# Patient Record
Sex: FEMALE | Race: WHITE | NOT HISPANIC OR LATINO | Employment: OTHER | URBAN - METROPOLITAN AREA
[De-identification: names, ages, dates, MRNs, and addresses within clinical notes are randomized per-mention and may not be internally consistent; named-entity substitution may affect disease eponyms.]

---

## 2017-09-20 ENCOUNTER — TELEPHONE (OUTPATIENT)
Dept: TRANSPLANT | Facility: CLINIC | Age: 57
End: 2017-09-20

## 2017-09-20 DIAGNOSIS — Z79.899 POLYPHARMACY: ICD-10-CM

## 2017-09-20 DIAGNOSIS — R06.82 TACHYPNEA: ICD-10-CM

## 2017-09-20 DIAGNOSIS — I27.9 CHRONIC PULMONARY HEART DISEASE: Primary | ICD-10-CM

## 2017-09-20 NOTE — TELEPHONE ENCOUNTER
----- Message from Cecile Snyder sent at 9/20/2017 12:13 PM CDT -----  Regarding: Pulmonary Hypertension Referral from Dr. Paddy Guerrier  Good Afternoon,     Dr. Paddy Guerrier is referring the current patient to the Pulmonary Hypertension clinic due to severe pulmonary hypertension and progressively worsening SOB. I've scanned the referral and records received, in the patient's  tab. Please let me know if you need additional information and I'd be happy to get it.    Thank you,  Cecile Snydre   [facility]  [phone]

## 2017-09-22 ENCOUNTER — TELEPHONE (OUTPATIENT)
Dept: TRANSPLANT | Facility: CLINIC | Age: 57
End: 2017-09-22

## 2017-09-28 ENCOUNTER — HISTORICAL (OUTPATIENT)
Dept: ADMINISTRATIVE | Facility: HOSPITAL | Age: 57
End: 2017-09-28

## 2017-10-04 PROBLEM — I27.20 PULMONARY HYPERTENSION: Status: ACTIVE | Noted: 2017-10-04

## 2017-10-05 ENCOUNTER — HOSPITAL ENCOUNTER (INPATIENT)
Facility: HOSPITAL | Age: 57
LOS: 6 days | Discharge: HOME HEALTH | DRG: 286 | End: 2017-10-11
Attending: INTERNAL MEDICINE | Admitting: INTERNAL MEDICINE
Payer: MEDICARE

## 2017-10-05 DIAGNOSIS — I51.7 CARDIOMEGALY: ICD-10-CM

## 2017-10-05 DIAGNOSIS — J96.11 CHRONIC RESPIRATORY FAILURE WITH HYPOXIA: ICD-10-CM

## 2017-10-05 DIAGNOSIS — I27.20 PULMONARY HTN: ICD-10-CM

## 2017-10-05 DIAGNOSIS — I50.9 CHF (CONGESTIVE HEART FAILURE): ICD-10-CM

## 2017-10-05 DIAGNOSIS — I27.20 PULMONARY HYPERTENSION: ICD-10-CM

## 2017-10-05 DIAGNOSIS — I50.33 ACUTE ON CHRONIC DIASTOLIC HEART FAILURE: ICD-10-CM

## 2017-10-05 DIAGNOSIS — I27.20 PULMONARY HYPERTENSION: Primary | ICD-10-CM

## 2017-10-05 PROBLEM — E66.2 HYPOVENTILATION ASSOCIATED WITH OBESITY SYNDROME: Status: ACTIVE | Noted: 2017-10-05

## 2017-10-05 PROBLEM — F31.9 BIPOLAR DISEASE, CHRONIC: Status: ACTIVE | Noted: 2017-10-05

## 2017-10-05 PROBLEM — J44.9 COPD (CHRONIC OBSTRUCTIVE PULMONARY DISEASE): Status: ACTIVE | Noted: 2017-10-05

## 2017-10-05 PROBLEM — E03.9 HYPOTHYROIDISM: Status: ACTIVE | Noted: 2017-10-05

## 2017-10-05 LAB
ALBUMIN SERPL BCP-MCNC: 4.1 G/DL
ALP SERPL-CCNC: 146 U/L
ALT SERPL W/O P-5'-P-CCNC: 44 U/L
ANION GAP SERPL CALC-SCNC: 13 MMOL/L
AST SERPL-CCNC: 37 U/L
BASOPHILS # BLD AUTO: 0.01 K/UL
BASOPHILS NFR BLD: 0.1 %
BILIRUB SERPL-MCNC: 1.5 MG/DL
BNP SERPL-MCNC: 669 PG/ML
BUN SERPL-MCNC: 37 MG/DL
CALCIUM SERPL-MCNC: 10.1 MG/DL
CHLORIDE SERPL-SCNC: 88 MMOL/L
CO2 SERPL-SCNC: 41 MMOL/L
CREAT SERPL-MCNC: 1.1 MG/DL
DIFFERENTIAL METHOD: ABNORMAL
EOSINOPHIL # BLD AUTO: 0.1 K/UL
EOSINOPHIL NFR BLD: 0.9 %
ERYTHROCYTE [DISTWIDTH] IN BLOOD BY AUTOMATED COUNT: 15.3 %
EST. GFR  (AFRICAN AMERICAN): >60 ML/MIN/1.73 M^2
EST. GFR  (NON AFRICAN AMERICAN): 56.7 ML/MIN/1.73 M^2
ESTIMATED AVG GLUCOSE: 111 MG/DL
ESTIMATED PA SYSTOLIC PRESSURE: 124.64
GLOBAL PERICARDIAL EFFUSION: ABNORMAL
GLUCOSE SERPL-MCNC: 81 MG/DL
HBA1C MFR BLD HPLC: 5.5 %
HCT VFR BLD AUTO: 49.6 %
HGB BLD-MCNC: 16.8 G/DL
INR PPP: 1
LYMPHOCYTES # BLD AUTO: 2 K/UL
LYMPHOCYTES NFR BLD: 18.2 %
MAGNESIUM SERPL-MCNC: 2.2 MG/DL
MCH RBC QN AUTO: 30.9 PG
MCHC RBC AUTO-ENTMCNC: 33.9 G/DL
MCV RBC AUTO: 91 FL
MONOCYTES # BLD AUTO: 0.6 K/UL
MONOCYTES NFR BLD: 5.4 %
NEUTROPHILS # BLD AUTO: 8.4 K/UL
NEUTROPHILS NFR BLD: 74.6 %
PLATELET # BLD AUTO: 136 K/UL
PMV BLD AUTO: 11.9 FL
POTASSIUM SERPL-SCNC: 3.3 MMOL/L
PROT SERPL-MCNC: 7.1 G/DL
PROTHROMBIN TIME: 10.9 SEC
RBC # BLD AUTO: 5.43 M/UL
RETIRED EF AND QEF - SEE NOTES: 55 (ref 55–65)
SODIUM SERPL-SCNC: 142 MMOL/L
TRICUSPID VALVE REGURGITATION: ABNORMAL
TSH SERPL DL<=0.005 MIU/L-ACNC: 1.9 UIU/ML
WBC # BLD AUTO: 11.18 K/UL

## 2017-10-05 PROCEDURE — 63600175 PHARM REV CODE 636 W HCPCS: Performed by: INTERNAL MEDICINE

## 2017-10-05 PROCEDURE — 83880 ASSAY OF NATRIURETIC PEPTIDE: CPT

## 2017-10-05 PROCEDURE — 94799 UNLISTED PULMONARY SVC/PX: CPT

## 2017-10-05 PROCEDURE — 99223 1ST HOSP IP/OBS HIGH 75: CPT | Mod: ,,, | Performed by: INTERNAL MEDICINE

## 2017-10-05 PROCEDURE — 93306 TTE W/DOPPLER COMPLETE: CPT | Mod: 26,,, | Performed by: INTERNAL MEDICINE

## 2017-10-05 PROCEDURE — 25000003 PHARM REV CODE 250: Performed by: PHYSICIAN ASSISTANT

## 2017-10-05 PROCEDURE — 99900035 HC TECH TIME PER 15 MIN (STAT)

## 2017-10-05 PROCEDURE — 94761 N-INVAS EAR/PLS OXIMETRY MLT: CPT

## 2017-10-05 PROCEDURE — 27000190 HC CPAP FULL FACE MASK W/VALVE

## 2017-10-05 PROCEDURE — 93306 TTE W/DOPPLER COMPLETE: CPT

## 2017-10-05 PROCEDURE — 84443 ASSAY THYROID STIM HORMONE: CPT

## 2017-10-05 PROCEDURE — 27000221 HC OXYGEN, UP TO 24 HOURS

## 2017-10-05 PROCEDURE — 85610 PROTHROMBIN TIME: CPT

## 2017-10-05 PROCEDURE — 94640 AIRWAY INHALATION TREATMENT: CPT

## 2017-10-05 PROCEDURE — 85025 COMPLETE CBC W/AUTO DIFF WBC: CPT

## 2017-10-05 PROCEDURE — 94660 CPAP INITIATION&MGMT: CPT

## 2017-10-05 PROCEDURE — 20600001 HC STEP DOWN PRIVATE ROOM

## 2017-10-05 PROCEDURE — 25000003 PHARM REV CODE 250: Performed by: INTERNAL MEDICINE

## 2017-10-05 PROCEDURE — A4216 STERILE WATER/SALINE, 10 ML: HCPCS | Performed by: INTERNAL MEDICINE

## 2017-10-05 PROCEDURE — 93010 ELECTROCARDIOGRAM REPORT: CPT | Mod: ,,, | Performed by: INTERNAL MEDICINE

## 2017-10-05 PROCEDURE — 83036 HEMOGLOBIN GLYCOSYLATED A1C: CPT

## 2017-10-05 PROCEDURE — 80053 COMPREHEN METABOLIC PANEL: CPT

## 2017-10-05 PROCEDURE — 36415 COLL VENOUS BLD VENIPUNCTURE: CPT

## 2017-10-05 PROCEDURE — 93005 ELECTROCARDIOGRAM TRACING: CPT

## 2017-10-05 PROCEDURE — 25000242 PHARM REV CODE 250 ALT 637 W/ HCPCS: Performed by: INTERNAL MEDICINE

## 2017-10-05 PROCEDURE — 83735 ASSAY OF MAGNESIUM: CPT

## 2017-10-05 RX ORDER — LEVOTHYROXINE SODIUM 75 UG/1
75 TABLET ORAL DAILY
COMMUNITY

## 2017-10-05 RX ORDER — HYDROCODONE BITARTRATE AND ACETAMINOPHEN 5; 325 MG/1; MG/1
1 TABLET ORAL EVERY 6 HOURS PRN
Status: DISCONTINUED | OUTPATIENT
Start: 2017-10-05 | End: 2017-10-11 | Stop reason: HOSPADM

## 2017-10-05 RX ORDER — PANTOPRAZOLE SODIUM 40 MG/1
40 TABLET, DELAYED RELEASE ORAL DAILY
Status: DISCONTINUED | OUTPATIENT
Start: 2017-10-05 | End: 2017-10-11 | Stop reason: HOSPADM

## 2017-10-05 RX ORDER — AMLODIPINE BESYLATE 5 MG/1
5 TABLET ORAL DAILY
COMMUNITY

## 2017-10-05 RX ORDER — AMOXICILLIN AND CLAVULANATE POTASSIUM 875; 125 MG/1; MG/1
1 TABLET, FILM COATED ORAL
Status: ON HOLD | COMMUNITY
End: 2017-10-05 | Stop reason: CLARIF

## 2017-10-05 RX ORDER — PRAVASTATIN SODIUM 40 MG/1
40 TABLET ORAL DAILY
Status: DISCONTINUED | OUTPATIENT
Start: 2017-10-05 | End: 2017-10-11 | Stop reason: HOSPADM

## 2017-10-05 RX ORDER — LEVOTHYROXINE SODIUM 75 UG/1
75 TABLET ORAL DAILY
Status: DISCONTINUED | OUTPATIENT
Start: 2017-10-05 | End: 2017-10-11 | Stop reason: HOSPADM

## 2017-10-05 RX ORDER — SODIUM CHLORIDE 0.9 % (FLUSH) 0.9 %
3 SYRINGE (ML) INJECTION EVERY 8 HOURS
Status: DISCONTINUED | OUTPATIENT
Start: 2017-10-05 | End: 2017-10-11 | Stop reason: HOSPADM

## 2017-10-05 RX ORDER — BUSPIRONE HYDROCHLORIDE 15 MG/1
15 TABLET ORAL 3 TIMES DAILY
COMMUNITY

## 2017-10-05 RX ORDER — IPRATROPIUM BROMIDE AND ALBUTEROL SULFATE 2.5; .5 MG/3ML; MG/3ML
3 SOLUTION RESPIRATORY (INHALATION) EVERY 6 HOURS
Status: DISCONTINUED | OUTPATIENT
Start: 2017-10-05 | End: 2017-10-11

## 2017-10-05 RX ORDER — DESVENLAFAXINE SUCCINATE 50 MG/1
100 TABLET, EXTENDED RELEASE ORAL DAILY
Status: DISCONTINUED | OUTPATIENT
Start: 2017-10-05 | End: 2017-10-11 | Stop reason: HOSPADM

## 2017-10-05 RX ORDER — BUSPIRONE HYDROCHLORIDE 5 MG/1
15 TABLET ORAL 3 TIMES DAILY
Status: DISCONTINUED | OUTPATIENT
Start: 2017-10-05 | End: 2017-10-11 | Stop reason: HOSPADM

## 2017-10-05 RX ORDER — BUDESONIDE AND FORMOTEROL FUMARATE DIHYDRATE 80; 4.5 UG/1; UG/1
2 AEROSOL RESPIRATORY (INHALATION) 2 TIMES DAILY
Status: ON HOLD | COMMUNITY
End: 2018-06-10 | Stop reason: HOSPADM

## 2017-10-05 RX ORDER — ALBUTEROL SULFATE 0.83 MG/ML
2.5 SOLUTION RESPIRATORY (INHALATION) EVERY 6 HOURS PRN
COMMUNITY

## 2017-10-05 RX ORDER — LAMOTRIGINE 100 MG/1
100 TABLET ORAL 2 TIMES DAILY
Status: DISCONTINUED | OUTPATIENT
Start: 2017-10-05 | End: 2017-10-11 | Stop reason: HOSPADM

## 2017-10-05 RX ORDER — POTASSIUM CHLORIDE 750 MG/1
60 CAPSULE, EXTENDED RELEASE ORAL ONCE
Status: COMPLETED | OUTPATIENT
Start: 2017-10-05 | End: 2017-10-05

## 2017-10-05 RX ORDER — LURASIDONE HYDROCHLORIDE 20 MG/1
60 TABLET, FILM COATED ORAL DAILY
Status: DISCONTINUED | OUTPATIENT
Start: 2017-10-05 | End: 2017-10-11 | Stop reason: HOSPADM

## 2017-10-05 RX ORDER — LURASIDONE HYDROCHLORIDE 60 MG/1
40 TABLET, FILM COATED ORAL DAILY
COMMUNITY
End: 2018-06-22

## 2017-10-05 RX ORDER — FUROSEMIDE 20 MG/1
20 TABLET ORAL DAILY
Status: ON HOLD | COMMUNITY
End: 2017-10-11

## 2017-10-05 RX ORDER — PRAVASTATIN SODIUM 40 MG/1
40 TABLET ORAL DAILY
COMMUNITY

## 2017-10-05 RX ORDER — DESVENLAFAXINE 100 MG/1
100 TABLET, EXTENDED RELEASE ORAL DAILY
COMMUNITY

## 2017-10-05 RX ORDER — PANTOPRAZOLE SODIUM 40 MG/1
40 TABLET, DELAYED RELEASE ORAL DAILY
COMMUNITY

## 2017-10-05 RX ORDER — AMLODIPINE BESYLATE 5 MG/1
5 TABLET ORAL DAILY
Status: DISCONTINUED | OUTPATIENT
Start: 2017-10-05 | End: 2017-10-11 | Stop reason: HOSPADM

## 2017-10-05 RX ORDER — HEPARIN SODIUM 5000 [USP'U]/ML
5000 INJECTION, SOLUTION INTRAVENOUS; SUBCUTANEOUS EVERY 8 HOURS
Status: DISCONTINUED | OUTPATIENT
Start: 2017-10-05 | End: 2017-10-11 | Stop reason: HOSPADM

## 2017-10-05 RX ORDER — FUROSEMIDE 10 MG/ML
40 INJECTION INTRAMUSCULAR; INTRAVENOUS 2 TIMES DAILY
Status: DISCONTINUED | OUTPATIENT
Start: 2017-10-05 | End: 2017-10-10

## 2017-10-05 RX ORDER — ALBUTEROL SULFATE 0.83 MG/ML
2.5 SOLUTION RESPIRATORY (INHALATION) EVERY 4 HOURS PRN
Status: DISCONTINUED | OUTPATIENT
Start: 2017-10-05 | End: 2017-10-11

## 2017-10-05 RX ORDER — HYDROCODONE BITARTRATE AND ACETAMINOPHEN 10; 325 MG/1; MG/1
1 TABLET ORAL EVERY 8 HOURS PRN
COMMUNITY

## 2017-10-05 RX ORDER — LAMOTRIGINE 100 MG/1
100 TABLET ORAL 2 TIMES DAILY
COMMUNITY

## 2017-10-05 RX ADMIN — IPRATROPIUM BROMIDE AND ALBUTEROL SULFATE 3 ML: .5; 3 SOLUTION RESPIRATORY (INHALATION) at 01:10

## 2017-10-05 RX ADMIN — HEPARIN SODIUM 5000 UNITS: 5000 INJECTION, SOLUTION INTRAVENOUS; SUBCUTANEOUS at 05:10

## 2017-10-05 RX ADMIN — PANTOPRAZOLE SODIUM 40 MG: 40 TABLET, DELAYED RELEASE ORAL at 09:10

## 2017-10-05 RX ADMIN — DESVENLAFAXINE SUCCINATE 100 MG: 50 TABLET, FILM COATED, EXTENDED RELEASE ORAL at 09:10

## 2017-10-05 RX ADMIN — FUROSEMIDE 40 MG: 10 INJECTION, SOLUTION INTRAVENOUS at 09:10

## 2017-10-05 RX ADMIN — HEPARIN SODIUM 5000 UNITS: 5000 INJECTION, SOLUTION INTRAVENOUS; SUBCUTANEOUS at 08:10

## 2017-10-05 RX ADMIN — LEVOTHYROXINE SODIUM 75 MCG: 75 TABLET ORAL at 09:10

## 2017-10-05 RX ADMIN — BUSPIRONE HYDROCHLORIDE 15 MG: 5 TABLET ORAL at 05:10

## 2017-10-05 RX ADMIN — BUSPIRONE HYDROCHLORIDE 15 MG: 5 TABLET ORAL at 08:10

## 2017-10-05 RX ADMIN — AMLODIPINE BESYLATE 5 MG: 5 TABLET ORAL at 09:10

## 2017-10-05 RX ADMIN — Medication 3 ML: at 01:10

## 2017-10-05 RX ADMIN — IPRATROPIUM BROMIDE AND ALBUTEROL SULFATE 3 ML: .5; 3 SOLUTION RESPIRATORY (INHALATION) at 07:10

## 2017-10-05 RX ADMIN — LAMOTRIGINE 100 MG: 100 TABLET ORAL at 08:10

## 2017-10-05 RX ADMIN — LAMOTRIGINE 100 MG: 100 TABLET ORAL at 11:10

## 2017-10-05 RX ADMIN — FUROSEMIDE 40 MG: 10 INJECTION, SOLUTION INTRAVENOUS at 05:10

## 2017-10-05 RX ADMIN — PRAVASTATIN SODIUM 40 MG: 40 TABLET ORAL at 09:10

## 2017-10-05 RX ADMIN — POTASSIUM CHLORIDE 60 MEQ: 750 CAPSULE, EXTENDED RELEASE ORAL at 11:10

## 2017-10-05 RX ADMIN — HYDROCODONE BITARTRATE AND ACETAMINOPHEN 1 TABLET: 5; 325 TABLET ORAL at 05:10

## 2017-10-05 RX ADMIN — HEPARIN SODIUM 5000 UNITS: 5000 INJECTION, SOLUTION INTRAVENOUS; SUBCUTANEOUS at 01:10

## 2017-10-05 RX ADMIN — BUSPIRONE HYDROCHLORIDE 15 MG: 5 TABLET ORAL at 01:10

## 2017-10-05 RX ADMIN — IPRATROPIUM BROMIDE AND ALBUTEROL SULFATE 3 ML: .5; 3 SOLUTION RESPIRATORY (INHALATION) at 08:10

## 2017-10-05 RX ADMIN — LURASIDONE HYDROCHLORIDE 60 MG: 20 TABLET, FILM COATED ORAL at 11:10

## 2017-10-05 NOTE — ASSESSMENT & PLAN NOTE
- Unclear type (suspect this is 1 or 3) with concomitant SHERIE (PA pressure out of proportion to what would be expected of SHERIE alone)   - Long history of smoking but no COPD per outside pulmonologist   - CT PE study done at OSH. May want to have our radiologist reviews as CTEPH is sometimes subtle. Disk placed in folder on floor.   - Will be evaluated this admission for advanced therapies (PA vasodilators etc)   - Will repeat TTE here with bubbles to evaluate for shunt  - Heparin sub q for VTE PPX  - Awaiting labs

## 2017-10-05 NOTE — ASSESSMENT & PLAN NOTE
"- Continue oxygen supplementaion with plan to wean back to home dose of 3Ls by NC  - BiPAP QHS (she is not sure her settings). May have to start on 10/5 or get outside records  - Consider pulmonary consult to help guide need for possible repeat PFTs and to clarify the appropriateness of a home trilogy vent(?) despite not having a tracheostomy or chronic vent dependence?   - SpO2 96% by 10L on Venti mask   - ABG for any signs of encephalopathy or respiratory distress  - Unclear role for her previous inhaled therapies for what was called "COPD". Will continue duoneb and prn albuterol for now as her lungs sound junky. Will hold further systemic steroids (initially on prednisone 40mg for a possible COPD exacerbation).   "

## 2017-10-05 NOTE — PLAN OF CARE
Problem: Patient Care Overview  Goal: Plan of Care Review  Outcome: Ongoing (interventions implemented as appropriate)  Pt AAO x4. Pt independent. Pt on 3 L NC. O2 sats 93%. Pt sinus rhythm on tele. Pt had 2D echo today. Pt on regular low sodium diet, with 2000 mL fluid restriction. Pt not experiencing any pain. Pt receiving IV lasix. Pt's urine output 350 thus far this shift with one unmeasured urine occurrence. Pt has had one bowel movement today. Pt wearing nonslip socks when out of bed and aware to call for help when needed.

## 2017-10-05 NOTE — HPI
Mrs. Sue Smith is a 57 yo female with a PMHx of complex pulmonary disease (see below), Bipolar disorder (controlled on multiple meds), hypothyroidism, GERD, previous pericardial window for an pericardial effusion, HTN, GERD and dyslipidemia. She presents as a transfer from [facility] for PulmHTN evaluation. She had previously been referred to us with plans for outpatient evaluation on 10/18/17. She was admitted there on 9/28/17 for worsening SOB that had developed over the past week. She initially required BiPAP and IV diuresis before eventually weaning to Venti mask. Over the course of her admission she had a CT PE study that was read as negative for pulmonary embolism, TTE that showed no significant aortic/mitral disease but did show a hyperdynamic LV and  Estimated PA systolic pressure of 98. She subsequently underwent RHC on 9/29/17 (unclear if she was adequately diuresed) with a PCW of 26 /50 (75) /30 RA 25. She was transferred here for advance PAH options / evaluation. Relatively asymptomatic here. Lives in assisted living with extended family close. Long discussion regarding goals of care. Patient is a full code. Does not think she has an advanced directive (she plans to discuss with her family).     Pulmonary Hx: Of note records from OSH are discordant. Hospitalist / cardiology have her labeled as COPD and Obesity hypoventilation however consult by pulmonologist (Dr. Hardik Magallanes) states explicitly that PFTs done in 2015 showed only mild restriction and were not consistent with COPD. In addition he states that though she is mildly obese her pCO2 is normal at baseline ruling out obesity hypoventilation. What makes this more difficult is the fact that the patient is unsure if she has COPD as she has been told different things by different doctors over the years.     - Severe Pulmonary HTN  - Chronic hypoxic respiratory failure for which she is on 3L all the time and BiPAP QHS  - History of  tracheostomy (4 years ago) and short term vent dependence. Tracheostomy was eventually de cannulated.   - Previous tobacco abuse (stopped 15 yrs ago)   - SHERIE (unclear severity) has been on trilogy vent for BiPAP at night.

## 2017-10-05 NOTE — PLAN OF CARE
Problem: Patient Care Overview  Goal: Plan of Care Review  Outcome: Ongoing (interventions implemented as appropriate)  Pt has call bell in reach, non slip socks on, and bedrails up x2. Pt has meds for anxiety and comfortable in room. Pt on venti mask at 12 liters. Pt encouraged to wash hands.

## 2017-10-05 NOTE — PROGRESS NOTES
Admit Note     Met with patient to assess needs. Patient is a 56 y.o.  female, admitted for pulmonary hypertension.  PMH of hypothyroidism , GERD, HTN, SOB and Bipolar disorder.     Patient transfered from [facility] on 10/5/2017 .  At this time, patient presents as alert and oriented x 4, pleasant and good eye contact.  At this time, patients caregiver is not in attendance.     Household/Family Systems     Patient resides alone, at    [facility]   [address]  [place] [address].    Pt lives in assisted living.  Pt lives 2.5 hours from [facility].  [place] is close to [place].     Support system includes Daughter, father and friend.    Patient does not have dependents that are need of being cared for.   The pt has two daughters however has the contact information for only one daughter.      Patients primary caregiver is self.  The pt's cell is: [phone]  Additional emergency contacts:  Prabha Smith (daughter, lives in [place]) [phone]  UERIN Clive (father, lives in [place]) [phone]    During admission, patient's family plans to stay at home.  Confirmed patient and patients caregivers  Have limited access to reliable transportation.    Cognitive Status/Learning     Patient reports reading ability as 12th grade and states patient does have difficulty with comprehension and learning. Pt reports she does not have difficulty with seeing, hearing, reading, witting, or with her memory.  Pt does wear glasses.  Patient reports patient learns best by one on one.     Needed: No.   Highest education level: High School (9-12) or GED    Vocation/Disability   .  Working for Income: No  If no, reason not working: Disability    Patient reports she has been on disability for the last 25-30 years due to bipolar disorder.   Pt reports prior to disability she was a .     Adherence     Patient reports a high level of adherence to patients health care regimen. Pt  reports she is on a regular diet at home.  Adherence counseling and education provided. Patient verbalizes understanding.    Substance Use    Patient reports the following substance usage.    Tobacco: none.  Pt reports she smoked from 1919-3192.  Pt reports she used to smoke 1-2 ppd.  .  Alcohol: none, patient denies any use.  Illicit Drugs/Non-prescribed Medications: none, patient denies any use.  Patient states clear understanding of the potential impact of substance use.  Substance abstinence/cessation counseling, education and resources provided and reviewed.     Services Utilizing/ADLS    Infusion Service: Prior to admission, patient utilizing? no  Home Health: Prior to admission, patient utilizing? no Pt reports no HH preference.   DME: Prior to admission, yes Home 02 set up with portables, 3 LPM via [employer].  Pt also has a nebulizar and shower chair.   Pulmonary/Cardiac Rehab: Prior to admission, no  Dialysis:  Prior to admission, no  Transplant Specialty Pharmacy:  Prior to admission, no.    Prior to admission, patient reports patient was independent with ADLS, but not with cooking or cleaning her home.   Pt  was not driving. The pt reports her daughter and father can assist with transport around the [place] area, but are not able to assist with transportation to [place].  The pt reports she has a friend-Rivka who should be able to drive the pt home and assist with transport to [place] as needed.      Patient reports patient is not able to care for self at this time due to compromised medical condition (as documented in medical record) and physical weakness..  Patient indicates a willingness to care for self once medically cleared to do so.  Worker encouraged pt to go to the Medicaid office in her town and apply for a PCA via Medicaid.    Insurance/Medications    Insured by   Payor/Plan Subscr  Sex Relation Sub. Ins. ID Effective Group Num   1. PEOPLES LINDA* RADHAHERACLIO SIMPSON 1960  Female  [id number] 1/1/17 [address]      Primary Insurance (for UNOS reporting): Public Insurance - Medicare FFS (Fee For Service)  Secondary Insurance (for UNOS reporting): Public Insurance - Medicaid    Patient reports patient is able to obtain and afford medications at this time and at time of discharge.    Living Will/Healthcare Power of     Patient states patient does not have a LW and/or HCPA.   provided education regarding LW and HCPA and the completion of forms.    Coping/Mental Health    Patient is coping adequately with the aid of  family members and friends. .  Patient indicates mental health difficulties. Pt reports she has a long history of Bipolar along with anxiety and depression.  The pt reports she is on multiple medications to assist with same.  The pt reports she tries to maintain a positive attitude.  Worker provided general support.      Discharge Planning    At time of discharge, patient plans to return to patient's home under the care of slef.  Patients friend will transport patient.  Per rounds today, expected discharge date has not been medically determined at this time. Patient and patients caregiver  verbalize understanding and are involved in treatment planning and discharge process.    Additional Concerns    Patient is being followed for needs, education, resources, information, emotional support, supportive counseling, and for supportive and skilled discharge plan of care.  providing ongoing psychosocial support, education, resources and d/c planning as needed.  SW remains available. Patient verbalizes understanding and agreement with information reviewed, social work availability, and how to access available resources as needed.

## 2017-10-05 NOTE — SUBJECTIVE & OBJECTIVE
Past Medical History:   Diagnosis Date    Bipolar disorder     CHF (congestive heart failure)     Dyslipidemia     GERD (gastroesophageal reflux disease)     Hx of tracheostomy     Decanulated / surgically removed in 2013? Does not currently have tracheostomy    Hypertension     Hypothyroidism     Oxygen dependent     3L around the clock     Pulmonary HTN     Respiratory failure, chronic        Past Surgical History:   Procedure Laterality Date    BACK SURGERY      PERICARDIAL WINDOW  2013    OK LEFT HEART CATH,PERCUTANEOUS      Cardiac Cath, Left Heart    TUBAL LIGATION         Review of patient's allergies indicates:   Allergen Reactions    Sulfa (sulfonamide antibiotics) Rash       Current Facility-Administered Medications   Medication    albuterol nebulizer solution 2.5 mg    albuterol-ipratropium 2.5mg-0.5mg/3mL nebulizer solution 3 mL    amlodipine tablet 5 mg    busPIRone tablet 15 mg    desvenlafaxine succinate 24 hr tablet 100 mg    furosemide injection 40 mg    heparin (porcine) injection 5,000 Units    lamotrigine tablet 100 mg    levothyroxine tablet 75 mcg    lurasidone tablet 60 mg    pantoprazole EC tablet 40 mg    pravastatin tablet 40 mg    sodium chloride 0.9% flush 3 mL     Family History     Problem Relation (Age of Onset)    Cancer Mother, Sister    Hypertension Mother, Father        Social History Main Topics    Smoking status: Former Smoker     Types: Cigarettes     Start date: 1/1/1979     Quit date: 1/5/1997    Smokeless tobacco: Never Used    Alcohol use No    Drug use: No    Sexual activity: No     Review of Systems   Constitutional: Negative.    HENT: Negative.    Eyes: Negative.    Respiratory: Positive for shortness of breath.    Cardiovascular: Positive for leg swelling. Negative for chest pain.   Gastrointestinal: Negative.    Endocrine: Negative.    Genitourinary: Negative.    Musculoskeletal: Positive for back pain.   Skin: Negative.     Allergic/Immunologic: Negative.    Neurological: Negative.    Hematological: Negative.    Psychiatric/Behavioral: Negative.      Objective:     Vital Signs (Most Recent):  Temp: 99.1 °F (37.3 °C) (10/05/17 0209)  Pulse: 70 (10/05/17 0209)  Resp: 18 (10/05/17 0209)  BP: 126/89 (10/05/17 0209)  SpO2: 96 % (10/05/17 0209) Vital Signs (24h Range):  Temp:  [99.1 °F (37.3 °C)] 99.1 °F (37.3 °C)  Pulse:  [70] 70  Resp:  [18] 18  SpO2:  [96 %] 96 %  BP: (126)/(89) 126/89   Patient Vitals for the past 72 hrs (Last 3 readings):   Weight   10/05/17 0209 86.3 kg (190 lb 4.1 oz)     Body mass index is 32.66 kg/m².    Physical Exam   Constitutional: She is oriented to person, place, and time. She appears well-developed and well-nourished. No distress.   HENT:   Head: Normocephalic and atraumatic.   Eyes: Pupils are equal, round, and reactive to light.   Neck: Neck supple. JVD present.   Cardiovascular: Normal rate and regular rhythm.    Pulmonary/Chest: Effort normal. No respiratory distress. She has rales.   Abdominal: Soft. She exhibits no distension. There is no tenderness.   Obese    Musculoskeletal: She exhibits edema.   Neurological: She is alert and oriented to person, place, and time.   Skin: Skin is warm and dry.   Psychiatric: She has a normal mood and affect. Her behavior is normal. Judgment and thought content normal.   Vitals reviewed.      Significant Labs:  Labs: pending     Diagnostic Results:  RHC / TTE reviewed per paper record from OSH (summary in HPI)    CXR ordered.

## 2017-10-05 NOTE — ASSESSMENT & PLAN NOTE
- Volume overloaded on exam   - Etiology is likely PAH and RV failure   - Continue IV lasix with strict I/Os and daily weights   - LV function normal / hyperdynamic

## 2017-10-05 NOTE — H&P
[facility]  Heart Transplant  H&P    Patient Name: Sue Smith  MRN: [id number]  Admission Date: 10/5/2017  Attending Physician: Shelby De La Paz MD  Primary Care Provider: Paddy Guerrier DO  Principal Problem:Pulmonary hypertension    Subjective:     History of Present Illness:  Mrs. Sue Smith is a 55 yo female with a PMHx of complex pulmonary disease (see below), Bipolar disorder (controlled on multiple meds), hypothyroidism, GERD, previous pericardial window for an pericardial effusion, HTN, GERD and dyslipidemia. She presents as a transfer from [facility] for PulTN evaluation. She had previously been referred to us with plans for outpatient evaluation on 10/18/17. She was admitted there on 9/28/17 for worsening SOB that had developed over the past week. She initially required BiPAP and IV diuresis before eventually weaning to Venti mask. Over the course of her admission she had a CT PE study that was read as negative for pulmonary embolism, TTE that showed no significant aortic/mitral disease but did show a hyperdynamic LV and  Estimated PA systolic pressure of 98. She subsequently underwent RHC on 9/29/17 (unclear if she was adequately diuresed) with a PCW of 26 /50 (75) /30 RA 25. She was transferred here for advance PAH options / evaluation. Relatively asymptomatic here. Lives in assisted living with extended family close. Long discussion regarding goals of care. Patient is a full code. Does not thing she has an advanced directive (she plans to discuss with her family).     Pulmonary Hx: Of note records from OSH are discordant. Hospitalist / cardiology have her labeled as COPD and Obesity hypoventilation however consult by pulmonologist (Dr. Hardik Magallanes) states explicitly that PFTs done in 2015 showed only mild restriction and were not consistent with COPD. In addition he states that though she is mildly obese her pCO2 is normal at baseline ruling out obesity  hypoventilation. What makes this more difficult is the fact that the patient is unsure if she has COPD as she has been told different things by different doctors over the years.     - Severe Pulmonary HTN  - Chronic hypoxic respiratory failure for which she is on 3L all the time and BiPAP QHS  - History of tracheostomy (4 years ago) and short term vent dependence. Tracheostomy was eventually de cannulated.   - Previous tobacco abuse (stopped 15 yrs ago)   - SHERIE (unclear severity) has been on trilogy vent for BiPAP at night.     History was difficult to obtain as patient is a poor historian (at least in regards to timeline of events) and previous care seems fragmented (see pulmonary hx above).      OSH imaging disc, pulmonology consult note and cath report organized in printed record with metal clip (placed by me) for easy review.     Past Medical History:   Diagnosis Date    Bipolar disorder     CHF (congestive heart failure)     Dyslipidemia     GERD (gastroesophageal reflux disease)     Hx of tracheostomy     Decanulated / surgically removed in 2013? Does not currently have tracheostomy    Hypertension     Hypothyroidism     Oxygen dependent     3L around the clock     Pulmonary HTN     Respiratory failure, chronic        Past Surgical History:   Procedure Laterality Date    BACK SURGERY      PERICARDIAL WINDOW  2013    DC LEFT HEART CATH,PERCUTANEOUS      Cardiac Cath, Left Heart    TUBAL LIGATION         Review of patient's allergies indicates:   Allergen Reactions    Sulfa (sulfonamide antibiotics) Rash       Current Facility-Administered Medications   Medication    albuterol nebulizer solution 2.5 mg    albuterol-ipratropium 2.5mg-0.5mg/3mL nebulizer solution 3 mL    amlodipine tablet 5 mg    busPIRone tablet 15 mg    desvenlafaxine succinate 24 hr tablet 100 mg    furosemide injection 40 mg    heparin (porcine) injection 5,000 Units    lamotrigine tablet 100 mg    levothyroxine tablet  75 mcg    lurasidone tablet 60 mg    pantoprazole EC tablet 40 mg    pravastatin tablet 40 mg    sodium chloride 0.9% flush 3 mL     Family History     Problem Relation (Age of Onset)    Cancer Mother, Sister    Hypertension Mother, Father        Social History Main Topics    Smoking status: Former Smoker     Types: Cigarettes     Start date: 1/1/1979     Quit date: 1/5/1997    Smokeless tobacco: Never Used    Alcohol use No    Drug use: No    Sexual activity: No     Review of Systems   Constitutional: Negative.    HENT: Negative.    Eyes: Negative.    Respiratory: Positive for shortness of breath.    Cardiovascular: Positive for leg swelling. Negative for chest pain.   Gastrointestinal: Negative.    Endocrine: Negative.    Genitourinary: Negative.    Musculoskeletal: Positive for back pain.   Skin: Negative.    Allergic/Immunologic: Negative.    Neurological: Negative.    Hematological: Negative.    Psychiatric/Behavioral: Negative.      Objective:     Vital Signs (Most Recent):  Temp: 99.1 °F (37.3 °C) (10/05/17 0209)  Pulse: 70 (10/05/17 0209)  Resp: 18 (10/05/17 0209)  BP: 126/89 (10/05/17 0209)  SpO2: 96 % (10/05/17 0209) Vital Signs (24h Range):  Temp:  [99.1 °F (37.3 °C)] 99.1 °F (37.3 °C)  Pulse:  [70] 70  Resp:  [18] 18  SpO2:  [96 %] 96 %  BP: (126)/(89) 126/89   Patient Vitals for the past 72 hrs (Last 3 readings):   Weight   10/05/17 0209 86.3 kg (190 lb 4.1 oz)     Body mass index is 32.66 kg/m².    Physical Exam   Constitutional: She is oriented to person, place, and time. She appears well-developed and well-nourished. No distress.   HENT:   Head: Normocephalic and atraumatic.   Eyes: Pupils are equal, round, and reactive to light.   Neck: Neck supple. JVD present.   Cardiovascular: Normal rate and regular rhythm.    Pulmonary/Chest: Effort normal. No respiratory distress. She has rales.   Abdominal: Soft. She exhibits no distension. There is no tenderness.   Obese    Musculoskeletal: She  "exhibits edema.   Neurological: She is alert and oriented to person, place, and time.   Skin: Skin is warm and dry.   Psychiatric: She has a normal mood and affect. Her behavior is normal. Judgment and thought content normal.   Vitals reviewed.      Significant Labs:  Labs: pending     Diagnostic Results:  RHC / TTE reviewed per paper record from OSH (summary in HPI)    CXR ordered.     Assessment/Plan:     * Pulmonary hypertension    - Unclear type (suspect this is 1 or 3) with concomitant SHERIE (PA pressure out of proportion to what would be expected of SHERIE alone)   - Long history of smoking but no COPD per outside pulmonologist   - CT PE study done at OSH. May want to have our radiologist reviews as CTEPH is sometimes subtle. Disk placed in folder on floor.   - Will be evaluated this admission for advanced therapies (PA vasodilators etc)   - Will repeat TTE here with bubbles to evaluate for shunt  - Heparin sub q for VTE PPX  - Awaiting labs         Chronic respiratory failure with hypoxia    - Continue oxygen supplementaion with plan to wean back to home dose of 3Ls by NC  - BiPAP QHS (she is not sure her settings). May have to start on 10/5 or get outside records  - Consider pulmonary consult to help guide need for possible repeat PFTs and to clarify the appropriateness of a home trilogy vent(?) despite not having a tracheostomy or chronic vent dependence?   - SpO2 96% by 10L on Venti mask   - ABG for any signs of encephalopathy or respiratory distress  - Unclear role for her previous inhaled therapies for what was called "COPD". Will continue duoneb and prn albuterol for now as her lungs sound junky. Will hold further systemic steroids (initially on prednisone 40mg for a possible COPD exacerbation).         CHF (congestive heart failure)    - Volume overloaded on exam   - Etiology is likely PAH and RV failure   - Continue IV lasix with strict I/Os and daily weights   - LV function normal / hyperdynamic       "   Bipolar disorder    - Continue lamictal, desvenlafaxine, buspirone and lurasidone.   -Expressed concerns regarding her buspirone as it can propogate liyah   -Will hold off on making changes as I have just meet her and she has been stable on above regimen for many years   -Consider psych evaluation if she starts showing signs of liyah for medication adjustment           Chronic Medical issues not being actively addressed   - DLD: continue statin  - HTN: continue pta amlodipine   - Hypothyroidism: continue pta levothyroxine  - Chronic neck pain: reduced her norco dose to 5/325mg q8prn due to her respiratory issues   - GERD: continue PPI     Patient discussed with Dr. Sandy with plan to staff with Dr. De La Paz this AM    Pedro frankel, DO  Heart Transplant  [facility]

## 2017-10-06 PROBLEM — I50.33 ACUTE ON CHRONIC DIASTOLIC HEART FAILURE: Status: ACTIVE | Noted: 2017-10-06

## 2017-10-06 LAB
ALBUMIN SERPL BCP-MCNC: 4.1 G/DL
ALP SERPL-CCNC: 152 U/L
ALT SERPL W/O P-5'-P-CCNC: 43 U/L
ANION GAP SERPL CALC-SCNC: 13 MMOL/L
AST SERPL-CCNC: 38 U/L
BASOPHILS # BLD AUTO: 0.03 K/UL
BASOPHILS NFR BLD: 0.2 %
BILIRUB SERPL-MCNC: 1.4 MG/DL
BUN SERPL-MCNC: 38 MG/DL
CALCIUM SERPL-MCNC: 10.1 MG/DL
CHLORIDE SERPL-SCNC: 90 MMOL/L
CO2 SERPL-SCNC: 37 MMOL/L
CREAT SERPL-MCNC: 1.2 MG/DL
CRP SERPL-MCNC: 14.9 MG/L
DIFFERENTIAL METHOD: ABNORMAL
EOSINOPHIL # BLD AUTO: 0.2 K/UL
EOSINOPHIL NFR BLD: 1.8 %
ERYTHROCYTE [DISTWIDTH] IN BLOOD BY AUTOMATED COUNT: 15.3 %
ERYTHROCYTE [SEDIMENTATION RATE] IN BLOOD BY WESTERGREN METHOD: 2 MM/HR
EST. GFR  (AFRICAN AMERICAN): 58.8 ML/MIN/1.73 M^2
EST. GFR  (NON AFRICAN AMERICAN): 51 ML/MIN/1.73 M^2
GLUCOSE SERPL-MCNC: 90 MG/DL
HCT VFR BLD AUTO: 52.8 %
HGB BLD-MCNC: 17.2 G/DL
HIV 1+2 AB+HIV1 P24 AG SERPL QL IA: NEGATIVE
INR PPP: 1
LA PPP-IMP: NEGATIVE
LYMPHOCYTES # BLD AUTO: 2.1 K/UL
LYMPHOCYTES NFR BLD: 16.9 %
MAGNESIUM SERPL-MCNC: 2.1 MG/DL
MCH RBC QN AUTO: 31 PG
MCHC RBC AUTO-ENTMCNC: 32.6 G/DL
MCV RBC AUTO: 95 FL
MONOCYTES # BLD AUTO: 0.9 K/UL
MONOCYTES NFR BLD: 7 %
NEUTROPHILS # BLD AUTO: 9.1 K/UL
NEUTROPHILS NFR BLD: 73 %
PLATELET # BLD AUTO: 161 K/UL
PMV BLD AUTO: 12.5 FL
POTASSIUM SERPL-SCNC: 4 MMOL/L
PRE FEV1 FVC: 74
PRE FEV1: 2.09
PRE FVC: 2.81
PREDICTED FEV1 FVC: 81
PREDICTED FEV1: 2.52
PREDICTED FVC: 3.11
PROT SERPL-MCNC: 7.2 G/DL
PROTHROMBIN TIME: 10.9 SEC
RBC # BLD AUTO: 5.55 M/UL
RHEUMATOID FACT SERPL-ACNC: 12 IU/ML
SODIUM SERPL-SCNC: 140 MMOL/L
WBC # BLD AUTO: 12.51 K/UL

## 2017-10-06 PROCEDURE — 86038 ANTINUCLEAR ANTIBODIES: CPT

## 2017-10-06 PROCEDURE — 85610 PROTHROMBIN TIME: CPT

## 2017-10-06 PROCEDURE — 36415 COLL VENOUS BLD VENIPUNCTURE: CPT

## 2017-10-06 PROCEDURE — 99900035 HC TECH TIME PER 15 MIN (STAT)

## 2017-10-06 PROCEDURE — 63600175 PHARM REV CODE 636 W HCPCS: Performed by: INTERNAL MEDICINE

## 2017-10-06 PROCEDURE — 83735 ASSAY OF MAGNESIUM: CPT

## 2017-10-06 PROCEDURE — 94640 AIRWAY INHALATION TREATMENT: CPT

## 2017-10-06 PROCEDURE — A4216 STERILE WATER/SALINE, 10 ML: HCPCS | Performed by: INTERNAL MEDICINE

## 2017-10-06 PROCEDURE — 27000221 HC OXYGEN, UP TO 24 HOURS

## 2017-10-06 PROCEDURE — 25000242 PHARM REV CODE 250 ALT 637 W/ HCPCS: Performed by: INTERNAL MEDICINE

## 2017-10-06 PROCEDURE — 27000173 HC ACAPELLA DEVICE DH OR DM

## 2017-10-06 PROCEDURE — 25000003 PHARM REV CODE 250: Performed by: INTERNAL MEDICINE

## 2017-10-06 PROCEDURE — 94664 DEMO&/EVAL PT USE INHALER: CPT

## 2017-10-06 PROCEDURE — 86703 HIV-1/HIV-2 1 RESULT ANTBDY: CPT

## 2017-10-06 PROCEDURE — 85651 RBC SED RATE NONAUTOMATED: CPT

## 2017-10-06 PROCEDURE — 80053 COMPREHEN METABOLIC PANEL: CPT

## 2017-10-06 PROCEDURE — 20600001 HC STEP DOWN PRIVATE ROOM

## 2017-10-06 PROCEDURE — 99232 SBSQ HOSP IP/OBS MODERATE 35: CPT | Mod: ,,, | Performed by: INTERNAL MEDICINE

## 2017-10-06 PROCEDURE — 86431 RHEUMATOID FACTOR QUANT: CPT

## 2017-10-06 PROCEDURE — 85025 COMPLETE CBC W/AUTO DIFF WBC: CPT

## 2017-10-06 PROCEDURE — 85613 RUSSELL VIPER VENOM DILUTED: CPT

## 2017-10-06 PROCEDURE — 86140 C-REACTIVE PROTEIN: CPT

## 2017-10-06 RX ADMIN — HEPARIN SODIUM 5000 UNITS: 5000 INJECTION, SOLUTION INTRAVENOUS; SUBCUTANEOUS at 08:10

## 2017-10-06 RX ADMIN — FUROSEMIDE 40 MG: 10 INJECTION, SOLUTION INTRAVENOUS at 09:10

## 2017-10-06 RX ADMIN — IPRATROPIUM BROMIDE AND ALBUTEROL SULFATE 3 ML: .5; 3 SOLUTION RESPIRATORY (INHALATION) at 12:10

## 2017-10-06 RX ADMIN — HEPARIN SODIUM 5000 UNITS: 5000 INJECTION, SOLUTION INTRAVENOUS; SUBCUTANEOUS at 05:10

## 2017-10-06 RX ADMIN — Medication 3 ML: at 01:10

## 2017-10-06 RX ADMIN — HYDROCODONE BITARTRATE AND ACETAMINOPHEN 1 TABLET: 5; 325 TABLET ORAL at 04:10

## 2017-10-06 RX ADMIN — IPRATROPIUM BROMIDE AND ALBUTEROL SULFATE 3 ML: .5; 3 SOLUTION RESPIRATORY (INHALATION) at 08:10

## 2017-10-06 RX ADMIN — PANTOPRAZOLE SODIUM 40 MG: 40 TABLET, DELAYED RELEASE ORAL at 09:10

## 2017-10-06 RX ADMIN — PRAVASTATIN SODIUM 40 MG: 40 TABLET ORAL at 09:10

## 2017-10-06 RX ADMIN — HYDROCODONE BITARTRATE AND ACETAMINOPHEN 1 TABLET: 5; 325 TABLET ORAL at 09:10

## 2017-10-06 RX ADMIN — Medication 3 ML: at 05:10

## 2017-10-06 RX ADMIN — BUSPIRONE HYDROCHLORIDE 15 MG: 5 TABLET ORAL at 05:10

## 2017-10-06 RX ADMIN — DESVENLAFAXINE SUCCINATE 100 MG: 50 TABLET, FILM COATED, EXTENDED RELEASE ORAL at 09:10

## 2017-10-06 RX ADMIN — LAMOTRIGINE 100 MG: 100 TABLET ORAL at 09:10

## 2017-10-06 RX ADMIN — IPRATROPIUM BROMIDE AND ALBUTEROL SULFATE 3 ML: .5; 3 SOLUTION RESPIRATORY (INHALATION) at 07:10

## 2017-10-06 RX ADMIN — IPRATROPIUM BROMIDE AND ALBUTEROL SULFATE 3 ML: .5; 3 SOLUTION RESPIRATORY (INHALATION) at 11:10

## 2017-10-06 RX ADMIN — LEVOTHYROXINE SODIUM 75 MCG: 75 TABLET ORAL at 09:10

## 2017-10-06 RX ADMIN — HEPARIN SODIUM 5000 UNITS: 5000 INJECTION, SOLUTION INTRAVENOUS; SUBCUTANEOUS at 01:10

## 2017-10-06 RX ADMIN — FUROSEMIDE 40 MG: 10 INJECTION, SOLUTION INTRAVENOUS at 05:10

## 2017-10-06 RX ADMIN — LAMOTRIGINE 100 MG: 100 TABLET ORAL at 08:10

## 2017-10-06 RX ADMIN — BUSPIRONE HYDROCHLORIDE 15 MG: 5 TABLET ORAL at 01:10

## 2017-10-06 RX ADMIN — LURASIDONE HYDROCHLORIDE 60 MG: 20 TABLET, FILM COATED ORAL at 09:10

## 2017-10-06 RX ADMIN — BUSPIRONE HYDROCHLORIDE 15 MG: 5 TABLET ORAL at 08:10

## 2017-10-06 RX ADMIN — AMLODIPINE BESYLATE 5 MG: 5 TABLET ORAL at 09:10

## 2017-10-06 NOTE — SUBJECTIVE & OBJECTIVE
Interval History: Breathing is better. On O2 via NC    Continuous Infusions:   Scheduled Meds:   albuterol-ipratropium 2.5mg-0.5mg/3mL  3 mL Nebulization Q6H    amlodipine  5 mg Oral Daily    busPIRone  15 mg Oral TID    desvenlafaxine succinate  100 mg Oral Daily    furosemide  40 mg Intravenous BID    heparin (porcine)  5,000 Units Subcutaneous Q8H    lamotrigine  100 mg Oral BID    levothyroxine  75 mcg Oral Daily    lurasidone  60 mg Oral Daily    pantoprazole  40 mg Oral Daily    pravastatin  40 mg Oral Daily    sodium chloride 0.9%  3 mL Intravenous Q8H     PRN Meds:albuterol sulfate, hydrocodone-acetaminophen 5-325mg    Review of patient's allergies indicates:   Allergen Reactions    Sulfa (sulfonamide antibiotics) Rash     Objective:     Vital Signs (Most Recent):  Temp: 97.8 °F (36.6 °C) (10/06/17 0741)  Pulse: 79 (10/06/17 0751)  Resp: 14 (10/06/17 0751)  BP: 119/74 (10/06/17 0741)  SpO2: 95 % (10/06/17 0900) Vital Signs (24h Range):  Temp:  [97.4 °F (36.3 °C)-97.8 °F (36.6 °C)] 97.8 °F (36.6 °C)  Pulse:  [70-86] 79  Resp:  [14-19] 14  SpO2:  [88 %-100 %] 95 %  BP: (100-138)/(60-84) 119/74     Patient Vitals for the past 72 hrs (Last 3 readings):   Weight   10/06/17 0600 85 kg (187 lb 6.3 oz)   10/05/17 0209 86.3 kg (190 lb 4.1 oz)     Body mass index is 32.17 kg/m².      Intake/Output Summary (Last 24 hours) at 10/06/17 1138  Last data filed at 10/06/17 0900   Gross per 24 hour   Intake              720 ml   Output             1750 ml   Net            -1030 ml       Hemodynamic Parameters:         Physical Exam   Constitutional: She is oriented to person, place, and time. She appears well-developed and well-nourished.   Neck: Normal range of motion. Neck supple. JVD present.   Cardiovascular: Normal rate and regular rhythm.  Exam reveals no gallop and no friction rub.    No murmur heard.  Pulmonary/Chest: Effort normal and breath sounds normal. She has no wheezes. She has no rales.   On O2  via NC   Abdominal: Soft. Bowel sounds are normal. There is no tenderness.   Musculoskeletal: She exhibits no edema.   Neurological: She is alert and oriented to person, place, and time.   Skin: Skin is warm and dry.       Significant Labs:  CBC:    Recent Labs  Lab 10/05/17  0533 10/06/17  0513   WBC 11.18 12.51   RBC 5.43* 5.55*   HGB 16.8* 17.2*   HCT 49.6* 52.8*   * 161   MCV 91 95   MCH 30.9 31.0   MCHC 33.9 32.6     BNP:    Recent Labs  Lab 10/05/17  0533   *     CMP:    Recent Labs  Lab 10/05/17  0533 10/06/17  0513   GLU 81 90   CALCIUM 10.1 10.1   ALBUMIN 4.1 4.1   PROT 7.1 7.2    140   K 3.3* 4.0   CO2 41* 37*   CL 88* 90*   BUN 37* 38*   CREATININE 1.1 1.2   ALKPHOS 146* 152*   ALT 44 43   AST 37 38   BILITOT 1.5* 1.4*      Coagulation:     Recent Labs  Lab 10/05/17  0533 10/06/17  0513   INR 1.0 1.0     LDH:  No results for input(s): LDH in the last 72 hours.  Microbiology:  Microbiology Results (last 7 days)     ** No results found for the last 168 hours. **          I have reviewed all pertinent labs within the past 24 hours.    Estimated Creatinine Clearance: 55.9 mL/min (based on SCr of 1.2 mg/dL).    Diagnostic Results:  I have reviewed and interpreted all pertinent imaging results/findings within the past 24 hours.

## 2017-10-06 NOTE — PROGRESS NOTES
[facility]  Heart Transplant  Progress Note    Patient Name: Sue Smith  MRN: [id number]  Admission Date: 10/5/2017  Hospital Length of Stay: 1 days  Attending Physician: Shelby De La Paz MD  Primary Care Provider: Paddy Guerrier DO  Principal Problem:Pulmonary hypertension    Subjective:     Interval History: Breathing is better. On O2 via NC    Continuous Infusions:   Scheduled Meds:   albuterol-ipratropium 2.5mg-0.5mg/3mL  3 mL Nebulization Q6H    amlodipine  5 mg Oral Daily    busPIRone  15 mg Oral TID    desvenlafaxine succinate  100 mg Oral Daily    furosemide  40 mg Intravenous BID    heparin (porcine)  5,000 Units Subcutaneous Q8H    lamotrigine  100 mg Oral BID    levothyroxine  75 mcg Oral Daily    lurasidone  60 mg Oral Daily    pantoprazole  40 mg Oral Daily    pravastatin  40 mg Oral Daily    sodium chloride 0.9%  3 mL Intravenous Q8H     PRN Meds:albuterol sulfate, hydrocodone-acetaminophen 5-325mg    Review of patient's allergies indicates:   Allergen Reactions    Sulfa (sulfonamide antibiotics) Rash     Objective:     Vital Signs (Most Recent):  Temp: 97.8 °F (36.6 °C) (10/06/17 0741)  Pulse: 79 (10/06/17 0751)  Resp: 14 (10/06/17 0751)  BP: 119/74 (10/06/17 0741)  SpO2: 95 % (10/06/17 0900) Vital Signs (24h Range):  Temp:  [97.4 °F (36.3 °C)-97.8 °F (36.6 °C)] 97.8 °F (36.6 °C)  Pulse:  [70-86] 79  Resp:  [14-19] 14  SpO2:  [88 %-100 %] 95 %  BP: (100-138)/(60-84) 119/74     Patient Vitals for the past 72 hrs (Last 3 readings):   Weight   10/06/17 0600 85 kg (187 lb 6.3 oz)   10/05/17 0209 86.3 kg (190 lb 4.1 oz)     Body mass index is 32.17 kg/m².      Intake/Output Summary (Last 24 hours) at 10/06/17 1138  Last data filed at 10/06/17 0900   Gross per 24 hour   Intake              720 ml   Output             1750 ml   Net            -1030 ml       Hemodynamic Parameters:         Physical Exam   Constitutional: She is oriented to person, place, and time. She appears  well-developed and well-nourished.   Neck: Normal range of motion. Neck supple. JVD present.   Cardiovascular: Normal rate and regular rhythm.  Exam reveals no gallop and no friction rub.    No murmur heard.  Pulmonary/Chest: Effort normal and breath sounds normal. She has no wheezes. She has no rales.   On O2 via NC   Abdominal: Soft. Bowel sounds are normal. There is no tenderness.   Musculoskeletal: She exhibits no edema.   Neurological: She is alert and oriented to person, place, and time.   Skin: Skin is warm and dry.       Significant Labs:  CBC:    Recent Labs  Lab 10/05/17  0533 10/06/17  0513   WBC 11.18 12.51   RBC 5.43* 5.55*   HGB 16.8* 17.2*   HCT 49.6* 52.8*   * 161   MCV 91 95   MCH 30.9 31.0   MCHC 33.9 32.6     BNP:    Recent Labs  Lab 10/05/17  0533   *     CMP:    Recent Labs  Lab 10/05/17  0533 10/06/17  0513   GLU 81 90   CALCIUM 10.1 10.1   ALBUMIN 4.1 4.1   PROT 7.1 7.2    140   K 3.3* 4.0   CO2 41* 37*   CL 88* 90*   BUN 37* 38*   CREATININE 1.1 1.2   ALKPHOS 146* 152*   ALT 44 43   AST 37 38   BILITOT 1.5* 1.4*      Coagulation:     Recent Labs  Lab 10/05/17  0533 10/06/17  0513   INR 1.0 1.0     LDH:  No results for input(s): LDH in the last 72 hours.  Microbiology:  Microbiology Results (last 7 days)     ** No results found for the last 168 hours. **          I have reviewed all pertinent labs within the past 24 hours.    Estimated Creatinine Clearance: 55.9 mL/min (based on SCr of 1.2 mg/dL).    Diagnostic Results:  I have reviewed and interpreted all pertinent imaging results/findings within the past 24 hours.    Assessment and Plan:     Mrs. Sue Smith is a 55 yo female with a PMHx of complex pulmonary disease (see below), Bipolar disorder (controlled on multiple meds), hypothyroidism, GERD, previous pericardial window for an pericardial effusion, HTN, GERD and dyslipidemia. She presents as a transfer from [facility] for PulmHTN evaluation. She had previously  been referred to us with plans for outpatient evaluation on 10/18/17. She was admitted there on 9/28/17 for worsening SOB that had developed over the past week. She initially required BiPAP and IV diuresis before eventually weaning to Venti mask. Over the course of her admission she had a CT PE study that was read as negative for pulmonary embolism, TTE that showed no significant aortic/mitral disease but did show a hyperdynamic LV and  Estimated PA systolic pressure of 98. She subsequently underwent RHC on 9/29/17 (unclear if she was adequately diuresed) with a PCW of 26 /50 (75) /30 RA 25. She was transferred here for advance PAH options / evaluation. Relatively asymptomatic here. Lives in assisted living with extended family close. Long discussion regarding goals of care. Patient is a full code. Does not thing she has an advanced directive (she plans to discuss with her family).     Pulmonary Hx: Of note records from OSH are discordant. Hospitalist / cardiology have her labeled as COPD and Obesity hypoventilation however consult by pulmonologist (Dr. Hardik Magallanes) states explicitly that PFTs done in 2015 showed only mild restriction and were not consistent with COPD. In addition he states that though she is mildly obese her pCO2 is normal at baseline ruling out obesity hypoventilation. What makes this more difficult is the fact that the patient is unsure if she has COPD as she has been told different things by different doctors over the years.     - Severe Pulmonary HTN  - Chronic hypoxic respiratory failure for which she is on 3L all the time and BiPAP QHS  - History of tracheostomy (4 years ago) and short term vent dependence. Tracheostomy was eventually de cannulated.   - Previous tobacco abuse (stopped 15 yrs ago)   - SHERIE (unclear severity) has been on trilogy vent for BiPAP at night.     History was difficult to obtain as patient is a poor historian (at least in regards to timeline of events) and  previous care seems fragmented (see pulmonary hx above).      OSH imaging disc, pulmonology consult note and cath report organized in printed record with metal clip (placed by me) for easy review.     * Pulmonary hypertension    - Unclear type   - Long history of smoking but no COPD per outside pulmonologist. Will get PFTs here  - CT study done at OSH. Will  have our radiologist review  - Will be evaluated this admission for PH therapies   - Heparin sub q for VTE PPX  -Plan for RHC Monday. Will also get VQ scan, 6MWT, labs        Acute on chronic right heart failure    -Lasix IVP 40 BID. I/Os not accurate over last 24 hours  -Echo yesterday showed normal LVEF, severely depressed RV function, elevated PA pressures        Bipolar disorder    - Continue lamictal, desvenlafaxine, buspirone and lurasidone.   -Expressed concerns regarding her buspirone as it can propogate liyah   -Will hold off on making changes as I have just meet her and she has been stable on above regimen for many years   -Consider psych evaluation if she starts showing signs of liyah for medication adjustment         Chronic respiratory failure with hypoxia    - On O2 3L by NC at home  - BiPAP QHS   -? H/o COPD. Will continue duoneb and prn albuterol for now. Will hold further systemic steroids (initially on prednisone 40mg for a possible COPD exacerbation).             Deanne Tello PA-C  Heart Transplant  [facility]

## 2017-10-06 NOTE — ASSESSMENT & PLAN NOTE
- Unclear type   - Long history of smoking but no COPD per outside pulmonologist. Will get PFTs here  - CT study done at OSH. Will  have our radiologist review  - Will be evaluated this admission for PH therapies   - Heparin sub q for VTE PPX  -Plan for RHC Monday. Will also get VQ scan, 6MWT, labs

## 2017-10-06 NOTE — PROGRESS NOTES
Pt AAOx4. Pt independent. Pt up ambulating in ford with nurse (to help with oxygen). Pt on 3 L NC. O2 sats > 92%. Pt wears BiPap at night. Pt receiving IV lasix BID. Urine output 1600 mL thus far this shift.   Pt on low sodium diet. Pt on 2000 ml fluid restriction. VSS. Pt normal sinus rhythm on tele. Pt scheduled for right heart cath on Monday. Pt wearing nonslip socks when out of bed. Pt receiving scheduled nebulizer treatments.

## 2017-10-06 NOTE — ASSESSMENT & PLAN NOTE
-Lasix IVP 40 BID. I/Os not accurate over last 24 hours  -Echo yesterday showed normal LVEF, severely depressed RV function, elevated PA pressures

## 2017-10-06 NOTE — ASSESSMENT & PLAN NOTE
- Continue lamictal, desvenlafaxine, buspirone and lurasidone.   -Expressed concerns regarding her buspirone as it can propogate liyah   -Will hold off on making changes as I have just meet her and she has been stable on above regimen for many years   -Consider psych evaluation if she starts showing signs of liyah for medication adjustment

## 2017-10-06 NOTE — PLAN OF CARE
Problem: Patient Care Overview  Goal: Plan of Care Review  Outcome: Ongoing (interventions implemented as appropriate)  Pt AAOx4. Pt independent. Pt up ambulating in ford with nurse (to help with oxygen). Pt on 3 L NC. O2 sats > 92%. Pt wears BiPap at night. Pt receiving IV lasix BID. Urine output 1600 mL thus far this shift.   Pt on low sodium diet. Pt on 2000 ml fluid restriction. VSS. Pt normal sinus rhythm on tele. Pt scheduled for right heart cath on Monday. Pt wearing nonslip socks when out of bed. Pt receiving scheduled nebulizer treatments.     Problem: Fall Risk (Adult)  Goal: Absence of Falls  Patient will demonstrate the desired outcomes by discharge/transition of care.   Outcome: Ongoing (interventions implemented as appropriate)  Pt free of falls. Pt wearing nonslip socks when out of bed.     Problem: Infection, Risk/Actual (Adult)  Goal: Infection Prevention/Resolution  Patient will demonstrate the desired outcomes by discharge/transition of care.   Outcome: Ongoing (interventions implemented as appropriate)  Handwashing encouraged.

## 2017-10-06 NOTE — ASSESSMENT & PLAN NOTE
- On O2 3L by NC at home  - BiPAP QHS   -? H/o COPD. Will continue duoneb and prn albuterol for now. Will hold further systemic steroids (initially on prednisone 40mg for a possible COPD exacerbation).

## 2017-10-06 NOTE — PROGRESS NOTES
Update Note:    SW to pt's room for update. Pt aaox4, calm, and pleasant. Pt reports coping adequately at this time. Pt reports living in an income based assisted living facility, and not able to afford paying for assistance with any ADLs. Pt reports applying for Medicaid in the past, and being denied Medicaid multiple times. Pt reports considering the possibility of moving in with a friend who is willing to provide assistance with ADLs, but this would require pt to re-home her dog, and pt is not sure if she is ready to re-home her dog. SW suggested pt speak with [facility]  on aging about any assistance they may offer. SW also discussed NHP with pt. Pt reports no other questions or concerns at this time. SW providing ongoing psychosocial counseling and support, education, assistance, resources, and discharge planning as indicated. SW continuing to follow and remains available.

## 2017-10-06 NOTE — PLAN OF CARE
Problem: Patient Care Overview  Goal: Plan of Care Review  Outcome: Ongoing (interventions implemented as appropriate)  Pt AAOx4, VSS, afebrile.  Denies c/o pain.  Ambulating to restroom independently.  Urine output 800 cc clear yellow urine 0 BM noted.  Slept with BiPaP on during shift.      Fall risk precautions initiated.  Pt in lowest bed position setting, lighting adjusted, pt to wear nonskid socks when ambulating, side rails up x2.  Pt remain free from falls during shift.   Pt verbalize understanding to call when needed assistance.  Call light within reach. Will continue to monitor.

## 2017-10-07 LAB
ALBUMIN SERPL BCP-MCNC: 3.7 G/DL
ALP SERPL-CCNC: 158 U/L
ALT SERPL W/O P-5'-P-CCNC: 36 U/L
ANION GAP SERPL CALC-SCNC: 13 MMOL/L
AST SERPL-CCNC: 33 U/L
BASOPHILS # BLD AUTO: 0.02 K/UL
BASOPHILS NFR BLD: 0.2 %
BILIRUB SERPL-MCNC: 1.2 MG/DL
BUN SERPL-MCNC: 32 MG/DL
CALCIUM SERPL-MCNC: 9.7 MG/DL
CHLORIDE SERPL-SCNC: 92 MMOL/L
CO2 SERPL-SCNC: 32 MMOL/L
CREAT SERPL-MCNC: 0.9 MG/DL
DIFFERENTIAL METHOD: ABNORMAL
EOSINOPHIL # BLD AUTO: 0.2 K/UL
EOSINOPHIL NFR BLD: 2 %
ERYTHROCYTE [DISTWIDTH] IN BLOOD BY AUTOMATED COUNT: 15.1 %
EST. GFR  (AFRICAN AMERICAN): >60 ML/MIN/1.73 M^2
EST. GFR  (NON AFRICAN AMERICAN): >60 ML/MIN/1.73 M^2
GLUCOSE SERPL-MCNC: 98 MG/DL
HCT VFR BLD AUTO: 47.7 %
HGB BLD-MCNC: 16.4 G/DL
INR PPP: 1
LYMPHOCYTES # BLD AUTO: 1.9 K/UL
LYMPHOCYTES NFR BLD: 16.8 %
MAGNESIUM SERPL-MCNC: 2.1 MG/DL
MCH RBC QN AUTO: 30.8 PG
MCHC RBC AUTO-ENTMCNC: 34.4 G/DL
MCV RBC AUTO: 90 FL
MONOCYTES # BLD AUTO: 0.9 K/UL
MONOCYTES NFR BLD: 8.4 %
NEUTROPHILS # BLD AUTO: 7.9 K/UL
NEUTROPHILS NFR BLD: 71.8 %
PLATELET # BLD AUTO: 139 K/UL
PLATELET BLD QL SMEAR: ABNORMAL
PMV BLD AUTO: 12.3 FL
POTASSIUM SERPL-SCNC: 3.2 MMOL/L
PROT SERPL-MCNC: 6.9 G/DL
PROTHROMBIN TIME: 10.7 SEC
RBC # BLD AUTO: 5.32 M/UL
SODIUM SERPL-SCNC: 137 MMOL/L
WBC # BLD AUTO: 11.04 K/UL

## 2017-10-07 PROCEDURE — 20600001 HC STEP DOWN PRIVATE ROOM

## 2017-10-07 PROCEDURE — 94799 UNLISTED PULMONARY SVC/PX: CPT

## 2017-10-07 PROCEDURE — 94660 CPAP INITIATION&MGMT: CPT

## 2017-10-07 PROCEDURE — 99232 SBSQ HOSP IP/OBS MODERATE 35: CPT | Mod: ,,, | Performed by: INTERNAL MEDICINE

## 2017-10-07 PROCEDURE — 25000242 PHARM REV CODE 250 ALT 637 W/ HCPCS: Performed by: INTERNAL MEDICINE

## 2017-10-07 PROCEDURE — 94761 N-INVAS EAR/PLS OXIMETRY MLT: CPT

## 2017-10-07 PROCEDURE — 94640 AIRWAY INHALATION TREATMENT: CPT

## 2017-10-07 PROCEDURE — 25000003 PHARM REV CODE 250: Performed by: INTERNAL MEDICINE

## 2017-10-07 PROCEDURE — 83735 ASSAY OF MAGNESIUM: CPT

## 2017-10-07 PROCEDURE — 80053 COMPREHEN METABOLIC PANEL: CPT

## 2017-10-07 PROCEDURE — 99900035 HC TECH TIME PER 15 MIN (STAT)

## 2017-10-07 PROCEDURE — 27000221 HC OXYGEN, UP TO 24 HOURS

## 2017-10-07 PROCEDURE — 85025 COMPLETE CBC W/AUTO DIFF WBC: CPT

## 2017-10-07 PROCEDURE — 85610 PROTHROMBIN TIME: CPT

## 2017-10-07 PROCEDURE — 36415 COLL VENOUS BLD VENIPUNCTURE: CPT

## 2017-10-07 PROCEDURE — 25000003 PHARM REV CODE 250: Performed by: STUDENT IN AN ORGANIZED HEALTH CARE EDUCATION/TRAINING PROGRAM

## 2017-10-07 PROCEDURE — 63600175 PHARM REV CODE 636 W HCPCS: Performed by: INTERNAL MEDICINE

## 2017-10-07 RX ORDER — POTASSIUM CHLORIDE 20 MEQ/1
60 TABLET, EXTENDED RELEASE ORAL ONCE
Status: COMPLETED | OUTPATIENT
Start: 2017-10-07 | End: 2017-10-07

## 2017-10-07 RX ORDER — POTASSIUM CHLORIDE 20 MEQ/1
20 TABLET, EXTENDED RELEASE ORAL ONCE
Status: COMPLETED | OUTPATIENT
Start: 2017-10-07 | End: 2017-10-07

## 2017-10-07 RX ADMIN — PANTOPRAZOLE SODIUM 40 MG: 40 TABLET, DELAYED RELEASE ORAL at 08:10

## 2017-10-07 RX ADMIN — BUSPIRONE HYDROCHLORIDE 15 MG: 5 TABLET ORAL at 01:10

## 2017-10-07 RX ADMIN — FUROSEMIDE 40 MG: 10 INJECTION, SOLUTION INTRAVENOUS at 05:10

## 2017-10-07 RX ADMIN — IPRATROPIUM BROMIDE AND ALBUTEROL SULFATE 3 ML: .5; 3 SOLUTION RESPIRATORY (INHALATION) at 07:10

## 2017-10-07 RX ADMIN — LEVOTHYROXINE SODIUM 75 MCG: 75 TABLET ORAL at 08:10

## 2017-10-07 RX ADMIN — LURASIDONE HYDROCHLORIDE 60 MG: 20 TABLET, FILM COATED ORAL at 08:10

## 2017-10-07 RX ADMIN — LAMOTRIGINE 100 MG: 100 TABLET ORAL at 08:10

## 2017-10-07 RX ADMIN — HEPARIN SODIUM 5000 UNITS: 5000 INJECTION, SOLUTION INTRAVENOUS; SUBCUTANEOUS at 08:10

## 2017-10-07 RX ADMIN — POTASSIUM CHLORIDE 20 MEQ: 1500 TABLET, EXTENDED RELEASE ORAL at 11:10

## 2017-10-07 RX ADMIN — POTASSIUM CHLORIDE 60 MEQ: 1500 TABLET, EXTENDED RELEASE ORAL at 08:10

## 2017-10-07 RX ADMIN — BUSPIRONE HYDROCHLORIDE 15 MG: 5 TABLET ORAL at 05:10

## 2017-10-07 RX ADMIN — PRAVASTATIN SODIUM 40 MG: 40 TABLET ORAL at 08:10

## 2017-10-07 RX ADMIN — DESVENLAFAXINE SUCCINATE 100 MG: 50 TABLET, FILM COATED, EXTENDED RELEASE ORAL at 08:10

## 2017-10-07 RX ADMIN — HEPARIN SODIUM 5000 UNITS: 5000 INJECTION, SOLUTION INTRAVENOUS; SUBCUTANEOUS at 01:10

## 2017-10-07 RX ADMIN — HYDROCODONE BITARTRATE AND ACETAMINOPHEN 1 TABLET: 5; 325 TABLET ORAL at 08:10

## 2017-10-07 RX ADMIN — IPRATROPIUM BROMIDE AND ALBUTEROL SULFATE 3 ML: .5; 3 SOLUTION RESPIRATORY (INHALATION) at 11:10

## 2017-10-07 RX ADMIN — AMLODIPINE BESYLATE 5 MG: 5 TABLET ORAL at 08:10

## 2017-10-07 RX ADMIN — IPRATROPIUM BROMIDE AND ALBUTEROL SULFATE 3 ML: .5; 3 SOLUTION RESPIRATORY (INHALATION) at 08:10

## 2017-10-07 RX ADMIN — HEPARIN SODIUM 5000 UNITS: 5000 INJECTION, SOLUTION INTRAVENOUS; SUBCUTANEOUS at 05:10

## 2017-10-07 RX ADMIN — FUROSEMIDE 40 MG: 10 INJECTION, SOLUTION INTRAVENOUS at 08:10

## 2017-10-07 RX ADMIN — BUSPIRONE HYDROCHLORIDE 15 MG: 5 TABLET ORAL at 08:10

## 2017-10-07 NOTE — PLAN OF CARE
Problem: Patient Care Overview  Goal: Plan of Care Review  Outcome: Ongoing (interventions implemented as appropriate)  Patient aaox4. Bed kept locked, lowest position with 2x side rails up while in bed. nonslip footwear when out of bed. Ambulates independently, ambulates around unit. Call bell and personal items within reach. 2L fluid restriction, 2gNA diet; patient follows diet. Receiving lasix 40mg IVP BID. Left FA PIV cdi. Telemetry monitoring SR. O2>92% on 3L NC, wears 3L at home; wears bipap at night.  Potassium 3.2 this morning replaced with 80mg PO. Standard precautions maintained.

## 2017-10-07 NOTE — ASSESSMENT & PLAN NOTE
- Unclear type   - Long history of smoking but no COPD per outside pulmonologist. Will get PFTs here  - CT study done at OSH. Will  have our radiologist review  - Will be evaluated this admission for PH therapies   - Heparin sub q for VTE PPX  -Plan for RHC Monday. Will also get VQ scan, 6MWT, labs    -No change in plans today

## 2017-10-07 NOTE — PLAN OF CARE
Problem: Patient Care Overview  Goal: Plan of Care Review  Outcome: Ongoing (interventions implemented as appropriate)  * AAO x 4  * 2 gram NA diet with a 2000 cc fluid restriction tolerating well.  * Generalized edema noted  * Bed at lowest position and locked, call light within reach, non-slip socks on, and side rails up x 2.  Encouraged patient to call for assistance when needed patient stated understanding.  * Left FA PIV 20 G (10/4/17) patent, dressing clean dry and intact no signs or symptoms of infection noted.  * Oxygen 3 liters via nasal cannula.  Oxygen saturation 92-93 % respirations even and unlabored. BiPaP while sleeping initiated by respiratory tech per patient request, patient tolerating well.   * Patient has complaints of pain to the back/neck PRN pain medication administrated    * Skin assessment preformed no breakdown noted   * Standard precautions maintained  * Continuous telemetry monitoring continued SR 80's no ectopy noted.   * Patient able to turn self no assistance needed.  * Patient voiding clear yellow urine.  See flow sheet for intake and output totals.

## 2017-10-07 NOTE — ASSESSMENT & PLAN NOTE
Not wheezing unlikely to be COPD exacerbation    - On O2 3L by NC at home  - BiPAP QHS   -? H/o COPD. Will continue duoneb and prn albuterol for now. Will hold further systemic steroids (initially on prednisone 40mg for a possible COPD exacerbation).

## 2017-10-07 NOTE — PROGRESS NOTES
[facility]  Heart Transplant  Progress Note    Patient Name: Sue Smith  MRN: [id number]  Admission Date: 10/5/2017  Hospital Length of Stay: 2 days  Attending Physician: Shelby De La Paz MD  Primary Care Provider: Paddy Guerrier DO  Principal Problem:Pulmonary hypertension    Subjective:     Interval History: Comfortable on NC     Continuous Infusions:   Scheduled Meds:   albuterol-ipratropium 2.5mg-0.5mg/3mL  3 mL Nebulization Q6H    amlodipine  5 mg Oral Daily    busPIRone  15 mg Oral TID    desvenlafaxine succinate  100 mg Oral Daily    furosemide  40 mg Intravenous BID    heparin (porcine)  5,000 Units Subcutaneous Q8H    lamotrigine  100 mg Oral BID    levothyroxine  75 mcg Oral Daily    lurasidone  60 mg Oral Daily    pantoprazole  40 mg Oral Daily    potassium chloride  20 mEq Oral Once    pravastatin  40 mg Oral Daily    sodium chloride 0.9%  3 mL Intravenous Q8H     PRN Meds:albuterol sulfate, hydrocodone-acetaminophen 5-325mg    Review of patient's allergies indicates:   Allergen Reactions    Sulfa (sulfonamide antibiotics) Rash     Objective:     Vital Signs (Most Recent):  Temp: 99 °F (37.2 °C) (10/07/17 0800)  Pulse: 87 (10/07/17 1100)  Resp: 18 (10/07/17 0800)  BP: 108/62 (10/07/17 0800)  SpO2: 95 % (10/07/17 0800) Vital Signs (24h Range):  Temp:  [97.4 °F (36.3 °C)-99 °F (37.2 °C)] 99 °F (37.2 °C)  Pulse:  [73-88] 87  Resp:  [15-18] 18  SpO2:  [91 %-99 %] 95 %  BP: (103-118)/(62-71) 108/62     Patient Vitals for the past 72 hrs (Last 3 readings):   Weight   10/07/17 0515 87.4 kg (192 lb 10.9 oz)   10/06/17 0600 85 kg (187 lb 6.3 oz)   10/05/17 0209 86.3 kg (190 lb 4.1 oz)     Body mass index is 33.07 kg/m².      Intake/Output Summary (Last 24 hours) at 10/07/17 1142  Last data filed at 10/07/17 0800   Gross per 24 hour   Intake             1636 ml   Output             2200 ml   Net             -564 ml       Hemodynamic Parameters:         Physical Exam   Constitutional:  She is oriented to person, place, and time. She appears well-developed and well-nourished.   Neck: Normal range of motion. Neck supple.   Cardiovascular: Normal rate and regular rhythm.  Exam reveals no gallop and no friction rub.    No murmur heard.  JVD at claicle   Loud P2   Pulmonary/Chest: Effort normal and breath sounds normal. She has no wheezes. She has no rales.   On O2 via NC   Abdominal: Soft. Bowel sounds are normal. There is no tenderness.   Musculoskeletal: She exhibits no edema.   Neurological: She is alert and oriented to person, place, and time.   Skin: Skin is warm and dry.       Significant Labs:  CBC:    Recent Labs  Lab 10/05/17  0533 10/06/17  0513 10/07/17  0546   WBC 11.18 12.51 11.04   RBC 5.43* 5.55* 5.32   HGB 16.8* 17.2* 16.4*   HCT 49.6* 52.8* 47.7   * 161  --    MCV 91 95 90   MCH 30.9 31.0 30.8   MCHC 33.9 32.6 34.4     BNP:    Recent Labs  Lab 10/05/17  0533   *     CMP:    Recent Labs  Lab 10/05/17  0533 10/06/17  0513 10/07/17  0546   GLU 81 90 98   CALCIUM 10.1 10.1 9.7   ALBUMIN 4.1 4.1 3.7   PROT 7.1 7.2 6.9    140 137   K 3.3* 4.0 3.2*   CO2 41* 37* 32*   CL 88* 90* 92*   BUN 37* 38* 32*   CREATININE 1.1 1.2 0.9   ALKPHOS 146* 152* 158*   ALT 44 43 36   AST 37 38 33   BILITOT 1.5* 1.4* 1.2*      Coagulation:     Recent Labs  Lab 10/05/17  0533 10/06/17  0513 10/07/17  0546   INR 1.0 1.0 1.0     LDH:  No results for input(s): LDH in the last 72 hours.  Microbiology:  Microbiology Results (last 7 days)     ** No results found for the last 168 hours. **          I have reviewed all pertinent labs within the past 24 hours.    Estimated Creatinine Clearance: 75.6 mL/min (based on SCr of 0.9 mg/dL).    Diagnostic Results:  I have reviewed and interpreted all pertinent imaging results/findings within the past 24 hours.    Assessment and Plan:     Mrs. Sue Smith is a 55 yo female with a PMHx of complex pulmonary disease (see below), Bipolar disorder (controlled on  multiple meds), hypothyroidism, GERD, previous pericardial window for an pericardial effusion, HTN, GERD and dyslipidemia. She presents as a transfer from [facility] for PulTN evaluation. She had previously been referred to us with plans for outpatient evaluation on 10/18/17. She was admitted there on 9/28/17 for worsening SOB that had developed over the past week. She initially required BiPAP and IV diuresis before eventually weaning to Venti mask. Over the course of her admission she had a CT PE study that was read as negative for pulmonary embolism, TTE that showed no significant aortic/mitral disease but did show a hyperdynamic LV and  Estimated PA systolic pressure of 98. She subsequently underwent RHC on 9/29/17 (unclear if she was adequately diuresed) with a PCW of 26 /50 (75) /30 RA 25. She was transferred here for advance PAH options / evaluation. Relatively asymptomatic here. Lives in assisted living with extended family close. Long discussion regarding goals of care. Patient is a full code. Does not thing she has an advanced directive (she plans to discuss with her family).     Pulmonary Hx: Of note records from OSH are discordant. Hospitalist / cardiology have her labeled as COPD and Obesity hypoventilation however consult by pulmonologist (Dr. Hardik Magallanes) states explicitly that PFTs done in 2015 showed only mild restriction and were not consistent with COPD. In addition he states that though she is mildly obese her pCO2 is normal at baseline ruling out obesity hypoventilation. What makes this more difficult is the fact that the patient is unsure if she has COPD as she has been told different things by different doctors over the years.     - Severe Pulmonary HTN  - Chronic hypoxic respiratory failure for which she is on 3L all the time and BiPAP QHS  - History of tracheostomy (4 years ago) and short term vent dependence. Tracheostomy was eventually de cannulated.   - Previous  tobacco abuse (stopped 15 yrs ago)   - SHERIE (unclear severity) has been on trilogy vent for BiPAP at night.     History was difficult to obtain as patient is a poor historian (at least in regards to timeline of events) and previous care seems fragmented (see pulmonary hx above).      OSH imaging disc, pulmonology consult note and cath report organized in printed record with metal clip (placed by me) for easy review.     * Pulmonary hypertension    - Unclear type   - Long history of smoking but no COPD per outside pulmonologist. Will get PFTs here  - CT study done at OSH. Will  have our radiologist review  - Will be evaluated this admission for PH therapies   - Heparin sub q for VTE PPX  -Plan for RHC Monday. Will also get VQ scan, 6MWT, labs    -No change in plans today        Acute on chronic right heart failure    -Lasix IVP 40 BID. I/Os not accurate over last 24 hours  -Echo yesterday showed normal LVEF, severely depressed RV function, elevated PA pressures    -strict I/Os please         Bipolar disorder    - Continue lamictal, desvenlafaxine, buspirone and lurasidone.   -Expressed concerns regarding her buspirone as it can propogate liyah   -Will hold off on making changes as I have just meet her and she has been stable on above regimen for many years   -Consider psych evaluation if she starts showing signs of liyah for medication adjustment         Chronic respiratory failure with hypoxia    Not wheezing unlikely to be COPD exacerbation    - On O2 3L by NC at home  - BiPAP QHS   -? H/o COPD. Will continue duoneb and prn albuterol for now. Will hold further systemic steroids (initially on prednisone 40mg for a possible COPD exacerbation).             Berenice Cortes MD  Heart Transplant  [facility]

## 2017-10-07 NOTE — SUBJECTIVE & OBJECTIVE
Interval History: Comfortable on NC     Continuous Infusions:   Scheduled Meds:   albuterol-ipratropium 2.5mg-0.5mg/3mL  3 mL Nebulization Q6H    amlodipine  5 mg Oral Daily    busPIRone  15 mg Oral TID    desvenlafaxine succinate  100 mg Oral Daily    furosemide  40 mg Intravenous BID    heparin (porcine)  5,000 Units Subcutaneous Q8H    lamotrigine  100 mg Oral BID    levothyroxine  75 mcg Oral Daily    lurasidone  60 mg Oral Daily    pantoprazole  40 mg Oral Daily    potassium chloride  20 mEq Oral Once    pravastatin  40 mg Oral Daily    sodium chloride 0.9%  3 mL Intravenous Q8H     PRN Meds:albuterol sulfate, hydrocodone-acetaminophen 5-325mg    Review of patient's allergies indicates:   Allergen Reactions    Sulfa (sulfonamide antibiotics) Rash     Objective:     Vital Signs (Most Recent):  Temp: 99 °F (37.2 °C) (10/07/17 0800)  Pulse: 87 (10/07/17 1100)  Resp: 18 (10/07/17 0800)  BP: 108/62 (10/07/17 0800)  SpO2: 95 % (10/07/17 0800) Vital Signs (24h Range):  Temp:  [97.4 °F (36.3 °C)-99 °F (37.2 °C)] 99 °F (37.2 °C)  Pulse:  [73-88] 87  Resp:  [15-18] 18  SpO2:  [91 %-99 %] 95 %  BP: (103-118)/(62-71) 108/62     Patient Vitals for the past 72 hrs (Last 3 readings):   Weight   10/07/17 0515 87.4 kg (192 lb 10.9 oz)   10/06/17 0600 85 kg (187 lb 6.3 oz)   10/05/17 0209 86.3 kg (190 lb 4.1 oz)     Body mass index is 33.07 kg/m².      Intake/Output Summary (Last 24 hours) at 10/07/17 1142  Last data filed at 10/07/17 0800   Gross per 24 hour   Intake             1636 ml   Output             2200 ml   Net             -564 ml       Hemodynamic Parameters:         Physical Exam   Constitutional: She is oriented to person, place, and time. She appears well-developed and well-nourished.   Neck: Normal range of motion. Neck supple.   Cardiovascular: Normal rate and regular rhythm.  Exam reveals no gallop and no friction rub.    No murmur heard.  JVD at claicle   Loud P2   Pulmonary/Chest: Effort normal  and breath sounds normal. She has no wheezes. She has no rales.   On O2 via NC   Abdominal: Soft. Bowel sounds are normal. There is no tenderness.   Musculoskeletal: She exhibits no edema.   Neurological: She is alert and oriented to person, place, and time.   Skin: Skin is warm and dry.       Significant Labs:  CBC:    Recent Labs  Lab 10/05/17  0533 10/06/17  0513 10/07/17  0546   WBC 11.18 12.51 11.04   RBC 5.43* 5.55* 5.32   HGB 16.8* 17.2* 16.4*   HCT 49.6* 52.8* 47.7   * 161  --    MCV 91 95 90   MCH 30.9 31.0 30.8   MCHC 33.9 32.6 34.4     BNP:    Recent Labs  Lab 10/05/17  0533   *     CMP:    Recent Labs  Lab 10/05/17  0533 10/06/17  0513 10/07/17  0546   GLU 81 90 98   CALCIUM 10.1 10.1 9.7   ALBUMIN 4.1 4.1 3.7   PROT 7.1 7.2 6.9    140 137   K 3.3* 4.0 3.2*   CO2 41* 37* 32*   CL 88* 90* 92*   BUN 37* 38* 32*   CREATININE 1.1 1.2 0.9   ALKPHOS 146* 152* 158*   ALT 44 43 36   AST 37 38 33   BILITOT 1.5* 1.4* 1.2*      Coagulation:     Recent Labs  Lab 10/05/17  0533 10/06/17  0513 10/07/17  0546   INR 1.0 1.0 1.0     LDH:  No results for input(s): LDH in the last 72 hours.  Microbiology:  Microbiology Results (last 7 days)     ** No results found for the last 168 hours. **          I have reviewed all pertinent labs within the past 24 hours.    Estimated Creatinine Clearance: 75.6 mL/min (based on SCr of 0.9 mg/dL).    Diagnostic Results:  I have reviewed and interpreted all pertinent imaging results/findings within the past 24 hours.

## 2017-10-07 NOTE — ASSESSMENT & PLAN NOTE
-Lasix IVP 40 BID. I/Os not accurate over last 24 hours  -Echo yesterday showed normal LVEF, severely depressed RV function, elevated PA pressures    -strict I/Os please

## 2017-10-08 LAB
ALBUMIN SERPL BCP-MCNC: 3.7 G/DL
ALP SERPL-CCNC: 168 U/L
ALT SERPL W/O P-5'-P-CCNC: 41 U/L
ANION GAP SERPL CALC-SCNC: 11 MMOL/L
AST SERPL-CCNC: 36 U/L
BASOPHILS # BLD AUTO: 0.04 K/UL
BASOPHILS NFR BLD: 0.4 %
BILIRUB SERPL-MCNC: 1.1 MG/DL
BUN SERPL-MCNC: 30 MG/DL
CALCIUM SERPL-MCNC: 9.8 MG/DL
CHLORIDE SERPL-SCNC: 93 MMOL/L
CO2 SERPL-SCNC: 35 MMOL/L
CREAT SERPL-MCNC: 1 MG/DL
DIFFERENTIAL METHOD: ABNORMAL
EOSINOPHIL # BLD AUTO: 0.2 K/UL
EOSINOPHIL NFR BLD: 2.1 %
ERYTHROCYTE [DISTWIDTH] IN BLOOD BY AUTOMATED COUNT: 15 %
EST. GFR  (AFRICAN AMERICAN): >60 ML/MIN/1.73 M^2
EST. GFR  (NON AFRICAN AMERICAN): >60 ML/MIN/1.73 M^2
GLUCOSE SERPL-MCNC: 99 MG/DL
HCT VFR BLD AUTO: 48.7 %
HGB BLD-MCNC: 16 G/DL
INR PPP: 1
LYMPHOCYTES # BLD AUTO: 2.1 K/UL
LYMPHOCYTES NFR BLD: 19.2 %
MAGNESIUM SERPL-MCNC: 2.3 MG/DL
MCH RBC QN AUTO: 30.5 PG
MCHC RBC AUTO-ENTMCNC: 32.9 G/DL
MCV RBC AUTO: 93 FL
MONOCYTES # BLD AUTO: 0.8 K/UL
MONOCYTES NFR BLD: 7.5 %
NEUTROPHILS # BLD AUTO: 7.5 K/UL
NEUTROPHILS NFR BLD: 69.9 %
PLATELET # BLD AUTO: 162 K/UL
PMV BLD AUTO: 12.7 FL
POTASSIUM SERPL-SCNC: 3.6 MMOL/L
PROT SERPL-MCNC: 7 G/DL
PROTHROMBIN TIME: 10.4 SEC
RBC # BLD AUTO: 5.24 M/UL
SODIUM SERPL-SCNC: 139 MMOL/L
WBC # BLD AUTO: 10.79 K/UL

## 2017-10-08 PROCEDURE — 27000221 HC OXYGEN, UP TO 24 HOURS

## 2017-10-08 PROCEDURE — 20600001 HC STEP DOWN PRIVATE ROOM

## 2017-10-08 PROCEDURE — 94640 AIRWAY INHALATION TREATMENT: CPT

## 2017-10-08 PROCEDURE — 63600175 PHARM REV CODE 636 W HCPCS: Performed by: INTERNAL MEDICINE

## 2017-10-08 PROCEDURE — 94761 N-INVAS EAR/PLS OXIMETRY MLT: CPT

## 2017-10-08 PROCEDURE — 94664 DEMO&/EVAL PT USE INHALER: CPT

## 2017-10-08 PROCEDURE — 25000242 PHARM REV CODE 250 ALT 637 W/ HCPCS: Performed by: INTERNAL MEDICINE

## 2017-10-08 PROCEDURE — A4216 STERILE WATER/SALINE, 10 ML: HCPCS | Performed by: INTERNAL MEDICINE

## 2017-10-08 PROCEDURE — 27000173 HC ACAPELLA DEVICE DH OR DM

## 2017-10-08 PROCEDURE — 85610 PROTHROMBIN TIME: CPT

## 2017-10-08 PROCEDURE — 83735 ASSAY OF MAGNESIUM: CPT

## 2017-10-08 PROCEDURE — 94660 CPAP INITIATION&MGMT: CPT

## 2017-10-08 PROCEDURE — 99232 SBSQ HOSP IP/OBS MODERATE 35: CPT | Mod: ,,, | Performed by: INTERNAL MEDICINE

## 2017-10-08 PROCEDURE — 25000003 PHARM REV CODE 250: Performed by: STUDENT IN AN ORGANIZED HEALTH CARE EDUCATION/TRAINING PROGRAM

## 2017-10-08 PROCEDURE — 99900035 HC TECH TIME PER 15 MIN (STAT)

## 2017-10-08 PROCEDURE — 85025 COMPLETE CBC W/AUTO DIFF WBC: CPT

## 2017-10-08 PROCEDURE — 25000003 PHARM REV CODE 250: Performed by: INTERNAL MEDICINE

## 2017-10-08 PROCEDURE — 80053 COMPREHEN METABOLIC PANEL: CPT

## 2017-10-08 PROCEDURE — 36415 COLL VENOUS BLD VENIPUNCTURE: CPT

## 2017-10-08 RX ORDER — POTASSIUM CHLORIDE 20 MEQ/1
40 TABLET, EXTENDED RELEASE ORAL ONCE
Status: COMPLETED | OUTPATIENT
Start: 2017-10-08 | End: 2017-10-08

## 2017-10-08 RX ADMIN — LURASIDONE HYDROCHLORIDE 60 MG: 20 TABLET, FILM COATED ORAL at 09:10

## 2017-10-08 RX ADMIN — DESVENLAFAXINE SUCCINATE 100 MG: 50 TABLET, FILM COATED, EXTENDED RELEASE ORAL at 09:10

## 2017-10-08 RX ADMIN — PANTOPRAZOLE SODIUM 40 MG: 40 TABLET, DELAYED RELEASE ORAL at 09:10

## 2017-10-08 RX ADMIN — LAMOTRIGINE 100 MG: 100 TABLET ORAL at 09:10

## 2017-10-08 RX ADMIN — IPRATROPIUM BROMIDE AND ALBUTEROL SULFATE 3 ML: .5; 3 SOLUTION RESPIRATORY (INHALATION) at 12:10

## 2017-10-08 RX ADMIN — BUSPIRONE HYDROCHLORIDE 15 MG: 5 TABLET ORAL at 09:10

## 2017-10-08 RX ADMIN — LEVOTHYROXINE SODIUM 75 MCG: 75 TABLET ORAL at 09:10

## 2017-10-08 RX ADMIN — HEPARIN SODIUM 5000 UNITS: 5000 INJECTION, SOLUTION INTRAVENOUS; SUBCUTANEOUS at 09:10

## 2017-10-08 RX ADMIN — HYDROCODONE BITARTRATE AND ACETAMINOPHEN 1 TABLET: 5; 325 TABLET ORAL at 05:10

## 2017-10-08 RX ADMIN — Medication 3 ML: at 02:10

## 2017-10-08 RX ADMIN — HEPARIN SODIUM 5000 UNITS: 5000 INJECTION, SOLUTION INTRAVENOUS; SUBCUTANEOUS at 02:10

## 2017-10-08 RX ADMIN — AMLODIPINE BESYLATE 5 MG: 5 TABLET ORAL at 09:10

## 2017-10-08 RX ADMIN — Medication 3 ML: at 09:10

## 2017-10-08 RX ADMIN — IPRATROPIUM BROMIDE AND ALBUTEROL SULFATE 3 ML: .5; 3 SOLUTION RESPIRATORY (INHALATION) at 08:10

## 2017-10-08 RX ADMIN — FUROSEMIDE 40 MG: 10 INJECTION, SOLUTION INTRAVENOUS at 05:10

## 2017-10-08 RX ADMIN — POTASSIUM CHLORIDE 40 MEQ: 1500 TABLET, EXTENDED RELEASE ORAL at 09:10

## 2017-10-08 RX ADMIN — HEPARIN SODIUM 5000 UNITS: 5000 INJECTION, SOLUTION INTRAVENOUS; SUBCUTANEOUS at 04:10

## 2017-10-08 RX ADMIN — FUROSEMIDE 40 MG: 10 INJECTION, SOLUTION INTRAVENOUS at 09:10

## 2017-10-08 RX ADMIN — IPRATROPIUM BROMIDE AND ALBUTEROL SULFATE 3 ML: .5; 3 SOLUTION RESPIRATORY (INHALATION) at 06:10

## 2017-10-08 RX ADMIN — PRAVASTATIN SODIUM 40 MG: 40 TABLET ORAL at 09:10

## 2017-10-08 RX ADMIN — BUSPIRONE HYDROCHLORIDE 15 MG: 5 TABLET ORAL at 04:10

## 2017-10-08 RX ADMIN — BUSPIRONE HYDROCHLORIDE 15 MG: 5 TABLET ORAL at 02:10

## 2017-10-08 NOTE — PLAN OF CARE
Problem: Patient Care Overview  Goal: Plan of Care Review  Outcome: Ongoing (interventions implemented as appropriate)  * AAO x 4  * 2 gram NA diet with a 2000 cc fluid restriction tolerating well.  * Generalized edema noted  * Bed at lowest position and locked, call light within reach, non-slip socks on, and side rails up x 2.  Encouraged patient to call for assistance when needed patient stated understanding.  * Left FA PIV 20 G (10/4/17) patent, dressing clean dry and intact no signs or symptoms of infection noted.  * Oxygen 3 liters via nasal cannula.  Oxygen saturation 92-93 % respirations even and unlabored. BiPaP while sleeping initiated by respiratory tech per patient request, patient tolerating well.   * Patient has complaints of pain to the back/neck PRN pain medication administrated    * Skin assessment preformed no breakdown noted   * Standard precautions maintained  * Continuous telemetry monitoring continued SR 80's no ectopy noted.   * Patient able to turn self no assistance needed.  * Patient voiding clear yellow urine.  See flow sheet for intake and output totals

## 2017-10-08 NOTE — SUBJECTIVE & OBJECTIVE
Interval History: Comfortable on NC eating breakfast at the time of exam.    Continuous Infusions:   Scheduled Meds:   albuterol-ipratropium 2.5mg-0.5mg/3mL  3 mL Nebulization Q6H    amlodipine  5 mg Oral Daily    busPIRone  15 mg Oral TID    desvenlafaxine succinate  100 mg Oral Daily    furosemide  40 mg Intravenous BID    heparin (porcine)  5,000 Units Subcutaneous Q8H    lamotrigine  100 mg Oral BID    levothyroxine  75 mcg Oral Daily    lurasidone  60 mg Oral Daily    pantoprazole  40 mg Oral Daily    pravastatin  40 mg Oral Daily    sodium chloride 0.9%  3 mL Intravenous Q8H     PRN Meds:albuterol sulfate, hydrocodone-acetaminophen 5-325mg    Review of patient's allergies indicates:   Allergen Reactions    Sulfa (sulfonamide antibiotics) Rash     Objective:     Vital Signs (Most Recent):  Temp: 97.6 °F (36.4 °C) (10/08/17 0800)  Pulse: 96 (10/08/17 1100)  Resp: 18 (10/08/17 0832)  BP: 132/74 (10/08/17 0800)  SpO2: 96 % (10/08/17 0832) Vital Signs (24h Range):  Temp:  [97.4 °F (36.3 °C)-98.6 °F (37 °C)] 97.6 °F (36.4 °C)  Pulse:  [75-96] 96  Resp:  [15-20] 18  SpO2:  [92 %-99 %] 96 %  BP: (107-132)/(62-86) 132/74     Patient Vitals for the past 72 hrs (Last 3 readings):   Weight   10/08/17 0430 86.8 kg (191 lb 5.8 oz)   10/07/17 0515 87.4 kg (192 lb 10.9 oz)   10/06/17 0600 85 kg (187 lb 6.3 oz)     Body mass index is 32.85 kg/m².      Intake/Output Summary (Last 24 hours) at 10/08/17 1142  Last data filed at 10/08/17 0430   Gross per 24 hour   Intake             1540 ml   Output             2600 ml   Net            -1060 ml       Hemodynamic Parameters:         Physical Exam   Constitutional: She is oriented to person, place, and time. She appears well-developed and well-nourished.   Neck: Normal range of motion. Neck supple.   Cardiovascular: Normal rate and regular rhythm.  Exam reveals no gallop and no friction rub.    No murmur heard.  JVD at claicle   Loud P2   Pulmonary/Chest: Effort normal  and breath sounds normal. She has no wheezes. She has no rales.   On O2 via NC   Abdominal: Soft. Bowel sounds are normal. There is no tenderness.   Musculoskeletal: She exhibits no edema.   Neurological: She is alert and oriented to person, place, and time.   Skin: Skin is warm and dry.       Significant Labs:  CBC:    Recent Labs  Lab 10/06/17  0513 10/07/17  0546 10/08/17  0437   WBC 12.51 11.04 10.79   RBC 5.55* 5.32 5.24   HGB 17.2* 16.4* 16.0   HCT 52.8* 47.7 48.7*    139* 162   MCV 95 90 93   MCH 31.0 30.8 30.5   MCHC 32.6 34.4 32.9     BNP:    Recent Labs  Lab 10/05/17  0533   *     CMP:    Recent Labs  Lab 10/06/17  0513 10/07/17  0546 10/08/17  0437   GLU 90 98 99   CALCIUM 10.1 9.7 9.8   ALBUMIN 4.1 3.7 3.7   PROT 7.2 6.9 7.0    137 139   K 4.0 3.2* 3.6   CO2 37* 32* 35*   CL 90* 92* 93*   BUN 38* 32* 30*   CREATININE 1.2 0.9 1.0   ALKPHOS 152* 158* 168*   ALT 43 36 41   AST 38 33 36   BILITOT 1.4* 1.2* 1.1*      Coagulation:     Recent Labs  Lab 10/06/17  0513 10/07/17  0546 10/08/17  0437   INR 1.0 1.0 1.0     LDH:  No results for input(s): LDH in the last 72 hours.  Microbiology:  Microbiology Results (last 7 days)     ** No results found for the last 168 hours. **          I have reviewed all pertinent labs within the past 24 hours.    Estimated Creatinine Clearance: 67.7 mL/min (based on SCr of 1 mg/dL).    Diagnostic Results:  I have reviewed and interpreted all pertinent imaging results/findings within the past 24 hours.

## 2017-10-08 NOTE — PLAN OF CARE
Problem: Patient Care Overview  Goal: Plan of Care Review  Outcome: Ongoing (interventions implemented as appropriate)  Pt's VSS, AAOX3, and independent. 3L NC applied- pulse ox ranged from,92-97%. Pt ambulated in hallway with nonskid socks on. Furosemide 40mg IV x 2 administered-total u/o 800ml  Clear yellow. C/o of pain-Norco 5mg given. Heparin injection given. Telemetry monitoring in place. Pt's bed in lowest position with call bell within reach. Will continue to monitor.

## 2017-10-08 NOTE — ASSESSMENT & PLAN NOTE
- Unclear type   - Long history of smoking but no COPD per outside pulmonologist. Will get PFTs here  - CT study done at OSH. Will  have our radiologist review  - Will be evaluated this admission for PH therapies   - Heparin sub q for VTE PPX  -Plan for RHC Monday. Will also get VQ scan, 6MWT, labs  -V/Q scan today

## 2017-10-08 NOTE — PROGRESS NOTES
[facility]  Heart Transplant  Progress Note    Patient Name: Sue Smith  MRN: [id number]  Admission Date: 10/5/2017  Hospital Length of Stay: 3 days  Attending Physician: Shelby De La Paz MD  Primary Care Provider: Paddy Guerrier DO  Principal Problem:Pulmonary hypertension    Subjective:     Interval History: Comfortable on NC eating breakfast at the time of exam.    Continuous Infusions:   Scheduled Meds:   albuterol-ipratropium 2.5mg-0.5mg/3mL  3 mL Nebulization Q6H    amlodipine  5 mg Oral Daily    busPIRone  15 mg Oral TID    desvenlafaxine succinate  100 mg Oral Daily    furosemide  40 mg Intravenous BID    heparin (porcine)  5,000 Units Subcutaneous Q8H    lamotrigine  100 mg Oral BID    levothyroxine  75 mcg Oral Daily    lurasidone  60 mg Oral Daily    pantoprazole  40 mg Oral Daily    pravastatin  40 mg Oral Daily    sodium chloride 0.9%  3 mL Intravenous Q8H     PRN Meds:albuterol sulfate, hydrocodone-acetaminophen 5-325mg    Review of patient's allergies indicates:   Allergen Reactions    Sulfa (sulfonamide antibiotics) Rash     Objective:     Vital Signs (Most Recent):  Temp: 97.6 °F (36.4 °C) (10/08/17 0800)  Pulse: 96 (10/08/17 1100)  Resp: 18 (10/08/17 0832)  BP: 132/74 (10/08/17 0800)  SpO2: 96 % (10/08/17 0832) Vital Signs (24h Range):  Temp:  [97.4 °F (36.3 °C)-98.6 °F (37 °C)] 97.6 °F (36.4 °C)  Pulse:  [75-96] 96  Resp:  [15-20] 18  SpO2:  [92 %-99 %] 96 %  BP: (107-132)/(62-86) 132/74     Patient Vitals for the past 72 hrs (Last 3 readings):   Weight   10/08/17 0430 86.8 kg (191 lb 5.8 oz)   10/07/17 0515 87.4 kg (192 lb 10.9 oz)   10/06/17 0600 85 kg (187 lb 6.3 oz)     Body mass index is 32.85 kg/m².      Intake/Output Summary (Last 24 hours) at 10/08/17 1142  Last data filed at 10/08/17 0430   Gross per 24 hour   Intake             1540 ml   Output             2600 ml   Net            -1060 ml       Hemodynamic Parameters:         Physical Exam   Constitutional:  She is oriented to person, place, and time. She appears well-developed and well-nourished.   Neck: Normal range of motion. Neck supple.   Cardiovascular: Normal rate and regular rhythm.  Exam reveals no gallop and no friction rub.    No murmur heard.  JVD at claicle   Loud P2   Pulmonary/Chest: Effort normal and breath sounds normal. She has no wheezes. She has no rales.   On O2 via NC   Abdominal: Soft. Bowel sounds are normal. There is no tenderness.   Musculoskeletal: She exhibits no edema.   Neurological: She is alert and oriented to person, place, and time.   Skin: Skin is warm and dry.       Significant Labs:  CBC:    Recent Labs  Lab 10/06/17  0513 10/07/17  0546 10/08/17  0437   WBC 12.51 11.04 10.79   RBC 5.55* 5.32 5.24   HGB 17.2* 16.4* 16.0   HCT 52.8* 47.7 48.7*    139* 162   MCV 95 90 93   MCH 31.0 30.8 30.5   MCHC 32.6 34.4 32.9     BNP:    Recent Labs  Lab 10/05/17  0533   *     CMP:    Recent Labs  Lab 10/06/17  0513 10/07/17  0546 10/08/17  0437   GLU 90 98 99   CALCIUM 10.1 9.7 9.8   ALBUMIN 4.1 3.7 3.7   PROT 7.2 6.9 7.0    137 139   K 4.0 3.2* 3.6   CO2 37* 32* 35*   CL 90* 92* 93*   BUN 38* 32* 30*   CREATININE 1.2 0.9 1.0   ALKPHOS 152* 158* 168*   ALT 43 36 41   AST 38 33 36   BILITOT 1.4* 1.2* 1.1*      Coagulation:     Recent Labs  Lab 10/06/17  0513 10/07/17  0546 10/08/17  0437   INR 1.0 1.0 1.0     LDH:  No results for input(s): LDH in the last 72 hours.  Microbiology:  Microbiology Results (last 7 days)     ** No results found for the last 168 hours. **          I have reviewed all pertinent labs within the past 24 hours.    Estimated Creatinine Clearance: 67.7 mL/min (based on SCr of 1 mg/dL).    Diagnostic Results:  I have reviewed and interpreted all pertinent imaging results/findings within the past 24 hours.    Assessment and Plan:     Mrs. Sue Smith is a 57 yo female with a PMHx of complex pulmonary disease (see below), Bipolar disorder (controlled on  multiple meds), hypothyroidism, GERD, previous pericardial window for an pericardial effusion, HTN, GERD and dyslipidemia. She presents as a transfer from [facility] for PulTN evaluation. She had previously been referred to us with plans for outpatient evaluation on 10/18/17. She was admitted there on 9/28/17 for worsening SOB that had developed over the past week. She initially required BiPAP and IV diuresis before eventually weaning to Venti mask. Over the course of her admission she had a CT PE study that was read as negative for pulmonary embolism, TTE that showed no significant aortic/mitral disease but did show a hyperdynamic LV and  Estimated PA systolic pressure of 98. She subsequently underwent RHC on 9/29/17 (unclear if she was adequately diuresed) with a PCW of 26 /50 (75) /30 RA 25. She was transferred here for advance PAH options / evaluation. Relatively asymptomatic here. Lives in assisted living with extended family close. Long discussion regarding goals of care. Patient is a full code. Does not thing she has an advanced directive (she plans to discuss with her family).     Pulmonary Hx: Of note records from OSH are discordant. Hospitalist / cardiology have her labeled as COPD and Obesity hypoventilation however consult by pulmonologist (Dr. Hardik Magallanes) states explicitly that PFTs done in 2015 showed only mild restriction and were not consistent with COPD. In addition he states that though she is mildly obese her pCO2 is normal at baseline ruling out obesity hypoventilation. What makes this more difficult is the fact that the patient is unsure if she has COPD as she has been told different things by different doctors over the years.     - Severe Pulmonary HTN  - Chronic hypoxic respiratory failure for which she is on 3L all the time and BiPAP QHS  - History of tracheostomy (4 years ago) and short term vent dependence. Tracheostomy was eventually de cannulated.   - Previous  tobacco abuse (stopped 15 yrs ago)   - SHERIE (unclear severity) has been on trilogy vent for BiPAP at night.     History was difficult to obtain as patient is a poor historian (at least in regards to timeline of events) and previous care seems fragmented (see pulmonary hx above).      OSH imaging disc, pulmonology consult note and cath report organized in printed record with metal clip (placed by me) for easy review.     * Pulmonary hypertension    - Unclear type   - Long history of smoking but no COPD per outside pulmonologist. Will get PFTs here  - CT study done at OSH. Will  have our radiologist review  - Will be evaluated this admission for PH therapies   - Heparin sub q for VTE PPX  -Plan for RHC Monday. Will also get VQ scan, 6MWT, labs  -V/Q scan today            Acute on chronic right heart failure    -Lasix IVP 40 BID.  -Echo showed normal LVEF, severely depressed RV function, elevated PA pressures            Bipolar disorder    - Continue lamictal, desvenlafaxine, buspirone and lurasidone.   -Expressed concerns regarding her buspirone as it can propogate liyah   -Will hold off on making changes as I have just meet her and she has been stable on above regimen for many years   -Consider psych evaluation if she starts showing signs of liyah for medication adjustment         Chronic respiratory failure with hypoxia    Not wheezing unlikely to be COPD exacerbation    - On O2 3L by NC at home  - BiPAP QHS   -? H/o COPD. Will continue duoneb and prn albuterol for now. Will hold further systemic steroids (initially on prednisone 40mg for a possible COPD exacerbation).             Berenice Cortes MD  Heart Transplant  [facility]

## 2017-10-08 NOTE — ASSESSMENT & PLAN NOTE
-Lasix IVP 40 BID.  -Echo showed normal LVEF, severely depressed RV function, elevated PA pressures

## 2017-10-09 PROBLEM — I50.9 CHF (CONGESTIVE HEART FAILURE): Status: ACTIVE | Noted: 2017-10-09

## 2017-10-09 PROBLEM — I51.7 CARDIOMEGALY: Status: ACTIVE | Noted: 2017-10-09

## 2017-10-09 LAB
ALBUMIN SERPL BCP-MCNC: 3.5 G/DL
ALP SERPL-CCNC: 169 U/L
ALT SERPL W/O P-5'-P-CCNC: 43 U/L
ANA SER QL IF: NORMAL
ANION GAP SERPL CALC-SCNC: 12 MMOL/L
AST SERPL-CCNC: 40 U/L
BASOPHILS # BLD AUTO: 0.02 K/UL
BASOPHILS NFR BLD: 0.2 %
BILIRUB SERPL-MCNC: 0.9 MG/DL
BNP SERPL-MCNC: 381 PG/ML
BUN SERPL-MCNC: 25 MG/DL
CALCIUM SERPL-MCNC: 9.4 MG/DL
CHLORIDE SERPL-SCNC: 97 MMOL/L
CO2 SERPL-SCNC: 29 MMOL/L
CREAT SERPL-MCNC: 0.9 MG/DL
DIFFERENTIAL METHOD: ABNORMAL
EOSINOPHIL # BLD AUTO: 0.3 K/UL
EOSINOPHIL NFR BLD: 3 %
ERYTHROCYTE [DISTWIDTH] IN BLOOD BY AUTOMATED COUNT: 14.8 %
EST. GFR  (AFRICAN AMERICAN): >60 ML/MIN/1.73 M^2
EST. GFR  (NON AFRICAN AMERICAN): >60 ML/MIN/1.73 M^2
GLUCOSE SERPL-MCNC: 101 MG/DL
HCT VFR BLD AUTO: 46.6 %
HGB BLD-MCNC: 15.6 G/DL
INR PPP: 1
LYMPHOCYTES # BLD AUTO: 2.1 K/UL
LYMPHOCYTES NFR BLD: 21.8 %
MAGNESIUM SERPL-MCNC: 2.4 MG/DL
MCH RBC QN AUTO: 30.6 PG
MCHC RBC AUTO-ENTMCNC: 33.5 G/DL
MCV RBC AUTO: 92 FL
MONOCYTES # BLD AUTO: 0.5 K/UL
MONOCYTES NFR BLD: 5.6 %
NEUTROPHILS # BLD AUTO: 6.5 K/UL
NEUTROPHILS NFR BLD: 68.4 %
PLATELET # BLD AUTO: 163 K/UL
PMV BLD AUTO: 11.9 FL
POTASSIUM SERPL-SCNC: 3.5 MMOL/L
PROT SERPL-MCNC: 7 G/DL
PROTHROMBIN TIME: 10.6 SEC
RBC # BLD AUTO: 5.09 M/UL
SODIUM SERPL-SCNC: 138 MMOL/L
WBC # BLD AUTO: 9.45 K/UL

## 2017-10-09 PROCEDURE — 99900035 HC TECH TIME PER 15 MIN (STAT)

## 2017-10-09 PROCEDURE — 25000003 PHARM REV CODE 250: Performed by: PHYSICIAN ASSISTANT

## 2017-10-09 PROCEDURE — 94727 GAS DIL/WSHOT DETER LNG VOL: CPT | Performed by: INTERNAL MEDICINE

## 2017-10-09 PROCEDURE — 36415 COLL VENOUS BLD VENIPUNCTURE: CPT

## 2017-10-09 PROCEDURE — 93451 RIGHT HEART CATH: CPT

## 2017-10-09 PROCEDURE — 20600001 HC STEP DOWN PRIVATE ROOM

## 2017-10-09 PROCEDURE — A4216 STERILE WATER/SALINE, 10 ML: HCPCS | Performed by: INTERNAL MEDICINE

## 2017-10-09 PROCEDURE — 94620 *HC PUL STRESS; SIMPLE (6MIN WALK): CPT | Performed by: INTERNAL MEDICINE

## 2017-10-09 PROCEDURE — 63600175 PHARM REV CODE 636 W HCPCS: Performed by: INTERNAL MEDICINE

## 2017-10-09 PROCEDURE — 94664 DEMO&/EVAL PT USE INHALER: CPT

## 2017-10-09 PROCEDURE — 85025 COMPLETE CBC W/AUTO DIFF WBC: CPT

## 2017-10-09 PROCEDURE — 83735 ASSAY OF MAGNESIUM: CPT

## 2017-10-09 PROCEDURE — 93451 RIGHT HEART CATH: CPT | Mod: 26,,, | Performed by: INTERNAL MEDICINE

## 2017-10-09 PROCEDURE — 94640 AIRWAY INHALATION TREATMENT: CPT

## 2017-10-09 PROCEDURE — 02HQ32Z INSERTION OF MONITORING DEVICE INTO RIGHT PULMONARY ARTERY, PERCUTANEOUS APPROACH: ICD-10-PCS | Performed by: INTERNAL MEDICINE

## 2017-10-09 PROCEDURE — 93463 DRUG ADMIN & HEMODYNMIC MEAS: CPT

## 2017-10-09 PROCEDURE — 94010 BREATHING CAPACITY TEST: CPT | Performed by: INTERNAL MEDICINE

## 2017-10-09 PROCEDURE — 82803 BLOOD GASES ANY COMBINATION: CPT | Performed by: INTERNAL MEDICINE

## 2017-10-09 PROCEDURE — 25000242 PHARM REV CODE 250 ALT 637 W/ HCPCS: Performed by: INTERNAL MEDICINE

## 2017-10-09 PROCEDURE — 83880 ASSAY OF NATRIURETIC PEPTIDE: CPT

## 2017-10-09 PROCEDURE — 93463 DRUG ADMIN & HEMODYNMIC MEAS: CPT | Mod: ,,, | Performed by: INTERNAL MEDICINE

## 2017-10-09 PROCEDURE — 25000003 PHARM REV CODE 250: Performed by: INTERNAL MEDICINE

## 2017-10-09 PROCEDURE — C1894 INTRO/SHEATH, NON-LASER: HCPCS

## 2017-10-09 PROCEDURE — B2141ZZ FLUOROSCOPY OF RIGHT HEART USING LOW OSMOLAR CONTRAST: ICD-10-PCS | Performed by: INTERNAL MEDICINE

## 2017-10-09 PROCEDURE — 85610 PROTHROMBIN TIME: CPT

## 2017-10-09 PROCEDURE — 94760 N-INVAS EAR/PLS OXIMETRY 1: CPT

## 2017-10-09 PROCEDURE — 80053 COMPREHEN METABOLIC PANEL: CPT

## 2017-10-09 PROCEDURE — 94729 DIFFUSING CAPACITY: CPT | Performed by: INTERNAL MEDICINE

## 2017-10-09 PROCEDURE — 25000003 PHARM REV CODE 250

## 2017-10-09 PROCEDURE — 36600 WITHDRAWAL OF ARTERIAL BLOOD: CPT | Performed by: INTERNAL MEDICINE

## 2017-10-09 PROCEDURE — 4A023N6 MEASUREMENT OF CARDIAC SAMPLING AND PRESSURE, RIGHT HEART, PERCUTANEOUS APPROACH: ICD-10-PCS | Performed by: INTERNAL MEDICINE

## 2017-10-09 PROCEDURE — 94660 CPAP INITIATION&MGMT: CPT

## 2017-10-09 PROCEDURE — 99232 SBSQ HOSP IP/OBS MODERATE 35: CPT | Mod: ,,, | Performed by: INTERNAL MEDICINE

## 2017-10-09 PROCEDURE — 94761 N-INVAS EAR/PLS OXIMETRY MLT: CPT

## 2017-10-09 PROCEDURE — 27000221 HC OXYGEN, UP TO 24 HOURS

## 2017-10-09 RX ORDER — POTASSIUM CHLORIDE 750 MG/1
60 CAPSULE, EXTENDED RELEASE ORAL ONCE
Status: COMPLETED | OUTPATIENT
Start: 2017-10-09 | End: 2017-10-09

## 2017-10-09 RX ADMIN — POTASSIUM CHLORIDE 60 MEQ: 750 CAPSULE, EXTENDED RELEASE ORAL at 08:10

## 2017-10-09 RX ADMIN — HEPARIN SODIUM 5000 UNITS: 5000 INJECTION, SOLUTION INTRAVENOUS; SUBCUTANEOUS at 04:10

## 2017-10-09 RX ADMIN — LURASIDONE HYDROCHLORIDE 60 MG: 20 TABLET, FILM COATED ORAL at 08:10

## 2017-10-09 RX ADMIN — DESVENLAFAXINE SUCCINATE 100 MG: 50 TABLET, FILM COATED, EXTENDED RELEASE ORAL at 08:10

## 2017-10-09 RX ADMIN — FUROSEMIDE 40 MG: 10 INJECTION, SOLUTION INTRAVENOUS at 08:10

## 2017-10-09 RX ADMIN — PRAVASTATIN SODIUM 40 MG: 40 TABLET ORAL at 08:10

## 2017-10-09 RX ADMIN — FUROSEMIDE 40 MG: 10 INJECTION, SOLUTION INTRAVENOUS at 06:10

## 2017-10-09 RX ADMIN — Medication 3 ML: at 01:10

## 2017-10-09 RX ADMIN — BUSPIRONE HYDROCHLORIDE 15 MG: 5 TABLET ORAL at 04:10

## 2017-10-09 RX ADMIN — HYDROCODONE BITARTRATE AND ACETAMINOPHEN 1 TABLET: 5; 325 TABLET ORAL at 01:10

## 2017-10-09 RX ADMIN — IPRATROPIUM BROMIDE AND ALBUTEROL SULFATE 3 ML: .5; 3 SOLUTION RESPIRATORY (INHALATION) at 12:10

## 2017-10-09 RX ADMIN — BUSPIRONE HYDROCHLORIDE 15 MG: 5 TABLET ORAL at 01:10

## 2017-10-09 RX ADMIN — PANTOPRAZOLE SODIUM 40 MG: 40 TABLET, DELAYED RELEASE ORAL at 08:10

## 2017-10-09 RX ADMIN — IPRATROPIUM BROMIDE AND ALBUTEROL SULFATE 3 ML: .5; 3 SOLUTION RESPIRATORY (INHALATION) at 11:10

## 2017-10-09 RX ADMIN — LAMOTRIGINE 100 MG: 100 TABLET ORAL at 08:10

## 2017-10-09 RX ADMIN — IPRATROPIUM BROMIDE AND ALBUTEROL SULFATE 3 ML: .5; 3 SOLUTION RESPIRATORY (INHALATION) at 07:10

## 2017-10-09 RX ADMIN — HYDROCODONE BITARTRATE AND ACETAMINOPHEN 1 TABLET: 5; 325 TABLET ORAL at 07:10

## 2017-10-09 RX ADMIN — HEPARIN SODIUM 5000 UNITS: 5000 INJECTION, SOLUTION INTRAVENOUS; SUBCUTANEOUS at 08:10

## 2017-10-09 RX ADMIN — HEPARIN SODIUM 5000 UNITS: 5000 INJECTION, SOLUTION INTRAVENOUS; SUBCUTANEOUS at 01:10

## 2017-10-09 RX ADMIN — LEVOTHYROXINE SODIUM 75 MCG: 75 TABLET ORAL at 08:10

## 2017-10-09 RX ADMIN — AMLODIPINE BESYLATE 5 MG: 5 TABLET ORAL at 08:10

## 2017-10-09 RX ADMIN — BUSPIRONE HYDROCHLORIDE 15 MG: 5 TABLET ORAL at 08:10

## 2017-10-09 NOTE — PLAN OF CARE
Problem: Patient Care Overview  Goal: Plan of Care Review  Patient admitted 10/5 for pulmonary htn work up.  Currently 95% and up on 3LNC.  LANE.  Patient completed VQ scan, PFTs and will have right heart cath and CT of chest this afternoon. K replaced this am.  PO pain medication received once for pain.  Patient will probably be ordered IV pulmonary HTN medication thus will be moved to TSU this evening.

## 2017-10-09 NOTE — PROGRESS NOTES
[facility]  Heart Transplant  Progress Note    Patient Name: Sue Smith  MRN: [id number]  Admission Date: 10/5/2017  Hospital Length of Stay: 4 days  Attending Physician: Shelby De La Paz MD  Primary Care Provider: Paddy Guerrier DO  Principal Problem:Pulmonary hypertension    Subjective:     Interval History: Continues to feel better.    Continuous Infusions:   Scheduled Meds:   albuterol-ipratropium 2.5mg-0.5mg/3mL  3 mL Nebulization Q6H    amlodipine  5 mg Oral Daily    busPIRone  15 mg Oral TID    desvenlafaxine succinate  100 mg Oral Daily    furosemide  40 mg Intravenous BID    heparin (porcine)  5,000 Units Subcutaneous Q8H    lamotrigine  100 mg Oral BID    levothyroxine  75 mcg Oral Daily    lurasidone  60 mg Oral Daily    pantoprazole  40 mg Oral Daily    pravastatin  40 mg Oral Daily    sodium chloride 0.9%  3 mL Intravenous Q8H     PRN Meds:albuterol sulfate, hydrocodone-acetaminophen 5-325mg    Review of patient's allergies indicates:   Allergen Reactions    Sulfa (sulfonamide antibiotics) Rash     Objective:     Vital Signs (Most Recent):  Temp: 96.6 °F (35.9 °C) (10/09/17 1243)  Pulse: 89 (10/09/17 1243)  Resp: 18 (10/09/17 1243)  BP: 111/72 (10/09/17 1315)  SpO2: 95 % (10/09/17 1243) Vital Signs (24h Range):  Temp:  [96.6 °F (35.9 °C)-98.9 °F (37.2 °C)] 96.6 °F (35.9 °C)  Pulse:  [74-93] 89  Resp:  [16-20] 18  SpO2:  [92 %-99 %] 95 %  BP: ()/(55-73) 111/72     Patient Vitals for the past 72 hrs (Last 3 readings):   Weight   10/09/17 0346 86.5 kg (190 lb 11.2 oz)   10/08/17 0430 86.8 kg (191 lb 5.8 oz)   10/07/17 0515 87.4 kg (192 lb 10.9 oz)     Body mass index is 32.73 kg/m².      Intake/Output Summary (Last 24 hours) at 10/09/17 1431  Last data filed at 10/09/17 0346   Gross per 24 hour   Intake              243 ml   Output             1500 ml   Net            -1257 ml       Hemodynamic Parameters:         Physical Exam   Constitutional: She is oriented to  person, place, and time. She appears well-developed and well-nourished.   Neck: Normal range of motion. Neck supple. No JVD present.   Cardiovascular: Normal rate and regular rhythm.  Exam reveals no gallop and no friction rub.    No murmur heard.  Pulmonary/Chest: Effort normal and breath sounds normal. She has no wheezes. She has no rales.   On O2 via NC   Abdominal: Soft. Bowel sounds are normal. There is no tenderness.   Musculoskeletal: She exhibits no edema.   Neurological: She is alert and oriented to person, place, and time.   Skin: Skin is warm and dry.       Significant Labs:  CBC:    Recent Labs  Lab 10/07/17  0546 10/08/17  0437 10/09/17  0435   WBC 11.04 10.79 9.45   RBC 5.32 5.24 5.09   HGB 16.4* 16.0 15.6   HCT 47.7 48.7* 46.6   * 162 163   MCV 90 93 92   MCH 30.8 30.5 30.6   MCHC 34.4 32.9 33.5     BNP:    Recent Labs  Lab 10/05/17  0533 10/09/17  0435   * 381*     CMP:    Recent Labs  Lab 10/07/17  0546 10/08/17  0437 10/09/17  0435   GLU 98 99 101   CALCIUM 9.7 9.8 9.4   ALBUMIN 3.7 3.7 3.5   PROT 6.9 7.0 7.0    139 138   K 3.2* 3.6 3.5   CO2 32* 35* 29   CL 92* 93* 97   BUN 32* 30* 25*   CREATININE 0.9 1.0 0.9   ALKPHOS 158* 168* 169*   ALT 36 41 43   AST 33 36 40   BILITOT 1.2* 1.1* 0.9      Coagulation:     Recent Labs  Lab 10/07/17  0546 10/08/17  0437 10/09/17  0435   INR 1.0 1.0 1.0     LDH:  No results for input(s): LDH in the last 72 hours.  Microbiology:  Microbiology Results (last 7 days)     ** No results found for the last 168 hours. **          I have reviewed all pertinent labs within the past 24 hours.    Estimated Creatinine Clearance: 75.1 mL/min (based on SCr of 0.9 mg/dL).    Diagnostic Results:  I have reviewed and interpreted all pertinent imaging results/findings within the past 24 hours.    Assessment and Plan:         * Pulmonary hypertension    - Unclear type   - Long history of smoking but no COPD per outside pulmonologist. Will get PFTs here  - CT  study done at OSH- unable to open CD so will get Chest CT here to r/o ILD  - Will be evaluated this admission for PH therapies   - Heparin sub q for VTE PPX  -Plan for RHC today. Will also get 6MWT, labs  -V/Q scan today showed low probability for PE            Acute on chronic right heart failure    -Lasix IVP 40 BID. Nte neg 4L since admit. RHC today  -Echo showed normal LVEF, severely depressed RV function, elevated PA pressures            Bipolar disorder    - Continue lamictal, desvenlafaxine, buspirone and lurasidone.           Chronic respiratory failure with hypoxia    - On O2 3L by NC at home  - BiPAP QHS   -? H/o COPD. Will continue duoneb and prn albuterol for now. Will hold further systemic steroids (initially on prednisone 40mg for a possible COPD exacerbation).             Deanne Tello PA-C  Heart Transplant  [facility]

## 2017-10-09 NOTE — PROGRESS NOTES
Patient transported off the unit to Pulmonary lab for PFTs, no acute distress noted at this time.  Will continue to monitor for patients return.

## 2017-10-09 NOTE — PROGRESS NOTES
Patient transported to CT, no acute distress noted at this time, will monitor for patients return.

## 2017-10-09 NOTE — PROGRESS NOTES
Patient returned form pulmonary lab, no acute distress noted at this time.  Radha with cath lab notified of patients return.

## 2017-10-09 NOTE — PROGRESS NOTES
Patient transported off the unit to VQ scan. No acute distress noted at this time.  Will monitor for patients return.

## 2017-10-09 NOTE — PROGRESS NOTES
Patient tolerated the procedure well. Please see complete RHC procedure note for full details and results. Stable to return from cath lab to their hospital room.  Procedure: Right heart catheterization   Procedure date: 10/09/17    Discharge date: 10/09/17  Estimated blood loss: less than 10 cc  Complications: none  Condition at discharge: stable  Discharge instructions: no heavy lifting greater than 5 lbs and no bearing down/coughing without holding pressure over incision site.  Activity: as tolerated after 24 hours.

## 2017-10-09 NOTE — INTERVAL H&P NOTE
Patient presents for RHC to evaluate PA pressures and cardiac flows. No significant interval change in H&P from 10/05/17, patient has been diuresing and getting worked up for her PH, feeling better overall. I have explained the risks, benefits, and alternatives of the procedure in detail.  The patient voices understanding and all questions have been answered.  The patient agrees to proceed as planned.

## 2017-10-09 NOTE — NURSING
Notified Dr Balderas of 8 beat run v-tach no symptoms noted vital signs stable see flow sheet will monitor.

## 2017-10-09 NOTE — PROGRESS NOTES
Patient transported down to cath lab for right heart cath.  NO acute distress noted at this time, will monitor for patients return.

## 2017-10-09 NOTE — ASSESSMENT & PLAN NOTE
-Lasix IVP 40 BID. Nte neg 4L since admit. RHC today  -Echo showed normal LVEF, severely depressed RV function, elevated PA pressures

## 2017-10-09 NOTE — PROGRESS NOTES
Patient returned from VQ scan, no acute distress noted at this time.  Notified Radha in cath lab, right heart cath will be performed this afternoon.  Patient does not need to be NPO.  Will continue to monitor.

## 2017-10-09 NOTE — PROGRESS NOTES
Patient returned to the unit from cath lab, right heart cath performed.  Patient has right IJ bandage intact, no ss of bleeding, or hematoma.  Will continue to monitor.

## 2017-10-09 NOTE — SUBJECTIVE & OBJECTIVE
Interval History: Continues to feel better.    Continuous Infusions:   Scheduled Meds:   albuterol-ipratropium 2.5mg-0.5mg/3mL  3 mL Nebulization Q6H    amlodipine  5 mg Oral Daily    busPIRone  15 mg Oral TID    desvenlafaxine succinate  100 mg Oral Daily    furosemide  40 mg Intravenous BID    heparin (porcine)  5,000 Units Subcutaneous Q8H    lamotrigine  100 mg Oral BID    levothyroxine  75 mcg Oral Daily    lurasidone  60 mg Oral Daily    pantoprazole  40 mg Oral Daily    pravastatin  40 mg Oral Daily    sodium chloride 0.9%  3 mL Intravenous Q8H     PRN Meds:albuterol sulfate, hydrocodone-acetaminophen 5-325mg    Review of patient's allergies indicates:   Allergen Reactions    Sulfa (sulfonamide antibiotics) Rash     Objective:     Vital Signs (Most Recent):  Temp: 96.6 °F (35.9 °C) (10/09/17 1243)  Pulse: 89 (10/09/17 1243)  Resp: 18 (10/09/17 1243)  BP: 111/72 (10/09/17 1315)  SpO2: 95 % (10/09/17 1243) Vital Signs (24h Range):  Temp:  [96.6 °F (35.9 °C)-98.9 °F (37.2 °C)] 96.6 °F (35.9 °C)  Pulse:  [74-93] 89  Resp:  [16-20] 18  SpO2:  [92 %-99 %] 95 %  BP: ()/(55-73) 111/72     Patient Vitals for the past 72 hrs (Last 3 readings):   Weight   10/09/17 0346 86.5 kg (190 lb 11.2 oz)   10/08/17 0430 86.8 kg (191 lb 5.8 oz)   10/07/17 0515 87.4 kg (192 lb 10.9 oz)     Body mass index is 32.73 kg/m².      Intake/Output Summary (Last 24 hours) at 10/09/17 1431  Last data filed at 10/09/17 0346   Gross per 24 hour   Intake              243 ml   Output             1500 ml   Net            -1257 ml       Hemodynamic Parameters:         Physical Exam   Constitutional: She is oriented to person, place, and time. She appears well-developed and well-nourished.   Neck: Normal range of motion. Neck supple. No JVD present.   Cardiovascular: Normal rate and regular rhythm.  Exam reveals no gallop and no friction rub.    No murmur heard.  Pulmonary/Chest: Effort normal and breath sounds normal. She has no  wheezes. She has no rales.   On O2 via NC   Abdominal: Soft. Bowel sounds are normal. There is no tenderness.   Musculoskeletal: She exhibits no edema.   Neurological: She is alert and oriented to person, place, and time.   Skin: Skin is warm and dry.       Significant Labs:  CBC:    Recent Labs  Lab 10/07/17  0546 10/08/17  0437 10/09/17  0435   WBC 11.04 10.79 9.45   RBC 5.32 5.24 5.09   HGB 16.4* 16.0 15.6   HCT 47.7 48.7* 46.6   * 162 163   MCV 90 93 92   MCH 30.8 30.5 30.6   MCHC 34.4 32.9 33.5     BNP:    Recent Labs  Lab 10/05/17  0533 10/09/17  0435   * 381*     CMP:    Recent Labs  Lab 10/07/17  0546 10/08/17  0437 10/09/17  0435   GLU 98 99 101   CALCIUM 9.7 9.8 9.4   ALBUMIN 3.7 3.7 3.5   PROT 6.9 7.0 7.0    139 138   K 3.2* 3.6 3.5   CO2 32* 35* 29   CL 92* 93* 97   BUN 32* 30* 25*   CREATININE 0.9 1.0 0.9   ALKPHOS 158* 168* 169*   ALT 36 41 43   AST 33 36 40   BILITOT 1.2* 1.1* 0.9      Coagulation:     Recent Labs  Lab 10/07/17  0546 10/08/17  0437 10/09/17  0435   INR 1.0 1.0 1.0     LDH:  No results for input(s): LDH in the last 72 hours.  Microbiology:  Microbiology Results (last 7 days)     ** No results found for the last 168 hours. **          I have reviewed all pertinent labs within the past 24 hours.    Estimated Creatinine Clearance: 75.1 mL/min (based on SCr of 0.9 mg/dL).    Diagnostic Results:  I have reviewed and interpreted all pertinent imaging results/findings within the past 24 hours.

## 2017-10-09 NOTE — ASSESSMENT & PLAN NOTE
- Unclear type   - Long history of smoking but no COPD per outside pulmonologist. Will get PFTs here  - CT study done at OSH- unable to open CD so will get Chest CT here to r/o ILD  - Will be evaluated this admission for PH therapies   - Heparin sub q for VTE PPX  -Plan for RHC today. Will also get 6MWT, labs  -V/Q scan today showed low probability for PE

## 2017-10-09 NOTE — PLAN OF CARE
Problem: Patient Care Overview  Goal: Plan of Care Review  Outcome: Ongoing (interventions implemented as appropriate)  * AAO x 4  * 2 gram NA diet with a 2000 cc fluid restriction tolerating well.  * Generalized edema noted  * Bed at lowest position and locked, call light within reach, non-slip socks on, and side rails up x 2.  Encouraged patient to call for assistance when needed patient stated understanding.  * Left FA PIV 20 G due to be changed PIV removed by this RN catheter intact gauze place and secured with coban. A left wrist 22 G PIV (10/8/17) placed by this RN blood return noted flushed and saline locked. New tegaderm applied and site secured with coban. Patient tolerated well.   * Oxygen 3 liters via nasal cannula.  Oxygen saturation 92-93 % respirations even and unlabored. BiPaP while sleeping initiated by respiratory tech per patient request, patient tolerating well.   * Patient has complaints of pain to the back/neck PRN pain medication administrated    * Skin assessment preformed no breakdown noted   * Standard precautions maintained  * Continuous telemetry monitoring continued SR 80's no ectopy noted.   * Patient able to turn self no assistance needed.  * Patient voiding clear yellow urine.  See flow sheet for intake and output totals

## 2017-10-10 PROBLEM — R91.8 PULMONARY NODULES: Status: ACTIVE | Noted: 2017-10-10

## 2017-10-10 LAB
ALBUMIN SERPL BCP-MCNC: 3.8 G/DL
ALP SERPL-CCNC: 185 U/L
ALT SERPL W/O P-5'-P-CCNC: 53 U/L
ANION GAP SERPL CALC-SCNC: 12 MMOL/L
ANISOCYTOSIS BLD QL SMEAR: SLIGHT
AST SERPL-CCNC: 51 U/L
BASOPHILS # BLD AUTO: 0.03 K/UL
BASOPHILS NFR BLD: 0.4 %
BILIRUB SERPL-MCNC: 1 MG/DL
BUN SERPL-MCNC: 29 MG/DL
CALCIUM SERPL-MCNC: 9.8 MG/DL
CHLORIDE SERPL-SCNC: 96 MMOL/L
CO2 SERPL-SCNC: 31 MMOL/L
CREAT SERPL-MCNC: 1 MG/DL
DIFFERENTIAL METHOD: ABNORMAL
EOSINOPHIL # BLD AUTO: 0.2 K/UL
EOSINOPHIL NFR BLD: 2.8 %
ERYTHROCYTE [DISTWIDTH] IN BLOOD BY AUTOMATED COUNT: 15 %
EST. GFR  (AFRICAN AMERICAN): >60 ML/MIN/1.73 M^2
EST. GFR  (NON AFRICAN AMERICAN): >60 ML/MIN/1.73 M^2
GLUCOSE SERPL-MCNC: 90 MG/DL
HCT VFR BLD AUTO: 46.6 %
HGB BLD-MCNC: 16.2 G/DL
INR PPP: 0.9
LYMPHOCYTES # BLD AUTO: 1.9 K/UL
LYMPHOCYTES NFR BLD: 23.6 %
MAGNESIUM SERPL-MCNC: 2.5 MG/DL
MCH RBC QN AUTO: 31.2 PG
MCHC RBC AUTO-ENTMCNC: 34.8 G/DL
MCV RBC AUTO: 90 FL
MONOCYTES # BLD AUTO: 0.5 K/UL
MONOCYTES NFR BLD: 6.2 %
NEUTROPHILS # BLD AUTO: 5.5 K/UL
NEUTROPHILS NFR BLD: 67 %
OVALOCYTES BLD QL SMEAR: ABNORMAL
PLATELET # BLD AUTO: 178 K/UL
PLATELET BLD QL SMEAR: ABNORMAL
PMV BLD AUTO: 13.2 FL
POIKILOCYTOSIS BLD QL SMEAR: SLIGHT
POTASSIUM SERPL-SCNC: 3.6 MMOL/L
PROT SERPL-MCNC: 7.4 G/DL
PROTHROMBIN TIME: 10 SEC
RBC # BLD AUTO: 5.19 M/UL
SODIUM SERPL-SCNC: 139 MMOL/L
WBC # BLD AUTO: 8.23 K/UL

## 2017-10-10 PROCEDURE — 83735 ASSAY OF MAGNESIUM: CPT

## 2017-10-10 PROCEDURE — 94664 DEMO&/EVAL PT USE INHALER: CPT

## 2017-10-10 PROCEDURE — 94761 N-INVAS EAR/PLS OXIMETRY MLT: CPT

## 2017-10-10 PROCEDURE — 63600175 PHARM REV CODE 636 W HCPCS: Performed by: INTERNAL MEDICINE

## 2017-10-10 PROCEDURE — 85025 COMPLETE CBC W/AUTO DIFF WBC: CPT

## 2017-10-10 PROCEDURE — 94660 CPAP INITIATION&MGMT: CPT

## 2017-10-10 PROCEDURE — A4216 STERILE WATER/SALINE, 10 ML: HCPCS | Performed by: INTERNAL MEDICINE

## 2017-10-10 PROCEDURE — 99232 SBSQ HOSP IP/OBS MODERATE 35: CPT | Mod: ,,, | Performed by: INTERNAL MEDICINE

## 2017-10-10 PROCEDURE — 36415 COLL VENOUS BLD VENIPUNCTURE: CPT

## 2017-10-10 PROCEDURE — 80053 COMPREHEN METABOLIC PANEL: CPT

## 2017-10-10 PROCEDURE — 20600001 HC STEP DOWN PRIVATE ROOM

## 2017-10-10 PROCEDURE — 27000221 HC OXYGEN, UP TO 24 HOURS

## 2017-10-10 PROCEDURE — 25000003 PHARM REV CODE 250: Performed by: INTERNAL MEDICINE

## 2017-10-10 PROCEDURE — 99900035 HC TECH TIME PER 15 MIN (STAT)

## 2017-10-10 PROCEDURE — 25000242 PHARM REV CODE 250 ALT 637 W/ HCPCS: Performed by: INTERNAL MEDICINE

## 2017-10-10 PROCEDURE — 94640 AIRWAY INHALATION TREATMENT: CPT

## 2017-10-10 PROCEDURE — 85610 PROTHROMBIN TIME: CPT

## 2017-10-10 PROCEDURE — 25000003 PHARM REV CODE 250: Performed by: PHYSICIAN ASSISTANT

## 2017-10-10 RX ORDER — POTASSIUM CHLORIDE 750 MG/1
40 CAPSULE, EXTENDED RELEASE ORAL ONCE
Status: COMPLETED | OUTPATIENT
Start: 2017-10-10 | End: 2017-10-10

## 2017-10-10 RX ORDER — TRIAMCINOLONE ACETONIDE 1 MG/G
CREAM TOPICAL 2 TIMES DAILY
Status: DISCONTINUED | OUTPATIENT
Start: 2017-10-10 | End: 2017-10-11 | Stop reason: HOSPADM

## 2017-10-10 RX ORDER — FUROSEMIDE 40 MG/1
80 TABLET ORAL 2 TIMES DAILY
Status: DISCONTINUED | OUTPATIENT
Start: 2017-10-10 | End: 2017-10-11 | Stop reason: HOSPADM

## 2017-10-10 RX ORDER — TRIAMCINOLONE ACETONIDE 1 MG/G
CREAM TOPICAL 2 TIMES DAILY
Status: DISCONTINUED | OUTPATIENT
Start: 2017-10-10 | End: 2017-10-10

## 2017-10-10 RX ADMIN — LAMOTRIGINE 100 MG: 100 TABLET ORAL at 11:10

## 2017-10-10 RX ADMIN — TRIAMCINOLONE ACETONIDE: 1 CREAM TOPICAL at 02:10

## 2017-10-10 RX ADMIN — Medication 3 ML: at 05:10

## 2017-10-10 RX ADMIN — HEPARIN SODIUM 5000 UNITS: 5000 INJECTION, SOLUTION INTRAVENOUS; SUBCUTANEOUS at 05:10

## 2017-10-10 RX ADMIN — Medication 3 ML: at 02:10

## 2017-10-10 RX ADMIN — BUSPIRONE HYDROCHLORIDE 15 MG: 5 TABLET ORAL at 02:10

## 2017-10-10 RX ADMIN — IPRATROPIUM BROMIDE AND ALBUTEROL SULFATE 3 ML: .5; 3 SOLUTION RESPIRATORY (INHALATION) at 09:10

## 2017-10-10 RX ADMIN — Medication 3 ML: at 10:10

## 2017-10-10 RX ADMIN — BUSPIRONE HYDROCHLORIDE 15 MG: 5 TABLET ORAL at 09:10

## 2017-10-10 RX ADMIN — TRIAMCINOLONE ACETONIDE: 1 CREAM TOPICAL at 09:10

## 2017-10-10 RX ADMIN — LAMOTRIGINE 100 MG: 100 TABLET ORAL at 09:10

## 2017-10-10 RX ADMIN — FUROSEMIDE 80 MG: 40 TABLET ORAL at 05:10

## 2017-10-10 RX ADMIN — HYDROCODONE BITARTRATE AND ACETAMINOPHEN 1 TABLET: 5; 325 TABLET ORAL at 04:10

## 2017-10-10 RX ADMIN — PANTOPRAZOLE SODIUM 40 MG: 40 TABLET, DELAYED RELEASE ORAL at 11:10

## 2017-10-10 RX ADMIN — POTASSIUM CHLORIDE 40 MEQ: 750 CAPSULE, EXTENDED RELEASE ORAL at 11:10

## 2017-10-10 RX ADMIN — DESVENLAFAXINE SUCCINATE 100 MG: 50 TABLET, FILM COATED, EXTENDED RELEASE ORAL at 11:10

## 2017-10-10 RX ADMIN — IPRATROPIUM BROMIDE AND ALBUTEROL SULFATE 3 ML: .5; 3 SOLUTION RESPIRATORY (INHALATION) at 12:10

## 2017-10-10 RX ADMIN — HYDROCODONE BITARTRATE AND ACETAMINOPHEN 1 TABLET: 5; 325 TABLET ORAL at 07:10

## 2017-10-10 RX ADMIN — BUSPIRONE HYDROCHLORIDE 15 MG: 5 TABLET ORAL at 05:10

## 2017-10-10 RX ADMIN — HEPARIN SODIUM 5000 UNITS: 5000 INJECTION, SOLUTION INTRAVENOUS; SUBCUTANEOUS at 02:10

## 2017-10-10 RX ADMIN — LURASIDONE HYDROCHLORIDE 60 MG: 20 TABLET, FILM COATED ORAL at 12:10

## 2017-10-10 RX ADMIN — FUROSEMIDE 80 MG: 40 TABLET ORAL at 11:10

## 2017-10-10 RX ADMIN — AMLODIPINE BESYLATE 5 MG: 5 TABLET ORAL at 11:10

## 2017-10-10 RX ADMIN — PRAVASTATIN SODIUM 40 MG: 40 TABLET ORAL at 11:10

## 2017-10-10 RX ADMIN — LEVOTHYROXINE SODIUM 75 MCG: 75 TABLET ORAL at 11:10

## 2017-10-10 RX ADMIN — HEPARIN SODIUM 5000 UNITS: 5000 INJECTION, SOLUTION INTRAVENOUS; SUBCUTANEOUS at 09:10

## 2017-10-10 NOTE — PLAN OF CARE
Problem: Patient Care Overview  Goal: Plan of Care Review  Outcome: Ongoing (interventions implemented as appropriate)  Reinforced to wear non-skid socks when ambulating to prevent falling. Verbalized understanding. Afebrile. Rash noted to bilat buttocks. MD and PA evaluated. Triamcinolone cream aopplied as ordered. States has LANE. O2 at 2L/NC in use. RT ordered. K+=3.6. K+po given as ordered. Lasix changed from IV to po. Denied pain today.

## 2017-10-10 NOTE — PROGRESS NOTES
[facility]  Heart Transplant  Progress Note    Patient Name: Sue Smith  MRN: [id number]  Admission Date: 10/5/2017  Hospital Length of Stay: 5 days  Attending Physician: Jacky Wong Jr.,*  Primary Care Provider: Paddy Guerrier DO  Principal Problem:Pulmonary hypertension    Subjective:     Interval History: No complaints this morning. Feeling OK    Continuous Infusions:   Scheduled Meds:   albuterol-ipratropium 2.5mg-0.5mg/3mL  3 mL Nebulization Q6H    amlodipine  5 mg Oral Daily    busPIRone  15 mg Oral TID    desvenlafaxine succinate  100 mg Oral Daily    furosemide  80 mg Oral BID    heparin (porcine)  5,000 Units Subcutaneous Q8H    lamotrigine  100 mg Oral BID    levothyroxine  75 mcg Oral Daily    lurasidone  60 mg Oral Daily    pantoprazole  40 mg Oral Daily    pravastatin  40 mg Oral Daily    sodium chloride 0.9%  3 mL Intravenous Q8H    triamcinolone acetonide 0.1%   Topical (Top) BID     PRN Meds:albuterol sulfate, hydrocodone-acetaminophen 5-325mg    Review of patient's allergies indicates:   Allergen Reactions    Sulfa (sulfonamide antibiotics) Rash     Objective:     Vital Signs (Most Recent):  Temp: 98.5 °F (36.9 °C) (10/10/17 1148)  Pulse: 88 (10/10/17 1257)  Resp: 16 (10/10/17 1257)  BP: 121/60 (10/10/17 1148)  SpO2: 96 % (10/10/17 1257) Vital Signs (24h Range):  Temp:  [97.8 °F (36.6 °C)-99.4 °F (37.4 °C)] 98.5 °F (36.9 °C)  Pulse:  [] 88  Resp:  [14-18] 16  SpO2:  [94 %-99 %] 96 %  BP: (113-157)/(60-88) 121/60     Patient Vitals for the past 72 hrs (Last 3 readings):   Weight   10/09/17 1542 86.5 kg (190 lb 11.2 oz)   10/09/17 0346 86.5 kg (190 lb 11.2 oz)   10/08/17 0430 86.8 kg (191 lb 5.8 oz)     Body mass index is 32.73 kg/m².      Intake/Output Summary (Last 24 hours) at 10/10/17 1410  Last data filed at 10/10/17 0910   Gross per 24 hour   Intake              660 ml   Output              400 ml   Net              260 ml       Hemodynamic  Parameters:         Physical Exam   Constitutional: She is oriented to person, place, and time. She appears well-developed and well-nourished.   Neck: Normal range of motion. Neck supple. No JVD present.   Cardiovascular: Normal rate and regular rhythm.  Exam reveals no gallop and no friction rub.    No murmur heard.  Pulmonary/Chest: Effort normal and breath sounds normal. She has no wheezes. She has no rales.   On O2 via NC   Abdominal: Soft. Bowel sounds are normal. There is no tenderness.   Musculoskeletal: She exhibits no edema.   Neurological: She is alert and oriented to person, place, and time.   Skin: Skin is warm and dry.       Significant Labs:  CBC:    Recent Labs  Lab 10/08/17  0437 10/09/17  0435 10/10/17  0437   WBC 10.79 9.45 8.23   RBC 5.24 5.09 5.19   HGB 16.0 15.6 16.2*   HCT 48.7* 46.6 46.6    163 178   MCV 93 92 90   MCH 30.5 30.6 31.2*   MCHC 32.9 33.5 34.8     BNP:    Recent Labs  Lab 10/05/17  0533 10/09/17  0435   * 381*     CMP:    Recent Labs  Lab 10/08/17  0437 10/09/17  0435 10/10/17  0437   GLU 99 101 90   CALCIUM 9.8 9.4 9.8   ALBUMIN 3.7 3.5 3.8   PROT 7.0 7.0 7.4    138 139   K 3.6 3.5 3.6   CO2 35* 29 31*   CL 93* 97 96   BUN 30* 25* 29*   CREATININE 1.0 0.9 1.0   ALKPHOS 168* 169* 185*   ALT 41 43 53*   AST 36 40 51*   BILITOT 1.1* 0.9 1.0      Coagulation:     Recent Labs  Lab 10/08/17  0437 10/09/17  0435 10/10/17  0437   INR 1.0 1.0 0.9     LDH:  No results for input(s): LDH in the last 72 hours.  Microbiology:  Microbiology Results (last 7 days)     ** No results found for the last 168 hours. **          I have reviewed all pertinent labs within the past 24 hours.    Estimated Creatinine Clearance: 67.6 mL/min (based on SCr of 1 mg/dL).    Diagnostic Results:  I have reviewed and interpreted all pertinent imaging results/findings within the past 24 hours.    Assessment and Plan:       * Pulmonary hypertension    -RHC yesterday showed severe Pulm HTN.  Discussed with pt starting PO PH therapy as opposed to IV therapy due to her social situation (lives in assisted living facility, transportation issues)  - Heparin sub q for VTE PPX  -V/Q scanshowed low probability for PE, PFTs- no obstruction,  CT Chest- no ILD            Acute on chronic right heart failure    -Lasix IVP 40 BID. Transition to PO Lasix today  -Echo showed normal LVEF, severely depressed RV function, elevated PA pressures            Bipolar disorder    - Continue lamictal, desvenlafaxine, buspirone and lurasidone.           Chronic respiratory failure with hypoxia    - On O2 3L by NC at home  - BiPAP Q               Deanne Tello, VEE  Heart Transplant  [facility]

## 2017-10-10 NOTE — SUBJECTIVE & OBJECTIVE
Interval History: No complaints this morning. Feeling OK    Continuous Infusions:   Scheduled Meds:   albuterol-ipratropium 2.5mg-0.5mg/3mL  3 mL Nebulization Q6H    amlodipine  5 mg Oral Daily    busPIRone  15 mg Oral TID    desvenlafaxine succinate  100 mg Oral Daily    furosemide  80 mg Oral BID    heparin (porcine)  5,000 Units Subcutaneous Q8H    lamotrigine  100 mg Oral BID    levothyroxine  75 mcg Oral Daily    lurasidone  60 mg Oral Daily    pantoprazole  40 mg Oral Daily    pravastatin  40 mg Oral Daily    sodium chloride 0.9%  3 mL Intravenous Q8H    triamcinolone acetonide 0.1%   Topical (Top) BID     PRN Meds:albuterol sulfate, hydrocodone-acetaminophen 5-325mg    Review of patient's allergies indicates:   Allergen Reactions    Sulfa (sulfonamide antibiotics) Rash     Objective:     Vital Signs (Most Recent):  Temp: 98.5 °F (36.9 °C) (10/10/17 1148)  Pulse: 88 (10/10/17 1257)  Resp: 16 (10/10/17 1257)  BP: 121/60 (10/10/17 1148)  SpO2: 96 % (10/10/17 1257) Vital Signs (24h Range):  Temp:  [97.8 °F (36.6 °C)-99.4 °F (37.4 °C)] 98.5 °F (36.9 °C)  Pulse:  [] 88  Resp:  [14-18] 16  SpO2:  [94 %-99 %] 96 %  BP: (113-157)/(60-88) 121/60     Patient Vitals for the past 72 hrs (Last 3 readings):   Weight   10/09/17 1542 86.5 kg (190 lb 11.2 oz)   10/09/17 0346 86.5 kg (190 lb 11.2 oz)   10/08/17 0430 86.8 kg (191 lb 5.8 oz)     Body mass index is 32.73 kg/m².      Intake/Output Summary (Last 24 hours) at 10/10/17 1410  Last data filed at 10/10/17 0910   Gross per 24 hour   Intake              660 ml   Output              400 ml   Net              260 ml       Hemodynamic Parameters:         Physical Exam   Constitutional: She is oriented to person, place, and time. She appears well-developed and well-nourished.   Neck: Normal range of motion. Neck supple. No JVD present.   Cardiovascular: Normal rate and regular rhythm.  Exam reveals no gallop and no friction rub.    No murmur  heard.  Pulmonary/Chest: Effort normal and breath sounds normal. She has no wheezes. She has no rales.   On O2 via NC   Abdominal: Soft. Bowel sounds are normal. There is no tenderness.   Musculoskeletal: She exhibits no edema.   Neurological: She is alert and oriented to person, place, and time.   Skin: Skin is warm and dry.       Significant Labs:  CBC:    Recent Labs  Lab 10/08/17  0437 10/09/17  0435 10/10/17  0437   WBC 10.79 9.45 8.23   RBC 5.24 5.09 5.19   HGB 16.0 15.6 16.2*   HCT 48.7* 46.6 46.6    163 178   MCV 93 92 90   MCH 30.5 30.6 31.2*   MCHC 32.9 33.5 34.8     BNP:    Recent Labs  Lab 10/05/17  0533 10/09/17  0435   * 381*     CMP:    Recent Labs  Lab 10/08/17  0437 10/09/17  0435 10/10/17  0437   GLU 99 101 90   CALCIUM 9.8 9.4 9.8   ALBUMIN 3.7 3.5 3.8   PROT 7.0 7.0 7.4    138 139   K 3.6 3.5 3.6   CO2 35* 29 31*   CL 93* 97 96   BUN 30* 25* 29*   CREATININE 1.0 0.9 1.0   ALKPHOS 168* 169* 185*   ALT 41 43 53*   AST 36 40 51*   BILITOT 1.1* 0.9 1.0      Coagulation:     Recent Labs  Lab 10/08/17  0437 10/09/17  0435 10/10/17  0437   INR 1.0 1.0 0.9     LDH:  No results for input(s): LDH in the last 72 hours.  Microbiology:  Microbiology Results (last 7 days)     ** No results found for the last 168 hours. **          I have reviewed all pertinent labs within the past 24 hours.    Estimated Creatinine Clearance: 67.6 mL/min (based on SCr of 1 mg/dL).    Diagnostic Results:  I have reviewed and interpreted all pertinent imaging results/findings within the past 24 hours.

## 2017-10-10 NOTE — PHYSICIAN QUERY
PT Name: Sue Smith  MR #: [id number]     Physician Query Form - Documentation Clarification      CDS/: Angela Saunders RN, CCDS              Contact information: [email or web address]    This form is a permanent document in the medical record.     Query Date: October 10, 2017    By submitting this query, we are merely seeking further clarification of documentation. Please utilize your independent clinical judgment when addressing the question(s) below.    The Medical record reflects the following:    Supporting Clinical Findings Location in Medical Record     K+ 3.3--->3.2       10/5, 10/7 lab     Potassium chloride 60 meq po x1  Potassium chloride 20 meq po x1       10/7 mar                                                                            Doctor, Please specify diagnosis or diagnoses associated with above clinical findings.    Provider Use Only    ( x   )  Hypokalemia    (    )  Other______________                                                                                                                           [  ] Clinically undetermined

## 2017-10-10 NOTE — ASSESSMENT & PLAN NOTE
-RHC yesterday showed severe Pulm HTN. Discussed with pt starting PO PH therapy as opposed to IV therapy due to her social situation (lives in assisted living facility, transportation issues)  - Heparin sub q for VTE PPX  -V/Q scanshowed low probability for PE, PFTs- no obstruction,  CT Chest- no ILD

## 2017-10-10 NOTE — ASSESSMENT & PLAN NOTE
-Lasix IVP 40 BID. Transition to PO Lasix today  -Echo showed normal LVEF, severely depressed RV function, elevated PA pressures

## 2017-10-10 NOTE — PLAN OF CARE
[facility]   Heart Transplant/PHTN Clinic   [address] [place] [address]   [phone] after hours [phone] fax   HOME HEALTH ORDERS     Admit to Home Health   Diagnosis:  Patient Active Problem List   Diagnosis    Pulmonary hypertension    Chronic respiratory failure with hypoxia    Hypothyroidism    Oxygen dependent    Hypertension    Dyslipidemia    Bipolar disorder    Acute on chronic right heart failure    Hx of tracheostomy    GERD (gastroesophageal reflux disease)    Acute on chronic diastolic heart failure    Cardiomegaly    CHF (congestive heart failure)       Patient is homebound due to: PH, Chronic Respiratory Failure    Diet: 2 gm Na diet, 2L fluid restriction    Acitivities: As tolerated    Nursing:   SN to complete comprehensive assessment including routine vital signs. Instruct on disease process and s/s of complications to report to MD. Review/verify medication list sent home with the patient at time of discharge and instruct patient/caregiver as needed. Frequency may be adjusted depending on start of care date.     Notify MD if SBP > 160 or < 90; DBP > 90 or < 50; HR > 120 or < 50; Temp > 101; Weight gain >3lbs in 1 day or 5lbs in 1 week.      LABS: CMP, Mg, BNP, CBC in 1 week        CONSULTS:     Physical Therapy to evaluate and treat. Evaluate for home safety and equipment needs; Establish/upgrade home exercise program. Perform / instruct on therapeutic exercises, gait training, transfer training, and Range of Motion.     Occupational Therapy to evaluate and treat. Evaluate home environment for safety and equipment needs. Perform/Instruct on transfers, ADL training, ROM, and therapeutic exercises.      to evaluate for community resources/long-range planning.   Send initial Home Health orders to HTS attending physician on call.                                                           to evaluate for  community resources/long-range planning.     Aide to provide assistance with personal care, ADLs, and vital signs      Send follow up questions to [phone] or [phone].

## 2017-10-11 VITALS
SYSTOLIC BLOOD PRESSURE: 125 MMHG | BODY MASS INDEX: 32.52 KG/M2 | OXYGEN SATURATION: 98 % | WEIGHT: 190.5 LBS | HEIGHT: 64 IN | TEMPERATURE: 98 F | RESPIRATION RATE: 18 BRPM | HEART RATE: 85 BPM | DIASTOLIC BLOOD PRESSURE: 75 MMHG

## 2017-10-11 LAB
ALBUMIN SERPL BCP-MCNC: 3.6 G/DL
ALP SERPL-CCNC: 181 U/L
ALT SERPL W/O P-5'-P-CCNC: 57 U/L
ANION GAP SERPL CALC-SCNC: 11 MMOL/L
AST SERPL-CCNC: 49 U/L
BASOPHILS # BLD AUTO: 0.02 K/UL
BASOPHILS NFR BLD: 0.3 %
BILIRUB SERPL-MCNC: 0.8 MG/DL
BUN SERPL-MCNC: 24 MG/DL
CALCIUM SERPL-MCNC: 9.3 MG/DL
CHLORIDE SERPL-SCNC: 99 MMOL/L
CO2 SERPL-SCNC: 32 MMOL/L
CREAT SERPL-MCNC: 1.1 MG/DL
DIFFERENTIAL METHOD: ABNORMAL
EOSINOPHIL # BLD AUTO: 0.2 K/UL
EOSINOPHIL NFR BLD: 3.2 %
ERYTHROCYTE [DISTWIDTH] IN BLOOD BY AUTOMATED COUNT: 14.6 %
EST. GFR  (AFRICAN AMERICAN): >60 ML/MIN/1.73 M^2
EST. GFR  (NON AFRICAN AMERICAN): 56.7 ML/MIN/1.73 M^2
GLUCOSE SERPL-MCNC: 102 MG/DL
HCT VFR BLD AUTO: 45.2 %
HGB BLD-MCNC: 15.1 G/DL
INR PPP: 1
LYMPHOCYTES # BLD AUTO: 1.5 K/UL
LYMPHOCYTES NFR BLD: 21.7 %
MAGNESIUM SERPL-MCNC: 2 MG/DL
MCH RBC QN AUTO: 30.6 PG
MCHC RBC AUTO-ENTMCNC: 33.4 G/DL
MCV RBC AUTO: 92 FL
MONOCYTES # BLD AUTO: 0.5 K/UL
MONOCYTES NFR BLD: 7.8 %
NEUTROPHILS # BLD AUTO: 4.5 K/UL
NEUTROPHILS NFR BLD: 65.6 %
PLATELET # BLD AUTO: 160 K/UL
PMV BLD AUTO: 12.1 FL
POTASSIUM SERPL-SCNC: 3.9 MMOL/L
PROT SERPL-MCNC: 6.9 G/DL
PROTHROMBIN TIME: 10.6 SEC
RBC # BLD AUTO: 4.93 M/UL
SODIUM SERPL-SCNC: 142 MMOL/L
WBC # BLD AUTO: 6.9 K/UL

## 2017-10-11 PROCEDURE — 85025 COMPLETE CBC W/AUTO DIFF WBC: CPT

## 2017-10-11 PROCEDURE — 99239 HOSP IP/OBS DSCHRG MGMT >30: CPT | Mod: ,,, | Performed by: INTERNAL MEDICINE

## 2017-10-11 PROCEDURE — 85610 PROTHROMBIN TIME: CPT

## 2017-10-11 PROCEDURE — 25000003 PHARM REV CODE 250: Performed by: PHYSICIAN ASSISTANT

## 2017-10-11 PROCEDURE — 83735 ASSAY OF MAGNESIUM: CPT

## 2017-10-11 PROCEDURE — 25000003 PHARM REV CODE 250: Performed by: INTERNAL MEDICINE

## 2017-10-11 PROCEDURE — 36415 COLL VENOUS BLD VENIPUNCTURE: CPT

## 2017-10-11 PROCEDURE — 97165 OT EVAL LOW COMPLEX 30 MIN: CPT

## 2017-10-11 PROCEDURE — 27000221 HC OXYGEN, UP TO 24 HOURS

## 2017-10-11 PROCEDURE — 25000242 PHARM REV CODE 250 ALT 637 W/ HCPCS: Performed by: PHYSICIAN ASSISTANT

## 2017-10-11 PROCEDURE — 25000242 PHARM REV CODE 250 ALT 637 W/ HCPCS: Performed by: INTERNAL MEDICINE

## 2017-10-11 PROCEDURE — 94664 DEMO&/EVAL PT USE INHALER: CPT

## 2017-10-11 PROCEDURE — 97161 PT EVAL LOW COMPLEX 20 MIN: CPT

## 2017-10-11 PROCEDURE — A4216 STERILE WATER/SALINE, 10 ML: HCPCS | Performed by: INTERNAL MEDICINE

## 2017-10-11 PROCEDURE — 80053 COMPREHEN METABOLIC PANEL: CPT

## 2017-10-11 PROCEDURE — 94640 AIRWAY INHALATION TREATMENT: CPT

## 2017-10-11 PROCEDURE — 63600175 PHARM REV CODE 636 W HCPCS: Performed by: INTERNAL MEDICINE

## 2017-10-11 PROCEDURE — 99900035 HC TECH TIME PER 15 MIN (STAT)

## 2017-10-11 PROCEDURE — 97530 THERAPEUTIC ACTIVITIES: CPT

## 2017-10-11 RX ORDER — FUROSEMIDE 80 MG/1
80 TABLET ORAL 2 TIMES DAILY
Qty: 60 TABLET | Refills: 3 | Status: ON HOLD | OUTPATIENT
Start: 2017-10-11 | End: 2017-11-13 | Stop reason: HOSPADM

## 2017-10-11 RX ORDER — IPRATROPIUM BROMIDE AND ALBUTEROL SULFATE 2.5; .5 MG/3ML; MG/3ML
3 SOLUTION RESPIRATORY (INHALATION) EVERY 6 HOURS PRN
Status: DISCONTINUED | OUTPATIENT
Start: 2017-10-11 | End: 2017-10-11 | Stop reason: HOSPADM

## 2017-10-11 RX ADMIN — FUROSEMIDE 80 MG: 40 TABLET ORAL at 08:10

## 2017-10-11 RX ADMIN — TRIAMCINOLONE ACETONIDE: 1 CREAM TOPICAL at 08:10

## 2017-10-11 RX ADMIN — DESVENLAFAXINE SUCCINATE 100 MG: 50 TABLET, FILM COATED, EXTENDED RELEASE ORAL at 08:10

## 2017-10-11 RX ADMIN — HEPARIN SODIUM 5000 UNITS: 5000 INJECTION, SOLUTION INTRAVENOUS; SUBCUTANEOUS at 06:10

## 2017-10-11 RX ADMIN — BUSPIRONE HYDROCHLORIDE 15 MG: 5 TABLET ORAL at 02:10

## 2017-10-11 RX ADMIN — IPRATROPIUM BROMIDE AND ALBUTEROL SULFATE 3 ML: .5; 3 SOLUTION RESPIRATORY (INHALATION) at 02:10

## 2017-10-11 RX ADMIN — IPRATROPIUM BROMIDE AND ALBUTEROL SULFATE 3 ML: .5; 3 SOLUTION RESPIRATORY (INHALATION) at 01:10

## 2017-10-11 RX ADMIN — BUSPIRONE HYDROCHLORIDE 15 MG: 5 TABLET ORAL at 06:10

## 2017-10-11 RX ADMIN — Medication 3 ML: at 06:10

## 2017-10-11 RX ADMIN — LEVOTHYROXINE SODIUM 75 MCG: 75 TABLET ORAL at 06:10

## 2017-10-11 RX ADMIN — AMLODIPINE BESYLATE 5 MG: 5 TABLET ORAL at 08:10

## 2017-10-11 RX ADMIN — IPRATROPIUM BROMIDE AND ALBUTEROL SULFATE 3 ML: .5; 3 SOLUTION RESPIRATORY (INHALATION) at 07:10

## 2017-10-11 RX ADMIN — LAMOTRIGINE 100 MG: 100 TABLET ORAL at 08:10

## 2017-10-11 RX ADMIN — PANTOPRAZOLE SODIUM 40 MG: 40 TABLET, DELAYED RELEASE ORAL at 08:10

## 2017-10-11 RX ADMIN — LURASIDONE HYDROCHLORIDE 60 MG: 20 TABLET, FILM COATED ORAL at 08:10

## 2017-10-11 RX ADMIN — PRAVASTATIN SODIUM 40 MG: 40 TABLET ORAL at 08:10

## 2017-10-11 NOTE — PT/OT/SLP EVAL
Physical Therapy  Evaluation/ DC    Sue Smith   MRN: [id number]   Admitting Diagnosis: Pulmonary hypertension    PT Received On: 10/11/17  PT Start Time: 1030     PT Stop Time: 1048    PT Total Time (min): 18 min       Billable Minutes:  Evaluation 1 and Therapeutic Activity 15    Diagnosis: Pulmonary hypertension      Past Medical History:   Diagnosis Date    Bipolar disorder     CHF (congestive heart failure)     Dyslipidemia     GERD (gastroesophageal reflux disease)     Hx of tracheostomy     Decanulated / surgically removed in 2013? Does not currently have tracheostomy    Hypertension     Hypothyroidism     Oxygen dependent     3L around the clock     Pulmonary HTN     Pulmonary hypertension, group 1 10/4/2017    Pulmonary nodules 10/10/2017    Respiratory failure, chronic       Past Surgical History:   Procedure Laterality Date    BACK SURGERY      PERICARDIAL WINDOW  2013    VT LEFT HEART CATH,PERCUTANEOUS      Cardiac Cath, Left Heart    TUBAL LIGATION         Referring physician: VEE Lott  Date referred to PT: 10/10/17    General Precautions: Standard,  (Notify MD if HR>120bpm, <50 bpm, >10 PVCs/min; 3L/m O2 for SpO2 >92%)  Orthopedic Precautions:     Braces:              Patient History:  Lives With: alone (Pt lives in assisted living facility)  Living Arrangements: assisted living  Home Layout: Able to live on 1st floor  Living Environment Comment: Pt lives in assisted living facility, 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 Pt has bath bench for bath tub. (I) with all ADls  Equipment Currently Used at Home: bath bench  DME owned (not currently used): none    Previous Level of Function:  Ambulation Skills: independent  Transfer Skills: independent  ADL Skills: independent  Work/Leisure Activity: independent    Subjective:  Communicated with nurse prior to session. Pt was agreeable to PT services.  Lives in [place] and has friends nearby that can help  "her as needed. Has two children that live in [facility].  "I was supposed to have neck surgery a while ago, but now I have all this going on."  Chief Complaint: None  Patient goals: None    Pain/Comfort  Pain Rating 1: 6/10  Location - Side 1: Bilateral  Location - Orientation 1: generalized  Location 1: neck ("I have stenosis")  Pain Addressed 1: Distraction  Pain Rating Post-Intervention 1: 6/10      Objective:         Cognitive Exam:  Oriented to: Person, Place, Time and Situation    Follows Commands/attention: Follows multistep  commands  Communication: clear/fluent  Safety awareness/insight to disability: intact    Physical Exam:  Postural examination/scapula alignment: No postural abnormalities identified    Skin integrity: Visible skin intact  Edema: None noted     Sensation:   Intact    Upper Extremity Range of Motion:  Right Upper Extremity: WNL  Left Upper Extremity: WNL    Upper Extremity Strength:  Right Upper Extremity: WNL  Left Upper Extremity: WNL    Lower Extremity Range of Motion:  Right Lower Extremity: WNL  Left Lower Extremity: WNL    Lower Extremity Strength:  Right Lower Extremity: WNL  Left Lower Extremity: WNL     Fine motor coordination:  Intact    Gross motor coordination: WFL    Functional Mobility:  Bed Mobility:  Supine to Sit: Independent  Sit to Supine: Independent    Transfers:  Sit <> Stand Assistance: Independent  Sit <> Stand Assistive Device: No Assistive Device  Bed <> Chair Technique: Stand Pivot    Gait:   Gait Distance: 400 feet with independence wtih 3L O2  Assistance 1: Independent  Gait Assistive Device: No device  Gait Pattern: reciprocal    Balance:   Static Sit: NORMAL: No deviations seen in posture held statically  Dynamic Sit: NORMAL: No deviations seen in posture held dynamically  Static Stand: NORMAL: No deviations seen in posture held statically  Dynamic stand: NORMAL: No deviations seen in posture held dynamically    Therapeutic Activities and " Exercises:  Pt was educated on the importance of maintaining an active lifestyle, a healthy diet (limiting salt intake), and continuing her neck stretches to alleviate pain in her neck.     AM-PAC 6 CLICK MOBILITY  How much help from another person does this patient currently need?   1 = Unable, Total/Dependent Assistance  2 = A lot, Maximum/Moderate Assistance  3 = A little, Minimum/Contact Guard/Supervision  4 = None, Modified [place]/Independent    Turning over in bed (including adjusting bedclothes, sheets and blankets)?: 4  Sitting down on and standing up from a chair with arms (e.g., wheelchair, bedside commode, etc.): 4  Moving from lying on back to sitting on the side of the bed?: 4  Moving to and from a bed to a chair (including a wheelchair)?: 4  Need to walk in hospital room?: 4  Climbing 3-5 steps with a railing?: 4  Total Score: 24     AM-PAC Raw Score CMS G-Code Modifier Level of Impairment Assistance   6 % Total / Unable   7 - 9 CM 80 - 100% Maximal Assist   10 - 14 CL 60 - 80% Moderate Assist   15 - 19 CK 40 - 60% Moderate Assist   20 - 22 CJ 20 - 40% Minimal Assist   23 CI 1-20% SBA / CGA   24 CH 0% Independent/ Mod I     Patient left supine with call button in reach.    Assessment:   Sue Smith is a 56 y.o. female with a medical diagnosis of Pulmonary hypertension and presents with her prior level of function. She is not in need of acute care PT services nor any services upon discharge. She is safe to go home with no equipment needed.    Rehab identified problem list/impairments: Rehab identified problem list/impairments: impaired endurance    Rehab potential is good.    Activity tolerance: Good    Discharge recommendations: Discharge Facility/Level Of Care Needs: home     Barriers to discharge: Barriers to Discharge: None    Equipment recommendations: Equipment Needed After Discharge: none     GOALS:    Physical Therapy Goals     Not on file                PLAN:    Pt will discharge  from PT services. No further needs.        Keerthi Toney, PT  10/11/2017

## 2017-10-11 NOTE — HOSPITAL COURSE
Pt was admitted to [facility]. She was started on IV Lasix for diuresis. She had work up here for PH including rheum labs which were all negative, CT chest- neg for ILD, PFTs- not consistent with COPD, VQ scan- low probability for PE. She underwent RHC on 10/9/17 which showed Severely reduced CO/CI off inotropes, Normal right and very mildly elevated left sided filling pressures, and Severe pulmonary hypertension. With severe PH and markedly elevated PVR, markedly reduced CI, and her level of symptoms we recommended prostanoid therapy. Due to her social situation (lives in assisted living facility, difficulty with transportation), decided to apply for PO prostanoid therapy. This should be approved in the next 5-7 days, and so pt will be set up with the medication and education at home. She was transitioned to PO Lasix during hospitalization and will be sent home on Lasix 80 BID. She will be d/c'ed with Home Health nursing, PT/OT, and social work. We should get approval for PO prostanoid therapy in then next 5-7 days so will schedule f/u in PH clinic in 3 weeks with 6MWT and labs.

## 2017-10-11 NOTE — SUBJECTIVE & OBJECTIVE
Interval History: Feeling well this morning. No complaints    Continuous Infusions:   Scheduled Meds:   amlodipine  5 mg Oral Daily    busPIRone  15 mg Oral TID    desvenlafaxine succinate  100 mg Oral Daily    furosemide  80 mg Oral BID    heparin (porcine)  5,000 Units Subcutaneous Q8H    lamotrigine  100 mg Oral BID    levothyroxine  75 mcg Oral Daily    lurasidone  60 mg Oral Daily    pantoprazole  40 mg Oral Daily    pravastatin  40 mg Oral Daily    sodium chloride 0.9%  3 mL Intravenous Q8H    triamcinolone acetonide 0.1%   Topical (Top) BID     PRN Meds:albuterol-ipratropium 2.5mg-0.5mg/3mL, hydrocodone-acetaminophen 5-325mg    Review of patient's allergies indicates:   Allergen Reactions    Sulfa (sulfonamide antibiotics) Rash     Objective:     Vital Signs (Most Recent):  Temp: 98.1 °F (36.7 °C) (10/11/17 0845)  Pulse: 85 (10/11/17 0845)  Resp: 16 (10/11/17 0845)  BP: 122/70 (10/11/17 0845)  SpO2: 95 % (10/11/17 0845) Vital Signs (24h Range):  Temp:  [97.8 °F (36.6 °C)-98.7 °F (37.1 °C)] 98.1 °F (36.7 °C)  Pulse:  [73-97] 85  Resp:  [14-18] 16  SpO2:  [91 %-97 %] 95 %  BP: (104-142)/(60-81) 122/70     Patient Vitals for the past 72 hrs (Last 3 readings):   Weight   10/11/17 0400 86.4 kg (190 lb 7.6 oz)   10/09/17 1542 86.5 kg (190 lb 11.2 oz)   10/09/17 0346 86.5 kg (190 lb 11.2 oz)     Body mass index is 32.7 kg/m².      Intake/Output Summary (Last 24 hours) at 10/11/17 1059  Last data filed at 10/11/17 0400   Gross per 24 hour   Intake              970 ml   Output             1000 ml   Net              -30 ml       Hemodynamic Parameters:         Physical Exam   Constitutional: She is oriented to person, place, and time. She appears well-developed and well-nourished.   Neck: Normal range of motion. Neck supple. No JVD present.   Cardiovascular: Normal rate and regular rhythm.  Exam reveals no gallop and no friction rub.    No murmur heard.  Pulmonary/Chest: Effort normal and breath sounds  normal. She has no wheezes. She has no rales.   On O2 via NC   Abdominal: Soft. Bowel sounds are normal. There is no tenderness.   Musculoskeletal: She exhibits no edema.   Neurological: She is alert and oriented to person, place, and time.   Skin: Skin is warm and dry.       Significant Labs:  CBC:    Recent Labs  Lab 10/09/17  0435 10/10/17  0437 10/11/17  0448   WBC 9.45 8.23 6.90   RBC 5.09 5.19 4.93   HGB 15.6 16.2* 15.1   HCT 46.6 46.6 45.2    178 160   MCV 92 90 92   MCH 30.6 31.2* 30.6   MCHC 33.5 34.8 33.4     BNP:    Recent Labs  Lab 10/05/17  0533 10/09/17  0435   * 381*     CMP:    Recent Labs  Lab 10/09/17  0435 10/10/17  0437 10/11/17  0448    90 102   CALCIUM 9.4 9.8 9.3   ALBUMIN 3.5 3.8 3.6   PROT 7.0 7.4 6.9    139 142   K 3.5 3.6 3.9   CO2 29 31* 32*   CL 97 96 99   BUN 25* 29* 24*   CREATININE 0.9 1.0 1.1   ALKPHOS 169* 185* 181*   ALT 43 53* 57*   AST 40 51* 49*   BILITOT 0.9 1.0 0.8      Coagulation:     Recent Labs  Lab 10/09/17  0435 10/10/17  0437 10/11/17  0448   INR 1.0 0.9 1.0     LDH:  No results for input(s): LDH in the last 72 hours.  Microbiology:  Microbiology Results (last 7 days)     ** No results found for the last 168 hours. **          I have reviewed all pertinent labs within the past 24 hours.    Estimated Creatinine Clearance: 61.5 mL/min (based on SCr of 1.1 mg/dL).    Diagnostic Results:  I have reviewed and interpreted all pertinent imaging results/findings within the past 24 hours.

## 2017-10-11 NOTE — PROGRESS NOTES
D/C note:    SW to pt's room for D/C. Pt aaox4, calm, and pleasant. Pt reports in agreement with plan to D/C home today with HH. Pt reports no preference of HH company. HH SN, aid, PT, OT, and SW ordered. SW faxed orders to N yesterday ([phone] ph, [phone] fax). Latha with [facility] arranged HH with  [phone] ([phone] ). Pt reports friend to provide transport. Pt reports coping well today and happy to return home. Pt reports no other needs from SW at this time. SW providing psychosocial counseling and support, education, assistance, resources, and D/C planning as indicated. SW remains available.

## 2017-10-11 NOTE — PROGRESS NOTES
[facility]  Heart Transplant  Progress Note    Patient Name: Sue Smith  MRN: [id number]  Admission Date: 10/5/2017  Hospital Length of Stay: 6 days  Attending Physician: Jacky Wong Jr.,*  Primary Care Provider: Paddy Guerrier DO  Principal Problem:Pulmonary hypertension    Subjective:     Interval History: Feeling well this morning. No complaints    Continuous Infusions:   Scheduled Meds:   amlodipine  5 mg Oral Daily    busPIRone  15 mg Oral TID    desvenlafaxine succinate  100 mg Oral Daily    furosemide  80 mg Oral BID    heparin (porcine)  5,000 Units Subcutaneous Q8H    lamotrigine  100 mg Oral BID    levothyroxine  75 mcg Oral Daily    lurasidone  60 mg Oral Daily    pantoprazole  40 mg Oral Daily    pravastatin  40 mg Oral Daily    sodium chloride 0.9%  3 mL Intravenous Q8H    triamcinolone acetonide 0.1%   Topical (Top) BID     PRN Meds:albuterol-ipratropium 2.5mg-0.5mg/3mL, hydrocodone-acetaminophen 5-325mg    Review of patient's allergies indicates:   Allergen Reactions    Sulfa (sulfonamide antibiotics) Rash     Objective:     Vital Signs (Most Recent):  Temp: 98.1 °F (36.7 °C) (10/11/17 0845)  Pulse: 85 (10/11/17 0845)  Resp: 16 (10/11/17 0845)  BP: 122/70 (10/11/17 0845)  SpO2: 95 % (10/11/17 0845) Vital Signs (24h Range):  Temp:  [97.8 °F (36.6 °C)-98.7 °F (37.1 °C)] 98.1 °F (36.7 °C)  Pulse:  [73-97] 85  Resp:  [14-18] 16  SpO2:  [91 %-97 %] 95 %  BP: (104-142)/(60-81) 122/70     Patient Vitals for the past 72 hrs (Last 3 readings):   Weight   10/11/17 0400 86.4 kg (190 lb 7.6 oz)   10/09/17 1542 86.5 kg (190 lb 11.2 oz)   10/09/17 0346 86.5 kg (190 lb 11.2 oz)     Body mass index is 32.7 kg/m².      Intake/Output Summary (Last 24 hours) at 10/11/17 1059  Last data filed at 10/11/17 0400   Gross per 24 hour   Intake              970 ml   Output             1000 ml   Net              -30 ml       Hemodynamic Parameters:         Physical Exam   Constitutional:  She is oriented to person, place, and time. She appears well-developed and well-nourished.   Neck: Normal range of motion. Neck supple. No JVD present.   Cardiovascular: Normal rate and regular rhythm.  Exam reveals no gallop and no friction rub.    No murmur heard.  Pulmonary/Chest: Effort normal and breath sounds normal. She has no wheezes. She has no rales.   On O2 via NC   Abdominal: Soft. Bowel sounds are normal. There is no tenderness.   Musculoskeletal: She exhibits no edema.   Neurological: She is alert and oriented to person, place, and time.   Skin: Skin is warm and dry.       Significant Labs:  CBC:    Recent Labs  Lab 10/09/17  0435 10/10/17  0437 10/11/17  0448   WBC 9.45 8.23 6.90   RBC 5.09 5.19 4.93   HGB 15.6 16.2* 15.1   HCT 46.6 46.6 45.2    178 160   MCV 92 90 92   MCH 30.6 31.2* 30.6   MCHC 33.5 34.8 33.4     BNP:    Recent Labs  Lab 10/05/17  0533 10/09/17  0435   * 381*     CMP:    Recent Labs  Lab 10/09/17  0435 10/10/17  0437 10/11/17  0448    90 102   CALCIUM 9.4 9.8 9.3   ALBUMIN 3.5 3.8 3.6   PROT 7.0 7.4 6.9    139 142   K 3.5 3.6 3.9   CO2 29 31* 32*   CL 97 96 99   BUN 25* 29* 24*   CREATININE 0.9 1.0 1.1   ALKPHOS 169* 185* 181*   ALT 43 53* 57*   AST 40 51* 49*   BILITOT 0.9 1.0 0.8      Coagulation:     Recent Labs  Lab 10/09/17  0435 10/10/17  0437 10/11/17  0448   INR 1.0 0.9 1.0     LDH:  No results for input(s): LDH in the last 72 hours.  Microbiology:  Microbiology Results (last 7 days)     ** No results found for the last 168 hours. **          I have reviewed all pertinent labs within the past 24 hours.    Estimated Creatinine Clearance: 61.5 mL/min (based on SCr of 1.1 mg/dL).    Diagnostic Results:  I have reviewed and interpreted all pertinent imaging results/findings within the past 24 hours.    Assessment and Plan:     * Pulmonary hypertension, group 1    -RHC 10/9/17 showed severe Pulm HTN. Discussed with pt starting PO vasodilators - PH  nurses are working on approval for this and will be set up as an outpt  - Heparin sub q for VTE PPX  -V/Q scanshowed low probability for PE, PFTs- no obstruction,  CT Chest- no ILD            Acute on chronic right heart failure    -Transitioned to PO Lasix 80 BID  -Echo showed normal LVEF, severely depressed RV function, elevated PA pressures            Bipolar disorder    - Continue lamictal, desvenlafaxine, buspirone and lurasidone.           Chronic respiratory failure with hypoxia    - On O2 3L by NC at home  - BiPAP QHS             D/C home today with Home Health. We should get approval for PO vasodilator therapy in then next 5-7 days so will schedule f/u in PH clinic in 3 weeks with 6MWT and labs.    Deanne Tello, PA-C  Heart Transplant  [facility]

## 2017-10-11 NOTE — ASSESSMENT & PLAN NOTE
-Transitioned to PO Lasix 80 BID  -Echo showed normal LVEF, severely depressed RV function, elevated PA pressures

## 2017-10-11 NOTE — PLAN OF CARE
Problem: Occupational Therapy Goal  Goal: Occupational Therapy Goal  No OT goals at this time; discharge from OT  Outcome: Outcome(s) achieved Date Met: 10/11/17  Discharge from OT; pt at [facility] indep level  MOISE Isabel  10/11/2017  [phone]

## 2017-10-11 NOTE — PLAN OF CARE
Problem: Patient Care Overview  Goal: Plan of Care Review  Outcome: Ongoing (interventions implemented as appropriate)  Pt free from falls. Pt wears non slip socks when ambulating. Pt bed low and locked position. Pt afebrile. Pt IV site without redness or edema. Educated pt on importance of hand washing. Pt has denied any pain or discomfort this shift.

## 2017-10-11 NOTE — PT/OT/SLP EVAL
Occupational Therapy  Evaluation/Discharge    Sue Smith   MRN: [id number]   Admitting Diagnosis: Pulmonary hypertension    OT Date of Treatment: 10/11/17   OT Start Time: 1310  OT Stop Time: 1333  OT Total Time (min): 23 min    Billable Minutes:  Evaluation 23    Diagnosis: Pulmonary hypertension   Pt admitted with CHF/volume overload, acute on chronic R HF    Past Medical History:   Diagnosis Date    Bipolar disorder     CHF (congestive heart failure)     Dyslipidemia     GERD (gastroesophageal reflux disease)     Hx of tracheostomy     Decanulated / surgically removed in 2013? Does not currently have tracheostomy    Hypertension     Hypothyroidism     Oxygen dependent     3L around the clock     Pulmonary HTN     Pulmonary hypertension, group 1 10/4/2017    Pulmonary nodules 10/10/2017    Respiratory failure, chronic       Past Surgical History:   Procedure Laterality Date    BACK SURGERY      PERICARDIAL WINDOW  2013    MN LEFT HEART CATH,PERCUTANEOUS      Cardiac Cath, Left Heart    TUBAL LIGATION         Referring physician: Dr. Jacky Wong and IRISH Haddad  Date referred to OT: 10/11/2017    General Precautions: Standard,  (standard)  Orthopedic Precautions: N/A  Braces: N/A    Do you have any cultural, spiritual, Yazdanism conflicts, given your current situation?: no issues     Patient History:  Living Environment  Lives With:  (Pt lives alone in an apt complex for the elderly/disabled.  No transporation or meals are provided. One story, 0 ASHLEE,, tub/shower)  Equipment Currently Used at Home: none    Prior level of function:   Bed Mobility/Transfers: independent  Grooming: independent  Bathing: independent  Upper Body Dressing: independent  Lower Body Dressing: independent  Toileting: independent  Home Management Skills: independent  Leisure and Hobbies: Enjoys cooking, visiting iwth her neighbors, playing Bingo  IADL Comments: Ambulates long ford to get to laundry; friend drives her  on errands; pt always attempts to get motorized buggy in stores.  Difficulty amb distance to car from room and to laundry room.     Dominant hand: right    Subjective:  Communicated with RN prior to session.  No issues  Chief Complaint: none  Patient/Family stated goals: Pt has no therapy goals; she feels confident in returning to her plof    Pain/Comfort  Pain Rating 1: 0/10    Objective:       Cognitive Exam:  Oriented to: Person, Place, Time and Situation  Follows Commands/attention: Follows one-step commands  Communication: clear/fluent  Memory:  No Deficits noted  Safety awareness/insight to disability: intact  Coping skills/emotional control: Appropriate to situation    Visual/perceptual:  Intact    Physical Exam:  Postural examination/scapula alignment: No postural abnormalities identified  Skin integrity: Visible skin intact  Edema: None noted     Sensation:   Intact    Upper Extremity Range of Motion:  Right Upper Extremity: A/PROM WNL  Left Upper Extremity: A/PROM WNL    Upper Extremity Strength:  Right Upper Extremity: 4+/5  Left Upper Extremity: 4+/5      Fine motor coordination:   Intact    Gross motor coordination: WFL    Functional Mobility:  Bed Mobility:       Transfers:  Sit <> Stand Assistance: Modified Independent (O2)  Sit <> Stand Assistive Device: No Assistive Device  Bed <> Chair Technique: Stand Pivot  Bed <> Chair Transfer Assistance: Modified Independent  Bed <> Chair Assistive Device: No Assistive Device  Toilet Transfer Technique: Stand Pivot  Toilet Transfer Assistance: Modified Independent  Toilet Transfer Assistive Device: No Assistive Device    Functional Ambulation: Ambulate in the room and bathroom at mod indep level with no LOB; pt managed her O2 tubing independently    Activities of Daily Living:       UE Dressing Level of Assistance: Independent    LE Dressing Level of Assistance: Modified independent    Grooming Position: Standing at sink  Grooming Level of Assistance: Modified  "independent     Toileting Level of Assistance: Modified independent          Balance:   Static Sit: GOOD: Takes MODERATE challenges from all directions  Dynamic Sit: GOOD: Maintains balance through MODERATE excursions of active trunk movement  Static Stand: GOOD: Takes MODERATE challenges from all directions  Dynamic stand: GOOD+: Independent gait (with or without assistive device)      AM-PAC 6 CLICK ADL  How much help from another person does this patient currently need?  1 = Unable, Total/Dependent Assistance  2 = A lot, Maximum/Moderate Assistance  3 = A little, Minimum/Contact Guard/Supervision  4 = None, Modified [place]/Independent    Putting on and taking off regular lower body clothing? : 4  Bathing (including washing, rinsing, drying)?: 4  Toileting, which includes using toilet, bedpan, or urinal? : 4  Putting on and taking off regular upper body clothing?: 4  Taking care of personal grooming such as brushing teeth?: 4  Eating meals?: 4  Total Score: 24    AM-PAC Raw Score CMS "G-Code Modifier Level of Impairment Assistance   6 % Total / Unable   7 - 9 CM 80 - 100% Maximal Assist   10-14 CL 60 - 80% Moderate Assist   15 - 19 CK 40 - 60% Moderate Assist   20 - 22 CJ 20 - 40% Minimal Assist   23 CI 1-20% SBA / CGA   24 CH 0% Independent/ Mod I       Patient left up in chair with all lines intact and call button in reach    Assessment:  Sue Smith is a 56 y.o. female with a medical diagnosis of Pulmonary hypertension.  The pt is currently at mod indep level self-care.  She experienced no sob during eval process and demonstrated good pacing.  OT recommended she purchase a wheeled basket for carrying her clothes to the laundry area in her apt building as she identified this as a very difficult task.  She agreed readily that a wheeled basket would help.  Pt has no further OT needs at this time.    Rehab identified problem list/impairments: Rehab identified problem list/impairments: impaired " endurance, impaired cardiopulmonary response to activity    Rehab potential is n/a.    Activity tolerance: Good    Discharge recommendations: Discharge Facility/Level Of Care Needs: home (f/u OT not indicated at this time)     Barriers to discharge: Barriers to Discharge: None    Equipment recommendations: none     GOALS:    Occupational Therapy Goals     Not on file          Multidisciplinary Problems (Resolved)        Problem: Occupational Therapy Goal    Goal Priority Disciplines Outcome Interventions   Occupational Therapy Goal   (Resolved)     OT, PT/OT Outcome(s) achieved    Description:  No OT goals at this time; discharge from OT                    PLAN: Discharge from OT.  Plan of Care reviewed with: patient         MOISE Worley  10/11/2017

## 2017-10-11 NOTE — ASSESSMENT & PLAN NOTE
-RHC 10/9/17 showed severe Pulm HTN. Discussed with pt starting PO vasodilators - PH nurses are working on approval for this and will be set up as an outpt  - Heparin sub q for VTE PPX  -V/Q scanshowed low probability for PE, PFTs- no obstruction,  CT Chest- no ILD

## 2017-10-11 NOTE — PROGRESS NOTES
Patient given avs, follow up appointments given.  Patient given education on heart failure and what to look for.  Patient educated on any medication changes.  Patient notified to follow up with pulmonologist to ask about inhalers.  PIV dcd patient tolerated well.  Tele dcd.  Patient gave full verbal understanding on all verbal and written dc information.  Patient is waiting for ride home, patient has home o2 in room.

## 2017-10-11 NOTE — DISCHARGE SUMMARY
[facility]  Heart Transplant  Discharge Summary      Patient Name: Sue Smith  MRN: [id number]  Admission Date: 10/5/2017  Hospital Length of Stay: 6 days  Discharge Date and Time: 10/11/2017 3:03 PM  Attending Physician: Jacky Wong Jr.,*   Discharging Provider: Deanne Tello PA-C  Primary Care Provider: Paddy Guerrier DO     HPI: Mrs. Sue Smith is a 55 yo female with a PMHx of complex pulmonary disease (see below), Bipolar disorder (controlled on multiple meds), hypothyroidism, GERD, previous pericardial window for an pericardial effusion, HTN, GERD and dyslipidemia. She presents as a transfer from [facility] for PulTN evaluation. She had previously been referred to us with plans for outpatient evaluation on 10/18/17. She was admitted there on 9/28/17 for worsening SOB that had developed over the past week. She initially required BiPAP and IV diuresis before eventually weaning to Venti mask. Over the course of her admission she had a CT PE study that was read as negative for pulmonary embolism, TTE that showed no significant aortic/mitral disease but did show a hyperdynamic LV and  Estimated PA systolic pressure of 98. She subsequently underwent RHC on 9/29/17 (unclear if she was adequately diuresed) with a PCW of 26 /50 (75) /30 RA 25. She was transferred here for advance PAH options / evaluation. Relatively asymptomatic here. Lives in assisted living with extended family close. Long discussion regarding goals of care. Patient is a full code. Does not think she has an advanced directive (she plans to discuss with her family).     Pulmonary Hx: Of note records from OSH are discordant. Hospitalist / cardiology have her labeled as COPD and Obesity hypoventilation however consult by pulmonologist (Dr. Hardik Magallanes) states explicitly that PFTs done in 2015 showed only mild restriction and were not consistent with COPD. In addition he states that though she is mildly  obese her pCO2 is normal at baseline ruling out obesity hypoventilation. What makes this more difficult is the fact that the patient is unsure if she has COPD as she has been told different things by different doctors over the years.     - Severe Pulmonary HTN  - Chronic hypoxic respiratory failure for which she is on 3L all the time and BiPAP QHS  - History of tracheostomy (4 years ago) and short term vent dependence. Tracheostomy was eventually de cannulated.   - Previous tobacco abuse (stopped 15 yrs ago)   - SHERIE (unclear severity) has been on trilogy vent for BiPAP at night.            Procedure(s) (LRB):  HEART CATH-RIGHT (N/A)     Hospital Course: Pt was admitted to [facility]. She was started on IV Lasix for diuresis. She had work up here for PH including rheum labs which were all negative, CT chest- neg for ILD, PFTs- not consistent with COPD, VQ scan- low probability for PE. She underwent RHC on 10/9/17 which showed Severely reduced CO/CI off inotropes, Normal right and very mildly elevated left sided filling pressures, and Severe pulmonary hypertension. With severe PH and markedly elevated PVR, markedly reduced CI, and her level of symptoms we recommended prostanoid therapy. Due to her social situation (lives in assisted living facility, difficulty with transportation), decided to apply for PO prostanoid therapy. This should be approved in the next 5-7 days, and so pt will be set up with the medication and education at home. She was transitioned to PO Lasix during hospitalization and will be sent home on Lasix 80 BID. She will be d/c'ed with Home Health nursing, PT/OT, and social work. We should get approval for PO prostanoid therapy in then next 5-7 days so will schedule f/u in PH clinic in 3 weeks with 6MWT and labs.          Pending Diagnostic Studies:     None        Final Active Diagnoses:    Diagnosis Date Noted POA    PRINCIPAL PROBLEM:  Pulmonary hypertension, group 1 [I27.20] 10/04/2017 Yes    Acute on  chronic right heart failure [I50.813]  Yes    Pulmonary nodules [R91.8] 10/10/2017 Yes    Cardiomegaly [I51.7] 10/09/2017 Yes    CHF (congestive heart failure) [I50.9] 10/09/2017 Yes    Acute on chronic diastolic heart failure [I50.33] 10/06/2017 Yes    Chronic respiratory failure with hypoxia [J96.11] 10/05/2017 Yes    Bipolar disorder [F31.9]  Yes      Problems Resolved During this Admission:    Diagnosis Date Noted Date Resolved POA      Discharged Condition: stable    Disposition: Home or Self Care      Patient Instructions:     Diet general   Order Specific Question Answer Comments   Na restriction, if any: 2gNa    Fluid restriction: Fluid - 1500mL      Activity as tolerated     Call MD for:  temperature >100.4     Call MD for:  persistent nausea and vomiting or diarrhea     Call MD for:  severe uncontrolled pain     Call MD for:  difficulty breathing or increased cough     Call MD for:  severe persistent headache     Call MD for:  worsening rash     Call MD for:  persistent dizziness, light-headedness, or visual disturbances     Call MD for:  increased confusion or weakness       Medications:  Reconciled Home Medications:   Current Discharge Medication List      CONTINUE these medications which have CHANGED    Details   furosemide (LASIX) 80 MG tablet Take 1 tablet (80 mg total) by mouth 2 (two) times daily.  Qty: 60 tablet, Refills: 3         CONTINUE these medications which have NOT CHANGED    Details   albuterol (PROVENTIL) 2.5 mg /3 mL (0.083 %) nebulizer solution Take 2.5 mg by nebulization every 6 (six) hours as needed for Wheezing. Rescue      amlodipine (NORVASC) 5 MG tablet Take 5 mg by mouth once daily.      budesonide-formoterol 80-4.5 mcg (SYMBICORT) 80-4.5 mcg/actuation HFAA Inhale 2 puffs into the lungs 2 (two) times daily. Controller      busPIRone (BUSPAR) 15 MG tablet Take 15 mg by mouth 3 (three) times daily.      desvenlafaxine succinate (PRISTIQ) 100 MG Tb24 Take 100 mg by mouth once  daily.      hydrocodone-acetaminophen 10-325mg (NORCO)  mg Tab Take 1 tablet by mouth every 8 (eight) hours as needed for Pain.      lamotrigine (LAMICTAL) 100 MG tablet Take 100 mg by mouth 2 (two) times daily.      levothyroxine (SYNTHROID) 75 MCG tablet Take 75 mcg by mouth once daily.      lurasidone (LATUDA) 60 mg Tab tablet Take 60 mg by mouth once daily.      pantoprazole (PROTONIX) 40 MG tablet Take 40 mg by mouth once daily.      pravastatin (PRAVACHOL) 40 MG tablet Take 40 mg by mouth once daily.             Deanne Tello PA-C  Heart Transplant  [facility]

## 2017-10-13 ENCOUNTER — NURSE TRIAGE (OUTPATIENT)
Dept: ADMINISTRATIVE | Facility: CLINIC | Age: 57
End: 2017-10-13

## 2017-10-13 NOTE — TELEPHONE ENCOUNTER
Reason for Disposition   Red area or streak and large (> 2 in. or 5 cm)    Protocols used: ST LEG PAIN-A-OH    Pt c/o bilateral foot pain with redness. Care advice given.

## 2017-10-18 ENCOUNTER — TELEPHONE (OUTPATIENT)
Dept: TRANSPLANT | Facility: CLINIC | Age: 57
End: 2017-10-18

## 2017-10-24 ENCOUNTER — OFFICE VISIT (OUTPATIENT)
Dept: TRANSPLANT | Facility: CLINIC | Age: 57
End: 2017-10-24
Payer: MEDICARE

## 2017-10-24 ENCOUNTER — HOSPITAL ENCOUNTER (OUTPATIENT)
Dept: PULMONOLOGY | Facility: CLINIC | Age: 57
Discharge: HOME | End: 2017-10-24
Payer: MEDICARE

## 2017-10-24 VITALS
DIASTOLIC BLOOD PRESSURE: 80 MMHG | SYSTOLIC BLOOD PRESSURE: 126 MMHG | BODY MASS INDEX: 33.49 KG/M2 | HEART RATE: 75 BPM | OXYGEN SATURATION: 86 % | WEIGHT: 196.19 LBS | HEIGHT: 64 IN

## 2017-10-24 VITALS — HEIGHT: 64 IN | WEIGHT: 194.69 LBS | BODY MASS INDEX: 33.24 KG/M2

## 2017-10-24 DIAGNOSIS — I27.20 PULMONARY HYPERTENSION: Primary | ICD-10-CM

## 2017-10-24 DIAGNOSIS — I10 ESSENTIAL HYPERTENSION: ICD-10-CM

## 2017-10-24 DIAGNOSIS — I50.813 ACUTE ON CHRONIC RIGHT HEART FAILURE: ICD-10-CM

## 2017-10-24 DIAGNOSIS — I27.9 CHRONIC PULMONARY HEART DISEASE: ICD-10-CM

## 2017-10-24 DIAGNOSIS — Z99.81 OXYGEN DEPENDENT: ICD-10-CM

## 2017-10-24 DIAGNOSIS — J96.11 CHRONIC RESPIRATORY FAILURE WITH HYPOXIA: ICD-10-CM

## 2017-10-24 DIAGNOSIS — I51.7 CARDIOMEGALY: ICD-10-CM

## 2017-10-24 DIAGNOSIS — F31.9 BIPOLAR AFFECTIVE DISORDER, REMISSION STATUS UNSPECIFIED: ICD-10-CM

## 2017-10-24 DIAGNOSIS — I50.33 ACUTE ON CHRONIC DIASTOLIC HEART FAILURE: ICD-10-CM

## 2017-10-24 PROBLEM — I50.9 CHF (CONGESTIVE HEART FAILURE): Status: RESOLVED | Noted: 2017-10-09 | Resolved: 2017-10-24

## 2017-10-24 PROCEDURE — 99215 OFFICE O/P EST HI 40 MIN: CPT | Mod: S$GLB,,, | Performed by: INTERNAL MEDICINE

## 2017-10-24 PROCEDURE — 94620 PR PULMONARY STRESS TESTING,SIMPLE: CPT | Mod: S$GLB,,, | Performed by: INTERNAL MEDICINE

## 2017-10-24 PROCEDURE — 99999 PR PBB SHADOW E&M-EST. PATIENT-LVL III: CPT | Mod: PBBFAC,,, | Performed by: INTERNAL MEDICINE

## 2017-10-24 RX ORDER — METOLAZONE 2.5 MG/1
2.5 TABLET ORAL DAILY PRN
Qty: 30 TABLET | Refills: 11 | Status: ON HOLD | OUTPATIENT
Start: 2017-10-24 | End: 2018-01-11 | Stop reason: HOSPADM

## 2017-10-24 RX ORDER — POTASSIUM CHLORIDE 750 MG/1
20 TABLET, EXTENDED RELEASE ORAL DAILY
Qty: 60 TABLET | Refills: 11 | Status: ON HOLD | OUTPATIENT
Start: 2017-10-24 | End: 2017-12-26 | Stop reason: HOSPADM

## 2017-10-24 NOTE — PATIENT INSTRUCTIONS
Tonight, take metolazone 30 minutes before your evening lasix with a potassium tablet    Start potassium 20meq daily    Check your weights every morning after getting out of bed and urinating. If your weight goes up 3# overnight or 5# in one week take metolazone 30 minutes before am lasix with extra potassium and call us if the fluid doesn't come off.    Lets try to push you uptravi up as quickly as possible (weekly)  Please call us with any side effects so I can help manage them    Keep salt intake to under 2000 mg sodium, fluids to under 2 L (64 oz)        Low-Salt Diet  This diet removes foods that are high in salt. It also limits the amount of salt you use when cooking. It is most often used for people with high blood pressure, edema (fluid retention), and kidney, liver, or heart disease.  Table salt contains the mineral sodium. Your body needs sodium to work normally. But too much sodium can make your health problems worse. Your healthcare provider is recommending a low-salt (also called low-sodium) diet for you. Your total daily allowance of salt is 1,500 to 2,300 milligrams (mg). It is less than 1 teaspoon of table salt. This means you can have only about 500 to 700 mg of sodium at each meal. People with certain health problems should limit salt intake to the lower end of the recommended range.    When you cook, dont add much salt. If you can cook without using salt, even better. Dont add salt to your food at the table.  When shopping, read food labels. Salt is often called sodium on the label. Choose foods that are salt-free, low salt, or very low salt. Note that foods with reduced salt may not lower your salt intake enough.    Beans, potatoes, and pasta  Ok: Dry beans, split peas, lentils, potatoes, rice, macaroni, pasta, spaghetti without added salt  Avoid: Potato chips, tortilla chips, and similar products  Breads and cereals  Ok: Low-sodium breads, rolls, cereals, and cakes; low-salt crackers, matzo  crackers  Avoid: Salted crackers, pretzels, popcorn, Indonesian toast, pancakes, muffins  Dairy  Ok: Milk, chocolate milk, hot chocolate mix, low-salt cheeses, and yogurt  Avoid: Processed cheese and cheese spreads; Roquefort, Camembert, and cottage cheese; buttermilk, instant breakfast drink  Desserts  Ok: Ice cream, frozen yogurt, juice bars, gelatin, cookies and pies, sugar, honey, jelly, hard candy  Avoid: Most pies, cakes and cookies prepared or processed with salt; instant pudding  Drinks  Ok: Tea, coffee, fizzy (carbonated) drinks, juices  Avoid: Flavored coffees, electrolyte replacement drinks, sports drinks  Meats  Ok: All fresh meat, fish, poultry, low-salt tuna, eggs, egg substitute  Avoid: Smoked, pickled, brine-cured, or salted meats and fish. This includes pike, chipped beef, corned beef, hot dogs, deli meats, ham, kosher meats, salt pork, sausage, canned tuna, salted codfish, smoked salmon, herring, sardines, or anchovies.  Seasonings and spices  Ok: Most seasonings are okay. Good substitutes for salt include: fresh herb blends, hot sauce, lemon, garlic, gibson, vinegar, dry mustard, parsley, cilantro, horseradish, tomato paste, regular margarine, mayonnaise, unsalted butter, cream cheese, vegetable oil, cream, low-salt salad dressing and gravy.  Avoid: Regular ketchup, relishes, pickles, soy sauce, teriyaki sauce, Worcestershire sauce, BBQ sauce, tartar sauce, meat tenderizer, chili sauce, regular gravy, regular salad dressing, salted butter  Soups  Ok: Low-salt soups and broths made with allowed foods  Avoid: Bouillon cubes, soups with smoked or salted meats, regular soup and broth  Vegetables  Ok: Most vegetables are okay; also low-salt tomato and vegetable juices  Avoid: Sauerkraut and other brine-soaked vegetables; pickles and other pickled vegetables; tomato juice, olives  Date Last Reviewed: 8/1/2016  © 6280-4625 [email or web address]. [address] [place] [address], [place] [address]. All rights  reserved. This information is not intended as a substitute for professional medical care. Always follow your healthcare professional's instructions.

## 2017-10-24 NOTE — PROGRESS NOTES
Subjective:    Patient ID:  Sue Smith is a 56 y.o. female who presents for follow-up of Pulmonary Hypertension.    HPI  57 yo woman with severe PAH here for PH f/u after recent hosp stay at which time dx of PH was confirmed- plan was to start uptravi and she has just started yest   Of note records from OSH are discordant. Hospitalist / cardiology have her labeled as COPD and Obesity hypoventilation however consult by pulmonologist (Dr. Hardik Magallanes) states explicitly that PFTs done in 2015 showed only mild restriction and were not consistent with COPD. In addition he states that though she is mildly obese her pCO2 is normal at baseline ruling out obesity hypoventilation. What makes this more difficult is the fact that the patient is unsure if she has COPD as she has been told different things by different doctors over the years.  Has Severe Pulmonary HTN with  Chronic hypoxic respiratory failure for which she is on 3L all the time and BiPAP QHS, History of tracheostomy (4 years ago) and short term vent dependence. Tracheostomy was eventually de cannulated.   - Previous tobacco abuse (stopped 15 yrs ago) - SHERIE (unclear severity) has been on trilogy vent for BiPAP at night.   Hosp course: She had work up here for PH including rheum labs which were all negative, CT chest- neg for ILD, PFTs- not consistent with COPD, VQ scan- low probability for PE. She underwent RHC on 10/9/17 which showed Severely reduced CO/CI off inotropes, Normal right and very mildly elevated left sided filling pressures, and Severe pulmonary hypertension. With severe PH and markedly elevated PVR, markedly reduced CI, and her level of symptoms we recommended prostanoid therapy. Due to her social situation (lives in assisted living facility, difficulty with transportation), decided to apply for PO prostanoid therapy. This should be approved in the next 5-7 days, and so pt will be set up with the medication and education at home. She was  "transitioned to PO Lasix during hospitalization and will be sent home on Lasix 80 BID. She will be d/c'ed with Home Health nursing, PT/OT, and social work.        Since dc home pt has had to increase from 2-3L NC- gets LH, terra bending down and coming up- says its hard for her to walk, has a little chihuahua she has difficulty walking- has a lot of anxiety because of the SOB- has had presyncope though not syncope- no syncope. Denies fluid retention since she's been home, though she does agree her belly is a little bigger-  Has gained a little wt, but says she also is eating better- wt was 193# when home health weighed her the other day (196 on our scale)   home health has been keeping an eye on her and she is in the process on trying to get a digital scale. Lasix is 80mg bid with good UOP, terra at night- says she watches her salt "somewhat"      Six Minute Walk Test:   366  m (   m in    )                                              O2 sat  96 ->93  %                                                           HR 80  -> 108                                                                 BP  134 / 69  ->149 /71                                                         Erasmo   0  -> 3    Echo        Results for orders placed or performed during the hospital encounter of 10/05/17   2D echo with color flow doppler   Result Value Ref Range    EF 55 55 - 65    Est. PA Systolic Pressure 124.64 (A)     Pericardial Effusion TRIVIAL     Tricuspid Valve Regurgitation MILD TO MODERATE    CONCLUSIONS     1 - Normal left ventricular systolic function (EF 55-60%).     2 - Severe right ventricular enlargement with hypertrophy, with severely depressed systolic function.     3 - Mild to moderate tricuspid regurgitation.     4 - Severe pulmonary hypertension. The estimated PA systolic pressure is 125 mmHg.     5 - Intermediate central venous pressure.       [facility]    CONDITION 1 (10/9/2017 16:14:37):  FICKCI: 1.4600  FICKCO: 2.7600  PULARTSAT: " 54.0000  RA: 11/9 (8)  SYSARTSAT: 97.0000  RV: 102/2  RVEDP: 9     PW: 16/16 (15)  PA: 102/42 (70)  PA: 100/42 (68)  AO: 119/81 (94)    CXR    Mild CHF.    CT Chest     . No evidence of acute intrathoracic process.    2. Irregular 1.1 cm nodular opacity at the right lung apex as well as an  additional 1.6 cm irregular nodular opacity within the right upper lobe as discussed above. These may be infectious or inflammatory in etiology, however three-month followup noncontrast chest CT is recommended to exclude neoplastic process.    3. Subcentimeter lobular filling defects within the dependent aspect of the distal trachea and proximal right lower lobe segmental bronchus. These presumably represents secretions within the airways. Correlation for history of aspiration is advised in light of moderate size hiatal hernia. Further evaluation with direct visualization as clinically warranted.     4. Mild cardiomegaly. Coronary artery calcifications.    5. Prominence of the pulmonary outflow tract which is nonspecific, but can be seen in the setting of pulmonary arterial hypertension. Correlation is advised.    PFTs     Were done but I am unable to view the results in epi      V/Q Scan   Low probability VQ scan for pulmonary embolism, noting the examination is mildly limited given absence of chest radiograph within 24 hours for comparison.    Review of Systems   Constitution: Positive for weight gain. Negative for chills, fever and malaise/fatigue.   HENT: Negative.    Eyes: Negative.    Cardiovascular: Positive for dyspnea on exertion and leg swelling. Negative for chest pain, near-syncope, orthopnea, palpitations, paroxysmal nocturnal dyspnea and syncope.   Respiratory: Negative for cough and shortness of breath.    Endocrine: Negative.    Skin: Negative.    Musculoskeletal: Negative.    Gastrointestinal: Negative for bloating, abdominal pain and change in bowel habit.   Neurological: Positive for light-headedness. Negative  "for dizziness.   Psychiatric/Behavioral: Negative for depression.        Objective:  /80   Pulse 75   Ht 5' 4" (1.626 m)   Wt 89 kg (196 lb 3.4 oz)   SpO2 (!) 86% Comment: Pt on 3L of O2 at time of reading  BMI 33.68 kg/m²       Physical Exam   Constitutional: She is oriented to person, place, and time. She appears well-developed and well-nourished.   HENT:   Head: Normocephalic and atraumatic.   Eyes: Right eye exhibits no discharge. Left eye exhibits no discharge.   Neck: Neck supple. No JVD present. No thyromegaly present.   Cardiovascular: Normal rate and regular rhythm.  Exam reveals no gallop and no friction rub.    No murmur heard.  Loud S2, RV heave and R sided S3     Pulmonary/Chest: Effort normal and breath sounds normal. No respiratory distress. She has no wheezes. She has no rales.   Abdominal: Soft. Bowel sounds are normal. She exhibits no distension. There is no tenderness.   Musculoskeletal: Normal range of motion. She exhibits edema. She exhibits no tenderness.   Neurological: She is alert and oriented to person, place, and time. No cranial nerve deficit. Coordination normal.   Skin: Skin is warm and dry. No rash noted.   Psychiatric: She has a normal mood and affect. Judgment and thought content normal.           Chemistry        Component Value Date/Time     10/24/2017 1125    K 3.2 (L) 10/24/2017 1125    CL 98 10/24/2017 1125    CO2 31 (H) 10/24/2017 1125    BUN 29 (H) 10/24/2017 1125    CREATININE 1.3 10/24/2017 1125    GLU 94 10/24/2017 1125        Component Value Date/Time    CALCIUM 10.0 10/24/2017 1125    ALKPHOS 131 10/24/2017 1125    AST 26 10/24/2017 1125    ALT 23 10/24/2017 1125    BILITOT 0.7 10/24/2017 1125    ESTGFRAFRICA 53.4 (A) 10/24/2017 1125    EGFRNONAA 46.3 (A) 10/24/2017 1125            Magnesium   Date Value Ref Range Status   10/24/2017 1.7 1.6 - 2.6 mg/dL Final       Lab Results   Component Value Date    WBC 6.92 10/24/2017    HGB 12.4 10/24/2017    HCT " 36.9 (L) 10/24/2017    MCV 91 10/24/2017     (L) 10/24/2017       Lab Results   Component Value Date    INR 1.0 10/11/2017    INR 0.9 10/10/2017    INR 1.0 10/09/2017       BNP   Date Value Ref Range Status   10/24/2017 1,411 (H) 0 - 99 pg/mL Final     Comment:     Values of less than 100 pg/ml are consistent with non-CHF populations.   10/09/2017 381 (H) 0 - 99 pg/mL Final     Comment:     Values of less than 100 pg/ml are consistent with non-CHF populations.   10/05/2017 669 (H) 0 - 99 pg/mL Final     Comment:     Values of less than 100 pg/ml are consistent with non-CHF populations.       No results found for: LDH          Assessment:       1. Pulmonary hypertension, group 1- severe- sick enough for IV therapy but unfortunately poor candidate due to psychosocial concerns- now starting uptravi, with markedly increased BNP today and volume overload on exam-    2. Chronic respiratory failure with hypoxia    3. Essential hypertension    4. Oxygen dependent    5. Cardiomegaly    6. Bipolar affective disorder, remission status unspecified    7. Acute on chronic right heart failure    8. hypokalemia     WHO Group:1  Functional Class 4     Plan:     offered to give IV lasix in clinic today but we both agreed the drive back to [place] might be problematic- therefore agreed to following plan:  Tonight, take metolazone 30 minutes before  evening lasix with a potassium tablet  May repeat again tomorrow if needed, and then going forward for wt gain 3# overnight or 5# in 1 wk (with extra K)    Start potassium 20meq daily    Will try to push  uptravi up as quickly as possible (weekly)  Pt to call us with any side effects so we can help manage them    Keep salt intake to under 2000 mg sodium, fluids to under 2 L (64 oz)      F/u 4 wk with 6mw and labs

## 2017-10-26 NOTE — PROCEDURES
Sue Smith is a 56 y.o.  female patient, who presents for a 6 minute walk test ordered by MD. Jhony.  The diagnosis is Pulmonary Hypertension.  The patient's BMI is 33.5 kg/m2.  Predicted distance (lower limit of normal) is 348.31 meters.      Test Results:    The test was completed without stopping.    The total time walked was 360 seconds.  During walking, the patient reported:  Dyspnea. The patient used no assistive devices and supplemental oxygen during testing.     10/24/2017---------Distance: 365.76 meters (1200 feet)     O2 Sat % Supplemental Oxygen Heart Rate Blood Pressure Erasmo Scale   Pre-exercise  (Resting) 96 % 3 L/M 80 bpm 134/69 mmHg 0   During Exercise 93 % 3 L/M 108 bpm 149/71 mmHg 3   Post-exercise  (Recovery) 94 % 3 L/M  89 bpm   mmHg       Recovery Time: 98 seconds    Performing nurse/tech: MERARI Flores      PREVIOUS STUDY:   The patient had a previous study.  10/09/2017---------Distance: 309.37 meters (1015 feet)       O2 Sat % Supplemental Oxygen Heart Rate Blood Pressure Erasmo Scale   Pre-exercise  (Resting) 96 % 3 L/M 90 bpm 123/65 mmHg 0   During Exercise 93 % 3 L/M 116 bpm 117/59 mmHg 3   Post-exercise  (Recovery) 95 % 3 L/M  104 bpm             CLINICAL INTERPRETATION:  Six minute walk distance is 365.76 meters (1200 feet) with moderate dyspnea.  During exercise, there was significant desaturation while breathing room air.  Blood pressure remained stable and Heart rate increased significantly with walking.  This may represent a tachycardic response to exercise.  The patient did not report non-pulmonary symptoms during exercise.  Since the previous study in October 9, 2017, exercise capacity is unchanged.  Based upon age and body mass index, exercise capacity is normal.

## 2017-11-02 ENCOUNTER — TELEPHONE (OUTPATIENT)
Dept: TRANSPLANT | Facility: CLINIC | Age: 57
End: 2017-11-02

## 2017-11-02 NOTE — TELEPHONE ENCOUNTER
Patient called because she is concerned about being able to follow up with this office due to transportation issues. Patient lives in [place] and does not drive. Explained to patient that if she is doing well on her medication, then follow up would be every 3 months as opposed to monthly or sooner. Mrs. Smith says she should be able to make these appts. She also reports having no side effects on Uptravi and is currently on 400 mcg, BID.  Confirmed appt for December. No other concerns at this time.

## 2017-11-06 ENCOUNTER — TELEPHONE (OUTPATIENT)
Dept: TRANSPLANT | Facility: CLINIC | Age: 57
End: 2017-11-06

## 2017-11-06 DIAGNOSIS — I27.0 PRIMARY PULMONARY HYPERTENSION: Primary | ICD-10-CM

## 2017-11-06 DIAGNOSIS — I50.813 ACUTE ON CHRONIC RIGHT HEART FAILURE: ICD-10-CM

## 2017-11-06 NOTE — PROGRESS NOTES
What Home Health Agency is the patient currently using? Other/External Comment - [facility]   Order comments: Subsequent Home Health Orders  SN to complete comprehensive assessment including routine vital signs. Instruct on disease process and s/s of complications to report to MD.   Review/verify medication list sent home with the patient at time of discharge and instruct patient/caregiver as needed. Frequency may be adjusted depending on start of care date.     Notify MD if SBP > 160 or < 90; DBP > 90 or < 50; HR > 120 or < 50; Temp > 101; Weight gain >3lbs in 1 day or 5lbs in 1 week.      Diet: 2 gm NA diet, 2L fluid restriction     Activities: activities as tolerated     Labs: CMP, BNP, CBC, PRN per MD.    Current Medications: Current Outpatient Prescriptions: albuterol (PROVENTIL) 2.5 mg /3 mL (0.083 %) nebulizer solution, Take 2.5 mg by nebulization every 6 (six) hours as needed for Wheezing. Rescue, Disp: , Rfl: amlodipine (NORVASC) 5 MG tablet, Take 5 mg by mouth once daily., Disp: , Rfl: budesonide-formoterol 80-4.5 mcg (SYMBICORT) 80-4.5 mcg/actuation HFAA, Inhale 2 puffs into the lungs 2 (two) times daily. Controller, Disp: , Rfl: busPIRone (BUSPAR) 15 MG tablet, Take 15 mg by mouth 3 (three) times daily., Disp: , Rfl: desvenlafaxine succinate (PRISTIQ) 100 MG Tb24, Take 100 mg by mouth once daily., Disp: , Rfl: furosemide (LASIX) 80 MG tablet, Take 1 tablet (80 mg total) by mouth 2 (two) times daily., Disp: 60 tablet, Rfl: 3 hydrocodone-acetaminophen 10-325mg (NORCO)  mg Tab, Take 1 tablet by mouth every 8 (eight) hours as needed for Pain., Disp: , Rfl: lamotrigine (LAMICTAL) 100 MG tablet, Take 100 mg by mouth 2 (two) times daily., Disp: , Rfl: levothyroxine (SYNTHROID) 75 MCG tablet, Take 75 mcg by mouth once daily., Disp: , Rfl: lurasidone (LATUDA) 60 mg Tab tablet, Take 60 mg by mouth once daily., Disp: , Rfl: metOLazone (ZAROXOLYN) 2.5 MG tablet, Take 1 tablet (2.5 mg total) by mouth  daily as needed. Take for wt gain 3# overnight or 5# in 1 wk, with extra potassium, Disp: 30 tablet, Rfl: 11 pantoprazole (PROTONIX) 40 MG tablet, Take 40 mg by mouth once daily., Disp: , Rfl: potassium chloride SA (K-DUR,KLOR-CON) 10 MEQ tablet, Take 2 tablets (20 mEq total) by mouth once daily., Disp: 60 tablet, Rfl: 11 pravastatin (PRAVACHOL) 40 MG tablet, Take 40 mg by mouth once daily., Disp: , Rfl: selexipag 200 mcg (140)- 800 mcg (60) DsPk, Take 200 mcg BID by mouth for 1 week, then increase by 200 mcg BID, at weekly intervals, to the highest tolerated dose up to 1600 mcg BID., Disp: 200 tablet, Rfl: 11

## 2017-11-06 NOTE — TELEPHONE ENCOUNTER
Calling patient about report from  concerning 8lb weight gain in 4 days, dated 10/30/17. Left message.    Also called HH agency. Sent new orders and ask for notification by phone with information about weight gain or other issues outside of parameters in order set. Faxed previous  orders and subsequent orders to [facility] [place] [facility].

## 2017-11-07 ENCOUNTER — TELEPHONE (OUTPATIENT)
Dept: TRANSPLANT | Facility: CLINIC | Age: 57
End: 2017-11-07

## 2017-11-07 NOTE — TELEPHONE ENCOUNTER
Received report from  that patient had been sent to the ER on 11/6 due to increased SOB, diminished lung sounds and difficulty maintaining O2 sats about 85% on 3L NC.    PH Coordinator called [facility] and spoke to patient's nurse. Provided information on Uptravi, patient's PH medication and advised that patient would have to use her home supply. At the time of the conversation the nurse had not rounded on the patient. Gave RN direct number to reach PH coordinator for any questions or if patient needs to be transferred to a higher level of care at [facility].    Notified Dr. Cullen.

## 2017-11-08 ENCOUNTER — TELEPHONE (OUTPATIENT)
Dept: TRANSPLANT | Facility: CLINIC | Age: 57
End: 2017-11-08

## 2017-11-08 NOTE — TELEPHONE ENCOUNTER
Spoke with patient's nurse this morning. They are still waiting on a bed at main campus. Patient is reportedly comfortable on BiPap and has supply of Uptravi/Selexipag with her. She is currently taking 600 mcg, BID.

## 2017-11-09 ENCOUNTER — HOSPITAL ENCOUNTER (INPATIENT)
Facility: HOSPITAL | Age: 57
LOS: 4 days | Discharge: HOME HEALTH | DRG: 292 | End: 2017-11-13
Attending: INTERNAL MEDICINE | Admitting: INTERNAL MEDICINE
Payer: MEDICARE

## 2017-11-09 DIAGNOSIS — I50.813 ACUTE ON CHRONIC RIGHT HEART FAILURE: Primary | ICD-10-CM

## 2017-11-09 DIAGNOSIS — R06.02 SHORTNESS OF BREATH: ICD-10-CM

## 2017-11-09 DIAGNOSIS — I27.20 PULMONARY HTN: ICD-10-CM

## 2017-11-09 DIAGNOSIS — I27.20 PULMONARY HYPERTENSION: ICD-10-CM

## 2017-11-09 DIAGNOSIS — I50.33 ACUTE ON CHRONIC DIASTOLIC HEART FAILURE: ICD-10-CM

## 2017-11-09 LAB
ALBUMIN SERPL BCP-MCNC: 4.1 G/DL
ALBUMIN SERPL BCP-MCNC: 4.2 G/DL
ALP SERPL-CCNC: 116 U/L
ALP SERPL-CCNC: 119 U/L
ALT SERPL W/O P-5'-P-CCNC: 25 U/L
ALT SERPL W/O P-5'-P-CCNC: 25 U/L
ANION GAP SERPL CALC-SCNC: 14 MMOL/L
ANION GAP SERPL CALC-SCNC: 14 MMOL/L
ANION GAP SERPL CALC-SCNC: 15 MMOL/L
AST SERPL-CCNC: 27 U/L
AST SERPL-CCNC: 34 U/L
BASOPHILS # BLD AUTO: 0.01 K/UL
BASOPHILS NFR BLD: 0.1 %
BILIRUB DIRECT SERPL-MCNC: 0.3 MG/DL
BILIRUB SERPL-MCNC: 0.6 MG/DL
BILIRUB SERPL-MCNC: 0.6 MG/DL
BUN SERPL-MCNC: 45 MG/DL
BUN SERPL-MCNC: 46 MG/DL
BUN SERPL-MCNC: 47 MG/DL
CALCIUM SERPL-MCNC: 10.2 MG/DL
CALCIUM SERPL-MCNC: 10.3 MG/DL
CALCIUM SERPL-MCNC: 10.8 MG/DL
CHLORIDE SERPL-SCNC: 88 MMOL/L
CHLORIDE SERPL-SCNC: 92 MMOL/L
CHLORIDE SERPL-SCNC: 92 MMOL/L
CO2 SERPL-SCNC: 36 MMOL/L
CO2 SERPL-SCNC: 37 MMOL/L
CO2 SERPL-SCNC: 37 MMOL/L
CREAT SERPL-MCNC: 1.4 MG/DL
CREAT SERPL-MCNC: 1.5 MG/DL
CREAT SERPL-MCNC: 1.6 MG/DL
DIFFERENTIAL METHOD: ABNORMAL
EOSINOPHIL # BLD AUTO: 0 K/UL
EOSINOPHIL NFR BLD: 0 %
ERYTHROCYTE [DISTWIDTH] IN BLOOD BY AUTOMATED COUNT: 18.1 %
EST. GFR  (AFRICAN AMERICAN): 41.5 ML/MIN/1.73 M^2
EST. GFR  (AFRICAN AMERICAN): 44.9 ML/MIN/1.73 M^2
EST. GFR  (AFRICAN AMERICAN): 48.8 ML/MIN/1.73 M^2
EST. GFR  (NON AFRICAN AMERICAN): 36 ML/MIN/1.73 M^2
EST. GFR  (NON AFRICAN AMERICAN): 38.9 ML/MIN/1.73 M^2
EST. GFR  (NON AFRICAN AMERICAN): 42.3 ML/MIN/1.73 M^2
GLUCOSE SERPL-MCNC: 113 MG/DL
GLUCOSE SERPL-MCNC: 114 MG/DL
GLUCOSE SERPL-MCNC: 95 MG/DL
HCT VFR BLD AUTO: 41 %
HGB BLD-MCNC: 13.2 G/DL
IMM GRANULOCYTES # BLD AUTO: 0.12 K/UL
IMM GRANULOCYTES NFR BLD AUTO: 1 %
INR PPP: 1.1
LYMPHOCYTES # BLD AUTO: 0.4 K/UL
LYMPHOCYTES NFR BLD: 3.7 %
MAGNESIUM SERPL-MCNC: 1.5 MG/DL
MAGNESIUM SERPL-MCNC: 2.8 MG/DL
MCH RBC QN AUTO: 30.9 PG
MCHC RBC AUTO-ENTMCNC: 32.2 G/DL
MCV RBC AUTO: 96 FL
MONOCYTES # BLD AUTO: 0.4 K/UL
MONOCYTES NFR BLD: 3.2 %
NEUTROPHILS # BLD AUTO: 10.7 K/UL
NEUTROPHILS NFR BLD: 92 %
NRBC BLD-RTO: 0 /100 WBC
PHOSPHATE SERPL-MCNC: 4.6 MG/DL
PLATELET # BLD AUTO: 171 K/UL
PMV BLD AUTO: 11.9 FL
POTASSIUM SERPL-SCNC: 3.4 MMOL/L
POTASSIUM SERPL-SCNC: 3.7 MMOL/L
POTASSIUM SERPL-SCNC: 3.7 MMOL/L
PROT SERPL-MCNC: 7.1 G/DL
PROT SERPL-MCNC: 7.3 G/DL
PROTHROMBIN TIME: 12.1 SEC
RBC # BLD AUTO: 4.27 M/UL
SODIUM SERPL-SCNC: 139 MMOL/L
SODIUM SERPL-SCNC: 143 MMOL/L
SODIUM SERPL-SCNC: 143 MMOL/L
WBC # BLD AUTO: 11.66 K/UL

## 2017-11-09 PROCEDURE — 25000003 PHARM REV CODE 250: Performed by: PHYSICIAN ASSISTANT

## 2017-11-09 PROCEDURE — 83735 ASSAY OF MAGNESIUM: CPT | Mod: 91

## 2017-11-09 PROCEDURE — 27000190 HC CPAP FULL FACE MASK W/VALVE

## 2017-11-09 PROCEDURE — 20600001 HC STEP DOWN PRIVATE ROOM

## 2017-11-09 PROCEDURE — 84100 ASSAY OF PHOSPHORUS: CPT

## 2017-11-09 PROCEDURE — 94761 N-INVAS EAR/PLS OXIMETRY MLT: CPT

## 2017-11-09 PROCEDURE — 25000242 PHARM REV CODE 250 ALT 637 W/ HCPCS: Performed by: INTERNAL MEDICINE

## 2017-11-09 PROCEDURE — 83735 ASSAY OF MAGNESIUM: CPT

## 2017-11-09 PROCEDURE — 93005 ELECTROCARDIOGRAM TRACING: CPT

## 2017-11-09 PROCEDURE — 27100171 HC OXYGEN HIGH FLOW UP TO 24 HOURS

## 2017-11-09 PROCEDURE — 80048 BASIC METABOLIC PNL TOTAL CA: CPT | Mod: 91

## 2017-11-09 PROCEDURE — 63600175 PHARM REV CODE 636 W HCPCS: Performed by: PHYSICIAN ASSISTANT

## 2017-11-09 PROCEDURE — 80076 HEPATIC FUNCTION PANEL: CPT

## 2017-11-09 PROCEDURE — 85610 PROTHROMBIN TIME: CPT

## 2017-11-09 PROCEDURE — 27000221 HC OXYGEN, UP TO 24 HOURS

## 2017-11-09 PROCEDURE — A4216 STERILE WATER/SALINE, 10 ML: HCPCS | Performed by: INTERNAL MEDICINE

## 2017-11-09 PROCEDURE — 25000003 PHARM REV CODE 250: Performed by: INTERNAL MEDICINE

## 2017-11-09 PROCEDURE — 63600175 PHARM REV CODE 636 W HCPCS: Performed by: INTERNAL MEDICINE

## 2017-11-09 PROCEDURE — 36415 COLL VENOUS BLD VENIPUNCTURE: CPT

## 2017-11-09 PROCEDURE — 80053 COMPREHEN METABOLIC PANEL: CPT

## 2017-11-09 PROCEDURE — 99222 1ST HOSP IP/OBS MODERATE 55: CPT | Mod: ,,, | Performed by: INTERNAL MEDICINE

## 2017-11-09 PROCEDURE — 85025 COMPLETE CBC W/AUTO DIFF WBC: CPT

## 2017-11-09 PROCEDURE — 94660 CPAP INITIATION&MGMT: CPT

## 2017-11-09 PROCEDURE — 94640 AIRWAY INHALATION TREATMENT: CPT

## 2017-11-09 PROCEDURE — 93010 ELECTROCARDIOGRAM REPORT: CPT | Mod: ,,, | Performed by: INTERNAL MEDICINE

## 2017-11-09 PROCEDURE — 99900035 HC TECH TIME PER 15 MIN (STAT)

## 2017-11-09 PROCEDURE — 80048 BASIC METABOLIC PNL TOTAL CA: CPT

## 2017-11-09 RX ORDER — SODIUM CHLORIDE 0.9 % (FLUSH) 0.9 %
3 SYRINGE (ML) INJECTION EVERY 8 HOURS
Status: DISCONTINUED | OUTPATIENT
Start: 2017-11-09 | End: 2017-11-13 | Stop reason: HOSPADM

## 2017-11-09 RX ORDER — AMLODIPINE BESYLATE 5 MG/1
5 TABLET ORAL DAILY
Status: DISCONTINUED | OUTPATIENT
Start: 2017-11-09 | End: 2017-11-13 | Stop reason: HOSPADM

## 2017-11-09 RX ORDER — POTASSIUM CHLORIDE 20 MEQ/1
20 TABLET, EXTENDED RELEASE ORAL DAILY
Status: DISCONTINUED | OUTPATIENT
Start: 2017-11-09 | End: 2017-11-09

## 2017-11-09 RX ORDER — FLUTICASONE FUROATE AND VILANTEROL 100; 25 UG/1; UG/1
1 POWDER RESPIRATORY (INHALATION) DAILY
Status: DISCONTINUED | OUTPATIENT
Start: 2017-11-09 | End: 2017-11-13 | Stop reason: HOSPADM

## 2017-11-09 RX ORDER — PRAVASTATIN SODIUM 40 MG/1
40 TABLET ORAL DAILY
Status: DISCONTINUED | OUTPATIENT
Start: 2017-11-09 | End: 2017-11-13 | Stop reason: HOSPADM

## 2017-11-09 RX ORDER — DESVENLAFAXINE SUCCINATE 50 MG/1
100 TABLET, EXTENDED RELEASE ORAL DAILY
Status: DISCONTINUED | OUTPATIENT
Start: 2017-11-09 | End: 2017-11-13 | Stop reason: HOSPADM

## 2017-11-09 RX ORDER — ALBUTEROL SULFATE 0.83 MG/ML
2.5 SOLUTION RESPIRATORY (INHALATION) EVERY 4 HOURS
Status: DISCONTINUED | OUTPATIENT
Start: 2017-11-09 | End: 2017-11-13 | Stop reason: HOSPADM

## 2017-11-09 RX ORDER — PANTOPRAZOLE SODIUM 40 MG/1
40 TABLET, DELAYED RELEASE ORAL DAILY
Status: DISCONTINUED | OUTPATIENT
Start: 2017-11-09 | End: 2017-11-13 | Stop reason: HOSPADM

## 2017-11-09 RX ORDER — LAMOTRIGINE 100 MG/1
100 TABLET ORAL 2 TIMES DAILY
Status: DISCONTINUED | OUTPATIENT
Start: 2017-11-09 | End: 2017-11-13 | Stop reason: HOSPADM

## 2017-11-09 RX ORDER — POTASSIUM CHLORIDE 20 MEQ/1
40 TABLET, EXTENDED RELEASE ORAL 2 TIMES DAILY
Status: DISCONTINUED | OUTPATIENT
Start: 2017-11-09 | End: 2017-11-13 | Stop reason: HOSPADM

## 2017-11-09 RX ORDER — HEPARIN SODIUM 5000 [USP'U]/ML
5000 INJECTION, SOLUTION INTRAVENOUS; SUBCUTANEOUS EVERY 8 HOURS
Status: DISCONTINUED | OUTPATIENT
Start: 2017-11-09 | End: 2017-11-13 | Stop reason: HOSPADM

## 2017-11-09 RX ORDER — BUSPIRONE HYDROCHLORIDE 5 MG/1
15 TABLET ORAL 3 TIMES DAILY
Status: DISCONTINUED | OUTPATIENT
Start: 2017-11-09 | End: 2017-11-13 | Stop reason: HOSPADM

## 2017-11-09 RX ORDER — POTASSIUM CHLORIDE 20 MEQ/1
40 TABLET, EXTENDED RELEASE ORAL DAILY
Status: DISCONTINUED | OUTPATIENT
Start: 2017-11-09 | End: 2017-11-09

## 2017-11-09 RX ORDER — POTASSIUM CHLORIDE 750 MG/1
20 CAPSULE, EXTENDED RELEASE ORAL ONCE
Status: COMPLETED | OUTPATIENT
Start: 2017-11-09 | End: 2017-11-09

## 2017-11-09 RX ORDER — HYDROCODONE BITARTRATE AND ACETAMINOPHEN 10; 325 MG/1; MG/1
1 TABLET ORAL EVERY 8 HOURS PRN
Status: DISCONTINUED | OUTPATIENT
Start: 2017-11-09 | End: 2017-11-13 | Stop reason: HOSPADM

## 2017-11-09 RX ORDER — METOLAZONE 2.5 MG/1
2.5 TABLET ORAL 2 TIMES DAILY
Status: DISCONTINUED | OUTPATIENT
Start: 2017-11-09 | End: 2017-11-09

## 2017-11-09 RX ORDER — POTASSIUM CHLORIDE 750 MG/1
40 CAPSULE, EXTENDED RELEASE ORAL ONCE
Status: COMPLETED | OUTPATIENT
Start: 2017-11-09 | End: 2017-11-09

## 2017-11-09 RX ORDER — FUROSEMIDE 10 MG/ML
80 INJECTION INTRAMUSCULAR; INTRAVENOUS ONCE
Status: COMPLETED | OUTPATIENT
Start: 2017-11-09 | End: 2017-11-09

## 2017-11-09 RX ADMIN — PRAVASTATIN SODIUM 40 MG: 40 TABLET ORAL at 09:11

## 2017-11-09 RX ADMIN — MAGNESIUM SULFATE HEPTAHYDRATE 3 G: 500 INJECTION, SOLUTION INTRAMUSCULAR; INTRAVENOUS at 08:11

## 2017-11-09 RX ADMIN — Medication 3 ML: at 10:11

## 2017-11-09 RX ADMIN — METOLAZONE 2.5 MG: 2.5 TABLET ORAL at 02:11

## 2017-11-09 RX ADMIN — LAMOTRIGINE 100 MG: 100 TABLET ORAL at 10:11

## 2017-11-09 RX ADMIN — SODIUM CHLORIDE 20 MG/HR: 900 INJECTION, SOLUTION INTRAVENOUS at 02:11

## 2017-11-09 RX ADMIN — ALBUTEROL SULFATE 2.5 MG: 2.5 SOLUTION RESPIRATORY (INHALATION) at 05:11

## 2017-11-09 RX ADMIN — PANTOPRAZOLE SODIUM 40 MG: 40 TABLET, DELAYED RELEASE ORAL at 09:11

## 2017-11-09 RX ADMIN — AMLODIPINE BESYLATE 5 MG: 5 TABLET ORAL at 09:11

## 2017-11-09 RX ADMIN — ALBUTEROL SULFATE 2.5 MG: 2.5 SOLUTION RESPIRATORY (INHALATION) at 11:11

## 2017-11-09 RX ADMIN — DESVENLAFAXINE SUCCINATE 100 MG: 50 TABLET, FILM COATED, EXTENDED RELEASE ORAL at 09:11

## 2017-11-09 RX ADMIN — FLUTICASONE FUROATE AND VILANTEROL TRIFENATATE 1 PUFF: 100; 25 POWDER RESPIRATORY (INHALATION) at 08:11

## 2017-11-09 RX ADMIN — HEPARIN SODIUM 5000 UNITS: 5000 INJECTION, SOLUTION INTRAVENOUS; SUBCUTANEOUS at 03:11

## 2017-11-09 RX ADMIN — HEPARIN SODIUM 5000 UNITS: 5000 INJECTION, SOLUTION INTRAVENOUS; SUBCUTANEOUS at 05:11

## 2017-11-09 RX ADMIN — BUSPIRONE HYDROCHLORIDE 15 MG: 5 TABLET ORAL at 03:11

## 2017-11-09 RX ADMIN — FUROSEMIDE 80 MG: 10 INJECTION, SOLUTION INTRAVENOUS at 02:11

## 2017-11-09 RX ADMIN — LAMOTRIGINE 100 MG: 100 TABLET ORAL at 09:11

## 2017-11-09 RX ADMIN — Medication 3 ML: at 06:11

## 2017-11-09 RX ADMIN — BUSPIRONE HYDROCHLORIDE 15 MG: 5 TABLET ORAL at 05:11

## 2017-11-09 RX ADMIN — LEVOTHYROXINE SODIUM 75 MCG: 25 TABLET ORAL at 05:11

## 2017-11-09 RX ADMIN — POTASSIUM CHLORIDE 40 MEQ: 1500 TABLET, EXTENDED RELEASE ORAL at 10:11

## 2017-11-09 RX ADMIN — POTASSIUM CHLORIDE 40 MEQ: 750 CAPSULE, EXTENDED RELEASE ORAL at 05:11

## 2017-11-09 RX ADMIN — METOLAZONE 2.5 MG: 2.5 TABLET ORAL at 09:11

## 2017-11-09 RX ADMIN — HEPARIN SODIUM 5000 UNITS: 5000 INJECTION, SOLUTION INTRAVENOUS; SUBCUTANEOUS at 10:11

## 2017-11-09 RX ADMIN — POTASSIUM CHLORIDE 20 MEQ: 750 CAPSULE, EXTENDED RELEASE ORAL at 09:11

## 2017-11-09 RX ADMIN — ALBUTEROL SULFATE 2.5 MG: 2.5 SOLUTION RESPIRATORY (INHALATION) at 02:11

## 2017-11-09 RX ADMIN — ALBUTEROL SULFATE 2.5 MG: 2.5 SOLUTION RESPIRATORY (INHALATION) at 08:11

## 2017-11-09 RX ADMIN — BUSPIRONE HYDROCHLORIDE 15 MG: 5 TABLET ORAL at 10:11

## 2017-11-09 RX ADMIN — ALBUTEROL SULFATE 2.5 MG: 2.5 SOLUTION RESPIRATORY (INHALATION) at 07:11

## 2017-11-09 RX ADMIN — ALBUTEROL SULFATE 2.5 MG: 2.5 SOLUTION RESPIRATORY (INHALATION) at 12:11

## 2017-11-09 RX ADMIN — POTASSIUM CHLORIDE 40 MEQ: 1500 TABLET, EXTENDED RELEASE ORAL at 02:11

## 2017-11-09 RX ADMIN — Medication 3 ML: at 02:11

## 2017-11-09 RX ADMIN — SODIUM CHLORIDE 20 MG/HR: 900 INJECTION, SOLUTION INTRAVENOUS at 03:11

## 2017-11-09 NOTE — ASSESSMENT & PLAN NOTE
- Patient stable at the moment; continue Amlodipine  - Avoid negative inotropic agent in the face of suspected RV failure.

## 2017-11-09 NOTE — HPI
56 YOWM with PMH of Asthma, Rt sided heart failure and pulmonary HTN recently seen by Dr. Cullen, with complex history of PAH as records from OSH are discordant. Hospitalist / cardiology have her labeled as COPD and Obesity hypoventilation however consult by pulmonologist (Dr. aHrdik Magallanes) states explicitly that PFTs done in 2015 showed only mild restriction and were not consistent with COPD. In addition he states that though she is mildly obese her pCO2 is normal at baseline ruling out obesity hypoventilation. What makes this more difficult is the fact that the patient is unsure if she has COPD as she has been told different things by different doctors over the years.     - Severe Pulmonary HTN - last ECHO in Oct 2017 with EF 55% and PA Systolic pressure of 124.5 mmHg with globally impaired RV systolic function and abnormal septal movements, meaning high intracavitary pressure in the RV.   - Chronic hypoxic respiratory failure for which she is on 3L all the time and BiPAP QHS  - History of tracheostomy (4 years ago) and short term vent dependence. Tracheostomy was eventually de cannulated.   - Previous tobacco abuse (stopped 15 yrs ago)   - SHERIE (unclear severity) has been on trilogy vent for BiPAP at night.     Who was discharged approx three weeks ago from [facility]. Since discharge the patient has been using Lasix 80 mg BID, Metolazone 2.5 mg BID and instructions about the weight management and the need for increasing if her weight gain.     Over the past one week, patient has gained 8-10 pounds of weight. She reported that despite her care managed with best dosing of her Lasix she continue to gain weight. Her BNP at OSH was 945 which is the high number for her.  She does report that she may not be observing dietary recommendations.     She was started on intermittent LASIX at [facility] and after initial stabilization, pt was transferred to our service for further management.

## 2017-11-09 NOTE — ASSESSMENT & PLAN NOTE
- In the setting of RV failure, due to high pressure PAH  - Started on LASIX drip at 20 mg/hr.   - Strict intake output and daily standing weight.   - Fluid restriction to 1.5 Litres a day   - Probably will need inotropic support if not much UOp with above regime.

## 2017-11-09 NOTE — PLAN OF CARE
Problem: Patient Care Overview  Goal: Plan of Care Review  Plan of care reviewed on am rounds, venancio, vss, tele SR, good diuretic response from Lasix gtt, Mg and K replaced this am, repeat labs K 3.4 Mg 2 with additional Micro K ordered, Lasix gtt decreased to 10 mg/hr, O2 weaned from 6 Liters to 3 liters ( home rate) to maintain  sats 88% and above. Appetite good on low salt diet/ 1.5 L fluid restriction, needs reenforcement  To maintain dietary restrictions when dc'd. Patient independent with using bedside commode, reenforced need to call staff for sitting up in chair, walking to bathroom, non skid socks on, possible dc Sunday if continues to diurese well. Pleasant and cooperative with care, emotional support provided. Home regimen of anti anxiety/ anti depressives continued.

## 2017-11-09 NOTE — ASSESSMENT & PLAN NOTE
- Unspecified lung disease, with PH and is on home oxygen with 3 LPM   - Currently worsening hypoxia in the setting of acute on chronic right heart failure  - Increase supplemental oxygen to 6 LPM ,   - Observer for trend.  - Continue Nebilzation therapy as needed

## 2017-11-09 NOTE — H&P
[facility]  Cardiology  History and Physical     Patient Name: Sue Smith  MRN: [id number]  Admission Date: 11/9/2017  Code Status: Full Code   Attending Provider: Lola Paul MD   Primary Care Physician: Paddy Guerrier DO  Principal Problem:Acute on chronic diastolic heart failure    Patient information was obtained from patient, past medical records and ER records.     Subjective:     Chief Complaint:  SHortness of breath      HPI:  56 YOWM with PMH of Asthma, Rt sided heart failure and pulmonary HTN recently seen by Dr. Cullen, with complex history of PAH as records from OSH are discordant. Hospitalist / cardiology have her labeled as COPD and Obesity hypoventilation however consult by pulmonologist (Dr. Hardik Magallanes) states explicitly that PFTs done in 2015 showed only mild restriction and were not consistent with COPD. In addition he states that though she is mildly obese her pCO2 is normal at baseline ruling out obesity hypoventilation. What makes this more difficult is the fact that the patient is unsure if she has COPD as she has been told different things by different doctors over the years.     - Severe Pulmonary HTN - last ECHO in Oct 2017 with EF 55% and PA Systolic pressure of 124.5 mmHg with globally impaired RV systolic function and abnormal septal movements, meaning high intracavitary pressure in the RV.   - Chronic hypoxic respiratory failure for which she is on 3L all the time and BiPAP QHS  - History of tracheostomy (4 years ago) and short term vent dependence. Tracheostomy was eventually de cannulated.   - Previous tobacco abuse (stopped 15 yrs ago)   - SHERIE (unclear severity) has been on trilogy vent for BiPAP at night.     Who was discharged approx three weeks ago from [facility]. Since discharge the patient has been using Lasix 80 mg BID, Metolazone 2.5 mg BID and instructions about the weight management and the need for increasing if her weight gain.     Over the past one  week, patient has gained 8-10 pounds of weight. She reported that despite her care managed with best dosing of her Lasix she continue to gain weight. Her BNP at OSH was 945 which is the high number for her.  She does report that she may not be observing dietary recommendations.     She was started on intermittent LASIX at [facility] and after initial stabilization, pt was transferred to our service for further management.     Past Medical History:   Diagnosis Date    Bipolar disorder     CHF (congestive heart failure)     Dyslipidemia     GERD (gastroesophageal reflux disease)     Hx of tracheostomy     Decanulated / surgically removed in 2013? Does not currently have tracheostomy    Hypertension     Hypothyroidism     Oxygen dependent     3L around the clock     Pulmonary HTN     Pulmonary hypertension, group 1 10/4/2017    Pulmonary nodules 10/10/2017    Respiratory failure, chronic        Past Surgical History:   Procedure Laterality Date    BACK SURGERY      PERICARDIAL WINDOW  2013    [place] LEFT HEART CATH,PERCUTANEOUS      Cardiac Cath, Left Heart    TUBAL LIGATION         Review of patient's allergies indicates:   Allergen Reactions    Sulfa (sulfonamide antibiotics) Rash       No current facility-administered medications on file prior to encounter.      Current Outpatient Prescriptions on File Prior to Encounter   Medication Sig    albuterol (PROVENTIL) 2.5 mg /3 mL (0.083 %) nebulizer solution Take 2.5 mg by nebulization every 6 (six) hours as needed for Wheezing. Rescue    amlodipine (NORVASC) 5 MG tablet Take 5 mg by mouth once daily.    budesonide-formoterol 80-4.5 mcg (SYMBICORT) 80-4.5 mcg/actuation HFAA Inhale 2 puffs into the lungs 2 (two) times daily. Controller    busPIRone (BUSPAR) 15 MG tablet Take 15 mg by mouth 3 (three) times daily.    desvenlafaxine succinate (PRISTIQ) 100 MG Tb24 Take 100 mg by mouth once daily.    furosemide (LASIX) 80 MG tablet Take 1 tablet (80 mg total)  by mouth 2 (two) times daily.    hydrocodone-acetaminophen 10-325mg (NORCO)  mg Tab Take 1 tablet by mouth every 8 (eight) hours as needed for Pain.    lamotrigine (LAMICTAL) 100 MG tablet Take 100 mg by mouth 2 (two) times daily.    levothyroxine (SYNTHROID) 75 MCG tablet Take 75 mcg by mouth once daily.    lurasidone (LATUDA) 60 mg Tab tablet Take 60 mg by mouth once daily.    metOLazone (ZAROXOLYN) 2.5 MG tablet Take 1 tablet (2.5 mg total) by mouth daily as needed. Take for wt gain 3# overnight or 5# in 1 wk, with extra potassium    pantoprazole (PROTONIX) 40 MG tablet Take 40 mg by mouth once daily.    potassium chloride SA (K-DUR,KLOR-CON) 10 MEQ tablet Take 2 tablets (20 mEq total) by mouth once daily.    pravastatin (PRAVACHOL) 40 MG tablet Take 40 mg by mouth once daily.    selexipag 200 mcg (140)- 800 mcg (60) DsPk Take 200 mcg BID by mouth for 1 week, then increase by 200 mcg BID, at weekly intervals, to the highest tolerated dose up to 1600 mcg BID.     Family History     Problem Relation (Age of Onset)    Cancer Mother, Sister    Hypertension Mother, Father        Social History Main Topics    Smoking status: Former Smoker     Types: Cigarettes     Start date: 1/1/1979     Quit date: 1/5/1997    Smokeless tobacco: Never Used    Alcohol use No    Drug use: No    Sexual activity: No     Review of Systems   Constitution: Positive for decreased appetite, weakness, malaise/fatigue and weight gain.   HENT: Negative.    Eyes: Negative.    Cardiovascular: Positive for dyspnea on exertion and near-syncope.   Respiratory: Positive for shortness of breath and sleep disturbances due to breathing. Negative for cough, hemoptysis, snoring, sputum production and wheezing.    Endocrine: Negative.    Skin: Negative.    Musculoskeletal: Negative.    Gastrointestinal: Negative.    Genitourinary: Negative.    Neurological: Positive for dizziness.   Psychiatric/Behavioral: Negative.     Allergic/Immunologic: Negative.      Objective:     Vital Signs (Most Recent):    Vital Signs (24h Range):  BP: ()/()   Arterial Line BP: ()/()         There is no height or weight on file to calculate BMI.            No intake or output data in the 24 hours ending 11/09/17 0231    Lines/Drains/Airways          No matching active lines, drains, or airways          Physical Exam   Constitutional: She is oriented to person, place, and time. She appears well-developed and well-nourished.   HENT:   Head: Normocephalic and atraumatic.   Left Ear: External ear normal.   Nose: Nose normal.   Mouth/Throat: No oropharyngeal exudate.   Eyes: Conjunctivae and EOM are normal. Pupils are equal, round, and reactive to light. Right eye exhibits no discharge. Left eye exhibits no discharge.   Neck: Neck supple. JVD (upto the angle of the jaw on the neck aspect. ) present.   Pulmonary/Chest: She is in respiratory distress. She has no wheezes. She has rales. She exhibits no tenderness.   Abdominal: Soft. Bowel sounds are normal. She exhibits distension. There is no tenderness. There is no guarding.   Musculoskeletal: Normal range of motion. She exhibits no edema, tenderness or deformity.   Neurological: She is alert and oriented to person, place, and time. She has normal reflexes. She displays normal reflexes. No cranial nerve deficit. Coordination normal.   Skin: Skin is warm and dry. No erythema. No pallor.   Psychiatric: She has a normal mood and affect. Judgment and thought content normal.   Nursing note and vitals reviewed.      Significant Labs: CMP No results for input(s): NA, K, CL, CO2, GLU, BUN, CREATININE, CALCIUM, PROT, ALBUMIN, BILITOT, ALKPHOS, AST, ALT, ANIONGAP, ESTGFRAFRICA, EGFRNONAA in the last 48 hours., CBC No results for input(s): WBC, HGB, HCT, PLT in the last 48 hours. and INR No results for input(s): INR, PROTIME in the last 48 hours.    Significant Imaging: X-Ray: CXR: X-Ray Chest 1 View (CXR): No results  found for this visit on 11/09/17.    Assessment and Plan:     * Acute on chronic diastolic heart failure    - In the setting of RV failure, due to high pressure PAH  - Started on LASIX drip at 20 mg/hr.   - Strict intake output and daily standing weight.   - Fluid restriction to 1.5 Litres a day   - Probably will need inotropic support if not much UOp with above regime.         GERD (gastroesophageal reflux disease)    - No active issues.   - Continue protonics.         Bipolar disorder    - Currently non-suicidal and non-homicidal.   - Continue Buspiron for now        Dyslipidemia    - Contine statin , follow the lipid panel.         Hypertension    - Patient stable at the moment; continue Amlodipine  - Avoid negative inotropic agent in the face of suspected RV failure.         Hypothyroidism    - Continue Levothyroxine 75 mcg daily           Chronic respiratory failure with hypoxia    - Unspecified lung disease, with PH and is on home oxygen with 3 LPM   - Currently worsening hypoxia in the setting of acute on chronic right heart failure  - Increase supplemental oxygen to 6 LPM ,   - Observer for trend.  - Continue Nebilzation therapy as needed        Pulmonary hypertension, group 1    - Currently continue supplemental oxygen and diuretic therapy.   - Advanced therapy initiation per Pulmon HTN team.             VTE Risk Mitigation         Ordered     heparin (porcine) injection 5,000 Units  Every 8 hours     Route:  Subcutaneous        11/09/17 0208     Medium Risk of VTE  Once      11/09/17 0208          Joshua John MD  Cardiology   [facility]

## 2017-11-09 NOTE — NURSING
Pt arrived to the unit via ambulance (transfer from [place]) on 6L via N/C w/ Optimizer. VSS. Dr. Everett notified.     Unable to get patient in Epic due to technical difficulties; Charge nurse notified.    Small bruise noted on R leg; pt stated that the bruise is related to a non hospital incident unrelated to medical dx.    Pt currently has 2 PIVs(L&R FA, 22G)

## 2017-11-09 NOTE — SUBJECTIVE & OBJECTIVE
Past Medical History:   Diagnosis Date    Bipolar disorder     CHF (congestive heart failure)     Dyslipidemia     GERD (gastroesophageal reflux disease)     Hx of tracheostomy     Decanulated / surgically removed in 2013? Does not currently have tracheostomy    Hypertension     Hypothyroidism     Oxygen dependent     3L around the clock     Pulmonary HTN     Pulmonary hypertension, group 1 10/4/2017    Pulmonary nodules 10/10/2017    Respiratory failure, chronic        Past Surgical History:   Procedure Laterality Date    BACK SURGERY      PERICARDIAL WINDOW  2013    [place] LEFT HEART CATH,PERCUTANEOUS      Cardiac Cath, Left Heart    TUBAL LIGATION         Review of patient's allergies indicates:   Allergen Reactions    Sulfa (sulfonamide antibiotics) Rash       No current facility-administered medications on file prior to encounter.      Current Outpatient Prescriptions on File Prior to Encounter   Medication Sig    albuterol (PROVENTIL) 2.5 mg /3 mL (0.083 %) nebulizer solution Take 2.5 mg by nebulization every 6 (six) hours as needed for Wheezing. Rescue    amlodipine (NORVASC) 5 MG tablet Take 5 mg by mouth once daily.    budesonide-formoterol 80-4.5 mcg (SYMBICORT) 80-4.5 mcg/actuation HFAA Inhale 2 puffs into the lungs 2 (two) times daily. Controller    busPIRone (BUSPAR) 15 MG tablet Take 15 mg by mouth 3 (three) times daily.    desvenlafaxine succinate (PRISTIQ) 100 MG Tb24 Take 100 mg by mouth once daily.    furosemide (LASIX) 80 MG tablet Take 1 tablet (80 mg total) by mouth 2 (two) times daily.    hydrocodone-acetaminophen 10-325mg (NORCO)  mg Tab Take 1 tablet by mouth every 8 (eight) hours as needed for Pain.    lamotrigine (LAMICTAL) 100 MG tablet Take 100 mg by mouth 2 (two) times daily.    levothyroxine (SYNTHROID) 75 MCG tablet Take 75 mcg by mouth once daily.    lurasidone (LATUDA) 60 mg Tab tablet Take 60 mg by mouth once daily.    metOLazone (ZAROXOLYN) 2.5 MG  tablet Take 1 tablet (2.5 mg total) by mouth daily as needed. Take for wt gain 3# overnight or 5# in 1 wk, with extra potassium    pantoprazole (PROTONIX) 40 MG tablet Take 40 mg by mouth once daily.    potassium chloride SA (K-DUR,KLOR-CON) 10 MEQ tablet Take 2 tablets (20 mEq total) by mouth once daily.    pravastatin (PRAVACHOL) 40 MG tablet Take 40 mg by mouth once daily.    selexipag 200 mcg (140)- 800 mcg (60) DsPk Take 200 mcg BID by mouth for 1 week, then increase by 200 mcg BID, at weekly intervals, to the highest tolerated dose up to 1600 mcg BID.     Family History     Problem Relation (Age of Onset)    Cancer Mother, Sister    Hypertension Mother, Father        Social History Main Topics    Smoking status: Former Smoker     Types: Cigarettes     Start date: 1/1/1979     Quit date: 1/5/1997    Smokeless tobacco: Never Used    Alcohol use No    Drug use: No    Sexual activity: No     Review of Systems   Constitution: Positive for decreased appetite, weakness, malaise/fatigue and weight gain.   HENT: Negative.    Eyes: Negative.    Cardiovascular: Positive for dyspnea on exertion and near-syncope.   Respiratory: Positive for shortness of breath and sleep disturbances due to breathing. Negative for cough, hemoptysis, snoring, sputum production and wheezing.    Endocrine: Negative.    Skin: Negative.    Musculoskeletal: Negative.    Gastrointestinal: Negative.    Genitourinary: Negative.    Neurological: Positive for dizziness.   Psychiatric/Behavioral: Negative.    Allergic/Immunologic: Negative.      Objective:     Vital Signs (Most Recent):    Vital Signs (24h Range):  BP: ()/()   Arterial Line BP: ()/()         There is no height or weight on file to calculate BMI.            No intake or output data in the 24 hours ending 11/09/17 0231    Lines/Drains/Airways          No matching active lines, drains, or airways          Physical Exam   Constitutional: She is oriented to person, place, and time.  She appears well-developed and well-nourished.   HENT:   Head: Normocephalic and atraumatic.   Left Ear: External ear normal.   Nose: Nose normal.   Mouth/Throat: No oropharyngeal exudate.   Eyes: Conjunctivae and EOM are normal. Pupils are equal, round, and reactive to light. Right eye exhibits no discharge. Left eye exhibits no discharge.   Neck: Neck supple. JVD (upto the angle of the jaw on the neck aspect. ) present.   Pulmonary/Chest: She is in respiratory distress. She has no wheezes. She has rales. She exhibits no tenderness.   Abdominal: Soft. Bowel sounds are normal. She exhibits distension. There is no tenderness. There is no guarding.   Musculoskeletal: Normal range of motion. She exhibits no edema, tenderness or deformity.   Neurological: She is alert and oriented to person, place, and time. She has normal reflexes. She displays normal reflexes. No cranial nerve deficit. Coordination normal.   Skin: Skin is warm and dry. No erythema. No pallor.   Psychiatric: She has a normal mood and affect. Judgment and thought content normal.   Nursing note and vitals reviewed.      Significant Labs: CMP No results for input(s): NA, K, CL, CO2, GLU, BUN, CREATININE, CALCIUM, PROT, ALBUMIN, BILITOT, ALKPHOS, AST, ALT, ANIONGAP, ESTGFRAFRICA, EGFRNONAA in the last 48 hours., CBC No results for input(s): WBC, HGB, HCT, PLT in the last 48 hours. and INR No results for input(s): INR, PROTIME in the last 48 hours.    Significant Imaging: X-Ray: CXR: X-Ray Chest 1 View (CXR): No results found for this visit on 11/09/17.

## 2017-11-09 NOTE — PROGRESS NOTES
Admit Note     Met with patient to assess needs. Patient is a 56 y.o.  female, admitted  for pulmonary hypertension.  Pt has a PMH of hypothyroidism, GERD, HTN, SOB, asthma and bipolar disorder.     Patient admitted from home on 11/9/2017 .  At this time, patient presents as alert and oriented x 4 and good eye contact.  At this time, patients caregiver is not in attendance.     Household/Family Systems     Patient resides alone at     [address]  [place] [address].    Pt reports this is assisted living, however pt reports she does not receive any assistance.  Pt lives 2.5 hours from [facility]. [place] is close to [place]    Support system includes daughter, father and a few friends.    Patient does not have dependents that are need of being cared for.     Patients primary caregiver is self.  Pt's cell:  [phone]  Additional emergency contacts:  Prabha Smith (daughter, lives in [place]) [phone]  UERIN Clive (father, lives in [place]) [phone]    During admission, patient's caregiver plans to stay at home.  Confirmed patient and patients caregivers do not have access to reliable transportation.    Cognitive Status/Learning     Patient reports reading ability as 12th grade and states patient does not have difficulty with reading, writing, seeing, hearing and memory. Pt reports difficulty with comprehension and learning.  Pt wears glasses.   Patient reports patient learns best by one on one.   Needed: No.   Highest education level: High School (9-12) or GED    Vocation/Disability   .  Working for Income: No  If no, reason not working: Disability    Patient is disabled due to Biolar disorder since 25-30 years.  Prior to disability, patient  was employed as a .    Adherence     Patient reports a medium level of adherence to patients health care regimen. Pt reports she has not been following her diet.  Pt reports she does not want to spend more time in the hospital  so she plans to follow her diet.  Adherence counseling and education provided. Patient verbalizes understanding.    Substance Use    Patient reports the following substance usage.    Tobacco: none.  Pt smoked from 1100-5127. Pt used to smoke 1-2ppd.   Alcohol: none, patient denies any use.  Illicit Drugs/Non-prescribed Medications: none, patient denies any use.  Patient states clear understanding of the potential impact of substance use.  Substance abstinence/cessation counseling, education and resources provided and reviewed.     Services Utilizing/ADLS    Infusion Service: Prior to admission, patient utilizing? no  Home Health: Prior to admission, patient utilizing? yes [facility]  [phone], [phone] fax,  is with  [phone]. HH was seeing pt 2x a week.   DME: Prior to admission, home 02 with portables, 3LPM via [employer].  Pt also has a nebulizer and shower chair.   Pulmonary/Cardiac Rehab: Prior to admission, no  Dialysis:  Prior to admission, no  Transplant Specialty Pharmacy:  Prior to admission, no.    Prior to admission, patient reports patient was mostly  independent with ADLS.  Pt reports issues with cooking for self and cleaning own home.  The pt has applied for Medicaid in order to apply for the PCA program, however pt reports she did not qualify due to the amount of disability she receives each month.  Pt reports she can't afford to pay someone to clean or cook and family is not able to assist with same.  The pt does not drive. The pt's daughter and father can help with transportation around [facility], however not to [place].  The pt reports either her friend Rivka or Lexi should be able to assist with transportation home.   Patient reports patient is not able to care for self at this time due to compromised medical condition (as documented in medical record) and physical weakness..  Patient indicates a willingness to care for self once medically cleared to do  so.    Insurance/Medications    Insured by   Payor/Plan Subscr  Sex Relation Sub. Ins. ID Effective Group Num   1. MAXINE MCKEON* HERACLIO GARCIA 1960 Female  [id number] 17 [address]      Primary Insurance (for UNOS reporting): Public Insurance - Medicare FFS (Fee For Service)  Secondary Insurance (for UNOS reporting): None    Patient reports patient is able to obtain and afford medications at this time and at time of discharge.    Living Will/Healthcare Power of     Patient states patient does not have a LW and/or HCPA.   provided education regarding LW and HCPA and the completion of forms.    Coping/Mental Health    Patient is coping adequately with the aid of  family members. .  Patient indicates mental health difficulties. The pt has a long history of Bipolar disorder with anxiety and depression.  Pt reports she is taking multiple medications to assist with same. Pt reports he medication works well.  Worker provided general support.      Discharge Planning    At time of discharge, patient plans to return to patient's home under the care of self.  Patients friend will transport patient.  Per rounds today, expected discharge date has not been medically determined at this time, however the pt reports she will be discharged on .  Patient and patients caregiver  verbalize understanding and are involved in treatment planning and discharge process.    Additional Concerns    Patient is being followed for needs, education, resources, information, emotional support, supportive counseling, and for supportive and skilled discharge plan of care.  providing ongoing psychosocial support, education, resources and d/c planning as needed.  SW remains available. Patient denies additional needs and/or concerns at this time. Patient verbalizes understanding and agreement with information reviewed, social work availability, and how to  access available resources as needed.

## 2017-11-09 NOTE — NURSING
Deanne Guerrero notified of repeat K and Mg levels, also notified of urine  output 3.5Lsince 0600, K to be replaced and Lasix gtt decreased to 10 mg /hr

## 2017-11-09 NOTE — ASSESSMENT & PLAN NOTE
- Currently continue supplemental oxygen and diuretic therapy.   - Advanced therapy initiation per Pulmon HTN team.

## 2017-11-09 NOTE — NURSING
Pt adx 11/8(transfer from [facility]) w/ increasing SOB and 8lb weight gain in 1-2 weeks period.    Pt up ad adrien, LANE noted. VSS. Pt on 6L via N/C with optimizer; O2 levels 93-95%. Pt used Bi-PAP machine overnight; tolerated well.    Lasix gtt infusing at 20ml/hr, additional IVP 80mg Lasix administered. UO 1800 overnight.    Fall precautions in place; pt remains free of injury at this time. Daily labs monitored. No acute events overnight.

## 2017-11-10 LAB
ALBUMIN SERPL BCP-MCNC: 4.1 G/DL
ALP SERPL-CCNC: 117 U/L
ALT SERPL W/O P-5'-P-CCNC: 21 U/L
ANION GAP SERPL CALC-SCNC: 13 MMOL/L
AST SERPL-CCNC: 25 U/L
BASOPHILS # BLD AUTO: 0.02 K/UL
BASOPHILS NFR BLD: 0.2 %
BILIRUB SERPL-MCNC: 0.7 MG/DL
BUN SERPL-MCNC: 49 MG/DL
CALCIUM SERPL-MCNC: 10.4 MG/DL
CHLORIDE SERPL-SCNC: 88 MMOL/L
CO2 SERPL-SCNC: 41 MMOL/L
CREAT SERPL-MCNC: 1.4 MG/DL
DIFFERENTIAL METHOD: ABNORMAL
EOSINOPHIL # BLD AUTO: 0 K/UL
EOSINOPHIL NFR BLD: 0.3 %
ERYTHROCYTE [DISTWIDTH] IN BLOOD BY AUTOMATED COUNT: 17.7 %
EST. GFR  (AFRICAN AMERICAN): 48.8 ML/MIN/1.73 M^2
EST. GFR  (NON AFRICAN AMERICAN): 42.3 ML/MIN/1.73 M^2
GLUCOSE SERPL-MCNC: 88 MG/DL
HCT VFR BLD AUTO: 42.9 %
HGB BLD-MCNC: 14.3 G/DL
IMM GRANULOCYTES # BLD AUTO: 0.05 K/UL
IMM GRANULOCYTES NFR BLD AUTO: 0.5 %
INR PPP: 1.1
LYMPHOCYTES # BLD AUTO: 1.4 K/UL
LYMPHOCYTES NFR BLD: 15 %
MAGNESIUM SERPL-MCNC: 2.2 MG/DL
MCH RBC QN AUTO: 31.7 PG
MCHC RBC AUTO-ENTMCNC: 33.3 G/DL
MCV RBC AUTO: 95 FL
MONOCYTES # BLD AUTO: 0.7 K/UL
MONOCYTES NFR BLD: 7.9 %
NEUTROPHILS # BLD AUTO: 7.1 K/UL
NEUTROPHILS NFR BLD: 76.1 %
NRBC BLD-RTO: 0 /100 WBC
PLATELET # BLD AUTO: 146 K/UL
PMV BLD AUTO: 12.1 FL
POTASSIUM SERPL-SCNC: 3.8 MMOL/L
PROT SERPL-MCNC: 6.9 G/DL
PROTHROMBIN TIME: 11.5 SEC
RBC # BLD AUTO: 4.51 M/UL
SODIUM SERPL-SCNC: 142 MMOL/L
WBC # BLD AUTO: 9.29 K/UL

## 2017-11-10 PROCEDURE — 25000003 PHARM REV CODE 250: Performed by: PHYSICIAN ASSISTANT

## 2017-11-10 PROCEDURE — 85025 COMPLETE CBC W/AUTO DIFF WBC: CPT

## 2017-11-10 PROCEDURE — 94660 CPAP INITIATION&MGMT: CPT

## 2017-11-10 PROCEDURE — 63600175 PHARM REV CODE 636 W HCPCS: Performed by: PHYSICIAN ASSISTANT

## 2017-11-10 PROCEDURE — 99232 SBSQ HOSP IP/OBS MODERATE 35: CPT | Mod: ,,, | Performed by: INTERNAL MEDICINE

## 2017-11-10 PROCEDURE — 63600175 PHARM REV CODE 636 W HCPCS: Performed by: INTERNAL MEDICINE

## 2017-11-10 PROCEDURE — 25000242 PHARM REV CODE 250 ALT 637 W/ HCPCS: Performed by: INTERNAL MEDICINE

## 2017-11-10 PROCEDURE — 27000221 HC OXYGEN, UP TO 24 HOURS

## 2017-11-10 PROCEDURE — 36415 COLL VENOUS BLD VENIPUNCTURE: CPT

## 2017-11-10 PROCEDURE — 83735 ASSAY OF MAGNESIUM: CPT

## 2017-11-10 PROCEDURE — A4216 STERILE WATER/SALINE, 10 ML: HCPCS | Performed by: INTERNAL MEDICINE

## 2017-11-10 PROCEDURE — 85610 PROTHROMBIN TIME: CPT

## 2017-11-10 PROCEDURE — 99900035 HC TECH TIME PER 15 MIN (STAT)

## 2017-11-10 PROCEDURE — 94640 AIRWAY INHALATION TREATMENT: CPT

## 2017-11-10 PROCEDURE — 20600001 HC STEP DOWN PRIVATE ROOM

## 2017-11-10 PROCEDURE — 25000003 PHARM REV CODE 250: Performed by: INTERNAL MEDICINE

## 2017-11-10 PROCEDURE — 80053 COMPREHEN METABOLIC PANEL: CPT

## 2017-11-10 RX ORDER — FUROSEMIDE 10 MG/ML
80 INJECTION INTRAMUSCULAR; INTRAVENOUS ONCE
Status: COMPLETED | OUTPATIENT
Start: 2017-11-10 | End: 2017-11-10

## 2017-11-10 RX ORDER — LURASIDONE HYDROCHLORIDE 20 MG/1
60 TABLET, FILM COATED ORAL DAILY
Status: DISCONTINUED | OUTPATIENT
Start: 2017-11-10 | End: 2017-11-13 | Stop reason: HOSPADM

## 2017-11-10 RX ADMIN — ALBUTEROL SULFATE 2.5 MG: 2.5 SOLUTION RESPIRATORY (INHALATION) at 11:11

## 2017-11-10 RX ADMIN — ALBUTEROL SULFATE 2.5 MG: 2.5 SOLUTION RESPIRATORY (INHALATION) at 04:11

## 2017-11-10 RX ADMIN — LEVOTHYROXINE SODIUM 75 MCG: 25 TABLET ORAL at 06:11

## 2017-11-10 RX ADMIN — ALBUTEROL SULFATE 2.5 MG: 2.5 SOLUTION RESPIRATORY (INHALATION) at 12:11

## 2017-11-10 RX ADMIN — AMLODIPINE BESYLATE 5 MG: 5 TABLET ORAL at 09:11

## 2017-11-10 RX ADMIN — ALBUTEROL SULFATE 2.5 MG: 2.5 SOLUTION RESPIRATORY (INHALATION) at 08:11

## 2017-11-10 RX ADMIN — BUSPIRONE HYDROCHLORIDE 15 MG: 5 TABLET ORAL at 06:11

## 2017-11-10 RX ADMIN — PRAVASTATIN SODIUM 40 MG: 40 TABLET ORAL at 09:11

## 2017-11-10 RX ADMIN — HEPARIN SODIUM 5000 UNITS: 5000 INJECTION, SOLUTION INTRAVENOUS; SUBCUTANEOUS at 02:11

## 2017-11-10 RX ADMIN — Medication 3 ML: at 10:11

## 2017-11-10 RX ADMIN — POTASSIUM CHLORIDE 40 MEQ: 1500 TABLET, EXTENDED RELEASE ORAL at 10:11

## 2017-11-10 RX ADMIN — LURASIDONE HYDROCHLORIDE 60 MG: 20 TABLET, FILM COATED ORAL at 09:11

## 2017-11-10 RX ADMIN — POTASSIUM CHLORIDE 40 MEQ: 1500 TABLET, EXTENDED RELEASE ORAL at 09:11

## 2017-11-10 RX ADMIN — FLUTICASONE FUROATE AND VILANTEROL TRIFENATATE 1 PUFF: 100; 25 POWDER RESPIRATORY (INHALATION) at 09:11

## 2017-11-10 RX ADMIN — LAMOTRIGINE 100 MG: 100 TABLET ORAL at 09:11

## 2017-11-10 RX ADMIN — DESVENLAFAXINE SUCCINATE 100 MG: 50 TABLET, FILM COATED, EXTENDED RELEASE ORAL at 09:11

## 2017-11-10 RX ADMIN — HEPARIN SODIUM 5000 UNITS: 5000 INJECTION, SOLUTION INTRAVENOUS; SUBCUTANEOUS at 06:11

## 2017-11-10 RX ADMIN — FUROSEMIDE 80 MG: 10 INJECTION, SOLUTION INTRAVENOUS at 02:11

## 2017-11-10 RX ADMIN — BUSPIRONE HYDROCHLORIDE 15 MG: 5 TABLET ORAL at 09:11

## 2017-11-10 RX ADMIN — BUSPIRONE HYDROCHLORIDE 15 MG: 5 TABLET ORAL at 02:11

## 2017-11-10 RX ADMIN — HYDROCODONE BITARTRATE AND ACETAMINOPHEN 1 TABLET: 10; 325 TABLET ORAL at 02:11

## 2017-11-10 RX ADMIN — PANTOPRAZOLE SODIUM 40 MG: 40 TABLET, DELAYED RELEASE ORAL at 09:11

## 2017-11-10 RX ADMIN — Medication 3 ML: at 06:11

## 2017-11-10 RX ADMIN — HEPARIN SODIUM 5000 UNITS: 5000 INJECTION, SOLUTION INTRAVENOUS; SUBCUTANEOUS at 09:11

## 2017-11-10 NOTE — SUBJECTIVE & OBJECTIVE
Interval History: Urinating a lot on Lasix drip. Feeling a lot better    Continuous Infusions:   furosemide (LASIX) 1 mg/mL infusion (non-titrating) 10 mg/hr (11/09/17 1700)     Scheduled Meds:   albuterol sulfate  2.5 mg Nebulization Q4H    amLODIPine  5 mg Oral Daily    busPIRone  15 mg Oral TID    desvenlafaxine succinate  100 mg Oral Daily    fluticasone-vilanterol  1 puff Inhalation Daily    heparin (porcine)  5,000 Units Subcutaneous Q8H    lamoTRIgine  100 mg Oral BID    levothyroxine  75 mcg Oral Before breakfast    pantoprazole  40 mg Oral Daily    potassium chloride SA  40 mEq Oral BID    pravastatin  40 mg Oral Daily    selexipag  600 mcg Oral BID    sodium chloride 0.9%  3 mL Intravenous Q8H     PRN Meds:hydrocodone-acetaminophen 10-325mg    Review of patient's allergies indicates:   Allergen Reactions    Sulfa (sulfonamide antibiotics) Rash     Objective:     Vital Signs (Most Recent):  Temp: 99.5 °F (37.5 °C) (11/10/17 1133)  Pulse: 81 (11/10/17 1229)  Resp: 17 (11/10/17 1229)  BP: (!) 115/56 (11/10/17 1133)  SpO2: (!) 90 % (11/10/17 1229) Vital Signs (24h Range):  Temp:  [97.5 °F (36.4 °C)-99.5 °F (37.5 °C)] 99.5 °F (37.5 °C)  Pulse:  [64-89] 81  Resp:  [13-20] 17  SpO2:  [89 %-98 %] 90 %  BP: (113-137)/(56-77) 115/56     Patient Vitals for the past 72 hrs (Last 3 readings):   Weight   11/10/17 0600 84.6 kg (186 lb 8.2 oz)   11/09/17 0400 86.6 kg (190 lb 14.7 oz)   11/08/17 2355 82.4 kg (181 lb 10.5 oz)     Body mass index is 34.11 kg/m².      Intake/Output Summary (Last 24 hours) at 11/10/17 1246  Last data filed at 11/10/17 0554   Gross per 24 hour   Intake             1379 ml   Output             2175 ml   Net             -796 ml       Hemodynamic Parameters:           Physical Exam   Constitutional: She is oriented to person, place, and time. She appears well-developed and well-nourished.   Neck: Normal range of motion. Neck supple. Hepatojugular reflux and JVD (to mid neck)  present.   Cardiovascular: Normal rate and regular rhythm.  Exam reveals no gallop and no friction rub.    No murmur heard.  Pulmonary/Chest: Effort normal and breath sounds normal. She has no wheezes. She has no rales.   On O2 via NC   Abdominal: Soft. Bowel sounds are normal. There is no tenderness.   Musculoskeletal: She exhibits no edema.   Neurological: She is alert and oriented to person, place, and time.   Skin: Skin is warm and dry.       Significant Labs:  CBC:    Recent Labs  Lab 11/09/17  0435 11/10/17  0415   WBC 11.66 9.29   RBC 4.27 4.51   HGB 13.2 14.3   HCT 41.0 42.9    146*   MCV 96 95   MCH 30.9 31.7*   MCHC 32.2 33.3     BNP:  No results for input(s): BNP in the last 168 hours.    Invalid input(s): BNPTRIAGELBLO  CMP:    Recent Labs  Lab 11/09/17 0435 11/09/17  1501 11/10/17  0415   *  114* 95 88   CALCIUM 10.3  10.2 10.8* 10.4   ALBUMIN 4.2  4.1  --  4.1   PROT 7.3  7.1  --  6.9     143 139 142   K 3.7  3.7 3.4* 3.8   CO2 37*  37* 36* 41*   CL 92*  92* 88* 88*   BUN 46*  45* 47* 49*   CREATININE 1.4  1.5* 1.6* 1.4   ALKPHOS 119  116  --  117   ALT 25  25  --  21   AST 34  27  --  25   BILITOT 0.6  0.6  --  0.7      Coagulation:     Recent Labs  Lab 11/09/17  0715 11/10/17  0415   INR 1.1 1.1     LDH:  No results for input(s): LDH in the last 72 hours.  Microbiology:  Microbiology Results (last 7 days)     ** No results found for the last 168 hours. **          I have reviewed all pertinent labs within the past 24 hours.    Estimated Creatinine Clearance: 45.8 mL/min (based on SCr of 1.4 mg/dL).    Diagnostic Results:  I have reviewed all pertinent imaging results/findings within the past 24 hours.

## 2017-11-10 NOTE — PROGRESS NOTES
Critical CO2 level of 41  ( up from 36 yesterday) reported from the lab.    MD on call for HTS, Dr. Everett, notified.     No new orders at this time.

## 2017-11-10 NOTE — PROGRESS NOTES
[facility]  Heart Transplant  Progress Note    Patient Name: Sue Smith  MRN: [id number]  Admission Date: 11/9/2017  Hospital Length of Stay: 1 days  Attending Physician: Lola Paul MD  Primary Care Provider: Paddy Guerrier DO  Principal Problem:Acute on chronic diastolic heart failure    Subjective:     Interval History: Urinating a lot on Lasix drip. Feeling a lot better    Continuous Infusions:   furosemide (LASIX) 1 mg/mL infusion (non-titrating) 10 mg/hr (11/09/17 1700)     Scheduled Meds:   albuterol sulfate  2.5 mg Nebulization Q4H    amLODIPine  5 mg Oral Daily    busPIRone  15 mg Oral TID    desvenlafaxine succinate  100 mg Oral Daily    fluticasone-vilanterol  1 puff Inhalation Daily    heparin (porcine)  5,000 Units Subcutaneous Q8H    lamoTRIgine  100 mg Oral BID    levothyroxine  75 mcg Oral Before breakfast    pantoprazole  40 mg Oral Daily    potassium chloride SA  40 mEq Oral BID    pravastatin  40 mg Oral Daily    selexipag  600 mcg Oral BID    sodium chloride 0.9%  3 mL Intravenous Q8H     PRN Meds:hydrocodone-acetaminophen 10-325mg    Review of patient's allergies indicates:   Allergen Reactions    Sulfa (sulfonamide antibiotics) Rash     Objective:     Vital Signs (Most Recent):  Temp: 99.5 °F (37.5 °C) (11/10/17 1133)  Pulse: 81 (11/10/17 1229)  Resp: 17 (11/10/17 1229)  BP: (!) 115/56 (11/10/17 1133)  SpO2: (!) 90 % (11/10/17 1229) Vital Signs (24h Range):  Temp:  [97.5 °F (36.4 °C)-99.5 °F (37.5 °C)] 99.5 °F (37.5 °C)  Pulse:  [64-89] 81  Resp:  [13-20] 17  SpO2:  [89 %-98 %] 90 %  BP: (113-137)/(56-77) 115/56     Patient Vitals for the past 72 hrs (Last 3 readings):   Weight   11/10/17 0600 84.6 kg (186 lb 8.2 oz)   11/09/17 0400 86.6 kg (190 lb 14.7 oz)   11/08/17 0356 82.4 kg (181 lb 10.5 oz)     Body mass index is 34.11 kg/m².      Intake/Output Summary (Last 24 hours) at 11/10/17 1246  Last data filed at 11/10/17 0554   Gross per 24 hour   Intake              1379 ml   Output             2175 ml   Net             -796 ml       Hemodynamic Parameters:           Physical Exam   Constitutional: She is oriented to person, place, and time. She appears well-developed and well-nourished.   Neck: Normal range of motion. Neck supple. Hepatojugular reflux and JVD (to mid neck) present.   Cardiovascular: Normal rate and regular rhythm.  Exam reveals no gallop and no friction rub.    No murmur heard.  Pulmonary/Chest: Effort normal and breath sounds normal. She has no wheezes. She has no rales.   On O2 via NC   Abdominal: Soft. Bowel sounds are normal. There is no tenderness.   Musculoskeletal: She exhibits no edema.   Neurological: She is alert and oriented to person, place, and time.   Skin: Skin is warm and dry.       Significant Labs:  CBC:    Recent Labs  Lab 11/09/17  0435 11/10/17  0415   WBC 11.66 9.29   RBC 4.27 4.51   HGB 13.2 14.3   HCT 41.0 42.9    146*   MCV 96 95   MCH 30.9 31.7*   MCHC 32.2 33.3     BNP:  No results for input(s): BNP in the last 168 hours.    Invalid input(s): BNPTRIAGELBLO  CMP:    Recent Labs  Lab 11/09/17  0435 11/09/17  1501 11/10/17  0415   *  114* 95 88   CALCIUM 10.3  10.2 10.8* 10.4   ALBUMIN 4.2  4.1  --  4.1   PROT 7.3  7.1  --  6.9     143 139 142   K 3.7  3.7 3.4* 3.8   CO2 37*  37* 36* 41*   CL 92*  92* 88* 88*   BUN 46*  45* 47* 49*   CREATININE 1.4  1.5* 1.6* 1.4   ALKPHOS 119  116  --  117   ALT 25  25  --  21   AST 34  27  --  25   BILITOT 0.6  0.6  --  0.7      Coagulation:     Recent Labs  Lab 11/09/17  0715 11/10/17  0415   INR 1.1 1.1     LDH:  No results for input(s): LDH in the last 72 hours.  Microbiology:  Microbiology Results (last 7 days)     ** No results found for the last 168 hours. **          I have reviewed all pertinent labs within the past 24 hours.    Estimated Creatinine Clearance: 45.8 mL/min (based on SCr of 1.4 mg/dL).    Diagnostic Results:  I have reviewed all  pertinent imaging results/findings within the past 24 hours.    Assessment and Plan:     No notes on file    Acute on chronic right heart failure    -Now on Lasix drip 10 mg/hr (was on Lasix 20/hr and on metolazone yesterday). Net neg 3.2 L in 24 hours but UOP has dropped off on 10/hr. Lasix 80 mg IV x 1 now and increase back to 20 mg/hr. Will monitor response and add metolazone PRN  -Decompensated on Lasix 80 BID at home so will change to Bumex or torsemide once ready to transition to PO        Pulmonary hypertension, group 1    -Cont selexipag 600 mcg BID (not on formulary here so pt taking her own pills)        Chronic respiratory failure with hypoxia    -Back on home O2 at 3 L  -CPAP QHS        Hypothyroidism    -Cont levothyroxine        Bipolar disorder    -Cont buspirone, desvenlafaxine, lamotrigine. Was not restarted on lurasidone on admit- will verify with pt that she was taking and resume if so            Deanne Tello PA-C  Heart Transplant  [facility]

## 2017-11-10 NOTE — ASSESSMENT & PLAN NOTE
-Now on Lasix drip 10 mg/hr (was on Lasix 20/hr and on metolazone yesterday). Net neg 3.2 L in 24 hours but UOP has dropped off on 10/hr. Lasix 80 mg IV x 1 now and increase back to 20 mg/hr. Will monitor response and add metolazone PRN  -Decompensated on Lasix 80 BID at home so will change to Bumex or torsemide once ready to transition to PO

## 2017-11-10 NOTE — ASSESSMENT & PLAN NOTE
-Cont buspirone, desvenlafaxine, lamotrigine. Was not restarted on lurasidone on admit- will verify with pt that she was taking and resume if so

## 2017-11-11 LAB
ALBUMIN SERPL BCP-MCNC: 4.8 G/DL
ALP SERPL-CCNC: 144 U/L
ALT SERPL W/O P-5'-P-CCNC: 26 U/L
ANION GAP SERPL CALC-SCNC: 14 MMOL/L
AST SERPL-CCNC: 31 U/L
BASOPHILS # BLD AUTO: 0.04 K/UL
BASOPHILS NFR BLD: 0.4 %
BILIRUB SERPL-MCNC: 1.4 MG/DL
BUN SERPL-MCNC: 44 MG/DL
CALCIUM SERPL-MCNC: 11.1 MG/DL
CHLORIDE SERPL-SCNC: 83 MMOL/L
CO2 SERPL-SCNC: 45 MMOL/L
CREAT SERPL-MCNC: 1.5 MG/DL
DIFFERENTIAL METHOD: ABNORMAL
EOSINOPHIL # BLD AUTO: 0.1 K/UL
EOSINOPHIL NFR BLD: 1.3 %
ERYTHROCYTE [DISTWIDTH] IN BLOOD BY AUTOMATED COUNT: 17.4 %
EST. GFR  (AFRICAN AMERICAN): 44.9 ML/MIN/1.73 M^2
EST. GFR  (NON AFRICAN AMERICAN): 38.9 ML/MIN/1.73 M^2
GLUCOSE SERPL-MCNC: 97 MG/DL
HCT VFR BLD AUTO: 49.3 %
HGB BLD-MCNC: 16.7 G/DL
IMM GRANULOCYTES # BLD AUTO: 0.11 K/UL
IMM GRANULOCYTES NFR BLD AUTO: 1.2 %
INR PPP: 1.1
LYMPHOCYTES # BLD AUTO: 1.3 K/UL
LYMPHOCYTES NFR BLD: 13.5 %
MAGNESIUM SERPL-MCNC: 2.2 MG/DL
MCH RBC QN AUTO: 31.2 PG
MCHC RBC AUTO-ENTMCNC: 33.9 G/DL
MCV RBC AUTO: 92 FL
MONOCYTES # BLD AUTO: 0.7 K/UL
MONOCYTES NFR BLD: 6.8 %
NEUTROPHILS # BLD AUTO: 7.3 K/UL
NEUTROPHILS NFR BLD: 76.8 %
NRBC BLD-RTO: 0 /100 WBC
PLATELET # BLD AUTO: 192 K/UL
PMV BLD AUTO: 12.4 FL
POTASSIUM SERPL-SCNC: 3.4 MMOL/L
PROT SERPL-MCNC: 8.4 G/DL
PROTHROMBIN TIME: 11.4 SEC
RBC # BLD AUTO: 5.35 M/UL
SODIUM SERPL-SCNC: 142 MMOL/L
WBC # BLD AUTO: 9.49 K/UL

## 2017-11-11 PROCEDURE — 99900035 HC TECH TIME PER 15 MIN (STAT)

## 2017-11-11 PROCEDURE — 25000003 PHARM REV CODE 250: Performed by: PHYSICIAN ASSISTANT

## 2017-11-11 PROCEDURE — 99232 SBSQ HOSP IP/OBS MODERATE 35: CPT | Mod: ,,, | Performed by: INTERNAL MEDICINE

## 2017-11-11 PROCEDURE — 94660 CPAP INITIATION&MGMT: CPT

## 2017-11-11 PROCEDURE — 63600175 PHARM REV CODE 636 W HCPCS: Performed by: INTERNAL MEDICINE

## 2017-11-11 PROCEDURE — 36415 COLL VENOUS BLD VENIPUNCTURE: CPT

## 2017-11-11 PROCEDURE — 20600001 HC STEP DOWN PRIVATE ROOM

## 2017-11-11 PROCEDURE — 83735 ASSAY OF MAGNESIUM: CPT

## 2017-11-11 PROCEDURE — 85025 COMPLETE CBC W/AUTO DIFF WBC: CPT

## 2017-11-11 PROCEDURE — 85610 PROTHROMBIN TIME: CPT

## 2017-11-11 PROCEDURE — 27000221 HC OXYGEN, UP TO 24 HOURS

## 2017-11-11 PROCEDURE — 25000003 PHARM REV CODE 250: Performed by: INTERNAL MEDICINE

## 2017-11-11 PROCEDURE — 94761 N-INVAS EAR/PLS OXIMETRY MLT: CPT

## 2017-11-11 PROCEDURE — 80053 COMPREHEN METABOLIC PANEL: CPT

## 2017-11-11 PROCEDURE — 94640 AIRWAY INHALATION TREATMENT: CPT

## 2017-11-11 PROCEDURE — A4216 STERILE WATER/SALINE, 10 ML: HCPCS | Performed by: INTERNAL MEDICINE

## 2017-11-11 PROCEDURE — 25000242 PHARM REV CODE 250 ALT 637 W/ HCPCS: Performed by: INTERNAL MEDICINE

## 2017-11-11 RX ORDER — FUROSEMIDE 10 MG/ML
80 INJECTION INTRAMUSCULAR; INTRAVENOUS 2 TIMES DAILY
Status: DISCONTINUED | OUTPATIENT
Start: 2017-11-11 | End: 2017-11-11

## 2017-11-11 RX ORDER — BUMETANIDE 1 MG/1
4 TABLET ORAL DAILY
Status: DISCONTINUED | OUTPATIENT
Start: 2017-11-12 | End: 2017-11-12

## 2017-11-11 RX ADMIN — FLUTICASONE FUROATE AND VILANTEROL TRIFENATATE 1 PUFF: 100; 25 POWDER RESPIRATORY (INHALATION) at 08:11

## 2017-11-11 RX ADMIN — ALBUTEROL SULFATE 2.5 MG: 2.5 SOLUTION RESPIRATORY (INHALATION) at 11:11

## 2017-11-11 RX ADMIN — HYDROCODONE BITARTRATE AND ACETAMINOPHEN 1 TABLET: 10; 325 TABLET ORAL at 10:11

## 2017-11-11 RX ADMIN — FUROSEMIDE 80 MG: 10 INJECTION, SOLUTION INTRAVENOUS at 11:11

## 2017-11-11 RX ADMIN — ALBUTEROL SULFATE 2.5 MG: 2.5 SOLUTION RESPIRATORY (INHALATION) at 04:11

## 2017-11-11 RX ADMIN — Medication 3 ML: at 10:11

## 2017-11-11 RX ADMIN — LEVOTHYROXINE SODIUM 75 MCG: 25 TABLET ORAL at 06:11

## 2017-11-11 RX ADMIN — BUSPIRONE HYDROCHLORIDE 15 MG: 5 TABLET ORAL at 10:11

## 2017-11-11 RX ADMIN — HEPARIN SODIUM 5000 UNITS: 5000 INJECTION, SOLUTION INTRAVENOUS; SUBCUTANEOUS at 10:11

## 2017-11-11 RX ADMIN — HEPARIN SODIUM 5000 UNITS: 5000 INJECTION, SOLUTION INTRAVENOUS; SUBCUTANEOUS at 01:11

## 2017-11-11 RX ADMIN — LAMOTRIGINE 100 MG: 100 TABLET ORAL at 08:11

## 2017-11-11 RX ADMIN — ALBUTEROL SULFATE 2.5 MG: 2.5 SOLUTION RESPIRATORY (INHALATION) at 08:11

## 2017-11-11 RX ADMIN — HEPARIN SODIUM 5000 UNITS: 5000 INJECTION, SOLUTION INTRAVENOUS; SUBCUTANEOUS at 06:11

## 2017-11-11 RX ADMIN — BUSPIRONE HYDROCHLORIDE 15 MG: 5 TABLET ORAL at 01:11

## 2017-11-11 RX ADMIN — AMLODIPINE BESYLATE 5 MG: 5 TABLET ORAL at 08:11

## 2017-11-11 RX ADMIN — LAMOTRIGINE 100 MG: 100 TABLET ORAL at 10:11

## 2017-11-11 RX ADMIN — POTASSIUM CHLORIDE 40 MEQ: 1500 TABLET, EXTENDED RELEASE ORAL at 10:11

## 2017-11-11 RX ADMIN — ALBUTEROL SULFATE 2.5 MG: 2.5 SOLUTION RESPIRATORY (INHALATION) at 03:11

## 2017-11-11 RX ADMIN — BUSPIRONE HYDROCHLORIDE 15 MG: 5 TABLET ORAL at 06:11

## 2017-11-11 RX ADMIN — LURASIDONE HYDROCHLORIDE 60 MG: 20 TABLET, FILM COATED ORAL at 08:11

## 2017-11-11 RX ADMIN — Medication 3 ML: at 06:11

## 2017-11-11 RX ADMIN — PANTOPRAZOLE SODIUM 40 MG: 40 TABLET, DELAYED RELEASE ORAL at 08:11

## 2017-11-11 RX ADMIN — PRAVASTATIN SODIUM 40 MG: 40 TABLET ORAL at 08:11

## 2017-11-11 RX ADMIN — DESVENLAFAXINE SUCCINATE 100 MG: 50 TABLET, FILM COATED, EXTENDED RELEASE ORAL at 08:11

## 2017-11-11 RX ADMIN — POTASSIUM CHLORIDE 40 MEQ: 1500 TABLET, EXTENDED RELEASE ORAL at 08:11

## 2017-11-11 RX ADMIN — HYDROCODONE BITARTRATE AND ACETAMINOPHEN 1 TABLET: 10; 325 TABLET ORAL at 07:11

## 2017-11-11 RX ADMIN — ALBUTEROL SULFATE 2.5 MG: 2.5 SOLUTION RESPIRATORY (INHALATION) at 12:11

## 2017-11-11 NOTE — PLAN OF CARE
Problem: Patient Care Overview  Goal: Plan of Care Review  Outcome: Ongoing (interventions implemented as appropriate)  Patient's VSS for shift. Patient remains AAOx4, calm, cooperative, and independent. Patient continues to diurese. Lasix drip discontinued today. Patient received one dose of 80 mg of Lasix IV push. Patient still reports dyspnea with exertion. Did walk into bathroom and have shower today. Change to PO diuretics planned. Patient free from falls, injury, and skin breakdown.

## 2017-11-11 NOTE — PROGRESS NOTES
Heart Failure Progress Note  Attending Physician: Lola Paul MD  Hospital Day: 3    Subjective:   Interval History: no overnight events    Plan: have nurse weigh patient.  Reduce diuretic to 10 mg/hr in the meantime.  Will weigh patient and if close to dry weight will transition to Bumex BID.  Dose to be determined.      Medications:   Continuous Infusions:   furosemide (LASIX) 1 mg/mL infusion (non-titrating) 20 mg/hr (11/10/17 1421)       Scheduled Meds:   albuterol sulfate  2.5 mg Nebulization Q4H    amLODIPine  5 mg Oral Daily    busPIRone  15 mg Oral TID    desvenlafaxine succinate  100 mg Oral Daily    fluticasone-vilanterol  1 puff Inhalation Daily    heparin (porcine)  5,000 Units Subcutaneous Q8H    lamoTRIgine  100 mg Oral BID    levothyroxine  75 mcg Oral Before breakfast    lurasidone  60 mg Oral Daily    pantoprazole  40 mg Oral Daily    potassium chloride SA  40 mEq Oral BID    pravastatin  40 mg Oral Daily    selexipag  600 mcg Oral BID    sodium chloride 0.9%  3 mL Intravenous Q8H     PRN Meds:hydrocodone-acetaminophen 10-325mg    ----------------------------------------------------------------------------------------------------------------------  Home medications:   No current facility-administered medications on file prior to encounter.      Current Outpatient Prescriptions on File Prior to Encounter   Medication Sig Dispense Refill    albuterol (PROVENTIL) 2.5 mg /3 mL (0.083 %) nebulizer solution Take 2.5 mg by nebulization every 6 (six) hours as needed for Wheezing. Rescue      amlodipine (NORVASC) 5 MG tablet Take 5 mg by mouth once daily.      budesonide-formoterol 80-4.5 mcg (SYMBICORT) 80-4.5 mcg/actuation HFAA Inhale 2 puffs into the lungs 2 (two) times daily. Controller      busPIRone (BUSPAR) 15 MG tablet Take 15 mg by mouth 3 (three) times daily.      desvenlafaxine succinate (PRISTIQ) 100 MG Tb24 Take 100 mg by mouth once daily.      furosemide (LASIX)  "80 MG tablet Take 1 tablet (80 mg total) by mouth 2 (two) times daily. 60 tablet 3    hydrocodone-acetaminophen 10-325mg (NORCO)  mg Tab Take 1 tablet by mouth every 8 (eight) hours as needed for Pain.      lamotrigine (LAMICTAL) 100 MG tablet Take 100 mg by mouth 2 (two) times daily.      levothyroxine (SYNTHROID) 75 MCG tablet Take 75 mcg by mouth once daily.      lurasidone (LATUDA) 60 mg Tab tablet Take 60 mg by mouth once daily.      metOLazone (ZAROXOLYN) 2.5 MG tablet Take 1 tablet (2.5 mg total) by mouth daily as needed. Take for wt gain 3# overnight or 5# in 1 wk, with extra potassium 30 tablet 11    pantoprazole (PROTONIX) 40 MG tablet Take 40 mg by mouth once daily.      potassium chloride SA (K-DUR,KLOR-CON) 10 MEQ tablet Take 2 tablets (20 mEq total) by mouth once daily. 60 tablet 11    pravastatin (PRAVACHOL) 40 MG tablet Take 40 mg by mouth once daily.      selexipag 200 mcg (140)- 800 mcg (60) DsPk Take 200 mcg BID by mouth for 1 week, then increase by 200 mcg BID, at weekly intervals, to the highest tolerated dose up to 1600 mcg BID. 200 tablet 11         Objective:     Vitals:  Temp:  [97.2 °F (36.2 °C)-99.5 °F (37.5 °C)]   Pulse:  [69-99]   Resp:  [12-20]   BP: (100-130)/(56-77)   SpO2:  [90 %-100 %]     /76 (BP Location: Right arm, Patient Position: Lying)   Pulse 72   Temp 97.2 °F (36.2 °C) (Axillary)   Resp 16   Ht 5' 2" (1.575 m)   Wt 84.6 kg (186 lb 8.2 oz) Comment: standing scale  SpO2 95%   Breastfeeding? No   BMI 34.11 kg/m²  I/O's:    Intake/Output Summary (Last 24 hours) at 11/11/17 0944  Last data filed at 11/11/17 0600   Gross per 24 hour   Intake              720 ml   Output             4650 ml   Net            -3930 ml       Wt Readings from Last 10 Encounters:   11/10/17 84.6 kg (186 lb 8.2 oz)   10/24/17 88.3 kg (194 lb 10.7 oz)   10/24/17 89 kg (196 lb 3.4 oz)   10/11/17 86.4 kg (190 lb 7.6 oz)        General Appearance:    Alert, cooperative, no " distress   Lungs:     Clear to auscultation bilaterally, respirations unlabored    Heart:    JVP unable to assess, Regular rate and rhythm, S1+S2+no added sounds   Abdomen:     Soft, non-tender, bowel sounds active, no masses, no organomegaly   Extremities:   trace edema b/l, pulses +2 b/l     Labs:       Recent Labs  Lab 11/09/17  1501 11/10/17  0415 11/11/17  0639    142 142   K 3.4* 3.8 3.4*   CL 88* 88* 83*   CO2 36* 41* 45*   BUN 47* 49* 44*   CREATININE 1.6* 1.4 1.5*   GLU 95 88 97   ANIONGAP 15 13 14       Recent Labs  Lab 11/09/17  0435 11/10/17  0415 11/11/17  0639   AST 34  27 25 31   ALT 25  25 21 26   ALKPHOS 119  116 117 144*   BILITOT 0.6  0.6 0.7 1.4*   BILIDIR 0.3  --   --    ALBUMIN 4.2  4.1 4.1 4.8     No results for input(s): PH, PCO2, PO2, HCO3, POCSATURATED in the last 168 hours.     Recent Labs  Lab 11/09/17  0435 11/10/17  0415 11/11/17  0639   WBC 11.66 9.29 9.49   HGB 13.2 14.3 16.7*   HCT 41.0 42.9 49.3*    146* 192   GRAN 92.0*  10.7* 76.1*  7.1 76.8*  7.3       Recent Labs  Lab 11/09/17  0715 11/10/17  0415 11/11/17  0639   INR 1.1 1.1 1.1     No results for input(s): TROPONINI, BNP in the last 168 hours.   Micro:   Blood Cultures  No results found for: LABBLOO  Urine Cultures  No results found for: LABURIN    EF   Date Value Ref Range Status   10/05/2017 55 55 - 65        Assessment and Plan:      56 YOWM with PMH of Asthma, Rt sided heart failure and pulmonary HTN recently seen by Dr. Cullen, with complex history of PAH         Acute on chronic right heart failure     -Now on Lasix drip 10 mg/hr (was on Lasix 20/hr and on metolazone yesterday).   -Decompensated on Lasix 80 BID at home so will change to Bumex or torsemide once ready to transition to PO, will get daily weight for today and make transition if close to dry weight       Pulmonary hypertension, group 1     -Cont selexipag 600 mcg BID (not on formulary here so pt taking her own pills)       Chronic  respiratory failure with hypoxia     -Back on home O2 at 3 L  -CPAP QHS       Hypothyroidism     -Cont levothyroxine       Bipolar disorder     -Cont buspirone, desvenlafaxine, lamotrigine. Was not restarted on lurasidone on admit- will verify with pt that she was taking and resume if so             Signed:  La Lundberg MD  Cardiology Hospitalist  Pager: [phone]  11/11/2017 9:44 AM

## 2017-11-12 LAB
ALBUMIN SERPL BCP-MCNC: 4 G/DL
ALP SERPL-CCNC: 136 U/L
ALT SERPL W/O P-5'-P-CCNC: 20 U/L
ANION GAP SERPL CALC-SCNC: 14 MMOL/L
AST SERPL-CCNC: 24 U/L
BASOPHILS # BLD AUTO: 0.06 K/UL
BASOPHILS NFR BLD: 0.6 %
BILIRUB SERPL-MCNC: 1.5 MG/DL
BUN SERPL-MCNC: 48 MG/DL
CALCIUM SERPL-MCNC: 10.1 MG/DL
CHLORIDE SERPL-SCNC: 86 MMOL/L
CO2 SERPL-SCNC: 39 MMOL/L
CREAT SERPL-MCNC: 1.6 MG/DL
DIFFERENTIAL METHOD: ABNORMAL
EOSINOPHIL # BLD AUTO: 0.2 K/UL
EOSINOPHIL NFR BLD: 2 %
ERYTHROCYTE [DISTWIDTH] IN BLOOD BY AUTOMATED COUNT: 16.9 %
EST. GFR  (AFRICAN AMERICAN): 41.5 ML/MIN/1.73 M^2
EST. GFR  (NON AFRICAN AMERICAN): 36 ML/MIN/1.73 M^2
GLUCOSE SERPL-MCNC: 105 MG/DL
HCT VFR BLD AUTO: 47.9 %
HGB BLD-MCNC: 16.3 G/DL
IMM GRANULOCYTES # BLD AUTO: 0.11 K/UL
IMM GRANULOCYTES NFR BLD AUTO: 1.1 %
INR PPP: 1.1
LYMPHOCYTES # BLD AUTO: 1.9 K/UL
LYMPHOCYTES NFR BLD: 19.3 %
MAGNESIUM SERPL-MCNC: 2.5 MG/DL
MCH RBC QN AUTO: 30.9 PG
MCHC RBC AUTO-ENTMCNC: 34 G/DL
MCV RBC AUTO: 91 FL
MONOCYTES # BLD AUTO: 0.7 K/UL
MONOCYTES NFR BLD: 7.2 %
NEUTROPHILS # BLD AUTO: 6.8 K/UL
NEUTROPHILS NFR BLD: 69.8 %
NRBC BLD-RTO: 0 /100 WBC
PLATELET # BLD AUTO: 180 K/UL
PMV BLD AUTO: 12.3 FL
POTASSIUM SERPL-SCNC: 3.7 MMOL/L
PROT SERPL-MCNC: 7.3 G/DL
PROTHROMBIN TIME: 11.2 SEC
RBC # BLD AUTO: 5.27 M/UL
SODIUM SERPL-SCNC: 139 MMOL/L
WBC # BLD AUTO: 9.77 K/UL

## 2017-11-12 PROCEDURE — 85025 COMPLETE CBC W/AUTO DIFF WBC: CPT

## 2017-11-12 PROCEDURE — 25000003 PHARM REV CODE 250: Performed by: PHYSICIAN ASSISTANT

## 2017-11-12 PROCEDURE — 25000003 PHARM REV CODE 250: Performed by: INTERNAL MEDICINE

## 2017-11-12 PROCEDURE — 63600175 PHARM REV CODE 636 W HCPCS: Performed by: INTERNAL MEDICINE

## 2017-11-12 PROCEDURE — 20600001 HC STEP DOWN PRIVATE ROOM

## 2017-11-12 PROCEDURE — 27000221 HC OXYGEN, UP TO 24 HOURS

## 2017-11-12 PROCEDURE — 83735 ASSAY OF MAGNESIUM: CPT

## 2017-11-12 PROCEDURE — 25000242 PHARM REV CODE 250 ALT 637 W/ HCPCS: Performed by: INTERNAL MEDICINE

## 2017-11-12 PROCEDURE — 94660 CPAP INITIATION&MGMT: CPT

## 2017-11-12 PROCEDURE — 99900035 HC TECH TIME PER 15 MIN (STAT)

## 2017-11-12 PROCEDURE — 94761 N-INVAS EAR/PLS OXIMETRY MLT: CPT

## 2017-11-12 PROCEDURE — 80053 COMPREHEN METABOLIC PANEL: CPT

## 2017-11-12 PROCEDURE — 85610 PROTHROMBIN TIME: CPT

## 2017-11-12 PROCEDURE — 94640 AIRWAY INHALATION TREATMENT: CPT

## 2017-11-12 PROCEDURE — 99232 SBSQ HOSP IP/OBS MODERATE 35: CPT | Mod: ,,, | Performed by: INTERNAL MEDICINE

## 2017-11-12 PROCEDURE — 36415 COLL VENOUS BLD VENIPUNCTURE: CPT

## 2017-11-12 RX ORDER — BUMETANIDE 1 MG/1
4 TABLET ORAL 2 TIMES DAILY
Status: DISCONTINUED | OUTPATIENT
Start: 2017-11-12 | End: 2017-11-13 | Stop reason: HOSPADM

## 2017-11-12 RX ADMIN — POTASSIUM CHLORIDE 40 MEQ: 1500 TABLET, EXTENDED RELEASE ORAL at 08:11

## 2017-11-12 RX ADMIN — BUSPIRONE HYDROCHLORIDE 15 MG: 5 TABLET ORAL at 02:11

## 2017-11-12 RX ADMIN — FLUTICASONE FUROATE AND VILANTEROL TRIFENATATE 1 PUFF: 100; 25 POWDER RESPIRATORY (INHALATION) at 09:11

## 2017-11-12 RX ADMIN — BUMETANIDE 4 MG: 1 TABLET ORAL at 08:11

## 2017-11-12 RX ADMIN — POTASSIUM CHLORIDE 40 MEQ: 1500 TABLET, EXTENDED RELEASE ORAL at 09:11

## 2017-11-12 RX ADMIN — LURASIDONE HYDROCHLORIDE 60 MG: 20 TABLET, FILM COATED ORAL at 09:11

## 2017-11-12 RX ADMIN — PANTOPRAZOLE SODIUM 40 MG: 40 TABLET, DELAYED RELEASE ORAL at 09:11

## 2017-11-12 RX ADMIN — BUMETANIDE 4 MG: 1 TABLET ORAL at 09:11

## 2017-11-12 RX ADMIN — ALBUTEROL SULFATE 2.5 MG: 2.5 SOLUTION RESPIRATORY (INHALATION) at 03:11

## 2017-11-12 RX ADMIN — ALBUTEROL SULFATE 2.5 MG: 2.5 SOLUTION RESPIRATORY (INHALATION) at 05:11

## 2017-11-12 RX ADMIN — AMLODIPINE BESYLATE 5 MG: 5 TABLET ORAL at 09:11

## 2017-11-12 RX ADMIN — LAMOTRIGINE 100 MG: 100 TABLET ORAL at 08:11

## 2017-11-12 RX ADMIN — ALBUTEROL SULFATE 2.5 MG: 2.5 SOLUTION RESPIRATORY (INHALATION) at 08:11

## 2017-11-12 RX ADMIN — BUSPIRONE HYDROCHLORIDE 15 MG: 5 TABLET ORAL at 08:11

## 2017-11-12 RX ADMIN — HEPARIN SODIUM 5000 UNITS: 5000 INJECTION, SOLUTION INTRAVENOUS; SUBCUTANEOUS at 06:11

## 2017-11-12 RX ADMIN — LEVOTHYROXINE SODIUM 75 MCG: 25 TABLET ORAL at 06:11

## 2017-11-12 RX ADMIN — LAMOTRIGINE 100 MG: 100 TABLET ORAL at 09:11

## 2017-11-12 RX ADMIN — PRAVASTATIN SODIUM 40 MG: 40 TABLET ORAL at 09:11

## 2017-11-12 RX ADMIN — HEPARIN SODIUM 5000 UNITS: 5000 INJECTION, SOLUTION INTRAVENOUS; SUBCUTANEOUS at 08:11

## 2017-11-12 RX ADMIN — ALBUTEROL SULFATE 2.5 MG: 2.5 SOLUTION RESPIRATORY (INHALATION) at 07:11

## 2017-11-12 RX ADMIN — ALBUTEROL SULFATE 2.5 MG: 2.5 SOLUTION RESPIRATORY (INHALATION) at 11:11

## 2017-11-12 RX ADMIN — HEPARIN SODIUM 5000 UNITS: 5000 INJECTION, SOLUTION INTRAVENOUS; SUBCUTANEOUS at 02:11

## 2017-11-12 RX ADMIN — BUSPIRONE HYDROCHLORIDE 15 MG: 5 TABLET ORAL at 06:11

## 2017-11-12 RX ADMIN — DESVENLAFAXINE SUCCINATE 100 MG: 50 TABLET, FILM COATED, EXTENDED RELEASE ORAL at 09:11

## 2017-11-12 NOTE — PLAN OF CARE
Problem: Patient Care Overview  Goal: Plan of Care Review  Outcome: Ongoing (interventions implemented as appropriate)  Pt tolerating all treatments and therapies well, pt is AAOx4 and VSS, pt reported pain one time that was controlled with prn medication, pt bed is low and locked and call bell is in reach,pt instructed to call if any assistance is needed, pt afebrile and shows no new signs of infection, no acute events noted or reported, will continue to monitor.

## 2017-11-13 VITALS
TEMPERATURE: 98 F | DIASTOLIC BLOOD PRESSURE: 73 MMHG | SYSTOLIC BLOOD PRESSURE: 117 MMHG | RESPIRATION RATE: 18 BRPM | BODY MASS INDEX: 33.55 KG/M2 | WEIGHT: 182.31 LBS | OXYGEN SATURATION: 95 % | HEART RATE: 93 BPM | HEIGHT: 62 IN

## 2017-11-13 LAB
ALBUMIN SERPL BCP-MCNC: 4.3 G/DL
ALP SERPL-CCNC: 140 U/L
ALT SERPL W/O P-5'-P-CCNC: 28 U/L
ANION GAP SERPL CALC-SCNC: 17 MMOL/L
AST SERPL-CCNC: 32 U/L
BASOPHILS # BLD AUTO: 0.08 K/UL
BASOPHILS NFR BLD: 0.7 %
BILIRUB SERPL-MCNC: 1.5 MG/DL
BUN SERPL-MCNC: 43 MG/DL
CALCIUM SERPL-MCNC: 10.2 MG/DL
CHLORIDE SERPL-SCNC: 85 MMOL/L
CO2 SERPL-SCNC: 37 MMOL/L
CREAT SERPL-MCNC: 1.4 MG/DL
DIFFERENTIAL METHOD: ABNORMAL
EOSINOPHIL # BLD AUTO: 0.3 K/UL
EOSINOPHIL NFR BLD: 2.2 %
ERYTHROCYTE [DISTWIDTH] IN BLOOD BY AUTOMATED COUNT: 16.5 %
EST. GFR  (AFRICAN AMERICAN): 48.8 ML/MIN/1.73 M^2
EST. GFR  (NON AFRICAN AMERICAN): 42.3 ML/MIN/1.73 M^2
GLUCOSE SERPL-MCNC: 101 MG/DL
HCT VFR BLD AUTO: 49.9 %
HGB BLD-MCNC: 16.8 G/DL
IMM GRANULOCYTES # BLD AUTO: 0.19 K/UL
IMM GRANULOCYTES NFR BLD AUTO: 1.6 %
INR PPP: 1
LYMPHOCYTES # BLD AUTO: 2.1 K/UL
LYMPHOCYTES NFR BLD: 17.8 %
MAGNESIUM SERPL-MCNC: 2.1 MG/DL
MCH RBC QN AUTO: 30.5 PG
MCHC RBC AUTO-ENTMCNC: 33.7 G/DL
MCV RBC AUTO: 91 FL
MONOCYTES # BLD AUTO: 0.9 K/UL
MONOCYTES NFR BLD: 7.3 %
NEUTROPHILS # BLD AUTO: 8.2 K/UL
NEUTROPHILS NFR BLD: 70.4 %
NRBC BLD-RTO: 0 /100 WBC
PLATELET # BLD AUTO: 193 K/UL
PMV BLD AUTO: 11.8 FL
POTASSIUM SERPL-SCNC: 3 MMOL/L
PROT SERPL-MCNC: 7.7 G/DL
PROTHROMBIN TIME: 11.1 SEC
RBC # BLD AUTO: 5.51 M/UL
SODIUM SERPL-SCNC: 139 MMOL/L
WBC # BLD AUTO: 11.62 K/UL

## 2017-11-13 PROCEDURE — 25000003 PHARM REV CODE 250: Performed by: PHYSICIAN ASSISTANT

## 2017-11-13 PROCEDURE — 94640 AIRWAY INHALATION TREATMENT: CPT

## 2017-11-13 PROCEDURE — 63600175 PHARM REV CODE 636 W HCPCS: Performed by: INTERNAL MEDICINE

## 2017-11-13 PROCEDURE — 99232 SBSQ HOSP IP/OBS MODERATE 35: CPT | Mod: ,,, | Performed by: INTERNAL MEDICINE

## 2017-11-13 PROCEDURE — 36415 COLL VENOUS BLD VENIPUNCTURE: CPT

## 2017-11-13 PROCEDURE — 85610 PROTHROMBIN TIME: CPT

## 2017-11-13 PROCEDURE — 25000003 PHARM REV CODE 250: Performed by: INTERNAL MEDICINE

## 2017-11-13 PROCEDURE — 99900035 HC TECH TIME PER 15 MIN (STAT)

## 2017-11-13 PROCEDURE — 83735 ASSAY OF MAGNESIUM: CPT

## 2017-11-13 PROCEDURE — 27000221 HC OXYGEN, UP TO 24 HOURS

## 2017-11-13 PROCEDURE — 85025 COMPLETE CBC W/AUTO DIFF WBC: CPT

## 2017-11-13 PROCEDURE — 80053 COMPREHEN METABOLIC PANEL: CPT

## 2017-11-13 PROCEDURE — 25000242 PHARM REV CODE 250 ALT 637 W/ HCPCS: Performed by: INTERNAL MEDICINE

## 2017-11-13 PROCEDURE — 94761 N-INVAS EAR/PLS OXIMETRY MLT: CPT

## 2017-11-13 PROCEDURE — A4216 STERILE WATER/SALINE, 10 ML: HCPCS | Performed by: INTERNAL MEDICINE

## 2017-11-13 RX ORDER — BUMETANIDE 2 MG/1
4 TABLET ORAL 2 TIMES DAILY
Qty: 120 TABLET | Refills: 11 | Status: ON HOLD | OUTPATIENT
Start: 2017-11-13 | End: 2018-02-23 | Stop reason: HOSPADM

## 2017-11-13 RX ORDER — POTASSIUM CHLORIDE 20 MEQ/1
20 TABLET, EXTENDED RELEASE ORAL ONCE
Status: COMPLETED | OUTPATIENT
Start: 2017-11-13 | End: 2017-11-13

## 2017-11-13 RX ADMIN — ALBUTEROL SULFATE 2.5 MG: 2.5 SOLUTION RESPIRATORY (INHALATION) at 12:11

## 2017-11-13 RX ADMIN — AMLODIPINE BESYLATE 5 MG: 5 TABLET ORAL at 09:11

## 2017-11-13 RX ADMIN — LEVOTHYROXINE SODIUM 75 MCG: 25 TABLET ORAL at 06:11

## 2017-11-13 RX ADMIN — BUSPIRONE HYDROCHLORIDE 15 MG: 5 TABLET ORAL at 06:11

## 2017-11-13 RX ADMIN — LURASIDONE HYDROCHLORIDE 60 MG: 20 TABLET, FILM COATED ORAL at 09:11

## 2017-11-13 RX ADMIN — PANTOPRAZOLE SODIUM 40 MG: 40 TABLET, DELAYED RELEASE ORAL at 09:11

## 2017-11-13 RX ADMIN — POTASSIUM CHLORIDE 20 MEQ: 1500 TABLET, EXTENDED RELEASE ORAL at 09:11

## 2017-11-13 RX ADMIN — DESVENLAFAXINE SUCCINATE 100 MG: 50 TABLET, FILM COATED, EXTENDED RELEASE ORAL at 09:11

## 2017-11-13 RX ADMIN — PRAVASTATIN SODIUM 40 MG: 40 TABLET ORAL at 09:11

## 2017-11-13 RX ADMIN — FLUTICASONE FUROATE AND VILANTEROL TRIFENATATE 1 PUFF: 100; 25 POWDER RESPIRATORY (INHALATION) at 09:11

## 2017-11-13 RX ADMIN — ALBUTEROL SULFATE 2.5 MG: 2.5 SOLUTION RESPIRATORY (INHALATION) at 04:11

## 2017-11-13 RX ADMIN — BUMETANIDE 4 MG: 1 TABLET ORAL at 09:11

## 2017-11-13 RX ADMIN — Medication 3 ML: at 06:11

## 2017-11-13 RX ADMIN — POTASSIUM CHLORIDE 40 MEQ: 1500 TABLET, EXTENDED RELEASE ORAL at 09:11

## 2017-11-13 RX ADMIN — BUSPIRONE HYDROCHLORIDE 15 MG: 5 TABLET ORAL at 01:11

## 2017-11-13 RX ADMIN — HYDROCODONE BITARTRATE AND ACETAMINOPHEN 1 TABLET: 10; 325 TABLET ORAL at 04:11

## 2017-11-13 RX ADMIN — HEPARIN SODIUM 5000 UNITS: 5000 INJECTION, SOLUTION INTRAVENOUS; SUBCUTANEOUS at 06:11

## 2017-11-13 RX ADMIN — LAMOTRIGINE 100 MG: 100 TABLET ORAL at 09:11

## 2017-11-13 NOTE — PLAN OF CARE
Problem: Patient Care Overview  Goal: Plan of Care Review  Outcome: Ongoing (interventions implemented as appropriate)  Patient's VSS and in NAD for shift. Patient remains AAOx4, calm, cooperative, and independent. Patient's oxygen saturation 95% on 3 liters. Patient diuresing well on Bumex. Has put out 1400 L this shift. Patient free from falls, injury, and skin breakdown.

## 2017-11-13 NOTE — SUBJECTIVE & OBJECTIVE
Interval History: Patient reports she had a good night last night and is ready to go home.  She feels back to baseline and denies CP, SOB, palpitations.     Continuous Infusions:   Scheduled Meds:   albuterol sulfate  2.5 mg Nebulization Q4H    amLODIPine  5 mg Oral Daily    bumetanide  4 mg Oral BID    busPIRone  15 mg Oral TID    desvenlafaxine succinate  100 mg Oral Daily    fluticasone-vilanterol  1 puff Inhalation Daily    heparin (porcine)  5,000 Units Subcutaneous Q8H    lamoTRIgine  100 mg Oral BID    levothyroxine  75 mcg Oral Before breakfast    lurasidone  60 mg Oral Daily    pantoprazole  40 mg Oral Daily    potassium chloride SA  40 mEq Oral BID    pravastatin  40 mg Oral Daily    selexipag  600 mcg Oral BID    sodium chloride 0.9%  3 mL Intravenous Q8H     PRN Meds:hydrocodone-acetaminophen 10-325mg    Review of patient's allergies indicates:   Allergen Reactions    Sulfa (sulfonamide antibiotics) Rash     Objective:     Vital Signs (Most Recent):  Temp: 98.3 °F (36.8 °C) (11/13/17 0800)  Pulse: 89 (11/13/17 0924)  Resp: 20 (11/13/17 0924)  BP: 115/81 (11/13/17 0920)  SpO2: 100 % (11/13/17 0924) Vital Signs (24h Range):  Temp:  [97.8 °F (36.6 °C)-98.7 °F (37.1 °C)] 98.3 °F (36.8 °C)  Pulse:  [79-92] 89  Resp:  [14-20] 20  SpO2:  [93 %-100 %] 100 %  BP: (104-133)/(63-81) 115/81     Patient Vitals for the past 72 hrs (Last 3 readings):   Weight   11/13/17 0600 82.7 kg (182 lb 5.1 oz)   11/12/17 0930 82.9 kg (182 lb 12.2 oz)   11/11/17 1530 82.8 kg (182 lb 8.7 oz)     Body mass index is 33.35 kg/m².      Intake/Output Summary (Last 24 hours) at 11/13/17 1102  Last data filed at 11/13/17 0900   Gross per 24 hour   Intake              970 ml   Output             1700 ml   Net             -730 ml     Physical Exam  Constitutional: She is oriented to person, place, and time. She appears well-developed and well-nourished.   Neck: Normal range of motion. Neck supple. JVD elevation just above the  clavicle  Cardiovascular: Normal rate and regular rhythm.  Exam reveals no gallop and no friction rub.    No murmur heard.  Pulmonary/Chest: Effort normal and breath sounds normal. She has no wheezes. She has no rales.   On O2 via NC   Abdominal: Soft. Bowel sounds are normal. There is no tenderness.   Musculoskeletal: She exhibits no edema.   Neurological: She is alert and oriented to person, place, and time.   Skin: Skin is warm and dry.     Significant Labs:  CBC:    Recent Labs  Lab 11/11/17 0639 11/12/17 0419 11/13/17 0357   WBC 9.49 9.77 11.62   RBC 5.35 5.27 5.51*   HGB 16.7* 16.3* 16.8*   HCT 49.3* 47.9 49.9*    180 193   MCV 92 91 91   MCH 31.2* 30.9 30.5   MCHC 33.9 34.0 33.7     BNP:  No results for input(s): BNP in the last 168 hours.    Invalid input(s): BNPTRIAGELBLO  CMP:    Recent Labs  Lab 11/11/17 0639 11/12/17 0419 11/13/17 0357   GLU 97 105 101   CALCIUM 11.1* 10.1 10.2   ALBUMIN 4.8 4.0 4.3   PROT 8.4 7.3 7.7    139 139   K 3.4* 3.7 3.0*   CO2 45* 39* 37*   CL 83* 86* 85*   BUN 44* 48* 43*   CREATININE 1.5* 1.6* 1.4   ALKPHOS 144* 136* 140*   ALT 26 20 28   AST 31 24 32   BILITOT 1.4* 1.5* 1.5*      Coagulation:     Recent Labs  Lab 11/11/17 0639 11/12/17 0419 11/13/17 0357   INR 1.1 1.1 1.0     LDH:  No results for input(s): LDH in the last 72 hours.  Microbiology:  Microbiology Results (last 7 days)     ** No results found for the last 168 hours. **          I have reviewed all pertinent labs within the past 24 hours.    Estimated Creatinine Clearance: 45.2 mL/min (based on SCr of 1.4 mg/dL).    Diagnostic Results:  I have reviewed all pertinent imaging results/findings within the past 24 hours.

## 2017-11-13 NOTE — DISCHARGE SUMMARY
[facility]  Heart Transplant  Discharge Summary      Patient Name: Sue Smith  MRN: [id number]  Admission Date: 11/9/2017  Hospital Length of Stay: 4 days  Discharge Date and Time: 11/13/2017 11:19 AM  Attending Physician: Lola Paul MD   Discharging Provider: IRISH Diallo  Primary Care Provider: Paddy Guerrier DO     HPI: 56 YOWM with PMH of Asthma, Rt sided heart failure and pulmonary HTN recently seen by Dr. Cullen, with complex history of PAH as records from OSH are discordant. Hospitalist / cardiology have her labeled as COPD and Obesity hypoventilation however consult by pulmonologist (Dr. Hardik Magallanes) states explicitly that PFTs done in 2015 showed only mild restriction and were not consistent with COPD. In addition he states that though she is mildly obese her pCO2 is normal at baseline ruling out obesity hypoventilation. What makes this more difficult is the fact that the patient is unsure if she has COPD as she has been told different things by different doctors over the years.    Who was discharged approx three weeks ago from [facility]. Since discharge the patient has been using Lasix 80 mg BID, Metolazone 2.5 mg BID and instructions about the weight management and the need for increasing if her weight gain.    Over the past one week, patient has gained 8-10 pounds of weight. She reported that despite her care managed with best dosing of her Lasix she continue to gain weight. Her BNP at OSH was 945 which is the high number for her.  She does report that she may not be observing dietary recommendations.     * No surgery found *     Hospital Course: Patient was admitted to the [facility] service and placed on 24 hour telemetry    Patient presented in ADHF with 8-10 lbs weight gain over the last week. She was started on a Lasix infusion at 20mg/hr with Metolazone; Lasix drip was decreased to 10mg, but patient had to be bolused and placed back on 20mg/hr.  She diuresed well on this  regimen and drip was decrease to 10mg/hr and eventually transitioned to oral diuretic regimen.  Given that patient decompenstaed on oral Lasix at home prior to admission; she was put on Bumex 4mg BID for a home regimen.  On this dose of Bumex, patient had good urine output and renal function remained stable.  Throughout her hosptialization; she is net negative 8.3L and dry weight on day fo dishcarge is 182lbs which is down from 190lbs on admission.  We discussed compliance with low salt diet and medications.  She was instructed on daily weights and notify us of weight gain >3lbs over last 24 hours and 5lbs over a week.       We continued her home dose of Selexipag for management of pulmonary HTN and she was stable on her home dose of O2 at 3L       Home health has been arranged with PT, OT, Aid, Nurse and .  Patient already has appointments scheduled with Dr. Cullen and 6 minute walk on 12/8    Significant Diagnostic Studies: N/A    Pending Diagnostic Studies:     None        Final Active Diagnoses:    Diagnosis Date Noted POA    PRINCIPAL PROBLEM:  Acute on chronic diastolic heart failure [I50.33] 10/06/2017 Yes    Acute on chronic right heart failure [I50.813]  Yes    Pulmonary hypertension, group 1 [I27.20] 10/04/2017 Yes    Chronic respiratory failure with hypoxia [J96.11] 10/05/2017 Yes    Hypothyroidism [E03.9] 10/05/2017 Yes    Bipolar disorder [F31.9]  Yes    GERD (gastroesophageal reflux disease) [K21.9]  Yes    Hypertension [I10]  Yes    Dyslipidemia [E78.5]  Yes      Problems Resolved During this Admission:    Diagnosis Date Noted Date Resolved POA    Oxygen dependent [Z99.81]  11/09/2017 Not Applicable      Discharged Condition: stable    Disposition: Home or Self Care    Follow Up:  Follow-up Information     Faina Cullen MD On 12/8/2017.    Specialties:  Transplant, Cardiology  Why:  as scheduled at 2484  Contact information:  [address] [place]  [address]  [phone]                 Patient Instructions:     Diet general   Order Specific Question Answer Comments   Additional restrictions: Cardiac (Low Na/Chol)      Activity as tolerated     Call MD for:  temperature >100.4     Call MD for:  redness, tenderness, or signs of infection (pain, swelling, redness, odor or green/yellow discharge around incision site)     Call MD for:   Order Comments: Chest pain, SOB, weight gain, lower extremity edema       Medications:  Reconciled Home Medications:   Current Discharge Medication List      START taking these medications    Details   bumetanide (BUMEX) 2 MG tablet Take 2 tablets (4 mg total) by mouth 2 (two) times daily.  Qty: 120 tablet, Refills: 11         CONTINUE these medications which have NOT CHANGED    Details   albuterol (PROVENTIL) 2.5 mg /3 mL (0.083 %) nebulizer solution Take 2.5 mg by nebulization every 6 (six) hours as needed for Wheezing. Rescue      amlodipine (NORVASC) 5 MG tablet Take 5 mg by mouth once daily.      budesonide-formoterol 80-4.5 mcg (SYMBICORT) 80-4.5 mcg/actuation HFAA Inhale 2 puffs into the lungs 2 (two) times daily. Controller      busPIRone (BUSPAR) 15 MG tablet Take 15 mg by mouth 3 (three) times daily.      desvenlafaxine succinate (PRISTIQ) 100 MG Tb24 Take 100 mg by mouth once daily.      hydrocodone-acetaminophen 10-325mg (NORCO)  mg Tab Take 1 tablet by mouth every 8 (eight) hours as needed for Pain.      lamotrigine (LAMICTAL) 100 MG tablet Take 100 mg by mouth 2 (two) times daily.      levothyroxine (SYNTHROID) 75 MCG tablet Take 75 mcg by mouth once daily.      lurasidone (LATUDA) 60 mg Tab tablet Take 60 mg by mouth once daily.      metOLazone (ZAROXOLYN) 2.5 MG tablet Take 1 tablet (2.5 mg total) by mouth daily as needed. Take for wt gain 3# overnight or 5# in 1 wk, with extra potassium  Qty: 30 tablet, Refills: 11      pantoprazole (PROTONIX) 40 MG tablet Take 40 mg by mouth once daily.      potassium chloride  SA (K-DUR,KLOR-CON) 10 MEQ tablet Take 2 tablets (20 mEq total) by mouth once daily.  Qty: 60 tablet, Refills: 11      pravastatin (PRAVACHOL) 40 MG tablet Take 40 mg by mouth once daily.      selexipag 200 mcg (140)- 800 mcg (60) DsPk Take 200 mcg BID by mouth for 1 week, then increase by 200 mcg BID, at weekly intervals, to the highest tolerated dose up to 1600 mcg BID.  Qty: 200 tablet, Refills: 11         STOP taking these medications       furosemide (LASIX) 80 MG tablet Comments:   Reason for Stopping:               IRISH Diallo  Heart Transplant  [facility]

## 2017-11-13 NOTE — PLAN OF CARE
[facility]   Heart Transplant Clinic  [address] [place] [address]   [phone] after hours        HOME  HEALTH ORDERS      Admit to Home Health    Diagnosis:   Patient Active Problem List   Diagnosis    Pulmonary hypertension, group 1    Chronic respiratory failure with hypoxia    Hypothyroidism    Hypertension    Dyslipidemia    Bipolar disorder    Acute on chronic right heart failure    Hx of tracheostomy    GERD (gastroesophageal reflux disease)    Acute on chronic diastolic heart failure    Cardiomegaly    Pulmonary nodules    Chronic pulmonary heart disease       Patient is homebound due to:  CHF, pulm HTN     Diet: Cardiac    Acitivities: as tolerated    Nursing:   SN to complete comprehensive assessment including routine vital signs. Instruct on disease process and s/s of complications to report to MD. Review/verify medication list sent home with the patient at time of discharge  and instruct patient/caregiver as needed. Frequency may be adjusted depending on start of care date.    Notify MD if SBP > 160 or < 90; DBP > 90 or < 50; HR > 120 or < 50; Temp > 101; Weight gain >3lbs in 1 day or 5lbs in 1 week.   Other:       CONSULTS:      Physical Therapy to evaluate and treat. Evaluate for home safety and equipment needs; Establish/upgrade home exercise program. Perform / instruct on therapeutic exercises, gait training, transfer training, and Range of Motion.    Occupational Therapy to evaluate and treat. Evaluate home environment for safety and equipment needs. Perform/Instruct on transfers, ADL training, ROM, and therapeutic exercises.    Speech Therapy  to evaluate and treat for:  Language  Swallowing  Cognition                                              to evaluate for community resources/long-range planning.     Aide to provide assistance with personal care, ADLs, and vital signs      Send initial Home Health orders to  HTS attending physician on call.  Send follow up questions to [phone] or fax:                     Heart Failure:      [phone]

## 2017-11-13 NOTE — NURSING
Patient ready for dc once friend arrives to transport home, printed AVS reviewed with med schedule and followup appts. Patient to  needed Rx from local pharmacy. Dietary restrictions of low salt/ 1500 cc fluid restriction reenforced. Patient given printed diet info on last admissio and given measure cups to aid in adhering to fluid restriction. Patient has own O2 tank to travel home with. Patient verbalizes undertsanding of dc instructions. Home supply Uptriva returned to patient.

## 2017-11-13 NOTE — ASSESSMENT & PLAN NOTE
-Lasix infusion transitioned to po Bumex at 4mg BID.  Patient net negative 1L over last 24 hours and 8.3L total.  She is compensated on exam and renal function stable.  Dry weight is 182lbs  -Will discharge on current regimen as patient is tolerating it and renal function is stable

## 2017-11-13 NOTE — HOSPITAL COURSE
Patient presented in ADHF with 8-10 lbs weight gain over the last week. She was started on a Lasix infusion at 20mg/hr with Metolazone; Lasix drip was decreased to 10mg, but patient had to be bolused and placed back on 20mg/hr.  She diuresed well on this regimen and drip was decrease to 10mg/hr and eventually transitioned to oral diuretic regimen.  Given that patient decompenstaed on oral Lasix at home prior to admission; she was put on Bumex 4mg BID for a home regimen.  On this dose of Bumex, patient had good urine output and renal function remained stable.  Throughout her hosptialization; she is net negative 8.3L and dry weight on day fo dishcarge is 182lbs which is down from 190lbs on admission.  We discussed compliance with low salt diet and medications.  She was instructed on daily weights and notify us of weight gain >3lbs over last 24 hours and 5lbs over a week.      We continued her home dose of Selexipag for management of pulmonary HTN and she was stable on her home dose of O2 at 3L      Home health has been arranged with PT, OT, Aid, Nurse and .  Patient already has appointments scheduled with Dr. Cullen and 6 minute walk on 12/8

## 2017-11-13 NOTE — PROGRESS NOTES
[facility]  Heart Transplant  Progress Note    Patient Name: Sue Smith  MRN: [id number]  Admission Date: 11/9/2017  Hospital Length of Stay: 4 days  Attending Physician: Lola Paul MD  Primary Care Provider: Paddy Guerrier DO  Principal Problem:Acute on chronic diastolic heart failure    Subjective:     Interval History: Patient reports she had a good night last night and is ready to go home.  She feels back to baseline and denies CP, SOB, palpitations.     Continuous Infusions:   Scheduled Meds:   albuterol sulfate  2.5 mg Nebulization Q4H    amLODIPine  5 mg Oral Daily    bumetanide  4 mg Oral BID    busPIRone  15 mg Oral TID    desvenlafaxine succinate  100 mg Oral Daily    fluticasone-vilanterol  1 puff Inhalation Daily    heparin (porcine)  5,000 Units Subcutaneous Q8H    lamoTRIgine  100 mg Oral BID    levothyroxine  75 mcg Oral Before breakfast    lurasidone  60 mg Oral Daily    pantoprazole  40 mg Oral Daily    potassium chloride SA  40 mEq Oral BID    pravastatin  40 mg Oral Daily    selexipag  600 mcg Oral BID    sodium chloride 0.9%  3 mL Intravenous Q8H     PRN Meds:hydrocodone-acetaminophen 10-325mg    Review of patient's allergies indicates:   Allergen Reactions    Sulfa (sulfonamide antibiotics) Rash     Objective:     Vital Signs (Most Recent):  Temp: 98.3 °F (36.8 °C) (11/13/17 0800)  Pulse: 89 (11/13/17 0924)  Resp: 20 (11/13/17 0924)  BP: 115/81 (11/13/17 0920)  SpO2: 100 % (11/13/17 0924) Vital Signs (24h Range):  Temp:  [97.8 °F (36.6 °C)-98.7 °F (37.1 °C)] 98.3 °F (36.8 °C)  Pulse:  [79-92] 89  Resp:  [14-20] 20  SpO2:  [93 %-100 %] 100 %  BP: (104-133)/(63-81) 115/81     Patient Vitals for the past 72 hrs (Last 3 readings):   Weight   11/13/17 0600 82.7 kg (182 lb 5.1 oz)   11/12/17 0930 82.9 kg (182 lb 12.2 oz)   11/11/17 1530 82.8 kg (182 lb 8.7 oz)     Body mass index is 33.35 kg/m².      Intake/Output Summary (Last 24 hours) at 11/13/17 1102  Last  data filed at 11/13/17 0900   Gross per 24 hour   Intake              970 ml   Output             1700 ml   Net             -730 ml     Physical Exam  Constitutional: She is oriented to person, place, and time. She appears well-developed and well-nourished.   Neck: Normal range of motion. Neck supple. JVD elevation just above the clavicle  Cardiovascular: Normal rate and regular rhythm.  Exam reveals no gallop and no friction rub.    No murmur heard.  Pulmonary/Chest: Effort normal and breath sounds normal. She has no wheezes. She has no rales.   On O2 via NC   Abdominal: Soft. Bowel sounds are normal. There is no tenderness.   Musculoskeletal: She exhibits no edema.   Neurological: She is alert and oriented to person, place, and time.   Skin: Skin is warm and dry.     Significant Labs:  CBC:    Recent Labs  Lab 11/11/17 0639 11/12/17 0419 11/13/17 0357   WBC 9.49 9.77 11.62   RBC 5.35 5.27 5.51*   HGB 16.7* 16.3* 16.8*   HCT 49.3* 47.9 49.9*    180 193   MCV 92 91 91   MCH 31.2* 30.9 30.5   MCHC 33.9 34.0 33.7     BNP:  No results for input(s): BNP in the last 168 hours.    Invalid input(s): BNPTRIAGELBLO  CMP:    Recent Labs  Lab 11/11/17 0639 11/12/17 0419 11/13/17 0357   GLU 97 105 101   CALCIUM 11.1* 10.1 10.2   ALBUMIN 4.8 4.0 4.3   PROT 8.4 7.3 7.7    139 139   K 3.4* 3.7 3.0*   CO2 45* 39* 37*   CL 83* 86* 85*   BUN 44* 48* 43*   CREATININE 1.5* 1.6* 1.4   ALKPHOS 144* 136* 140*   ALT 26 20 28   AST 31 24 32   BILITOT 1.4* 1.5* 1.5*      Coagulation:     Recent Labs  Lab 11/11/17 0639 11/12/17 0419 11/13/17 0357   INR 1.1 1.1 1.0     LDH:  No results for input(s): LDH in the last 72 hours.  Microbiology:  Microbiology Results (last 7 days)     ** No results found for the last 168 hours. **          I have reviewed all pertinent labs within the past 24 hours.    Estimated Creatinine Clearance: 45.2 mL/min (based on SCr of 1.4 mg/dL).    Diagnostic Results:  I have reviewed all pertinent  imaging results/findings within the past 24 hours.    Assessment and Plan:     No notes on file    Acute on chronic right heart failure    -Lasix infusion transitioned to po Bumex at 4mg BID.  Patient net negative 1L over last 24 hours and 8.3L total.  She is compensated on exam and renal function stable.  Dry weight is 182lbs  -Will discharge on current regimen as patient is tolerating it and renal function is stable         Pulmonary hypertension, group 1    -Cont selexipag 600 mcg BID (not on formulary here so pt taking her own pills)        Chronic respiratory failure with hypoxia    -Back on home O2 at 3 L  -CPAP QHS        Hypothyroidism    -Cont levothyroxine        Bipolar disorder    -Cont buspirone, desvenlafaxine, lamotrigine. Was not restarted on lurasidone on admit- will verify with pt that she was taking and resume if so            IRISH Diallo  Heart Transplant  [facility]

## 2017-11-13 NOTE — PROGRESS NOTES
D/C note:    SW to pt's room for D/C. Pt aaox4, calm, and cooperative. Pt reports in agreement with plan to D/C home today with HH. SW notified Edyta at  [address] ([phone] , [phone] fax) of plan for D/C today. SW faxed  orders to [facility] ([phone] ph, [phone] fax) and confirmed receipt. [facility] will arrange HH with  [address]. Pt reports friend to provide transport home. Pt reports having enough portable O2 with her for ride home. Pt reports no other needs, and none identified. SW providing psychosocial and counseling support, education, assistance, resources, and D/C planning as indicated. SW remains available.

## 2017-11-15 ENCOUNTER — PATIENT OUTREACH (OUTPATIENT)
Dept: ADMINISTRATIVE | Facility: CLINIC | Age: 57
End: 2017-11-15

## 2017-11-15 NOTE — PROGRESS NOTES
[phone]-LVM  [phone]-spoke with pts dad ANDI  [phone]-spoke with pts drt LM    C3 nurse attempted to contact patient. No answer. The following message was left for the patient to return the call:  Good morning I am a nurse calling on behalf of [facility] from the Care Coordination Center.  This is a Transitional Care Call for Sue Smith. When you have a moment please contact us at [phone] or [phone] Monday through Friday, between the hours of 8 am to 4 pm. We look forward to speaking with you. On behalf of [facility] have a nice day.    The patient does not have a scheduled HOSFU appointment within 7-14 days post hospital discharge date 11/13/17. Message sent to Physician staff to assist with HOSFU appointment scheduling.

## 2017-11-15 NOTE — PATIENT INSTRUCTIONS

## 2017-11-17 ENCOUNTER — TELEPHONE (OUTPATIENT)
Dept: TRANSPLANT | Facility: CLINIC | Age: 57
End: 2017-11-17

## 2017-11-17 NOTE — TELEPHONE ENCOUNTER
Returned call to [facility]. Patient was admitted to hospice by her PCP, Dr. William Melendez. Hospice was unaware of patient's PH medication (Selexipag). Advised Graciela that patient receives this med through a specialty pharmacy and they need to be contacted.  Will update [facility].  Also, notified Dr. Cullen and left a message for Dr. Melendez.

## 2017-11-17 NOTE — TELEPHONE ENCOUNTER
----- Message from Shandra Stout sent at 11/17/2017  9:35 AM CST -----  Contact: Graciela bergman/ [facility]  Graciela called because the pt was admitted into hospice yesterday and she wanted to know what the follow up was going to be for.  She states she needs to discuss with the pt if she wants to keep the appt since she has been admitted to hospice.   She can be reached @ [phone].  Thanks!!

## 2017-11-17 NOTE — TELEPHONE ENCOUNTER
"Spoke with patient about hospice. She confirmed that her PCP had admitted her to hospice. Patient says she is "going to continue taking her Uptravi" and states that she is currently at 800 mcg, BID. Advised patient that Coordinator would need to speak to Dr. Cullen and Specialty Pharmacy to decide if patient should continue medication.  No other concerns at this time.  "

## 2017-11-22 ENCOUNTER — TELEPHONE (OUTPATIENT)
Dept: TRANSPLANT | Facility: CLINIC | Age: 57
End: 2017-11-22

## 2017-11-22 DIAGNOSIS — R52 PAIN: Primary | ICD-10-CM

## 2017-11-22 RX ORDER — GABAPENTIN 100 MG/1
CAPSULE ORAL
Qty: 60 CAPSULE | Refills: 11 | Status: SHIPPED | OUTPATIENT
Start: 2017-11-22 | End: 2017-11-28 | Stop reason: SDUPTHER

## 2017-11-22 NOTE — TELEPHONE ENCOUNTER
Patient called to report that she has been experiencing pain and tingling in her feet that is waking her at night. She is currently titrating Uptravi and we discussed that this kind of sensation can be common.   Per Dr. Cullen, prescribing RX for Gabapentin. Advised the patient that it may make her feel drowsy and to report to us if the pain does not subside or if other symtoms appear.

## 2017-11-28 ENCOUNTER — TELEPHONE (OUTPATIENT)
Dept: TRANSPLANT | Facility: CLINIC | Age: 57
End: 2017-11-28

## 2017-11-28 DIAGNOSIS — R52 PAIN: ICD-10-CM

## 2017-11-28 RX ORDER — GABAPENTIN 100 MG/1
100 CAPSULE ORAL 3 TIMES DAILY
Qty: 90 CAPSULE | Refills: 11 | Status: SHIPPED | OUTPATIENT
Start: 2017-11-28 | End: 2017-11-28 | Stop reason: DRUGHIGH

## 2017-11-28 NOTE — TELEPHONE ENCOUNTER
"Mrs. Smith called to report that her body had been "hurting all over" so she decided to take the new medication, Gabaentin 100 mg tablets, three times a day instead of 2 tabs at night. She says that it has been helping. Advised patient that it is ok to take the medication this way but encouraged patient to call when making medication changes or having increased symptoms.    Updated Dr. Cullen and sent new RX to pharmacy.  "

## 2017-11-30 RX ORDER — GABAPENTIN 100 MG/1
100 CAPSULE ORAL 3 TIMES DAILY
Qty: 90 CAPSULE | Refills: 11 | Status: SHIPPED | OUTPATIENT
Start: 2017-11-30

## 2017-12-04 ENCOUNTER — TELEPHONE (OUTPATIENT)
Dept: TRANSPLANT | Facility: CLINIC | Age: 57
End: 2017-12-04

## 2017-12-04 DIAGNOSIS — I27.9 CHRONIC PULMONARY HEART DISEASE: Primary | ICD-10-CM

## 2017-12-04 NOTE — TELEPHONE ENCOUNTER
"Patient called to report that she "passed out last night." She states that she woke up on the kitchen floor outside of her bedroom. Patient did not go to the ER and does not "feel like she hurt anything." She does not report any other symptoms. She reports taking all of her medication as directed and today she took her first does of Uptravi at 1400 mcg.   Patient confirmed that she will be here on Friday. Per Dr. Cullen, we will add an ECHO.  Strongly encouraged patient to go to the ER if she should pass out again or feel poorly. Patient verbalized understanding.  "

## 2017-12-08 ENCOUNTER — HOSPITAL ENCOUNTER (OUTPATIENT)
Dept: PULMONOLOGY | Facility: CLINIC | Age: 57
Discharge: HOME | DRG: 291 | End: 2017-12-08
Payer: MEDICARE

## 2017-12-08 ENCOUNTER — HOSPITAL ENCOUNTER (INPATIENT)
Facility: HOSPITAL | Age: 57
LOS: 19 days | Discharge: HOME HEALTH | DRG: 291 | End: 2017-12-27
Attending: INTERNAL MEDICINE | Admitting: INTERNAL MEDICINE
Payer: MEDICARE

## 2017-12-08 ENCOUNTER — HOSPITAL ENCOUNTER (OUTPATIENT)
Dept: CARDIOLOGY | Facility: CLINIC | Age: 57
Discharge: HOME | End: 2017-12-08
Attending: INTERNAL MEDICINE
Payer: MEDICARE

## 2017-12-08 ENCOUNTER — TELEPHONE (OUTPATIENT)
Dept: TRANSPLANT | Facility: CLINIC | Age: 57
End: 2017-12-08

## 2017-12-08 ENCOUNTER — OFFICE VISIT (OUTPATIENT)
Dept: TRANSPLANT | Facility: CLINIC | Age: 57
End: 2017-12-08
Payer: MEDICARE

## 2017-12-08 VITALS — BODY MASS INDEX: 32.61 KG/M2 | WEIGHT: 191 LBS | HEIGHT: 64 IN

## 2017-12-08 VITALS
OXYGEN SATURATION: 90 % | WEIGHT: 198.44 LBS | DIASTOLIC BLOOD PRESSURE: 65 MMHG | BODY MASS INDEX: 33.88 KG/M2 | HEIGHT: 64 IN | HEART RATE: 83 BPM | SYSTOLIC BLOOD PRESSURE: 123 MMHG

## 2017-12-08 DIAGNOSIS — I50.813 ACUTE ON CHRONIC RIGHT HEART FAILURE: ICD-10-CM

## 2017-12-08 DIAGNOSIS — J96.11 CHRONIC RESPIRATORY FAILURE WITH HYPOXIA: ICD-10-CM

## 2017-12-08 DIAGNOSIS — I27.9 CHRONIC PULMONARY HEART DISEASE: ICD-10-CM

## 2017-12-08 DIAGNOSIS — I27.20 PULMONARY HYPERTENSION: Primary | ICD-10-CM

## 2017-12-08 DIAGNOSIS — I10 ESSENTIAL HYPERTENSION: ICD-10-CM

## 2017-12-08 LAB
ALBUMIN SERPL BCP-MCNC: 3.8 G/DL
ALP SERPL-CCNC: 104 U/L
ALT SERPL W/O P-5'-P-CCNC: 23 U/L
ANION GAP SERPL CALC-SCNC: 11 MMOL/L
AST SERPL-CCNC: 29 U/L
BASOPHILS # BLD AUTO: 0.03 K/UL
BASOPHILS NFR BLD: 0.5 %
BILIRUB SERPL-MCNC: 0.7 MG/DL
BUN SERPL-MCNC: 26 MG/DL
CALCIUM SERPL-MCNC: 9.7 MG/DL
CHLORIDE SERPL-SCNC: 97 MMOL/L
CO2 SERPL-SCNC: 34 MMOL/L
CREAT SERPL-MCNC: 1.6 MG/DL
DIFFERENTIAL METHOD: ABNORMAL
EOSINOPHIL # BLD AUTO: 0.1 K/UL
EOSINOPHIL NFR BLD: 1.1 %
ERYTHROCYTE [DISTWIDTH] IN BLOOD BY AUTOMATED COUNT: 18.6 %
EST. GFR  (AFRICAN AMERICAN): 41.2 ML/MIN/1.73 M^2
EST. GFR  (NON AFRICAN AMERICAN): 35.8 ML/MIN/1.73 M^2
ESTIMATED PA SYSTOLIC PRESSURE: 87.57
GLUCOSE SERPL-MCNC: 102 MG/DL
HCT VFR BLD AUTO: 33.2 %
HGB BLD-MCNC: 11.3 G/DL
IMM GRANULOCYTES # BLD AUTO: 0.04 K/UL
IMM GRANULOCYTES NFR BLD AUTO: 0.7 %
LYMPHOCYTES # BLD AUTO: 1.1 K/UL
LYMPHOCYTES NFR BLD: 17.9 %
MAGNESIUM SERPL-MCNC: 2.1 MG/DL
MCH RBC QN AUTO: 31.7 PG
MCHC RBC AUTO-ENTMCNC: 34 G/DL
MCV RBC AUTO: 93 FL
MITRAL VALVE MOBILITY: NORMAL
MONOCYTES # BLD AUTO: 0.5 K/UL
MONOCYTES NFR BLD: 7.7 %
NEUTROPHILS # BLD AUTO: 4.4 K/UL
NEUTROPHILS NFR BLD: 72.1 %
NRBC BLD-RTO: 0 /100 WBC
PHOSPHATE SERPL-MCNC: 3.5 MG/DL
PLATELET # BLD AUTO: 151 K/UL
PMV BLD AUTO: 12.2 FL
POTASSIUM SERPL-SCNC: 2.8 MMOL/L
PROT SERPL-MCNC: 6.5 G/DL
RBC # BLD AUTO: 3.56 M/UL
RETIRED EF AND QEF - SEE NOTES: 55 (ref 55–65)
SODIUM SERPL-SCNC: 142 MMOL/L
TRICUSPID VALVE REGURGITATION: ABNORMAL
WBC # BLD AUTO: 6.09 K/UL

## 2017-12-08 PROCEDURE — 99999 PR PBB SHADOW E&M-EST. PATIENT-LVL IV: CPT | Mod: PBBFAC,,, | Performed by: INTERNAL MEDICINE

## 2017-12-08 PROCEDURE — 83735 ASSAY OF MAGNESIUM: CPT | Mod: 91

## 2017-12-08 PROCEDURE — 63600175 PHARM REV CODE 636 W HCPCS: Performed by: INTERNAL MEDICINE

## 2017-12-08 PROCEDURE — 94620 PR PULMONARY STRESS TESTING,SIMPLE: CPT | Mod: GW,HPC,S$GLB, | Performed by: INTERNAL MEDICINE

## 2017-12-08 PROCEDURE — 93306 TTE W/DOPPLER COMPLETE: CPT | Mod: GW,S$GLB,HPC, | Performed by: INTERNAL MEDICINE

## 2017-12-08 PROCEDURE — 36415 COLL VENOUS BLD VENIPUNCTURE: CPT

## 2017-12-08 PROCEDURE — 27000221 HC OXYGEN, UP TO 24 HOURS

## 2017-12-08 PROCEDURE — 94640 AIRWAY INHALATION TREATMENT: CPT

## 2017-12-08 PROCEDURE — 25000003 PHARM REV CODE 250: Performed by: INTERNAL MEDICINE

## 2017-12-08 PROCEDURE — 84100 ASSAY OF PHOSPHORUS: CPT

## 2017-12-08 PROCEDURE — 94761 N-INVAS EAR/PLS OXIMETRY MLT: CPT

## 2017-12-08 PROCEDURE — 99214 OFFICE O/P EST MOD 30 MIN: CPT | Mod: GV,S$GLB,, | Performed by: INTERNAL MEDICINE

## 2017-12-08 PROCEDURE — 85025 COMPLETE CBC W/AUTO DIFF WBC: CPT | Mod: 91

## 2017-12-08 PROCEDURE — 80053 COMPREHEN METABOLIC PANEL: CPT | Mod: 91

## 2017-12-08 PROCEDURE — 20600001 HC STEP DOWN PRIVATE ROOM

## 2017-12-08 PROCEDURE — 25000242 PHARM REV CODE 250 ALT 637 W/ HCPCS: Performed by: INTERNAL MEDICINE

## 2017-12-08 RX ORDER — PANTOPRAZOLE SODIUM 40 MG/1
40 TABLET, DELAYED RELEASE ORAL DAILY
Status: DISCONTINUED | OUTPATIENT
Start: 2017-12-09 | End: 2017-12-27 | Stop reason: HOSPADM

## 2017-12-08 RX ORDER — BUSPIRONE HYDROCHLORIDE 5 MG/1
15 TABLET ORAL 3 TIMES DAILY
Status: DISCONTINUED | OUTPATIENT
Start: 2017-12-08 | End: 2017-12-27 | Stop reason: HOSPADM

## 2017-12-08 RX ORDER — LEVOTHYROXINE SODIUM 75 UG/1
75 TABLET ORAL DAILY
Status: DISCONTINUED | OUTPATIENT
Start: 2017-12-09 | End: 2017-12-27 | Stop reason: HOSPADM

## 2017-12-08 RX ORDER — POTASSIUM CHLORIDE 20 MEQ/1
40 TABLET, EXTENDED RELEASE ORAL ONCE
Status: COMPLETED | OUTPATIENT
Start: 2017-12-08 | End: 2017-12-08

## 2017-12-08 RX ORDER — POTASSIUM CHLORIDE 20 MEQ/1
20 TABLET, EXTENDED RELEASE ORAL DAILY
Status: DISCONTINUED | OUTPATIENT
Start: 2017-12-09 | End: 2017-12-14

## 2017-12-08 RX ORDER — HYDROCODONE BITARTRATE AND ACETAMINOPHEN 10; 325 MG/1; MG/1
1 TABLET ORAL EVERY 8 HOURS PRN
Status: DISCONTINUED | OUTPATIENT
Start: 2017-12-08 | End: 2017-12-27 | Stop reason: HOSPADM

## 2017-12-08 RX ORDER — ALBUTEROL SULFATE 0.83 MG/ML
2.5 SOLUTION RESPIRATORY (INHALATION) EVERY 4 HOURS
Status: DISCONTINUED | OUTPATIENT
Start: 2017-12-08 | End: 2017-12-27 | Stop reason: HOSPADM

## 2017-12-08 RX ORDER — GABAPENTIN 100 MG/1
100 CAPSULE ORAL 3 TIMES DAILY
Status: DISCONTINUED | OUTPATIENT
Start: 2017-12-08 | End: 2017-12-27 | Stop reason: HOSPADM

## 2017-12-08 RX ORDER — HEPARIN SODIUM 5000 [USP'U]/ML
5000 INJECTION, SOLUTION INTRAVENOUS; SUBCUTANEOUS EVERY 8 HOURS
Status: DISCONTINUED | OUTPATIENT
Start: 2017-12-08 | End: 2017-12-14

## 2017-12-08 RX ORDER — LAMOTRIGINE 100 MG/1
100 TABLET ORAL 2 TIMES DAILY
Status: DISCONTINUED | OUTPATIENT
Start: 2017-12-08 | End: 2017-12-27 | Stop reason: HOSPADM

## 2017-12-08 RX ORDER — METOLAZONE 2.5 MG/1
2.5 TABLET ORAL DAILY
Status: DISCONTINUED | OUTPATIENT
Start: 2017-12-09 | End: 2017-12-11

## 2017-12-08 RX ORDER — FLUTICASONE FUROATE AND VILANTEROL 100; 25 UG/1; UG/1
1 POWDER RESPIRATORY (INHALATION) DAILY
Status: DISCONTINUED | OUTPATIENT
Start: 2017-12-09 | End: 2017-12-27 | Stop reason: HOSPADM

## 2017-12-08 RX ORDER — DESVENLAFAXINE SUCCINATE 50 MG/1
100 TABLET, EXTENDED RELEASE ORAL DAILY
Status: DISCONTINUED | OUTPATIENT
Start: 2017-12-09 | End: 2017-12-27 | Stop reason: HOSPADM

## 2017-12-08 RX ORDER — PRAVASTATIN SODIUM 40 MG/1
40 TABLET ORAL DAILY
Status: DISCONTINUED | OUTPATIENT
Start: 2017-12-09 | End: 2017-12-27 | Stop reason: HOSPADM

## 2017-12-08 RX ORDER — AMLODIPINE BESYLATE 5 MG/1
5 TABLET ORAL DAILY
Status: DISCONTINUED | OUTPATIENT
Start: 2017-12-09 | End: 2017-12-27 | Stop reason: HOSPADM

## 2017-12-08 RX ORDER — SODIUM CHLORIDE 0.9 % (FLUSH) 0.9 %
3 SYRINGE (ML) INJECTION EVERY 8 HOURS
Status: DISCONTINUED | OUTPATIENT
Start: 2017-12-08 | End: 2017-12-27 | Stop reason: HOSPADM

## 2017-12-08 RX ADMIN — BUSPIRONE HYDROCHLORIDE 15 MG: 5 TABLET ORAL at 08:12

## 2017-12-08 RX ADMIN — LAMOTRIGINE 100 MG: 100 TABLET ORAL at 08:12

## 2017-12-08 RX ADMIN — FUROSEMIDE 20 MG/HR: 10 INJECTION, SOLUTION INTRAVENOUS at 08:12

## 2017-12-08 RX ADMIN — HYDROCODONE BITARTRATE AND ACETAMINOPHEN 1 TABLET: 10; 325 TABLET ORAL at 08:12

## 2017-12-08 RX ADMIN — ALBUTEROL SULFATE 2.5 MG: 2.5 SOLUTION RESPIRATORY (INHALATION) at 09:12

## 2017-12-08 RX ADMIN — GABAPENTIN 100 MG: 100 CAPSULE ORAL at 08:12

## 2017-12-08 RX ADMIN — POTASSIUM CHLORIDE 40 MEQ: 1500 TABLET, EXTENDED RELEASE ORAL at 08:12

## 2017-12-08 RX ADMIN — HEPARIN SODIUM 5000 UNITS: 5000 INJECTION, SOLUTION INTRAVENOUS; SUBCUTANEOUS at 08:12

## 2017-12-08 NOTE — TELEPHONE ENCOUNTER
"Received message from Mrs. Smith that she was snowed in and could not make her appt. Schedulers called to reschedule. Patient then called again and said she "has to come here today," "she is too sick." Advised the patient to go to the hospital close to her as the road conditions are bad and she should be seen right away. Patients states that they are already on their way and they are taking "backroads." Again tried to get patient to go to the hospital closest to her but she refused and is still traveling here.  Notifed Dr. Cullen of all.  "

## 2017-12-08 NOTE — PROGRESS NOTES
Subjective:    Patient ID:  Sue Smith is a 57 y.o. female who presents for    HPI 56 YOWM with PMH severe PAH on Uptravi .  records from OSH are discordant. Hospitalist / cardiology have her labeled as COPD and Obesity hypoventilation however consult by pulmonologist (Dr. Hardik Magallanes) states explicitly that PFTs done in 2015 showed only mild restriction and were not consistent with COPD. In addition he states that though she is mildly obese her pCO2 is normal at baseline ruling out obesity hypoventilation. What makes this more difficult is the fact that the patient is unsure if she has COPD as she has been told different things by different doctors over the years.  Has Severe Pulmonary HTN with  Chronic hypoxic respiratory failure for which she is on 3L all the time and BiPAP QHS, History of tracheostomy (4 years ago) and short term vent dependence. Tracheostomy was eventually de cannulated.     Previous tobacco abuse (stopped 15 yrs ago) - SHERIE (unclear severity) has been on trilogy vent for BiPAP at night however she she states that she no longer qualifies for this and no longer has the device. Is undergoing sleep study for workup. She was admitted 11/17 with volume overload,  A 8-10 lb weight gain , was started on Lasix gtt and ultimately transitioned to oral diuretic to Bumex 4mg BID. Since discharge she states that she initially felt well , but has felt the fluid build up over the last few weeks. States that she weighs 198 lbs today ( her dry weight on discharge was 182 lbs). She states that she does not feel as though she's diuresing quite as well with oral Bumex. She states she is not able to lay flat. Denies nausea, emesis. Trying to watch salt in her diet but admits she not doing a good job. She realizes many foods have salt, using salt shaker with many meat meals.                       Today : Distance 273.5 meters   10/24/2017---------Distance: 365.76 meters (1200 feet)    Review of Systems    Constitution: Positive for weakness. Negative for chills, decreased appetite and fever.   Cardiovascular: Positive for dyspnea on exertion, leg swelling and orthopnea. Negative for chest pain, irregular heartbeat, palpitations, paroxysmal nocturnal dyspnea and syncope.   Respiratory: Positive for shortness of breath. Negative for cough, sputum production and wheezing.    Skin: Negative for poor wound healing.   Gastrointestinal: Negative for bloating, abdominal pain, constipation, diarrhea, nausea and vomiting.   Psychiatric/Behavioral: Negative for altered mental status.        Objective:    Physical Exam   Constitutional: She is oriented to person, place, and time. She appears well-developed and well-nourished. No distress.   HENT:   Head: Normocephalic and atraumatic.   Neck: Normal range of motion. Neck supple. JVD present. No thyromegaly present.   Cardiovascular: Normal rate, regular rhythm, normal heart sounds and intact distal pulses.  Exam reveals no gallop and no friction rub.    No murmur heard.  Pulmonary/Chest: Effort normal and breath sounds normal. No respiratory distress. She has no wheezes. She has no rales. She exhibits no tenderness.   Abdominal: Soft. Bowel sounds are normal. She exhibits no distension. There is no tenderness. There is no rebound and no guarding.   Musculoskeletal: She exhibits edema.   Neurological: She is alert and oriented to person, place, and time. She has normal reflexes.   Skin: Skin is warm and dry. She is not diaphoretic.                14:15  (12/8/17) 1mo ago  (10/24/17) 2mo ago  (10/9/17) 2mo ago  (10/5/17)    BNP 0 - 99 pg/mL 1,489   1,411CM   381CM   669CM         Assessment:       1. Pulmonary hypertension, group 1    2. Chronic respiratory failure with hypoxia    3. Essential hypertension    4. Acute on chronic right heart failure         Plan:       Who group I Functional Class III   Warm and Wet   Volume overloaded by exam, BNP , weight ( although echo with  intermediate IVC)    Would diurese with IV lasix @ 20mg/hr as she had been last admit   Will consider switching to parenteral agent in future     Needs oxygen bottle for ride home as her present bottle is empty.

## 2017-12-08 NOTE — PROGRESS NOTES
I have personally taken the history and examined this patient and agree with Dr Sandy's note as stated above.      Admit today for acute on chronic RV failure will diurese with lasix gtt over the weekend and plan to transition from uptravi to IV remodulin once approved (Pt now FC IV with syncope)  Pt given educational materials today and her friend has agreed to be her caregivier- she will return Monday to receive education

## 2017-12-09 LAB
ANION GAP SERPL CALC-SCNC: 12 MMOL/L
ANION GAP SERPL CALC-SCNC: 13 MMOL/L
BUN SERPL-MCNC: 23 MG/DL
BUN SERPL-MCNC: 24 MG/DL
CALCIUM SERPL-MCNC: 10 MG/DL
CALCIUM SERPL-MCNC: 9.9 MG/DL
CHLORIDE SERPL-SCNC: 95 MMOL/L
CHLORIDE SERPL-SCNC: 95 MMOL/L
CO2 SERPL-SCNC: 33 MMOL/L
CO2 SERPL-SCNC: 33 MMOL/L
CREAT SERPL-MCNC: 1.4 MG/DL
CREAT SERPL-MCNC: 1.6 MG/DL
EST. GFR  (AFRICAN AMERICAN): 41.2 ML/MIN/1.73 M^2
EST. GFR  (AFRICAN AMERICAN): 48.5 ML/MIN/1.73 M^2
EST. GFR  (NON AFRICAN AMERICAN): 35.8 ML/MIN/1.73 M^2
EST. GFR  (NON AFRICAN AMERICAN): 42 ML/MIN/1.73 M^2
GLUCOSE SERPL-MCNC: 129 MG/DL
GLUCOSE SERPL-MCNC: 77 MG/DL
MAGNESIUM SERPL-MCNC: 1.9 MG/DL
POTASSIUM SERPL-SCNC: 2.7 MMOL/L
POTASSIUM SERPL-SCNC: 3.6 MMOL/L
SODIUM SERPL-SCNC: 140 MMOL/L
SODIUM SERPL-SCNC: 141 MMOL/L

## 2017-12-09 PROCEDURE — 20600001 HC STEP DOWN PRIVATE ROOM

## 2017-12-09 PROCEDURE — 94640 AIRWAY INHALATION TREATMENT: CPT

## 2017-12-09 PROCEDURE — 25000242 PHARM REV CODE 250 ALT 637 W/ HCPCS: Performed by: INTERNAL MEDICINE

## 2017-12-09 PROCEDURE — 36415 COLL VENOUS BLD VENIPUNCTURE: CPT

## 2017-12-09 PROCEDURE — 63600175 PHARM REV CODE 636 W HCPCS: Performed by: INTERNAL MEDICINE

## 2017-12-09 PROCEDURE — 83735 ASSAY OF MAGNESIUM: CPT

## 2017-12-09 PROCEDURE — 80048 BASIC METABOLIC PNL TOTAL CA: CPT

## 2017-12-09 PROCEDURE — 27000221 HC OXYGEN, UP TO 24 HOURS

## 2017-12-09 PROCEDURE — 94761 N-INVAS EAR/PLS OXIMETRY MLT: CPT

## 2017-12-09 PROCEDURE — A4216 STERILE WATER/SALINE, 10 ML: HCPCS | Performed by: INTERNAL MEDICINE

## 2017-12-09 PROCEDURE — 25000003 PHARM REV CODE 250: Performed by: INTERNAL MEDICINE

## 2017-12-09 RX ORDER — POTASSIUM CHLORIDE 20 MEQ/15ML
60 SOLUTION ORAL ONCE
Status: COMPLETED | OUTPATIENT
Start: 2017-12-09 | End: 2017-12-09

## 2017-12-09 RX ORDER — POTASSIUM CHLORIDE 750 MG/1
10 CAPSULE, EXTENDED RELEASE ORAL ONCE
Status: COMPLETED | OUTPATIENT
Start: 2017-12-09 | End: 2017-12-09

## 2017-12-09 RX ORDER — OXYCODONE AND ACETAMINOPHEN 5; 325 MG/1; MG/1
1 TABLET ORAL EVERY 4 HOURS PRN
Status: DISCONTINUED | OUTPATIENT
Start: 2017-12-09 | End: 2017-12-10

## 2017-12-09 RX ORDER — ACETAMINOPHEN 325 MG/1
650 TABLET ORAL EVERY 6 HOURS PRN
Status: DISCONTINUED | OUTPATIENT
Start: 2017-12-09 | End: 2017-12-10

## 2017-12-09 RX ORDER — ONDANSETRON 8 MG/1
8 TABLET, ORALLY DISINTEGRATING ORAL EVERY 8 HOURS PRN
Status: DISCONTINUED | OUTPATIENT
Start: 2017-12-09 | End: 2017-12-10

## 2017-12-09 RX ORDER — POTASSIUM CHLORIDE 20 MEQ/15ML
40 SOLUTION ORAL ONCE
Status: DISCONTINUED | OUTPATIENT
Start: 2017-12-09 | End: 2017-12-27 | Stop reason: HOSPADM

## 2017-12-09 RX ORDER — LOPERAMIDE HYDROCHLORIDE 2 MG/1
2 CAPSULE ORAL EVERY 4 HOURS PRN
Status: DISCONTINUED | OUTPATIENT
Start: 2017-12-09 | End: 2017-12-10

## 2017-12-09 RX ORDER — POTASSIUM CHLORIDE 750 MG/1
10 CAPSULE, EXTENDED RELEASE ORAL ONCE
Status: DISCONTINUED | OUTPATIENT
Start: 2017-12-09 | End: 2017-12-09

## 2017-12-09 RX ORDER — ONDANSETRON 2 MG/ML
4 INJECTION INTRAMUSCULAR; INTRAVENOUS EVERY 8 HOURS PRN
Status: DISCONTINUED | OUTPATIENT
Start: 2017-12-09 | End: 2017-12-10

## 2017-12-09 RX ADMIN — GABAPENTIN 100 MG: 100 CAPSULE ORAL at 05:12

## 2017-12-09 RX ADMIN — ALBUTEROL SULFATE 2.5 MG: 2.5 SOLUTION RESPIRATORY (INHALATION) at 07:12

## 2017-12-09 RX ADMIN — PANTOPRAZOLE SODIUM 40 MG: 40 TABLET, DELAYED RELEASE ORAL at 08:12

## 2017-12-09 RX ADMIN — BUSPIRONE HYDROCHLORIDE 15 MG: 5 TABLET ORAL at 01:12

## 2017-12-09 RX ADMIN — BUSPIRONE HYDROCHLORIDE 15 MG: 5 TABLET ORAL at 10:12

## 2017-12-09 RX ADMIN — ALBUTEROL SULFATE 2.5 MG: 2.5 SOLUTION RESPIRATORY (INHALATION) at 04:12

## 2017-12-09 RX ADMIN — AMLODIPINE BESYLATE 5 MG: 5 TABLET ORAL at 08:12

## 2017-12-09 RX ADMIN — HEPARIN SODIUM 5000 UNITS: 5000 INJECTION, SOLUTION INTRAVENOUS; SUBCUTANEOUS at 10:12

## 2017-12-09 RX ADMIN — ALBUTEROL SULFATE 2.5 MG: 2.5 SOLUTION RESPIRATORY (INHALATION) at 11:12

## 2017-12-09 RX ADMIN — LAMOTRIGINE 100 MG: 100 TABLET ORAL at 08:12

## 2017-12-09 RX ADMIN — DESVENLAFAXINE SUCCINATE 100 MG: 50 TABLET, FILM COATED, EXTENDED RELEASE ORAL at 08:12

## 2017-12-09 RX ADMIN — GABAPENTIN 100 MG: 100 CAPSULE ORAL at 10:12

## 2017-12-09 RX ADMIN — FLUTICASONE FUROATE AND VILANTEROL TRIFENATATE 1 PUFF: 100; 25 POWDER RESPIRATORY (INHALATION) at 08:12

## 2017-12-09 RX ADMIN — ALBUTEROL SULFATE 2.5 MG: 2.5 SOLUTION RESPIRATORY (INHALATION) at 01:12

## 2017-12-09 RX ADMIN — BUSPIRONE HYDROCHLORIDE 15 MG: 5 TABLET ORAL at 05:12

## 2017-12-09 RX ADMIN — Medication 3 ML: at 10:12

## 2017-12-09 RX ADMIN — POTASSIUM CHLORIDE 20 MEQ: 1500 TABLET, EXTENDED RELEASE ORAL at 08:12

## 2017-12-09 RX ADMIN — HYDROCODONE BITARTRATE AND ACETAMINOPHEN 1 TABLET: 10; 325 TABLET ORAL at 09:12

## 2017-12-09 RX ADMIN — METOLAZONE 2.5 MG: 2.5 TABLET ORAL at 08:12

## 2017-12-09 RX ADMIN — POTASSIUM CHLORIDE 60 MEQ: 20 SOLUTION ORAL at 05:12

## 2017-12-09 RX ADMIN — PRAVASTATIN SODIUM 40 MG: 40 TABLET ORAL at 08:12

## 2017-12-09 RX ADMIN — GABAPENTIN 100 MG: 100 CAPSULE ORAL at 01:12

## 2017-12-09 RX ADMIN — POTASSIUM CHLORIDE 10 MEQ: 750 CAPSULE, EXTENDED RELEASE ORAL at 08:12

## 2017-12-09 RX ADMIN — FUROSEMIDE 20 MG/HR: 10 INJECTION, SOLUTION INTRAVENOUS at 06:12

## 2017-12-09 RX ADMIN — HEPARIN SODIUM 5000 UNITS: 5000 INJECTION, SOLUTION INTRAVENOUS; SUBCUTANEOUS at 01:12

## 2017-12-09 RX ADMIN — LAMOTRIGINE 100 MG: 100 TABLET ORAL at 10:12

## 2017-12-09 RX ADMIN — HEPARIN SODIUM 5000 UNITS: 5000 INJECTION, SOLUTION INTRAVENOUS; SUBCUTANEOUS at 05:12

## 2017-12-09 RX ADMIN — LEVOTHYROXINE SODIUM 75 MCG: 75 TABLET ORAL at 08:12

## 2017-12-09 NOTE — PROCEDURES
Sue Smith is a 57 y.o.  female patient, who presents for a 6 minute walk test ordered by Liliana Booker MD.  The diagnosis is Pulmonary Hypertension.  The patient's BMI is 32.9 kg/m2.  Predicted distance (lower limit of normal) is 346.22 meters.      Test Results:    The test was completed without stopping.  The total time walked was 360 seconds.  During walking, the patient reported:  Dyspnea, Lightheadedness.  The patient used supplemental oxygen during testing.     12/08/2017---------Distance: 273.41 meters (897 feet)     O2 Sat % Supplemental Oxygen Heart Rate Blood Pressure Erasmo Scale   Pre-exercise  (Resting) 96 % 3 L/M 86 bpm 133/63 mmHg 3   During Exercise 89 % 3 L/M 102 bpm 112/63 mmHg 5-6   Post-exercise  (Recovery) 92 % 3 L/M  87 bpm       Recovery Time:  160 seconds    Performing nurse/tech:  MERARI Nicholas      PREVIOUS STUDY:   10/24/2017---------Distance: 365.76 meters (1200 feet)       O2 Sat % Supplemental Oxygen Heart Rate Blood Pressure Erasmo Scale   Pre-exercise  (Resting) 96 % 3 L/M 80 bpm 134/69 mmHg 0   During Exercise 93 % 3 L/M 108 bpm 149/71 mmHg 3   Post-exercise  (Recovery) 94 % 3 L/M  89 bpm   mmHg         CLINICAL INTERPRETATION:  Six minute walk distance is 273.41 meters (897 feet) with heavy dyspnea.  During exercise, there was significant desaturation while breathing supplemental oxygen.  Blood pressure decreased significantly and Heart rate remained stable with walking.  The patient reported non-pulmonary symptoms during exercise.  Significant exercise impairment is likely due to desaturation and subjective symptoms.  Since the previous study in October 2017, exercise capacity is significantly worse.  Based upon age and body mass index, exercise capacity is less than predicted.

## 2017-12-09 NOTE — ASSESSMENT & PLAN NOTE
Severe PH with markedly reduced CI also severe RV dysfunction. Left her medications at home. Will talk with Dr. Cullen about further medications. Will continue with O2 for now.

## 2017-12-09 NOTE — H&P
[facility]  Cardiology  History and Physical     Patient Name: Sue Smith  MRN: [id number]  Admission Date: 12/8/2017  Code Status: Full Code   Attending Provider: Jacky Wong Jr.,*   Primary Care Physician: Paddy Guerrier DO  Principal Problem:<principal problem not specified>    Patient information was obtained from patient, past medical records and ER records.     Subjective:     Chief Complaint:  Shortness of breath and LE swelling       HPI:  Patient was seen and examined by Dr. Sandy, the heart failure fellow, with following HnP which is no changed in narrative ater I spoke with the patient.     56 YOWM with PMH severe PAH on Uptravi .  records from OSH are discordant. Hospitalist / cardiology have her labeled as COPD and Obesity hypoventilation however consult by pulmonologist (Dr. Hardik Magallanes) states explicitly that PFTs done in 2015 showed only mild restriction and were not consistent with COPD. In addition he states that though she is mildly obese her pCO2 is normal at baseline ruling out obesity hypoventilation. What makes this more difficult is the fact that the patient is unsure if she has COPD as she has been told different things by different doctors over the years.  Has Severe Pulmonary HTN with  Chronic hypoxic respiratory failure for which she is on 3L all the time and BiPAP QHS, History of tracheostomy (4 years ago) and short term vent dependence. Tracheostomy was eventually de cannulated.      Previous tobacco abuse (stopped 15 yrs ago) - SHERIE (unclear severity) has been on trilogy vent for BiPAP at night however she she states that she no longer qualifies for this and no longer has the device. Is undergoing sleep study for workup. She was admitted 11/17 with volume overload,  A 8-10 lb weight gain , was started on Lasix gtt and ultimately transitioned to oral diuretic to Bumex 4mg BID. Since discharge she states that she initially felt well , but has felt the fluid build  up over the last few weeks. States that she weighs 198 lbs today ( her dry weight on discharge was 182 lbs). She states that she does not feel as though she's diuresing quite as well with oral Bumex. She states she is not able to lay flat. Denies nausea, emesis. Trying to watch salt in her diet but admits she not doing a good job. She realizes many foods have salt, using salt shaker with many meat meals.           Past Medical History:   Diagnosis Date    Bipolar disorder     CHF (congestive heart failure)     Dyslipidemia     GERD (gastroesophageal reflux disease)     Hx of tracheostomy     Decanulated / surgically removed in 2013? Does not currently have tracheostomy    Hypertension     Hypothyroidism     Oxygen dependent     3L around the clock     Pulmonary HTN     Pulmonary hypertension, group 1 10/4/2017    Pulmonary nodules 10/10/2017    Respiratory failure, chronic        Past Surgical History:   Procedure Laterality Date    BACK SURGERY      PERICARDIAL WINDOW  2013    [place] LEFT HEART CATH,PERCUTANEOUS      Cardiac Cath, Left Heart    TUBAL LIGATION         Review of patient's allergies indicates:   Allergen Reactions    Sulfa (sulfonamide antibiotics) Rash       No current facility-administered medications on file prior to encounter.      Current Outpatient Prescriptions on File Prior to Encounter   Medication Sig    albuterol (PROVENTIL) 2.5 mg /3 mL (0.083 %) nebulizer solution Take 2.5 mg by nebulization every 6 (six) hours as needed for Wheezing. Rescue    amlodipine (NORVASC) 5 MG tablet Take 5 mg by mouth once daily.    budesonide-formoterol 80-4.5 mcg (SYMBICORT) 80-4.5 mcg/actuation HFAA Inhale 2 puffs into the lungs 2 (two) times daily. Controller    bumetanide (BUMEX) 2 MG tablet Take 2 tablets (4 mg total) by mouth 2 (two) times daily.    busPIRone (BUSPAR) 15 MG tablet Take 15 mg by mouth 3 (three) times daily.    desvenlafaxine succinate (PRISTIQ) 100 MG Tb24 Take 100 mg  by mouth once daily.    gabapentin (NEURONTIN) 100 MG capsule Take 1 capsule (100 mg total) by mouth 3 (three) times daily.    hydrocodone-acetaminophen 10-325mg (NORCO)  mg Tab Take 1 tablet by mouth every 8 (eight) hours as needed for Pain.    lamotrigine (LAMICTAL) 100 MG tablet Take 100 mg by mouth 2 (two) times daily.    levothyroxine (SYNTHROID) 75 MCG tablet Take 75 mcg by mouth once daily.    lurasidone (LATUDA) 60 mg Tab tablet Take 60 mg by mouth once daily.    metOLazone (ZAROXOLYN) 2.5 MG tablet Take 1 tablet (2.5 mg total) by mouth daily as needed. Take for wt gain 3# overnight or 5# in 1 wk, with extra potassium    pantoprazole (PROTONIX) 40 MG tablet Take 40 mg by mouth once daily.    potassium chloride SA (K-DUR,KLOR-CON) 10 MEQ tablet Take 2 tablets (20 mEq total) by mouth once daily.    pravastatin (PRAVACHOL) 40 MG tablet Take 40 mg by mouth once daily.    selexipag 200 mcg (140)- 800 mcg (60) DsPk Take 200 mcg BID by mouth for 1 week, then increase by 200 mcg BID, at weekly intervals, to the highest tolerated dose up to 1600 mcg BID.     Family History     Problem Relation (Age of Onset)    Cancer Mother, Sister    Hypertension Mother, Father        Social History Main Topics    Smoking status: Former Smoker     Types: Cigarettes     Start date: 1/1/1979     Quit date: 1/5/1997    Smokeless tobacco: Never Used    Alcohol use No    Drug use: No    Sexual activity: No     Review of Systems   Constitution: Positive for weight gain.   HENT: Negative for congestion, hearing loss, nosebleeds and sore throat.    Eyes: Negative.    Cardiovascular: Positive for dyspnea on exertion. Negative for chest pain, claudication, irregular heartbeat and near-syncope.   Respiratory: Positive for shortness of breath. Negative for sputum production and wheezing.    Endocrine: Negative.    Hematologic/Lymphatic: Negative.    Skin: Negative.    Musculoskeletal: Negative.    Neurological: Negative.     Psychiatric/Behavioral: Negative.    Allergic/Immunologic: Negative.      Objective:     Vital Signs (Most Recent):  Temp: 98.5 °F (36.9 °C) (12/08/17 1830)  Pulse: 75 (12/08/17 1830)  Resp: 18 (12/08/17 1830)  BP: 127/75 (12/08/17 1830)  SpO2: (!) 94 % (12/08/17 1830) Vital Signs (24h Range):  Temp:  [98.5 °F (36.9 °C)] 98.5 °F (36.9 °C)  Pulse:  [75-83] 75  Resp:  [18] 18  SpO2:  [90 %-94 %] 94 %  BP: (123-127)/(65-75) 127/75        There is no height or weight on file to calculate BMI.    SpO2: (!) 94 %  O2 Device (Oxygen Therapy): nasal cannula w/ humidification    No intake or output data in the 24 hours ending 12/08/17 2009    Lines/Drains/Airways          No matching active lines, drains, or airways          Physical Exam   Constitutional: She is oriented to person, place, and time. She appears well-developed and well-nourished. No distress.   HENT:   Head: Normocephalic and atraumatic.   Mouth/Throat: Oropharynx is clear and moist.   Eyes: EOM are normal. Pupils are equal, round, and reactive to light. Right eye exhibits no discharge. Left eye exhibits no discharge. No scleral icterus.   Neck: Normal range of motion. Neck supple. No JVD present. No tracheal deviation present. No thyromegaly present.   Cardiovascular: Normal rate, regular rhythm and intact distal pulses.    Pulmonary/Chest: Effort normal and breath sounds normal. No stridor.   Abdominal: Soft. Bowel sounds are normal. She exhibits no distension. There is no rebound and no guarding.   Musculoskeletal: Normal range of motion. She exhibits no edema or tenderness.   Neurological: She is alert and oriented to person, place, and time.   Skin: Skin is warm and dry. She is not diaphoretic.   Psychiatric: She has a normal mood and affect.   Nursing note and vitals reviewed.      Significant Labs:   CMP   Recent Labs  Lab 12/08/17  1415      K 2.9*   CL 96   CO2 33*   GLU 88   BUN 25*   CREATININE 1.7*   CALCIUM 9.5   PROT 7.1   ALBUMIN 4.1    BILITOT 0.7   ALKPHOS 110   AST 31   ALT 23   ANIONGAP 14   ESTGFRAFRICA 38.3*   EGFRNONAA 33.2*   , CBC   Recent Labs  Lab 12/08/17  1415 12/08/17 1945   WBC 6.65 6.09   HGB 12.3 11.3*   HCT 37.4 33.2*    151    and INR No results for input(s): INR, PROTIME in the last 48 hours.    Significant Imaging: X-Ray: CXR: X-Ray Chest 1 View (CXR): No results found for this visit on 12/08/17.    Assessment and Plan:     Acute on chronic right heart failure    - In the setting of primary pulmonary HTN  - e/o volume overload on exam  - Start on Lasix drip at 20 mg/hrm  - pt already has low k;please replete the K   - check Mag in the mornng and replete.         Dyslipidemia    -continue statins, follow up with your clinician.         Chronic respiratory failure with hypoxia    - Currently not decompensated  - Continue supplemental oxygen.           Pulmonary hypertension, group 1    - Continue amlodipine,             VTE Risk Mitigation         Ordered     heparin (porcine) injection 5,000 Units  Every 8 hours     Route:  Subcutaneous        12/08/17 1925     Medium Risk of VTE  Once      12/08/17 1925          Joshua John MD  Cardiology   [facility]

## 2017-12-09 NOTE — PLAN OF CARE
Problem: Patient Care Overview  Goal: Plan of Care Review  Outcome: Ongoing (interventions implemented as appropriate)  Plan of care reviewed with patient. Pt verbalized understanding. Pt AAOX4. Pt free from falls, injuries and trauma.  Pt free from skin breakdown.  Pt VSS and in NAD.  Pt afebrile.  Pt had no complaints of SOB, N/V or CP.  Pt had no complaints of pain this shift. Pt on 3L O2 humidified NC. No complaints of dyspnea. O2 sats >93%. Pt ambulated in room independently.  Adequate UOP this shift. No BM this shift. Pt had uneventful evening. Pt in low locked bed with personal items and call light within reach. Fall precautions maintained.

## 2017-12-09 NOTE — PROGRESS NOTES
Notified Dr. Lundberg of patient's arrival to [facility].   Dr. Lundberg stated Dr. Everett will be by to assess patient and place orders within the next 20 minutes.   Will continue to monitor.

## 2017-12-09 NOTE — SUBJECTIVE & OBJECTIVE
Past Medical History:   Diagnosis Date    Bipolar disorder     CHF (congestive heart failure)     Dyslipidemia     GERD (gastroesophageal reflux disease)     Hx of tracheostomy     Decanulated / surgically removed in 2013? Does not currently have tracheostomy    Hypertension     Hypothyroidism     Oxygen dependent     3L around the clock     Pulmonary HTN     Pulmonary hypertension, group 1 10/4/2017    Pulmonary nodules 10/10/2017    Respiratory failure, chronic        Past Surgical History:   Procedure Laterality Date    BACK SURGERY      PERICARDIAL WINDOW  2013    [place] LEFT HEART CATH,PERCUTANEOUS      Cardiac Cath, Left Heart    TUBAL LIGATION         Review of patient's allergies indicates:   Allergen Reactions    Sulfa (sulfonamide antibiotics) Rash       No current facility-administered medications on file prior to encounter.      Current Outpatient Prescriptions on File Prior to Encounter   Medication Sig    albuterol (PROVENTIL) 2.5 mg /3 mL (0.083 %) nebulizer solution Take 2.5 mg by nebulization every 6 (six) hours as needed for Wheezing. Rescue    amlodipine (NORVASC) 5 MG tablet Take 5 mg by mouth once daily.    budesonide-formoterol 80-4.5 mcg (SYMBICORT) 80-4.5 mcg/actuation HFAA Inhale 2 puffs into the lungs 2 (two) times daily. Controller    bumetanide (BUMEX) 2 MG tablet Take 2 tablets (4 mg total) by mouth 2 (two) times daily.    busPIRone (BUSPAR) 15 MG tablet Take 15 mg by mouth 3 (three) times daily.    desvenlafaxine succinate (PRISTIQ) 100 MG Tb24 Take 100 mg by mouth once daily.    gabapentin (NEURONTIN) 100 MG capsule Take 1 capsule (100 mg total) by mouth 3 (three) times daily.    hydrocodone-acetaminophen 10-325mg (NORCO)  mg Tab Take 1 tablet by mouth every 8 (eight) hours as needed for Pain.    lamotrigine (LAMICTAL) 100 MG tablet Take 100 mg by mouth 2 (two) times daily.    levothyroxine (SYNTHROID) 75 MCG tablet Take 75 mcg by mouth once daily.     lurasidone (LATUDA) 60 mg Tab tablet Take 60 mg by mouth once daily.    metOLazone (ZAROXOLYN) 2.5 MG tablet Take 1 tablet (2.5 mg total) by mouth daily as needed. Take for wt gain 3# overnight or 5# in 1 wk, with extra potassium    pantoprazole (PROTONIX) 40 MG tablet Take 40 mg by mouth once daily.    potassium chloride SA (K-DUR,KLOR-CON) 10 MEQ tablet Take 2 tablets (20 mEq total) by mouth once daily.    pravastatin (PRAVACHOL) 40 MG tablet Take 40 mg by mouth once daily.    selexipag 200 mcg (140)- 800 mcg (60) DsPk Take 200 mcg BID by mouth for 1 week, then increase by 200 mcg BID, at weekly intervals, to the highest tolerated dose up to 1600 mcg BID.     Family History     Problem Relation (Age of Onset)    Cancer Mother, Sister    Hypertension Mother, Father        Social History Main Topics    Smoking status: Former Smoker     Types: Cigarettes     Start date: 1/1/1979     Quit date: 1/5/1997    Smokeless tobacco: Never Used    Alcohol use No    Drug use: No    Sexual activity: No     Review of Systems   Constitution: Positive for weight gain.   HENT: Negative for congestion, hearing loss, nosebleeds and sore throat.    Eyes: Negative.    Cardiovascular: Positive for dyspnea on exertion. Negative for chest pain, claudication, irregular heartbeat and near-syncope.   Respiratory: Positive for shortness of breath. Negative for sputum production and wheezing.    Endocrine: Negative.    Hematologic/Lymphatic: Negative.    Skin: Negative.    Musculoskeletal: Negative.    Neurological: Negative.    Psychiatric/Behavioral: Negative.    Allergic/Immunologic: Negative.      Objective:     Vital Signs (Most Recent):  Temp: 98.5 °F (36.9 °C) (12/08/17 1830)  Pulse: 75 (12/08/17 1830)  Resp: 18 (12/08/17 1830)  BP: 127/75 (12/08/17 1830)  SpO2: (!) 94 % (12/08/17 1830) Vital Signs (24h Range):  Temp:  [98.5 °F (36.9 °C)] 98.5 °F (36.9 °C)  Pulse:  [75-83] 75  Resp:  [18] 18  SpO2:  [90 %-94 %] 94 %  BP:  (123-127)/(65-75) 127/75        There is no height or weight on file to calculate BMI.    SpO2: (!) 94 %  O2 Device (Oxygen Therapy): nasal cannula w/ humidification    No intake or output data in the 24 hours ending 12/08/17 2009    Lines/Drains/Airways          No matching active lines, drains, or airways          Physical Exam   Constitutional: She is oriented to person, place, and time. She appears well-developed and well-nourished. No distress.   HENT:   Head: Normocephalic and atraumatic.   Mouth/Throat: Oropharynx is clear and moist.   Eyes: EOM are normal. Pupils are equal, round, and reactive to light. Right eye exhibits no discharge. Left eye exhibits no discharge. No scleral icterus.   Neck: Normal range of motion. Neck supple. No JVD present. No tracheal deviation present. No thyromegaly present.   Cardiovascular: Normal rate, regular rhythm and intact distal pulses.    Pulmonary/Chest: Effort normal and breath sounds normal. No stridor.   Abdominal: Soft. Bowel sounds are normal. She exhibits no distension. There is no rebound and no guarding.   Musculoskeletal: Normal range of motion. She exhibits no edema or tenderness.   Neurological: She is alert and oriented to person, place, and time.   Skin: Skin is warm and dry. She is not diaphoretic.   Psychiatric: She has a normal mood and affect.   Nursing note and vitals reviewed.      Significant Labs:   CMP   Recent Labs  Lab 12/08/17  1415      K 2.9*   CL 96   CO2 33*   GLU 88   BUN 25*   CREATININE 1.7*   CALCIUM 9.5   PROT 7.1   ALBUMIN 4.1   BILITOT 0.7   ALKPHOS 110   AST 31   ALT 23   ANIONGAP 14   ESTGFRAFRICA 38.3*   EGFRNONAA 33.2*   , CBC   Recent Labs  Lab 12/08/17  1415 12/08/17  1945   WBC 6.65 6.09   HGB 12.3 11.3*   HCT 37.4 33.2*    151    and INR No results for input(s): INR, PROTIME in the last 48 hours.    Significant Imaging: X-Ray: CXR: X-Ray Chest 1 View (CXR): No results found for this visit on 12/08/17.

## 2017-12-09 NOTE — ASSESSMENT & PLAN NOTE
- In the setting of primary pulmonary HTN  - e/o volume overload on exam  - Start on Lasix drip at 20 mg/hrm  - pt already has low k;please replete the K   - check Mag in the mornng and replete.

## 2017-12-09 NOTE — PROGRESS NOTES
[facility]  Heart Transplant  Progress Note    Patient Name: Sue Smith  MRN: [id number]  Admission Date: 12/8/2017  Hospital Length of Stay: 1 days  Attending Physician: Jacky Wong Jr.,*  Primary Care Provider: Paddy Guerrier DO  Principal Problem:Acute on chronic right heart failure    Subjective:     Interval History: Feeling better, diuresing well.     Continuous Infusions:   furosemide (LASIX) 5 mg/mL infusion (non-titrating) 20 mg/hr (12/08/17 2055)     Scheduled Meds:   albuterol sulfate  2.5 mg Nebulization Q4H    amLODIPine  5 mg Oral Daily    busPIRone  15 mg Oral TID    desvenlafaxine succinate  100 mg Oral Daily    fluticasone-vilanterol  1 puff Inhalation Daily    gabapentin  100 mg Oral TID    heparin (porcine)  5,000 Units Subcutaneous Q8H    lamoTRIgine  100 mg Oral BID    levothyroxine  75 mcg Oral Daily    metOLazone  2.5 mg Oral Daily    pantoprazole  40 mg Oral Daily    potassium chloride 10%  40 mEq Oral Once    potassium chloride  10 mEq Oral Once    potassium chloride SA  20 mEq Oral Daily    pravastatin  40 mg Oral Daily    selexipag 200 mcg (140)- 800 mcg (60) DsPk  1,400 mcg Oral BID    sodium chloride 0.9%  3 mL Intravenous Q8H     PRN Meds:hydrocodone-acetaminophen 10-325mg    Review of patient's allergies indicates:   Allergen Reactions    Sulfa (sulfonamide antibiotics) Rash     Objective:     Vital Signs (Most Recent):  Temp: 97.5 °F (36.4 °C) (12/09/17 0311)  Pulse: 72 (12/09/17 0728)  Resp: (!) 95 (12/09/17 0723)  BP: 113/76 (12/09/17 0311)  SpO2: (!) 94 % (12/09/17 0723) Vital Signs (24h Range):  Temp:  [97.5 °F (36.4 °C)-98.5 °F (36.9 °C)] 97.5 °F (36.4 °C)  Pulse:  [70-83] 72  Resp:  [16-95] 95  SpO2:  [90 %-97 %] 94 %  BP: (103-127)/(65-81) 113/76     Patient Vitals for the past 72 hrs (Last 3 readings):   Weight   12/09/17 0400 87.2 kg (192 lb 3.9 oz)   12/08/17 2100 88.3 kg (194 lb 10.7 oz)   12/08/17 1925 88.3 kg (194 lb 10.7 oz)      Body mass index is 35.16 kg/m².      Intake/Output Summary (Last 24 hours) at 12/09/17 0808  Last data filed at 12/09/17 0600   Gross per 24 hour   Intake                0 ml   Output             1800 ml   Net            -1800 ml       Hemodynamic Parameters:         Physical Exam   Constitutional: She is oriented to person, place, and time. She appears well-developed and well-nourished.   HENT:   Head: Normocephalic.   Neck: Normal range of motion. JVD present.   Cardiovascular: Normal rate and regular rhythm.    Pulmonary/Chest: Effort normal and breath sounds normal.   Abdominal: Soft.   Musculoskeletal: She exhibits edema.   Neurological: She is alert and oriented to person, place, and time.   Skin: Skin is warm and dry.       Significant Labs:  CBC:    Recent Labs  Lab 12/08/17 1415 12/08/17 1945   WBC 6.65 6.09   RBC 3.93* 3.56*   HGB 12.3 11.3*   HCT 37.4 33.2*    151   MCV 95 93   MCH 31.3* 31.7*   MCHC 32.9 34.0     BNP:    Recent Labs  Lab 12/08/17  1415   BNP 1,489*     CMP:    Recent Labs  Lab 12/08/17  1415 12/08/17 1945 12/09/17  0444   GLU 88 102 77   CALCIUM 9.5 9.7 9.9   ALBUMIN 4.1 3.8  --    PROT 7.1 6.5  --     142 141   K 2.9* 2.8* 2.7*   CO2 33* 34* 33*   CL 96 97 95   BUN 25* 26* 23*   CREATININE 1.7* 1.6* 1.4   ALKPHOS 110 104  --    ALT 23 23  --    AST 31 29  --    BILITOT 0.7 0.7  --       Coagulation:   No results for input(s): PT, INR, APTT in the last 168 hours.  LDH:  No results for input(s): LDH in the last 72 hours.  Microbiology:  Microbiology Results (last 7 days)     ** No results found for the last 168 hours. **          I have reviewed all pertinent labs within the past 24 hours.    Estimated Creatinine Clearance: 46 mL/min (based on SCr of 1.4 mg/dL).    Diagnostic Results:  I have reviewed and interpreted all pertinent imaging results/findings within the past 24 hours.    Assessment and Plan:     No notes on file    * Acute on chronic right heart failure     - In the setting of primary pulmonary HTN  - e/o volume overload on exam  - Continue Lasix drip at 20 mg/hr  - Repeat renal panel pending will need to stay on top of this as she was low when she got here.   - check Mag in the mornng and replete.           Pulmonary hypertension, group 1    Severe PH with markedly reduced CI also severe RV dysfunction. Left her medications at home. Will talk with Dr. Cullen about further medications. Will continue with O2 for now.           Chronic respiratory failure with hypoxia    2/2 PH as above        GERD (gastroesophageal reflux disease)    Continue home medications.         Bipolar disorder    Continue home medications.         Dyslipidemia    Continue home medications.         Hypertension    Continue medications.        Hypothyroidism    Continue medications.            Nas Chen MD  Heart Transplant  [facility]

## 2017-12-09 NOTE — PLAN OF CARE
Pt is aaox4. Pt is on a lasix gtt going at 4ml/hr- UOP 1200cc of clear yellow urine. Pt's repeat BMP at noon showed a K+ 3.6. VSS. Pt c/o of pain- prn lorcet given x1. Pt on 3L NC sating>91%. Pt independent and ambulates to bathroom freely. Non skid socks on while ambulating. Bed locked and lowered to lowest position. Call bell within reach. Will continue to monitor.

## 2017-12-09 NOTE — ASSESSMENT & PLAN NOTE
- In the setting of primary pulmonary HTN  - e/o volume overload on exam  - Continue Lasix drip at 20 mg/hr  - Repeat renal panel pending will need to stay on top of this as she was low when she got here.   - check Mag in the mornng and replete.

## 2017-12-09 NOTE — HPI
Patient was seen and examined by Dr. Sandy, the heart failure fellow, with following HnP which is no changed in narrative ater I spoke with the patient.     56 YOWM with PMH severe PAH on Uptravi .  records from OSH are discordant. Hospitalist / cardiology have her labeled as COPD and Obesity hypoventilation however consult by pulmonologist (Dr. Hardik Magallanes) states explicitly that PFTs done in 2015 showed only mild restriction and were not consistent with COPD. In addition he states that though she is mildly obese her pCO2 is normal at baseline ruling out obesity hypoventilation. What makes this more difficult is the fact that the patient is unsure if she has COPD as she has been told different things by different doctors over the years.  Has Severe Pulmonary HTN with  Chronic hypoxic respiratory failure for which she is on 3L all the time and BiPAP QHS, History of tracheostomy (4 years ago) and short term vent dependence. Tracheostomy was eventually de cannulated.      Previous tobacco abuse (stopped 15 yrs ago) - SHERIE (unclear severity) has been on trilogy vent for BiPAP at night however she she states that she no longer qualifies for this and no longer has the device. Is undergoing sleep study for workup. She was admitted 11/17 with volume overload,  A 8-10 lb weight gain , was started on Lasix gtt and ultimately transitioned to oral diuretic to Bumex 4mg BID. Since discharge she states that she initially felt well , but has felt the fluid build up over the last few weeks. States that she weighs 198 lbs today ( her dry weight on discharge was 182 lbs). She states that she does not feel as though she's diuresing quite as well with oral Bumex. She states she is not able to lay flat. Denies nausea, emesis. Trying to watch salt in her diet but admits she not doing a good job. She realizes many foods have salt, using salt shaker with many meat meals.

## 2017-12-09 NOTE — ASSESSMENT & PLAN NOTE
- Continue amlodipine, home selexipag 200 mcg BID,L plan for transition to remodulin per Dr. Cullen.

## 2017-12-10 LAB
ANION GAP SERPL CALC-SCNC: 17 MMOL/L
BUN SERPL-MCNC: 29 MG/DL
CALCIUM SERPL-MCNC: 10.6 MG/DL
CHLORIDE SERPL-SCNC: 90 MMOL/L
CO2 SERPL-SCNC: 34 MMOL/L
CREAT SERPL-MCNC: 1.7 MG/DL
EST. GFR  (AFRICAN AMERICAN): 38.3 ML/MIN/1.73 M^2
EST. GFR  (NON AFRICAN AMERICAN): 33.2 ML/MIN/1.73 M^2
GLUCOSE SERPL-MCNC: 108 MG/DL
POTASSIUM SERPL-SCNC: 3 MMOL/L
SODIUM SERPL-SCNC: 141 MMOL/L

## 2017-12-10 PROCEDURE — 63600175 PHARM REV CODE 636 W HCPCS: Performed by: INTERNAL MEDICINE

## 2017-12-10 PROCEDURE — A4216 STERILE WATER/SALINE, 10 ML: HCPCS | Performed by: INTERNAL MEDICINE

## 2017-12-10 PROCEDURE — 25000003 PHARM REV CODE 250: Performed by: INTERNAL MEDICINE

## 2017-12-10 PROCEDURE — 20600001 HC STEP DOWN PRIVATE ROOM

## 2017-12-10 PROCEDURE — 94640 AIRWAY INHALATION TREATMENT: CPT

## 2017-12-10 PROCEDURE — 27000221 HC OXYGEN, UP TO 24 HOURS

## 2017-12-10 PROCEDURE — 99233 SBSQ HOSP IP/OBS HIGH 50: CPT | Mod: GW,HPC,, | Performed by: INTERNAL MEDICINE

## 2017-12-10 PROCEDURE — 36415 COLL VENOUS BLD VENIPUNCTURE: CPT

## 2017-12-10 PROCEDURE — 25000242 PHARM REV CODE 250 ALT 637 W/ HCPCS: Performed by: INTERNAL MEDICINE

## 2017-12-10 PROCEDURE — 94761 N-INVAS EAR/PLS OXIMETRY MLT: CPT

## 2017-12-10 PROCEDURE — 25000003 PHARM REV CODE 250: Performed by: PHYSICIAN ASSISTANT

## 2017-12-10 PROCEDURE — 80048 BASIC METABOLIC PNL TOTAL CA: CPT

## 2017-12-10 RX ORDER — LOPERAMIDE HYDROCHLORIDE 2 MG/1
2 CAPSULE ORAL EVERY 4 HOURS PRN
Status: DISCONTINUED | OUTPATIENT
Start: 2017-12-10 | End: 2017-12-27 | Stop reason: HOSPADM

## 2017-12-10 RX ORDER — ONDANSETRON 8 MG/1
8 TABLET, ORALLY DISINTEGRATING ORAL EVERY 8 HOURS PRN
Status: DISCONTINUED | OUTPATIENT
Start: 2017-12-10 | End: 2017-12-27 | Stop reason: HOSPADM

## 2017-12-10 RX ORDER — ONDANSETRON 2 MG/ML
4 INJECTION INTRAMUSCULAR; INTRAVENOUS EVERY 8 HOURS PRN
Status: DISCONTINUED | OUTPATIENT
Start: 2017-12-10 | End: 2017-12-27 | Stop reason: HOSPADM

## 2017-12-10 RX ORDER — ACETAMINOPHEN 325 MG/1
650 TABLET ORAL EVERY 6 HOURS PRN
Status: DISCONTINUED | OUTPATIENT
Start: 2017-12-10 | End: 2017-12-27 | Stop reason: HOSPADM

## 2017-12-10 RX ORDER — OXYCODONE AND ACETAMINOPHEN 5; 325 MG/1; MG/1
1 TABLET ORAL EVERY 4 HOURS PRN
Status: DISCONTINUED | OUTPATIENT
Start: 2017-12-10 | End: 2017-12-27 | Stop reason: HOSPADM

## 2017-12-10 RX ADMIN — HEPARIN SODIUM 5000 UNITS: 5000 INJECTION, SOLUTION INTRAVENOUS; SUBCUTANEOUS at 05:12

## 2017-12-10 RX ADMIN — DESVENLAFAXINE SUCCINATE 100 MG: 50 TABLET, FILM COATED, EXTENDED RELEASE ORAL at 09:12

## 2017-12-10 RX ADMIN — PRAVASTATIN SODIUM 40 MG: 40 TABLET ORAL at 09:12

## 2017-12-10 RX ADMIN — GABAPENTIN 100 MG: 100 CAPSULE ORAL at 02:12

## 2017-12-10 RX ADMIN — ALBUTEROL SULFATE 2.5 MG: 2.5 SOLUTION RESPIRATORY (INHALATION) at 03:12

## 2017-12-10 RX ADMIN — BUSPIRONE HYDROCHLORIDE 15 MG: 5 TABLET ORAL at 02:12

## 2017-12-10 RX ADMIN — HEPARIN SODIUM 5000 UNITS: 5000 INJECTION, SOLUTION INTRAVENOUS; SUBCUTANEOUS at 02:12

## 2017-12-10 RX ADMIN — ALBUTEROL SULFATE 2.5 MG: 2.5 SOLUTION RESPIRATORY (INHALATION) at 08:12

## 2017-12-10 RX ADMIN — TREPROSTINIL 3 NG/KG/MIN: 20 INJECTION, SOLUTION INTRAVENOUS; SUBCUTANEOUS at 11:12

## 2017-12-10 RX ADMIN — METOLAZONE 2.5 MG: 2.5 TABLET ORAL at 09:12

## 2017-12-10 RX ADMIN — TREPROSTINIL 2 NG/KG/MIN: 20 INJECTION, SOLUTION INTRAVENOUS; SUBCUTANEOUS at 05:12

## 2017-12-10 RX ADMIN — BUSPIRONE HYDROCHLORIDE 15 MG: 5 TABLET ORAL at 09:12

## 2017-12-10 RX ADMIN — GABAPENTIN 100 MG: 100 CAPSULE ORAL at 05:12

## 2017-12-10 RX ADMIN — ONDANSETRON 8 MG: 8 TABLET, ORALLY DISINTEGRATING ORAL at 01:12

## 2017-12-10 RX ADMIN — LEVOTHYROXINE SODIUM 75 MCG: 75 TABLET ORAL at 09:12

## 2017-12-10 RX ADMIN — OXYCODONE HYDROCHLORIDE AND ACETAMINOPHEN 1 TABLET: 5; 325 TABLET ORAL at 12:12

## 2017-12-10 RX ADMIN — FLUTICASONE FUROATE AND VILANTEROL TRIFENATATE 1 PUFF: 100; 25 POWDER RESPIRATORY (INHALATION) at 09:12

## 2017-12-10 RX ADMIN — GABAPENTIN 100 MG: 100 CAPSULE ORAL at 09:12

## 2017-12-10 RX ADMIN — HYDROCODONE BITARTRATE AND ACETAMINOPHEN 1 TABLET: 10; 325 TABLET ORAL at 09:12

## 2017-12-10 RX ADMIN — HYDROCODONE BITARTRATE AND ACETAMINOPHEN 1 TABLET: 10; 325 TABLET ORAL at 02:12

## 2017-12-10 RX ADMIN — ALBUTEROL SULFATE 2.5 MG: 2.5 SOLUTION RESPIRATORY (INHALATION) at 11:12

## 2017-12-10 RX ADMIN — POTASSIUM CHLORIDE 20 MEQ: 1500 TABLET, EXTENDED RELEASE ORAL at 09:12

## 2017-12-10 RX ADMIN — LAMOTRIGINE 100 MG: 100 TABLET ORAL at 09:12

## 2017-12-10 RX ADMIN — OXYCODONE HYDROCHLORIDE AND ACETAMINOPHEN 1 TABLET: 5; 325 TABLET ORAL at 04:12

## 2017-12-10 RX ADMIN — HEPARIN SODIUM 5000 UNITS: 5000 INJECTION, SOLUTION INTRAVENOUS; SUBCUTANEOUS at 09:12

## 2017-12-10 RX ADMIN — ALBUTEROL SULFATE 2.5 MG: 2.5 SOLUTION RESPIRATORY (INHALATION) at 09:12

## 2017-12-10 RX ADMIN — Medication 3 ML: at 02:12

## 2017-12-10 RX ADMIN — ALBUTEROL SULFATE 2.5 MG: 2.5 SOLUTION RESPIRATORY (INHALATION) at 01:12

## 2017-12-10 RX ADMIN — AMLODIPINE BESYLATE 5 MG: 5 TABLET ORAL at 09:12

## 2017-12-10 RX ADMIN — ALBUTEROL SULFATE 2.5 MG: 2.5 SOLUTION RESPIRATORY (INHALATION) at 05:12

## 2017-12-10 RX ADMIN — BUSPIRONE HYDROCHLORIDE 15 MG: 5 TABLET ORAL at 05:12

## 2017-12-10 RX ADMIN — HYDROCODONE BITARTRATE AND ACETAMINOPHEN 1 TABLET: 10; 325 TABLET ORAL at 07:12

## 2017-12-10 RX ADMIN — PANTOPRAZOLE SODIUM 40 MG: 40 TABLET, DELAYED RELEASE ORAL at 09:12

## 2017-12-10 NOTE — PROGRESS NOTES
[facility]  Heart Transplant  Progress Note    Patient Name: Sue Smith  MRN: [id number]  Admission Date: 12/8/2017  Hospital Length of Stay: 2 days  Attending Physician: Jacky Wong Jr.,*  Primary Care Provider: Paddy Guerrier DO  Principal Problem:Acute on chronic right heart failure    Subjective:     Interval History: Feeling much better diuresing well.     Continuous Infusions:   furosemide (LASIX) 5 mg/mL infusion (non-titrating) 20 mg/hr (12/09/17 1840)    treprostinil (REMODULIN) infusion      veletri/remodulin cassette      veletri/remodulin tubing       Scheduled Meds:   albuterol sulfate  2.5 mg Nebulization Q4H    amLODIPine  5 mg Oral Daily    busPIRone  15 mg Oral TID    desvenlafaxine succinate  100 mg Oral Daily    fluticasone-vilanterol  1 puff Inhalation Daily    gabapentin  100 mg Oral TID    heparin (porcine)  5,000 Units Subcutaneous Q8H    lamoTRIgine  100 mg Oral BID    levothyroxine  75 mcg Oral Daily    metOLazone  2.5 mg Oral Daily    pantoprazole  40 mg Oral Daily    potassium chloride 10%  40 mEq Oral Once    potassium chloride SA  20 mEq Oral Daily    pravastatin  40 mg Oral Daily    selexipag 200 mcg (140)- 800 mcg (60) DsPk  1,400 mcg Oral BID    sodium chloride 0.9%  3 mL Intravenous Q8H     PRN Meds:acetaminophen, acetaminophen, hydrocodone-acetaminophen 10-325mg, loperamide, loperamide, ondansetron, ondansetron, ondansetron, ondansetron, oxyCODONE-acetaminophen, oxyCODONE-acetaminophen    Review of patient's allergies indicates:   Allergen Reactions    Sulfa (sulfonamide antibiotics) Rash     Objective:     Vital Signs (Most Recent):  Temp: 97.7 °F (36.5 °C) (12/10/17 0745)  Pulse: 90 (12/10/17 0936)  Resp: 18 (12/10/17 0936)  BP: 104/74 (12/10/17 0745)  SpO2: 95 % (12/10/17 0857) Vital Signs (24h Range):  Temp:  [97.4 °F (36.3 °C)-98.5 °F (36.9 °C)] 97.7 °F (36.5 °C)  Pulse:  [73-90] 90  Resp:  [14-20] 18  SpO2:  [88 %-97 %] 95 %  BP:  (104-123)/(65-80) 104/74     Patient Vitals for the past 72 hrs (Last 3 readings):   Weight   12/10/17 0400 85 kg (187 lb 6.3 oz)   12/09/17 0400 87.2 kg (192 lb 3.9 oz)   12/08/17 2100 88.3 kg (194 lb 10.7 oz)     Body mass index is 34.27 kg/m².      Intake/Output Summary (Last 24 hours) at 12/10/17 1104  Last data filed at 12/10/17 0500   Gross per 24 hour   Intake              680 ml   Output             6800 ml   Net            -6120 ml       Hemodynamic Parameters:         Physical Exam   Constitutional: She is oriented to person, place, and time. She appears well-developed and well-nourished.   HENT:   Head: Normocephalic.   Neck: Normal range of motion. JVD present.   Cardiovascular: Normal rate and regular rhythm.    Pulmonary/Chest: Effort normal and breath sounds normal.   Abdominal: Soft.   Musculoskeletal: She exhibits edema.   Neurological: She is alert and oriented to person, place, and time.   Skin: Skin is warm and dry.       Significant Labs:  CBC:    Recent Labs  Lab 12/08/17  1415 12/08/17 1945   WBC 6.65 6.09   RBC 3.93* 3.56*   HGB 12.3 11.3*   HCT 37.4 33.2*    151   MCV 95 93   MCH 31.3* 31.7*   MCHC 32.9 34.0     BNP:    Recent Labs  Lab 12/08/17  1415   BNP 1,489*     CMP:    Recent Labs  Lab 12/08/17  1415 12/08/17  1945 12/09/17  0444 12/09/17  1212 12/10/17  0447   GLU 88 102 77 129* 108   CALCIUM 9.5 9.7 9.9 10.0 10.6*   ALBUMIN 4.1 3.8  --   --   --    PROT 7.1 6.5  --   --   --     142 141 140 141   K 2.9* 2.8* 2.7* 3.6 3.0*   CO2 33* 34* 33* 33* 34*   CL 96 97 95 95 90*   BUN 25* 26* 23* 24* 29*   CREATININE 1.7* 1.6* 1.4 1.6* 1.7*   ALKPHOS 110 104  --   --   --    ALT 23 23  --   --   --    AST 31 29  --   --   --    BILITOT 0.7 0.7  --   --   --       Coagulation:   No results for input(s): PT, INR, APTT in the last 168 hours.  LDH:  No results for input(s): LDH in the last 72 hours.  Microbiology:  Microbiology Results (last 7 days)     ** No results found for the  last 168 hours. **          I have reviewed all pertinent labs within the past 24 hours.    Estimated Creatinine Clearance: 37.4 mL/min (based on SCr of 1.7 mg/dL (H)).    Diagnostic Results:  I have reviewed and interpreted all pertinent imaging results/findings within the past 24 hours.    Assessment and Plan:     No notes on file    * Acute on chronic right heart failure    - In the setting of primary pulmonary HTN  - e/o volume overload on exam  - Continue Lasix drip at 20 mg/hr  - Repeat renal panel pending will need to stay on top of this as she was low when she got here.   - check Mag in the mornng and replete.           Pulmonary hypertension, group 1    Severe PH with markedly reduced CI also severe RV dysfunction. Left her medications at home. Will stop UPTRAVI and start on remodulin today.           Chronic respiratory failure with hypoxia    2/2 PH as above        GERD (gastroesophageal reflux disease)    Continue home medications.         Bipolar disorder    Continue home medications.         Dyslipidemia    Continue home medications.         Hypertension    Continue medications.        Hypothyroidism    Continue medications.            Nas Chen MD  Heart Transplant  [facility]

## 2017-12-10 NOTE — PLAN OF CARE
Problem: Patient Care Overview  Goal: Plan of Care Review  Outcome: Ongoing (interventions implemented as appropriate)  Plan of care reviewed with patient. Pt verbalized understanding. Pt AAOX4. Pt free from falls, injuries and trauma.  Pt free from skin breakdown.  Pt VSS and in NAD.  Pt afebrile.  Pt had no complaints of SOB, N/V or CP.  Lasix gtt continuous. Pt had no complaints of pain this shift.  Adequate UOP this shift. No BM this shift.  Pt had uneventful evening. Pt in low locked bed with personal items and call light within reach. Fall precautions maintained.

## 2017-12-10 NOTE — NURSING
"Awaiting remodulin cassette from pharmacy. CADD pump in room, second IV placed. Pharmacy states that the med is "being made now." Will begin as soon as drug is delivered.  "

## 2017-12-10 NOTE — SUBJECTIVE & OBJECTIVE
Interval History: Feeling much better diuresing well.     Continuous Infusions:   furosemide (LASIX) 5 mg/mL infusion (non-titrating) 20 mg/hr (12/09/17 1840)    treprostinil (REMODULIN) infusion      veletri/remodulin cassette      veletri/remodulin tubing       Scheduled Meds:   albuterol sulfate  2.5 mg Nebulization Q4H    amLODIPine  5 mg Oral Daily    busPIRone  15 mg Oral TID    desvenlafaxine succinate  100 mg Oral Daily    fluticasone-vilanterol  1 puff Inhalation Daily    gabapentin  100 mg Oral TID    heparin (porcine)  5,000 Units Subcutaneous Q8H    lamoTRIgine  100 mg Oral BID    levothyroxine  75 mcg Oral Daily    metOLazone  2.5 mg Oral Daily    pantoprazole  40 mg Oral Daily    potassium chloride 10%  40 mEq Oral Once    potassium chloride SA  20 mEq Oral Daily    pravastatin  40 mg Oral Daily    selexipag 200 mcg (140)- 800 mcg (60) DsPk  1,400 mcg Oral BID    sodium chloride 0.9%  3 mL Intravenous Q8H     PRN Meds:acetaminophen, acetaminophen, hydrocodone-acetaminophen 10-325mg, loperamide, loperamide, ondansetron, ondansetron, ondansetron, ondansetron, oxyCODONE-acetaminophen, oxyCODONE-acetaminophen    Review of patient's allergies indicates:   Allergen Reactions    Sulfa (sulfonamide antibiotics) Rash     Objective:     Vital Signs (Most Recent):  Temp: 97.7 °F (36.5 °C) (12/10/17 0745)  Pulse: 90 (12/10/17 0936)  Resp: 18 (12/10/17 0936)  BP: 104/74 (12/10/17 0745)  SpO2: 95 % (12/10/17 0857) Vital Signs (24h Range):  Temp:  [97.4 °F (36.3 °C)-98.5 °F (36.9 °C)] 97.7 °F (36.5 °C)  Pulse:  [73-90] 90  Resp:  [14-20] 18  SpO2:  [88 %-97 %] 95 %  BP: (104-123)/(65-80) 104/74     Patient Vitals for the past 72 hrs (Last 3 readings):   Weight   12/10/17 0400 85 kg (187 lb 6.3 oz)   12/09/17 0400 87.2 kg (192 lb 3.9 oz)   12/08/17 2100 88.3 kg (194 lb 10.7 oz)     Body mass index is 34.27 kg/m².      Intake/Output Summary (Last 24 hours) at 12/10/17 1104  Last data filed at  12/10/17 0500   Gross per 24 hour   Intake              680 ml   Output             6800 ml   Net            -6120 ml       Hemodynamic Parameters:         Physical Exam   Constitutional: She is oriented to person, place, and time. She appears well-developed and well-nourished.   HENT:   Head: Normocephalic.   Neck: Normal range of motion. JVD present.   Cardiovascular: Normal rate and regular rhythm.    Pulmonary/Chest: Effort normal and breath sounds normal.   Abdominal: Soft.   Musculoskeletal: She exhibits edema.   Neurological: She is alert and oriented to person, place, and time.   Skin: Skin is warm and dry.       Significant Labs:  CBC:    Recent Labs  Lab 12/08/17  1415 12/08/17 1945   WBC 6.65 6.09   RBC 3.93* 3.56*   HGB 12.3 11.3*   HCT 37.4 33.2*    151   MCV 95 93   MCH 31.3* 31.7*   MCHC 32.9 34.0     BNP:    Recent Labs  Lab 12/08/17  1415   BNP 1,489*     CMP:    Recent Labs  Lab 12/08/17  1415 12/08/17  1945 12/09/17  0444 12/09/17  1212 12/10/17  0447   GLU 88 102 77 129* 108   CALCIUM 9.5 9.7 9.9 10.0 10.6*   ALBUMIN 4.1 3.8  --   --   --    PROT 7.1 6.5  --   --   --     142 141 140 141   K 2.9* 2.8* 2.7* 3.6 3.0*   CO2 33* 34* 33* 33* 34*   CL 96 97 95 95 90*   BUN 25* 26* 23* 24* 29*   CREATININE 1.7* 1.6* 1.4 1.6* 1.7*   ALKPHOS 110 104  --   --   --    ALT 23 23  --   --   --    AST 31 29  --   --   --    BILITOT 0.7 0.7  --   --   --       Coagulation:   No results for input(s): PT, INR, APTT in the last 168 hours.  LDH:  No results for input(s): LDH in the last 72 hours.  Microbiology:  Microbiology Results (last 7 days)     ** No results found for the last 168 hours. **          I have reviewed all pertinent labs within the past 24 hours.    Estimated Creatinine Clearance: 37.4 mL/min (based on SCr of 1.7 mg/dL (H)).    Diagnostic Results:  I have reviewed and interpreted all pertinent imaging results/findings within the past 24 hours.

## 2017-12-10 NOTE — ASSESSMENT & PLAN NOTE
Severe PH with markedly reduced CI also severe RV dysfunction. Left her medications at home. Will stop UPTRAVI and start on remodulin today.

## 2017-12-11 ENCOUNTER — TELEPHONE (OUTPATIENT)
Dept: TRANSPLANT | Facility: CLINIC | Age: 57
End: 2017-12-11

## 2017-12-11 LAB
ANION GAP SERPL CALC-SCNC: 16 MMOL/L
BUN SERPL-MCNC: 50 MG/DL
CALCIUM SERPL-MCNC: 10.5 MG/DL
CHLORIDE SERPL-SCNC: 83 MMOL/L
CO2 SERPL-SCNC: 39 MMOL/L
CREAT SERPL-MCNC: 2.3 MG/DL
EST. GFR  (AFRICAN AMERICAN): 26.6 ML/MIN/1.73 M^2
EST. GFR  (NON AFRICAN AMERICAN): 23.1 ML/MIN/1.73 M^2
GLUCOSE SERPL-MCNC: 95 MG/DL
POTASSIUM SERPL-SCNC: 3.4 MMOL/L
SODIUM SERPL-SCNC: 138 MMOL/L

## 2017-12-11 PROCEDURE — 25000003 PHARM REV CODE 250: Performed by: INTERNAL MEDICINE

## 2017-12-11 PROCEDURE — 63600175 PHARM REV CODE 636 W HCPCS: Performed by: INTERNAL MEDICINE

## 2017-12-11 PROCEDURE — 94660 CPAP INITIATION&MGMT: CPT

## 2017-12-11 PROCEDURE — 27000190 HC CPAP FULL FACE MASK W/VALVE

## 2017-12-11 PROCEDURE — 94640 AIRWAY INHALATION TREATMENT: CPT

## 2017-12-11 PROCEDURE — 99232 SBSQ HOSP IP/OBS MODERATE 35: CPT | Mod: GW,HPC,, | Performed by: INTERNAL MEDICINE

## 2017-12-11 PROCEDURE — 94761 N-INVAS EAR/PLS OXIMETRY MLT: CPT

## 2017-12-11 PROCEDURE — 20600001 HC STEP DOWN PRIVATE ROOM

## 2017-12-11 PROCEDURE — 25000242 PHARM REV CODE 250 ALT 637 W/ HCPCS: Performed by: INTERNAL MEDICINE

## 2017-12-11 PROCEDURE — A4216 STERILE WATER/SALINE, 10 ML: HCPCS | Performed by: INTERNAL MEDICINE

## 2017-12-11 PROCEDURE — 99900035 HC TECH TIME PER 15 MIN (STAT)

## 2017-12-11 PROCEDURE — 80048 BASIC METABOLIC PNL TOTAL CA: CPT

## 2017-12-11 PROCEDURE — 36415 COLL VENOUS BLD VENIPUNCTURE: CPT

## 2017-12-11 PROCEDURE — 25000003 PHARM REV CODE 250: Performed by: STUDENT IN AN ORGANIZED HEALTH CARE EDUCATION/TRAINING PROGRAM

## 2017-12-11 PROCEDURE — 27000221 HC OXYGEN, UP TO 24 HOURS

## 2017-12-11 RX ORDER — BUMETANIDE 1 MG/1
4 TABLET ORAL 2 TIMES DAILY
Status: DISCONTINUED | OUTPATIENT
Start: 2017-12-11 | End: 2017-12-27 | Stop reason: HOSPADM

## 2017-12-11 RX ADMIN — BUSPIRONE HYDROCHLORIDE 15 MG: 5 TABLET ORAL at 06:12

## 2017-12-11 RX ADMIN — ALBUTEROL SULFATE 2.5 MG: 2.5 SOLUTION RESPIRATORY (INHALATION) at 11:12

## 2017-12-11 RX ADMIN — ALBUTEROL SULFATE 2.5 MG: 2.5 SOLUTION RESPIRATORY (INHALATION) at 12:12

## 2017-12-11 RX ADMIN — GABAPENTIN 100 MG: 100 CAPSULE ORAL at 01:12

## 2017-12-11 RX ADMIN — HEPARIN SODIUM 5000 UNITS: 5000 INJECTION, SOLUTION INTRAVENOUS; SUBCUTANEOUS at 06:12

## 2017-12-11 RX ADMIN — GABAPENTIN 100 MG: 100 CAPSULE ORAL at 06:12

## 2017-12-11 RX ADMIN — PANTOPRAZOLE SODIUM 40 MG: 40 TABLET, DELAYED RELEASE ORAL at 08:12

## 2017-12-11 RX ADMIN — BUSPIRONE HYDROCHLORIDE 15 MG: 5 TABLET ORAL at 01:12

## 2017-12-11 RX ADMIN — PRAVASTATIN SODIUM 40 MG: 40 TABLET ORAL at 08:12

## 2017-12-11 RX ADMIN — FLUTICASONE FUROATE AND VILANTEROL TRIFENATATE 1 PUFF: 100; 25 POWDER RESPIRATORY (INHALATION) at 08:12

## 2017-12-11 RX ADMIN — DESVENLAFAXINE SUCCINATE 100 MG: 50 TABLET, FILM COATED, EXTENDED RELEASE ORAL at 08:12

## 2017-12-11 RX ADMIN — ALBUTEROL SULFATE 2.5 MG: 2.5 SOLUTION RESPIRATORY (INHALATION) at 08:12

## 2017-12-11 RX ADMIN — TREPROSTINIL 4 NG/KG/MIN: 20 INJECTION, SOLUTION INTRAVENOUS; SUBCUTANEOUS at 05:12

## 2017-12-11 RX ADMIN — AMLODIPINE BESYLATE 5 MG: 5 TABLET ORAL at 08:12

## 2017-12-11 RX ADMIN — POTASSIUM CHLORIDE 20 MEQ: 1500 TABLET, EXTENDED RELEASE ORAL at 08:12

## 2017-12-11 RX ADMIN — GABAPENTIN 100 MG: 100 CAPSULE ORAL at 08:12

## 2017-12-11 RX ADMIN — ALBUTEROL SULFATE 2.5 MG: 2.5 SOLUTION RESPIRATORY (INHALATION) at 03:12

## 2017-12-11 RX ADMIN — HEPARIN SODIUM 5000 UNITS: 5000 INJECTION, SOLUTION INTRAVENOUS; SUBCUTANEOUS at 08:12

## 2017-12-11 RX ADMIN — LAMOTRIGINE 100 MG: 100 TABLET ORAL at 08:12

## 2017-12-11 RX ADMIN — TREPROSTINIL 8 NG/KG/MIN: 20 INJECTION, SOLUTION INTRAVENOUS; SUBCUTANEOUS at 06:12

## 2017-12-11 RX ADMIN — BUMETANIDE 4 MG: 1 TABLET ORAL at 05:12

## 2017-12-11 RX ADMIN — HYDROCODONE BITARTRATE AND ACETAMINOPHEN 1 TABLET: 10; 325 TABLET ORAL at 09:12

## 2017-12-11 RX ADMIN — HEPARIN SODIUM 5000 UNITS: 5000 INJECTION, SOLUTION INTRAVENOUS; SUBCUTANEOUS at 01:12

## 2017-12-11 RX ADMIN — BUSPIRONE HYDROCHLORIDE 15 MG: 5 TABLET ORAL at 08:12

## 2017-12-11 RX ADMIN — LEVOTHYROXINE SODIUM 75 MCG: 75 TABLET ORAL at 11:12

## 2017-12-11 RX ADMIN — Medication 3 ML: at 01:12

## 2017-12-11 NOTE — SUBJECTIVE & OBJECTIVE
Interval History: Feeling much better diuresing well. Cr took a slight hit.     Continuous Infusions:   furosemide (LASIX) 5 mg/mL infusion (non-titrating) 20 mg/hr (12/09/17 1840)    treprostinil (REMODULIN) infusion 4 ng/kg/min (12/11/17 0544)    veletri/remodulin cassette      veletri/remodulin tubing       Scheduled Meds:   albuterol sulfate  2.5 mg Nebulization Q4H    amLODIPine  5 mg Oral Daily    busPIRone  15 mg Oral TID    desvenlafaxine succinate  100 mg Oral Daily    fluticasone-vilanterol  1 puff Inhalation Daily    gabapentin  100 mg Oral TID    heparin (porcine)  5,000 Units Subcutaneous Q8H    lamoTRIgine  100 mg Oral BID    levothyroxine  75 mcg Oral Daily    metOLazone  2.5 mg Oral Daily    pantoprazole  40 mg Oral Daily    potassium chloride 10%  40 mEq Oral Once    potassium chloride SA  20 mEq Oral Daily    pravastatin  40 mg Oral Daily    selexipag 200 mcg (140)- 800 mcg (60) DsPk  1,400 mcg Oral BID    sodium chloride 0.9%  3 mL Intravenous Q8H     PRN Meds:acetaminophen, hydrocodone-acetaminophen 10-325mg, loperamide, ondansetron, ondansetron, oxyCODONE-acetaminophen    Review of patient's allergies indicates:   Allergen Reactions    Sulfa (sulfonamide antibiotics) Rash     Objective:     Vital Signs (Most Recent):  Temp: 97.9 °F (36.6 °C) (12/11/17 0745)  Pulse: 81 (12/11/17 0745)  Resp: 20 (12/11/17 0745)  BP: 120/60 (12/11/17 0745)  SpO2: (!) 94 % (12/11/17 0745) Vital Signs (24h Range):  Temp:  [97.5 °F (36.4 °C)-98.9 °F (37.2 °C)] 97.9 °F (36.6 °C)  Pulse:  [] 81  Resp:  [14-20] 20  SpO2:  [88 %-100 %] 94 %  BP: ()/(52-84) 120/60     Patient Vitals for the past 72 hrs (Last 3 readings):   Weight   12/11/17 0400 85.6 kg (188 lb 11.4 oz)   12/10/17 0400 85 kg (187 lb 6.3 oz)   12/09/17 0400 87.2 kg (192 lb 3.9 oz)     Body mass index is 34.52 kg/m².      Intake/Output Summary (Last 24 hours) at 12/11/17 0841  Last data filed at 12/10/17 2348   Gross per 24  hour   Intake              800 ml   Output             2600 ml   Net            -1800 ml       Hemodynamic Parameters:         Physical Exam   Constitutional: She is oriented to person, place, and time. She appears well-developed and well-nourished.   HENT:   Head: Normocephalic.   Neck: Normal range of motion. JVD (to ear) present.   Cardiovascular: Normal rate and regular rhythm.    Pulmonary/Chest: Effort normal and breath sounds normal.   Abdominal: Soft.   Musculoskeletal: She exhibits edema.   Neurological: She is alert and oriented to person, place, and time.   Skin: Skin is warm and dry.       Significant Labs:  CBC:    Recent Labs  Lab 12/08/17 1415 12/08/17 1945   WBC 6.65 6.09   RBC 3.93* 3.56*   HGB 12.3 11.3*   HCT 37.4 33.2*    151   MCV 95 93   MCH 31.3* 31.7*   MCHC 32.9 34.0     BNP:    Recent Labs  Lab 12/08/17  1415   BNP 1,489*     CMP:    Recent Labs  Lab 12/08/17  1415 12/08/17  1945  12/09/17  1212 12/10/17  0447 12/11/17  0400   GLU 88 102  < > 129* 108 95   CALCIUM 9.5 9.7  < > 10.0 10.6* 10.5   ALBUMIN 4.1 3.8  --   --   --   --    PROT 7.1 6.5  --   --   --   --     142  < > 140 141 138   K 2.9* 2.8*  < > 3.6 3.0* 3.4*   CO2 33* 34*  < > 33* 34* 39*   CL 96 97  < > 95 90* 83*   BUN 25* 26*  < > 24* 29* 50*   CREATININE 1.7* 1.6*  < > 1.6* 1.7* 2.3*   ALKPHOS 110 104  --   --   --   --    ALT 23 23  --   --   --   --    AST 31 29  --   --   --   --    BILITOT 0.7 0.7  --   --   --   --    < > = values in this interval not displayed.   Coagulation:   No results for input(s): PT, INR, APTT in the last 168 hours.  LDH:  No results for input(s): LDH in the last 72 hours.  Microbiology:  Microbiology Results (last 7 days)     ** No results found for the last 168 hours. **          I have reviewed all pertinent labs within the past 24 hours.    Estimated Creatinine Clearance: 27.7 mL/min (based on SCr of 2.3 mg/dL (H)).    Diagnostic Results:  I have reviewed and interpreted all  pertinent imaging results/findings within the past 24 hours.

## 2017-12-11 NOTE — ASSESSMENT & PLAN NOTE
Severe PH with markedly reduced CI also severe RV dysfunction. Left her medications at home. UPTRAVI stopped and started on remodulin 12/10. She is currently on 4 ng/kg/min and we are uptitrating up 1 ng every 6 hours. The plan is to switch to subcutaneous injections before discharge.

## 2017-12-11 NOTE — ASSESSMENT & PLAN NOTE
Hx of severe primary pulmonary HTN still appears volume overloaded on exam but Cr has taken a slight bump to 2.3 from her baseline of 1.6. She has been on lasix gtt at 20 for the past couple of days as well as metolazone daily but she only takes this prn at home. Will stop lasix gtt/metolazone and just do do IV push lasix instead. Her dry weight is about 182 and she is 185 now so almost there.

## 2017-12-11 NOTE — ASSESSMENT & PLAN NOTE
Severe PH with markedly reduced CI also severe RV dysfunction. Left her medications at home. UPTRAVI stopped and started on remodulin 12/10. She is currently on 12 ng/kg/min and we are uptitrating up 2 ng every 6 hours. Will get maddie for outpatient infusion.

## 2017-12-11 NOTE — PROGRESS NOTES
Admit Note     Met with patient to assess needs. Patient is a 57 y.o.  female, admitted  for pulmonary hypertension.      Patient admitted from home on 12/8/2017 .  At this time, patient presents as alert and oriented x 4 and good eye contact.  At this time, patients caregiver is not in attendance.     Household/Family Systems     Patient resides alone at     [address]  [place] [address].      Pt reports this is assisted living, however pt reports she does not receive any assistance.  Pt lives 2.5 hours from [facility]. [place] is close to [place]    Support system includes daughter, father and a few friends.    Patient does not have dependents that are need of being cared for.     Patients primary caregiver is self.  Pt's cell:  [phone]    Additional emergency contacts:  Prabha Smith (daughter, lives in [place]) [phone]  U.JACKIE Clive (father, lives in [place]) [phone]    During admission, patient's caregiver plans to stay at home.  Confirmed patient and patients caregivers do not have access to reliable transportation.    Cognitive Status/Learning     Patient reports reading ability as 12th grade and states patient does not have difficulty with reading, writing, seeing, hearing and memory. Pt reports difficulty with comprehension and learning.  Pt wears glasses.   Patient reports patient learns best by one on one.   Needed: No.   Highest education level: High School (9-12) or GED    Vocation/Disability   .  Working for Income: No  If no, reason not working: Disability    Patient is disabled due to Biolar disorder since 25-30 years.  Prior to disability, patient  was employed as a .    Adherence     Patient reports a medium level of adherence to patients health care regimen. Pt reports she has not been following her diet.    Adherence counseling and education provided. Patient verbalizes understanding.    Substance Use    Patient reports the following  substance usage.    Tobacco: none.  Pt smoked from [id number]. Pt used to smoke 1-2ppd.   Alcohol: none, patient denies any use.  Illicit Drugs/Non-prescribed Medications: none, patient denies any use.  Patient states clear understanding of the potential impact of substance use.  Substance abstinence/cessation counseling, education and resources provided and reviewed.     Services Utilizing/ADLS    Infusion Service: Prior to admission, patient utilizing? no  Home Health: Prior to admission, patient utilizing? yes [facility] [phone], [phone] fax,  is with   [phone].  was seeing pt 2x a week.   DME: Prior to admission, home 02 with portables, 3LPM via [employer].  Pt also has a nebulizer and shower chair.   Pulmonary/Cardiac Rehab: Prior to admission, no  Dialysis:  Prior to admission, no  Transplant Specialty Pharmacy:  Prior to admission, no.    Prior to admission, patient reports patient was mostly  independent with ADLS.  Pt reports issues with cooking for self and cleaning own home.  The pt has applied for Medicaid in order to apply for the PCA program, however pt reports she did not qualify due to the amount of disability she receives each month.  Pt reports she can't afford to pay someone to clean or cook and family is not able to assist with same.  The pt does not drive. The pt's daughter and father can help with transportation around [facility], however not to [place].  The pt reports either her friend Rivka or Lexi should be able to assist with transportation home.   Patient reports patient is not able to care for self at this time due to compromised medical condition (as documented in medical record) and physical weakness..  Patient indicates a willingness to care for self once medically cleared to do so.    Insurance/Medications    Insured by   Payor/Plan Subscr  Sex Relation Sub. Ins. ID Effective Group Num   1. MAXINE LINDA* HERACLIO GARCIA 1960 Female  [id number]  1/1/17 [id number]                                    [id number]      Primary Insurance (for UNOS reporting): Public Insurance - Medicare FFS (Fee For Service)  Secondary Insurance (for UNOS reporting): None    Patient reports patient is able to obtain and afford medications at this time and at time of discharge.    Living Will/Healthcare Power of     Patient states patient does not have a LW and/or HCPA.   provided education regarding LW and HCPA and the completion of forms.    Coping/Mental Health    Patient is coping adequately with the aid of  family members.   Patient indicates mental health difficulties. The pt has a long history of Bipolar disorder with anxiety and depression.  Pt reports she is taking multiple medications to assist with same. Pt reports he medication works well.  Worker provided general support.      Discharge Planning    At time of discharge, patient plans to return to patient's home under the care of self.  Patients friend will transport patient.  Per rounds today, expected discharge date has not been medically determined at this time, however the pt reports she will be discharged on Sunday.  Patient and patients caregiver  verbalize understanding and are involved in treatment planning and discharge process.    Additional Concerns    Patient is being followed for needs, education, resources, information, emotional support, supportive counseling, and for supportive and skilled discharge plan of care.  providing ongoing psychosocial support, education, resources and d/c planning as needed.  SW remains available. Patient denies additional needs and/or concerns at this time. Patient verbalizes understanding and agreement with information reviewed, social work availability, and how to access available resources as needed.

## 2017-12-11 NOTE — TELEPHONE ENCOUNTER
"Patient and best friend (Radha) were informed of process by which we apply for Remodulin, including role of specialty pharmacy and specialty pharmacy nurse. It was noted that specialty pharmacy nurse would be dispatched to patient's home, once medication is approved by insurance, to do pre-teaching on Remodulin (mixing, care of pump/line, Gomez catheter, emergency intervention, etc) prior to patient coming to hospital for Gomez catheter placement and subsequent admission for start of IV therapy. It was noted that it typically takes 5-7 days to obtain insurance approval on medication, but it could take as long as one month (rare).   Patient will transition from Uptravi to IV Remodulin while in the hospital. Average hospital stay for transition is 3-5 days, and that patient starts medication in the hospital to monitor response to medication including side effects, which include but are not limited to HA, N/V/D, leg/back pain. Patient and friend were shown CADD legacy pump. It was emphasized that patient is connected to Remodulin 24 hours day, with breaks only to change the line/load the cassette with medication. Informed patient that more specific details regarding bathing with the Remodulin, etc are taught by specialty pharmacy nurse. Reviewed the "Connect-Start-Reach" flip book of therapy, provided a DVD and gave 'Take it all In Stride" card with web address for online videos concerning  Remodulin therapy. Also gave patient information about [facility] and encouraged her to become a member. All questions answered Patient encouraged to call with questions/concerns in the interim.   Submitted application to [facility].  "

## 2017-12-11 NOTE — PLAN OF CARE
Problem: Patient Care Overview  Goal: Plan of Care Review  Outcome: Ongoing (interventions implemented as appropriate)  Remodulin titrated up by 2 ng/kg/min via PIV - currently at 6 ng/kg/min. Denies any side effects at this time. WESLY consulted for Gomez placement. Lasix gtt held this am - creatinine up to 2.3 from 1.7. Patient reports using BiPAP nightly at home - orders placed to start tonight. On 3L NC.

## 2017-12-11 NOTE — PLAN OF CARE
Problem: Patient Care Overview  Goal: Plan of Care Review  Outcome: Ongoing (interventions implemented as appropriate)  Plan of care reviewed with patient. Pt verbalized understanding. Pt AAOX4. Pt free from falls, injuries and trauma.  Pt free from skin breakdown.  Pt VSS and in NAD.  Pt afebrile.  Pt had no complaints of SOB, N/V or CP.  Pts pain moderately controlled with prn pain medications. Pt ambulating independently in room. Adequate UOP this shift. No BM this shift.  O2 continuous @ 3L. Lasix continuous at 20mg/hr. Remodulin titrated up q 6hr from initial start time. Currently running at 4ng/kg/min. Pt tolerating titration well.  Pt had uneventful evening. Pt in low locked bed with personal items and call light within reach. Fall precautions maintained.

## 2017-12-11 NOTE — ASSESSMENT & PLAN NOTE
Hx of severe primary pulmonary HTN still appears volume overloaded on exam and Cr is back to baseline. Will continue with home bumex 4 mg bid.

## 2017-12-11 NOTE — PROGRESS NOTES
[facility]  Heart Transplant  Progress Note    Patient Name: Sue Smith  MRN: [id number]  Admission Date: 12/8/2017  Hospital Length of Stay: 3 days  Attending Physician: Faina Cullen MD  Primary Care Provider: Paddy Guerrier DO  Principal Problem:Acute on chronic right heart failure    Subjective:     No new subjective & objective note has been filed under this hospital service since the last note was generated.    Assessment and Plan:     No notes on file    * Acute on chronic right heart failure    Hx of severe primary pulmonary HTN still appears volume overloaded on exam but Cr has taken a slight bump to 2.3 from her baseline of 1.6. She has been on lasix gtt at 20 for the past couple of days as well as metolazone daily but she only takes this prn at home. Will stop lasix gtt/metolazone and just do home bumex. Her dry weight is about 182 and she is 185 now so almost there.           Pulmonary hypertension, group 1    Severe PH with markedly reduced CI also severe RV dysfunction. Left her medications at home. UPTRAVI stopped and started on remodulin 12/10. She is currently on 4 ng/kg/min and we are uptitrating up 1 ng every 6 hours. Will get [place] for outpatient infusion.           Chronic respiratory failure with hypoxia    2/2 PH as above        GERD (gastroesophageal reflux disease)    Continue home medications.         Bipolar disorder    Continue home medications.         Dyslipidemia    Continue home medications.         Hypertension    Continue medications.        Hypothyroidism    Continue medications.            Nas Chen MD  Heart Transplant  [facility]

## 2017-12-12 LAB
ANION GAP SERPL CALC-SCNC: 16 MMOL/L
BUN SERPL-MCNC: 46 MG/DL
CALCIUM SERPL-MCNC: 10.2 MG/DL
CHLORIDE SERPL-SCNC: 84 MMOL/L
CO2 SERPL-SCNC: 37 MMOL/L
CREAT SERPL-MCNC: 1.5 MG/DL
EST. GFR  (AFRICAN AMERICAN): 44.6 ML/MIN/1.73 M^2
EST. GFR  (NON AFRICAN AMERICAN): 38.7 ML/MIN/1.73 M^2
GLUCOSE SERPL-MCNC: 95 MG/DL
POTASSIUM SERPL-SCNC: 2.4 MMOL/L
POTASSIUM SERPL-SCNC: 4.1 MMOL/L
SODIUM SERPL-SCNC: 137 MMOL/L

## 2017-12-12 PROCEDURE — 20600001 HC STEP DOWN PRIVATE ROOM

## 2017-12-12 PROCEDURE — 27000221 HC OXYGEN, UP TO 24 HOURS

## 2017-12-12 PROCEDURE — 25000003 PHARM REV CODE 250: Performed by: PHYSICIAN ASSISTANT

## 2017-12-12 PROCEDURE — 63600175 PHARM REV CODE 636 W HCPCS: Performed by: INTERNAL MEDICINE

## 2017-12-12 PROCEDURE — 99232 SBSQ HOSP IP/OBS MODERATE 35: CPT | Mod: GW,HPC,, | Performed by: INTERNAL MEDICINE

## 2017-12-12 PROCEDURE — 25000003 PHARM REV CODE 250: Performed by: INTERNAL MEDICINE

## 2017-12-12 PROCEDURE — 36415 COLL VENOUS BLD VENIPUNCTURE: CPT

## 2017-12-12 PROCEDURE — 80048 BASIC METABOLIC PNL TOTAL CA: CPT

## 2017-12-12 PROCEDURE — 84132 ASSAY OF SERUM POTASSIUM: CPT

## 2017-12-12 PROCEDURE — A4216 STERILE WATER/SALINE, 10 ML: HCPCS | Performed by: INTERNAL MEDICINE

## 2017-12-12 PROCEDURE — 94640 AIRWAY INHALATION TREATMENT: CPT

## 2017-12-12 PROCEDURE — 25000003 PHARM REV CODE 250: Performed by: STUDENT IN AN ORGANIZED HEALTH CARE EDUCATION/TRAINING PROGRAM

## 2017-12-12 PROCEDURE — 25000242 PHARM REV CODE 250 ALT 637 W/ HCPCS: Performed by: INTERNAL MEDICINE

## 2017-12-12 PROCEDURE — 94761 N-INVAS EAR/PLS OXIMETRY MLT: CPT

## 2017-12-12 PROCEDURE — 94660 CPAP INITIATION&MGMT: CPT

## 2017-12-12 RX ORDER — POTASSIUM CHLORIDE 20 MEQ/1
60 TABLET, EXTENDED RELEASE ORAL ONCE
Status: COMPLETED | OUTPATIENT
Start: 2017-12-12 | End: 2017-12-12

## 2017-12-12 RX ADMIN — BUSPIRONE HYDROCHLORIDE 15 MG: 5 TABLET ORAL at 08:12

## 2017-12-12 RX ADMIN — GABAPENTIN 100 MG: 100 CAPSULE ORAL at 02:12

## 2017-12-12 RX ADMIN — ALBUTEROL SULFATE 2.5 MG: 2.5 SOLUTION RESPIRATORY (INHALATION) at 08:12

## 2017-12-12 RX ADMIN — GABAPENTIN 100 MG: 100 CAPSULE ORAL at 05:12

## 2017-12-12 RX ADMIN — POTASSIUM CHLORIDE 20 MEQ: 1500 TABLET, EXTENDED RELEASE ORAL at 07:12

## 2017-12-12 RX ADMIN — BUMETANIDE 4 MG: 1 TABLET ORAL at 06:12

## 2017-12-12 RX ADMIN — ALBUTEROL SULFATE 2.5 MG: 2.5 SOLUTION RESPIRATORY (INHALATION) at 03:12

## 2017-12-12 RX ADMIN — TREPROSTINIL 14 NG/KG/MIN: 100 INJECTION, SOLUTION INTRAVENOUS; SUBCUTANEOUS at 11:12

## 2017-12-12 RX ADMIN — ALBUTEROL SULFATE 2.5 MG: 2.5 SOLUTION RESPIRATORY (INHALATION) at 11:12

## 2017-12-12 RX ADMIN — OXYCODONE HYDROCHLORIDE AND ACETAMINOPHEN 1 TABLET: 5; 325 TABLET ORAL at 02:12

## 2017-12-12 RX ADMIN — DESVENLAFAXINE SUCCINATE 100 MG: 50 TABLET, FILM COATED, EXTENDED RELEASE ORAL at 07:12

## 2017-12-12 RX ADMIN — LAMOTRIGINE 100 MG: 100 TABLET ORAL at 07:12

## 2017-12-12 RX ADMIN — POTASSIUM CHLORIDE 60 MEQ: 1500 TABLET, EXTENDED RELEASE ORAL at 05:12

## 2017-12-12 RX ADMIN — GABAPENTIN 100 MG: 100 CAPSULE ORAL at 08:12

## 2017-12-12 RX ADMIN — TREPROSTINIL 16 NG/KG/MIN: 100 INJECTION, SOLUTION INTRAVENOUS; SUBCUTANEOUS at 05:12

## 2017-12-12 RX ADMIN — LEVOTHYROXINE SODIUM 75 MCG: 75 TABLET ORAL at 05:12

## 2017-12-12 RX ADMIN — HEPARIN SODIUM 5000 UNITS: 5000 INJECTION, SOLUTION INTRAVENOUS; SUBCUTANEOUS at 08:12

## 2017-12-12 RX ADMIN — LAMOTRIGINE 100 MG: 100 TABLET ORAL at 08:12

## 2017-12-12 RX ADMIN — PRAVASTATIN SODIUM 40 MG: 40 TABLET ORAL at 07:12

## 2017-12-12 RX ADMIN — HYDROCODONE BITARTRATE AND ACETAMINOPHEN 1 TABLET: 10; 325 TABLET ORAL at 08:12

## 2017-12-12 RX ADMIN — AMLODIPINE BESYLATE 5 MG: 5 TABLET ORAL at 07:12

## 2017-12-12 RX ADMIN — BUSPIRONE HYDROCHLORIDE 15 MG: 5 TABLET ORAL at 02:12

## 2017-12-12 RX ADMIN — HEPARIN SODIUM 5000 UNITS: 5000 INJECTION, SOLUTION INTRAVENOUS; SUBCUTANEOUS at 05:12

## 2017-12-12 RX ADMIN — ACETAMINOPHEN 650 MG: 325 TABLET ORAL at 07:12

## 2017-12-12 RX ADMIN — BUMETANIDE 4 MG: 1 TABLET ORAL at 07:12

## 2017-12-12 RX ADMIN — Medication 3 ML: at 06:12

## 2017-12-12 RX ADMIN — HEPARIN SODIUM 5000 UNITS: 5000 INJECTION, SOLUTION INTRAVENOUS; SUBCUTANEOUS at 02:12

## 2017-12-12 RX ADMIN — PANTOPRAZOLE SODIUM 40 MG: 40 TABLET, DELAYED RELEASE ORAL at 07:12

## 2017-12-12 RX ADMIN — FLUTICASONE FUROATE AND VILANTEROL TRIFENATATE 1 PUFF: 100; 25 POWDER RESPIRATORY (INHALATION) at 07:12

## 2017-12-12 NOTE — SUBJECTIVE & OBJECTIVE
Interval History: Feeling much better diuresing well. Cr better     Continuous Infusions:   treprostinil (REMODULIN) infusion 14 ng/kg/min (12/12/17 1152)    veletri/remodulin cassette      veletri/remodulin tubing       Scheduled Meds:   albuterol sulfate  2.5 mg Nebulization Q4H    amLODIPine  5 mg Oral Daily    bumetanide  4 mg Oral BID    busPIRone  15 mg Oral TID    desvenlafaxine succinate  100 mg Oral Daily    fluticasone-vilanterol  1 puff Inhalation Daily    gabapentin  100 mg Oral TID    heparin (porcine)  5,000 Units Subcutaneous Q8H    lamoTRIgine  100 mg Oral BID    levothyroxine  75 mcg Oral Daily    pantoprazole  40 mg Oral Daily    potassium chloride 10%  40 mEq Oral Once    potassium chloride SA  20 mEq Oral Daily    pravastatin  40 mg Oral Daily    sodium chloride 0.9%  3 mL Intravenous Q8H     PRN Meds:acetaminophen, hydrocodone-acetaminophen 10-325mg, loperamide, ondansetron, ondansetron, oxyCODONE-acetaminophen    Review of patient's allergies indicates:   Allergen Reactions    Sulfa (sulfonamide antibiotics) Rash     Objective:     Vital Signs (Most Recent):  Temp: 98.5 °F (36.9 °C) (12/12/17 1102)  Pulse: 79 (12/12/17 1151)  Resp: 18 (12/12/17 1151)  BP: 116/64 (12/12/17 1102)  SpO2: 95 % (12/12/17 1151) Vital Signs (24h Range):  Temp:  [97.7 °F (36.5 °C)-98.5 °F (36.9 °C)] 98.5 °F (36.9 °C)  Pulse:  [72-82] 79  Resp:  [18-22] 18  SpO2:  [88 %-99 %] 95 %  BP: (106-137)/(63-81) 116/64     Patient Vitals for the past 72 hrs (Last 3 readings):   Weight   12/12/17 0400 83.1 kg (183 lb 3.2 oz)   12/11/17 0400 85.6 kg (188 lb 11.4 oz)   12/10/17 0400 85 kg (187 lb 6.3 oz)     Body mass index is 33.51 kg/m².      Intake/Output Summary (Last 24 hours) at 12/12/17 1410  Last data filed at 12/12/17 0730   Gross per 24 hour   Intake             1400 ml   Output             3700 ml   Net            -2300 ml       Hemodynamic Parameters:         Physical Exam   Constitutional: She is  oriented to person, place, and time. She appears well-developed and well-nourished.   HENT:   Head: Normocephalic.   Neck: Normal range of motion. JVD (to ear) present.   Cardiovascular: Normal rate and regular rhythm.    Pulmonary/Chest: Effort normal and breath sounds normal.   Abdominal: Soft.   Musculoskeletal: She exhibits edema.   Neurological: She is alert and oriented to person, place, and time.   Skin: Skin is warm and dry.       Significant Labs:  CBC:    Recent Labs  Lab 12/08/17 1415 12/08/17 1945   WBC 6.65 6.09   RBC 3.93* 3.56*   HGB 12.3 11.3*   HCT 37.4 33.2*    151   MCV 95 93   MCH 31.3* 31.7*   MCHC 32.9 34.0     BNP:    Recent Labs  Lab 12/08/17 1415   BNP 1,489*     CMP:    Recent Labs  Lab 12/08/17 1415 12/08/17 1945  12/10/17  0447 12/11/17  0400 12/12/17  0350 12/12/17  0618   GLU 88 102  < > 108 95 95  --    CALCIUM 9.5 9.7  < > 10.6* 10.5 10.2  --    ALBUMIN 4.1 3.8  --   --   --   --   --    PROT 7.1 6.5  --   --   --   --   --     142  < > 141 138 137  --    K 2.9* 2.8*  < > 3.0* 3.4* 2.4* 4.1   CO2 33* 34*  < > 34* 39* 37*  --    CL 96 97  < > 90* 83* 84*  --    BUN 25* 26*  < > 29* 50* 46*  --    CREATININE 1.7* 1.6*  < > 1.7* 2.3* 1.5*  --    ALKPHOS 110 104  --   --   --   --   --    ALT 23 23  --   --   --   --   --    AST 31 29  --   --   --   --   --    BILITOT 0.7 0.7  --   --   --   --   --    < > = values in this interval not displayed.   Coagulation:   No results for input(s): PT, INR, APTT in the last 168 hours.  LDH:  No results for input(s): LDH in the last 72 hours.  Microbiology:  Microbiology Results (last 7 days)     ** No results found for the last 168 hours. **          I have reviewed all pertinent labs within the past 24 hours.    Estimated Creatinine Clearance: 41.8 mL/min (based on SCr of 1.5 mg/dL (H)).    Diagnostic Results:  I have reviewed and interpreted all pertinent imaging results/findings within the past 24 hours.

## 2017-12-12 NOTE — PROGRESS NOTES
[facility]  Heart Transplant  Progress Note    Patient Name: Sue Smith  MRN: [id number]  Admission Date: 12/8/2017  Hospital Length of Stay: 4 days  Attending Physician: Faina Cullen MD  Primary Care Provider: Paddy Guerrier DO  Principal Problem:Acute on chronic right heart failure    Subjective:     Interval History: Feeling much better diuresing well. Cr better     Continuous Infusions:   treprostinil (REMODULIN) infusion 14 ng/kg/min (12/12/17 1152)    veletri/remodulin cassette      veletri/remodulin tubing       Scheduled Meds:   albuterol sulfate  2.5 mg Nebulization Q4H    amLODIPine  5 mg Oral Daily    bumetanide  4 mg Oral BID    busPIRone  15 mg Oral TID    desvenlafaxine succinate  100 mg Oral Daily    fluticasone-vilanterol  1 puff Inhalation Daily    gabapentin  100 mg Oral TID    heparin (porcine)  5,000 Units Subcutaneous Q8H    lamoTRIgine  100 mg Oral BID    levothyroxine  75 mcg Oral Daily    pantoprazole  40 mg Oral Daily    potassium chloride 10%  40 mEq Oral Once    potassium chloride SA  20 mEq Oral Daily    pravastatin  40 mg Oral Daily    sodium chloride 0.9%  3 mL Intravenous Q8H     PRN Meds:acetaminophen, hydrocodone-acetaminophen 10-325mg, loperamide, ondansetron, ondansetron, oxyCODONE-acetaminophen    Review of patient's allergies indicates:   Allergen Reactions    Sulfa (sulfonamide antibiotics) Rash     Objective:     Vital Signs (Most Recent):  Temp: 98.5 °F (36.9 °C) (12/12/17 1102)  Pulse: 79 (12/12/17 1151)  Resp: 18 (12/12/17 1151)  BP: 116/64 (12/12/17 1102)  SpO2: 95 % (12/12/17 1151) Vital Signs (24h Range):  Temp:  [97.7 °F (36.5 °C)-98.5 °F (36.9 °C)] 98.5 °F (36.9 °C)  Pulse:  [72-82] 79  Resp:  [18-22] 18  SpO2:  [88 %-99 %] 95 %  BP: (106-137)/(63-81) 116/64     Patient Vitals for the past 72 hrs (Last 3 readings):   Weight   12/12/17 0400 83.1 kg (183 lb 3.2 oz)   12/11/17 0400 85.6 kg (188 lb 11.4 oz)   12/10/17 0400 85 kg  (187 lb 6.3 oz)     Body mass index is 33.51 kg/m².      Intake/Output Summary (Last 24 hours) at 12/12/17 1410  Last data filed at 12/12/17 0730   Gross per 24 hour   Intake             1400 ml   Output             3700 ml   Net            -2300 ml       Hemodynamic Parameters:         Physical Exam   Constitutional: She is oriented to person, place, and time. She appears well-developed and well-nourished.   HENT:   Head: Normocephalic.   Neck: Normal range of motion. JVD (to ear) present.   Cardiovascular: Normal rate and regular rhythm.    Pulmonary/Chest: Effort normal and breath sounds normal.   Abdominal: Soft.   Musculoskeletal: She exhibits edema.   Neurological: She is alert and oriented to person, place, and time.   Skin: Skin is warm and dry.       Significant Labs:  CBC:    Recent Labs  Lab 12/08/17  1415 12/08/17 1945   WBC 6.65 6.09   RBC 3.93* 3.56*   HGB 12.3 11.3*   HCT 37.4 33.2*    151   MCV 95 93   MCH 31.3* 31.7*   MCHC 32.9 34.0     BNP:    Recent Labs  Lab 12/08/17  1415   BNP 1,489*     CMP:    Recent Labs  Lab 12/08/17  1415 12/08/17  1945  12/10/17  0447 12/11/17  0400 12/12/17  0350 12/12/17  0618   GLU 88 102  < > 108 95 95  --    CALCIUM 9.5 9.7  < > 10.6* 10.5 10.2  --    ALBUMIN 4.1 3.8  --   --   --   --   --    PROT 7.1 6.5  --   --   --   --   --     142  < > 141 138 137  --    K 2.9* 2.8*  < > 3.0* 3.4* 2.4* 4.1   CO2 33* 34*  < > 34* 39* 37*  --    CL 96 97  < > 90* 83* 84*  --    BUN 25* 26*  < > 29* 50* 46*  --    CREATININE 1.7* 1.6*  < > 1.7* 2.3* 1.5*  --    ALKPHOS 110 104  --   --   --   --   --    ALT 23 23  --   --   --   --   --    AST 31 29  --   --   --   --   --    BILITOT 0.7 0.7  --   --   --   --   --    < > = values in this interval not displayed.   Coagulation:   No results for input(s): PT, INR, APTT in the last 168 hours.  LDH:  No results for input(s): LDH in the last 72 hours.  Microbiology:  Microbiology Results (last 7 days)     ** No results  found for the last 168 hours. **          I have reviewed all pertinent labs within the past 24 hours.    Estimated Creatinine Clearance: 41.8 mL/min (based on SCr of 1.5 mg/dL (H)).    Diagnostic Results:  I have reviewed and interpreted all pertinent imaging results/findings within the past 24 hours.    Assessment and Plan:     No notes on file    * Acute on chronic right heart failure    Hx of severe primary pulmonary HTN still appears volume overloaded on exam and Cr is back to baseline. Will continue with home bumex 4 mg bid.         Pulmonary hypertension, group 1    Severe PH with markedly reduced CI also severe RV dysfunction. Left her medications at home. UPTRAVI stopped and started on remodulin 12/10. She is currently on 12 ng/kg/min and we are uptitrating up 2 ng every 6 hours. Will get [facility] for outpatient infusion.           Chronic respiratory failure with hypoxia    2/2 PH as above        GERD (gastroesophageal reflux disease)    Continue home medications.         Bipolar disorder    Continue home medications.         Dyslipidemia    Continue home medications.         Hypertension    Continue medications.        Hypothyroidism    Continue medications.            Nas Chen MD  Heart Transplant  [facility]

## 2017-12-12 NOTE — PROGRESS NOTES
Increased pt's remodulin to 14ng/kg/min at 57cc/24hrs. Pt tolerating increase dose and remaining asymptomatic.

## 2017-12-12 NOTE — PLAN OF CARE
Pt is aaox4. Pt has continuous remodulin going at 57cc/24hr- will increase by another 2ng/kg/min at 1800. Pt tolerating dosing increase fine. [facility] placement did not happen today- plan for [facility] placement tomorrow 12/13. Pt has a new PIC to her right FA. VSS. Pt's repeat K+ 4.1. Increased pt's NC to 5L sating >91%. Pt c/o of pain x1 prn medication given. Pt also c/o of HA- Tylenol given. Bed locked and lowered to lowest position. Call bell within reach. Non skid socks on while ambulating. Will continue to monitor.

## 2017-12-12 NOTE — PLAN OF CARE
Problem: Patient Care Overview  Goal: Plan of Care Review  Pt aaox4 vswnl and no c/o pain. Bed in low position and callbell within reach. remodulin increased to 10ng/kg/min and so far pt is tolerating without any change in side effects. Will continue to monitor. Telemetry maintained nsr. sats 91-93%. bipap on at night. Pt ambulates with standby assist and bedside commode at night. Pt to get [facility]kins in am. Peripheral iv patent/intact wihtout redness or swelling. Backup peripheral maintained. Pt uop 2liters so far my shift. No c/o dizziness. Standard precautions maintained.

## 2017-12-12 NOTE — NURSING
md fellow on call [facility]  notified pt's k=2.4. Order for potassium 120meg po now and hold bumex this morning. Recheck k at 0700.

## 2017-12-12 NOTE — NURSING
Increased the remodulin to 10ng/kg/min per md order and to 41cc/24hrs. Pt instructed to call for any side effects.

## 2017-12-13 ENCOUNTER — ANESTHESIA EVENT (OUTPATIENT)
Dept: SURGERY | Facility: HOSPITAL | Age: 57
DRG: 291 | End: 2017-12-13
Payer: MEDICARE

## 2017-12-13 LAB
ANION GAP SERPL CALC-SCNC: 13 MMOL/L
BUN SERPL-MCNC: 43 MG/DL
CALCIUM SERPL-MCNC: 10.7 MG/DL
CHLORIDE SERPL-SCNC: 83 MMOL/L
CO2 SERPL-SCNC: 45 MMOL/L
CREAT SERPL-MCNC: 1.4 MG/DL
EST. GFR  (AFRICAN AMERICAN): 48.5 ML/MIN/1.73 M^2
EST. GFR  (NON AFRICAN AMERICAN): 42 ML/MIN/1.73 M^2
GLUCOSE SERPL-MCNC: 104 MG/DL
INR PPP: 1.1
POTASSIUM SERPL-SCNC: 3.4 MMOL/L
PROTHROMBIN TIME: 11.5 SEC
SODIUM SERPL-SCNC: 141 MMOL/L

## 2017-12-13 PROCEDURE — 25000003 PHARM REV CODE 250: Performed by: INTERNAL MEDICINE

## 2017-12-13 PROCEDURE — 94660 CPAP INITIATION&MGMT: CPT

## 2017-12-13 PROCEDURE — 20600001 HC STEP DOWN PRIVATE ROOM

## 2017-12-13 PROCEDURE — 25000003 PHARM REV CODE 250: Performed by: STUDENT IN AN ORGANIZED HEALTH CARE EDUCATION/TRAINING PROGRAM

## 2017-12-13 PROCEDURE — 94761 N-INVAS EAR/PLS OXIMETRY MLT: CPT

## 2017-12-13 PROCEDURE — 63600175 PHARM REV CODE 636 W HCPCS: Performed by: INTERNAL MEDICINE

## 2017-12-13 PROCEDURE — 99900035 HC TECH TIME PER 15 MIN (STAT)

## 2017-12-13 PROCEDURE — 99232 SBSQ HOSP IP/OBS MODERATE 35: CPT | Mod: GW,HPC,, | Performed by: INTERNAL MEDICINE

## 2017-12-13 PROCEDURE — 25000242 PHARM REV CODE 250 ALT 637 W/ HCPCS: Performed by: INTERNAL MEDICINE

## 2017-12-13 PROCEDURE — 80048 BASIC METABOLIC PNL TOTAL CA: CPT

## 2017-12-13 PROCEDURE — 85610 PROTHROMBIN TIME: CPT

## 2017-12-13 PROCEDURE — 94640 AIRWAY INHALATION TREATMENT: CPT

## 2017-12-13 PROCEDURE — 25000003 PHARM REV CODE 250: Performed by: PHYSICIAN ASSISTANT

## 2017-12-13 PROCEDURE — 36415 COLL VENOUS BLD VENIPUNCTURE: CPT

## 2017-12-13 PROCEDURE — 27000221 HC OXYGEN, UP TO 24 HOURS

## 2017-12-13 RX ORDER — CEFAZOLIN SODIUM 2 G/50ML
2 SOLUTION INTRAVENOUS
Status: DISCONTINUED | OUTPATIENT
Start: 2017-12-15 | End: 2017-12-13

## 2017-12-13 RX ORDER — CEFAZOLIN SODIUM 2 G/50ML
2 SOLUTION INTRAVENOUS
Status: DISCONTINUED | OUTPATIENT
Start: 2017-12-14 | End: 2017-12-20

## 2017-12-13 RX ADMIN — ALBUTEROL SULFATE 2.5 MG: 2.5 SOLUTION RESPIRATORY (INHALATION) at 03:12

## 2017-12-13 RX ADMIN — POTASSIUM CHLORIDE 20 MEQ: 1500 TABLET, EXTENDED RELEASE ORAL at 07:12

## 2017-12-13 RX ADMIN — HEPARIN SODIUM 5000 UNITS: 5000 INJECTION, SOLUTION INTRAVENOUS; SUBCUTANEOUS at 01:12

## 2017-12-13 RX ADMIN — ALBUTEROL SULFATE 2.5 MG: 2.5 SOLUTION RESPIRATORY (INHALATION) at 09:12

## 2017-12-13 RX ADMIN — DESVENLAFAXINE SUCCINATE 100 MG: 50 TABLET, FILM COATED, EXTENDED RELEASE ORAL at 07:12

## 2017-12-13 RX ADMIN — GABAPENTIN 100 MG: 100 CAPSULE ORAL at 05:12

## 2017-12-13 RX ADMIN — OXYCODONE HYDROCHLORIDE AND ACETAMINOPHEN 1 TABLET: 5; 325 TABLET ORAL at 03:12

## 2017-12-13 RX ADMIN — AMLODIPINE BESYLATE 5 MG: 5 TABLET ORAL at 07:12

## 2017-12-13 RX ADMIN — PRAVASTATIN SODIUM 40 MG: 40 TABLET ORAL at 07:12

## 2017-12-13 RX ADMIN — HYDROCODONE BITARTRATE AND ACETAMINOPHEN 1 TABLET: 10; 325 TABLET ORAL at 11:12

## 2017-12-13 RX ADMIN — ALBUTEROL SULFATE 2.5 MG: 2.5 SOLUTION RESPIRATORY (INHALATION) at 08:12

## 2017-12-13 RX ADMIN — ALBUTEROL SULFATE 2.5 MG: 2.5 SOLUTION RESPIRATORY (INHALATION) at 04:12

## 2017-12-13 RX ADMIN — BUSPIRONE HYDROCHLORIDE 15 MG: 5 TABLET ORAL at 01:12

## 2017-12-13 RX ADMIN — BUMETANIDE 4 MG: 1 TABLET ORAL at 07:12

## 2017-12-13 RX ADMIN — FLUTICASONE FUROATE AND VILANTEROL TRIFENATATE 1 PUFF: 100; 25 POWDER RESPIRATORY (INHALATION) at 07:12

## 2017-12-13 RX ADMIN — LEVOTHYROXINE SODIUM 75 MCG: 75 TABLET ORAL at 05:12

## 2017-12-13 RX ADMIN — BUMETANIDE 4 MG: 1 TABLET ORAL at 06:12

## 2017-12-13 RX ADMIN — GABAPENTIN 100 MG: 100 CAPSULE ORAL at 08:12

## 2017-12-13 RX ADMIN — HEPARIN SODIUM 5000 UNITS: 5000 INJECTION, SOLUTION INTRAVENOUS; SUBCUTANEOUS at 08:12

## 2017-12-13 RX ADMIN — TREPROSTINIL 22 NG/KG/MIN: 100 INJECTION, SOLUTION INTRAVENOUS; SUBCUTANEOUS at 12:12

## 2017-12-13 RX ADMIN — TREPROSTINIL 22 NG/KG/MIN: 100 INJECTION, SOLUTION INTRAVENOUS; SUBCUTANEOUS at 06:12

## 2017-12-13 RX ADMIN — LAMOTRIGINE 100 MG: 100 TABLET ORAL at 07:12

## 2017-12-13 RX ADMIN — LAMOTRIGINE 100 MG: 100 TABLET ORAL at 08:12

## 2017-12-13 RX ADMIN — HEPARIN SODIUM 5000 UNITS: 5000 INJECTION, SOLUTION INTRAVENOUS; SUBCUTANEOUS at 05:12

## 2017-12-13 RX ADMIN — GABAPENTIN 100 MG: 100 CAPSULE ORAL at 01:12

## 2017-12-13 RX ADMIN — BUSPIRONE HYDROCHLORIDE 15 MG: 5 TABLET ORAL at 05:12

## 2017-12-13 RX ADMIN — BUSPIRONE HYDROCHLORIDE 15 MG: 5 TABLET ORAL at 08:12

## 2017-12-13 RX ADMIN — PANTOPRAZOLE SODIUM 40 MG: 40 TABLET, DELAYED RELEASE ORAL at 07:12

## 2017-12-13 NOTE — SUBJECTIVE & OBJECTIVE
Interval History: Feeling much better. Tolerating medications. Cr better     Continuous Infusions:   treprostinil (REMODULIN) infusion 20 ng/kg/min (12/13/17 0533)    veletri/remodulin cassette      veletri/remodulin tubing       Scheduled Meds:   albuterol sulfate  2.5 mg Nebulization Q4H    amLODIPine  5 mg Oral Daily    bumetanide  4 mg Oral BID    busPIRone  15 mg Oral TID    desvenlafaxine succinate  100 mg Oral Daily    fluticasone-vilanterol  1 puff Inhalation Daily    gabapentin  100 mg Oral TID    heparin (porcine)  5,000 Units Subcutaneous Q8H    lamoTRIgine  100 mg Oral BID    levothyroxine  75 mcg Oral Daily    pantoprazole  40 mg Oral Daily    potassium chloride 10%  40 mEq Oral Once    potassium chloride SA  20 mEq Oral Daily    pravastatin  40 mg Oral Daily    sodium chloride 0.9%  3 mL Intravenous Q8H     PRN Meds:acetaminophen, hydrocodone-acetaminophen 10-325mg, loperamide, ondansetron, ondansetron, oxyCODONE-acetaminophen    Review of patient's allergies indicates:   Allergen Reactions    Sulfa (sulfonamide antibiotics) Rash     Objective:     Vital Signs (Most Recent):  Temp: 98 °F (36.7 °C) (12/13/17 0715)  Pulse: (!) 115 (12/13/17 0800)  Resp: 18 (12/13/17 0715)  BP: 111/65 (12/13/17 0715)  SpO2: (!) 94 % (12/13/17 0715) Vital Signs (24h Range):  Temp:  [97.7 °F (36.5 °C)-98.5 °F (36.9 °C)] 98 °F (36.7 °C)  Pulse:  [] 115  Resp:  [16-20] 18  SpO2:  [91 %-99 %] 94 %  BP: (105-121)/(60-75) 111/65     Patient Vitals for the past 72 hrs (Last 3 readings):   Weight   12/13/17 0349 83.2 kg (183 lb 6.8 oz)   12/12/17 0400 83.1 kg (183 lb 3.2 oz)   12/11/17 0400 85.6 kg (188 lb 11.4 oz)     Body mass index is 33.55 kg/m².      Intake/Output Summary (Last 24 hours) at 12/13/17 0842  Last data filed at 12/13/17 0349   Gross per 24 hour   Intake              620 ml   Output              950 ml   Net             -330 ml       Hemodynamic Parameters:         Physical Exam    Constitutional: She is oriented to person, place, and time. She appears well-developed and well-nourished.   HENT:   Head: Normocephalic.   Neck: Normal range of motion. JVD (to ear) present.   Cardiovascular: Normal rate and regular rhythm.    Pulmonary/Chest: Effort normal and breath sounds normal.   Abdominal: Soft.   Musculoskeletal: She exhibits edema.   Neurological: She is alert and oriented to person, place, and time.   Skin: Skin is warm and dry.       Significant Labs:  CBC:    Recent Labs  Lab 12/08/17 1415 12/08/17 1945   WBC 6.65 6.09   RBC 3.93* 3.56*   HGB 12.3 11.3*   HCT 37.4 33.2*    151   MCV 95 93   MCH 31.3* 31.7*   MCHC 32.9 34.0     BNP:    Recent Labs  Lab 12/08/17 1415   BNP 1,489*     CMP:    Recent Labs  Lab 12/08/17  1415 12/08/17  1945  12/11/17  0400 12/12/17  0350 12/12/17  0618 12/13/17  0552   GLU 88 102  < > 95 95  --  104   CALCIUM 9.5 9.7  < > 10.5 10.2  --  10.7*   ALBUMIN 4.1 3.8  --   --   --   --   --    PROT 7.1 6.5  --   --   --   --   --     142  < > 138 137  --  141   K 2.9* 2.8*  < > 3.4* 2.4* 4.1 3.4*   CO2 33* 34*  < > 39* 37*  --  45*   CL 96 97  < > 83* 84*  --  83*   BUN 25* 26*  < > 50* 46*  --  43*   CREATININE 1.7* 1.6*  < > 2.3* 1.5*  --  1.4   ALKPHOS 110 104  --   --   --   --   --    ALT 23 23  --   --   --   --   --    AST 31 29  --   --   --   --   --    BILITOT 0.7 0.7  --   --   --   --   --    < > = values in this interval not displayed.   Coagulation:     Recent Labs  Lab 12/13/17  0552   INR 1.1     LDH:  No results for input(s): LDH in the last 72 hours.  Microbiology:  Microbiology Results (last 7 days)     ** No results found for the last 168 hours. **          I have reviewed all pertinent labs within the past 24 hours.    Estimated Creatinine Clearance: 44.8 mL/min (based on SCr of 1.4 mg/dL).    Diagnostic Results:  I have reviewed and interpreted all pertinent imaging results/findings within the past 24 hours.

## 2017-12-13 NOTE — PROGRESS NOTES
Increased pt's remodulin to 16ng/kg/min going at 65cc/24hrs. Pt is asymptomatic and tolerating it well so far. Will continue to monitor.

## 2017-12-13 NOTE — PROGRESS NOTES
[facility]  Heart Transplant  Progress Note    Patient Name: Sue Smith  MRN: [id number]  Admission Date: 12/8/2017  Hospital Length of Stay: 5 days  Attending Physician: Faina Cullen MD  Primary Care Provider: Paddy Guerrier DO  Principal Problem:Acute on chronic right heart failure    Subjective:     Interval History: Feeling much better. Tolerating medications. Cr better     Continuous Infusions:   treprostinil (REMODULIN) infusion 20 ng/kg/min (12/13/17 0533)    veletri/remodulin cassette      veletri/remodulin tubing       Scheduled Meds:   albuterol sulfate  2.5 mg Nebulization Q4H    amLODIPine  5 mg Oral Daily    bumetanide  4 mg Oral BID    busPIRone  15 mg Oral TID    desvenlafaxine succinate  100 mg Oral Daily    fluticasone-vilanterol  1 puff Inhalation Daily    gabapentin  100 mg Oral TID    heparin (porcine)  5,000 Units Subcutaneous Q8H    lamoTRIgine  100 mg Oral BID    levothyroxine  75 mcg Oral Daily    pantoprazole  40 mg Oral Daily    potassium chloride 10%  40 mEq Oral Once    potassium chloride SA  20 mEq Oral Daily    pravastatin  40 mg Oral Daily    sodium chloride 0.9%  3 mL Intravenous Q8H     PRN Meds:acetaminophen, hydrocodone-acetaminophen 10-325mg, loperamide, ondansetron, ondansetron, oxyCODONE-acetaminophen    Review of patient's allergies indicates:   Allergen Reactions    Sulfa (sulfonamide antibiotics) Rash     Objective:     Vital Signs (Most Recent):  Temp: 98 °F (36.7 °C) (12/13/17 0715)  Pulse: (!) 115 (12/13/17 0800)  Resp: 18 (12/13/17 0715)  BP: 111/65 (12/13/17 0715)  SpO2: (!) 94 % (12/13/17 0715) Vital Signs (24h Range):  Temp:  [97.7 °F (36.5 °C)-98.5 °F (36.9 °C)] 98 °F (36.7 °C)  Pulse:  [] 115  Resp:  [16-20] 18  SpO2:  [91 %-99 %] 94 %  BP: (105-121)/(60-75) 111/65     Patient Vitals for the past 72 hrs (Last 3 readings):   Weight   12/13/17 0349 83.2 kg (183 lb 6.8 oz)   12/12/17 0400 83.1 kg (183 lb 3.2 oz)   12/11/17  0400 85.6 kg (188 lb 11.4 oz)     Body mass index is 33.55 kg/m².      Intake/Output Summary (Last 24 hours) at 12/13/17 0842  Last data filed at 12/13/17 0349   Gross per 24 hour   Intake              620 ml   Output              950 ml   Net             -330 ml       Hemodynamic Parameters:         Physical Exam   Constitutional: She is oriented to person, place, and time. She appears well-developed and well-nourished.   HENT:   Head: Normocephalic.   Neck: Normal range of motion. JVD (to ear) present.   Cardiovascular: Normal rate and regular rhythm.    Pulmonary/Chest: Effort normal and breath sounds normal.   Abdominal: Soft.   Musculoskeletal: She exhibits edema.   Neurological: She is alert and oriented to person, place, and time.   Skin: Skin is warm and dry.       Significant Labs:  CBC:    Recent Labs  Lab 12/08/17 1415 12/08/17 1945   WBC 6.65 6.09   RBC 3.93* 3.56*   HGB 12.3 11.3*   HCT 37.4 33.2*    151   MCV 95 93   MCH 31.3* 31.7*   MCHC 32.9 34.0     BNP:    Recent Labs  Lab 12/08/17  1415   BNP 1,489*     CMP:    Recent Labs  Lab 12/08/17  1415 12/08/17  1945  12/11/17  0400 12/12/17  0350 12/12/17  0618 12/13/17  0552   GLU 88 102  < > 95 95  --  104   CALCIUM 9.5 9.7  < > 10.5 10.2  --  10.7*   ALBUMIN 4.1 3.8  --   --   --   --   --    PROT 7.1 6.5  --   --   --   --   --     142  < > 138 137  --  141   K 2.9* 2.8*  < > 3.4* 2.4* 4.1 3.4*   CO2 33* 34*  < > 39* 37*  --  45*   CL 96 97  < > 83* 84*  --  83*   BUN 25* 26*  < > 50* 46*  --  43*   CREATININE 1.7* 1.6*  < > 2.3* 1.5*  --  1.4   ALKPHOS 110 104  --   --   --   --   --    ALT 23 23  --   --   --   --   --    AST 31 29  --   --   --   --   --    BILITOT 0.7 0.7  --   --   --   --   --    < > = values in this interval not displayed.   Coagulation:     Recent Labs  Lab 12/13/17  0552   INR 1.1     LDH:  No results for input(s): LDH in the last 72 hours.  Microbiology:  Microbiology Results (last 7 days)     ** No results  found for the last 168 hours. **          I have reviewed all pertinent labs within the past 24 hours.    Estimated Creatinine Clearance: 44.8 mL/min (based on SCr of 1.4 mg/dL).    Diagnostic Results:  I have reviewed and interpreted all pertinent imaging results/findings within the past 24 hours.    Assessment and Plan:     No notes on file    * Acute on chronic right heart failure    Hx of severe primary pulmonary HTN still appears volume overloaded on exam and Cr is back to baseline. Will continue with home bumex 4 mg bid.         Pulmonary hypertension, group 1    Severe PH with markedly reduced CI also severe RV dysfunction. Left her medications at home. UPTRAVI stopped and started on remodulin 12/10. She is currently on 20 ng/kg/min and we are uptitrating up 2 ng every 6 hours. Will get perkins for outpatient infusion. Have put in a general surgery consult.           Chronic respiratory failure with hypoxia    2/2 PH as above        GERD (gastroesophageal reflux disease)    Continue home medications.         Bipolar disorder    Continue home medications.         Dyslipidemia    Continue home medications.         Hypertension    Continue medications.        Hypothyroidism    Continue medications.            Nas Chen MD  Heart Transplant  [facility]

## 2017-12-13 NOTE — PROGRESS NOTES
Remodulin titrating stopped at 22ng/kg/min; 90cc/24hr. Pt developed a HA- HTS team notified and gave ok to hold on increasing remodulin dose. Continue to monitor for symptoms.

## 2017-12-13 NOTE — ANESTHESIA PREPROCEDURE EVALUATION
[facility]  Anesthesia Pre-Operative Evaluation         Patient Name: Sue Smith  YOB: 1960  MRN: [id number]    SUBJECTIVE:     Pre-operative evaluation for Procedure(s) (LRB):  INSERTION-CATHETER-RAMOS (single-lumen) (N/A)     12/13/2017    Sue Smith is a 57 y.o. female w/ a significant PMHx of pulmonary HTN (PASP 88), GERD, bipolar disorder, previous tracheostomy, HTN, SHERIE (on CPAP) hypothyroidism currently admitted for acute on chronic R heart failure, currently on remodulin 22 ng/kg/min, uptitrating up 2 ng every 6 hours     Patient now presents for the above procedure(s).    LDA:   20 G PIV Rt arm  22 G PIV Rt forearm    Prev airway: None documented.    Drips:    treprostinil (REMODULIN) infusion 22 ng/kg/min (12/13/17 1202)    veletri/remodulin cassette      veletri/remodulin tubing         Patient Active Problem List   Diagnosis    Pulmonary hypertension, group 1    Chronic respiratory failure with hypoxia    Hypothyroidism    Hypertension    Dyslipidemia    Bipolar disorder    Acute on chronic right heart failure    Hx of tracheostomy    GERD (gastroesophageal reflux disease)    Acute on chronic diastolic heart failure    Cardiomegaly    Pulmonary nodules    Chronic pulmonary heart disease       Review of patient's allergies indicates:   Allergen Reactions    Sulfa (sulfonamide antibiotics) Rash       Current Inpatient Medications:   albuterol sulfate  2.5 mg Nebulization Q4H    amLODIPine  5 mg Oral Daily    bumetanide  4 mg Oral BID    busPIRone  15 mg Oral TID    desvenlafaxine succinate  100 mg Oral Daily    fluticasone-vilanterol  1 puff Inhalation Daily    gabapentin  100 mg Oral TID    heparin (porcine)  5,000 Units Subcutaneous Q8H    lamoTRIgine  100 mg Oral BID    levothyroxine  75 mcg Oral Daily    pantoprazole  40 mg Oral Daily    potassium chloride 10%  40 mEq Oral Once    potassium chloride SA  20 mEq Oral Daily     pravastatin  40 mg Oral Daily    sodium chloride 0.9%  3 mL Intravenous Q8H       No current facility-administered medications on file prior to encounter.      Current Outpatient Prescriptions on File Prior to Encounter   Medication Sig Dispense Refill    albuterol (PROVENTIL) 2.5 mg /3 mL (0.083 %) nebulizer solution Take 2.5 mg by nebulization every 6 (six) hours as needed for Wheezing. Rescue      amlodipine (NORVASC) 5 MG tablet Take 5 mg by mouth once daily.      budesonide-formoterol 80-4.5 mcg (SYMBICORT) 80-4.5 mcg/actuation HFAA Inhale 2 puffs into the lungs 2 (two) times daily. Controller      bumetanide (BUMEX) 2 MG tablet Take 2 tablets (4 mg total) by mouth 2 (two) times daily. 120 tablet 11    busPIRone (BUSPAR) 15 MG tablet Take 15 mg by mouth 3 (three) times daily.      desvenlafaxine succinate (PRISTIQ) 100 MG Tb24 Take 100 mg by mouth once daily.      gabapentin (NEURONTIN) 100 MG capsule Take 1 capsule (100 mg total) by mouth 3 (three) times daily. 90 capsule 11    hydrocodone-acetaminophen 10-325mg (NORCO)  mg Tab Take 1 tablet by mouth every 8 (eight) hours as needed for Pain.      lamotrigine (LAMICTAL) 100 MG tablet Take 100 mg by mouth 2 (two) times daily.      levothyroxine (SYNTHROID) 75 MCG tablet Take 75 mcg by mouth once daily.      lurasidone (LATUDA) 60 mg Tab tablet Take 60 mg by mouth once daily.      metOLazone (ZAROXOLYN) 2.5 MG tablet Take 1 tablet (2.5 mg total) by mouth daily as needed. Take for wt gain 3# overnight or 5# in 1 wk, with extra potassium 30 tablet 11    pantoprazole (PROTONIX) 40 MG tablet Take 40 mg by mouth once daily.      potassium chloride SA (K-DUR,KLOR-CON) 10 MEQ tablet Take 2 tablets (20 mEq total) by mouth once daily. 60 tablet 11    pravastatin (PRAVACHOL) 40 MG tablet Take 40 mg by mouth once daily.      selexipag 200 mcg (140)- 800 mcg (60) DsPk Take 200 mcg BID by mouth for 1 week, then increase by 200 mcg BID, at weekly  intervals, to the highest tolerated dose up to 1600 mcg BID. 200 tablet 11       Past Surgical History:   Procedure Laterality Date    BACK SURGERY      PERICARDIAL WINDOW  2013    VT LEFT HEART CATH,PERCUTANEOUS      Cardiac Cath, Left Heart    TUBAL LIGATION         Social History     Social History    Marital status:      Spouse name: N/A    Number of children: N/A    Years of education: N/A     Occupational History    Not on file.     Social History Main Topics    Smoking status: Former Smoker     Types: Cigarettes     Start date: 1979     Quit date: 1997    Smokeless tobacco: Never Used    Alcohol use No    Drug use: No    Sexual activity: No     Other Topics Concern    Not on file     Social History Narrative    No narrative on file       OBJECTIVE:     Vital Signs Range (Last 24H):  Temp:  [36.5 °C (97.7 °F)-36.9 °C (98.5 °F)]   Pulse:  []   Resp:  [16-20]   BP: (105-130)/(60-75)   SpO2:  [89 %-97 %]       CBC:   No results for input(s): WBC, RBC, HGB, HCT, PLT, MCV, MCH, MCHC in the last 72 hours.    CMP:   Recent Labs      17   0350  17   0618  17   0552   NA  137   --   141   K  2.4*  4.1  3.4*   CL  84*   --   83*   CO2  37*   --   45*   BUN  46*   --   43*   CREATININE  1.5*   --   1.4   GLU  95   --   104   CALCIUM  10.2   --   10.7*       INR:  Recent Labs      17   0552   INR  1.1       Diagnostic Studies: No relevant studies.    EK/9/17  Vent. Rate : 085 BPM     Atrial Rate : 085 BPM     P-R Int : 176 ms          QRS Dur : 118 ms      QT Int : 384 ms       P-R-T Axes : 075 102 -71 degrees     QTc Int : 456 ms    Sinus rhythm with sinus arrhythmia  Possible Left atrial enlargement  Incomplete right bundle branch block  Left posterior fascicular block  ST and T wave abnormality, consider inferolateral ischemia  Abnormal ECG  When compared with ECG of 05-OCT-2017 04:44,  No significant change was found  Confirmed by Rosalind CHRISTOPHER, Hannah  ([id number]) on 11/9/2017 10:36:18 AM    2D ECHO:  Results for orders placed or performed during the hospital encounter of 12/08/17   2D echo with color flow doppler   Result Value Ref Range    EF 55 55 - 65    Est. PA Systolic Pressure 87.57 (A)     Mitral Valve Mobility NORMAL     Tricuspid Valve Regurgitation MODERATE (A)        CONCLUSIONS     1 - Normal left ventricular systolic function (EF 55-60%).     2 - No wall motion abnormalities.     3 - Concentric remodeling.     4 - Indeterminate LV diastolic function.     5 - Right atrial enlargement.     6 - Right ventricular enlargement with hypertrophy, with severely depressed systolic function.     7 - Pulmonary hypertension. The estimated PA systolic pressure is 88 mmHg.     8 - Moderate tricuspid regurgitation.     9 - Intermediate central venous pressure.     ASSESSMENT/PLAN:         Anesthesia Evaluation    I have reviewed the Patient Summary Reports.     I have reviewed the Medications.     Review of Systems  Anesthesia Hx:  No problems with previous Anesthesia  History of prior surgery of interest to airway management or planning: tracheostomy.  Denies Personal Hx of Anesthesia complications.   Social:  Former Smoker    EENT/Dental:   Previous trach   Cardiovascular:   Hypertension CHF    Pulmonary:   Severe pulmonary HTN   Hepatic/GI:   GERD    Endocrine:   Hypothyroidism    Psych:   Psychiatric History          Physical Exam  General:  Well nourished    Airway/Jaw/Neck:  Airway Findings: Mouth Opening: < 3 cm Tongue: Normal  General Airway Assessment: Adult  Mallampati: IV  Improves to III with phonation.  TM Distance: 4 - 6 cm      Dental:  Dental Findings: Upper Dentures   Chest/Lungs:  Chest/Lungs Findings: Normal Respiratory Rate, Decreased Breath Sounds Bilateral     Heart/Vascular:  Heart Findings: Rate: Normal  Rhythm: Regular Rhythm  Heart Murmur  Systolic        Mental Status:  Mental Status Findings:  Cooperative, Alert and Oriented         Anesthesia  Plan  Type of Anesthesia, risks & benefits discussed:  Anesthesia Type:  general, MAC  Patient's Preference:   Intra-op Monitoring Plan: standard ASA monitors  Intra-op Monitoring Plan Comments:   Post Op Pain Control Plan: per primary service following discharge from PACU, IV/PO Opioids PRN and multimodal analgesia  Post Op Pain Control Plan Comments:   Induction:   IV  Beta Blocker:  Patient is not currently on a Beta-Blocker (No further documentation required).       Informed Consent: Patient understands risks and agrees with Anesthesia plan.  Questions answered. Anesthesia consent signed with patient.  ASA Score: 3     Day of Surgery Review of History & Physical:  There are no significant changes.          Ready For Surgery From Anesthesia Perspective.

## 2017-12-13 NOTE — ASSESSMENT & PLAN NOTE
Severe PH with markedly reduced CI also severe RV dysfunction. Left her medications at home. UPTRAVI stopped and started on remodulin 12/10. She is currently on 20 ng/kg/min and we are uptitrating up 2 ng every 6 hours. Will get maddie for outpatient infusion. Have put in a general surgery consult.

## 2017-12-13 NOTE — PLAN OF CARE
Problem: Patient Care Overview  Goal: Plan of Care Review  Pt aaox4 vswnl and c/o back pain with pain med given per md order. Bed in low position and callbell within reach. Telemetry maintained nsr. bipap on at night. 5liters n/c for sat 95%. Car. remodulin infusing at 16ng/kg/min at 65cc/hr at beginning shift and then increased at 0000 per md order to 18ng/kg/min at 73cc/hr. Pt ambulates independently. Planned for perkins in am. Cancelled today.

## 2017-12-13 NOTE — CONSULTS
GENERAL SURGERY  Consultation      REASON FOR CONSULT:  Gomez catheter     SUBJECTIVE:     HISTORY OF PRESENT ILLNESS:  Sue Smith is a 57 y.o. female who was initially admitted to [facility] on 12/8/2017 for Acute on chronic right heart failure      The patient has not been on home remodulin therapy before, but this is something her primary team now thinks she would benefit from.  She denies every having any prior Gomez catheters or any central lines before, only reports what sounds like prior PICC line.  No current central lines visibly in place.  Patient agreeable and willing to proceed with procedure tomorrow as planned.  No blood cultures pending.  All questions answered.        MEDICATIONS:  Home Medications:  No current facility-administered medications on file prior to encounter.      Current Outpatient Prescriptions on File Prior to Encounter   Medication Sig Dispense Refill    albuterol (PROVENTIL) 2.5 mg /3 mL (0.083 %) nebulizer solution Take 2.5 mg by nebulization every 6 (six) hours as needed for Wheezing. Rescue      amlodipine (NORVASC) 5 MG tablet Take 5 mg by mouth once daily.      budesonide-formoterol 80-4.5 mcg (SYMBICORT) 80-4.5 mcg/actuation HFAA Inhale 2 puffs into the lungs 2 (two) times daily. Controller      bumetanide (BUMEX) 2 MG tablet Take 2 tablets (4 mg total) by mouth 2 (two) times daily. 120 tablet 11    busPIRone (BUSPAR) 15 MG tablet Take 15 mg by mouth 3 (three) times daily.      desvenlafaxine succinate (PRISTIQ) 100 MG Tb24 Take 100 mg by mouth once daily.      gabapentin (NEURONTIN) 100 MG capsule Take 1 capsule (100 mg total) by mouth 3 (three) times daily. 90 capsule 11    hydrocodone-acetaminophen 10-325mg (NORCO)  mg Tab Take 1 tablet by mouth every 8 (eight) hours as needed for Pain.      lamotrigine (LAMICTAL) 100 MG tablet Take 100 mg by mouth 2 (two) times daily.      levothyroxine (SYNTHROID) 75 MCG tablet Take 75 mcg by mouth once  daily.      lurasidone (LATUDA) 60 mg Tab tablet Take 60 mg by mouth once daily.      metOLazone (ZAROXOLYN) 2.5 MG tablet Take 1 tablet (2.5 mg total) by mouth daily as needed. Take for wt gain 3# overnight or 5# in 1 wk, with extra potassium 30 tablet 11    pantoprazole (PROTONIX) 40 MG tablet Take 40 mg by mouth once daily.      potassium chloride SA (K-DUR,KLOR-CON) 10 MEQ tablet Take 2 tablets (20 mEq total) by mouth once daily. 60 tablet 11    pravastatin (PRAVACHOL) 40 MG tablet Take 40 mg by mouth once daily.      selexipag 200 mcg (140)- 800 mcg (60) DsPk Take 200 mcg BID by mouth for 1 week, then increase by 200 mcg BID, at weekly intervals, to the highest tolerated dose up to 1600 mcg BID. 200 tablet 11     Inpatient Medications:   albuterol sulfate  2.5 mg Nebulization Q4H    amLODIPine  5 mg Oral Daily    bumetanide  4 mg Oral BID    busPIRone  15 mg Oral TID    desvenlafaxine succinate  100 mg Oral Daily    fluticasone-vilanterol  1 puff Inhalation Daily    gabapentin  100 mg Oral TID    heparin (porcine)  5,000 Units Subcutaneous Q8H    lamoTRIgine  100 mg Oral BID    levothyroxine  75 mcg Oral Daily    pantoprazole  40 mg Oral Daily    potassium chloride 10%  40 mEq Oral Once    potassium chloride SA  20 mEq Oral Daily    pravastatin  40 mg Oral Daily    sodium chloride 0.9%  3 mL Intravenous Q8H     Infusions:   treprostinil (REMODULIN) infusion 22 ng/kg/min (12/13/17 1202)    veletri/remodulin cassette      veletri/remodulin tubing       PRN Medications:acetaminophen, [START ON 12/14/2017] ceFAZolin (ANCEF) IVPB, hydrocodone-acetaminophen 10-325mg, loperamide, ondansetron, ondansetron, oxyCODONE-acetaminophen    ALLERGIES:    Review of patient's allergies indicates:   Allergen Reactions    Sulfa (sulfonamide antibiotics) Rash       PAST MEDICAL HISTORY:    Past Medical History:   Diagnosis Date    Bipolar disorder     CHF (congestive heart failure)     Dyslipidemia      GERD (gastroesophageal reflux disease)     Hx of tracheostomy     Decanulated / surgically removed in 2013? Does not currently have tracheostomy    Hypertension     Hypothyroidism     Oxygen dependent     3L around the clock     Pulmonary HTN     Pulmonary hypertension, group 1 10/4/2017    Pulmonary nodules 10/10/2017    Respiratory failure, chronic        SURGICAL HISTORY:  Past Surgical History:   Procedure Laterality Date    BACK SURGERY      PERICARDIAL WINDOW  2013    [place] LEFT HEART CATH,PERCUTANEOUS      Cardiac Cath, Left Heart    TUBAL LIGATION         FAMILY HISTORY:  Family History   Problem Relation Age of Onset    Hypertension Mother     Cancer Mother     Hypertension Father     Cancer Sister        SOCIAL HISTORY:  Social History   Substance Use Topics    Smoking status: Former Smoker     Types: Cigarettes     Start date: 1/1/1979     Quit date: 1/5/1997    Smokeless tobacco: Never Used    Alcohol use No        REVIEW OF SYSTEMS:  A 10-point review of systems is negative except for the above mentioned in the HPI.    OBJECTIVE:     Most Recent Vitals:  Temp: 97.8 °F (36.6 °C) (12/13/17 1215)  Pulse: 88 (12/13/17 1215)  Resp: 20 (12/13/17 1215)  BP: 130/73 (12/13/17 1215)  SpO2: (!) 93 % (12/13/17 1215)    24-Hour Vitals:  Temp:  [97.7 °F (36.5 °C)-98.5 °F (36.9 °C)]   Pulse:  []   Resp:  [16-20]   BP: (105-130)/(60-75)   SpO2:  [89 %-97 %]     24-Hour I&O:  Intake/Output Summary (Last 24 hours) at 12/13/17 1324  Last data filed at 12/13/17 0349   Gross per 24 hour   Intake              520 ml   Output              850 ml   Net             -330 ml       PHYSICAL EXAM:  AAO, NAD, well developed and well nourished.  Head normocephalic, atraumatic.  Trachea midline, neck supple.  Respirations unlabored with good inspiratory effort.  Heart regular rate and rhythm.  Abdomen soft, obese, nondistended, nontender to palpation.      LABORATORY VALUES:  No results for input(s): WBC, HGB,  HCT, PLT, BAND, METAMYELOCYT, MYELOPCT, HGBA1C in the last 72 hours.  Recent Labs  Lab 12/11/17  0400 12/12/17  0350 12/12/17  0618 12/13/17  0552    137  --  141   K 3.4* 2.4* 4.1 3.4*   CL 83* 84*  --  83*   CO2 39* 37*  --  45*   BUN 50* 46*  --  43*   CREATININE 2.3* 1.5*  --  1.4   GLU 95 95  --  104   CALCIUM 10.5 10.2  --  10.7*     Recent Labs  Lab 12/13/17  0552   INR 1.1   No results for input(s): PH, PCO2, PO2, HCO3 in the last 72 hours.      ASSESSMENT:     Sue Smith is a 57 y.o. female admitted to [facility] on 12/8/2017 for Acute on chronic right heart failure      PLAN:  · Plan for OR tomorrow with Dr. Cuadra for single-lumen Gomez catheter placement, right vs left side.  · Surgical consent signed and placed in chart.  · NPO at midnight.  · 2 g IV Cefazolin on call to OR tomorrow.      Rahel Farias MD  General Surgery, PGY-5  Pager: [phone]  Cell: [phone]       I have personally performed a detailed history and physical examination on this patient. My findings are summarized in the resident's note included in the record.

## 2017-12-13 NOTE — PROGRESS NOTES
remodulin increased to 22ng/kg/min at 90cc/hr at noon. VSS. Team notified that pt has reach romodulin goal and want to keep increasing remodulin by 2ng every 6 hours until symptomatic.

## 2017-12-14 ENCOUNTER — ANESTHESIA (OUTPATIENT)
Dept: SURGERY | Facility: HOSPITAL | Age: 57
DRG: 291 | End: 2017-12-14
Payer: MEDICARE

## 2017-12-14 LAB
ANION GAP SERPL CALC-SCNC: 12 MMOL/L
BASOPHILS # BLD AUTO: 0.03 K/UL
BASOPHILS NFR BLD: 0.4 %
BUN SERPL-MCNC: 34 MG/DL
CALCIUM SERPL-MCNC: 10.7 MG/DL
CHLORIDE SERPL-SCNC: 81 MMOL/L
CO2 SERPL-SCNC: 47 MMOL/L
CREAT SERPL-MCNC: 1.3 MG/DL
DIFFERENTIAL METHOD: ABNORMAL
EOSINOPHIL # BLD AUTO: 0.1 K/UL
EOSINOPHIL NFR BLD: 1.8 %
ERYTHROCYTE [DISTWIDTH] IN BLOOD BY AUTOMATED COUNT: 20.2 %
EST. GFR  (AFRICAN AMERICAN): 53 ML/MIN/1.73 M^2
EST. GFR  (NON AFRICAN AMERICAN): 46 ML/MIN/1.73 M^2
GLUCOSE SERPL-MCNC: 91 MG/DL
HCT VFR BLD AUTO: 40.8 %
HGB BLD-MCNC: 13.5 G/DL
IMM GRANULOCYTES # BLD AUTO: 0.07 K/UL
IMM GRANULOCYTES NFR BLD AUTO: 1 %
LYMPHOCYTES # BLD AUTO: 1 K/UL
LYMPHOCYTES NFR BLD: 15.1 %
MCH RBC QN AUTO: 31.5 PG
MCHC RBC AUTO-ENTMCNC: 33.1 G/DL
MCV RBC AUTO: 95 FL
MONOCYTES # BLD AUTO: 0.5 K/UL
MONOCYTES NFR BLD: 8 %
NEUTROPHILS # BLD AUTO: 5 K/UL
NEUTROPHILS NFR BLD: 73.7 %
NRBC BLD-RTO: 0 /100 WBC
PLATELET # BLD AUTO: 182 K/UL
PMV BLD AUTO: 12.2 FL
POTASSIUM SERPL-SCNC: 3 MMOL/L
RBC # BLD AUTO: 4.29 M/UL
SODIUM SERPL-SCNC: 140 MMOL/L
WBC # BLD AUTO: 6.75 K/UL

## 2017-12-14 PROCEDURE — 25000003 PHARM REV CODE 250: Performed by: PHYSICIAN ASSISTANT

## 2017-12-14 PROCEDURE — 80048 BASIC METABOLIC PNL TOTAL CA: CPT

## 2017-12-14 PROCEDURE — 85025 COMPLETE CBC W/AUTO DIFF WBC: CPT

## 2017-12-14 PROCEDURE — 25000003 PHARM REV CODE 250: Performed by: INTERNAL MEDICINE

## 2017-12-14 PROCEDURE — 99223 1ST HOSP IP/OBS HIGH 75: CPT | Mod: 25,GV,HPC, | Performed by: SURGERY

## 2017-12-14 PROCEDURE — 63600175 PHARM REV CODE 636 W HCPCS: Performed by: INTERNAL MEDICINE

## 2017-12-14 PROCEDURE — 36556 INSERT NON-TUNNEL CV CATH: CPT | Mod: GW,RT,HPC, | Performed by: SURGERY

## 2017-12-14 PROCEDURE — 25000003 PHARM REV CODE 250: Performed by: NURSE ANESTHETIST, CERTIFIED REGISTERED

## 2017-12-14 PROCEDURE — 02HV33Z INSERTION OF INFUSION DEVICE INTO SUPERIOR VENA CAVA, PERCUTANEOUS APPROACH: ICD-10-PCS | Performed by: SURGERY

## 2017-12-14 PROCEDURE — 94660 CPAP INITIATION&MGMT: CPT

## 2017-12-14 PROCEDURE — 99900035 HC TECH TIME PER 15 MIN (STAT)

## 2017-12-14 PROCEDURE — 36000706: Performed by: SURGERY

## 2017-12-14 PROCEDURE — C1751 CATH, INF, PER/CENT/MIDLINE: HCPCS | Performed by: SURGERY

## 2017-12-14 PROCEDURE — 63600175 PHARM REV CODE 636 W HCPCS: Performed by: NURSE ANESTHETIST, CERTIFIED REGISTERED

## 2017-12-14 PROCEDURE — 71000015 HC POSTOP RECOV 1ST HR: Performed by: SURGERY

## 2017-12-14 PROCEDURE — 36000707: Performed by: SURGERY

## 2017-12-14 PROCEDURE — D9220A PRA ANESTHESIA: Mod: GW,ANES,, | Performed by: ANESTHESIOLOGY

## 2017-12-14 PROCEDURE — A4216 STERILE WATER/SALINE, 10 ML: HCPCS | Performed by: INTERNAL MEDICINE

## 2017-12-14 PROCEDURE — 36415 COLL VENOUS BLD VENIPUNCTURE: CPT

## 2017-12-14 PROCEDURE — 77001 FLUOROGUIDE FOR VEIN DEVICE: CPT | Mod: 26,GW,HPC, | Performed by: SURGERY

## 2017-12-14 PROCEDURE — S0020 INJECTION, BUPIVICAINE HYDRO: HCPCS | Performed by: SURGERY

## 2017-12-14 PROCEDURE — 94640 AIRWAY INHALATION TREATMENT: CPT

## 2017-12-14 PROCEDURE — 20600001 HC STEP DOWN PRIVATE ROOM

## 2017-12-14 PROCEDURE — 37000009 HC ANESTHESIA EA ADD 15 MINS: Performed by: SURGERY

## 2017-12-14 PROCEDURE — 94761 N-INVAS EAR/PLS OXIMETRY MLT: CPT

## 2017-12-14 PROCEDURE — D9220A PRA ANESTHESIA: Mod: GW,CRNA,, | Performed by: NURSE ANESTHETIST, CERTIFIED REGISTERED

## 2017-12-14 PROCEDURE — 25000242 PHARM REV CODE 250 ALT 637 W/ HCPCS: Performed by: INTERNAL MEDICINE

## 2017-12-14 PROCEDURE — 27000221 HC OXYGEN, UP TO 24 HOURS

## 2017-12-14 PROCEDURE — 71000044 HC DOSC ROUTINE RECOVERY FIRST HOUR: Performed by: SURGERY

## 2017-12-14 PROCEDURE — 37000008 HC ANESTHESIA 1ST 15 MINUTES: Performed by: SURGERY

## 2017-12-14 PROCEDURE — 99232 SBSQ HOSP IP/OBS MODERATE 35: CPT | Mod: GW,HPC,, | Performed by: INTERNAL MEDICINE

## 2017-12-14 PROCEDURE — 25000003 PHARM REV CODE 250: Performed by: SURGERY

## 2017-12-14 PROCEDURE — 25000003 PHARM REV CODE 250: Performed by: STUDENT IN AN ORGANIZED HEALTH CARE EDUCATION/TRAINING PROGRAM

## 2017-12-14 DEVICE — CATH POWERHICKMAN 8FR 5CM: Type: IMPLANTABLE DEVICE | Site: NECK | Status: FUNCTIONAL

## 2017-12-14 RX ORDER — SODIUM CHLORIDE 9 MG/ML
INJECTION, SOLUTION INTRAVENOUS CONTINUOUS
Status: DISCONTINUED | OUTPATIENT
Start: 2017-12-14 | End: 2017-12-27 | Stop reason: HOSPADM

## 2017-12-14 RX ORDER — BUPIVACAINE HYDROCHLORIDE 5 MG/ML
INJECTION, SOLUTION EPIDURAL; INTRACAUDAL
Status: DISCONTINUED | OUTPATIENT
Start: 2017-12-14 | End: 2017-12-14 | Stop reason: HOSPADM

## 2017-12-14 RX ORDER — LIDOCAINE HYDROCHLORIDE 10 MG/ML
INJECTION INFILTRATION; PERINEURAL
Status: DISCONTINUED | OUTPATIENT
Start: 2017-12-14 | End: 2017-12-14 | Stop reason: HOSPADM

## 2017-12-14 RX ORDER — POTASSIUM CHLORIDE 750 MG/1
10 CAPSULE, EXTENDED RELEASE ORAL ONCE
Status: COMPLETED | OUTPATIENT
Start: 2017-12-14 | End: 2017-12-14

## 2017-12-14 RX ORDER — SODIUM CHLORIDE 9 MG/ML
INJECTION, SOLUTION INTRAVENOUS CONTINUOUS PRN
Status: DISCONTINUED | OUTPATIENT
Start: 2017-12-14 | End: 2017-12-14

## 2017-12-14 RX ORDER — MEPERIDINE HYDROCHLORIDE 50 MG/ML
12.5 INJECTION INTRAMUSCULAR; INTRAVENOUS; SUBCUTANEOUS ONCE AS NEEDED
Status: DISCONTINUED | OUTPATIENT
Start: 2017-12-14 | End: 2017-12-14 | Stop reason: HOSPADM

## 2017-12-14 RX ORDER — FENTANYL CITRATE 50 UG/ML
INJECTION, SOLUTION INTRAMUSCULAR; INTRAVENOUS
Status: DISCONTINUED | OUTPATIENT
Start: 2017-12-14 | End: 2017-12-14

## 2017-12-14 RX ORDER — MIDAZOLAM HYDROCHLORIDE 1 MG/ML
INJECTION, SOLUTION INTRAMUSCULAR; INTRAVENOUS
Status: DISCONTINUED | OUTPATIENT
Start: 2017-12-14 | End: 2017-12-14

## 2017-12-14 RX ORDER — POTASSIUM CHLORIDE 20 MEQ/1
40 TABLET, EXTENDED RELEASE ORAL ONCE
Status: COMPLETED | OUTPATIENT
Start: 2017-12-14 | End: 2017-12-14

## 2017-12-14 RX ADMIN — POTASSIUM CHLORIDE 40 MEQ: 1500 TABLET, EXTENDED RELEASE ORAL at 02:12

## 2017-12-14 RX ADMIN — Medication 3 ML: at 02:12

## 2017-12-14 RX ADMIN — LEVOTHYROXINE SODIUM 75 MCG: 75 TABLET ORAL at 05:12

## 2017-12-14 RX ADMIN — PRAVASTATIN SODIUM 40 MG: 40 TABLET ORAL at 09:12

## 2017-12-14 RX ADMIN — BUMETANIDE 4 MG: 1 TABLET ORAL at 09:12

## 2017-12-14 RX ADMIN — GABAPENTIN 100 MG: 100 CAPSULE ORAL at 05:12

## 2017-12-14 RX ADMIN — ALBUTEROL SULFATE 2.5 MG: 2.5 SOLUTION RESPIRATORY (INHALATION) at 08:12

## 2017-12-14 RX ADMIN — ALBUTEROL SULFATE 2.5 MG: 2.5 SOLUTION RESPIRATORY (INHALATION) at 07:12

## 2017-12-14 RX ADMIN — LAMOTRIGINE 100 MG: 100 TABLET ORAL at 09:12

## 2017-12-14 RX ADMIN — BUMETANIDE 4 MG: 1 TABLET ORAL at 04:12

## 2017-12-14 RX ADMIN — TREPROSTINIL 23 NG/KG/MIN: 100 INJECTION, SOLUTION INTRAVENOUS; SUBCUTANEOUS at 02:12

## 2017-12-14 RX ADMIN — TREPROSTINIL 24 NG/KG/MIN: 100 INJECTION, SOLUTION INTRAVENOUS; SUBCUTANEOUS at 09:12

## 2017-12-14 RX ADMIN — MIDAZOLAM HYDROCHLORIDE 0.5 MG: 1 INJECTION, SOLUTION INTRAMUSCULAR; INTRAVENOUS at 11:12

## 2017-12-14 RX ADMIN — HYDROCODONE BITARTRATE AND ACETAMINOPHEN 1 TABLET: 10; 325 TABLET ORAL at 01:12

## 2017-12-14 RX ADMIN — ALBUTEROL SULFATE 2.5 MG: 2.5 SOLUTION RESPIRATORY (INHALATION) at 11:12

## 2017-12-14 RX ADMIN — ALBUTEROL SULFATE 2.5 MG: 2.5 SOLUTION RESPIRATORY (INHALATION) at 05:12

## 2017-12-14 RX ADMIN — BUSPIRONE HYDROCHLORIDE 15 MG: 5 TABLET ORAL at 07:12

## 2017-12-14 RX ADMIN — Medication 3 ML: at 05:12

## 2017-12-14 RX ADMIN — FLUTICASONE FUROATE AND VILANTEROL TRIFENATATE 1 PUFF: 100; 25 POWDER RESPIRATORY (INHALATION) at 09:12

## 2017-12-14 RX ADMIN — BUSPIRONE HYDROCHLORIDE 15 MG: 5 TABLET ORAL at 02:12

## 2017-12-14 RX ADMIN — MIDAZOLAM HYDROCHLORIDE 1 MG: 1 INJECTION, SOLUTION INTRAMUSCULAR; INTRAVENOUS at 11:12

## 2017-12-14 RX ADMIN — FENTANYL CITRATE 25 MCG: 50 INJECTION, SOLUTION INTRAMUSCULAR; INTRAVENOUS at 11:12

## 2017-12-14 RX ADMIN — GABAPENTIN 100 MG: 100 CAPSULE ORAL at 02:12

## 2017-12-14 RX ADMIN — ALBUTEROL SULFATE 2.5 MG: 2.5 SOLUTION RESPIRATORY (INHALATION) at 03:12

## 2017-12-14 RX ADMIN — FENTANYL CITRATE 12.5 MCG: 50 INJECTION, SOLUTION INTRAMUSCULAR; INTRAVENOUS at 12:12

## 2017-12-14 RX ADMIN — Medication: at 02:12

## 2017-12-14 RX ADMIN — BUSPIRONE HYDROCHLORIDE 15 MG: 5 TABLET ORAL at 05:12

## 2017-12-14 RX ADMIN — PANTOPRAZOLE SODIUM 40 MG: 40 TABLET, DELAYED RELEASE ORAL at 09:12

## 2017-12-14 RX ADMIN — POTASSIUM CHLORIDE 20 MEQ: 1500 TABLET, EXTENDED RELEASE ORAL at 09:12

## 2017-12-14 RX ADMIN — DESVENLAFAXINE SUCCINATE 100 MG: 50 TABLET, FILM COATED, EXTENDED RELEASE ORAL at 09:12

## 2017-12-14 RX ADMIN — SODIUM CHLORIDE: 0.9 INJECTION, SOLUTION INTRAVENOUS at 11:12

## 2017-12-14 RX ADMIN — GABAPENTIN 100 MG: 100 CAPSULE ORAL at 09:12

## 2017-12-14 RX ADMIN — POTASSIUM CHLORIDE 10 MEQ: 750 CAPSULE, EXTENDED RELEASE ORAL at 02:12

## 2017-12-14 RX ADMIN — DEXTROSE 2 G: 50 INJECTION, SOLUTION INTRAVENOUS at 11:12

## 2017-12-14 RX ADMIN — ALBUTEROL SULFATE 2.5 MG: 2.5 SOLUTION RESPIRATORY (INHALATION) at 12:12

## 2017-12-14 RX ADMIN — AMLODIPINE BESYLATE 5 MG: 5 TABLET ORAL at 09:12

## 2017-12-14 NOTE — OP NOTE
DATE OF PROCEDURE:  12/14/2017.    PREOPERATIVE DIAGNOSIS:  Right heart failure.    POSTOPERATIVE DIAGNOSIS:  Right heart failure.    PROCEDURE:  Insertion of right internal jugular Gomez catheter.    SURGEON:  Mario Cuadra M.D.    ASSISTANT:  Joe Guzman M.D. (RES).    BLOOD LOSS:  Minimal.    ANESTHESIA:  Monitored anesthesia care.    COMPLICATIONS:  None.    INDICATIONS:  A 57-year-old patient who requires continuous infusion of   Remodulin, requiring a chronic indwelling catheter.    OPERATIVE REPORT IN DETAIL:  The patient was brought to the Operating Room,   placed in the supine position, and prepped and draped in sterile fashion once   satisfactory intravenous sedation was achieved.  Local anesthetic was utilized   to infiltrate the soft tissues overlying the right internal jugular vein and   chest wall.  Ultrasound was utilized to identify the right internal jugular   vein, which was then cannulated with a finder needle and then ultimately a   larger needle.  A wire was placed through the internal jugular into the right   heart under fluoroscopic guidance.  Catheter was brought in through a stab   incision in the right chest and brought up over the clavicle to the right chest   at the percutaneous access site.  The peel-away catheter was placed over the   wire and the tract was dilated from the skin to the internal jugular vein and   then the catheter was placed through the middle of the peel-away catheter into   the internal jugular vein, which position was confirmed fluoroscopically.  The   catheter was noted to aspirate blood freely and was flushed with heparinized   solution.  Needle, sponge, and instrument counts were correct.  The patient   remained stable throughout the procedure.      GF/HN  dd: 12/14/2017 12:25:22 (CST)  td: 12/14/2017 17:50:19 (CST)  Doc ID   #[id number]  Job ID #[id number]    CC:

## 2017-12-14 NOTE — BRIEF OP NOTE
[facility]  Surgery Department  Brief Op Note    SUMMARY     Date of Procedure: 12/14/2017     Procedure: Procedure(s) (LRB):  INSERTION-CATHETER-RAMOS (single-lumen) (Right)     Surgeon(s) and Role:     * Mario Cuadra MD - Primary     * Joe Guzman MD - Resident - Assisting        Pre-Operative Diagnosis: Pulmonary hypertension [I27.20]  Chronic respiratory failure with hypoxia [J96.11]    Post-Operative Diagnosis: Post-Op Diagnosis Codes:     * Pulmonary hypertension [I27.20]     * Chronic respiratory failure with hypoxia [J96.11]    Anesthesia: Local/MAC    Technical Procedures Used: Right IJ Ramos catheter placement     Description of the Findings of the Procedure: Catheter confirmed in vessel on fluoro       Complications: No    Estimated Blood Loss (EBL): 5mL           Implants:   Implant Name Type Inv. Item Serial No.  Lot No. LRB No. Used   CATHETER SGL LUMEN 8FR 5CM [facility] - [id number]   CATHETER SGL LUMEN 8FR 5CM [employer]   [employer] [id number] Right 1       Specimens:   Specimen (12h ago through future)    None                  Condition: Good    Disposition: PACU - hemodynamically stable.    Attestation: I was present and scrubbed for the entire procedure.

## 2017-12-14 NOTE — ASSESSMENT & PLAN NOTE
Severe PH with markedly reduced CI also severe RV dysfunction. Left her medications at home. UPTRAVI stopped and started on remodulin 12/10. She is currently on 22 ng/kg/min and we are uptitrating up 2 ng every 6 hours. Held yesterday but will continue increasing today. Will get maddie today for outpatient infusion.

## 2017-12-14 NOTE — PHYSICIAN QUERY
PT Name: Sue Smith  MR #: [id number]  Physician Query Form - Renal Clarification     CDS/: Angela Saunders RN, CCDS             Contact information: [email or web address]    This form is a permanent document in the medical record.     QueryDate: December 14, 2017    By submitting this query, we are merely seeking further clarification of documentation. Please utilize your independent clinical judgment when addressing the question(s) below.    The Medical record contains the following:   Indicator Supporting Clinical Findings Location in Medical Record    Kidney (Renal) Insufficiency      Kidney (Renal) Failure / Injury      Nephrotoxic Agents     X BUN/Creatinine GFR Cr 1.7->1.4->2.3->1.3  gfr 33.2->42.0->23.1->46 12/8, 12/9, 12/11, 12/13 lab    Urine: Casts         Eosinophils      Dehydration      Nausea/Vomiting      Dialysis/CRRT      Treatment:     X Other:    Cr is back to baseline  Renal fxn improved further today and good UOP on po bumex 12/12 prog note  12/13 prog note       Provider, please specify the diagnosis or diagnoses associated with above clinical findings.    [ ] Unspecified Acute Kidney Failure/Injury  [ ] Acute Renal Insufficiency  [x ] Acute on Chronic Renal Insufficiency (please specify stage*): ___3__________  [ ] Acute on Chronic Renal Failure (please specify stage*): _____________  [ ] Chronic Renal Insufficiency (please specify stage*): _____________  [ ] Chronic Kidney Disease (CKD) (please specify stage* below):      *[employer] Definitions:   Stage Description      eGFR (mL/min)   [ ] I  Slight kidney damage with normal or increased filtration  90+   [ ] II  Mildly reduced kidney function     60-89   [ ] III  Moderately reduced kidney function    30-59           [ ] Other (please specify): _______________________________  [ ] Clinically Undetermined    Please document in your progress notes daily for the duration of treatment, until resolved, and include in your  discharge summary.

## 2017-12-14 NOTE — PROGRESS NOTES
[facility]  General Surgery  Progress Note    Subjective:     History of Present Illness:  No notes on file    Post-Op Info:  Procedure(s) (LRB):  Permcath Insertion-Tunneled Cvc (N/A)         Interval History: NAEON.  NPO since midnight for Gomez catheter placement today     Medications:  Continuous Infusions:   treprostinil (REMODULIN) infusion 22 ng/kg/min (12/13/17 1802)    veletri/remodulin cassette      veletri/remodulin tubing       Scheduled Meds:   albuterol sulfate  2.5 mg Nebulization Q4H    amLODIPine  5 mg Oral Daily    bumetanide  4 mg Oral BID    busPIRone  15 mg Oral TID    desvenlafaxine succinate  100 mg Oral Daily    fluticasone-vilanterol  1 puff Inhalation Daily    gabapentin  100 mg Oral TID    lamoTRIgine  100 mg Oral BID    levothyroxine  75 mcg Oral Daily    pantoprazole  40 mg Oral Daily    potassium chloride 10%  40 mEq Oral Once    potassium chloride SA  20 mEq Oral Daily    pravastatin  40 mg Oral Daily    sodium chloride 0.9%  3 mL Intravenous Q8H     PRN Meds:acetaminophen, ceFAZolin (ANCEF) IVPB, hydrocodone-acetaminophen 10-325mg, loperamide, ondansetron, ondansetron, oxyCODONE-acetaminophen     Review of patient's allergies indicates:   Allergen Reactions    Sulfa (sulfonamide antibiotics) Rash     Objective:     Vital Signs (Most Recent):  Temp: 96.4 °F (35.8 °C) (12/14/17 0430)  Pulse: 75 (12/14/17 0600)  Resp: 15 (12/14/17 0500)  BP: 119/67 (12/14/17 0430)  SpO2: (!) 91 % (12/14/17 0500) Vital Signs (24h Range):  Temp:  [96.4 °F (35.8 °C)-98.4 °F (36.9 °C)] 96.4 °F (35.8 °C)  Pulse:  [] 75  Resp:  [15-20] 15  SpO2:  [89 %-94 %] 91 %  BP: (108-130)/(58-73) 119/67     Weight: 84.9 kg (187 lb 2.7 oz)  Body mass index is 34.23 kg/m².    Intake/Output - Last 3 Shifts       12/12 0700 - 12/13 0659 12/13 0700 - 12/14 0659 12/14 0700 - 12/15 0659    P.O. 820 840     Total Intake(mL/kg) 820 (9.9) 840 (9.9)     Urine (mL/kg/hr) 950 (0.5) 575  (0.3)     Emesis/NG output 0 (0) 0 (0)     Other 0 (0) 0 (0)     Stool 0 (0) 0 (0)     Blood 0 (0) 0 (0)     Total Output 950 575      Net -130 +265             Urine Occurrence 1 x 5 x     Stool Occurrence 1 x 2 x     Emesis Occurrence 0 x 0 x           Physical Exam   Constitutional: She is oriented to person, place, and time. She appears well-developed and well-nourished.   Cardiovascular: Normal rate.    Pulmonary/Chest: Effort normal.   Abdominal: Soft. There is no tenderness.   Neurological: She is alert and oriented to person, place, and time.       Significant Labs:  CBC:   Recent Labs  Lab 12/14/17  0628   WBC 6.75   RBC 4.29   HGB 13.5   HCT 40.8      MCV 95   MCH 31.5*   MCHC 33.1     CMP:   Recent Labs  Lab 12/08/17  1945  12/14/17  0451     < > 91   CALCIUM 9.7  < > 10.7*   ALBUMIN 3.8  --   --    PROT 6.5  --   --      < > 140   K 2.8*  < > 3.0*   CO2 34*  < > 47*   CL 97  < > 81*   BUN 26*  < > 34*   CREATININE 1.6*  < > 1.3   ALKPHOS 104  --   --    ALT 23  --   --    AST 29  --   --    BILITOT 0.7  --   --    < > = values in this interval not displayed.      Assessment/Plan:     * Acute on chronic right heart failure    - To OR today for Gomez catheter placement  - Keep patient NPO until surgery              Joe Guzman MD  General Surgery  [facility]

## 2017-12-14 NOTE — SUBJECTIVE & OBJECTIVE
Interval History: NAEON.  NPO since midnight for Gomez catheter placement today     Medications:  Continuous Infusions:   treprostinil (REMODULIN) infusion 22 ng/kg/min (12/13/17 1802)    veletri/remodulin cassette      veletri/remodulin tubing       Scheduled Meds:   albuterol sulfate  2.5 mg Nebulization Q4H    amLODIPine  5 mg Oral Daily    bumetanide  4 mg Oral BID    busPIRone  15 mg Oral TID    desvenlafaxine succinate  100 mg Oral Daily    fluticasone-vilanterol  1 puff Inhalation Daily    gabapentin  100 mg Oral TID    lamoTRIgine  100 mg Oral BID    levothyroxine  75 mcg Oral Daily    pantoprazole  40 mg Oral Daily    potassium chloride 10%  40 mEq Oral Once    potassium chloride SA  20 mEq Oral Daily    pravastatin  40 mg Oral Daily    sodium chloride 0.9%  3 mL Intravenous Q8H     PRN Meds:acetaminophen, ceFAZolin (ANCEF) IVPB, hydrocodone-acetaminophen 10-325mg, loperamide, ondansetron, ondansetron, oxyCODONE-acetaminophen     Review of patient's allergies indicates:   Allergen Reactions    Sulfa (sulfonamide antibiotics) Rash     Objective:     Vital Signs (Most Recent):  Temp: 96.4 °F (35.8 °C) (12/14/17 0430)  Pulse: 75 (12/14/17 0600)  Resp: 15 (12/14/17 0500)  BP: 119/67 (12/14/17 0430)  SpO2: (!) 91 % (12/14/17 0500) Vital Signs (24h Range):  Temp:  [96.4 °F (35.8 °C)-98.4 °F (36.9 °C)] 96.4 °F (35.8 °C)  Pulse:  [] 75  Resp:  [15-20] 15  SpO2:  [89 %-94 %] 91 %  BP: (108-130)/(58-73) 119/67     Weight: 84.9 kg (187 lb 2.7 oz)  Body mass index is 34.23 kg/m².    Intake/Output - Last 3 Shifts       12/12 0700 - 12/13 0659 12/13 0700 - 12/14 0659 12/14 0700 - 12/15 0659    P.O. 820 840     Total Intake(mL/kg) 820 (9.9) 840 (9.9)     Urine (mL/kg/hr) 950 (0.5) 575 (0.3)     Emesis/NG output 0 (0) 0 (0)     Other 0 (0) 0 (0)     Stool 0 (0) 0 (0)     Blood 0 (0) 0 (0)     Total Output 950 575      Net -130 +265             Urine Occurrence 1 x 5 x     Stool Occurrence 1 x 2 x      Emesis Occurrence 0 x 0 x           Physical Exam   Constitutional: She is oriented to person, place, and time. She appears well-developed and well-nourished.   Cardiovascular: Normal rate.    Pulmonary/Chest: Effort normal.   Abdominal: Soft. There is no tenderness.   Neurological: She is alert and oriented to person, place, and time.       Significant Labs:  CBC:   Recent Labs  Lab 12/14/17  0628   WBC 6.75   RBC 4.29   HGB 13.5   HCT 40.8      MCV 95   MCH 31.5*   MCHC 33.1     CMP:   Recent Labs  Lab 12/08/17  1945  12/14/17  0451     < > 91   CALCIUM 9.7  < > 10.7*   ALBUMIN 3.8  --   --    PROT 6.5  --   --      < > 140   K 2.8*  < > 3.0*   CO2 34*  < > 47*   CL 97  < > 81*   BUN 26*  < > 34*   CREATININE 1.6*  < > 1.3   ALKPHOS 104  --   --    ALT 23  --   --    AST 29  --   --    BILITOT 0.7  --   --    < > = values in this interval not displayed.

## 2017-12-14 NOTE — PHYSICIAN QUERY
PT Name: Sue Smith  MR #: [id number]     Physician Query Form - Documentation Clarification      CDS/: Angela Saunders RN, CCDS            Contact information: [email or web address]    This form is a permanent document in the medical record.     Query Date: December 14, 2017    By submitting this query, we are merely seeking further clarification of documentation. Please utilize your independent clinical judgment when addressing the question(s) below.    The Medical record reflects the following:    Supporting Clinical Findings Location in Medical Record     K+ 2.8-->2.7-->2.4    pt already has low k;please replete the K    12/8, 12/9, 12/12 lab    12/8 h/p     Potassium chloride 40 meq po x1  Potassium chloride 20 meq po daily  Potassium chloride 60 meq po x1        12/9 mar  12/9-12/14 mar  12/12 mar                                                                            Doctor, Please specify diagnosis or diagnoses associated with above clinical findings.    Provider Use Only        (    x)  Hypokalemia    (    )  Other__________                                                                                                                       [  ] Clinically undetermined

## 2017-12-14 NOTE — ANESTHESIA POSTPROCEDURE EVALUATION
"Anesthesia Post Evaluation    Patient: Sue Smith    Procedure(s) Performed: Procedure(s) (LRB):  INSERTION-CATHETER-RAMOS (single-lumen) (Right)    Final Anesthesia Type: general  Patient location during evaluation: PACU  Patient participation: Yes- Able to Participate  Level of consciousness: awake and alert  Post-procedure vital signs: reviewed and stable  Pain management: adequate  Airway patency: patent  PONV status at discharge: No PONV  Anesthetic complications: no      Cardiovascular status: blood pressure returned to baseline  Respiratory status: spontaneous ventilation and room air  Hydration status: euvolemic  Follow-up not needed.        Visit Vitals  /64 (BP Location: Left arm, Patient Position: Sitting)   Pulse 89   Temp 36.8 °C (98.2 °F) (Oral)   Resp 18   Ht 5' 2" (1.575 m)   Wt 84.9 kg (187 lb 2.7 oz)   SpO2 (!) 94%   Breastfeeding? No   BMI 34.23 kg/m²       Pain/Mary Score: Pain Assessment Performed: Yes (12/14/2017  1:30 PM)  Presence of Pain: complains of pain/discomfort (12/14/2017  1:30 PM)  Pain Rating Prior to Med Admin: 6 (12/14/2017  1:10 PM)  Pain Rating Post Med Admin: 1 (12/13/2017  4:37 PM)  Mary Score: 9 (12/14/2017  1:30 PM)  Modified Mary Score: 18 (12/14/2017  1:30 PM)      "

## 2017-12-14 NOTE — SUBJECTIVE & OBJECTIVE
Interval History: Feeling much better. Tolerating medications. Cr better. Had headache yesterday so held remodulin at 22.     Continuous Infusions:   treprostinil (REMODULIN) infusion 22 ng/kg/min (12/13/17 1802)    veletri/remodulin cassette      veletri/remodulin tubing       Scheduled Meds:   albuterol sulfate  2.5 mg Nebulization Q4H    amLODIPine  5 mg Oral Daily    bumetanide  4 mg Oral BID    busPIRone  15 mg Oral TID    desvenlafaxine succinate  100 mg Oral Daily    fluticasone-vilanterol  1 puff Inhalation Daily    gabapentin  100 mg Oral TID    lamoTRIgine  100 mg Oral BID    levothyroxine  75 mcg Oral Daily    pantoprazole  40 mg Oral Daily    potassium chloride 10%  40 mEq Oral Once    potassium chloride SA  20 mEq Oral Daily    pravastatin  40 mg Oral Daily    sodium chloride 0.9%  3 mL Intravenous Q8H     PRN Meds:acetaminophen, ceFAZolin (ANCEF) IVPB, hydrocodone-acetaminophen 10-325mg, loperamide, ondansetron, ondansetron, oxyCODONE-acetaminophen    Review of patient's allergies indicates:   Allergen Reactions    Sulfa (sulfonamide antibiotics) Rash     Objective:     Vital Signs (Most Recent):  Temp: 96.4 °F (35.8 °C) (12/14/17 0430)  Pulse: 75 (12/14/17 0600)  Resp: 15 (12/14/17 0500)  BP: 119/67 (12/14/17 0430)  SpO2: (!) 91 % (12/14/17 0500) Vital Signs (24h Range):  Temp:  [96.4 °F (35.8 °C)-98.4 °F (36.9 °C)] 96.4 °F (35.8 °C)  Pulse:  [] 75  Resp:  [15-20] 15  SpO2:  [89 %-94 %] 91 %  BP: (108-130)/(58-73) 119/67     Patient Vitals for the past 72 hrs (Last 3 readings):   Weight   12/14/17 0430 84.9 kg (187 lb 2.7 oz)   12/13/17 0349 83.2 kg (183 lb 6.8 oz)   12/12/17 0400 83.1 kg (183 lb 3.2 oz)     Body mass index is 34.23 kg/m².      Intake/Output Summary (Last 24 hours) at 12/14/17 0730  Last data filed at 12/13/17 2300   Gross per 24 hour   Intake              840 ml   Output              575 ml   Net              265 ml       Hemodynamic Parameters:          Physical Exam   Constitutional: She is oriented to person, place, and time. She appears well-developed and well-nourished.   HENT:   Head: Normocephalic.   Neck: Normal range of motion. No JVD present.   Cardiovascular: Normal rate and regular rhythm.    Pulmonary/Chest: Effort normal and breath sounds normal.   Abdominal: Soft.   Musculoskeletal: She exhibits edema.   Neurological: She is alert and oriented to person, place, and time.   Skin: Skin is warm and dry.       Significant Labs:  CBC:    Recent Labs  Lab 12/08/17  1415 12/08/17 1945 12/14/17  0628   WBC 6.65 6.09 6.75   RBC 3.93* 3.56* 4.29   HGB 12.3 11.3* 13.5   HCT 37.4 33.2* 40.8    151 182   MCV 95 93 95   MCH 31.3* 31.7* 31.5*   MCHC 32.9 34.0 33.1     BNP:    Recent Labs  Lab 12/08/17  1415   BNP 1,489*     CMP:    Recent Labs  Lab 12/08/17  1415 12/08/17  1945  12/12/17  0350 12/12/17  0618 12/13/17  0552 12/14/17  0451   GLU 88 102  < > 95  --  104 91   CALCIUM 9.5 9.7  < > 10.2  --  10.7* 10.7*   ALBUMIN 4.1 3.8  --   --   --   --   --    PROT 7.1 6.5  --   --   --   --   --     142  < > 137  --  141 140   K 2.9* 2.8*  < > 2.4* 4.1 3.4* 3.0*   CO2 33* 34*  < > 37*  --  45* 47*   CL 96 97  < > 84*  --  83* 81*   BUN 25* 26*  < > 46*  --  43* 34*   CREATININE 1.7* 1.6*  < > 1.5*  --  1.4 1.3   ALKPHOS 110 104  --   --   --   --   --    ALT 23 23  --   --   --   --   --    AST 31 29  --   --   --   --   --    BILITOT 0.7 0.7  --   --   --   --   --    < > = values in this interval not displayed.   Coagulation:     Recent Labs  Lab 12/13/17  0552   INR 1.1     LDH:  No results for input(s): LDH in the last 72 hours.  Microbiology:  Microbiology Results (last 7 days)     ** No results found for the last 168 hours. **          I have reviewed all pertinent labs within the past 24 hours.    Estimated Creatinine Clearance: 48.8 mL/min (based on SCr of 1.3 mg/dL).    Diagnostic Results:  I have reviewed and interpreted all pertinent  imaging results/findings within the past 24 hours.

## 2017-12-14 NOTE — PLAN OF CARE
Problem: Patient Care Overview  Goal: Plan of Care Review  Outcome: Ongoing (interventions implemented as appropriate)  Patient AAOx4 and VSS. She has remained afebrile. Telemetry monitor on and HR has been 60s-80s, NS. She's on 5 liters of O2 during the day and wears a bipap at night. She had a right subclavian perkins placed today with xray confirmation. Her remodulin is currently going to it at 23 ng/kg/min with a dosing weight of 85 kg (47 mL/24hr). Her concentration was increased from 3,000,000 to 6,000,000 today. She's being titrated up by 1 ng q6hr as tolerated. She's able to ambulate with standby assist and has experienced no falls today. She's stable and will continue to monitor.

## 2017-12-14 NOTE — TRANSFER OF CARE
"Anesthesia Transfer of Care Note    Patient: Sue Smith    Procedure(s) Performed: Procedure(s) (LRB):  INSERTION-CATHETER-RAMOS (single-lumen) (Right)    Patient location: PACU    Anesthesia Type: general    Transport from OR: Transported from OR on 6-10 L/min O2 by face mask with adequate spontaneous ventilation    Post pain: adequate analgesia    Post assessment: no apparent anesthetic complications and tolerated procedure well    Post vital signs: stable    Level of consciousness: awake, alert and oriented    Nausea/Vomiting: no nausea/vomiting    Complications: none    Transfer of care protocol was followed      Last vitals:   Visit Vitals  /74 (BP Location: Left arm, Patient Position: Lying)   Pulse 86   Temp 37 °C (98.6 °F) (Temporal)   Resp 18   Ht 5' 2" (1.575 m)   Wt 84.9 kg (187 lb 2.7 oz)   SpO2 97%   Breastfeeding? No   BMI 34.23 kg/m²     "

## 2017-12-14 NOTE — PROGRESS NOTES
[facility]  Heart Transplant  Progress Note    Patient Name: Sue Smith  MRN: [id number]  Admission Date: 12/8/2017  Hospital Length of Stay: 6 days  Attending Physician: Faina Cullen MD  Primary Care Provider: Paddy Guerrier DO  Principal Problem:Acute on chronic right heart failure    Subjective:     Interval History: Feeling much better. Tolerating medications. Cr better. Had headache yesterday so held remodulin at 22.     Continuous Infusions:   treprostinil (REMODULIN) infusion 22 ng/kg/min (12/13/17 1802)    veletri/remodulin cassette      veletri/remodulin tubing       Scheduled Meds:   albuterol sulfate  2.5 mg Nebulization Q4H    amLODIPine  5 mg Oral Daily    bumetanide  4 mg Oral BID    busPIRone  15 mg Oral TID    desvenlafaxine succinate  100 mg Oral Daily    fluticasone-vilanterol  1 puff Inhalation Daily    gabapentin  100 mg Oral TID    lamoTRIgine  100 mg Oral BID    levothyroxine  75 mcg Oral Daily    pantoprazole  40 mg Oral Daily    potassium chloride 10%  40 mEq Oral Once    potassium chloride SA  20 mEq Oral Daily    pravastatin  40 mg Oral Daily    sodium chloride 0.9%  3 mL Intravenous Q8H     PRN Meds:acetaminophen, ceFAZolin (ANCEF) IVPB, hydrocodone-acetaminophen 10-325mg, loperamide, ondansetron, ondansetron, oxyCODONE-acetaminophen    Review of patient's allergies indicates:   Allergen Reactions    Sulfa (sulfonamide antibiotics) Rash     Objective:     Vital Signs (Most Recent):  Temp: 96.4 °F (35.8 °C) (12/14/17 0430)  Pulse: 75 (12/14/17 0600)  Resp: 15 (12/14/17 0500)  BP: 119/67 (12/14/17 0430)  SpO2: (!) 91 % (12/14/17 0500) Vital Signs (24h Range):  Temp:  [96.4 °F (35.8 °C)-98.4 °F (36.9 °C)] 96.4 °F (35.8 °C)  Pulse:  [] 75  Resp:  [15-20] 15  SpO2:  [89 %-94 %] 91 %  BP: (108-130)/(58-73) 119/67     Patient Vitals for the past 72 hrs (Last 3 readings):   Weight   12/14/17 0430 84.9 kg (187 lb 2.7 oz)   12/13/17 0349 83.2 kg (183 lb  6.8 oz)   12/12/17 0400 83.1 kg (183 lb 3.2 oz)     Body mass index is 34.23 kg/m².      Intake/Output Summary (Last 24 hours) at 12/14/17 0730  Last data filed at 12/13/17 2300   Gross per 24 hour   Intake              840 ml   Output              575 ml   Net              265 ml       Hemodynamic Parameters:         Physical Exam   Constitutional: She is oriented to person, place, and time. She appears well-developed and well-nourished.   HENT:   Head: Normocephalic.   Neck: Normal range of motion. No JVD present.   Cardiovascular: Normal rate and regular rhythm.    Pulmonary/Chest: Effort normal and breath sounds normal.   Abdominal: Soft.   Musculoskeletal: She exhibits edema.   Neurological: She is alert and oriented to person, place, and time.   Skin: Skin is warm and dry.       Significant Labs:  CBC:    Recent Labs  Lab 12/08/17  1415 12/08/17  1945 12/14/17  0628   WBC 6.65 6.09 6.75   RBC 3.93* 3.56* 4.29   HGB 12.3 11.3* 13.5   HCT 37.4 33.2* 40.8    151 182   MCV 95 93 95   MCH 31.3* 31.7* 31.5*   MCHC 32.9 34.0 33.1     BNP:    Recent Labs  Lab 12/08/17  1415   BNP 1,489*     CMP:    Recent Labs  Lab 12/08/17  1415 12/08/17  1945  12/12/17  0350 12/12/17  0618 12/13/17  0552 12/14/17  0451   GLU 88 102  < > 95  --  104 91   CALCIUM 9.5 9.7  < > 10.2  --  10.7* 10.7*   ALBUMIN 4.1 3.8  --   --   --   --   --    PROT 7.1 6.5  --   --   --   --   --     142  < > 137  --  141 140   K 2.9* 2.8*  < > 2.4* 4.1 3.4* 3.0*   CO2 33* 34*  < > 37*  --  45* 47*   CL 96 97  < > 84*  --  83* 81*   BUN 25* 26*  < > 46*  --  43* 34*   CREATININE 1.7* 1.6*  < > 1.5*  --  1.4 1.3   ALKPHOS 110 104  --   --   --   --   --    ALT 23 23  --   --   --   --   --    AST 31 29  --   --   --   --   --    BILITOT 0.7 0.7  --   --   --   --   --    < > = values in this interval not displayed.   Coagulation:     Recent Labs  Lab 12/13/17  0552   INR 1.1     LDH:  No results for input(s): LDH in the last 72  hours.  Microbiology:  Microbiology Results (last 7 days)     ** No results found for the last 168 hours. **          I have reviewed all pertinent labs within the past 24 hours.    Estimated Creatinine Clearance: 48.8 mL/min (based on SCr of 1.3 mg/dL).    Diagnostic Results:  I have reviewed and interpreted all pertinent imaging results/findings within the past 24 hours.    Assessment and Plan:     No notes on file    * Acute on chronic right heart failure    Hx of severe primary pulmonary HTN still appears volume overloaded on exam and Cr is back to baseline. Will continue with home bumex 4 mg bid.         Pulmonary hypertension, group 1    Severe PH with markedly reduced CI also severe RV dysfunction. Left her medications at home. UPTRAVI stopped and started on remodulin 12/10. She is currently on 22 ng/kg/min and we are uptitrating up 2 ng every 6 hours. Held yesterday but will continue increasing today. Will get perkins today for outpatient infusion.          Chronic respiratory failure with hypoxia    2/2 PH as above        GERD (gastroesophageal reflux disease)    Continue home medications.         Bipolar disorder    Continue home medications.         Dyslipidemia    Continue home medications.         Hypertension    Continue medications.        Hypothyroidism    Continue medications.            Nas Chen MD  Heart Transplant  [facility]

## 2017-12-15 LAB
ANION GAP SERPL CALC-SCNC: 14 MMOL/L
BUN SERPL-MCNC: 27 MG/DL
CALCIUM SERPL-MCNC: 10 MG/DL
CHLORIDE SERPL-SCNC: 83 MMOL/L
CO2 SERPL-SCNC: 40 MMOL/L
CREAT SERPL-MCNC: 1.2 MG/DL
EST. GFR  (AFRICAN AMERICAN): 58.4 ML/MIN/1.73 M^2
EST. GFR  (NON AFRICAN AMERICAN): 50.6 ML/MIN/1.73 M^2
GLUCOSE SERPL-MCNC: 94 MG/DL
INR PPP: 1
POTASSIUM SERPL-SCNC: 2.9 MMOL/L
PROTHROMBIN TIME: 10.8 SEC
SODIUM SERPL-SCNC: 137 MMOL/L

## 2017-12-15 PROCEDURE — 80048 BASIC METABOLIC PNL TOTAL CA: CPT

## 2017-12-15 PROCEDURE — 25000003 PHARM REV CODE 250: Performed by: INTERNAL MEDICINE

## 2017-12-15 PROCEDURE — A4216 STERILE WATER/SALINE, 10 ML: HCPCS | Performed by: INTERNAL MEDICINE

## 2017-12-15 PROCEDURE — 99232 SBSQ HOSP IP/OBS MODERATE 35: CPT | Mod: GW,HPC,, | Performed by: INTERNAL MEDICINE

## 2017-12-15 PROCEDURE — 27000221 HC OXYGEN, UP TO 24 HOURS

## 2017-12-15 PROCEDURE — 20600001 HC STEP DOWN PRIVATE ROOM

## 2017-12-15 PROCEDURE — 85610 PROTHROMBIN TIME: CPT

## 2017-12-15 PROCEDURE — 25000003 PHARM REV CODE 250: Performed by: STUDENT IN AN ORGANIZED HEALTH CARE EDUCATION/TRAINING PROGRAM

## 2017-12-15 PROCEDURE — 94640 AIRWAY INHALATION TREATMENT: CPT

## 2017-12-15 PROCEDURE — 36415 COLL VENOUS BLD VENIPUNCTURE: CPT

## 2017-12-15 PROCEDURE — 25000003 PHARM REV CODE 250: Performed by: PHYSICIAN ASSISTANT

## 2017-12-15 PROCEDURE — 99900035 HC TECH TIME PER 15 MIN (STAT)

## 2017-12-15 PROCEDURE — 63600175 PHARM REV CODE 636 W HCPCS: Performed by: INTERNAL MEDICINE

## 2017-12-15 PROCEDURE — 25000242 PHARM REV CODE 250 ALT 637 W/ HCPCS: Performed by: INTERNAL MEDICINE

## 2017-12-15 PROCEDURE — 94761 N-INVAS EAR/PLS OXIMETRY MLT: CPT

## 2017-12-15 PROCEDURE — 94660 CPAP INITIATION&MGMT: CPT

## 2017-12-15 RX ORDER — WARFARIN SODIUM 5 MG/1
5 TABLET ORAL DAILY
Status: DISCONTINUED | OUTPATIENT
Start: 2017-12-15 | End: 2017-12-21

## 2017-12-15 RX ORDER — POTASSIUM CHLORIDE 20 MEQ/1
60 TABLET, EXTENDED RELEASE ORAL 2 TIMES DAILY
Status: COMPLETED | OUTPATIENT
Start: 2017-12-15 | End: 2017-12-15

## 2017-12-15 RX ADMIN — FLUTICASONE FUROATE AND VILANTEROL TRIFENATATE 1 PUFF: 100; 25 POWDER RESPIRATORY (INHALATION) at 08:12

## 2017-12-15 RX ADMIN — GABAPENTIN 100 MG: 100 CAPSULE ORAL at 08:12

## 2017-12-15 RX ADMIN — HYDROCODONE BITARTRATE AND ACETAMINOPHEN 1 TABLET: 10; 325 TABLET ORAL at 02:12

## 2017-12-15 RX ADMIN — POTASSIUM CHLORIDE 60 MEQ: 1500 TABLET, EXTENDED RELEASE ORAL at 08:12

## 2017-12-15 RX ADMIN — BUSPIRONE HYDROCHLORIDE 15 MG: 5 TABLET ORAL at 05:12

## 2017-12-15 RX ADMIN — LEVOTHYROXINE SODIUM 75 MCG: 75 TABLET ORAL at 05:12

## 2017-12-15 RX ADMIN — DESVENLAFAXINE SUCCINATE 100 MG: 50 TABLET, FILM COATED, EXTENDED RELEASE ORAL at 08:12

## 2017-12-15 RX ADMIN — ALBUTEROL SULFATE 2.5 MG: 2.5 SOLUTION RESPIRATORY (INHALATION) at 03:12

## 2017-12-15 RX ADMIN — PRAVASTATIN SODIUM 40 MG: 40 TABLET ORAL at 08:12

## 2017-12-15 RX ADMIN — BUMETANIDE 4 MG: 1 TABLET ORAL at 05:12

## 2017-12-15 RX ADMIN — ALBUTEROL SULFATE 2.5 MG: 2.5 SOLUTION RESPIRATORY (INHALATION) at 07:12

## 2017-12-15 RX ADMIN — LAMOTRIGINE 100 MG: 100 TABLET ORAL at 11:12

## 2017-12-15 RX ADMIN — GABAPENTIN 100 MG: 100 CAPSULE ORAL at 05:12

## 2017-12-15 RX ADMIN — Medication 3 ML: at 10:12

## 2017-12-15 RX ADMIN — LAMOTRIGINE 100 MG: 100 TABLET ORAL at 08:12

## 2017-12-15 RX ADMIN — ALBUTEROL SULFATE 2.5 MG: 2.5 SOLUTION RESPIRATORY (INHALATION) at 11:12

## 2017-12-15 RX ADMIN — TREPROSTINIL 27 NG/KG/MIN: 100 INJECTION, SOLUTION INTRAVENOUS; SUBCUTANEOUS at 06:12

## 2017-12-15 RX ADMIN — Medication 3 ML: at 02:12

## 2017-12-15 RX ADMIN — ACETAMINOPHEN 650 MG: 325 TABLET ORAL at 08:12

## 2017-12-15 RX ADMIN — WARFARIN SODIUM 5 MG: 5 TABLET ORAL at 05:12

## 2017-12-15 RX ADMIN — AMLODIPINE BESYLATE 5 MG: 5 TABLET ORAL at 08:12

## 2017-12-15 RX ADMIN — HYDROCODONE BITARTRATE AND ACETAMINOPHEN 1 TABLET: 10; 325 TABLET ORAL at 12:12

## 2017-12-15 RX ADMIN — Medication 3 ML: at 05:12

## 2017-12-15 RX ADMIN — BUSPIRONE HYDROCHLORIDE 15 MG: 5 TABLET ORAL at 08:12

## 2017-12-15 RX ADMIN — BUMETANIDE 4 MG: 1 TABLET ORAL at 08:12

## 2017-12-15 RX ADMIN — GABAPENTIN 100 MG: 100 CAPSULE ORAL at 02:12

## 2017-12-15 RX ADMIN — BUSPIRONE HYDROCHLORIDE 15 MG: 5 TABLET ORAL at 02:12

## 2017-12-15 RX ADMIN — TREPROSTINIL 25 NG/KG/MIN: 100 INJECTION, SOLUTION INTRAVENOUS; SUBCUTANEOUS at 03:12

## 2017-12-15 RX ADMIN — TREPROSTINIL 28 NG/KG/MIN: 100 INJECTION, SOLUTION INTRAVENOUS; SUBCUTANEOUS at 11:12

## 2017-12-15 RX ADMIN — ALBUTEROL SULFATE 2.5 MG: 2.5 SOLUTION RESPIRATORY (INHALATION) at 08:12

## 2017-12-15 RX ADMIN — OXYCODONE HYDROCHLORIDE AND ACETAMINOPHEN 1 TABLET: 5; 325 TABLET ORAL at 05:12

## 2017-12-15 RX ADMIN — PANTOPRAZOLE SODIUM 40 MG: 40 TABLET, DELAYED RELEASE ORAL at 08:12

## 2017-12-15 NOTE — PROGRESS NOTES
Noticed Dr. Peñaloza, called to pt's room states she is feeling funny, like she is a another room, another space. Says she is seeing dogs from time to time and that her TV keeps getting smaller and then bigger. VSS. Pt had perkins placed today, Remodulin concentration changed and titrated up today. Per MD give buspirone 15 mg PO now, continue to monitor. Titrate Remodulin as tolerated by the pt.

## 2017-12-15 NOTE — PLAN OF CARE
Problem: Patient Care Overview  Goal: Plan of Care Review  Pt with hallucinations at start of shift, Pt now AAOx4, bed low locked, call light within reach, wearing nonskid socks. Pt instructed to use call light for assistance, pt verbalizes understanding. C/o neck pain at insertion site of perkins, PRN pain meds x 2 , no further complaints. Tele NSR 70's - 80's. Remodulin currently infusing @ 25 ng/kg/min x 85 kg. Titrated up twice, pt tolerated well. Pt remains free from injury/harm at this time. Afebrile. See flowsheet for full assessment/VS.

## 2017-12-15 NOTE — ASSESSMENT & PLAN NOTE
Severe PH with markedly reduced CI also severe RV dysfunction. Left her medications at home. UPTRAVI stopped and started on remodulin 12/10. She is currently on 22 ng/kg/min. Gomez placed. Awaiting insurance approval.

## 2017-12-15 NOTE — SUBJECTIVE & OBJECTIVE
Interval History: Pt feeling well no new complaints.     Continuous Infusions:   sodium chloride 0.9%      treprostinil (REMODULIN) infusion 26 ng/kg/min (12/15/17 0950)    veletri/remodulin cassette      veletri/remodulin tubing       Scheduled Meds:   albuterol sulfate  2.5 mg Nebulization Q4H    amLODIPine  5 mg Oral Daily    bumetanide  4 mg Oral BID    busPIRone  15 mg Oral TID    desvenlafaxine succinate  100 mg Oral Daily    fluticasone-vilanterol  1 puff Inhalation Daily    gabapentin  100 mg Oral TID    lamoTRIgine  100 mg Oral BID    levothyroxine  75 mcg Oral Daily    pantoprazole  40 mg Oral Daily    potassium chloride 10%  40 mEq Oral Once    potassium chloride  60 mEq Oral BID    pravastatin  40 mg Oral Daily    sodium chloride 0.9%  3 mL Intravenous Q8H    warfarin  5 mg Oral Daily     PRN Meds:acetaminophen, ceFAZolin (ANCEF) IVPB, hydrocodone-acetaminophen 10-325mg, loperamide, ondansetron, ondansetron, oxyCODONE-acetaminophen    Review of patient's allergies indicates:   Allergen Reactions    Sulfa (sulfonamide antibiotics) Rash     Objective:     Vital Signs (Most Recent):  Temp: 97.7 °F (36.5 °C) (12/15/17 1120)  Pulse: 78 (12/15/17 1600)  Resp: 16 (12/15/17 1600)  BP: 101/67 (12/15/17 1120)  SpO2: (!) 90 % (12/15/17 1600) Vital Signs (24h Range):  Temp:  [97.4 °F (36.3 °C)-98.6 °F (37 °C)] 97.7 °F (36.5 °C)  Pulse:  [70-90] 78  Resp:  [14-19] 16  SpO2:  [88 %-99 %] 90 %  BP: ()/(58-77) 101/67     Patient Vitals for the past 72 hrs (Last 3 readings):   Weight   12/15/17 0415 85.4 kg (188 lb 4.4 oz)   12/14/17 0430 84.9 kg (187 lb 2.7 oz)   12/13/17 0349 83.2 kg (183 lb 6.8 oz)     Body mass index is 34.44 kg/m².      Intake/Output Summary (Last 24 hours) at 12/15/17 1649  Last data filed at 12/15/17 1400   Gross per 24 hour   Intake              780 ml   Output             2100 ml   Net            -1320 ml       Hemodynamic Parameters:         Physical Exam    Constitutional: She is oriented to person, place, and time. She appears well-developed and well-nourished.   HENT:   Head: Normocephalic.   Neck: Normal range of motion. No JVD present.   Cardiovascular: Normal rate and regular rhythm.    Pulmonary/Chest: Effort normal and breath sounds normal.   Abdominal: Soft.   Musculoskeletal: She exhibits no edema.   Neurological: She is alert and oriented to person, place, and time.   Skin: Skin is warm and dry.       Significant Labs:  CBC:    Recent Labs  Lab 12/08/17 1945 12/14/17  0628   WBC 6.09 6.75   RBC 3.56* 4.29   HGB 11.3* 13.5   HCT 33.2* 40.8    182   MCV 93 95   MCH 31.7* 31.5*   MCHC 34.0 33.1     BNP:  No results for input(s): BNP in the last 168 hours.    Invalid input(s): BNPTRIAGELBLO  CMP:    Recent Labs  Lab 12/08/17 1945 12/13/17 0552 12/14/17  0451 12/15/17  0646     < > 104 91 94   CALCIUM 9.7  < > 10.7* 10.7* 10.0   ALBUMIN 3.8  --   --   --   --    PROT 6.5  --   --   --   --      < > 141 140 137   K 2.8*  < > 3.4* 3.0* 2.9*   CO2 34*  < > 45* 47* 40*   CL 97  < > 83* 81* 83*   BUN 26*  < > 43* 34* 27*   CREATININE 1.6*  < > 1.4 1.3 1.2   ALKPHOS 104  --   --   --   --    ALT 23  --   --   --   --    AST 29  --   --   --   --    BILITOT 0.7  --   --   --   --    < > = values in this interval not displayed.   Coagulation:     Recent Labs  Lab 12/13/17  0552 12/15/17  1059   INR 1.1 1.0     LDH:  No results for input(s): LDH in the last 72 hours.  Microbiology:  Microbiology Results (last 7 days)     ** No results found for the last 168 hours. **          I have reviewed all pertinent labs within the past 24 hours.    Estimated Creatinine Clearance: 53.1 mL/min (based on SCr of 1.2 mg/dL).    Diagnostic Results:  I have reviewed and interpreted all pertinent imaging results/findings within the past 24 hours.

## 2017-12-15 NOTE — PROGRESS NOTES
[facility]  Heart Transplant  Progress Note    Patient Name: Sue Smith  MRN: [id number]  Admission Date: 12/8/2017  Hospital Length of Stay: 7 days  Attending Physician: Faina Cullen MD  Primary Care Provider: Paddy Guerrier DO  Principal Problem:Acute on chronic right heart failure    Subjective:     Interval History: Pt feeling well no new complaints.     Continuous Infusions:   sodium chloride 0.9%      treprostinil (REMODULIN) infusion 26 ng/kg/min (12/15/17 0950)    veletri/remodulin cassette      veletri/remodulin tubing       Scheduled Meds:   albuterol sulfate  2.5 mg Nebulization Q4H    amLODIPine  5 mg Oral Daily    bumetanide  4 mg Oral BID    busPIRone  15 mg Oral TID    desvenlafaxine succinate  100 mg Oral Daily    fluticasone-vilanterol  1 puff Inhalation Daily    gabapentin  100 mg Oral TID    lamoTRIgine  100 mg Oral BID    levothyroxine  75 mcg Oral Daily    pantoprazole  40 mg Oral Daily    potassium chloride 10%  40 mEq Oral Once    potassium chloride  60 mEq Oral BID    pravastatin  40 mg Oral Daily    sodium chloride 0.9%  3 mL Intravenous Q8H    warfarin  5 mg Oral Daily     PRN Meds:acetaminophen, ceFAZolin (ANCEF) IVPB, hydrocodone-acetaminophen 10-325mg, loperamide, ondansetron, ondansetron, oxyCODONE-acetaminophen    Review of patient's allergies indicates:   Allergen Reactions    Sulfa (sulfonamide antibiotics) Rash     Objective:     Vital Signs (Most Recent):  Temp: 97.7 °F (36.5 °C) (12/15/17 1120)  Pulse: 78 (12/15/17 1600)  Resp: 16 (12/15/17 1600)  BP: 101/67 (12/15/17 1120)  SpO2: (!) 90 % (12/15/17 1600) Vital Signs (24h Range):  Temp:  [97.4 °F (36.3 °C)-98.6 °F (37 °C)] 97.7 °F (36.5 °C)  Pulse:  [70-90] 78  Resp:  [14-19] 16  SpO2:  [88 %-99 %] 90 %  BP: ()/(58-77) 101/67     Patient Vitals for the past 72 hrs (Last 3 readings):   Weight   12/15/17 0415 85.4 kg (188 lb 4.4 oz)   12/14/17 0430 84.9 kg (187 lb 2.7 oz)   12/13/17  0349 83.2 kg (183 lb 6.8 oz)     Body mass index is 34.44 kg/m².      Intake/Output Summary (Last 24 hours) at 12/15/17 1649  Last data filed at 12/15/17 1400   Gross per 24 hour   Intake              780 ml   Output             2100 ml   Net            -1320 ml       Hemodynamic Parameters:         Physical Exam   Constitutional: She is oriented to person, place, and time. She appears well-developed and well-nourished.   HENT:   Head: Normocephalic.   Neck: Normal range of motion. No JVD present.   Cardiovascular: Normal rate and regular rhythm.    Pulmonary/Chest: Effort normal and breath sounds normal.   Abdominal: Soft.   Musculoskeletal: She exhibits no edema.   Neurological: She is alert and oriented to person, place, and time.   Skin: Skin is warm and dry.       Significant Labs:  CBC:    Recent Labs  Lab 12/08/17 1945 12/14/17  0628   WBC 6.09 6.75   RBC 3.56* 4.29   HGB 11.3* 13.5   HCT 33.2* 40.8    182   MCV 93 95   MCH 31.7* 31.5*   MCHC 34.0 33.1     BNP:  No results for input(s): BNP in the last 168 hours.    Invalid input(s): BNPTRIAGELBLO  CMP:    Recent Labs  Lab 12/08/17 1945 12/13/17 0552 12/14/17  0451 12/15/17  0646     < > 104 91 94   CALCIUM 9.7  < > 10.7* 10.7* 10.0   ALBUMIN 3.8  --   --   --   --    PROT 6.5  --   --   --   --      < > 141 140 137   K 2.8*  < > 3.4* 3.0* 2.9*   CO2 34*  < > 45* 47* 40*   CL 97  < > 83* 81* 83*   BUN 26*  < > 43* 34* 27*   CREATININE 1.6*  < > 1.4 1.3 1.2   ALKPHOS 104  --   --   --   --    ALT 23  --   --   --   --    AST 29  --   --   --   --    BILITOT 0.7  --   --   --   --    < > = values in this interval not displayed.   Coagulation:     Recent Labs  Lab 12/13/17  0552 12/15/17  1059   INR 1.1 1.0     LDH:  No results for input(s): LDH in the last 72 hours.  Microbiology:  Microbiology Results (last 7 days)     ** No results found for the last 168 hours. **          I have reviewed all pertinent labs within the past 24  hours.    Estimated Creatinine Clearance: 53.1 mL/min (based on SCr of 1.2 mg/dL).    Diagnostic Results:  I have reviewed and interpreted all pertinent imaging results/findings within the past 24 hours.    Assessment and Plan:     No notes on file    * Acute on chronic right heart failure    Hx of severe primary pulmonary HTN still appears volume overloaded on exam and Cr is back to baseline. Will continue with home bumex 4 mg bid.         Pulmonary hypertension, group 1    Severe PH with markedly reduced CI also severe RV dysfunction. Left her medications at home. UPTRAVI stopped and started on remodulin 12/10. She is currently on 22 ng/kg/min. Gomez placed. Awaiting insurance approval.           Chronic respiratory failure with hypoxia    2/2 PH as above        GERD (gastroesophageal reflux disease)    Continue home medications.         Bipolar disorder    Continue home medications.         Dyslipidemia    Continue home medications.         Hypertension    Continue medications.        Hypothyroidism    Continue medications.            Nas Chen MD  Heart Transplant  [facility]

## 2017-12-15 NOTE — PLAN OF CARE
Problem: Patient Care Overview  Goal: Plan of Care Review  Outcome: Ongoing (interventions implemented as appropriate)  Patient AAOx4 and VSS. She has remained afebrile. Telemetry monitor on and HR has been 60s-80s, NS. She's on 5 liters of O2 during the day and wears bipap at night. She has a right subclavian perkins and her remodulin is currently going to it at 26 ng/kg/min with a dosing weight of 85 kg (53 mL/24hr). She's being titrated up by 1 ng q6hr as tolerated. She's able to ambulate with standby assist and has experienced no falls today. She's stable and will continue to monitor.

## 2017-12-15 NOTE — PROGRESS NOTES
Mrs. Smith was initiated on warfarin with plans for an INR goal of 2-3 without needs for bridging therapy if subtherapeutic.  The indication is for PAH.  Patient has been educated regarding warfarin therapy and has been enrolled into the anticoagulation clinic.  She will likely be discharged this weekend, and would recommend a repeat INR next week.

## 2017-12-15 NOTE — PLAN OF CARE
[facility]   Heart Transplant Clinic  [address] [place] [address]   [phone] after hours        HOME  HEALTH ORDERS      Admit to Home Health    Diagnosis:   Patient Active Problem List   Diagnosis    Pulmonary hypertension, group 1    Chronic respiratory failure with hypoxia    Hypothyroidism    Hypertension    Dyslipidemia    Bipolar disorder    Acute on chronic right heart failure    Hx of tracheostomy    GERD (gastroesophageal reflux disease)    Acute on chronic diastolic heart failure    Cardiomegaly    Pulmonary nodules    Chronic pulmonary heart disease       Patient is homebound due to:  PH    Diet: Cardiac    Acitivities: As tolerated    Nursing:   SN to complete comprehensive assessment including routine vital signs. Instruct on disease process and s/s of complications to report to MD. Review/verify medication list sent home with the patient at time of discharge  and instruct patient/caregiver as needed. Frequency may be adjusted depending on start of care date.     Notify MD if SBP > 160 or < 90; DBP > 90 or < 50; HR > 120 or < 50; Temp > 101; Weight gain >3lbs in 1 day or 5lbs in 1 week.        HOME INFUSION THERAPY: Remodulin at 22 ng/kg/min  Patient will manage own line care please do not touch.     Administer (drug and dose): Remodulin at 22 ng/kg/min    **For questions or concerns, please call [phone] and ask for Pre-Heart transplant clinic, M-F 8-5. After hours, weekends, call [phone] and ask for the Heart Transplant Cardiologist on call.**    Central line care:  Scrub the Hub: Prior to accessing the line, always perform a 30 second alcohol scrub  Each lumen of the central line is to be flushed at least daily with 10 mL Normal Saline and 3 mL Heparin flush (100 units/mL)  Skilled Nurse (SN) may draw blood from IV access  Blood Draw Procedure:   - Aspirate at least 5 mL of blood   - Discard   - Obtain specimen   -  Change posiflow cap   - Flush with 20 mL Normal Saline followed by a                 3-5 mL Heparin flush (100 units/mL)  Central :   - Sterile dressing changes are done weekly and as needed.   - Use chlor-hexadine scrub to cleanse site, apply Biopatch to insertion site,       apply securement device dressing   - Posi-flow caps are changed weekly and after EVERY lab draw.   - If sterile gauze is under dressing to control oozing,                 dressing change must be performed every 24 hours until gauze is not needed.      CONSULTS:                                         to evaluate for community resources/long-range planning.     Aide to provide assistance with personal care, ADLs, and vital signs      Send initial Home Health orders to HTS attending physician on call.  Send follow up questions to [phone] or fax:                     Pre Transplant:   [phone]        Post Transplant: [phone]        Heart Failure:      [phone]

## 2017-12-16 LAB
ANION GAP SERPL CALC-SCNC: 11 MMOL/L
BUN SERPL-MCNC: 25 MG/DL
CALCIUM SERPL-MCNC: 10.3 MG/DL
CHLORIDE SERPL-SCNC: 86 MMOL/L
CO2 SERPL-SCNC: 37 MMOL/L
CREAT SERPL-MCNC: 1.1 MG/DL
EST. GFR  (AFRICAN AMERICAN): >60 ML/MIN/1.73 M^2
EST. GFR  (NON AFRICAN AMERICAN): 56.3 ML/MIN/1.73 M^2
GLUCOSE SERPL-MCNC: 96 MG/DL
INR PPP: 1.1
POTASSIUM SERPL-SCNC: 3.4 MMOL/L
PROTHROMBIN TIME: 11.9 SEC
SODIUM SERPL-SCNC: 134 MMOL/L

## 2017-12-16 PROCEDURE — 25000003 PHARM REV CODE 250: Performed by: PHYSICIAN ASSISTANT

## 2017-12-16 PROCEDURE — 63600175 PHARM REV CODE 636 W HCPCS: Performed by: INTERNAL MEDICINE

## 2017-12-16 PROCEDURE — 27000221 HC OXYGEN, UP TO 24 HOURS

## 2017-12-16 PROCEDURE — 80048 BASIC METABOLIC PNL TOTAL CA: CPT

## 2017-12-16 PROCEDURE — 25000003 PHARM REV CODE 250: Performed by: INTERNAL MEDICINE

## 2017-12-16 PROCEDURE — 99232 SBSQ HOSP IP/OBS MODERATE 35: CPT | Mod: GW,HPC,, | Performed by: INTERNAL MEDICINE

## 2017-12-16 PROCEDURE — 94660 CPAP INITIATION&MGMT: CPT

## 2017-12-16 PROCEDURE — 20600001 HC STEP DOWN PRIVATE ROOM

## 2017-12-16 PROCEDURE — 85610 PROTHROMBIN TIME: CPT

## 2017-12-16 PROCEDURE — A4216 STERILE WATER/SALINE, 10 ML: HCPCS | Performed by: INTERNAL MEDICINE

## 2017-12-16 PROCEDURE — 63600175 PHARM REV CODE 636 W HCPCS: Performed by: PHYSICIAN ASSISTANT

## 2017-12-16 PROCEDURE — 94640 AIRWAY INHALATION TREATMENT: CPT

## 2017-12-16 PROCEDURE — 25000003 PHARM REV CODE 250: Performed by: STUDENT IN AN ORGANIZED HEALTH CARE EDUCATION/TRAINING PROGRAM

## 2017-12-16 PROCEDURE — 94761 N-INVAS EAR/PLS OXIMETRY MLT: CPT

## 2017-12-16 PROCEDURE — 25000242 PHARM REV CODE 250 ALT 637 W/ HCPCS: Performed by: INTERNAL MEDICINE

## 2017-12-16 PROCEDURE — 36415 COLL VENOUS BLD VENIPUNCTURE: CPT

## 2017-12-16 PROCEDURE — 99900035 HC TECH TIME PER 15 MIN (STAT)

## 2017-12-16 RX ORDER — POTASSIUM CHLORIDE 20 MEQ/1
60 TABLET, EXTENDED RELEASE ORAL ONCE
Status: COMPLETED | OUTPATIENT
Start: 2017-12-16 | End: 2017-12-16

## 2017-12-16 RX ADMIN — ALBUTEROL SULFATE 2.5 MG: 2.5 SOLUTION RESPIRATORY (INHALATION) at 07:12

## 2017-12-16 RX ADMIN — BUSPIRONE HYDROCHLORIDE 15 MG: 5 TABLET ORAL at 05:12

## 2017-12-16 RX ADMIN — LAMOTRIGINE 100 MG: 100 TABLET ORAL at 08:12

## 2017-12-16 RX ADMIN — ONDANSETRON 8 MG: 8 TABLET, ORALLY DISINTEGRATING ORAL at 11:12

## 2017-12-16 RX ADMIN — HYDROCODONE BITARTRATE AND ACETAMINOPHEN 1 TABLET: 10; 325 TABLET ORAL at 11:12

## 2017-12-16 RX ADMIN — TREPROSTINIL 29 NG/KG/MIN: 100 INJECTION, SOLUTION INTRAVENOUS; SUBCUTANEOUS at 05:12

## 2017-12-16 RX ADMIN — GELATIN ABSORBABLE SPONGE 12-7 MM 1 APPLICATOR: 12-7 MISC at 12:12

## 2017-12-16 RX ADMIN — ALBUTEROL SULFATE 2.5 MG: 2.5 SOLUTION RESPIRATORY (INHALATION) at 03:12

## 2017-12-16 RX ADMIN — POTASSIUM CHLORIDE 60 MEQ: 1500 TABLET, EXTENDED RELEASE ORAL at 11:12

## 2017-12-16 RX ADMIN — BUMETANIDE 4 MG: 1 TABLET ORAL at 08:12

## 2017-12-16 RX ADMIN — PRAVASTATIN SODIUM 40 MG: 40 TABLET ORAL at 08:12

## 2017-12-16 RX ADMIN — AMLODIPINE BESYLATE 5 MG: 5 TABLET ORAL at 08:12

## 2017-12-16 RX ADMIN — PROMETHAZINE HYDROCHLORIDE 12.5 MG: 25 INJECTION INTRAMUSCULAR; INTRAVENOUS at 12:12

## 2017-12-16 RX ADMIN — BUSPIRONE HYDROCHLORIDE 15 MG: 5 TABLET ORAL at 08:12

## 2017-12-16 RX ADMIN — GABAPENTIN 100 MG: 100 CAPSULE ORAL at 05:12

## 2017-12-16 RX ADMIN — Medication 3 ML: at 02:12

## 2017-12-16 RX ADMIN — BUMETANIDE 4 MG: 1 TABLET ORAL at 05:12

## 2017-12-16 RX ADMIN — LEVOTHYROXINE SODIUM 75 MCG: 75 TABLET ORAL at 05:12

## 2017-12-16 RX ADMIN — FLUTICASONE FUROATE AND VILANTEROL TRIFENATATE 1 PUFF: 100; 25 POWDER RESPIRATORY (INHALATION) at 08:12

## 2017-12-16 RX ADMIN — ONDANSETRON 4 MG: 2 INJECTION INTRAMUSCULAR; INTRAVENOUS at 08:12

## 2017-12-16 RX ADMIN — GABAPENTIN 100 MG: 100 CAPSULE ORAL at 02:12

## 2017-12-16 RX ADMIN — BUSPIRONE HYDROCHLORIDE 15 MG: 5 TABLET ORAL at 02:12

## 2017-12-16 RX ADMIN — GABAPENTIN 100 MG: 100 CAPSULE ORAL at 08:12

## 2017-12-16 RX ADMIN — WARFARIN SODIUM 5 MG: 5 TABLET ORAL at 05:12

## 2017-12-16 RX ADMIN — HYDROCODONE BITARTRATE AND ACETAMINOPHEN 1 TABLET: 10; 325 TABLET ORAL at 01:12

## 2017-12-16 RX ADMIN — TREPROSTINIL 28 NG/KG/MIN: 100 INJECTION, SOLUTION INTRAVENOUS; SUBCUTANEOUS at 05:12

## 2017-12-16 RX ADMIN — Medication 3 ML: at 06:12

## 2017-12-16 RX ADMIN — ALBUTEROL SULFATE 2.5 MG: 2.5 SOLUTION RESPIRATORY (INHALATION) at 12:12

## 2017-12-16 RX ADMIN — OXYCODONE HYDROCHLORIDE AND ACETAMINOPHEN 1 TABLET: 5; 325 TABLET ORAL at 08:12

## 2017-12-16 RX ADMIN — PANTOPRAZOLE SODIUM 40 MG: 40 TABLET, DELAYED RELEASE ORAL at 08:12

## 2017-12-16 RX ADMIN — DESVENLAFAXINE SUCCINATE 100 MG: 50 TABLET, FILM COATED, EXTENDED RELEASE ORAL at 08:12

## 2017-12-16 RX ADMIN — ALBUTEROL SULFATE 5 MG: 2.5 SOLUTION RESPIRATORY (INHALATION) at 04:12

## 2017-12-16 RX ADMIN — ALBUTEROL SULFATE 2.5 MG: 2.5 SOLUTION RESPIRATORY (INHALATION) at 08:12

## 2017-12-16 NOTE — SUBJECTIVE & OBJECTIVE
Interval History: no acute changes  Tolerating medication appropriately  Awaiting approval    Continuous Infusions:   sodium chloride 0.9%      treprostinil (REMODULIN) infusion 29 ng/kg/min (12/16/17 0505)    veletri/remodulin cassette      veletri/remodulin tubing       Scheduled Meds:   albuterol sulfate  2.5 mg Nebulization Q4H    amLODIPine  5 mg Oral Daily    bumetanide  4 mg Oral BID    busPIRone  15 mg Oral TID    desvenlafaxine succinate  100 mg Oral Daily    fluticasone-vilanterol  1 puff Inhalation Daily    gabapentin  100 mg Oral TID    lamoTRIgine  100 mg Oral BID    levothyroxine  75 mcg Oral Daily    pantoprazole  40 mg Oral Daily    potassium chloride 10%  40 mEq Oral Once    pravastatin  40 mg Oral Daily    sodium chloride 0.9%  3 mL Intravenous Q8H    warfarin  5 mg Oral Daily     PRN Meds:acetaminophen, ceFAZolin (ANCEF) IVPB, hydrocodone-acetaminophen 10-325mg, loperamide, ondansetron, ondansetron, oxyCODONE-acetaminophen    Review of patient's allergies indicates:   Allergen Reactions    Sulfa (sulfonamide antibiotics) Rash     Objective:     Vital Signs (Most Recent):  Temp: 97.3 °F (36.3 °C) (12/16/17 0308)  Pulse: 75 (12/16/17 0551)  Resp: 18 (12/16/17 0551)  BP: 115/69 (12/16/17 0551)  SpO2: (!) 93 % (12/16/17 0551) Vital Signs (24h Range):  Temp:  [97.3 °F (36.3 °C)-98.7 °F (37.1 °C)] 97.3 °F (36.3 °C)  Pulse:  [71-90] 75  Resp:  [16-22] 18  SpO2:  [90 %-96 %] 93 %  BP: (101-126)/(56-77) 115/69     Patient Vitals for the past 72 hrs (Last 3 readings):   Weight   12/16/17 0500 85.1 kg (187 lb 9.8 oz)   12/15/17 0415 85.4 kg (188 lb 4.4 oz)   12/14/17 0430 84.9 kg (187 lb 2.7 oz)     Body mass index is 34.31 kg/m².      Intake/Output Summary (Last 24 hours) at 12/16/17 0603  Last data filed at 12/16/17 0505   Gross per 24 hour   Intake              780 ml   Output             1650 ml   Net             -870 ml       Hemodynamic Parameters:       Telemetry: no sig  events    Physical Exam   Constitutional: She is oriented to person, place, and time. She appears well-developed and well-nourished.   HENT:   Head: Normocephalic.   Neck: Normal range of motion. No JVD present.   Cardiovascular: Normal rate and regular rhythm.    Pulmonary/Chest: Effort normal and breath sounds normal.   Abdominal: Soft.   Musculoskeletal: She exhibits no edema.   Neurological: She is alert and oriented to person, place, and time.   Skin: Skin is warm and dry.       Significant Labs:  CBC:    Recent Labs  Lab 12/14/17  0628   WBC 6.75   RBC 4.29   HGB 13.5   HCT 40.8      MCV 95   MCH 31.5*   MCHC 33.1     BNP:  No results for input(s): BNP in the last 168 hours.    Invalid input(s): BNPTRIAGELBLO  CMP:    Recent Labs  Lab 12/13/17  0552 12/14/17  0451 12/15/17  0646    91 94   CALCIUM 10.7* 10.7* 10.0    140 137   K 3.4* 3.0* 2.9*   CO2 45* 47* 40*   CL 83* 81* 83*   BUN 43* 34* 27*   CREATININE 1.4 1.3 1.2      Coagulation:     Recent Labs  Lab 12/13/17  0552 12/15/17  1059   INR 1.1 1.0     LDH:  No results for input(s): LDH in the last 72 hours.  Microbiology:  Microbiology Results (last 7 days)     ** No results found for the last 168 hours. **          I have reviewed all pertinent labs within the past 24 hours.    Estimated Creatinine Clearance: 53 mL/min (based on SCr of 1.2 mg/dL).    Diagnostic Results:  I have reviewed and interpreted all pertinent imaging results/findings within the past 24 hours.

## 2017-12-16 NOTE — NURSING
Remodulin 27 ng/kg/min DW=85 kg concentration: 6,000,000/100 cc infusing at 55 cc/24 hrs. Dose increased per MD order to 28 ng/kg/min DW=85 kg concentration 6,000,000/100 cc now infusing at 57 cc/24 hrs verified by this RN and KRYSTA Mon RN. Frequent vitals WNL see flow sheet patient has no questions or concerns at this time.

## 2017-12-16 NOTE — PROGRESS NOTES
[facility]  Heart Transplant  Progress Note    Patient Name: Sue Smith  MRN: [id number]  Admission Date: 12/8/2017  Hospital Length of Stay: 8 days  Attending Physician: Faina Cullen MD  Primary Care Provider: Paddy Guerrier DO  Principal Problem:Acute on chronic right heart failure    Subjective:     Interval History: no acute changes  Tolerating medication appropriately  Awaiting approval    Continuous Infusions:   sodium chloride 0.9%      treprostinil (REMODULIN) infusion 29 ng/kg/min (12/16/17 0505)    veletri/remodulin cassette      veletri/remodulin tubing       Scheduled Meds:   albuterol sulfate  2.5 mg Nebulization Q4H    amLODIPine  5 mg Oral Daily    bumetanide  4 mg Oral BID    busPIRone  15 mg Oral TID    desvenlafaxine succinate  100 mg Oral Daily    fluticasone-vilanterol  1 puff Inhalation Daily    gabapentin  100 mg Oral TID    lamoTRIgine  100 mg Oral BID    levothyroxine  75 mcg Oral Daily    pantoprazole  40 mg Oral Daily    potassium chloride 10%  40 mEq Oral Once    pravastatin  40 mg Oral Daily    sodium chloride 0.9%  3 mL Intravenous Q8H    warfarin  5 mg Oral Daily     PRN Meds:acetaminophen, ceFAZolin (ANCEF) IVPB, hydrocodone-acetaminophen 10-325mg, loperamide, ondansetron, ondansetron, oxyCODONE-acetaminophen    Review of patient's allergies indicates:   Allergen Reactions    Sulfa (sulfonamide antibiotics) Rash     Objective:     Vital Signs (Most Recent):  Temp: 97.3 °F (36.3 °C) (12/16/17 0308)  Pulse: 75 (12/16/17 0551)  Resp: 18 (12/16/17 0551)  BP: 115/69 (12/16/17 0551)  SpO2: (!) 93 % (12/16/17 0551) Vital Signs (24h Range):  Temp:  [97.3 °F (36.3 °C)-98.7 °F (37.1 °C)] 97.3 °F (36.3 °C)  Pulse:  [71-90] 75  Resp:  [16-22] 18  SpO2:  [90 %-96 %] 93 %  BP: (101-126)/(56-77) 115/69     Patient Vitals for the past 72 hrs (Last 3 readings):   Weight   12/16/17 0500 85.1 kg (187 lb 9.8 oz)   12/15/17 0415 85.4 kg (188 lb 4.4 oz)   12/14/17  0430 84.9 kg (187 lb 2.7 oz)     Body mass index is 34.31 kg/m².      Intake/Output Summary (Last 24 hours) at 12/16/17 0603  Last data filed at 12/16/17 0505   Gross per 24 hour   Intake              780 ml   Output             1650 ml   Net             -870 ml       Hemodynamic Parameters:       Telemetry: no sig events    Physical Exam   Constitutional: She is oriented to person, place, and time. She appears well-developed and well-nourished.   HENT:   Head: Normocephalic.   Neck: Normal range of motion. No JVD present.   Cardiovascular: Normal rate and regular rhythm.    Pulmonary/Chest: Effort normal and breath sounds normal.   Abdominal: Soft.   Musculoskeletal: She exhibits no edema.   Neurological: She is alert and oriented to person, place, and time.   Skin: Skin is warm and dry.       Significant Labs:  CBC:    Recent Labs  Lab 12/14/17  0628   WBC 6.75   RBC 4.29   HGB 13.5   HCT 40.8      MCV 95   MCH 31.5*   MCHC 33.1     BNP:  No results for input(s): BNP in the last 168 hours.    Invalid input(s): BNPTRIAGELBLO  CMP:    Recent Labs  Lab 12/13/17  0552 12/14/17  0451 12/15/17  0646    91 94   CALCIUM 10.7* 10.7* 10.0    140 137   K 3.4* 3.0* 2.9*   CO2 45* 47* 40*   CL 83* 81* 83*   BUN 43* 34* 27*   CREATININE 1.4 1.3 1.2      Coagulation:     Recent Labs  Lab 12/13/17  0552 12/15/17  1059   INR 1.1 1.0     LDH:  No results for input(s): LDH in the last 72 hours.  Microbiology:  Microbiology Results (last 7 days)     ** No results found for the last 168 hours. **          I have reviewed all pertinent labs within the past 24 hours.    Estimated Creatinine Clearance: 53 mL/min (based on SCr of 1.2 mg/dL).    Diagnostic Results:  I have reviewed and interpreted all pertinent imaging results/findings within the past 24 hours.    Assessment and Plan:     55 yo female with Group I PAH w/ low CI and RV failure.  She has transitioned from Uptravi to Remodulin on this admission and is  currently on 29ng/kg/min increased 12/16/17 as she awaits insurance approval.    * Acute on chronic right heart failure    Hx of severe primary pulmonary HTN still appears volume overloaded on exam and Cr is back to baseline. Will continue with home bumex 4 mg bid. Diuresing well.        GERD (gastroesophageal reflux disease)    Continue home medications.         Bipolar disorder    Continue home medications.         Dyslipidemia    Continue home medications.         Hypertension    Continue medications.        Hypothyroidism    Continue medications.        Chronic respiratory failure with hypoxia    2/2 PH as above        Pulmonary hypertension, group 1    Severe PH with markedly reduced CI also severe RV dysfunction. Left her medications at home. UPTRAVI stopped and started on remodulin 12/10. She is currently on 29 ng/kg/min. Gomez placed. Awaiting insurance approval.   PASP 88            Graham Gibson MD  Heart Transplant  [facility]

## 2017-12-16 NOTE — ASSESSMENT & PLAN NOTE
Severe PH with markedly reduced CI also severe RV dysfunction. Left her medications at home. UPTRAVI stopped and started on remodulin 12/10. She is currently on 29 ng/kg/min. Gomez placed. Awaiting insurance approval.   PASP 88

## 2017-12-16 NOTE — PLAN OF CARE
Problem: Patient Care Overview  Goal: Plan of Care Review  Outcome: Ongoing (interventions implemented as appropriate)  * AAO x 4  * Abdomen distended and soft  * Cardiac diet tolerating well.    * No edema noted   * Bed at lowest position and locked, call light within reach, non-slip socks on, and side rails up x 2.  Encouraged patient to call for assistance when needed patient stated understanding.  * Right SC perkins (12/14/17) patent, dressing clean dry and intact no signs or symptoms of infection noted.  * Remodulin 27 ng/kg/min DW=85 kg concentration: 6,000,00 ng/100 cc infusing at 55 cc/24 hrs per MD order Remodulin to be increased every 6 hours by 1 ng/kg/min. Patient tolerating well  * Right FA PIV 22 G (12/12/17) patent, dressing clean dry and intact no signs or symptoms of infection noted.  * Oxygen 5 liters via humidified nasal cannula.  Oxygen saturation 92 - 95% respirations even unlabored  * Patient has complaints of a headache PRN medications administered. Full relief noted   * Skin assessment preformed no breakdown noted   * Standard precautions maintained  * Continuous telemetry monitoring continued SR 70-80 no ectopy noted  * Patient able to turn self no assistance needed.  * Patient voiding clear yellow urine.  See flow sheet for intake and output totals.

## 2017-12-16 NOTE — HPI
55 yo female with Group I PAH w/ low CI and RV failure.  She has transitioned from Uptravi to Remodulin on this admission and is currently on 29ng/kg/min increased 12/16/17 as she awaits insurance approval.

## 2017-12-16 NOTE — PLAN OF CARE
Pt is AAOx4; afebrile; vital signs stable. She is on 5L )2, with O2 sats in the mid 90's. She experienced nausea and vomiting this morning that was unrelieved by zofran. IV phenergan given and fully relieved nausea. Remodulin backed down to 28ng/kg/min (57mL/24hr). This afternoon, she reports that achy generalized pain is also gone. She is up independently.  Fall precautions in place. Pt has commode at bedside, bed in low position, call bell in reach, non-skid socks on when pt not in bed.

## 2017-12-17 LAB
ANION GAP SERPL CALC-SCNC: 11 MMOL/L
BUN SERPL-MCNC: 20 MG/DL
CALCIUM SERPL-MCNC: 10 MG/DL
CHLORIDE SERPL-SCNC: 91 MMOL/L
CO2 SERPL-SCNC: 38 MMOL/L
CREAT SERPL-MCNC: 1.2 MG/DL
EST. GFR  (AFRICAN AMERICAN): 58.4 ML/MIN/1.73 M^2
EST. GFR  (NON AFRICAN AMERICAN): 50.6 ML/MIN/1.73 M^2
GLUCOSE SERPL-MCNC: 95 MG/DL
INR PPP: 1.1
POTASSIUM SERPL-SCNC: 3.1 MMOL/L
PROTHROMBIN TIME: 11.8 SEC
SODIUM SERPL-SCNC: 140 MMOL/L

## 2017-12-17 PROCEDURE — 85610 PROTHROMBIN TIME: CPT

## 2017-12-17 PROCEDURE — 25000242 PHARM REV CODE 250 ALT 637 W/ HCPCS: Performed by: INTERNAL MEDICINE

## 2017-12-17 PROCEDURE — 94660 CPAP INITIATION&MGMT: CPT

## 2017-12-17 PROCEDURE — 63600175 PHARM REV CODE 636 W HCPCS: Performed by: INTERNAL MEDICINE

## 2017-12-17 PROCEDURE — 25000003 PHARM REV CODE 250: Performed by: PHYSICIAN ASSISTANT

## 2017-12-17 PROCEDURE — 36415 COLL VENOUS BLD VENIPUNCTURE: CPT

## 2017-12-17 PROCEDURE — 25000003 PHARM REV CODE 250: Performed by: STUDENT IN AN ORGANIZED HEALTH CARE EDUCATION/TRAINING PROGRAM

## 2017-12-17 PROCEDURE — 99232 SBSQ HOSP IP/OBS MODERATE 35: CPT | Mod: GW,HPC,, | Performed by: INTERNAL MEDICINE

## 2017-12-17 PROCEDURE — 25000003 PHARM REV CODE 250: Performed by: INTERNAL MEDICINE

## 2017-12-17 PROCEDURE — 20600001 HC STEP DOWN PRIVATE ROOM

## 2017-12-17 PROCEDURE — A4216 STERILE WATER/SALINE, 10 ML: HCPCS | Performed by: INTERNAL MEDICINE

## 2017-12-17 PROCEDURE — 94640 AIRWAY INHALATION TREATMENT: CPT

## 2017-12-17 PROCEDURE — 99900035 HC TECH TIME PER 15 MIN (STAT)

## 2017-12-17 PROCEDURE — 80048 BASIC METABOLIC PNL TOTAL CA: CPT

## 2017-12-17 PROCEDURE — 94761 N-INVAS EAR/PLS OXIMETRY MLT: CPT

## 2017-12-17 PROCEDURE — 27000221 HC OXYGEN, UP TO 24 HOURS

## 2017-12-17 RX ADMIN — AMLODIPINE BESYLATE 5 MG: 5 TABLET ORAL at 08:12

## 2017-12-17 RX ADMIN — FLUTICASONE FUROATE AND VILANTEROL TRIFENATATE 1 PUFF: 100; 25 POWDER RESPIRATORY (INHALATION) at 08:12

## 2017-12-17 RX ADMIN — BUSPIRONE HYDROCHLORIDE 15 MG: 5 TABLET ORAL at 08:12

## 2017-12-17 RX ADMIN — ALBUTEROL SULFATE 2.5 MG: 2.5 SOLUTION RESPIRATORY (INHALATION) at 03:12

## 2017-12-17 RX ADMIN — ALBUTEROL SULFATE 2.5 MG: 2.5 SOLUTION RESPIRATORY (INHALATION) at 09:12

## 2017-12-17 RX ADMIN — GABAPENTIN 100 MG: 100 CAPSULE ORAL at 05:12

## 2017-12-17 RX ADMIN — WARFARIN SODIUM 5 MG: 5 TABLET ORAL at 05:12

## 2017-12-17 RX ADMIN — ALBUTEROL SULFATE 2.5 MG: 2.5 SOLUTION RESPIRATORY (INHALATION) at 11:12

## 2017-12-17 RX ADMIN — LAMOTRIGINE 100 MG: 100 TABLET ORAL at 08:12

## 2017-12-17 RX ADMIN — BUMETANIDE 4 MG: 1 TABLET ORAL at 05:12

## 2017-12-17 RX ADMIN — Medication 3 ML: at 02:12

## 2017-12-17 RX ADMIN — ALBUTEROL SULFATE 2.5 MG: 2.5 SOLUTION RESPIRATORY (INHALATION) at 12:12

## 2017-12-17 RX ADMIN — BUSPIRONE HYDROCHLORIDE 15 MG: 5 TABLET ORAL at 02:12

## 2017-12-17 RX ADMIN — BUSPIRONE HYDROCHLORIDE 15 MG: 5 TABLET ORAL at 05:12

## 2017-12-17 RX ADMIN — Medication 3 ML: at 10:12

## 2017-12-17 RX ADMIN — DESVENLAFAXINE SUCCINATE 100 MG: 50 TABLET, FILM COATED, EXTENDED RELEASE ORAL at 08:12

## 2017-12-17 RX ADMIN — Medication 3 ML: at 06:12

## 2017-12-17 RX ADMIN — OXYCODONE HYDROCHLORIDE AND ACETAMINOPHEN 1 TABLET: 5; 325 TABLET ORAL at 03:12

## 2017-12-17 RX ADMIN — GABAPENTIN 100 MG: 100 CAPSULE ORAL at 08:12

## 2017-12-17 RX ADMIN — LEVOTHYROXINE SODIUM 75 MCG: 75 TABLET ORAL at 05:12

## 2017-12-17 RX ADMIN — PANTOPRAZOLE SODIUM 40 MG: 40 TABLET, DELAYED RELEASE ORAL at 08:12

## 2017-12-17 RX ADMIN — HYDROCODONE BITARTRATE AND ACETAMINOPHEN 1 TABLET: 10; 325 TABLET ORAL at 09:12

## 2017-12-17 RX ADMIN — HYDROCODONE BITARTRATE AND ACETAMINOPHEN 1 TABLET: 10; 325 TABLET ORAL at 06:12

## 2017-12-17 RX ADMIN — PRAVASTATIN SODIUM 40 MG: 40 TABLET ORAL at 08:12

## 2017-12-17 RX ADMIN — BUMETANIDE 4 MG: 1 TABLET ORAL at 08:12

## 2017-12-17 RX ADMIN — GABAPENTIN 100 MG: 100 CAPSULE ORAL at 02:12

## 2017-12-17 RX ADMIN — TREPROSTINIL 28 NG/KG/MIN: 100 INJECTION, SOLUTION INTRAVENOUS; SUBCUTANEOUS at 05:12

## 2017-12-17 NOTE — PLAN OF CARE
Pt is AAOx4; afebrile; vital signs stable. She is on 5L O2 nasal cannula and wears biPAP at night. Remodulin still at 28ng/kg/min, not titrated up today. She ammbulated around the unit today with minimal sob on 6L. Fall precautions in place. Pt has commode at bedside, bed in low position, call bell in reach, non-skid socks on when pt not in bed.

## 2017-12-17 NOTE — PLAN OF CARE
Problem: Patient Care Overview  Goal: Plan of Care Review  Outcome: Ongoing (interventions implemented as appropriate)  * AAO x 4  * Abdomen distended and soft  * Cardiac diet tolerating well.    * No edema noted   * Bed at lowest position and locked, call light within reach, non-slip socks on, and side rails up x 2.  Encouraged patient to call for assistance when needed patient stated understanding.  * Right SC perkins (12/14/17) patent, dressing clean dry and intact no signs or symptoms of infection noted.  * Remodulin 28 ng/kg/min DW=85 kg concentration: 6,000,00 ng/100 cc infusing at 57 cc/24 hrs per MD order Remodulin increased on hold. Infusion to be increased by HTS team daily Patient tolerating well  * Right FA PIV 22 G removed on day shift.   * Left shoulder 22 G PIV (12/16/17) patent dressing clean dry and intact no signs or symptoms of infection noted.   * Oxygen 5 liters via humidified nasal cannula.  Oxygen saturation 92 - 95% respirations even unlabored. BiPAP to be worn at night, patient tolerates well.   * Patient has no complaints of pain/discomfort at this time. Reminded patient that PRN pain medications are available if needed. Patient stated understanding.   * Skin assessment preformed no breakdown noted   * Standard precautions maintained  * Continuous telemetry monitoring continued SR 70-80 no ectopy noted  * Patient able to turn self no assistance needed.  * Patient voiding clear yellow urine.  See flow sheet for intake and output totals.

## 2017-12-17 NOTE — ASSESSMENT & PLAN NOTE
Severe PH with markedly reduced CI also severe RV dysfunction. Left her medications at home. UPTRAVI stopped and started on remodulin 12/10. She is currently on 28 ng/kg/min. Gomez placed. Awaiting insurance approval.   PASP 88  Symptoms on 29 so decreased to 28 yesterday with resolution therein  Will slowly uptitrate per staff

## 2017-12-17 NOTE — PROGRESS NOTES
[facility]  Heart Transplant  Progress Note    Patient Name: Sue Smith  MRN: [id number]  Admission Date: 12/8/2017  Hospital Length of Stay: 9 days  Attending Physician: Faina Cullen MD  Primary Care Provider: Paddy Guerrier DO  Principal Problem:Acute on chronic right heart failure    Subjective:     Interval History: awaiting approval  Resolution of symptoms with 28    Continuous Infusions:   sodium chloride 0.9%      treprostinil (REMODULIN) infusion 28 ng/kg/min (12/16/17 1752)    veletri/remodulin cassette      veletri/remodulin tubing       Scheduled Meds:   albuterol sulfate  2.5 mg Nebulization Q4H    amLODIPine  5 mg Oral Daily    bumetanide  4 mg Oral BID    busPIRone  15 mg Oral TID    desvenlafaxine succinate  100 mg Oral Daily    fluticasone-vilanterol  1 puff Inhalation Daily    gabapentin  100 mg Oral TID    lamoTRIgine  100 mg Oral BID    levothyroxine  75 mcg Oral Daily    pantoprazole  40 mg Oral Daily    potassium chloride 10%  40 mEq Oral Once    pravastatin  40 mg Oral Daily    sodium chloride 0.9%  3 mL Intravenous Q8H    warfarin  5 mg Oral Daily     PRN Meds:acetaminophen, ceFAZolin (ANCEF) IVPB, gelatin adsorbable 12-7 mm top sponge, hydrocodone-acetaminophen 10-325mg, loperamide, ondansetron, ondansetron, oxyCODONE-acetaminophen    Review of patient's allergies indicates:   Allergen Reactions    Sulfa (sulfonamide antibiotics) Rash     Objective:     Vital Signs (Most Recent):  Temp: 97.6 °F (36.4 °C) (12/17/17 0818)  Pulse: 89 (12/17/17 0900)  Resp: (!) 22 (12/17/17 0900)  BP: 121/76 (12/17/17 0818)  SpO2: 97 % (12/17/17 0900) Vital Signs (24h Range):  Temp:  [97.4 °F (36.3 °C)-98.4 °F (36.9 °C)] 97.6 °F (36.4 °C)  Pulse:  [70-95] 89  Resp:  [14-22] 22  SpO2:  [90 %-97 %] 97 %  BP: (115-127)/(67-76) 121/76     Patient Vitals for the past 72 hrs (Last 3 readings):   Weight   12/17/17 0530 84.5 kg (186 lb 4.6 oz)   12/16/17 0500 85.1 kg (187 lb 9.8  oz)   12/15/17 0415 85.4 kg (188 lb 4.4 oz)     Body mass index is 34.07 kg/m².      Intake/Output Summary (Last 24 hours) at 12/17/17 1126  Last data filed at 12/17/17 0900   Gross per 24 hour   Intake              710 ml   Output              675 ml   Net               35 ml       Hemodynamic Parameters:       Telemetry: no events    Physical Exam   Constitutional: She is oriented to person, place, and time. She appears well-developed and well-nourished.   HENT:   Head: Normocephalic.   Neck: Normal range of motion. No JVD present.   Cardiovascular: Normal rate and regular rhythm.    Pulmonary/Chest: Effort normal and breath sounds normal.   Abdominal: Soft.   Musculoskeletal: She exhibits no edema.   Neurological: She is alert and oriented to person, place, and time.   Skin: Skin is warm and dry.       Significant Labs:  CBC:    Recent Labs  Lab 12/14/17  0628   WBC 6.75   RBC 4.29   HGB 13.5   HCT 40.8      MCV 95   MCH 31.5*   MCHC 33.1     BNP:  No results for input(s): BNP in the last 168 hours.    Invalid input(s): BNPTRIAGELBLO  CMP:    Recent Labs  Lab 12/15/17  0646 12/16/17  0600 12/17/17  0436   GLU 94 96 95   CALCIUM 10.0 10.3 10.0    134* 140   K 2.9* 3.4* 3.1*   CO2 40* 37* 38*   CL 83* 86* 91*   BUN 27* 25* 20   CREATININE 1.2 1.1 1.2      Coagulation:     Recent Labs  Lab 12/15/17  1059 12/16/17  1419 12/17/17  0436   INR 1.0 1.1 1.1     LDH:  No results for input(s): LDH in the last 72 hours.  Microbiology:  Microbiology Results (last 7 days)     ** No results found for the last 168 hours. **          I have reviewed all pertinent labs within the past 24 hours.    Estimated Creatinine Clearance: 52.8 mL/min (based on SCr of 1.2 mg/dL).    Diagnostic Results:  I have reviewed all pertinent imaging results/findings within the past 24 hours.    Assessment and Plan:     55 yo female with Group I PAH w/ low CI and RV failure.  She has transitioned from Uptravi to Remodulin on this  admission and is currently on 29ng/kg/min increased 12/16/17 as she awaits insurance approval.    * Acute on chronic right heart failure    Hx of severe primary pulmonary HTN still appears volume overloaded on exam and Cr is back to baseline. Will continue with home bumex 4 mg bid.         GERD (gastroesophageal reflux disease)    Continue home medications.         Bipolar disorder    Continue home medications.         Dyslipidemia    Continue home medications.         Hypertension    Continue medications.        Hypothyroidism    Continue medications.        Chronic respiratory failure with hypoxia    2/2 PH as above        Pulmonary hypertension, group 1    Severe PH with markedly reduced CI also severe RV dysfunction. Left her medications at home. UPTRAVI stopped and started on remodulin 12/10. She is currently on 28 ng/kg/min. Gomez placed. Awaiting insurance approval.   PASP 88  Symptoms on 29 so decreased to 28 yesterday with resolution therein  Will slowly uptitrate per staff            Graham Gibson MD  Heart Transplant  [facility]

## 2017-12-17 NOTE — SUBJECTIVE & OBJECTIVE
Interval History: awaiting approval  Resolution of symptoms with 28    Continuous Infusions:   sodium chloride 0.9%      treprostinil (REMODULIN) infusion 28 ng/kg/min (12/16/17 1752)    veletri/remodulin cassette      veletri/remodulin tubing       Scheduled Meds:   albuterol sulfate  2.5 mg Nebulization Q4H    amLODIPine  5 mg Oral Daily    bumetanide  4 mg Oral BID    busPIRone  15 mg Oral TID    desvenlafaxine succinate  100 mg Oral Daily    fluticasone-vilanterol  1 puff Inhalation Daily    gabapentin  100 mg Oral TID    lamoTRIgine  100 mg Oral BID    levothyroxine  75 mcg Oral Daily    pantoprazole  40 mg Oral Daily    potassium chloride 10%  40 mEq Oral Once    pravastatin  40 mg Oral Daily    sodium chloride 0.9%  3 mL Intravenous Q8H    warfarin  5 mg Oral Daily     PRN Meds:acetaminophen, ceFAZolin (ANCEF) IVPB, gelatin adsorbable 12-7 mm top sponge, hydrocodone-acetaminophen 10-325mg, loperamide, ondansetron, ondansetron, oxyCODONE-acetaminophen    Review of patient's allergies indicates:   Allergen Reactions    Sulfa (sulfonamide antibiotics) Rash     Objective:     Vital Signs (Most Recent):  Temp: 97.6 °F (36.4 °C) (12/17/17 0818)  Pulse: 89 (12/17/17 0900)  Resp: (!) 22 (12/17/17 0900)  BP: 121/76 (12/17/17 0818)  SpO2: 97 % (12/17/17 0900) Vital Signs (24h Range):  Temp:  [97.4 °F (36.3 °C)-98.4 °F (36.9 °C)] 97.6 °F (36.4 °C)  Pulse:  [70-95] 89  Resp:  [14-22] 22  SpO2:  [90 %-97 %] 97 %  BP: (115-127)/(67-76) 121/76     Patient Vitals for the past 72 hrs (Last 3 readings):   Weight   12/17/17 0530 84.5 kg (186 lb 4.6 oz)   12/16/17 0500 85.1 kg (187 lb 9.8 oz)   12/15/17 0415 85.4 kg (188 lb 4.4 oz)     Body mass index is 34.07 kg/m².      Intake/Output Summary (Last 24 hours) at 12/17/17 1126  Last data filed at 12/17/17 0900   Gross per 24 hour   Intake              710 ml   Output              675 ml   Net               35 ml       Hemodynamic Parameters:       Telemetry: no  events    Physical Exam   Constitutional: She is oriented to person, place, and time. She appears well-developed and well-nourished.   HENT:   Head: Normocephalic.   Neck: Normal range of motion. No JVD present.   Cardiovascular: Normal rate and regular rhythm.    Pulmonary/Chest: Effort normal and breath sounds normal.   Abdominal: Soft.   Musculoskeletal: She exhibits no edema.   Neurological: She is alert and oriented to person, place, and time.   Skin: Skin is warm and dry.       Significant Labs:  CBC:    Recent Labs  Lab 12/14/17  0628   WBC 6.75   RBC 4.29   HGB 13.5   HCT 40.8      MCV 95   MCH 31.5*   MCHC 33.1     BNP:  No results for input(s): BNP in the last 168 hours.    Invalid input(s): BNPTRIAGELBLO  CMP:    Recent Labs  Lab 12/15/17  0646 12/16/17  0600 12/17/17  0436   GLU 94 96 95   CALCIUM 10.0 10.3 10.0    134* 140   K 2.9* 3.4* 3.1*   CO2 40* 37* 38*   CL 83* 86* 91*   BUN 27* 25* 20   CREATININE 1.2 1.1 1.2      Coagulation:     Recent Labs  Lab 12/15/17  1059 12/16/17  1419 12/17/17  0436   INR 1.0 1.1 1.1     LDH:  No results for input(s): LDH in the last 72 hours.  Microbiology:  Microbiology Results (last 7 days)     ** No results found for the last 168 hours. **          I have reviewed all pertinent labs within the past 24 hours.    Estimated Creatinine Clearance: 52.8 mL/min (based on SCr of 1.2 mg/dL).    Diagnostic Results:  I have reviewed all pertinent imaging results/findings within the past 24 hours.

## 2017-12-18 ENCOUNTER — TELEPHONE (OUTPATIENT)
Dept: TRANSPLANT | Facility: CLINIC | Age: 57
End: 2017-12-18

## 2017-12-18 LAB
ALBUMIN SERPL BCP-MCNC: 4.1 G/DL
ALP SERPL-CCNC: 107 U/L
ALT SERPL W/O P-5'-P-CCNC: 74 U/L
ANION GAP SERPL CALC-SCNC: 16 MMOL/L
AST SERPL-CCNC: 84 U/L
BASOPHILS # BLD AUTO: 0.04 K/UL
BASOPHILS NFR BLD: 0.6 %
BILIRUB DIRECT SERPL-MCNC: 0.4 MG/DL
BILIRUB SERPL-MCNC: 0.7 MG/DL
BNP SERPL-MCNC: 483 PG/ML
BUN SERPL-MCNC: 27 MG/DL
CALCIUM SERPL-MCNC: 10.1 MG/DL
CHLORIDE SERPL-SCNC: 87 MMOL/L
CO2 SERPL-SCNC: 36 MMOL/L
CREAT SERPL-MCNC: 1.2 MG/DL
DIFFERENTIAL METHOD: ABNORMAL
EOSINOPHIL # BLD AUTO: 0.1 K/UL
EOSINOPHIL NFR BLD: 1.7 %
ERYTHROCYTE [DISTWIDTH] IN BLOOD BY AUTOMATED COUNT: 18.6 %
EST. GFR  (AFRICAN AMERICAN): 58.4 ML/MIN/1.73 M^2
EST. GFR  (NON AFRICAN AMERICAN): 50.6 ML/MIN/1.73 M^2
GLUCOSE SERPL-MCNC: 93 MG/DL
HCT VFR BLD AUTO: 41 %
HGB BLD-MCNC: 13.8 G/DL
IMM GRANULOCYTES # BLD AUTO: 0.04 K/UL
IMM GRANULOCYTES NFR BLD AUTO: 0.6 %
INR PPP: 1.3
LYMPHOCYTES # BLD AUTO: 0.9 K/UL
LYMPHOCYTES NFR BLD: 13.1 %
MAGNESIUM SERPL-MCNC: 2 MG/DL
MCH RBC QN AUTO: 31.4 PG
MCHC RBC AUTO-ENTMCNC: 33.7 G/DL
MCV RBC AUTO: 93 FL
MONOCYTES # BLD AUTO: 0.4 K/UL
MONOCYTES NFR BLD: 5.6 %
NEUTROPHILS # BLD AUTO: 5.2 K/UL
NEUTROPHILS NFR BLD: 78.4 %
NRBC BLD-RTO: 0 /100 WBC
PLATELET # BLD AUTO: 131 K/UL
PMV BLD AUTO: 10.7 FL
POTASSIUM SERPL-SCNC: 3.2 MMOL/L
PROT SERPL-MCNC: 7.1 G/DL
PROTHROMBIN TIME: 13.2 SEC
RBC # BLD AUTO: 4.39 M/UL
SODIUM SERPL-SCNC: 139 MMOL/L
WBC # BLD AUTO: 6.59 K/UL

## 2017-12-18 PROCEDURE — 99233 SBSQ HOSP IP/OBS HIGH 50: CPT | Mod: GV,,, | Performed by: INTERNAL MEDICINE

## 2017-12-18 PROCEDURE — 36415 COLL VENOUS BLD VENIPUNCTURE: CPT

## 2017-12-18 PROCEDURE — 25000003 PHARM REV CODE 250: Performed by: INTERNAL MEDICINE

## 2017-12-18 PROCEDURE — 63600175 PHARM REV CODE 636 W HCPCS: Performed by: INTERNAL MEDICINE

## 2017-12-18 PROCEDURE — 20600001 HC STEP DOWN PRIVATE ROOM

## 2017-12-18 PROCEDURE — 80048 BASIC METABOLIC PNL TOTAL CA: CPT

## 2017-12-18 PROCEDURE — 25000003 PHARM REV CODE 250: Performed by: STUDENT IN AN ORGANIZED HEALTH CARE EDUCATION/TRAINING PROGRAM

## 2017-12-18 PROCEDURE — 27000221 HC OXYGEN, UP TO 24 HOURS

## 2017-12-18 PROCEDURE — A4216 STERILE WATER/SALINE, 10 ML: HCPCS | Performed by: INTERNAL MEDICINE

## 2017-12-18 PROCEDURE — 94660 CPAP INITIATION&MGMT: CPT

## 2017-12-18 PROCEDURE — 80076 HEPATIC FUNCTION PANEL: CPT

## 2017-12-18 PROCEDURE — 97802 MEDICAL NUTRITION INDIV IN: CPT | Performed by: DIETITIAN, REGISTERED

## 2017-12-18 PROCEDURE — 83880 ASSAY OF NATRIURETIC PEPTIDE: CPT

## 2017-12-18 PROCEDURE — 25000003 PHARM REV CODE 250: Performed by: PHYSICIAN ASSISTANT

## 2017-12-18 PROCEDURE — 25000003 PHARM REV CODE 250: Performed by: NURSE PRACTITIONER

## 2017-12-18 PROCEDURE — 99900035 HC TECH TIME PER 15 MIN (STAT)

## 2017-12-18 PROCEDURE — 83735 ASSAY OF MAGNESIUM: CPT

## 2017-12-18 PROCEDURE — 85610 PROTHROMBIN TIME: CPT

## 2017-12-18 PROCEDURE — 85025 COMPLETE CBC W/AUTO DIFF WBC: CPT

## 2017-12-18 PROCEDURE — 25000242 PHARM REV CODE 250 ALT 637 W/ HCPCS: Performed by: INTERNAL MEDICINE

## 2017-12-18 PROCEDURE — 94640 AIRWAY INHALATION TREATMENT: CPT

## 2017-12-18 RX ORDER — POTASSIUM CHLORIDE 20 MEQ/1
60 TABLET, EXTENDED RELEASE ORAL 2 TIMES DAILY
Status: DISCONTINUED | OUTPATIENT
Start: 2017-12-18 | End: 2017-12-21

## 2017-12-18 RX ADMIN — BUMETANIDE 4 MG: 1 TABLET ORAL at 08:12

## 2017-12-18 RX ADMIN — ALBUTEROL SULFATE 2.5 MG: 2.5 SOLUTION RESPIRATORY (INHALATION) at 03:12

## 2017-12-18 RX ADMIN — AMLODIPINE BESYLATE 5 MG: 5 TABLET ORAL at 08:12

## 2017-12-18 RX ADMIN — WARFARIN SODIUM 5 MG: 5 TABLET ORAL at 04:12

## 2017-12-18 RX ADMIN — Medication 3 ML: at 09:12

## 2017-12-18 RX ADMIN — GABAPENTIN 100 MG: 100 CAPSULE ORAL at 01:12

## 2017-12-18 RX ADMIN — PRAVASTATIN SODIUM 40 MG: 40 TABLET ORAL at 08:12

## 2017-12-18 RX ADMIN — ALBUTEROL SULFATE 2.5 MG: 2.5 SOLUTION RESPIRATORY (INHALATION) at 11:12

## 2017-12-18 RX ADMIN — BUSPIRONE HYDROCHLORIDE 15 MG: 5 TABLET ORAL at 01:12

## 2017-12-18 RX ADMIN — POTASSIUM CHLORIDE 60 MEQ: 1500 TABLET, EXTENDED RELEASE ORAL at 09:12

## 2017-12-18 RX ADMIN — ALBUTEROL SULFATE 2.5 MG: 2.5 SOLUTION RESPIRATORY (INHALATION) at 08:12

## 2017-12-18 RX ADMIN — DESVENLAFAXINE SUCCINATE 100 MG: 50 TABLET, FILM COATED, EXTENDED RELEASE ORAL at 08:12

## 2017-12-18 RX ADMIN — GABAPENTIN 100 MG: 100 CAPSULE ORAL at 05:12

## 2017-12-18 RX ADMIN — HYDROCODONE BITARTRATE AND ACETAMINOPHEN 1 TABLET: 10; 325 TABLET ORAL at 04:12

## 2017-12-18 RX ADMIN — BUSPIRONE HYDROCHLORIDE 15 MG: 5 TABLET ORAL at 09:12

## 2017-12-18 RX ADMIN — FLUTICASONE FUROATE AND VILANTEROL TRIFENATATE 1 PUFF: 100; 25 POWDER RESPIRATORY (INHALATION) at 08:12

## 2017-12-18 RX ADMIN — LAMOTRIGINE 100 MG: 100 TABLET ORAL at 08:12

## 2017-12-18 RX ADMIN — OXYCODONE HYDROCHLORIDE AND ACETAMINOPHEN 1 TABLET: 5; 325 TABLET ORAL at 01:12

## 2017-12-18 RX ADMIN — HYDROCODONE BITARTRATE AND ACETAMINOPHEN 1 TABLET: 10; 325 TABLET ORAL at 05:12

## 2017-12-18 RX ADMIN — TREPROSTINIL 28 NG/KG/MIN: 100 INJECTION, SOLUTION INTRAVENOUS; SUBCUTANEOUS at 05:12

## 2017-12-18 RX ADMIN — BUSPIRONE HYDROCHLORIDE 15 MG: 5 TABLET ORAL at 05:12

## 2017-12-18 RX ADMIN — LAMOTRIGINE 100 MG: 100 TABLET ORAL at 09:12

## 2017-12-18 RX ADMIN — PANTOPRAZOLE SODIUM 40 MG: 40 TABLET, DELAYED RELEASE ORAL at 08:12

## 2017-12-18 RX ADMIN — ALBUTEROL SULFATE 2.5 MG: 2.5 SOLUTION RESPIRATORY (INHALATION) at 07:12

## 2017-12-18 RX ADMIN — OXYCODONE HYDROCHLORIDE AND ACETAMINOPHEN 1 TABLET: 5; 325 TABLET ORAL at 10:12

## 2017-12-18 RX ADMIN — BUMETANIDE 4 MG: 1 TABLET ORAL at 05:12

## 2017-12-18 RX ADMIN — GABAPENTIN 100 MG: 100 CAPSULE ORAL at 09:12

## 2017-12-18 RX ADMIN — LEVOTHYROXINE SODIUM 75 MCG: 75 TABLET ORAL at 05:12

## 2017-12-18 NOTE — SUBJECTIVE & OBJECTIVE
Interval History: Feels well with no further side effects on Remodulin 28 ng/kg/min. Continues to diurese on Bumex 4 mg bid. Feels much better since transitioning from Uptravi to IV Remodulin    Continuous Infusions:   sodium chloride 0.9%      treprostinil (REMODULIN) infusion 28 ng/kg/min (12/17/17 1717)    veletri/remodulin cassette      veletri/remodulin tubing       Scheduled Meds:   albuterol sulfate  2.5 mg Nebulization Q4H    amLODIPine  5 mg Oral Daily    bumetanide  4 mg Oral BID    busPIRone  15 mg Oral TID    desvenlafaxine succinate  100 mg Oral Daily    fluticasone-vilanterol  1 puff Inhalation Daily    gabapentin  100 mg Oral TID    lamoTRIgine  100 mg Oral BID    levothyroxine  75 mcg Oral Daily    pantoprazole  40 mg Oral Daily    potassium chloride 10%  40 mEq Oral Once    potassium chloride SA  60 mEq Oral BID    pravastatin  40 mg Oral Daily    sodium chloride 0.9%  3 mL Intravenous Q8H    warfarin  5 mg Oral Daily     PRN Meds:acetaminophen, ceFAZolin (ANCEF) IVPB, gelatin adsorbable 12-7 mm top sponge, hydrocodone-acetaminophen 10-325mg, loperamide, ondansetron, ondansetron, oxyCODONE-acetaminophen    Review of patient's allergies indicates:   Allergen Reactions    Sulfa (sulfonamide antibiotics) Rash     Objective:     Vital Signs (Most Recent):  Temp: 98.3 °F (36.8 °C) (12/18/17 1131)  Pulse: 94 (12/18/17 1131)  Resp: 20 (12/18/17 1131)  BP: 129/60 (12/18/17 1131)  SpO2: (!) 93 % (12/18/17 1131) Vital Signs (24h Range):  Temp:  [97.1 °F (36.2 °C)-98.9 °F (37.2 °C)] 98.3 °F (36.8 °C)  Pulse:  [] 94  Resp:  [15-20] 20  SpO2:  [91 %-98 %] 93 %  BP: (109-129)/(57-83) 129/60     Patient Vitals for the past 72 hrs (Last 3 readings):   Weight   12/18/17 0515 83.7 kg (184 lb 8.4 oz)   12/17/17 0530 84.5 kg (186 lb 4.6 oz)   12/16/17 0500 85.1 kg (187 lb 9.8 oz)     Body mass index is 33.75 kg/m².      Intake/Output Summary (Last 24 hours) at 12/18/17 1210  Last data filed at  12/18/17 0500   Gross per 24 hour   Intake              660 ml   Output             2240 ml   Net            -1580 ml       Hemodynamic Parameters:       Telemetry: SR    Physical Exam   Constitutional: She is oriented to person, place, and time. She appears well-developed and well-nourished.   HENT:   Head: Normocephalic and atraumatic.   Eyes: Conjunctivae and EOM are normal. Pupils are equal, round, and reactive to light.   Neck: Normal range of motion. Neck supple. No JVD present. No thyromegaly present.   Cardiovascular: Normal rate and regular rhythm.    Grade III/VI systolic murmur LSB   Pulmonary/Chest: Effort normal and breath sounds normal.   Abdominal: Soft. Bowel sounds are normal.   Musculoskeletal: Normal range of motion. She exhibits no edema.   Neurological: She is alert and oriented to person, place, and time.   Skin: Skin is warm and dry. Capillary refill takes less than 2 seconds.   Psychiatric: She has a normal mood and affect. Her behavior is normal. Judgment and thought content normal.       Significant Labs:  CBC:    Recent Labs  Lab 12/14/17  0628 12/18/17  0911   WBC 6.75 6.59   RBC 4.29 4.39   HGB 13.5 13.8   HCT 40.8 41.0    131*   MCV 95 93   MCH 31.5* 31.4*   MCHC 33.1 33.7     BNP:    Recent Labs  Lab 12/18/17  0911   *     CMP:    Recent Labs  Lab 12/16/17  0600 12/17/17  0436 12/18/17  0530 12/18/17  0911   GLU 96 95 93  --    CALCIUM 10.3 10.0 10.1  --    ALBUMIN  --   --   --  4.1   PROT  --   --   --  7.1   * 140 139  --    K 3.4* 3.1* 3.2*  --    CO2 37* 38* 36*  --    CL 86* 91* 87*  --    BUN 25* 20 27*  --    CREATININE 1.1 1.2 1.2  --    ALKPHOS  --   --   --  107   ALT  --   --   --  74*   AST  --   --   --  84*   BILITOT  --   --   --  0.7      Coagulation:     Recent Labs  Lab 12/16/17  1419 12/17/17  0436 12/18/17  0530   INR 1.1 1.1 1.3*     LDH:  No results for input(s): LDH in the last 72 hours.  Microbiology:  Microbiology Results (last 7 days)      ** No results found for the last 168 hours. **          I have reviewed all pertinent labs within the past 24 hours.    Estimated Creatinine Clearance: 52.5 mL/min (based on SCr of 1.2 mg/dL).

## 2017-12-18 NOTE — PROGRESS NOTES
"[facility]  Adult Nutrition  Progress Note    SUMMARY     Recommendations    Recommendation/Intervention: Continue current cardiac diet, encouraging intake. Pt refusing Boost. Cardiac diet education provided. RD following.   Goals: Consume/tolerate >75% EEN and EPN  Nutrition Goal Status: new  Communication of RD Recs: reviewed with RN      Reason for Assessment    Reason for Assessment: length of stay  Diagnosis: cancer diagnosis/related complications  Relevent Medical History: acute CHF, COPD, obesity, pulmonary HTN, GERD, DLD   Interdisciplinary Rounds: did not attend     General Information Comments: pt with volume overload, reports fine appetite and intake, reports no previous education on low sodium diet except by MD, provided education    Nutrition Discharge Planning: Adequate PO intake with understanding of low Na diet    Nutrition Prescription Ordered    Current Diet Order: Cardiac     Evaluation of Received Nutrients/Fluid Intake     % Intake of Estimated Energy Needs: 50 - 75 %  % Meal Intake: 50%     Nutrition Risk Screen     Nutrition Risk Screen: no indicators present    Nutrition/Diet History    Patient Reported Diet/Restrictions/Preferences: general  Typical Food/Fluid Intake: canned foods, no fast foods, cooks at home  Food Preferences: No Zoroastrian or cultural food preferences noted      Labs/Tests/Procedures/Meds    Pertinent Labs Reviewed: reviewed  Pertinent Labs Comments: K 3.2, BUN 27, gfr 50.6  Pertinent Medications Reviewed: reviewed  Pertinent Medications Comments: pantoprazole, KCl, warfarin, statin    Physical Findings    Overall Physical Appearance: overweight, shortness of breath, on oxygen therapy  Oral/Mouth Cavity: WDL  Skin: intact    Anthropometrics    Temp: 97.1 °F (36.2 °C)     Height: 5' 2" (157.5 cm)  Weight Method: Bed Scale  Weight: 83.7 kg (184 lb 8.4 oz)     Ideal Body Weight (IBW), Female: 110 lb     % Ideal Body Weight, Female (lb): 176.97 lb  BMI " (Calculated): 35.7  BMI Grade: 35 - 39.9 - obesity - grade II     Estimated/Assessed Needs    Weight Used For Calorie Calculations: 83.7 kg (184 lb 8.4 oz)   Height (cm): 157.5 cm     Energy Need Method: [place]-St Jeor      RMR ([place]-St. Jeor Equation): 1380.25     Weight Used For Protein Calculations: 83.7 kg (184 lb 8.4 oz)  Protein Requirements: 84-101g (1-1.2 g/kg)     Fluid Need Method: RDA Method (or per MD)         Assessment and Plan    * Acute on chronic right heart failure    Nutrition Diagnosis:  Inadequate energy intake    Related to (etiology):   Decreased appetite    Signs and Symptoms (as evidenced by):   Pt consuming 50% EEN and EPN    Interventions/Recommendations (treatment strategy):  See recs    Nutrition Diagnosis Status:   New              Monitor and Evaluation    Food and Nutrient Intake: energy intake  Food and Nutrient Adminstration: diet order     Physical Activity and Function: nutrition-related ADLs and IADLs  Anthropometric Measurements: weight, weight change, body mass index  Biochemical Data, Medical Tests and Procedures: electrolyte and renal panel, lipid profile, gastrointestinal profile, glucose/endocrine profile, inflammatory profile  Nutrition-Focused Physical Findings: overall appearance    Nutrition Risk    Level of Risk:  (1x/week)    Nutrition Follow-Up

## 2017-12-18 NOTE — PROGRESS NOTES
UPDATE    SW to pt's room for update. Pt presents as aaox3 with calm and pleasant affect. No needs reported at this time. Per HTS rounds, pt will likely d/c Monday. SW confirmed pt would like to continue home health via [facility]  [phone], fax [phone]. SW faxed resume orders to [facility] and confirmed receipt. SW providing psychosocial and counseling support, education, resources, and d/c planning as needed. SW continuing to follow and remains available.

## 2017-12-18 NOTE — PROGRESS NOTES
[facility]  Heart Transplant  Progress Note    Patient Name: Sue Smith  MRN: [id number]  Admission Date: 12/8/2017  Hospital Length of Stay: 10 days  Attending Physician: Maty Bosch MD  Primary Care Provider: Paddy Guerrier DO  Principal Problem:Pulmonary hypertension    Subjective:     Interval History: Feels well with no further side effects on Remodulin 28 ng/kg/min. Continues to diurese on Bumex 4 mg bid. Feels much better since transitioning from Uptravi to IV Remodulin    Continuous Infusions:   sodium chloride 0.9%      treprostinil (REMODULIN) infusion 28 ng/kg/min (12/17/17 9936)    veletri/remodulin cassette      veletri/remodulin tubing       Scheduled Meds:   albuterol sulfate  2.5 mg Nebulization Q4H    amLODIPine  5 mg Oral Daily    bumetanide  4 mg Oral BID    busPIRone  15 mg Oral TID    desvenlafaxine succinate  100 mg Oral Daily    fluticasone-vilanterol  1 puff Inhalation Daily    gabapentin  100 mg Oral TID    lamoTRIgine  100 mg Oral BID    levothyroxine  75 mcg Oral Daily    pantoprazole  40 mg Oral Daily    potassium chloride 10%  40 mEq Oral Once    potassium chloride SA  60 mEq Oral BID    pravastatin  40 mg Oral Daily    sodium chloride 0.9%  3 mL Intravenous Q8H    warfarin  5 mg Oral Daily     PRN Meds:acetaminophen, ceFAZolin (ANCEF) IVPB, gelatin adsorbable 12-7 mm top sponge, hydrocodone-acetaminophen 10-325mg, loperamide, ondansetron, ondansetron, oxyCODONE-acetaminophen    Review of patient's allergies indicates:   Allergen Reactions    Sulfa (sulfonamide antibiotics) Rash     Objective:     Vital Signs (Most Recent):  Temp: 98.3 °F (36.8 °C) (12/18/17 1131)  Pulse: 94 (12/18/17 1131)  Resp: 20 (12/18/17 1131)  BP: 129/60 (12/18/17 1131)  SpO2: (!) 93 % (12/18/17 1131) Vital Signs (24h Range):  Temp:  [97.1 °F (36.2 °C)-98.9 °F (37.2 °C)] 98.3 °F (36.8 °C)  Pulse:  [] 94  Resp:  [15-20] 20  SpO2:  [91 %-98 %] 93 %  BP:  (109-129)/(57-83) 129/60     Patient Vitals for the past 72 hrs (Last 3 readings):   Weight   12/18/17 0515 83.7 kg (184 lb 8.4 oz)   12/17/17 0530 84.5 kg (186 lb 4.6 oz)   12/16/17 0500 85.1 kg (187 lb 9.8 oz)     Body mass index is 33.75 kg/m².      Intake/Output Summary (Last 24 hours) at 12/18/17 1210  Last data filed at 12/18/17 0500   Gross per 24 hour   Intake              660 ml   Output             2240 ml   Net            -1580 ml       Hemodynamic Parameters:       Telemetry: SR    Physical Exam   Constitutional: She is oriented to person, place, and time. She appears well-developed and well-nourished.   HENT:   Head: Normocephalic and atraumatic.   Eyes: Conjunctivae and EOM are normal. Pupils are equal, round, and reactive to light.   Neck: Normal range of motion. Neck supple. No JVD present. No thyromegaly present.   Cardiovascular: Normal rate and regular rhythm.    Grade III/VI systolic murmur LSB   Pulmonary/Chest: Effort normal and breath sounds normal.   Abdominal: Soft. Bowel sounds are normal.   Musculoskeletal: Normal range of motion. She exhibits no edema.   Neurological: She is alert and oriented to person, place, and time.   Skin: Skin is warm and dry. Capillary refill takes less than 2 seconds.   Psychiatric: She has a normal mood and affect. Her behavior is normal. Judgment and thought content normal.       Significant Labs:  CBC:    Recent Labs  Lab 12/14/17  0628 12/18/17  0911   WBC 6.75 6.59   RBC 4.29 4.39   HGB 13.5 13.8   HCT 40.8 41.0    131*   MCV 95 93   MCH 31.5* 31.4*   MCHC 33.1 33.7     BNP:    Recent Labs  Lab 12/18/17  0911   *     CMP:    Recent Labs  Lab 12/16/17  0600 12/17/17  0436 12/18/17  0530 12/18/17  0911   GLU 96 95 93  --    CALCIUM 10.3 10.0 10.1  --    ALBUMIN  --   --   --  4.1   PROT  --   --   --  7.1   * 140 139  --    K 3.4* 3.1* 3.2*  --    CO2 37* 38* 36*  --    CL 86* 91* 87*  --    BUN 25* 20 27*  --    CREATININE 1.1 1.2 1.2  --     ALKPHOS  --   --   --  107   ALT  --   --   --  74*   AST  --   --   --  84*   BILITOT  --   --   --  0.7      Coagulation:     Recent Labs  Lab 12/16/17  1419 12/17/17  0436 12/18/17  0530   INR 1.1 1.1 1.3*     LDH:  No results for input(s): LDH in the last 72 hours.  Microbiology:  Microbiology Results (last 7 days)     ** No results found for the last 168 hours. **          I have reviewed all pertinent labs within the past 24 hours.    Estimated Creatinine Clearance: 52.5 mL/min (based on SCr of 1.2 mg/dL).          Assessment and Plan:     57 yo female with Group I PAH w/ low CI and RV failure.  She has transitioned from Uptravi to Remodulin on this admission and is currently on 29ng/kg/min increased 12/16/17 as she awaits insurance approval.    * Pulmonary hypertension, group 1    - Severe PH with markedly reduced CI also severe RV dysfunction. UPTRAVI stopped and started on remodulin 12/10. She is currently on 28 ng/kg/min. Gomez placed. Awaiting insurance approval.   - PASP 88 by TTE 12/8/17  Symptoms on 29 so decreased to 28 with resolution therein. Discussed with Dr. Cullen today - will leave at this dose for now  - Continue Bumex 4 mg bid (net -ve 17.2L since admit)  - Continue Coumadin          Chronic respiratory failure with hypoxia    - Continue supplemental O2 (on 3 LPM at home, currently requiring 5 LPM here)  - BiPAP at night        GERD (gastroesophageal reflux disease)    Continue home medications.         Acute on chronic right heart failure    Hx of severe primary pulmonary HTN still appears volume overloaded on exam and Cr is back to baseline. Will continue with home bumex 4 mg bid.         Bipolar disorder    Continue home medications.         Dyslipidemia    Continue home medications.         Hypertension    - Continue Norvasc        Hypothyroidism    - [place] [id number]  Heart Transplant  [facility]

## 2017-12-18 NOTE — PLAN OF CARE
Problem: Patient Care Overview  Goal: Plan of Care Review  Outcome: Ongoing (interventions implemented as appropriate)  POC reviewed w/ pt this am to include IV remodulin, weaning O2 as tolerated, accurate intake/output, and safety.  Pt doing well today.  Pt has IV remodulin infusing to R subclavian perkins, due to change cassette at 1700 this pm.  Pt remains on O2 5L NC and bipap at night - tolerating well.  Pt compliant w/ accurate intake/output.  Pt able to ambulate in room independently without issue.

## 2017-12-18 NOTE — ASSESSMENT & PLAN NOTE
- Severe PH with markedly reduced CI also severe RV dysfunction. UPTRAVI stopped and started on remodulin 12/10. She is currently on 28 ng/kg/min. Gomez placed. Awaiting insurance approval.   - PASP 88 by TTE 12/8/17  Symptoms on 29 so decreased to 28 with resolution therein. Discussed with Dr. Cullen today - will leave at this dose for now  - Continue Bumex 4 mg bid (net -ve 17.2L since admit). BNP has trended down to 483 from 1489  - Continue Coumadin

## 2017-12-18 NOTE — ASSESSMENT & PLAN NOTE
- Severe PH with markedly reduced CI also severe RV dysfunction. UPTRAVI stopped and started on remodulin 12/10. She is currently on 28 ng/kg/min. Gomez placed. Awaiting insurance approval.   - PASP 88 by TTE 12/8/17  Symptoms on 29 so decreased to 28 with resolution therein. Discussed with Dr. Cullen today - will leave at this dose for now  - Continue Bumex 4 mg bid (net -ve 17.2L since admit)  - Continue Coumadin

## 2017-12-18 NOTE — PLAN OF CARE
Problem: Patient Care Overview  Goal: Plan of Care Review  Outcome: Ongoing (interventions implemented as appropriate)  * AAO x 4  * Abdomen distended and soft  * Cardiac diet tolerating well.    * No edema noted   * Bed at lowest position and locked, call light within reach, non-slip socks on, and side rails up x 2.  Encouraged patient to call for assistance when needed patient stated understanding.  * Right SC perkins (12/14/17) patent, dressing clean dry and intact no signs or symptoms of infection noted.  * Remodulin 28 ng/kg/min DW=85 kg concentration: 6,000,00 ng/100 cc infusing at 57 cc/24 hrs per MD order Remodulin increased on hold. Infusion to be increased by HTS team every other day Patient tolerating well  * Left shoulder 22 G PIV (12/16/17) patent dressing clean dry and intact no signs or symptoms of infection noted.   * Oxygen 5 liters via humidified nasal cannula.  Oxygen saturation 92 - 95% respirations even unlabored. BiPAP to be worn at night, patient tolerates well.   * Patient has no complaints of pain/discomfort at this time. Reminded patient that PRN pain medications are available if needed. Patient stated understanding.   * Skin assessment preformed no breakdown noted   * Standard precautions maintained  * Continuous telemetry monitoring continued SR 70-80 no ectopy noted  * Patient able to turn self no assistance needed.  * Patient voiding clear yellow urine.  See flow sheet for intake and output totals

## 2017-12-18 NOTE — PROGRESS NOTES
Reviewed Remodulin administration with patient.  Had patient work step by step and hands on with pump stopping, priming, and starting cassette.  Patient states feels a little better about doing it at home after getting to do hands on with the pump.  Will work with patient again tomorrow.  Will continue to monitor patient.

## 2017-12-18 NOTE — PROGRESS NOTES
[facility]  Heart Transplant  Progress Note    Patient Name: Sue Smith  MRN: [id number]  Admission Date: 12/8/2017  Hospital Length of Stay: 10 days  Attending Physician: Maty Bosch MD  Primary Care Provider: Paddy Guerrier DO  Principal Problem:Pulmonary hypertension    Subjective:     Interval History: Feels well with no further side effects on Remodulin 28 ng/kg/min. Continues to diurese on Bumex 4 mg bid. Feels much better since transitioning from Uptravi to IV Remodulin    Continuous Infusions:   sodium chloride 0.9%      treprostinil (REMODULIN) infusion 28 ng/kg/min (12/17/17 1708)    veletri/remodulin cassette      veletri/remodulin tubing       Scheduled Meds:   albuterol sulfate  2.5 mg Nebulization Q4H    amLODIPine  5 mg Oral Daily    bumetanide  4 mg Oral BID    busPIRone  15 mg Oral TID    desvenlafaxine succinate  100 mg Oral Daily    fluticasone-vilanterol  1 puff Inhalation Daily    gabapentin  100 mg Oral TID    lamoTRIgine  100 mg Oral BID    levothyroxine  75 mcg Oral Daily    pantoprazole  40 mg Oral Daily    potassium chloride 10%  40 mEq Oral Once    potassium chloride SA  60 mEq Oral BID    pravastatin  40 mg Oral Daily    sodium chloride 0.9%  3 mL Intravenous Q8H    warfarin  5 mg Oral Daily     PRN Meds:acetaminophen, ceFAZolin (ANCEF) IVPB, gelatin adsorbable 12-7 mm top sponge, hydrocodone-acetaminophen 10-325mg, loperamide, ondansetron, ondansetron, oxyCODONE-acetaminophen    Review of patient's allergies indicates:   Allergen Reactions    Sulfa (sulfonamide antibiotics) Rash     Objective:     Vital Signs (Most Recent):  Temp: 98.3 °F (36.8 °C) (12/18/17 1131)  Pulse: 94 (12/18/17 1131)  Resp: 20 (12/18/17 1131)  BP: 129/60 (12/18/17 1131)  SpO2: (!) 93 % (12/18/17 1131) Vital Signs (24h Range):  Temp:  [97.1 °F (36.2 °C)-98.9 °F (37.2 °C)] 98.3 °F (36.8 °C)  Pulse:  [] 94  Resp:  [15-20] 20  SpO2:  [91 %-98 %] 93 %  BP:  (109-129)/(57-83) 129/60     Patient Vitals for the past 72 hrs (Last 3 readings):   Weight   12/18/17 0515 83.7 kg (184 lb 8.4 oz)   12/17/17 0530 84.5 kg (186 lb 4.6 oz)   12/16/17 0500 85.1 kg (187 lb 9.8 oz)     Body mass index is 33.75 kg/m².      Intake/Output Summary (Last 24 hours) at 12/18/17 1210  Last data filed at 12/18/17 0500   Gross per 24 hour   Intake              660 ml   Output             2240 ml   Net            -1580 ml       Hemodynamic Parameters:       Telemetry: SR    Physical Exam   Constitutional: She is oriented to person, place, and time. She appears well-developed and well-nourished.   HENT:   Head: Normocephalic and atraumatic.   Eyes: Conjunctivae and EOM are normal. Pupils are equal, round, and reactive to light.   Neck: Normal range of motion. Neck supple. No JVD present. No thyromegaly present.   Cardiovascular: Normal rate and regular rhythm.    Grade III/VI systolic murmur LSB   Pulmonary/Chest: Effort normal and breath sounds normal.   Abdominal: Soft. Bowel sounds are normal.   Musculoskeletal: Normal range of motion. She exhibits no edema.   Neurological: She is alert and oriented to person, place, and time.   Skin: Skin is warm and dry. Capillary refill takes less than 2 seconds.   Psychiatric: She has a normal mood and affect. Her behavior is normal. Judgment and thought content normal.       Significant Labs:  CBC:    Recent Labs  Lab 12/14/17  0628 12/18/17  0911   WBC 6.75 6.59   RBC 4.29 4.39   HGB 13.5 13.8   HCT 40.8 41.0    131*   MCV 95 93   MCH 31.5* 31.4*   MCHC 33.1 33.7     BNP:    Recent Labs  Lab 12/18/17  0911   *     CMP:    Recent Labs  Lab 12/16/17  0600 12/17/17  0436 12/18/17  0530 12/18/17  0911   GLU 96 95 93  --    CALCIUM 10.3 10.0 10.1  --    ALBUMIN  --   --   --  4.1   PROT  --   --   --  7.1   * 140 139  --    K 3.4* 3.1* 3.2*  --    CO2 37* 38* 36*  --    CL 86* 91* 87*  --    BUN 25* 20 27*  --    CREATININE 1.1 1.2 1.2  --     ALKPHOS  --   --   --  107   ALT  --   --   --  74*   AST  --   --   --  84*   BILITOT  --   --   --  0.7      Coagulation:     Recent Labs  Lab 12/16/17  1419 12/17/17  0436 12/18/17  0530   INR 1.1 1.1 1.3*     LDH:  No results for input(s): LDH in the last 72 hours.  Microbiology:  Microbiology Results (last 7 days)     ** No results found for the last 168 hours. **          I have reviewed all pertinent labs within the past 24 hours.    Estimated Creatinine Clearance: 52.5 mL/min (based on SCr of 1.2 mg/dL).          Assessment and Plan:     55 yo female with Group I PAH w/ low CI and RV failure.  She has transitioned from Uptravi to Remodulin on this admission and is currently on 29ng/kg/min increased 12/16/17 as she awaits insurance approval.    * Pulmonary hypertension, group 1    - Severe PH with markedly reduced CI also severe RV dysfunction. UPTRAVI stopped and started on remodulin 12/10. She is currently on 28 ng/kg/min. Gomez placed. Awaiting insurance approval.   - PASP 88 by TTE 12/8/17  Symptoms on 29 so decreased to 28 with resolution therein. Discussed with Dr. Cullen today - will leave at this dose for now  - Continue Bumex 4 mg bid (net -ve 17.2L since admit). BNP has trended down to 483 from 1489  - Continue Coumadin          Acute on chronic right heart failure    - - Continue Bumex 4 mg bid (net -ve 17.2L since admit). BNP has trended down to 483 from 1489          Chronic respiratory failure with hypoxia    - Continue supplemental O2 (on 3 LPM at home, currently requiring 5 LPM here)  - BiPAP at night        GERD (gastroesophageal reflux disease)    Continue home medications.         Bipolar disorder    Continue home medications.         Dyslipidemia    Continue home medications.         Hypertension    - Continue Norvasc        Hypothyroidism    - [place] [id number]  Heart Transplant  [facility]

## 2017-12-19 LAB
ALBUMIN SERPL BCP-MCNC: 3.9 G/DL
ALP SERPL-CCNC: 97 U/L
ALT SERPL W/O P-5'-P-CCNC: 58 U/L
ANION GAP SERPL CALC-SCNC: 15 MMOL/L
AST SERPL-CCNC: 57 U/L
BASOPHILS # BLD AUTO: 0.05 K/UL
BASOPHILS NFR BLD: 0.7 %
BILIRUB SERPL-MCNC: 0.7 MG/DL
BUN SERPL-MCNC: 24 MG/DL
CALCIUM SERPL-MCNC: 9.9 MG/DL
CHLORIDE SERPL-SCNC: 94 MMOL/L
CO2 SERPL-SCNC: 29 MMOL/L
CREAT SERPL-MCNC: 0.9 MG/DL
DIFFERENTIAL METHOD: ABNORMAL
EOSINOPHIL # BLD AUTO: 0.1 K/UL
EOSINOPHIL NFR BLD: 2.1 %
ERYTHROCYTE [DISTWIDTH] IN BLOOD BY AUTOMATED COUNT: 18.1 %
EST. GFR  (AFRICAN AMERICAN): >60 ML/MIN/1.73 M^2
EST. GFR  (NON AFRICAN AMERICAN): >60 ML/MIN/1.73 M^2
GLUCOSE SERPL-MCNC: 103 MG/DL
HCT VFR BLD AUTO: 40 %
HGB BLD-MCNC: 13.7 G/DL
IMM GRANULOCYTES # BLD AUTO: 0.05 K/UL
IMM GRANULOCYTES NFR BLD AUTO: 0.7 %
INR PPP: 1.3
LYMPHOCYTES # BLD AUTO: 0.9 K/UL
LYMPHOCYTES NFR BLD: 12.7 %
MAGNESIUM SERPL-MCNC: 2 MG/DL
MCH RBC QN AUTO: 32.4 PG
MCHC RBC AUTO-ENTMCNC: 34.3 G/DL
MCV RBC AUTO: 95 FL
MONOCYTES # BLD AUTO: 0.4 K/UL
MONOCYTES NFR BLD: 6.4 %
NEUTROPHILS # BLD AUTO: 5.2 K/UL
NEUTROPHILS NFR BLD: 77.4 %
NRBC BLD-RTO: 0 /100 WBC
PLATELET # BLD AUTO: 148 K/UL
PMV BLD AUTO: 11.7 FL
POTASSIUM SERPL-SCNC: 3.6 MMOL/L
PROT SERPL-MCNC: 6.8 G/DL
PROTHROMBIN TIME: 13.8 SEC
RBC # BLD AUTO: 4.23 M/UL
SODIUM SERPL-SCNC: 138 MMOL/L
WBC # BLD AUTO: 6.69 K/UL

## 2017-12-19 PROCEDURE — 63600175 PHARM REV CODE 636 W HCPCS: Performed by: INTERNAL MEDICINE

## 2017-12-19 PROCEDURE — 25000003 PHARM REV CODE 250: Performed by: INTERNAL MEDICINE

## 2017-12-19 PROCEDURE — 94640 AIRWAY INHALATION TREATMENT: CPT

## 2017-12-19 PROCEDURE — 36415 COLL VENOUS BLD VENIPUNCTURE: CPT

## 2017-12-19 PROCEDURE — 25000003 PHARM REV CODE 250: Performed by: STUDENT IN AN ORGANIZED HEALTH CARE EDUCATION/TRAINING PROGRAM

## 2017-12-19 PROCEDURE — 20600001 HC STEP DOWN PRIVATE ROOM

## 2017-12-19 PROCEDURE — 25000003 PHARM REV CODE 250: Performed by: PHYSICIAN ASSISTANT

## 2017-12-19 PROCEDURE — 25000003 PHARM REV CODE 250: Performed by: NURSE PRACTITIONER

## 2017-12-19 PROCEDURE — 27000221 HC OXYGEN, UP TO 24 HOURS

## 2017-12-19 PROCEDURE — 25000242 PHARM REV CODE 250 ALT 637 W/ HCPCS: Performed by: INTERNAL MEDICINE

## 2017-12-19 PROCEDURE — 99233 SBSQ HOSP IP/OBS HIGH 50: CPT | Mod: GV,,, | Performed by: INTERNAL MEDICINE

## 2017-12-19 PROCEDURE — 83735 ASSAY OF MAGNESIUM: CPT

## 2017-12-19 PROCEDURE — 80053 COMPREHEN METABOLIC PANEL: CPT

## 2017-12-19 PROCEDURE — 94761 N-INVAS EAR/PLS OXIMETRY MLT: CPT

## 2017-12-19 PROCEDURE — A4216 STERILE WATER/SALINE, 10 ML: HCPCS | Performed by: INTERNAL MEDICINE

## 2017-12-19 PROCEDURE — 85025 COMPLETE CBC W/AUTO DIFF WBC: CPT

## 2017-12-19 PROCEDURE — 85610 PROTHROMBIN TIME: CPT

## 2017-12-19 RX ADMIN — GABAPENTIN 100 MG: 100 CAPSULE ORAL at 02:12

## 2017-12-19 RX ADMIN — ALBUTEROL SULFATE 2.5 MG: 2.5 SOLUTION RESPIRATORY (INHALATION) at 08:12

## 2017-12-19 RX ADMIN — DESVENLAFAXINE SUCCINATE 100 MG: 50 TABLET, FILM COATED, EXTENDED RELEASE ORAL at 09:12

## 2017-12-19 RX ADMIN — BUSPIRONE HYDROCHLORIDE 15 MG: 5 TABLET ORAL at 08:12

## 2017-12-19 RX ADMIN — HYDROCODONE BITARTRATE AND ACETAMINOPHEN 1 TABLET: 10; 325 TABLET ORAL at 03:12

## 2017-12-19 RX ADMIN — ALBUTEROL SULFATE 2.5 MG: 2.5 SOLUTION RESPIRATORY (INHALATION) at 04:12

## 2017-12-19 RX ADMIN — FLUTICASONE FUROATE AND VILANTEROL TRIFENATATE 1 PUFF: 100; 25 POWDER RESPIRATORY (INHALATION) at 09:12

## 2017-12-19 RX ADMIN — BUMETANIDE 4 MG: 1 TABLET ORAL at 05:12

## 2017-12-19 RX ADMIN — GABAPENTIN 100 MG: 100 CAPSULE ORAL at 05:12

## 2017-12-19 RX ADMIN — PRAVASTATIN SODIUM 40 MG: 40 TABLET ORAL at 09:12

## 2017-12-19 RX ADMIN — ALBUTEROL SULFATE 2.5 MG: 2.5 SOLUTION RESPIRATORY (INHALATION) at 11:12

## 2017-12-19 RX ADMIN — BUSPIRONE HYDROCHLORIDE 15 MG: 5 TABLET ORAL at 02:12

## 2017-12-19 RX ADMIN — BUMETANIDE 4 MG: 1 TABLET ORAL at 09:12

## 2017-12-19 RX ADMIN — Medication: at 05:12

## 2017-12-19 RX ADMIN — LAMOTRIGINE 100 MG: 100 TABLET ORAL at 09:12

## 2017-12-19 RX ADMIN — TREPROSTINIL 28 NG/KG/MIN: 100 INJECTION, SOLUTION INTRAVENOUS; SUBCUTANEOUS at 05:12

## 2017-12-19 RX ADMIN — ALBUTEROL SULFATE 2.5 MG: 2.5 SOLUTION RESPIRATORY (INHALATION) at 03:12

## 2017-12-19 RX ADMIN — LEVOTHYROXINE SODIUM 75 MCG: 75 TABLET ORAL at 05:12

## 2017-12-19 RX ADMIN — POTASSIUM CHLORIDE 60 MEQ: 1500 TABLET, EXTENDED RELEASE ORAL at 08:12

## 2017-12-19 RX ADMIN — Medication 3 ML: at 05:12

## 2017-12-19 RX ADMIN — WARFARIN SODIUM 5 MG: 5 TABLET ORAL at 05:12

## 2017-12-19 RX ADMIN — Medication 3 ML: at 02:12

## 2017-12-19 RX ADMIN — HYDROCODONE BITARTRATE AND ACETAMINOPHEN 1 TABLET: 10; 325 TABLET ORAL at 10:12

## 2017-12-19 RX ADMIN — BUSPIRONE HYDROCHLORIDE 15 MG: 5 TABLET ORAL at 05:12

## 2017-12-19 RX ADMIN — POTASSIUM CHLORIDE 60 MEQ: 1500 TABLET, EXTENDED RELEASE ORAL at 09:12

## 2017-12-19 RX ADMIN — PANTOPRAZOLE SODIUM 40 MG: 40 TABLET, DELAYED RELEASE ORAL at 09:12

## 2017-12-19 RX ADMIN — GABAPENTIN 100 MG: 100 CAPSULE ORAL at 08:12

## 2017-12-19 RX ADMIN — LAMOTRIGINE 100 MG: 100 TABLET ORAL at 08:12

## 2017-12-19 RX ADMIN — AMLODIPINE BESYLATE 5 MG: 5 TABLET ORAL at 09:12

## 2017-12-19 NOTE — SUBJECTIVE & OBJECTIVE
Interval History: Feels well with no further side effects on Remodulin 28 ng/kg/min. No complaints. HH should be coming tomorrow to finish education for patient. Per [facility] insurance approval complete just waiting for teaching to be complete.    Continuous Infusions:   sodium chloride 0.9%      treprostinil (REMODULIN) infusion 28 ng/kg/min (12/18/17 1719)    veletri/remodulin cassette      veletri/remodulin tubing       Scheduled Meds:   albuterol sulfate  2.5 mg Nebulization Q4H    amLODIPine  5 mg Oral Daily    bumetanide  4 mg Oral BID    busPIRone  15 mg Oral TID    desvenlafaxine succinate  100 mg Oral Daily    fluticasone-vilanterol  1 puff Inhalation Daily    gabapentin  100 mg Oral TID    lamoTRIgine  100 mg Oral BID    levothyroxine  75 mcg Oral Daily    pantoprazole  40 mg Oral Daily    potassium chloride 10%  40 mEq Oral Once    potassium chloride SA  60 mEq Oral BID    pravastatin  40 mg Oral Daily    sodium chloride 0.9%  3 mL Intravenous Q8H    warfarin  5 mg Oral Daily     PRN Meds:acetaminophen, ceFAZolin (ANCEF) IVPB, gelatin adsorbable 12-7 mm top sponge, hydrocodone-acetaminophen 10-325mg, loperamide, ondansetron, ondansetron, oxyCODONE-acetaminophen    Review of patient's allergies indicates:   Allergen Reactions    Sulfa (sulfonamide antibiotics) Rash     Objective:     Vital Signs (Most Recent):  Temp: 99 °F (37.2 °C) (12/19/17 1139)  Pulse: 89 (12/19/17 1147)  Resp: 20 (12/19/17 1147)  BP: 117/66 (12/19/17 1139)  SpO2: (!) 91 % (Pt had just come back from bathroom) (12/19/17 1151) Vital Signs (24h Range):  Temp:  [97.2 °F (36.2 °C)-99.1 °F (37.3 °C)] 99 °F (37.2 °C)  Pulse:  [] 89  Resp:  [17-20] 20  SpO2:  [89 %-95 %] 91 %  BP: (106-128)/(63-69) 117/66     Patient Vitals for the past 72 hrs (Last 3 readings):   Weight   12/19/17 0500 84.5 kg (186 lb 4.6 oz)   12/18/17 0515 83.7 kg (184 lb 8.4 oz)   12/17/17 0530 84.5 kg (186 lb 4.6 oz)     Body mass index is 34.07  kg/m².      Intake/Output Summary (Last 24 hours) at 12/19/17 1550  Last data filed at 12/19/17 0912   Gross per 24 hour   Intake              800 ml   Output             1600 ml   Net             -800 ml     Hemodynamic Parameters:    Telemetry: SR    Physical Exam   Constitutional: She is oriented to person, place, and time. She appears well-developed and well-nourished.   HENT:   Head: Normocephalic and atraumatic.   Eyes: Conjunctivae and EOM are normal. Pupils are equal, round, and reactive to light.   Neck: Normal range of motion. Neck supple. No JVD present. No thyromegaly present.   Cardiovascular: Normal rate and regular rhythm.    Grade III/VI systolic murmur LSB   Pulmonary/Chest: Effort normal and breath sounds normal.   Abdominal: Soft. Bowel sounds are normal.   Musculoskeletal: Normal range of motion. She exhibits no edema.   Neurological: She is alert and oriented to person, place, and time.   Skin: Skin is warm and dry. Capillary refill takes less than 2 seconds.   Psychiatric: She has a normal mood and affect. Her behavior is normal. Judgment and thought content normal.       Significant Labs:  CBC:    Recent Labs  Lab 12/14/17  0628 12/18/17  0911 12/19/17  0511   WBC 6.75 6.59 6.69   RBC 4.29 4.39 4.23   HGB 13.5 13.8 13.7   HCT 40.8 41.0 40.0    131* 148*   MCV 95 93 95   MCH 31.5* 31.4* 32.4*   MCHC 33.1 33.7 34.3     BNP:    Recent Labs  Lab 12/18/17  0911   *     CMP:    Recent Labs  Lab 12/17/17  0436 12/18/17  0530 12/18/17  0911 12/19/17  0511   GLU 95 93  --  103   CALCIUM 10.0 10.1  --  9.9   ALBUMIN  --   --  4.1 3.9   PROT  --   --  7.1 6.8    139  --  138   K 3.1* 3.2*  --  3.6   CO2 38* 36*  --  29   CL 91* 87*  --  94*   BUN 20 27*  --  24*   CREATININE 1.2 1.2  --  0.9   ALKPHOS  --   --  107 97   ALT  --   --  74* 58*   AST  --   --  84* 57*   BILITOT  --   --  0.7 0.7      Coagulation:     Recent Labs  Lab 12/17/17  0436 12/18/17  0530 12/19/17  0511   INR 1.1  1.3* 1.3*     LDH:  No results for input(s): LDH in the last 72 hours.  Microbiology:  Microbiology Results (last 7 days)     ** No results found for the last 168 hours. **          I have reviewed all pertinent labs within the past 24 hours.    Estimated Creatinine Clearance: 70.4 mL/min (based on SCr of 0.9 mg/dL).

## 2017-12-19 NOTE — PROGRESS NOTES
[facility]  Heart Transplant  Progress Note    Patient Name: Sue Smith  MRN: [id number]  Admission Date: 12/8/2017  Hospital Length of Stay: 11 days  Attending Physician: Maty Bosch MD  Primary Care Provider: Paddy Guerrier DO  Principal Problem:Pulmonary hypertension    Subjective:     Interval History: Feels well with no further side effects on Remodulin 28 ng/kg/min. No complaints. HH should be coming tomorrow to finish education for patient. Per [facility] insurance approval complete just waiting for teaching to be complete.    Continuous Infusions:   sodium chloride 0.9%      treprostinil (REMODULIN) infusion 28 ng/kg/min (12/18/17 5470)    veletri/remodulin cassette      veletri/remodulin tubing       Scheduled Meds:   albuterol sulfate  2.5 mg Nebulization Q4H    amLODIPine  5 mg Oral Daily    bumetanide  4 mg Oral BID    busPIRone  15 mg Oral TID    desvenlafaxine succinate  100 mg Oral Daily    fluticasone-vilanterol  1 puff Inhalation Daily    gabapentin  100 mg Oral TID    lamoTRIgine  100 mg Oral BID    levothyroxine  75 mcg Oral Daily    pantoprazole  40 mg Oral Daily    potassium chloride 10%  40 mEq Oral Once    potassium chloride SA  60 mEq Oral BID    pravastatin  40 mg Oral Daily    sodium chloride 0.9%  3 mL Intravenous Q8H    warfarin  5 mg Oral Daily     PRN Meds:acetaminophen, ceFAZolin (ANCEF) IVPB, gelatin adsorbable 12-7 mm top sponge, hydrocodone-acetaminophen 10-325mg, loperamide, ondansetron, ondansetron, oxyCODONE-acetaminophen    Review of patient's allergies indicates:   Allergen Reactions    Sulfa (sulfonamide antibiotics) Rash     Objective:     Vital Signs (Most Recent):  Temp: 99 °F (37.2 °C) (12/19/17 1139)  Pulse: 89 (12/19/17 1147)  Resp: 20 (12/19/17 1147)  BP: 117/66 (12/19/17 1139)  SpO2: (!) 91 % (Pt had just come back from bathroom) (12/19/17 1151) Vital Signs (24h Range):  Temp:  [97.2 °F (36.2 °C)-99.1 °F (37.3 °C)] 99 °F (37.2  °C)  Pulse:  [] 89  Resp:  [17-20] 20  SpO2:  [89 %-95 %] 91 %  BP: (106-128)/(63-69) 117/66     Patient Vitals for the past 72 hrs (Last 3 readings):   Weight   12/19/17 0500 84.5 kg (186 lb 4.6 oz)   12/18/17 0515 83.7 kg (184 lb 8.4 oz)   12/17/17 0530 84.5 kg (186 lb 4.6 oz)     Body mass index is 34.07 kg/m².      Intake/Output Summary (Last 24 hours) at 12/19/17 1550  Last data filed at 12/19/17 0912   Gross per 24 hour   Intake              800 ml   Output             1600 ml   Net             -800 ml     Hemodynamic Parameters:    Telemetry: SR    Physical Exam   Constitutional: She is oriented to person, place, and time. She appears well-developed and well-nourished.   HENT:   Head: Normocephalic and atraumatic.   Eyes: Conjunctivae and EOM are normal. Pupils are equal, round, and reactive to light.   Neck: Normal range of motion. Neck supple. No JVD present. No thyromegaly present.   Cardiovascular: Normal rate and regular rhythm.    Grade III/VI systolic murmur LSB   Pulmonary/Chest: Effort normal and breath sounds normal.   Abdominal: Soft. Bowel sounds are normal.   Musculoskeletal: Normal range of motion. She exhibits no edema.   Neurological: She is alert and oriented to person, place, and time.   Skin: Skin is warm and dry. Capillary refill takes less than 2 seconds.   Psychiatric: She has a normal mood and affect. Her behavior is normal. Judgment and thought content normal.       Significant Labs:  CBC:    Recent Labs  Lab 12/14/17  0628 12/18/17  0911 12/19/17  0511   WBC 6.75 6.59 6.69   RBC 4.29 4.39 4.23   HGB 13.5 13.8 13.7   HCT 40.8 41.0 40.0    131* 148*   MCV 95 93 95   MCH 31.5* 31.4* 32.4*   MCHC 33.1 33.7 34.3     BNP:    Recent Labs  Lab 12/18/17  0911   *     CMP:    Recent Labs  Lab 12/17/17  0436 12/18/17  0530 12/18/17  0911 12/19/17  0511   GLU 95 93  --  103   CALCIUM 10.0 10.1  --  9.9   ALBUMIN  --   --  4.1 3.9   PROT  --   --  7.1 6.8    139  --  138    K 3.1* 3.2*  --  3.6   CO2 38* 36*  --  29   CL 91* 87*  --  94*   BUN 20 27*  --  24*   CREATININE 1.2 1.2  --  0.9   ALKPHOS  --   --  107 97   ALT  --   --  74* 58*   AST  --   --  84* 57*   BILITOT  --   --  0.7 0.7      Coagulation:     Recent Labs  Lab 12/17/17  0436 12/18/17  0530 12/19/17  0511   INR 1.1 1.3* 1.3*     LDH:  No results for input(s): LDH in the last 72 hours.  Microbiology:  Microbiology Results (last 7 days)     ** No results found for the last 168 hours. **          I have reviewed all pertinent labs within the past 24 hours.    Estimated Creatinine Clearance: 70.4 mL/min (based on SCr of 0.9 mg/dL).          Assessment and Plan:     57 yo female with Group I PAH w/ low CI and RV failure.  She has transitioned from Uptravi to Remodulin on this admission and is currently on 29ng/kg/min increased 12/16/17 as she awaits insurance approval.    * Pulmonary hypertension, group 1    - Severe PH with markedly reduced CI also severe RV dysfunction. UPTRAVI stopped and started on remodulin 12/10. She is currently on 28 ng/kg/min. Gomez placed. Awaiting insurance approval.   - PASP 88 by TTE 12/8/17  Symptoms on 29 so decreased to 28 with resolution therein. Discussed with Dr. Cullen 12/18- will leave at this dose for now  - Continue Bumex 4 mg bid (net -ve 1530 overnight). BNP has trended down to 483 from 1489  - Continue Coumadin          GERD (gastroesophageal reflux disease)    Continue home medications.         Acute on chronic right heart failure    - Continue Bumex 4 mg bid. BNP has trended down to 483 from 1489          Bipolar disorder    Continue home medications.         Dyslipidemia    Continue home medications.         Hypertension    - Continue Norvasc        Hypothyroidism    - Continue Synthroid        Chronic respiratory failure with hypoxia    - Continue supplemental O2 (on 3 LPM at home, currently requiring 5 LPM here)   - continue to wean as tolerated and monitor O2 sats  -  BiPAP at night            Savannah Ybarra PA-C  Heart Transplant  [facility]

## 2017-12-19 NOTE — ASSESSMENT & PLAN NOTE
- Severe PH with markedly reduced CI also severe RV dysfunction. UPTRAVI stopped and started on remodulin 12/10. She is currently on 28 ng/kg/min. Gomez placed. Awaiting insurance approval.   - PASP 88 by TTE 12/8/17  Symptoms on 29 so decreased to 28 with resolution therein. Discussed with Dr. Cullen 12/18- will leave at this dose for now  - Continue Bumex 4 mg bid (net -ve 1530 overnight). BNP has trended down to 483 from 1489  - Continue Coumadin

## 2017-12-19 NOTE — PLAN OF CARE
AAO x 4. VSS, afebrile, SpO2>88% on 5L NC and BiPAP while asleep. Telemetry monitoring-SR. Remodulin infusing to R CW at 28 ng/kg/min x 85 kg or 57 mL/ 24hr; cassette last changed 12/18 at 1700. Fall precautions and POC reviewed with pt. See flowsheet for assessment findings. Will continue to monitor.

## 2017-12-19 NOTE — ASSESSMENT & PLAN NOTE
- Continue supplemental O2 (on 3 LPM at home, currently requiring 5 LPM here)   - continue to wean as tolerated and monitor O2 sats  - BiPAP at night

## 2017-12-20 LAB
ALBUMIN SERPL BCP-MCNC: 4.1 G/DL
ALP SERPL-CCNC: 100 U/L
ALT SERPL W/O P-5'-P-CCNC: 45 U/L
ANION GAP SERPL CALC-SCNC: 10 MMOL/L
AST SERPL-CCNC: 37 U/L
BASOPHILS # BLD AUTO: 0.04 K/UL
BASOPHILS NFR BLD: 0.7 %
BILIRUB SERPL-MCNC: 0.7 MG/DL
BUN SERPL-MCNC: 22 MG/DL
CALCIUM SERPL-MCNC: 9.9 MG/DL
CHLORIDE SERPL-SCNC: 97 MMOL/L
CO2 SERPL-SCNC: 32 MMOL/L
CREAT SERPL-MCNC: 1 MG/DL
DIFFERENTIAL METHOD: ABNORMAL
EOSINOPHIL # BLD AUTO: 0.1 K/UL
EOSINOPHIL NFR BLD: 2 %
ERYTHROCYTE [DISTWIDTH] IN BLOOD BY AUTOMATED COUNT: 17.7 %
EST. GFR  (AFRICAN AMERICAN): >60 ML/MIN/1.73 M^2
EST. GFR  (NON AFRICAN AMERICAN): >60 ML/MIN/1.73 M^2
GLUCOSE SERPL-MCNC: 103 MG/DL
HCT VFR BLD AUTO: 40.2 %
HGB BLD-MCNC: 13.7 G/DL
IMM GRANULOCYTES # BLD AUTO: 0.04 K/UL
IMM GRANULOCYTES NFR BLD AUTO: 0.7 %
INR PPP: 1.4
LYMPHOCYTES # BLD AUTO: 0.9 K/UL
LYMPHOCYTES NFR BLD: 15.3 %
MAGNESIUM SERPL-MCNC: 2.1 MG/DL
MCH RBC QN AUTO: 32.5 PG
MCHC RBC AUTO-ENTMCNC: 34.1 G/DL
MCV RBC AUTO: 95 FL
MONOCYTES # BLD AUTO: 0.4 K/UL
MONOCYTES NFR BLD: 7.3 %
NEUTROPHILS # BLD AUTO: 4.5 K/UL
NEUTROPHILS NFR BLD: 74 %
NRBC BLD-RTO: 0 /100 WBC
PLATELET # BLD AUTO: 129 K/UL
PMV BLD AUTO: 11.4 FL
POTASSIUM SERPL-SCNC: 3.8 MMOL/L
PROT SERPL-MCNC: 7 G/DL
PROTHROMBIN TIME: 14.5 SEC
RBC # BLD AUTO: 4.22 M/UL
SODIUM SERPL-SCNC: 139 MMOL/L
WBC # BLD AUTO: 6.06 K/UL

## 2017-12-20 PROCEDURE — 25000003 PHARM REV CODE 250: Performed by: INTERNAL MEDICINE

## 2017-12-20 PROCEDURE — 27000221 HC OXYGEN, UP TO 24 HOURS

## 2017-12-20 PROCEDURE — 25000242 PHARM REV CODE 250 ALT 637 W/ HCPCS: Performed by: INTERNAL MEDICINE

## 2017-12-20 PROCEDURE — 94660 CPAP INITIATION&MGMT: CPT

## 2017-12-20 PROCEDURE — 20600001 HC STEP DOWN PRIVATE ROOM

## 2017-12-20 PROCEDURE — 85025 COMPLETE CBC W/AUTO DIFF WBC: CPT

## 2017-12-20 PROCEDURE — 85610 PROTHROMBIN TIME: CPT

## 2017-12-20 PROCEDURE — 94761 N-INVAS EAR/PLS OXIMETRY MLT: CPT

## 2017-12-20 PROCEDURE — 25000003 PHARM REV CODE 250: Performed by: PHYSICIAN ASSISTANT

## 2017-12-20 PROCEDURE — 25000003 PHARM REV CODE 250: Performed by: NURSE PRACTITIONER

## 2017-12-20 PROCEDURE — 80053 COMPREHEN METABOLIC PANEL: CPT

## 2017-12-20 PROCEDURE — 25000003 PHARM REV CODE 250: Performed by: STUDENT IN AN ORGANIZED HEALTH CARE EDUCATION/TRAINING PROGRAM

## 2017-12-20 PROCEDURE — 83735 ASSAY OF MAGNESIUM: CPT

## 2017-12-20 PROCEDURE — 99233 SBSQ HOSP IP/OBS HIGH 50: CPT | Mod: GV,,, | Performed by: INTERNAL MEDICINE

## 2017-12-20 PROCEDURE — 94640 AIRWAY INHALATION TREATMENT: CPT

## 2017-12-20 PROCEDURE — 63600175 PHARM REV CODE 636 W HCPCS: Performed by: INTERNAL MEDICINE

## 2017-12-20 PROCEDURE — A4216 STERILE WATER/SALINE, 10 ML: HCPCS | Performed by: INTERNAL MEDICINE

## 2017-12-20 PROCEDURE — 99900035 HC TECH TIME PER 15 MIN (STAT)

## 2017-12-20 PROCEDURE — 36415 COLL VENOUS BLD VENIPUNCTURE: CPT

## 2017-12-20 RX ADMIN — LEVOTHYROXINE SODIUM 75 MCG: 75 TABLET ORAL at 05:12

## 2017-12-20 RX ADMIN — LAMOTRIGINE 100 MG: 100 TABLET ORAL at 08:12

## 2017-12-20 RX ADMIN — DESVENLAFAXINE SUCCINATE 100 MG: 50 TABLET, FILM COATED, EXTENDED RELEASE ORAL at 08:12

## 2017-12-20 RX ADMIN — Medication 3 ML: at 09:12

## 2017-12-20 RX ADMIN — PANTOPRAZOLE SODIUM 40 MG: 40 TABLET, DELAYED RELEASE ORAL at 08:12

## 2017-12-20 RX ADMIN — ALBUTEROL SULFATE 2.5 MG: 2.5 SOLUTION RESPIRATORY (INHALATION) at 09:12

## 2017-12-20 RX ADMIN — Medication 3 ML: at 02:12

## 2017-12-20 RX ADMIN — TREPROSTINIL 28 NG/KG/MIN: 100 INJECTION, SOLUTION INTRAVENOUS; SUBCUTANEOUS at 04:12

## 2017-12-20 RX ADMIN — Medication 3 ML: at 05:12

## 2017-12-20 RX ADMIN — ALBUTEROL SULFATE 2.5 MG: 2.5 SOLUTION RESPIRATORY (INHALATION) at 12:12

## 2017-12-20 RX ADMIN — ALBUTEROL SULFATE 2.5 MG: 2.5 SOLUTION RESPIRATORY (INHALATION) at 04:12

## 2017-12-20 RX ADMIN — HYDROCODONE BITARTRATE AND ACETAMINOPHEN 1 TABLET: 10; 325 TABLET ORAL at 04:12

## 2017-12-20 RX ADMIN — POTASSIUM CHLORIDE 60 MEQ: 1500 TABLET, EXTENDED RELEASE ORAL at 09:12

## 2017-12-20 RX ADMIN — GABAPENTIN 100 MG: 100 CAPSULE ORAL at 05:12

## 2017-12-20 RX ADMIN — WARFARIN SODIUM 5 MG: 5 TABLET ORAL at 04:12

## 2017-12-20 RX ADMIN — BUMETANIDE 4 MG: 1 TABLET ORAL at 08:12

## 2017-12-20 RX ADMIN — BUSPIRONE HYDROCHLORIDE 15 MG: 5 TABLET ORAL at 05:12

## 2017-12-20 RX ADMIN — POTASSIUM CHLORIDE 60 MEQ: 1500 TABLET, EXTENDED RELEASE ORAL at 08:12

## 2017-12-20 RX ADMIN — HYDROCODONE BITARTRATE AND ACETAMINOPHEN 1 TABLET: 10; 325 TABLET ORAL at 12:12

## 2017-12-20 RX ADMIN — ALBUTEROL SULFATE 2.5 MG: 2.5 SOLUTION RESPIRATORY (INHALATION) at 07:12

## 2017-12-20 RX ADMIN — GABAPENTIN 100 MG: 100 CAPSULE ORAL at 02:12

## 2017-12-20 RX ADMIN — BUMETANIDE 4 MG: 1 TABLET ORAL at 04:12

## 2017-12-20 RX ADMIN — ALBUTEROL SULFATE 2.5 MG: 2.5 SOLUTION RESPIRATORY (INHALATION) at 01:12

## 2017-12-20 RX ADMIN — GABAPENTIN 100 MG: 100 CAPSULE ORAL at 09:12

## 2017-12-20 RX ADMIN — BUSPIRONE HYDROCHLORIDE 15 MG: 5 TABLET ORAL at 02:12

## 2017-12-20 RX ADMIN — Medication: at 04:12

## 2017-12-20 RX ADMIN — OXYCODONE HYDROCHLORIDE AND ACETAMINOPHEN 1 TABLET: 5; 325 TABLET ORAL at 09:12

## 2017-12-20 RX ADMIN — FLUTICASONE FUROATE AND VILANTEROL TRIFENATATE 1 PUFF: 100; 25 POWDER RESPIRATORY (INHALATION) at 08:12

## 2017-12-20 RX ADMIN — ALBUTEROL SULFATE 2.5 MG: 2.5 SOLUTION RESPIRATORY (INHALATION) at 03:12

## 2017-12-20 RX ADMIN — LAMOTRIGINE 100 MG: 100 TABLET ORAL at 09:12

## 2017-12-20 RX ADMIN — AMLODIPINE BESYLATE 5 MG: 5 TABLET ORAL at 08:12

## 2017-12-20 RX ADMIN — PRAVASTATIN SODIUM 40 MG: 40 TABLET ORAL at 08:12

## 2017-12-20 RX ADMIN — BUSPIRONE HYDROCHLORIDE 15 MG: 5 TABLET ORAL at 09:12

## 2017-12-20 NOTE — PLAN OF CARE
Pt aaox3.  Bed in low and locked position.  Nonskid socks in use.  Call bell, food, phone, drink within reach in bed or on bedside table.  Pt aware to call if needing assistance.  Complained of pain once that was relieved by prn po pain meds.  Remodulin infusing at 28ng/kg/min, dw 85kg, rate 57cc/24hrs to [facility] perkins.  Changed at 430pm this evening.  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  It was determined that her friend in [place] would be taught again alina in [place] and be primary mixer.   3.5LNC in use during day, bipap in pm.  Tele in use.  See flowsheet for full assessment and details

## 2017-12-20 NOTE — SUBJECTIVE & OBJECTIVE
Interval History: Continues to feel better on Remodulin with no side effects at 28 ng/kg/min. Awaiting further education on same.    Continuous Infusions:   sodium chloride 0.9%      treprostinil (REMODULIN) infusion 28 ng/kg/min (12/19/17 1723)    veletri/remodulin cassette      veletri/remodulin tubing       Scheduled Meds:   albuterol sulfate  2.5 mg Nebulization Q4H    amLODIPine  5 mg Oral Daily    bumetanide  4 mg Oral BID    busPIRone  15 mg Oral TID    desvenlafaxine succinate  100 mg Oral Daily    fluticasone-vilanterol  1 puff Inhalation Daily    gabapentin  100 mg Oral TID    lamoTRIgine  100 mg Oral BID    levothyroxine  75 mcg Oral Daily    pantoprazole  40 mg Oral Daily    potassium chloride 10%  40 mEq Oral Once    potassium chloride SA  60 mEq Oral BID    pravastatin  40 mg Oral Daily    sodium chloride 0.9%  3 mL Intravenous Q8H    warfarin  5 mg Oral Daily     PRN Meds:acetaminophen, gelatin adsorbable 12-7 mm top sponge, hydrocodone-acetaminophen 10-325mg, loperamide, ondansetron, ondansetron, oxyCODONE-acetaminophen    Review of patient's allergies indicates:   Allergen Reactions    Sulfa (sulfonamide antibiotics) Rash     Objective:     Vital Signs (Most Recent):  Temp: 97.9 °F (36.6 °C) (12/20/17 1119)  Pulse: 91 (12/20/17 1309)  Resp: 18 (12/20/17 1309)  BP: 117/64 (12/20/17 1119)  SpO2: (!) 92 % (12/20/17 1309) Vital Signs (24h Range):  Temp:  [97.6 °F (36.4 °C)-98.5 °F (36.9 °C)] 97.9 °F (36.6 °C)  Pulse:  [] 91  Resp:  [17-20] 18  SpO2:  [91 %-96 %] 92 %  BP: (106-132)/(64-85) 117/64     Patient Vitals for the past 72 hrs (Last 3 readings):   Weight   12/20/17 0515 84.3 kg (185 lb 13.6 oz)   12/19/17 0500 84.5 kg (186 lb 4.6 oz)   12/18/17 0515 83.7 kg (184 lb 8.4 oz)     Body mass index is 33.99 kg/m².      Intake/Output Summary (Last 24 hours) at 12/20/17 1429  Last data filed at 12/20/17 1000   Gross per 24 hour   Intake              690 ml   Output              1750 ml   Net            -1060 ml       Hemodynamic Parameters:       Telemetry: SR    Physical Exam   Constitutional: She is oriented to person, place, and time. She appears well-developed and well-nourished.   HENT:   Head: Normocephalic and atraumatic.   Eyes: Conjunctivae and EOM are normal. Pupils are equal, round, and reactive to light.   Neck: Normal range of motion. Neck supple. No JVD present. No thyromegaly present.   Cardiovascular: Normal rate and regular rhythm.    Grade III/VI systolic murmur LSB   Pulmonary/Chest: Effort normal and breath sounds normal.   Abdominal: Soft. Bowel sounds are normal.   Musculoskeletal: Normal range of motion. She exhibits no edema.   Neurological: She is alert and oriented to person, place, and time.   Skin: Skin is warm and dry. Capillary refill takes less than 2 seconds.   Psychiatric: She has a normal mood and affect. Her behavior is normal. Judgment and thought content normal.       Significant Labs:  CBC:    Recent Labs  Lab 12/18/17  0911 12/19/17  0511 12/20/17  0626   WBC 6.59 6.69 6.06   RBC 4.39 4.23 4.22   HGB 13.8 13.7 13.7   HCT 41.0 40.0 40.2   * 148* 129*   MCV 93 95 95   MCH 31.4* 32.4* 32.5*   MCHC 33.7 34.3 34.1     BNP:    Recent Labs  Lab 12/18/17  0911   *     CMP:    Recent Labs  Lab 12/18/17  0530 12/18/17  0911 12/19/17  0511 12/20/17  0626   GLU 93  --  103 103   CALCIUM 10.1  --  9.9 9.9   ALBUMIN  --  4.1 3.9 4.1   PROT  --  7.1 6.8 7.0     --  138 139   K 3.2*  --  3.6 3.8   CO2 36*  --  29 32*   CL 87*  --  94* 97   BUN 27*  --  24* 22*   CREATININE 1.2  --  0.9 1.0   ALKPHOS  --  107 97 100   ALT  --  74* 58* 45*   AST  --  84* 57* 37   BILITOT  --  0.7 0.7 0.7      Coagulation:     Recent Labs  Lab 12/18/17  0530 12/19/17  0511 12/20/17  0626   INR 1.3* 1.3* 1.4*     LDH:  No results for input(s): LDH in the last 72 hours.  Microbiology:  Microbiology Results (last 7 days)     ** No results found for the last 168 hours.  **          I have reviewed all pertinent labs within the past 24 hours.    Estimated Creatinine Clearance: 63.3 mL/min (based on SCr of 1 mg/dL).

## 2017-12-20 NOTE — PROGRESS NOTES
[facility]  Heart Transplant  Progress Note    Patient Name: Sue Smith  MRN: [id number]  Admission Date: 12/8/2017  Hospital Length of Stay: 12 days  Attending Physician: Maty Bosch MD  Primary Care Provider: Paddy Guerrier DO  Principal Problem:Pulmonary hypertension    Subjective:     Interval History: Continues to feel better on Remodulin with no side effects at 28 ng/kg/min. Awaiting further education on same.    Continuous Infusions:   sodium chloride 0.9%      treprostinil (REMODULIN) infusion 28 ng/kg/min (12/19/17 1723)    veletri/remodulin cassette      veletri/remodulin tubing       Scheduled Meds:   albuterol sulfate  2.5 mg Nebulization Q4H    amLODIPine  5 mg Oral Daily    bumetanide  4 mg Oral BID    busPIRone  15 mg Oral TID    desvenlafaxine succinate  100 mg Oral Daily    fluticasone-vilanterol  1 puff Inhalation Daily    gabapentin  100 mg Oral TID    lamoTRIgine  100 mg Oral BID    levothyroxine  75 mcg Oral Daily    pantoprazole  40 mg Oral Daily    potassium chloride 10%  40 mEq Oral Once    potassium chloride SA  60 mEq Oral BID    pravastatin  40 mg Oral Daily    sodium chloride 0.9%  3 mL Intravenous Q8H    warfarin  5 mg Oral Daily     PRN Meds:acetaminophen, gelatin adsorbable 12-7 mm top sponge, hydrocodone-acetaminophen 10-325mg, loperamide, ondansetron, ondansetron, oxyCODONE-acetaminophen    Review of patient's allergies indicates:   Allergen Reactions    Sulfa (sulfonamide antibiotics) Rash     Objective:     Vital Signs (Most Recent):  Temp: 97.9 °F (36.6 °C) (12/20/17 1119)  Pulse: 91 (12/20/17 1309)  Resp: 18 (12/20/17 1309)  BP: 117/64 (12/20/17 1119)  SpO2: (!) 92 % (12/20/17 1309) Vital Signs (24h Range):  Temp:  [97.6 °F (36.4 °C)-98.5 °F (36.9 °C)] 97.9 °F (36.6 °C)  Pulse:  [] 91  Resp:  [17-20] 18  SpO2:  [91 %-96 %] 92 %  BP: (106-132)/(64-85) 117/64     Patient Vitals for the past 72 hrs (Last 3 readings):   Weight   12/20/17  0515 84.3 kg (185 lb 13.6 oz)   12/19/17 0500 84.5 kg (186 lb 4.6 oz)   12/18/17 0515 83.7 kg (184 lb 8.4 oz)     Body mass index is 33.99 kg/m².      Intake/Output Summary (Last 24 hours) at 12/20/17 1429  Last data filed at 12/20/17 1000   Gross per 24 hour   Intake              690 ml   Output             1750 ml   Net            -1060 ml       Hemodynamic Parameters:       Telemetry: SR    Physical Exam   Constitutional: She is oriented to person, place, and time. She appears well-developed and well-nourished.   HENT:   Head: Normocephalic and atraumatic.   Eyes: Conjunctivae and EOM are normal. Pupils are equal, round, and reactive to light.   Neck: Normal range of motion. Neck supple. No JVD present. No thyromegaly present.   Cardiovascular: Normal rate and regular rhythm.    Grade III/VI systolic murmur LSB   Pulmonary/Chest: Effort normal and breath sounds normal.   Abdominal: Soft. Bowel sounds are normal.   Musculoskeletal: Normal range of motion. She exhibits no edema.   Neurological: She is alert and oriented to person, place, and time.   Skin: Skin is warm and dry. Capillary refill takes less than 2 seconds.   Psychiatric: She has a normal mood and affect. Her behavior is normal. Judgment and thought content normal.       Significant Labs:  CBC:    Recent Labs  Lab 12/18/17  0911 12/19/17  0511 12/20/17  0626   WBC 6.59 6.69 6.06   RBC 4.39 4.23 4.22   HGB 13.8 13.7 13.7   HCT 41.0 40.0 40.2   * 148* 129*   MCV 93 95 95   MCH 31.4* 32.4* 32.5*   MCHC 33.7 34.3 34.1     BNP:    Recent Labs  Lab 12/18/17  0911   *     CMP:    Recent Labs  Lab 12/18/17  0530 12/18/17  0911 12/19/17  0511 12/20/17  0626   GLU 93  --  103 103   CALCIUM 10.1  --  9.9 9.9   ALBUMIN  --  4.1 3.9 4.1   PROT  --  7.1 6.8 7.0     --  138 139   K 3.2*  --  3.6 3.8   CO2 36*  --  29 32*   CL 87*  --  94* 97   BUN 27*  --  24* 22*   CREATININE 1.2  --  0.9 1.0   ALKPHOS  --  107 97 100   ALT  --  74* 58* 45*   AST   --  84* 57* 37   BILITOT  --  0.7 0.7 0.7      Coagulation:     Recent Labs  Lab 12/18/17  0530 12/19/17  0511 12/20/17  0626   INR 1.3* 1.3* 1.4*     LDH:  No results for input(s): LDH in the last 72 hours.  Microbiology:  Microbiology Results (last 7 days)     ** No results found for the last 168 hours. **          I have reviewed all pertinent labs within the past 24 hours.    Estimated Creatinine Clearance: 63.3 mL/min (based on SCr of 1 mg/dL).          Assessment and Plan:     57 yo female with Group I PAH w/ low CI and RV failure.  She has transitioned from Uptravi to Remodulin on this admission and is currently on 29ng/kg/min increased 12/16/17 as she awaits insurance approval.    * Pulmonary hypertension, group 1    - Severe PH with markedly reduced CI also severe RV dysfunction. UPTRAVI stopped and started on remodulin 12/10. She is currently on 28 ng/kg/min. Gomez placed. Awaiting insurance approval as well as patient education  - PASP 88 by TTE 12/8/17  Symptoms on 29 so decreased to 28 with resolution therein. Discussed with Dr. Cullen 12/18- will leave at this dose for now  - Continue Bumex 4 mg bid (net -ve 1530 overnight). BNP has trended down to 483 from 1489  - Continue Coumadin          Acute on chronic right heart failure    - Continue Bumex 4 mg bid          Chronic respiratory failure with hypoxia    - Continue supplemental O2 (on 3 LPM at home, currently requiring 3-4 LPM here)   - continue to wean as tolerated and monitor O2 sats  - BiPAP at night        GERD (gastroesophageal reflux disease)    Continue home medications.         Bipolar disorder    Continue home medications.         Dyslipidemia    Continue home medications.         Hypertension    - Continue Norvasc        Hypothyroidism    - [place] [id number]  Heart Transplant  [facility]

## 2017-12-20 NOTE — ASSESSMENT & PLAN NOTE
- Continue supplemental O2 (on 3 LPM at home, currently requiring 3-4 LPM here)   - continue to wean as tolerated and monitor O2 sats  - BiPAP at night

## 2017-12-20 NOTE — ASSESSMENT & PLAN NOTE
- Severe PH with markedly reduced CI also severe RV dysfunction. UPTRAVI stopped and started on remodulin 12/10. She is currently on 28 ng/kg/min. Gomez placed. Awaiting insurance approval as well as patient education  - PASP 88 by TTE 12/8/17  Symptoms on 29 so decreased to 28 with resolution therein. Discussed with Dr. Cullen 12/18- will leave at this dose for now  - Continue Bumex 4 mg bid (net -ve 1530 overnight). BNP has trended down to 483 from 1489  - Continue Coumadin

## 2017-12-20 NOTE — PLAN OF CARE
Pt aaox3.  Bed in low and locked position.  Nonskid socks in use.  Call bell, food, phone, drink within reach in bed or on bedside table.  Pt aware to call if needing assistance.  Complained of pain once that was relieved by prn po pain meds.  remodulin infusing at 82ng/kg/min, dw 85kg, rate 57cc/24hrs to [facility] maddie.  Changed at 5pm this evening.  Unable to fully teach pt this am about meds due to her being too sleepy from pain meds this afternoon.  Friend was successfully taught.  Infusion person to come back [place] to continue teaching.  Informed night RN to give pain meds at end of night shift, so none would be given on days to have pt more alert. Still awaiting insurance approval for home remodulin. 5LNC in use during day, bipap in pm.  Tele in use.  See flowsheet for full assessment and details.

## 2017-12-21 LAB
ALBUMIN SERPL BCP-MCNC: 3.8 G/DL
ALP SERPL-CCNC: 93 U/L
ALT SERPL W/O P-5'-P-CCNC: 35 U/L
ANION GAP SERPL CALC-SCNC: 7 MMOL/L
AST SERPL-CCNC: 29 U/L
BASOPHILS # BLD AUTO: 0.04 K/UL
BASOPHILS NFR BLD: 0.6 %
BILIRUB SERPL-MCNC: 0.6 MG/DL
BUN SERPL-MCNC: 21 MG/DL
CALCIUM SERPL-MCNC: 9.7 MG/DL
CHLORIDE SERPL-SCNC: 99 MMOL/L
CO2 SERPL-SCNC: 34 MMOL/L
CREAT SERPL-MCNC: 1.1 MG/DL
DIFFERENTIAL METHOD: ABNORMAL
EOSINOPHIL # BLD AUTO: 0.2 K/UL
EOSINOPHIL NFR BLD: 2.3 %
ERYTHROCYTE [DISTWIDTH] IN BLOOD BY AUTOMATED COUNT: 17.3 %
EST. GFR  (AFRICAN AMERICAN): >60 ML/MIN/1.73 M^2
EST. GFR  (NON AFRICAN AMERICAN): 56.3 ML/MIN/1.73 M^2
GLUCOSE SERPL-MCNC: 88 MG/DL
HCT VFR BLD AUTO: 38.2 %
HGB BLD-MCNC: 12.6 G/DL
IMM GRANULOCYTES # BLD AUTO: 0.05 K/UL
IMM GRANULOCYTES NFR BLD AUTO: 0.8 %
INR PPP: 1.3
LYMPHOCYTES # BLD AUTO: 1.1 K/UL
LYMPHOCYTES NFR BLD: 17.2 %
MAGNESIUM SERPL-MCNC: 2 MG/DL
MCH RBC QN AUTO: 31.5 PG
MCHC RBC AUTO-ENTMCNC: 33 G/DL
MCV RBC AUTO: 96 FL
MONOCYTES # BLD AUTO: 0.5 K/UL
MONOCYTES NFR BLD: 7.8 %
NEUTROPHILS # BLD AUTO: 4.6 K/UL
NEUTROPHILS NFR BLD: 71.3 %
NRBC BLD-RTO: 0 /100 WBC
PLATELET # BLD AUTO: 146 K/UL
PMV BLD AUTO: 12.3 FL
POTASSIUM SERPL-SCNC: 4.4 MMOL/L
PROT SERPL-MCNC: 6.5 G/DL
PROTHROMBIN TIME: 13.8 SEC
RBC # BLD AUTO: 4 M/UL
SODIUM SERPL-SCNC: 140 MMOL/L
WBC # BLD AUTO: 6.39 K/UL

## 2017-12-21 PROCEDURE — 83735 ASSAY OF MAGNESIUM: CPT

## 2017-12-21 PROCEDURE — 20600001 HC STEP DOWN PRIVATE ROOM

## 2017-12-21 PROCEDURE — 25000003 PHARM REV CODE 250: Performed by: STUDENT IN AN ORGANIZED HEALTH CARE EDUCATION/TRAINING PROGRAM

## 2017-12-21 PROCEDURE — 27000221 HC OXYGEN, UP TO 24 HOURS

## 2017-12-21 PROCEDURE — 63600175 PHARM REV CODE 636 W HCPCS: Performed by: INTERNAL MEDICINE

## 2017-12-21 PROCEDURE — 94640 AIRWAY INHALATION TREATMENT: CPT

## 2017-12-21 PROCEDURE — 80053 COMPREHEN METABOLIC PANEL: CPT

## 2017-12-21 PROCEDURE — 36415 COLL VENOUS BLD VENIPUNCTURE: CPT

## 2017-12-21 PROCEDURE — 85025 COMPLETE CBC W/AUTO DIFF WBC: CPT

## 2017-12-21 PROCEDURE — 85610 PROTHROMBIN TIME: CPT

## 2017-12-21 PROCEDURE — 99900035 HC TECH TIME PER 15 MIN (STAT)

## 2017-12-21 PROCEDURE — 25000242 PHARM REV CODE 250 ALT 637 W/ HCPCS: Performed by: INTERNAL MEDICINE

## 2017-12-21 PROCEDURE — 25000003 PHARM REV CODE 250: Performed by: PHYSICIAN ASSISTANT

## 2017-12-21 PROCEDURE — 99233 SBSQ HOSP IP/OBS HIGH 50: CPT | Mod: GV,,, | Performed by: INTERNAL MEDICINE

## 2017-12-21 PROCEDURE — A4216 STERILE WATER/SALINE, 10 ML: HCPCS | Performed by: INTERNAL MEDICINE

## 2017-12-21 PROCEDURE — 25000003 PHARM REV CODE 250: Performed by: INTERNAL MEDICINE

## 2017-12-21 PROCEDURE — 94761 N-INVAS EAR/PLS OXIMETRY MLT: CPT

## 2017-12-21 PROCEDURE — 94660 CPAP INITIATION&MGMT: CPT

## 2017-12-21 RX ADMIN — BUSPIRONE HYDROCHLORIDE 15 MG: 5 TABLET ORAL at 06:12

## 2017-12-21 RX ADMIN — ALBUTEROL SULFATE 2.5 MG: 2.5 SOLUTION RESPIRATORY (INHALATION) at 05:12

## 2017-12-21 RX ADMIN — ALBUTEROL SULFATE 2.5 MG: 2.5 SOLUTION RESPIRATORY (INHALATION) at 04:12

## 2017-12-21 RX ADMIN — BUSPIRONE HYDROCHLORIDE 15 MG: 5 TABLET ORAL at 08:12

## 2017-12-21 RX ADMIN — BUMETANIDE 4 MG: 1 TABLET ORAL at 05:12

## 2017-12-21 RX ADMIN — PRAVASTATIN SODIUM 40 MG: 40 TABLET ORAL at 09:12

## 2017-12-21 RX ADMIN — ALBUTEROL SULFATE 2.5 MG: 2.5 SOLUTION RESPIRATORY (INHALATION) at 11:12

## 2017-12-21 RX ADMIN — HYDROCODONE BITARTRATE AND ACETAMINOPHEN 1 TABLET: 10; 325 TABLET ORAL at 09:12

## 2017-12-21 RX ADMIN — BUSPIRONE HYDROCHLORIDE 15 MG: 5 TABLET ORAL at 01:12

## 2017-12-21 RX ADMIN — LEVOTHYROXINE SODIUM 75 MCG: 75 TABLET ORAL at 06:12

## 2017-12-21 RX ADMIN — BUMETANIDE 4 MG: 1 TABLET ORAL at 09:12

## 2017-12-21 RX ADMIN — HYDROCODONE BITARTRATE AND ACETAMINOPHEN 1 TABLET: 10; 325 TABLET ORAL at 10:12

## 2017-12-21 RX ADMIN — LAMOTRIGINE 100 MG: 100 TABLET ORAL at 08:12

## 2017-12-21 RX ADMIN — AMLODIPINE BESYLATE 5 MG: 5 TABLET ORAL at 09:12

## 2017-12-21 RX ADMIN — GABAPENTIN 100 MG: 100 CAPSULE ORAL at 01:12

## 2017-12-21 RX ADMIN — ALBUTEROL SULFATE 2.5 MG: 2.5 SOLUTION RESPIRATORY (INHALATION) at 09:12

## 2017-12-21 RX ADMIN — DESVENLAFAXINE SUCCINATE 100 MG: 50 TABLET, FILM COATED, EXTENDED RELEASE ORAL at 09:12

## 2017-12-21 RX ADMIN — GABAPENTIN 100 MG: 100 CAPSULE ORAL at 08:12

## 2017-12-21 RX ADMIN — PANTOPRAZOLE SODIUM 40 MG: 40 TABLET, DELAYED RELEASE ORAL at 09:12

## 2017-12-21 RX ADMIN — TREPROSTINIL 28 NG/KG/MIN: 100 INJECTION, SOLUTION INTRAVENOUS; SUBCUTANEOUS at 04:12

## 2017-12-21 RX ADMIN — OXYCODONE HYDROCHLORIDE AND ACETAMINOPHEN 1 TABLET: 5; 325 TABLET ORAL at 06:12

## 2017-12-21 RX ADMIN — ALBUTEROL SULFATE 2.5 MG: 2.5 SOLUTION RESPIRATORY (INHALATION) at 07:12

## 2017-12-21 RX ADMIN — WARFARIN SODIUM 7.5 MG: 2.5 TABLET ORAL at 04:12

## 2017-12-21 RX ADMIN — GABAPENTIN 100 MG: 100 CAPSULE ORAL at 06:12

## 2017-12-21 RX ADMIN — FLUTICASONE FUROATE AND VILANTEROL TRIFENATATE 1 PUFF: 100; 25 POWDER RESPIRATORY (INHALATION) at 09:12

## 2017-12-21 RX ADMIN — LAMOTRIGINE 100 MG: 100 TABLET ORAL at 09:12

## 2017-12-21 RX ADMIN — Medication 3 ML: at 01:12

## 2017-12-21 RX ADMIN — Medication 3 ML: at 06:12

## 2017-12-21 RX ADMIN — OXYCODONE HYDROCHLORIDE AND ACETAMINOPHEN 1 TABLET: 5; 325 TABLET ORAL at 01:12

## 2017-12-21 NOTE — SUBJECTIVE & OBJECTIVE
Interval History: Patient was too shaky to adequately learn how to manage IV Remodulin yesterday. Per patient, Celestine will go to [place] today for further teaching of her friend. Slept well last night.    Continuous Infusions:   sodium chloride 0.9%      treprostinil (REMODULIN) infusion 28 ng/kg/min (12/20/17 1639)    veletri/remodulin cassette      veletri/remodulin tubing       Scheduled Meds:   albuterol sulfate  2.5 mg Nebulization Q4H    amLODIPine  5 mg Oral Daily    bumetanide  4 mg Oral BID    busPIRone  15 mg Oral TID    desvenlafaxine succinate  100 mg Oral Daily    fluticasone-vilanterol  1 puff Inhalation Daily    gabapentin  100 mg Oral TID    lamoTRIgine  100 mg Oral BID    levothyroxine  75 mcg Oral Daily    pantoprazole  40 mg Oral Daily    potassium chloride 10%  40 mEq Oral Once    potassium chloride SA  60 mEq Oral BID    pravastatin  40 mg Oral Daily    sodium chloride 0.9%  3 mL Intravenous Q8H    warfarin  5 mg Oral Daily     PRN Meds:acetaminophen, gelatin adsorbable 12-7 mm top sponge, hydrocodone-acetaminophen 10-325mg, loperamide, ondansetron, ondansetron, oxyCODONE-acetaminophen    Review of patient's allergies indicates:   Allergen Reactions    Sulfa (sulfonamide antibiotics) Rash     Objective:     Vital Signs (Most Recent):  Temp: 98.3 °F (36.8 °C) (12/21/17 0418)  Pulse: (!) 115 (12/21/17 0700)  Resp: 17 (12/21/17 0450)  BP: 115/68 (12/21/17 0418)  SpO2: 96 % (12/21/17 0450) Vital Signs (24h Range):  Temp:  [97.4 °F (36.3 °C)-98.5 °F (36.9 °C)] 98.3 °F (36.8 °C)  Pulse:  [] 115  Resp:  [17-20] 17  SpO2:  [91 %-96 %] 96 %  BP: (108-132)/(60-85) 115/68     Patient Vitals for the past 72 hrs (Last 3 readings):   Weight   12/20/17 0515 84.3 kg (185 lb 13.6 oz)   12/19/17 0500 84.5 kg (186 lb 4.6 oz)     Body mass index is 33.99 kg/m².      Intake/Output Summary (Last 24 hours) at 12/21/17 0735  Last data filed at 12/21/17 0600   Gross per 24 hour   Intake               600 ml   Output              350 ml   Net              250 ml       Hemodynamic Parameters:           Physical Exam   Constitutional: She is oriented to person, place, and time. She appears well-developed and well-nourished.   HENT:   Head: Normocephalic and atraumatic.   Eyes: Conjunctivae and EOM are normal. Pupils are equal, round, and reactive to light.   Neck: Normal range of motion. Neck supple. No JVD present. No thyromegaly present.   Cardiovascular: Normal rate and regular rhythm.    Grade III/VI systolic murmur LSB   Pulmonary/Chest: Effort normal and breath sounds normal.   Abdominal: Soft. Bowel sounds are normal.   Musculoskeletal: Normal range of motion. She exhibits no edema.   Neurological: She is alert and oriented to person, place, and time.   Skin: Skin is warm and dry. Capillary refill takes less than 2 seconds.   Psychiatric: She has a normal mood and affect. Her behavior is normal. Judgment and thought content normal.       Significant Labs:  CBC:    Recent Labs  Lab 12/19/17  0511 12/20/17  0626 12/21/17  0359   WBC 6.69 6.06 6.39   RBC 4.23 4.22 4.00   HGB 13.7 13.7 12.6   HCT 40.0 40.2 38.2   * 129* 146*   MCV 95 95 96   MCH 32.4* 32.5* 31.5*   MCHC 34.3 34.1 33.0     BNP:    Recent Labs  Lab 12/18/17  0911   *     CMP:    Recent Labs  Lab 12/19/17  0511 12/20/17  0626 12/21/17  0359    103 88   CALCIUM 9.9 9.9 9.7   ALBUMIN 3.9 4.1 3.8   PROT 6.8 7.0 6.5    139 140   K 3.6 3.8 4.4   CO2 29 32* 34*   CL 94* 97 99   BUN 24* 22* 21*   CREATININE 0.9 1.0 1.1   ALKPHOS 97 100 93   ALT 58* 45* 35   AST 57* 37 29   BILITOT 0.7 0.7 0.6      Coagulation:     Recent Labs  Lab 12/19/17  0511 12/20/17  0626 12/21/17  0359   INR 1.3* 1.4* 1.3*     LDH:  No results for input(s): LDH in the last 72 hours.  Microbiology:  Microbiology Results (last 7 days)     ** No results found for the last 168 hours. **          I have reviewed all pertinent labs within the past 24  hours.    Estimated Creatinine Clearance: 57.5 mL/min (based on SCr of 1.1 mg/dL).

## 2017-12-21 NOTE — PLAN OF CARE
Problem: Patient Care Overview  Goal: Plan of Care Review  Outcome: Ongoing (interventions implemented as appropriate)  Pt is AAOx4, free from fall/injury so far this shift, in bed wearing non-skid footwear, bed in low/locked position, call bell next to pt. Pt VSS, no tele SR, on 3.5L while awake and wears BiPAP at night. Pt is afebrile at this time. Proper hand hygiene performed before and after pt care activities. Pt has remodulin gtt infusing to RS perkins @ 28ng/kg/min c DW of 85kg, 57cc/24hrs. Remodulin due to be change around 1630. Pt friend will manage remodulin gtt once home. Further teaching will be done c friend once pt goes back to [place]. Pt will possibly go home this weekend. Pt reminded to use call bell to call for assistance, pt verbalizes understanding. Will continue to monitor.

## 2017-12-21 NOTE — PLAN OF CARE
"Problem: Patient Care Overview  Goal: Plan of Care Review  Patient is AAOx4. Bed low, locked, call bell within reach, non skid socks on. Patient educated to use call bell for assistance, verbalized understanding. Patient remains free from falls or injury, ambulates independently.   Afebrile, WBC 6.39.   Telemetry monitoring SR 90's.   Patient on 3.5 L O2 NC - sats 93-95%.   R subclavian perkins (dressing changed 12/21/17) with Remodulin at 28 ng/kg/min, dsg weight 85 kg - 57 cc/24 hours.   Patient complained of back pain twice so far this shift - PRN pain medications admin per order.   Patient had 3 unmeasured urine occurrence, encouraging measuring using "hat" provided in toilet.   See flow sheet for complete assessment details.           "

## 2017-12-21 NOTE — ASSESSMENT & PLAN NOTE
- Severe PH with markedly reduced CI also severe RV dysfunction. UPTRAVI stopped and started on remodulin 12/10. She is currently on 28 ng/kg/min. Gomez placed. Awaiting final education of her caregiver in [place]  - PASP 88 by TTE 12/8/17  Symptoms on 29 so decreased to 28 with resolution therein. Discussed with Dr. Cullen 12/18- will leave at this dose for now  ded down to 483 from 1489  - Continue Bumex 4 mg bid   - Continue Coumadin

## 2017-12-21 NOTE — PROGRESS NOTES
[facility]  Heart Transplant  Progress Note    Patient Name: Sue Smith  MRN: [id number]  Admission Date: 12/8/2017  Hospital Length of Stay: 13 days  Attending Physician: Maty Bosch MD  Primary Care Provider: Paddy Guerrier DO  Principal Problem:Pulmonary hypertension    Subjective:     Interval History: Patient was too shaky to adequately learn how to manage IV Remodulin yesterday. Per patient, Celestine will go to [place] today for further teaching of her friend. Slept well last night.    Continuous Infusions:   sodium chloride 0.9%      treprostinil (REMODULIN) infusion 28 ng/kg/min (12/20/17 1639)    veletri/remodulin cassette      veletri/remodulin tubing       Scheduled Meds:   albuterol sulfate  2.5 mg Nebulization Q4H    amLODIPine  5 mg Oral Daily    bumetanide  4 mg Oral BID    busPIRone  15 mg Oral TID    desvenlafaxine succinate  100 mg Oral Daily    fluticasone-vilanterol  1 puff Inhalation Daily    gabapentin  100 mg Oral TID    lamoTRIgine  100 mg Oral BID    levothyroxine  75 mcg Oral Daily    pantoprazole  40 mg Oral Daily    potassium chloride 10%  40 mEq Oral Once    potassium chloride SA  60 mEq Oral BID    pravastatin  40 mg Oral Daily    sodium chloride 0.9%  3 mL Intravenous Q8H    warfarin  5 mg Oral Daily     PRN Meds:acetaminophen, gelatin adsorbable 12-7 mm top sponge, hydrocodone-acetaminophen 10-325mg, loperamide, ondansetron, ondansetron, oxyCODONE-acetaminophen    Review of patient's allergies indicates:   Allergen Reactions    Sulfa (sulfonamide antibiotics) Rash     Objective:     Vital Signs (Most Recent):  Temp: 98.3 °F (36.8 °C) (12/21/17 0418)  Pulse: (!) 115 (12/21/17 0700)  Resp: 17 (12/21/17 0450)  BP: 115/68 (12/21/17 0418)  SpO2: 96 % (12/21/17 0450) Vital Signs (24h Range):  Temp:  [97.4 °F (36.3 °C)-98.5 °F (36.9 °C)] 98.3 °F (36.8 °C)  Pulse:  [] 115  Resp:  [17-20] 17  SpO2:  [91 %-96 %] 96 %  BP: (108-132)/(60-85) 115/68      Patient Vitals for the past 72 hrs (Last 3 readings):   Weight   12/20/17 0515 84.3 kg (185 lb 13.6 oz)   12/19/17 0500 84.5 kg (186 lb 4.6 oz)     Body mass index is 33.99 kg/m².      Intake/Output Summary (Last 24 hours) at 12/21/17 0735  Last data filed at 12/21/17 0600   Gross per 24 hour   Intake              600 ml   Output              350 ml   Net              250 ml       Hemodynamic Parameters:           Physical Exam   Constitutional: She is oriented to person, place, and time. She appears well-developed and well-nourished.   HENT:   Head: Normocephalic and atraumatic.   Eyes: Conjunctivae and EOM are normal. Pupils are equal, round, and reactive to light.   Neck: Normal range of motion. Neck supple. No JVD present. No thyromegaly present.   Cardiovascular: Normal rate and regular rhythm.    Grade III/VI systolic murmur LSB   Pulmonary/Chest: Effort normal and breath sounds normal.   Abdominal: Soft. Bowel sounds are normal.   Musculoskeletal: Normal range of motion. She exhibits no edema.   Neurological: She is alert and oriented to person, place, and time.   Skin: Skin is warm and dry. Capillary refill takes less than 2 seconds.   Psychiatric: She has a normal mood and affect. Her behavior is normal. Judgment and thought content normal.       Significant Labs:  CBC:    Recent Labs  Lab 12/19/17 0511 12/20/17 0626 12/21/17 0359   WBC 6.69 6.06 6.39   RBC 4.23 4.22 4.00   HGB 13.7 13.7 12.6   HCT 40.0 40.2 38.2   * 129* 146*   MCV 95 95 96   MCH 32.4* 32.5* 31.5*   MCHC 34.3 34.1 33.0     BNP:    Recent Labs  Lab 12/18/17  0911   *     CMP:    Recent Labs  Lab 12/19/17 0511 12/20/17 0626 12/21/17  0359    103 88   CALCIUM 9.9 9.9 9.7   ALBUMIN 3.9 4.1 3.8   PROT 6.8 7.0 6.5    139 140   K 3.6 3.8 4.4   CO2 29 32* 34*   CL 94* 97 99   BUN 24* 22* 21*   CREATININE 0.9 1.0 1.1   ALKPHOS 97 100 93   ALT 58* 45* 35   AST 57* 37 29   BILITOT 0.7 0.7 0.6      Coagulation:      Recent Labs  Lab 12/19/17  0511 12/20/17  0626 12/21/17  0359   INR 1.3* 1.4* 1.3*     LDH:  No results for input(s): LDH in the last 72 hours.  Microbiology:  Microbiology Results (last 7 days)     ** No results found for the last 168 hours. **          I have reviewed all pertinent labs within the past 24 hours.    Estimated Creatinine Clearance: 57.5 mL/min (based on SCr of 1.1 mg/dL).          Assessment and Plan:     57 yo female with Group I PAH w/ low CI and RV failure.  She has transitioned from Uptravi to Remodulin on this admission and is currently on 29ng/kg/min increased 12/16/17 as she awaits insurance approval.    * Pulmonary hypertension, group 1    - Severe PH with markedly reduced CI also severe RV dysfunction. UPTRAVI stopped and started on remodulin 12/10. She is currently on 28 ng/kg/min. Gomez placed. Awaiting final education of her caregiver in [place]  - PASP 88 by TTE 12/8/17  Symptoms on 29 so decreased to 28 with resolution therein. Discussed with Dr. Cullen 12/18- will leave at this dose for now  ded down to 483 from 1489  - Continue Bumex 4 mg bid   - Continue Coumadin          Acute on chronic right heart failure    - Continue Bumex 4 mg bid          Chronic respiratory failure with hypoxia    - Continue supplemental O2 (on 3 LPM at home, currently requiring 3-4 LPM here)   - continue to wean as tolerated and monitor O2 sats  - BiPAP at night        GERD (gastroesophageal reflux disease)    Continue home medications.         Bipolar disorder    Continue home medications.         Dyslipidemia    Continue home medications.         Hypertension    - Continue Norvasc        Hypothyroidism    - [place] [id number]  Heart Transplant  [facility]

## 2017-12-22 LAB
ALBUMIN SERPL BCP-MCNC: 3.9 G/DL
ALP SERPL-CCNC: 91 U/L
ALT SERPL W/O P-5'-P-CCNC: 28 U/L
ANION GAP SERPL CALC-SCNC: 11 MMOL/L
AST SERPL-CCNC: 26 U/L
BASOPHILS # BLD AUTO: 0.02 K/UL
BASOPHILS NFR BLD: 0.3 %
BILIRUB SERPL-MCNC: 0.6 MG/DL
BUN SERPL-MCNC: 17 MG/DL
CALCIUM SERPL-MCNC: 8.9 MG/DL
CHLORIDE SERPL-SCNC: 98 MMOL/L
CO2 SERPL-SCNC: 31 MMOL/L
CREAT SERPL-MCNC: 0.9 MG/DL
DIFFERENTIAL METHOD: ABNORMAL
EOSINOPHIL # BLD AUTO: 0.2 K/UL
EOSINOPHIL NFR BLD: 2.9 %
ERYTHROCYTE [DISTWIDTH] IN BLOOD BY AUTOMATED COUNT: 16.9 %
EST. GFR  (AFRICAN AMERICAN): >60 ML/MIN/1.73 M^2
EST. GFR  (NON AFRICAN AMERICAN): >60 ML/MIN/1.73 M^2
GLUCOSE SERPL-MCNC: 103 MG/DL
HCT VFR BLD AUTO: 38.8 %
HGB BLD-MCNC: 13.2 G/DL
IMM GRANULOCYTES # BLD AUTO: 0.04 K/UL
IMM GRANULOCYTES NFR BLD AUTO: 0.6 %
INR PPP: 1.3
LYMPHOCYTES # BLD AUTO: 0.8 K/UL
LYMPHOCYTES NFR BLD: 13.6 %
MAGNESIUM SERPL-MCNC: 1.9 MG/DL
MCH RBC QN AUTO: 31.7 PG
MCHC RBC AUTO-ENTMCNC: 34 G/DL
MCV RBC AUTO: 93 FL
MONOCYTES # BLD AUTO: 0.4 K/UL
MONOCYTES NFR BLD: 6.5 %
NEUTROPHILS # BLD AUTO: 4.7 K/UL
NEUTROPHILS NFR BLD: 76.1 %
NRBC BLD-RTO: 0 /100 WBC
PLATELET # BLD AUTO: 155 K/UL
PMV BLD AUTO: 11 FL
POTASSIUM SERPL-SCNC: 3 MMOL/L
PROT SERPL-MCNC: 6.7 G/DL
PROTHROMBIN TIME: 13.6 SEC
RBC # BLD AUTO: 4.17 M/UL
SODIUM SERPL-SCNC: 140 MMOL/L
WBC # BLD AUTO: 6.16 K/UL

## 2017-12-22 PROCEDURE — 36415 COLL VENOUS BLD VENIPUNCTURE: CPT

## 2017-12-22 PROCEDURE — 25000003 PHARM REV CODE 250: Performed by: INTERNAL MEDICINE

## 2017-12-22 PROCEDURE — 63600175 PHARM REV CODE 636 W HCPCS: Performed by: INTERNAL MEDICINE

## 2017-12-22 PROCEDURE — 99233 SBSQ HOSP IP/OBS HIGH 50: CPT | Mod: GW,HPC,, | Performed by: INTERNAL MEDICINE

## 2017-12-22 PROCEDURE — A4216 STERILE WATER/SALINE, 10 ML: HCPCS | Performed by: INTERNAL MEDICINE

## 2017-12-22 PROCEDURE — 25000003 PHARM REV CODE 250: Performed by: PHYSICIAN ASSISTANT

## 2017-12-22 PROCEDURE — 94660 CPAP INITIATION&MGMT: CPT

## 2017-12-22 PROCEDURE — 83735 ASSAY OF MAGNESIUM: CPT

## 2017-12-22 PROCEDURE — 25000003 PHARM REV CODE 250: Performed by: NURSE PRACTITIONER

## 2017-12-22 PROCEDURE — 25000242 PHARM REV CODE 250 ALT 637 W/ HCPCS: Performed by: INTERNAL MEDICINE

## 2017-12-22 PROCEDURE — 27000221 HC OXYGEN, UP TO 24 HOURS

## 2017-12-22 PROCEDURE — 25000003 PHARM REV CODE 250: Performed by: STUDENT IN AN ORGANIZED HEALTH CARE EDUCATION/TRAINING PROGRAM

## 2017-12-22 PROCEDURE — 80053 COMPREHEN METABOLIC PANEL: CPT

## 2017-12-22 PROCEDURE — 85025 COMPLETE CBC W/AUTO DIFF WBC: CPT

## 2017-12-22 PROCEDURE — 85610 PROTHROMBIN TIME: CPT

## 2017-12-22 PROCEDURE — 94761 N-INVAS EAR/PLS OXIMETRY MLT: CPT

## 2017-12-22 PROCEDURE — 20600001 HC STEP DOWN PRIVATE ROOM

## 2017-12-22 PROCEDURE — 94640 AIRWAY INHALATION TREATMENT: CPT

## 2017-12-22 PROCEDURE — 99900035 HC TECH TIME PER 15 MIN (STAT)

## 2017-12-22 RX ORDER — LANOLIN ALCOHOL/MO/W.PET/CERES
400 CREAM (GRAM) TOPICAL DAILY
Status: DISCONTINUED | OUTPATIENT
Start: 2017-12-22 | End: 2017-12-25

## 2017-12-22 RX ORDER — POTASSIUM CHLORIDE 20 MEQ/1
60 TABLET, EXTENDED RELEASE ORAL 2 TIMES DAILY
Status: DISCONTINUED | OUTPATIENT
Start: 2017-12-22 | End: 2017-12-27 | Stop reason: HOSPADM

## 2017-12-22 RX ADMIN — ALBUTEROL SULFATE 2.5 MG: 2.5 SOLUTION RESPIRATORY (INHALATION) at 03:12

## 2017-12-22 RX ADMIN — PANTOPRAZOLE SODIUM 40 MG: 40 TABLET, DELAYED RELEASE ORAL at 08:12

## 2017-12-22 RX ADMIN — GABAPENTIN 100 MG: 100 CAPSULE ORAL at 05:12

## 2017-12-22 RX ADMIN — TREPROSTINIL 28 NG/KG/MIN: 100 INJECTION, SOLUTION INTRAVENOUS; SUBCUTANEOUS at 05:12

## 2017-12-22 RX ADMIN — ALBUTEROL SULFATE 2.5 MG: 2.5 SOLUTION RESPIRATORY (INHALATION) at 08:12

## 2017-12-22 RX ADMIN — FLUTICASONE FUROATE AND VILANTEROL TRIFENATATE 1 PUFF: 100; 25 POWDER RESPIRATORY (INHALATION) at 08:12

## 2017-12-22 RX ADMIN — GABAPENTIN 100 MG: 100 CAPSULE ORAL at 01:12

## 2017-12-22 RX ADMIN — PRAVASTATIN SODIUM 40 MG: 40 TABLET ORAL at 08:12

## 2017-12-22 RX ADMIN — BUSPIRONE HYDROCHLORIDE 15 MG: 5 TABLET ORAL at 05:12

## 2017-12-22 RX ADMIN — ALBUTEROL SULFATE 2.5 MG: 2.5 SOLUTION RESPIRATORY (INHALATION) at 04:12

## 2017-12-22 RX ADMIN — ALBUTEROL SULFATE 2.5 MG: 2.5 SOLUTION RESPIRATORY (INHALATION) at 11:12

## 2017-12-22 RX ADMIN — DESVENLAFAXINE SUCCINATE 100 MG: 50 TABLET, FILM COATED, EXTENDED RELEASE ORAL at 08:12

## 2017-12-22 RX ADMIN — GABAPENTIN 100 MG: 100 CAPSULE ORAL at 08:12

## 2017-12-22 RX ADMIN — Medication 3 ML: at 01:12

## 2017-12-22 RX ADMIN — LAMOTRIGINE 100 MG: 100 TABLET ORAL at 08:12

## 2017-12-22 RX ADMIN — WARFARIN SODIUM 7.5 MG: 2.5 TABLET ORAL at 04:12

## 2017-12-22 RX ADMIN — LEVOTHYROXINE SODIUM 75 MCG: 75 TABLET ORAL at 05:12

## 2017-12-22 RX ADMIN — HYDROCODONE BITARTRATE AND ACETAMINOPHEN 1 TABLET: 10; 325 TABLET ORAL at 08:12

## 2017-12-22 RX ADMIN — POTASSIUM CHLORIDE 60 MEQ: 1500 TABLET, EXTENDED RELEASE ORAL at 08:12

## 2017-12-22 RX ADMIN — OXYCODONE HYDROCHLORIDE AND ACETAMINOPHEN 1 TABLET: 5; 325 TABLET ORAL at 05:12

## 2017-12-22 RX ADMIN — MAGNESIUM OXIDE TAB 400 MG (241.3 MG ELEMENTAL MG) 400 MG: 400 (241.3 MG) TAB at 08:12

## 2017-12-22 RX ADMIN — BUMETANIDE 4 MG: 1 TABLET ORAL at 05:12

## 2017-12-22 RX ADMIN — BUMETANIDE 4 MG: 1 TABLET ORAL at 08:12

## 2017-12-22 RX ADMIN — ALBUTEROL SULFATE 2.5 MG: 2.5 SOLUTION RESPIRATORY (INHALATION) at 01:12

## 2017-12-22 RX ADMIN — BUSPIRONE HYDROCHLORIDE 15 MG: 5 TABLET ORAL at 08:12

## 2017-12-22 RX ADMIN — OXYCODONE HYDROCHLORIDE AND ACETAMINOPHEN 1 TABLET: 5; 325 TABLET ORAL at 10:12

## 2017-12-22 RX ADMIN — AMLODIPINE BESYLATE 5 MG: 5 TABLET ORAL at 08:12

## 2017-12-22 RX ADMIN — OXYCODONE HYDROCHLORIDE AND ACETAMINOPHEN 1 TABLET: 5; 325 TABLET ORAL at 03:12

## 2017-12-22 RX ADMIN — BUSPIRONE HYDROCHLORIDE 15 MG: 5 TABLET ORAL at 01:12

## 2017-12-22 NOTE — ASSESSMENT & PLAN NOTE
- Severe PH with markedly reduced CI also severe RV dysfunction. UPTRAVI stopped and started on remodulin 12/10. She is currently on 28 ng/kg/min. Gomez placed. Awaiting final education of her caregiver in [place]  - PASP 88 by TTE 12/8/17  Symptoms on 29 so decreased to 28 with resolution therein. Discussed with Dr. Cullen 12/18- will leave at this dose for now  ded down to 483 from 1489  - Continue Bumex 4 mg bid   - Continue Coumadin

## 2017-12-22 NOTE — PROGRESS NOTES
[facility]  Heart Transplant  Progress Note    Patient Name: Sue Smith  MRN: [id number]  Admission Date: 12/8/2017  Hospital Length of Stay: 14 days  Attending Physician: Maty Bosch MD  Primary Care Provider: Paddy Guerrier DO  Principal Problem:Pulmonary hypertension    Subjective:     Interval History: No complaints or overnight events    Continuous Infusions:   sodium chloride 0.9%      treprostinil (REMODULIN) infusion 28 ng/kg/min (12/21/17 1323)    veletri/remodulin cassette      veletri/remodulin tubing       Scheduled Meds:   albuterol sulfate  2.5 mg Nebulization Q4H    amLODIPine  5 mg Oral Daily    bumetanide  4 mg Oral BID    busPIRone  15 mg Oral TID    desvenlafaxine succinate  100 mg Oral Daily    fluticasone-vilanterol  1 puff Inhalation Daily    gabapentin  100 mg Oral TID    lamoTRIgine  100 mg Oral BID    levothyroxine  75 mcg Oral Daily    magnesium oxide  400 mg Oral Daily    pantoprazole  40 mg Oral Daily    potassium chloride 10%  40 mEq Oral Once    potassium chloride SA  60 mEq Oral BID    pravastatin  40 mg Oral Daily    sodium chloride 0.9%  3 mL Intravenous Q8H    warfarin  7.5 mg Oral Daily     PRN Meds:acetaminophen, gelatin adsorbable 12-7 mm top sponge, hydrocodone-acetaminophen 10-325mg, loperamide, ondansetron, ondansetron, oxyCODONE-acetaminophen    Review of patient's allergies indicates:   Allergen Reactions    Sulfa (sulfonamide antibiotics) Rash     Objective:     Vital Signs (Most Recent):  Temp: 96.7 °F (35.9 °C) (12/22/17 0408)  Pulse: 81 (12/22/17 0600)  Resp: 15 (12/22/17 0441)  BP: 115/69 (12/22/17 0408)  SpO2: 97 % (12/22/17 0441) Vital Signs (24h Range):  Temp:  [96.7 °F (35.9 °C)-98 °F (36.7 °C)] 96.7 °F (35.9 °C)  Pulse:  [] 81  Resp:  [12-20] 15  SpO2:  [93 %-97 %] 97 %  BP: (105-124)/(59-72) 115/69     Patient Vitals for the past 72 hrs (Last 3 readings):   Weight   12/22/17 0500 84.7 kg (186 lb 11.7 oz)   12/20/17  0515 84.3 kg (185 lb 13.6 oz)     Body mass index is 34.15 kg/m².      Intake/Output Summary (Last 24 hours) at 12/22/17 0648  Last data filed at 12/22/17 0500   Gross per 24 hour   Intake             1040 ml   Output             1300 ml   Net             -260 ml       Hemodynamic Parameters:           Physical Exam   Constitutional: She is oriented to person, place, and time. She appears well-developed and well-nourished.   HENT:   Head: Normocephalic and atraumatic.   Eyes: Conjunctivae and EOM are normal. Pupils are equal, round, and reactive to light.   Neck: Normal range of motion. Neck supple. No JVD present. No thyromegaly present.   Cardiovascular: Normal rate and regular rhythm.    Grade III/VI systolic murmur LSB   Pulmonary/Chest: Effort normal and breath sounds normal.   Abdominal: Soft. Bowel sounds are normal.   Musculoskeletal: Normal range of motion. She exhibits no edema.   Neurological: She is alert and oriented to person, place, and time.   Skin: Skin is warm and dry. Capillary refill takes less than 2 seconds.   Psychiatric: She has a normal mood and affect. Her behavior is normal. Judgment and thought content normal.       Significant Labs:  CBC:    Recent Labs  Lab 12/20/17  0626 12/21/17 0359 12/22/17  0422   WBC 6.06 6.39 6.16   RBC 4.22 4.00 4.17   HGB 13.7 12.6 13.2   HCT 40.2 38.2 38.8   * 146* 155   MCV 95 96 93   MCH 32.5* 31.5* 31.7*   MCHC 34.1 33.0 34.0     BNP:    Recent Labs  Lab 12/18/17  0911   *     CMP:    Recent Labs  Lab 12/20/17  0626 12/21/17  0359 12/22/17  0422    88 103   CALCIUM 9.9 9.7 8.9   ALBUMIN 4.1 3.8 3.9   PROT 7.0 6.5 6.7    140 140   K 3.8 4.4 3.0*   CO2 32* 34* 31*   CL 97 99 98   BUN 22* 21* 17   CREATININE 1.0 1.1 0.9   ALKPHOS 100 93 91   ALT 45* 35 28   AST 37 29 26   BILITOT 0.7 0.6 0.6      Coagulation:     Recent Labs  Lab 12/20/17  0626 12/21/17  0359 12/22/17  0422   INR 1.4* 1.3* 1.3*     LDH:  No results for input(s): LDH  in the last 72 hours.  Microbiology:  Microbiology Results (last 7 days)     ** No results found for the last 168 hours. **          I have reviewed all pertinent labs within the past 24 hours.    Estimated Creatinine Clearance: 70.4 mL/min (based on SCr of 0.9 mg/dL).          Assessment and Plan:     55 yo female with Group I PAH w/ low CI and RV failure.  She has transitioned from Uptravi to Remodulin on this admission and is currently on 29ng/kg/min increased 12/16/17 as she awaits insurance approval.    * Pulmonary hypertension, group 1    - Severe PH with markedly reduced CI also severe RV dysfunction. UPTRAVI stopped and started on remodulin 12/10. She is currently on 28 ng/kg/min. Gomez placed. Awaiting final education of her caregiver in [place]  - PASP 88 by TTE 12/8/17  Symptoms on 29 so decreased to 28 with resolution therein. Discussed with Dr. Cullen 12/18- will leave at this dose for now  ded down to 483 from 1489  - Continue Bumex 4 mg bid   - Continue Coumadin          Acute on chronic right heart failure    - Continue Bumex 4 mg bid          Chronic respiratory failure with hypoxia    - Continue supplemental O2 (on 3 LPM at home, currently requiring 3-4 LPM here)   - continue to wean as tolerated and monitor O2 sats  - BiPAP at night        GERD (gastroesophageal reflux disease)    Continue home medications.         Bipolar disorder    Continue home medications.         Dyslipidemia    Continue home medications.         Hypertension    - Continue Norvasc        Hypothyroidism    - [place] [id number]  Heart Transplant  [facility]

## 2017-12-22 NOTE — PROGRESS NOTES
UPDATE    SW to pt's room for update. Pt presents as aaox3 with calm affect. Pt reports coping adequately with no needs at this time. Pt reports in agreement with plan to d/c next week with resumed home health via [facility]. SW spoke with  nurse Sanchez and provided update.     Pt reports no questions or concerns at this time. SW providing psychosocial and counseling support, education, resources, and d/c planning as needed. SW continuing to follow and remains available.

## 2017-12-22 NOTE — PLAN OF CARE
Problem: Patient Care Overview  Goal: Plan of Care Review  Patient is AAOx4. Bed low, locked, call bell within reach, non skid socks on. Patient educated to use call bell for assistance, verbalized understanding. Patient remains free from falls or injury, ambulates independently.   Afebrile, WBC 6.39.   Telemetry monitoring SR 90's.   Patient on 3 L O2 NC - sats 92-98%.   R subclavian perkins (dressing changed 12/21/17) with Remodulin at 28 ng/kg/min, dsg weight 85 kg - 57 cc/24 hours.   Patient complained of back pain twice so far this shift - PRN pain medications admin per order.   CR 0.9 - urine output 600 cc so far this shift.   See flow sheet for complete assessment details.

## 2017-12-22 NOTE — PLAN OF CARE
Problem: Patient Care Overview  Goal: Plan of Care Review  Outcome: Ongoing (interventions implemented as appropriate)  AAOx3, afebrile, c/o pain.  Pain controlled by prn pain medication. Telemetry monitor in place (NSR). Bipap during the night. Remodulin administered through Rt subclavian perkins.  Remodulin @28ng/kg/min with DW of 85 kg (57cc/hr).  Cassette due to be changed @ 1630 today.  Pt in lowest position, side rails up x2, non-skid foot wear in place, call light within reach, pt verbalized understanding to call RN when needed. Hand hygiene practiced per protocol. Pt able to position self independently.  Will continue to monitor.

## 2017-12-23 LAB
ALBUMIN SERPL BCP-MCNC: 3.7 G/DL
ALP SERPL-CCNC: 87 U/L
ALT SERPL W/O P-5'-P-CCNC: 23 U/L
ANION GAP SERPL CALC-SCNC: 7 MMOL/L
AST SERPL-CCNC: 22 U/L
BASOPHILS # BLD AUTO: 0.04 K/UL
BASOPHILS NFR BLD: 0.8 %
BILIRUB SERPL-MCNC: 0.5 MG/DL
BUN SERPL-MCNC: 18 MG/DL
CALCIUM SERPL-MCNC: 9.4 MG/DL
CHLORIDE SERPL-SCNC: 99 MMOL/L
CO2 SERPL-SCNC: 34 MMOL/L
CREAT SERPL-MCNC: 1 MG/DL
DIFFERENTIAL METHOD: ABNORMAL
EOSINOPHIL # BLD AUTO: 0.2 K/UL
EOSINOPHIL NFR BLD: 2.9 %
ERYTHROCYTE [DISTWIDTH] IN BLOOD BY AUTOMATED COUNT: 16.5 %
EST. GFR  (AFRICAN AMERICAN): >60 ML/MIN/1.73 M^2
EST. GFR  (NON AFRICAN AMERICAN): >60 ML/MIN/1.73 M^2
GLUCOSE SERPL-MCNC: 91 MG/DL
HCT VFR BLD AUTO: 36.9 %
HGB BLD-MCNC: 12.5 G/DL
IMM GRANULOCYTES # BLD AUTO: 0.05 K/UL
IMM GRANULOCYTES NFR BLD AUTO: 1 %
INR PPP: 1.5
LYMPHOCYTES # BLD AUTO: 1.1 K/UL
LYMPHOCYTES NFR BLD: 21.8 %
MAGNESIUM SERPL-MCNC: 2 MG/DL
MCH RBC QN AUTO: 31.6 PG
MCHC RBC AUTO-ENTMCNC: 33.9 G/DL
MCV RBC AUTO: 93 FL
MONOCYTES # BLD AUTO: 0.5 K/UL
MONOCYTES NFR BLD: 8.8 %
NEUTROPHILS # BLD AUTO: 3.4 K/UL
NEUTROPHILS NFR BLD: 64.7 %
NRBC BLD-RTO: 0 /100 WBC
PLATELET # BLD AUTO: 148 K/UL
PMV BLD AUTO: 11.7 FL
POTASSIUM SERPL-SCNC: 3.9 MMOL/L
PROT SERPL-MCNC: 6.4 G/DL
PROTHROMBIN TIME: 15.5 SEC
RBC # BLD AUTO: 3.95 M/UL
SODIUM SERPL-SCNC: 140 MMOL/L
WBC # BLD AUTO: 5.23 K/UL

## 2017-12-23 PROCEDURE — 85610 PROTHROMBIN TIME: CPT

## 2017-12-23 PROCEDURE — 94761 N-INVAS EAR/PLS OXIMETRY MLT: CPT

## 2017-12-23 PROCEDURE — 20600001 HC STEP DOWN PRIVATE ROOM

## 2017-12-23 PROCEDURE — 99900035 HC TECH TIME PER 15 MIN (STAT)

## 2017-12-23 PROCEDURE — 80053 COMPREHEN METABOLIC PANEL: CPT

## 2017-12-23 PROCEDURE — 83735 ASSAY OF MAGNESIUM: CPT

## 2017-12-23 PROCEDURE — 25000003 PHARM REV CODE 250: Performed by: INTERNAL MEDICINE

## 2017-12-23 PROCEDURE — 25000003 PHARM REV CODE 250: Performed by: NURSE PRACTITIONER

## 2017-12-23 PROCEDURE — 94660 CPAP INITIATION&MGMT: CPT

## 2017-12-23 PROCEDURE — 99232 SBSQ HOSP IP/OBS MODERATE 35: CPT | Mod: GV,,, | Performed by: INTERNAL MEDICINE

## 2017-12-23 PROCEDURE — 25000003 PHARM REV CODE 250: Performed by: PHYSICIAN ASSISTANT

## 2017-12-23 PROCEDURE — 25000242 PHARM REV CODE 250 ALT 637 W/ HCPCS: Performed by: INTERNAL MEDICINE

## 2017-12-23 PROCEDURE — 63600175 PHARM REV CODE 636 W HCPCS: Performed by: INTERNAL MEDICINE

## 2017-12-23 PROCEDURE — A4216 STERILE WATER/SALINE, 10 ML: HCPCS | Performed by: INTERNAL MEDICINE

## 2017-12-23 PROCEDURE — 85025 COMPLETE CBC W/AUTO DIFF WBC: CPT

## 2017-12-23 PROCEDURE — 25000003 PHARM REV CODE 250: Performed by: STUDENT IN AN ORGANIZED HEALTH CARE EDUCATION/TRAINING PROGRAM

## 2017-12-23 PROCEDURE — 94640 AIRWAY INHALATION TREATMENT: CPT

## 2017-12-23 PROCEDURE — 27000221 HC OXYGEN, UP TO 24 HOURS

## 2017-12-23 PROCEDURE — 36415 COLL VENOUS BLD VENIPUNCTURE: CPT

## 2017-12-23 RX ADMIN — GABAPENTIN 100 MG: 100 CAPSULE ORAL at 08:12

## 2017-12-23 RX ADMIN — OXYCODONE HYDROCHLORIDE AND ACETAMINOPHEN 1 TABLET: 5; 325 TABLET ORAL at 05:12

## 2017-12-23 RX ADMIN — FLUTICASONE FUROATE AND VILANTEROL TRIFENATATE 1 PUFF: 100; 25 POWDER RESPIRATORY (INHALATION) at 09:12

## 2017-12-23 RX ADMIN — GABAPENTIN 100 MG: 100 CAPSULE ORAL at 05:12

## 2017-12-23 RX ADMIN — POTASSIUM CHLORIDE 60 MEQ: 1500 TABLET, EXTENDED RELEASE ORAL at 08:12

## 2017-12-23 RX ADMIN — ALBUTEROL SULFATE 2.5 MG: 2.5 SOLUTION RESPIRATORY (INHALATION) at 04:12

## 2017-12-23 RX ADMIN — ALBUTEROL SULFATE 2.5 MG: 2.5 SOLUTION RESPIRATORY (INHALATION) at 08:12

## 2017-12-23 RX ADMIN — GABAPENTIN 100 MG: 100 CAPSULE ORAL at 01:12

## 2017-12-23 RX ADMIN — PRAVASTATIN SODIUM 40 MG: 40 TABLET ORAL at 08:12

## 2017-12-23 RX ADMIN — BUMETANIDE 4 MG: 1 TABLET ORAL at 05:12

## 2017-12-23 RX ADMIN — ALBUTEROL SULFATE 2.5 MG: 2.5 SOLUTION RESPIRATORY (INHALATION) at 11:12

## 2017-12-23 RX ADMIN — MAGNESIUM OXIDE TAB 400 MG (241.3 MG ELEMENTAL MG) 400 MG: 400 (241.3 MG) TAB at 08:12

## 2017-12-23 RX ADMIN — DESVENLAFAXINE SUCCINATE 100 MG: 50 TABLET, FILM COATED, EXTENDED RELEASE ORAL at 08:12

## 2017-12-23 RX ADMIN — ALBUTEROL SULFATE 2.5 MG: 2.5 SOLUTION RESPIRATORY (INHALATION) at 07:12

## 2017-12-23 RX ADMIN — LAMOTRIGINE 100 MG: 100 TABLET ORAL at 08:12

## 2017-12-23 RX ADMIN — BUMETANIDE 4 MG: 1 TABLET ORAL at 08:12

## 2017-12-23 RX ADMIN — HYDROCODONE BITARTRATE AND ACETAMINOPHEN 1 TABLET: 10; 325 TABLET ORAL at 09:12

## 2017-12-23 RX ADMIN — BUSPIRONE HYDROCHLORIDE 15 MG: 5 TABLET ORAL at 08:12

## 2017-12-23 RX ADMIN — Medication 3 ML: at 02:12

## 2017-12-23 RX ADMIN — ALBUTEROL SULFATE 2.5 MG: 2.5 SOLUTION RESPIRATORY (INHALATION) at 03:12

## 2017-12-23 RX ADMIN — WARFARIN SODIUM 7.5 MG: 2.5 TABLET ORAL at 05:12

## 2017-12-23 RX ADMIN — ALBUTEROL SULFATE 2.5 MG: 2.5 SOLUTION RESPIRATORY (INHALATION) at 12:12

## 2017-12-23 RX ADMIN — AMLODIPINE BESYLATE 5 MG: 5 TABLET ORAL at 08:12

## 2017-12-23 RX ADMIN — LEVOTHYROXINE SODIUM 75 MCG: 75 TABLET ORAL at 05:12

## 2017-12-23 RX ADMIN — TREPROSTINIL 28 NG/KG/MIN: 100 INJECTION, SOLUTION INTRAVENOUS; SUBCUTANEOUS at 06:12

## 2017-12-23 RX ADMIN — OXYCODONE HYDROCHLORIDE AND ACETAMINOPHEN 1 TABLET: 5; 325 TABLET ORAL at 01:12

## 2017-12-23 RX ADMIN — BUSPIRONE HYDROCHLORIDE 15 MG: 5 TABLET ORAL at 01:12

## 2017-12-23 RX ADMIN — BUSPIRONE HYDROCHLORIDE 15 MG: 5 TABLET ORAL at 05:12

## 2017-12-23 RX ADMIN — PANTOPRAZOLE SODIUM 40 MG: 40 TABLET, DELAYED RELEASE ORAL at 08:12

## 2017-12-23 NOTE — SUBJECTIVE & OBJECTIVE
Interval History: Pt reported feeling better and expressed if she can be let gone home.       Review of Systems   Constitution: Negative.   HENT: Negative.    Cardiovascular: Negative.    Respiratory: Negative.      Objective:     Vital Signs (Most Recent):  Temp: 98.2 °F (36.8 °C) (12/23/17 1525)  Pulse: 95 (12/23/17 1528)  Resp: 18 (12/23/17 1528)  BP: 116/74 (12/23/17 1525)  SpO2: (!) 91 % (12/23/17 1528) Vital Signs (24h Range):  Temp:  [96.3 °F (35.7 °C)-98.5 °F (36.9 °C)] 98.2 °F (36.8 °C)  Pulse:  [77-99] 95  Resp:  [14-18] 18  SpO2:  [91 %-99 %] 91 %  BP: (114-132)/(56-78) 116/74     Weight: 85.5 kg (188 lb 7.9 oz)  Body mass index is 34.48 kg/m².     SpO2: (!) 91 %  O2 Device (Oxygen Therapy): nasal cannula w/ humidification      Intake/Output Summary (Last 24 hours) at 12/23/17 1757  Last data filed at 12/23/17 1700   Gross per 24 hour   Intake             1300 ml   Output             2100 ml   Net             -800 ml       Lines/Drains/Airways     Central Venous Catheter Line                 Tunneled Central Line Insertion/Assessment - Single Lumen  12/14/17 1209 right internal jugular 9 days          Peripheral Intravenous Line                 Peripheral IV - Single Lumen 12/21/17 1620 Right Forearm 2 days                Physical Exam   Constitutional: She is oriented to person, place, and time. She appears well-developed and well-nourished.   HENT:   Head: Normocephalic and atraumatic.   Left Ear: External ear normal.   Mouth/Throat: Oropharynx is clear and moist.   Eyes: EOM are normal. Pupils are equal, round, and reactive to light. Right eye exhibits no discharge. Left eye exhibits no discharge. No scleral icterus.   Neck: Normal range of motion. Neck supple. No JVD present. No tracheal deviation present. No thyromegaly present.   Cardiovascular: Normal rate, regular rhythm and intact distal pulses.    Murmur heard.  Abdominal: Soft. Bowel sounds are normal. She exhibits distension. There is no  tenderness. There is no guarding.   Lymphadenopathy:     She has no cervical adenopathy.   Neurological: She is alert and oriented to person, place, and time. She has normal reflexes. She displays normal reflexes. No cranial nerve deficit. Coordination normal.   Skin: Skin is warm and dry.   Psychiatric: She has a normal mood and affect. Judgment and thought content normal.   Nursing note and vitals reviewed.      Significant Labs:   CMP   Recent Labs  Lab 12/22/17 0422 12/23/17  0345    140   K 3.0* 3.9   CL 98 99   CO2 31* 34*    91   BUN 17 18   CREATININE 0.9 1.0   CALCIUM 8.9 9.4   PROT 6.7 6.4   ALBUMIN 3.9 3.7   BILITOT 0.6 0.5   ALKPHOS 91 87   AST 26 22   ALT 28 23   ANIONGAP 11 7*   ESTGFRAFRICA >60.0 >60.0   EGFRNONAA >60.0 >60.0   , CBC   Recent Labs  Lab 12/22/17  0422 12/23/17  0345   WBC 6.16 5.23   HGB 13.2 12.5   HCT 38.8 36.9*    148*    and INR   Recent Labs  Lab 12/22/17  0422 12/23/17  0345   INR 1.3* 1.5*       Significant Imaging: X-Ray: CXR: X-Ray Chest 1 View (CXR):   Results for orders placed or performed during the hospital encounter of 12/08/17   X-Ray Chest 1 View    Narrative    One view: Central line at SVC. There is cardiomegaly. There may be pulmonary artery hypertension. Lungs are clear.      Electronically signed by: MP SANTOS MD  Date:     12/14/17  Time:    15:02

## 2017-12-23 NOTE — PLAN OF CARE
Problem: Patient Care Overview  Goal: Plan of Care Review  Outcome: Ongoing (interventions implemented as appropriate)  AAOx3, afebrile, c/o pain. Pain controlled by prn pain medication. Telemetry monitor in place (NSR).  Remodulin administered through Rt subclavian perkins.  Remodulin @ 28 ng/kg/min with DW 85 kg (57 cc/24hr).  Remodulin cassette due to be changed @1700 today. CPAP worn at night. Pt able to position self independently. Pt in lowest position, side rails up x2, non-skid foot wear in place, call light within reach, pt verbalized understanding to call RN when needed. Hand hygiene practiced per protocol. Will continue to monitor.

## 2017-12-23 NOTE — PLAN OF CARE
Pt AAOx4, VSS.  Pt on 3-4L nasal cannula w/ O2 sats >91%.  CPAP worn at night. TELE monitoring in place, pt NSR 80s-90s.  Pt complaining of shoulder/neck pain.  PRN pain meds given as ordered w/ moderate relief obtained.    Remodulin running to R subclavian perkins @ 28 ng/kg/min, DW 85 kg, 57cc/24hr.  Cassette to be changed out today @ 1700.  Teaching on self administration continued.  Bed lowered and locked.  Pt up and ambulatory walking around unit this afternoon.  Non-skid socks on feet when out of bed.  Call bell in reach, pt verbalized understanding to call RN when needed.  Hand hygiene practiced. No acute events/falls/injuries.    See flowsheet for further assessment findings.  Will continue to monitor.

## 2017-12-24 LAB
ALBUMIN SERPL BCP-MCNC: 3.8 G/DL
ALP SERPL-CCNC: 86 U/L
ALT SERPL W/O P-5'-P-CCNC: 21 U/L
ANION GAP SERPL CALC-SCNC: 10 MMOL/L
AST SERPL-CCNC: 25 U/L
BASOPHILS # BLD AUTO: 0.03 K/UL
BASOPHILS NFR BLD: 0.7 %
BILIRUB SERPL-MCNC: 0.5 MG/DL
BUN SERPL-MCNC: 20 MG/DL
CALCIUM SERPL-MCNC: 9.6 MG/DL
CHLORIDE SERPL-SCNC: 103 MMOL/L
CO2 SERPL-SCNC: 29 MMOL/L
CREAT SERPL-MCNC: 1 MG/DL
DIFFERENTIAL METHOD: ABNORMAL
EOSINOPHIL # BLD AUTO: 0.1 K/UL
EOSINOPHIL NFR BLD: 3.2 %
ERYTHROCYTE [DISTWIDTH] IN BLOOD BY AUTOMATED COUNT: 16.2 %
EST. GFR  (AFRICAN AMERICAN): >60 ML/MIN/1.73 M^2
EST. GFR  (NON AFRICAN AMERICAN): >60 ML/MIN/1.73 M^2
GLUCOSE SERPL-MCNC: 82 MG/DL
HCT VFR BLD AUTO: 36.3 %
HGB BLD-MCNC: 12.2 G/DL
IMM GRANULOCYTES # BLD AUTO: 0.03 K/UL
IMM GRANULOCYTES NFR BLD AUTO: 0.7 %
INR PPP: 1.4
LYMPHOCYTES # BLD AUTO: 0.9 K/UL
LYMPHOCYTES NFR BLD: 19.5 %
MAGNESIUM SERPL-MCNC: 2.1 MG/DL
MCH RBC QN AUTO: 31.3 PG
MCHC RBC AUTO-ENTMCNC: 33.6 G/DL
MCV RBC AUTO: 93 FL
MONOCYTES # BLD AUTO: 0.3 K/UL
MONOCYTES NFR BLD: 6.6 %
NEUTROPHILS # BLD AUTO: 3 K/UL
NEUTROPHILS NFR BLD: 69.3 %
NRBC BLD-RTO: 0 /100 WBC
PLATELET # BLD AUTO: 146 K/UL
PMV BLD AUTO: 11.8 FL
POTASSIUM SERPL-SCNC: 3.8 MMOL/L
PROT SERPL-MCNC: 6.6 G/DL
PROTHROMBIN TIME: 14.7 SEC
RBC # BLD AUTO: 3.9 M/UL
SODIUM SERPL-SCNC: 142 MMOL/L
WBC # BLD AUTO: 4.37 K/UL

## 2017-12-24 PROCEDURE — 25000242 PHARM REV CODE 250 ALT 637 W/ HCPCS: Performed by: INTERNAL MEDICINE

## 2017-12-24 PROCEDURE — 25000003 PHARM REV CODE 250: Performed by: STUDENT IN AN ORGANIZED HEALTH CARE EDUCATION/TRAINING PROGRAM

## 2017-12-24 PROCEDURE — 25000003 PHARM REV CODE 250: Performed by: INTERNAL MEDICINE

## 2017-12-24 PROCEDURE — 80053 COMPREHEN METABOLIC PANEL: CPT

## 2017-12-24 PROCEDURE — 85610 PROTHROMBIN TIME: CPT

## 2017-12-24 PROCEDURE — 94640 AIRWAY INHALATION TREATMENT: CPT

## 2017-12-24 PROCEDURE — 83735 ASSAY OF MAGNESIUM: CPT

## 2017-12-24 PROCEDURE — 99900035 HC TECH TIME PER 15 MIN (STAT)

## 2017-12-24 PROCEDURE — 27000221 HC OXYGEN, UP TO 24 HOURS

## 2017-12-24 PROCEDURE — 85025 COMPLETE CBC W/AUTO DIFF WBC: CPT

## 2017-12-24 PROCEDURE — 94660 CPAP INITIATION&MGMT: CPT

## 2017-12-24 PROCEDURE — 63600175 PHARM REV CODE 636 W HCPCS: Performed by: INTERNAL MEDICINE

## 2017-12-24 PROCEDURE — 94761 N-INVAS EAR/PLS OXIMETRY MLT: CPT

## 2017-12-24 PROCEDURE — 36415 COLL VENOUS BLD VENIPUNCTURE: CPT

## 2017-12-24 PROCEDURE — 20600001 HC STEP DOWN PRIVATE ROOM

## 2017-12-24 PROCEDURE — A4216 STERILE WATER/SALINE, 10 ML: HCPCS | Performed by: INTERNAL MEDICINE

## 2017-12-24 PROCEDURE — 25000003 PHARM REV CODE 250: Performed by: NURSE PRACTITIONER

## 2017-12-24 RX ADMIN — GABAPENTIN 100 MG: 100 CAPSULE ORAL at 06:12

## 2017-12-24 RX ADMIN — LAMOTRIGINE 100 MG: 100 TABLET ORAL at 08:12

## 2017-12-24 RX ADMIN — GABAPENTIN 100 MG: 100 CAPSULE ORAL at 08:12

## 2017-12-24 RX ADMIN — BUSPIRONE HYDROCHLORIDE 15 MG: 5 TABLET ORAL at 06:12

## 2017-12-24 RX ADMIN — HYDROCODONE BITARTRATE AND ACETAMINOPHEN 1 TABLET: 10; 325 TABLET ORAL at 02:12

## 2017-12-24 RX ADMIN — PRAVASTATIN SODIUM 40 MG: 40 TABLET ORAL at 08:12

## 2017-12-24 RX ADMIN — HYDROCODONE BITARTRATE AND ACETAMINOPHEN 1 TABLET: 10; 325 TABLET ORAL at 10:12

## 2017-12-24 RX ADMIN — ALBUTEROL SULFATE 2.5 MG: 2.5 SOLUTION RESPIRATORY (INHALATION) at 03:12

## 2017-12-24 RX ADMIN — BUSPIRONE HYDROCHLORIDE 15 MG: 5 TABLET ORAL at 01:12

## 2017-12-24 RX ADMIN — ALBUTEROL SULFATE 2.5 MG: 2.5 SOLUTION RESPIRATORY (INHALATION) at 07:12

## 2017-12-24 RX ADMIN — GABAPENTIN 100 MG: 100 CAPSULE ORAL at 01:12

## 2017-12-24 RX ADMIN — DESVENLAFAXINE SUCCINATE 100 MG: 50 TABLET, FILM COATED, EXTENDED RELEASE ORAL at 08:12

## 2017-12-24 RX ADMIN — BUMETANIDE 4 MG: 1 TABLET ORAL at 08:12

## 2017-12-24 RX ADMIN — BUSPIRONE HYDROCHLORIDE 15 MG: 5 TABLET ORAL at 08:12

## 2017-12-24 RX ADMIN — ALBUTEROL SULFATE 2.5 MG: 2.5 SOLUTION RESPIRATORY (INHALATION) at 12:12

## 2017-12-24 RX ADMIN — ALBUTEROL SULFATE 2.5 MG: 2.5 SOLUTION RESPIRATORY (INHALATION) at 05:12

## 2017-12-24 RX ADMIN — HYDROCODONE BITARTRATE AND ACETAMINOPHEN 1 TABLET: 10; 325 TABLET ORAL at 08:12

## 2017-12-24 RX ADMIN — FLUTICASONE FUROATE AND VILANTEROL TRIFENATATE 1 PUFF: 100; 25 POWDER RESPIRATORY (INHALATION) at 08:12

## 2017-12-24 RX ADMIN — POTASSIUM CHLORIDE 60 MEQ: 1500 TABLET, EXTENDED RELEASE ORAL at 08:12

## 2017-12-24 RX ADMIN — ALBUTEROL SULFATE 2.5 MG: 2.5 SOLUTION RESPIRATORY (INHALATION) at 11:12

## 2017-12-24 RX ADMIN — Medication 3 ML: at 02:12

## 2017-12-24 RX ADMIN — MAGNESIUM OXIDE TAB 400 MG (241.3 MG ELEMENTAL MG) 400 MG: 400 (241.3 MG) TAB at 08:12

## 2017-12-24 RX ADMIN — AMLODIPINE BESYLATE 5 MG: 5 TABLET ORAL at 08:12

## 2017-12-24 RX ADMIN — BUMETANIDE 4 MG: 1 TABLET ORAL at 04:12

## 2017-12-24 RX ADMIN — LEVOTHYROXINE SODIUM 75 MCG: 75 TABLET ORAL at 06:12

## 2017-12-24 RX ADMIN — TREPROSTINIL 28 NG/KG/MIN: 100 INJECTION, SOLUTION INTRAVENOUS; SUBCUTANEOUS at 06:12

## 2017-12-24 RX ADMIN — WARFARIN SODIUM 7.5 MG: 2.5 TABLET ORAL at 04:12

## 2017-12-24 RX ADMIN — PANTOPRAZOLE SODIUM 40 MG: 40 TABLET, DELAYED RELEASE ORAL at 08:12

## 2017-12-24 NOTE — PLAN OF CARE
Problem: Patient Care Overview  Goal: Plan of Care Review  Outcome: Ongoing (interventions implemented as appropriate)  AAOx3, afebrile, c/o pain. Pain in neck.  Pain controlled by prn pain medication. Telemetry monitor in place (NSR).  Remodulin @ 28ng/kg/min, DW of 85 kg, (57cc/24hr).  Pt on 3.5 L of oxygen NC (>95%).  Breathing treatments scheduled.  Bipap worn at night.  Plan is await final teachings of Remodulin with friend and pt before discharge.  Pt able to position self independently. Pt in lowest position, side rails up x2, non-skid foot wear in place, call light within reach, pt verbalized understanding to call RN when needed. Hand hygiene practiced per protocol. Will continue to monitor.

## 2017-12-24 NOTE — PLAN OF CARE
Problem: Patient Care Overview  Goal: Plan of Care Review  Outcome: Ongoing (interventions implemented as appropriate)  Pt aao x3. Pt steady on her feet. Tele reveals NSR. Remodulin infusing as per order. Pt practiced mixing her remodulin this morning with her [employer] representative. Pt free from harm at this time. See assessment for full chart details. Will continue to monitor, assess, and adjust care as needed.

## 2017-12-24 NOTE — PROGRESS NOTES
[facility]  Cardiology  Progress Note    Patient Name: Sue Smith  MRN: [id number]  Admission Date: 12/8/2017  Hospital Length of Stay: 16 days  Code Status: Full Code   Attending Physician: Shelby De La Paz MD   Primary Care Physician: Paddy Guerrier DO  Expected Discharge Date: 12/27/2017  Principal Problem:Pulmonary hypertension    Subjective:     Hospital Course:   No notes on file    Interval History: Pt reported no overnight issues. Doing well this AM  Denied CP, SOB or palpitation     Review of Systems   Constitution: Negative for decreased appetite, diaphoresis, weakness, night sweats and weight loss.   HENT: Negative for congestion and hoarse voice.    Eyes: Negative for blurred vision, photophobia and visual disturbance.   Cardiovascular: Negative for chest pain, dyspnea on exertion, near-syncope and paroxysmal nocturnal dyspnea.   Respiratory: Positive for shortness of breath. Negative for cough, snoring and wheezing.    Endocrine: Negative.    Skin: Negative for dry skin, itching, nail changes, rash and suspicious lesions.   Musculoskeletal: Negative.    Gastrointestinal: Negative for bloating, constipation, excessive appetite and hematemesis.   Genitourinary: Negative.      Objective:     Vital Signs (Most Recent):  Temp: 97.7 °F (36.5 °C) (12/24/17 1528)  Pulse: 89 (12/24/17 1528)  Resp: 16 (12/24/17 1528)  BP: 111/74 (12/24/17 1528)  SpO2: 96 % (12/24/17 1528) Vital Signs (24h Range):  Temp:  [96.5 °F (35.8 °C)-98.6 °F (37 °C)] 97.7 °F (36.5 °C)  Pulse:  [] 89  Resp:  [15-83] 16  SpO2:  [87 %-98 %] 96 %  BP: (105-117)/(57-74) 111/74     Weight: 85.9 kg (189 lb 6 oz) (standing)  Body mass index is 34.64 kg/m².     SpO2: 96 %  O2 Device (Oxygen Therapy): nasal cannula w/ humidification      Intake/Output Summary (Last 24 hours) at 12/24/17 1702  Last data filed at 12/24/17 1400   Gross per 24 hour   Intake             1470 ml   Output             2750 ml   Net            -1280 ml        Lines/Drains/Airways     Central Venous Catheter Line                 Tunneled Central Line Insertion/Assessment - Single Lumen  12/14/17 1209 right internal jugular 10 days          Peripheral Intravenous Line                 Peripheral IV - Single Lumen 12/21/17 1620 Right Forearm 3 days                Physical Exam   Constitutional: She is oriented to person, place, and time. She appears well-developed and well-nourished.   HENT:   Head: Normocephalic and atraumatic.   Mouth/Throat: Oropharynx is clear and moist.   Eyes: EOM are normal. Pupils are equal, round, and reactive to light. Right eye exhibits no discharge. Left eye exhibits no discharge.   Neck: Normal range of motion. Neck supple. No JVD present. No tracheal deviation present. No thyromegaly present.   Pulmonary/Chest: Effort normal and breath sounds normal.   Pt is on NC supplemental o2    Abdominal: Soft. Bowel sounds are normal. She exhibits no distension and no mass. There is no rebound.   Musculoskeletal: Normal range of motion. She exhibits no edema, tenderness or deformity.   Lymphadenopathy:     She has no cervical adenopathy.   Neurological: She is alert and oriented to person, place, and time. She displays normal reflexes. No cranial nerve deficit. She exhibits normal muscle tone. Coordination normal.   Skin: Skin is warm and dry. No erythema. No pallor.   Psychiatric: She has a normal mood and affect. Judgment normal.   Nursing note and vitals reviewed.      Significant Labs:   CMP   Recent Labs  Lab 12/23/17  0345 12/24/17  0516    142   K 3.9 3.8   CL 99 103   CO2 34* 29   GLU 91 82   BUN 18 20   CREATININE 1.0 1.0   CALCIUM 9.4 9.6   PROT 6.4 6.6   ALBUMIN 3.7 3.8   BILITOT 0.5 0.5   ALKPHOS 87 86   AST 22 25   ALT 23 21   ANIONGAP 7* 10   ESTGFRAFRICA >60.0 >60.0   EGFRNONAA >60.0 >60.0   , CBC   Recent Labs  Lab 12/23/17  0345 12/24/17  0516   WBC 5.23 4.37   HGB 12.5 12.2   HCT 36.9* 36.3*   * 146*    and INR    Recent Labs  Lab 12/23/17  0345 12/24/17  0516   INR 1.5* 1.4*       Significant Imaging: X-Ray: CXR: X-Ray Chest 1 View (CXR):   Results for orders placed or performed during the hospital encounter of 12/08/17   X-Ray Chest 1 View    Narrative    One view: Central line at SVC. There is cardiomegaly. There may be pulmonary artery hypertension. Lungs are clear.      Electronically signed by: MP SANTOS MD  Date:     12/14/17  Time:    15:02      Assessment and Plan:     Brief HPI: 57 YOCW with PH admitted for acute worsening has been doing well, for two days on dose adjustment of IV Remoduline.     * Pulmonary hypertension, group 1    - Severe PH with markedly reduced CI also severe RV dysfunction, PASP 88 by TTE 12/8/17 S/P discontinuation of UPTRAVI and transitioning to Remodulin since 12/10.   - Currently on 28 ng/kg/min via Gomez. Awaiting final education of her caregiver in [place]  -  Continue Bumex 4 mg bid   - Continue Coumadin - INR 1.4 this morning         GERD (gastroesophageal reflux disease)    - Continue PPI for now         Acute on chronic right heart failure    - In the setting of primary pulmonary HTN  - e/o volume overload on exam  - Start on Lasix drip at 20 mg/hrm  - pt already has low k;please replete the K   - check Mag in the mornng and replete.         Bipolar disorder    - Continue current regimen of Buspiron, Lamotrigen and desvanlafaxin          Dyslipidemia    -- Continue statin         Hypertension    - Currently on Norvasc which to be continued.         Hypothyroidism    - No new symptoms.   -  Continue Levothyroxin at current dose of 75 mcg.         Chronic respiratory failure with hypoxia    - No new issues   - Continue supplemental oxygen.   -               VTE Risk Mitigation         Ordered     warfarin tablet 7.5 mg  Daily     Route:  Oral        12/21/17 0928     Medium Risk of VTE  Once      12/08/17 1925          Joshua John MD  Cardiology  [facility]

## 2017-12-24 NOTE — ASSESSMENT & PLAN NOTE
- Severe PH with markedly reduced CI also severe RV dysfunction, PASP 88 by TTE 12/8/17 S/P discontinuation of UPTRAVI and transitioning to Remodulin since 12/10.   - Currently on 28 ng/kg/min. Patricia placed. Awaiting final education of her caregiver in [place]  - Patient symptoms on 29 so decreased to 28 with resolution therein. Discussed with Dr. Cullen 12/18- will leave at this dose for now  ded down to 483 from 1489  - Continue Bumex 4 mg bid   - Continue Coumadin

## 2017-12-24 NOTE — ASSESSMENT & PLAN NOTE
- Severe PH with markedly reduced CI also severe RV dysfunction, PASP 88 by TTE 12/8/17 S/P discontinuation of UPTRAVI and transitioning to Remodulin since 12/10.   - Currently on 28 ng/kg/min via Gomez. Awaiting final education of her caregiver in [place]  -  Continue Bumex 4 mg bid   - Continue Coumadin - INR 1.4 this morning

## 2017-12-24 NOTE — SUBJECTIVE & OBJECTIVE
Interval History: Pt reported no overnight issues. Doing well this AM  Denied CP, SOB or palpitation     Review of Systems   Constitution: Negative for decreased appetite, diaphoresis, weakness, night sweats and weight loss.   HENT: Negative for congestion and hoarse voice.    Eyes: Negative for blurred vision, photophobia and visual disturbance.   Cardiovascular: Negative for chest pain, dyspnea on exertion, near-syncope and paroxysmal nocturnal dyspnea.   Respiratory: Positive for shortness of breath. Negative for cough, snoring and wheezing.    Endocrine: Negative.    Skin: Negative for dry skin, itching, nail changes, rash and suspicious lesions.   Musculoskeletal: Negative.    Gastrointestinal: Negative for bloating, constipation, excessive appetite and hematemesis.   Genitourinary: Negative.      Objective:     Vital Signs (Most Recent):  Temp: 97.7 °F (36.5 °C) (12/24/17 1528)  Pulse: 89 (12/24/17 1528)  Resp: 16 (12/24/17 1528)  BP: 111/74 (12/24/17 1528)  SpO2: 96 % (12/24/17 1528) Vital Signs (24h Range):  Temp:  [96.5 °F (35.8 °C)-98.6 °F (37 °C)] 97.7 °F (36.5 °C)  Pulse:  [] 89  Resp:  [15-83] 16  SpO2:  [87 %-98 %] 96 %  BP: (105-117)/(57-74) 111/74     Weight: 85.9 kg (189 lb 6 oz) (standing)  Body mass index is 34.64 kg/m².     SpO2: 96 %  O2 Device (Oxygen Therapy): nasal cannula w/ humidification      Intake/Output Summary (Last 24 hours) at 12/24/17 1702  Last data filed at 12/24/17 1400   Gross per 24 hour   Intake             1470 ml   Output             2750 ml   Net            -1280 ml       Lines/Drains/Airways     Central Venous Catheter Line                 Tunneled Central Line Insertion/Assessment - Single Lumen  12/14/17 1209 right internal jugular 10 days          Peripheral Intravenous Line                 Peripheral IV - Single Lumen 12/21/17 1620 Right Forearm 3 days                Physical Exam   Constitutional: She is oriented to person, place, and time. She appears  well-developed and well-nourished.   HENT:   Head: Normocephalic and atraumatic.   Mouth/Throat: Oropharynx is clear and moist.   Eyes: EOM are normal. Pupils are equal, round, and reactive to light. Right eye exhibits no discharge. Left eye exhibits no discharge.   Neck: Normal range of motion. Neck supple. No JVD present. No tracheal deviation present. No thyromegaly present.   Pulmonary/Chest: Effort normal and breath sounds normal.   Pt is on NC supplemental o2    Abdominal: Soft. Bowel sounds are normal. She exhibits no distension and no mass. There is no rebound.   Musculoskeletal: Normal range of motion. She exhibits no edema, tenderness or deformity.   Lymphadenopathy:     She has no cervical adenopathy.   Neurological: She is alert and oriented to person, place, and time. She displays normal reflexes. No cranial nerve deficit. She exhibits normal muscle tone. Coordination normal.   Skin: Skin is warm and dry. No erythema. No pallor.   Psychiatric: She has a normal mood and affect. Judgment normal.   Nursing note and vitals reviewed.      Significant Labs:   CMP   Recent Labs  Lab 12/23/17  0345 12/24/17  0516    142   K 3.9 3.8   CL 99 103   CO2 34* 29   GLU 91 82   BUN 18 20   CREATININE 1.0 1.0   CALCIUM 9.4 9.6   PROT 6.4 6.6   ALBUMIN 3.7 3.8   BILITOT 0.5 0.5   ALKPHOS 87 86   AST 22 25   ALT 23 21   ANIONGAP 7* 10   ESTGFRAFRICA >60.0 >60.0   EGFRNONAA >60.0 >60.0   , CBC   Recent Labs  Lab 12/23/17  0345 12/24/17  0516   WBC 5.23 4.37   HGB 12.5 12.2   HCT 36.9* 36.3*   * 146*    and INR   Recent Labs  Lab 12/23/17  0345 12/24/17  0516   INR 1.5* 1.4*       Significant Imaging: X-Ray: CXR: X-Ray Chest 1 View (CXR):   Results for orders placed or performed during the hospital encounter of 12/08/17   X-Ray Chest 1 View    Narrative    One view: Central line at SVC. There is cardiomegaly. There may be pulmonary artery hypertension. Lungs are clear.      Electronically signed by: MP  DANIELLE CHRISTOPHER  Date:     12/14/17  Time:    15:02

## 2017-12-24 NOTE — PROGRESS NOTES
[facility]  Cardiology  Progress Note    Patient Name: Sue Smith  MRN: [id number]  Admission Date: 12/8/2017  Hospital Length of Stay: 15 days  Code Status: Full Code   Attending Physician: Shelby De La Paz MD   Primary Care Physician: Paddy Guerrier DO  Expected Discharge Date: 12/27/2017  Principal Problem:Pulmonary hypertension    Subjective:     Hospital Course:   No notes on file    Interval History: Pt reported feeling better and expressed if she can be let gone home.       Review of Systems   Constitution: Negative.   HENT: Negative.    Cardiovascular: Negative.    Respiratory: Negative.      Objective:     Vital Signs (Most Recent):  Temp: 98.2 °F (36.8 °C) (12/23/17 1525)  Pulse: 95 (12/23/17 1528)  Resp: 18 (12/23/17 1528)  BP: 116/74 (12/23/17 1525)  SpO2: (!) 91 % (12/23/17 1528) Vital Signs (24h Range):  Temp:  [96.3 °F (35.7 °C)-98.5 °F (36.9 °C)] 98.2 °F (36.8 °C)  Pulse:  [77-99] 95  Resp:  [14-18] 18  SpO2:  [91 %-99 %] 91 %  BP: (114-132)/(56-78) 116/74     Weight: 85.5 kg (188 lb 7.9 oz)  Body mass index is 34.48 kg/m².     SpO2: (!) 91 %  O2 Device (Oxygen Therapy): nasal cannula w/ humidification      Intake/Output Summary (Last 24 hours) at 12/23/17 1757  Last data filed at 12/23/17 1700   Gross per 24 hour   Intake             1300 ml   Output             2100 ml   Net             -800 ml       Lines/Drains/Airways     Central Venous Catheter Line                 Tunneled Central Line Insertion/Assessment - Single Lumen  12/14/17 1209 right internal jugular 9 days          Peripheral Intravenous Line                 Peripheral IV - Single Lumen 12/21/17 1620 Right Forearm 2 days                Physical Exam   Constitutional: She is oriented to person, place, and time. She appears well-developed and well-nourished.   HENT:   Head: Normocephalic and atraumatic.   Left Ear: External ear normal.   Mouth/Throat: Oropharynx is clear and moist.   Eyes: EOM are normal. Pupils are  equal, round, and reactive to light. Right eye exhibits no discharge. Left eye exhibits no discharge. No scleral icterus.   Neck: Normal range of motion. Neck supple. No JVD present. No tracheal deviation present. No thyromegaly present.   Cardiovascular: Normal rate, regular rhythm and intact distal pulses.    Murmur heard.  Abdominal: Soft. Bowel sounds are normal. She exhibits distension. There is no tenderness. There is no guarding.   Lymphadenopathy:     She has no cervical adenopathy.   Neurological: She is alert and oriented to person, place, and time. She has normal reflexes. She displays normal reflexes. No cranial nerve deficit. Coordination normal.   Skin: Skin is warm and dry.   Psychiatric: She has a normal mood and affect. Judgment and thought content normal.   Nursing note and vitals reviewed.      Significant Labs:   CMP   Recent Labs  Lab 12/22/17 0422 12/23/17  0345    140   K 3.0* 3.9   CL 98 99   CO2 31* 34*    91   BUN 17 18   CREATININE 0.9 1.0   CALCIUM 8.9 9.4   PROT 6.7 6.4   ALBUMIN 3.9 3.7   BILITOT 0.6 0.5   ALKPHOS 91 87   AST 26 22   ALT 28 23   ANIONGAP 11 7*   ESTGFRAFRICA >60.0 >60.0   EGFRNONAA >60.0 >60.0   , CBC   Recent Labs  Lab 12/22/17 0422 12/23/17  0345   WBC 6.16 5.23   HGB 13.2 12.5   HCT 38.8 36.9*    148*    and INR   Recent Labs  Lab 12/22/17 0422 12/23/17  0345   INR 1.3* 1.5*       Significant Imaging: X-Ray: CXR: X-Ray Chest 1 View (CXR):   Results for orders placed or performed during the hospital encounter of 12/08/17   X-Ray Chest 1 View    Narrative    One view: Central line at SVC. There is cardiomegaly. There may be pulmonary artery hypertension. Lungs are clear.      Electronically signed by: MP SANTOS MD  Date:     12/14/17  Time:    15:02      Assessment and Plan:         * Pulmonary hypertension, group 1    - Severe PH with markedly reduced CI also severe RV dysfunction, PASP 88 by TTE 12/8/17 S/P discontinuation of UPTRAVI and  transitioning to Remodulin since 12/10.   - Currently on 28 ng/kg/min. Gomez placed. Awaiting final education of her caregiver in [place]  - Patient symptoms on 29 so decreased to 28 with resolution therein. Discussed with Dr. Cullen 12/18- will leave at this dose for now  ded down to 483 from 1489  - Continue Bumex 4 mg bid   - Continue Coumadin        GERD (gastroesophageal reflux disease)    - Continue PPI for now         Acute on chronic right heart failure    - In the setting of primary pulmonary HTN  - e/o volume overload on exam  - Start on Lasix drip at 20 mg/hrm  - pt already has low k;please replete the K   - check Mag in the mornng and replete.         Bipolar disorder    - Continue current regimen of Buspiron, Lamotrigen and desvanlafaxin          Dyslipidemia    -- Continue statin         Hypertension    - Currently on Norvasc which to be continued.         Hypothyroidism    - Continue Levothyroxin at current dose of 75 mcg.         Chronic respiratory failure with hypoxia    - Currently not decompensated  - Continue supplemental oxygen.               VTE Risk Mitigation         Ordered     warfarin tablet 7.5 mg  Daily     Route:  Oral        12/21/17 0928     Medium Risk of VTE  Once      12/08/17 1925          Joshua John MD  Cardiology  [facility]

## 2017-12-25 LAB
ALBUMIN SERPL BCP-MCNC: 3.6 G/DL
ALP SERPL-CCNC: 81 U/L
ALT SERPL W/O P-5'-P-CCNC: 22 U/L
ANION GAP SERPL CALC-SCNC: 8 MMOL/L
AST SERPL-CCNC: 24 U/L
BASOPHILS # BLD AUTO: 0.02 K/UL
BASOPHILS NFR BLD: 0.5 %
BILIRUB SERPL-MCNC: 0.5 MG/DL
BUN SERPL-MCNC: 20 MG/DL
CALCIUM SERPL-MCNC: 9.3 MG/DL
CHLORIDE SERPL-SCNC: 102 MMOL/L
CO2 SERPL-SCNC: 30 MMOL/L
CREAT SERPL-MCNC: 1.1 MG/DL
DIFFERENTIAL METHOD: ABNORMAL
EOSINOPHIL # BLD AUTO: 0.1 K/UL
EOSINOPHIL NFR BLD: 2.8 %
ERYTHROCYTE [DISTWIDTH] IN BLOOD BY AUTOMATED COUNT: 16.2 %
EST. GFR  (AFRICAN AMERICAN): >60 ML/MIN/1.73 M^2
EST. GFR  (NON AFRICAN AMERICAN): 56.3 ML/MIN/1.73 M^2
GLUCOSE SERPL-MCNC: 78 MG/DL
HCT VFR BLD AUTO: 35.8 %
HGB BLD-MCNC: 11.8 G/DL
IMM GRANULOCYTES # BLD AUTO: 0.02 K/UL
IMM GRANULOCYTES NFR BLD AUTO: 0.5 %
INR PPP: 1.5
LYMPHOCYTES # BLD AUTO: 0.9 K/UL
LYMPHOCYTES NFR BLD: 20.9 %
MAGNESIUM SERPL-MCNC: 1.9 MG/DL
MCH RBC QN AUTO: 31.9 PG
MCHC RBC AUTO-ENTMCNC: 33 G/DL
MCV RBC AUTO: 97 FL
MONOCYTES # BLD AUTO: 0.4 K/UL
MONOCYTES NFR BLD: 8.1 %
NEUTROPHILS # BLD AUTO: 2.9 K/UL
NEUTROPHILS NFR BLD: 67.2 %
NRBC BLD-RTO: 0 /100 WBC
PLATELET # BLD AUTO: 150 K/UL
PMV BLD AUTO: 12.2 FL
POTASSIUM SERPL-SCNC: 4 MMOL/L
PROT SERPL-MCNC: 6.2 G/DL
PROTHROMBIN TIME: 15.3 SEC
RBC # BLD AUTO: 3.7 M/UL
SODIUM SERPL-SCNC: 140 MMOL/L
WBC # BLD AUTO: 4.3 K/UL

## 2017-12-25 PROCEDURE — 25000003 PHARM REV CODE 250: Performed by: INTERNAL MEDICINE

## 2017-12-25 PROCEDURE — 80053 COMPREHEN METABOLIC PANEL: CPT

## 2017-12-25 PROCEDURE — 63600175 PHARM REV CODE 636 W HCPCS: Performed by: INTERNAL MEDICINE

## 2017-12-25 PROCEDURE — 25000242 PHARM REV CODE 250 ALT 637 W/ HCPCS: Performed by: INTERNAL MEDICINE

## 2017-12-25 PROCEDURE — 27000221 HC OXYGEN, UP TO 24 HOURS

## 2017-12-25 PROCEDURE — 25000003 PHARM REV CODE 250: Performed by: STUDENT IN AN ORGANIZED HEALTH CARE EDUCATION/TRAINING PROGRAM

## 2017-12-25 PROCEDURE — 94640 AIRWAY INHALATION TREATMENT: CPT

## 2017-12-25 PROCEDURE — 36415 COLL VENOUS BLD VENIPUNCTURE: CPT

## 2017-12-25 PROCEDURE — 85025 COMPLETE CBC W/AUTO DIFF WBC: CPT

## 2017-12-25 PROCEDURE — 25000003 PHARM REV CODE 250: Performed by: PHYSICIAN ASSISTANT

## 2017-12-25 PROCEDURE — 99232 SBSQ HOSP IP/OBS MODERATE 35: CPT | Mod: GV,,, | Performed by: INTERNAL MEDICINE

## 2017-12-25 PROCEDURE — 99900035 HC TECH TIME PER 15 MIN (STAT)

## 2017-12-25 PROCEDURE — 25000003 PHARM REV CODE 250: Performed by: NURSE PRACTITIONER

## 2017-12-25 PROCEDURE — 85610 PROTHROMBIN TIME: CPT

## 2017-12-25 PROCEDURE — A4216 STERILE WATER/SALINE, 10 ML: HCPCS | Performed by: INTERNAL MEDICINE

## 2017-12-25 PROCEDURE — 94660 CPAP INITIATION&MGMT: CPT

## 2017-12-25 PROCEDURE — 20600001 HC STEP DOWN PRIVATE ROOM

## 2017-12-25 PROCEDURE — 94761 N-INVAS EAR/PLS OXIMETRY MLT: CPT

## 2017-12-25 PROCEDURE — 83735 ASSAY OF MAGNESIUM: CPT

## 2017-12-25 RX ORDER — LANOLIN ALCOHOL/MO/W.PET/CERES
400 CREAM (GRAM) TOPICAL 2 TIMES DAILY
Status: DISCONTINUED | OUTPATIENT
Start: 2017-12-25 | End: 2017-12-27 | Stop reason: HOSPADM

## 2017-12-25 RX ORDER — WARFARIN SODIUM 5 MG/1
10 TABLET ORAL DAILY
Status: DISCONTINUED | OUTPATIENT
Start: 2017-12-25 | End: 2017-12-27

## 2017-12-25 RX ADMIN — BUMETANIDE 4 MG: 1 TABLET ORAL at 04:12

## 2017-12-25 RX ADMIN — ALBUTEROL SULFATE 2.5 MG: 2.5 SOLUTION RESPIRATORY (INHALATION) at 12:12

## 2017-12-25 RX ADMIN — MAGNESIUM OXIDE TAB 400 MG (241.3 MG ELEMENTAL MG) 400 MG: 400 (241.3 MG) TAB at 08:12

## 2017-12-25 RX ADMIN — LAMOTRIGINE 100 MG: 100 TABLET ORAL at 08:12

## 2017-12-25 RX ADMIN — DESVENLAFAXINE SUCCINATE 100 MG: 50 TABLET, FILM COATED, EXTENDED RELEASE ORAL at 08:12

## 2017-12-25 RX ADMIN — BUSPIRONE HYDROCHLORIDE 15 MG: 5 TABLET ORAL at 02:12

## 2017-12-25 RX ADMIN — GABAPENTIN 100 MG: 100 CAPSULE ORAL at 02:12

## 2017-12-25 RX ADMIN — ALBUTEROL SULFATE 2.5 MG: 2.5 SOLUTION RESPIRATORY (INHALATION) at 04:12

## 2017-12-25 RX ADMIN — BUSPIRONE HYDROCHLORIDE 15 MG: 5 TABLET ORAL at 06:12

## 2017-12-25 RX ADMIN — WARFARIN SODIUM 10 MG: 5 TABLET ORAL at 04:12

## 2017-12-25 RX ADMIN — PRAVASTATIN SODIUM 40 MG: 40 TABLET ORAL at 08:12

## 2017-12-25 RX ADMIN — POTASSIUM CHLORIDE 60 MEQ: 1500 TABLET, EXTENDED RELEASE ORAL at 08:12

## 2017-12-25 RX ADMIN — HYDROCODONE BITARTRATE AND ACETAMINOPHEN 1 TABLET: 10; 325 TABLET ORAL at 04:12

## 2017-12-25 RX ADMIN — LEVOTHYROXINE SODIUM 75 MCG: 75 TABLET ORAL at 06:12

## 2017-12-25 RX ADMIN — GABAPENTIN 100 MG: 100 CAPSULE ORAL at 08:12

## 2017-12-25 RX ADMIN — ALBUTEROL SULFATE 2.5 MG: 2.5 SOLUTION RESPIRATORY (INHALATION) at 11:12

## 2017-12-25 RX ADMIN — FLUTICASONE FUROATE AND VILANTEROL TRIFENATATE 1 PUFF: 100; 25 POWDER RESPIRATORY (INHALATION) at 08:12

## 2017-12-25 RX ADMIN — BUSPIRONE HYDROCHLORIDE 15 MG: 5 TABLET ORAL at 08:12

## 2017-12-25 RX ADMIN — TREPROSTINIL 28 NG/KG/MIN: 100 INJECTION, SOLUTION INTRAVENOUS; SUBCUTANEOUS at 07:12

## 2017-12-25 RX ADMIN — ALBUTEROL SULFATE 2.5 MG: 2.5 SOLUTION RESPIRATORY (INHALATION) at 09:12

## 2017-12-25 RX ADMIN — HYDROCODONE BITARTRATE AND ACETAMINOPHEN 1 TABLET: 10; 325 TABLET ORAL at 06:12

## 2017-12-25 RX ADMIN — Medication 3 ML: at 02:12

## 2017-12-25 RX ADMIN — BUMETANIDE 4 MG: 1 TABLET ORAL at 08:12

## 2017-12-25 RX ADMIN — Medication 3 ML: at 06:12

## 2017-12-25 RX ADMIN — GABAPENTIN 100 MG: 100 CAPSULE ORAL at 06:12

## 2017-12-25 RX ADMIN — OXYCODONE HYDROCHLORIDE AND ACETAMINOPHEN 1 TABLET: 5; 325 TABLET ORAL at 10:12

## 2017-12-25 RX ADMIN — AMLODIPINE BESYLATE 5 MG: 5 TABLET ORAL at 08:12

## 2017-12-25 RX ADMIN — PANTOPRAZOLE SODIUM 40 MG: 40 TABLET, DELAYED RELEASE ORAL at 08:12

## 2017-12-25 NOTE — PLAN OF CARE
Problem: Patient Care Overview  Goal: Plan of Care Review  Outcome: Ongoing (interventions implemented as appropriate)  Pt remained free from injury during shift. VSS. Remodulin continues to infuse @ 28ng/kg/min (DW=85kg). PRN pain medication given for neck pain. Pt ambulating unit with no issues. Pt aware of plan of care. Call light in reach. Will continue to monitor.

## 2017-12-25 NOTE — PLAN OF CARE
Problem: Patient Care Overview  Goal: Plan of Care Review  Outcome: Ongoing (interventions implemented as appropriate)  Pt aao x 4. VSS. Bed in low locked pos call light within reach. Remodulin continued as ordered, 28 ng/kg/min. Pt on 3 L N/c, Bipap worn at night. Prn pain med given for neck pain. Pt ambulates and voids with independence.

## 2017-12-25 NOTE — PROGRESS NOTES
[facility]  Heart Transplant  Progress Note    Patient Name: Sue Smith  MRN: [id number]  Admission Date: 12/8/2017  Hospital Length of Stay: 17 days  Attending Physician: Shelby De La Paz MD  Primary Care Provider: Paddy Guerrier DO  Principal Problem:Pulmonary hypertension    Subjective:     Interval History: Feels well. Remodulin education continues    Continuous Infusions:   sodium chloride 0.9%      treprostinil (REMODULIN) infusion 28 ng/kg/min (12/24/17 1845)    veletri/remodulin cassette      veletri/remodulin tubing       Scheduled Meds:   albuterol sulfate  2.5 mg Nebulization Q4H    amLODIPine  5 mg Oral Daily    bumetanide  4 mg Oral BID    busPIRone  15 mg Oral TID    desvenlafaxine succinate  100 mg Oral Daily    fluticasone-vilanterol  1 puff Inhalation Daily    gabapentin  100 mg Oral TID    lamoTRIgine  100 mg Oral BID    levothyroxine  75 mcg Oral Daily    magnesium oxide  400 mg Oral BID    pantoprazole  40 mg Oral Daily    potassium chloride 10%  40 mEq Oral Once    potassium chloride SA  60 mEq Oral BID    pravastatin  40 mg Oral Daily    sodium chloride 0.9%  3 mL Intravenous Q8H    warfarin  7.5 mg Oral Daily     PRN Meds:acetaminophen, gelatin adsorbable 12-7 mm top sponge, hydrocodone-acetaminophen 10-325mg, loperamide, ondansetron, ondansetron, oxyCODONE-acetaminophen    Review of patient's allergies indicates:   Allergen Reactions    Sulfa (sulfonamide antibiotics) Rash     Objective:     Vital Signs (Most Recent):  Temp: 97.6 °F (36.4 °C) (12/25/17 0731)  Pulse: 80 (12/25/17 0731)  Resp: 20 (12/25/17 0731)  BP: 107/66 (12/25/17 0731)  SpO2: 96 % (12/25/17 0731) Vital Signs (24h Range):  Temp:  [96.4 °F (35.8 °C)-98.6 °F (37 °C)] 97.6 °F (36.4 °C)  Pulse:  [] 80  Resp:  [15-83] 20  SpO2:  [92 %-98 %] 96 %  BP: (105-120)/(59-74) 107/66     Patient Vitals for the past 72 hrs (Last 3 readings):   Weight   12/25/17 0400 85 kg (187 lb 6.3 oz)    12/24/17 0600 85.9 kg (189 lb 6 oz)   12/23/17 0500 85.5 kg (188 lb 7.9 oz)     Body mass index is 34.27 kg/m².      Intake/Output Summary (Last 24 hours) at 12/25/17 0756  Last data filed at 12/25/17 0436   Gross per 24 hour   Intake             2110 ml   Output             2425 ml   Net             -315 ml       Hemodynamic Parameters:           Physical Exam   Constitutional: She is oriented to person, place, and time. She appears well-developed and well-nourished.   HENT:   Head: Normocephalic and atraumatic.   Eyes: Conjunctivae and EOM are normal. Pupils are equal, round, and reactive to light.   Neck: Normal range of motion. Neck supple. No JVD present. No thyromegaly present.   Cardiovascular: Normal rate and regular rhythm.    Grade III/VI systolic murmur LSB   Pulmonary/Chest: Effort normal and breath sounds normal.   Abdominal: Soft. Bowel sounds are normal.   Musculoskeletal: Normal range of motion. She exhibits no edema.   Neurological: She is alert and oriented to person, place, and time.   Skin: Skin is warm and dry. Capillary refill takes less than 2 seconds.   Psychiatric: She has a normal mood and affect. Her behavior is normal. Judgment and thought content normal.       Significant Labs:  CBC:    Recent Labs  Lab 12/23/17 0345 12/24/17  0516 12/25/17  0400   WBC 5.23 4.37 4.30   RBC 3.95* 3.90* 3.70*   HGB 12.5 12.2 11.8*   HCT 36.9* 36.3* 35.8*   * 146* 150   MCV 93 93 97   MCH 31.6* 31.3* 31.9*   MCHC 33.9 33.6 33.0     BNP:    Recent Labs  Lab 12/18/17  0911   *     CMP:    Recent Labs  Lab 12/23/17  0345 12/24/17  0516 12/25/17  0400   GLU 91 82 78   CALCIUM 9.4 9.6 9.3   ALBUMIN 3.7 3.8 3.6   PROT 6.4 6.6 6.2    142 140   K 3.9 3.8 4.0   CO2 34* 29 30*   CL 99 103 102   BUN 18 20 20   CREATININE 1.0 1.0 1.1   ALKPHOS 87 86 81   ALT 23 21 22   AST 22 25 24   BILITOT 0.5 0.5 0.5      Coagulation:     Recent Labs  Lab 12/23/17  0345 12/24/17  0516 12/25/17  0400   INR 1.5*  1.4* 1.5*     LDH:  No results for input(s): LDH in the last 72 hours.  Microbiology:  Microbiology Results (last 7 days)     ** No results found for the last 168 hours. **          I have reviewed all pertinent labs within the past 24 hours.    Estimated Creatinine Clearance: 57.8 mL/min (based on SCr of 1.1 mg/dL).          Assessment and Plan:     57 yo female with Group I PAH w/ low CI and RV failure.  She has transitioned from Uptravi to Remodulin on this admission and is currently on 29ng/kg/min increased 12/16/17 as she awaits insurance approval.    * Pulmonary hypertension, group 1    - Severe PH with markedly reduced CI also severe RV dysfunction. UPTRAVI stopped and started on remodulin 12/10. She is currently on 28 ng/kg/min. Gomez placed. Awaiting final education of her caregiver in [place] and home supplies  - PASP 88 by TTE 12/8/17  Symptoms on 29 so decreased to 28 with resolution therein. Discussed with Dr. Cullen 12/18- will leave at this dose for now  ded down to 483 from 1489  - Continue Bumex 4 mg bid   - Continue Coumadin (started this admit - will need to be enrolled in Coumadin Clinic upon discharge)          Acute on chronic right heart failure    - Continue Bumex 4 mg bid          Chronic respiratory failure with hypoxia    - Continue supplemental O2 (on 3 LPM at home, currently requiring 3-4 LPM here)   - continue to wean as tolerated and monitor O2 sats  - BiPAP at night        GERD (gastroesophageal reflux disease)    Continue home medications.         Bipolar disorder    Continue home medications.         Dyslipidemia    Continue home medications.         Hypertension    - Continue Norvasc        Hypothyroidism    - [place] [id number]  Heart Transplant  [facility]

## 2017-12-25 NOTE — ASSESSMENT & PLAN NOTE
- Severe PH with markedly reduced CI also severe RV dysfunction. UPTRAVI stopped and started on remodulin 12/10. She is currently on 28 ng/kg/min. Gomez placed. Awaiting final education of her caregiver in [place] and home supplies  - PASP 88 by TTE 12/8/17  Symptoms on 29 so decreased to 28 with resolution therein. Discussed with Dr. Cullen 12/18- will leave at this dose for now  ded down to 483 from 1489  - Continue Bumex 4 mg bid   - Continue Coumadin (started this admit - will need to be enrolled in Coumadin Clinic upon discharge)

## 2017-12-25 NOTE — SUBJECTIVE & OBJECTIVE
Interval History: Feels well. Remodulin education continues    Continuous Infusions:   sodium chloride 0.9%      treprostinil (REMODULIN) infusion 28 ng/kg/min (12/24/17 1845)    veletri/remodulin cassette      veletri/remodulin tubing       Scheduled Meds:   albuterol sulfate  2.5 mg Nebulization Q4H    amLODIPine  5 mg Oral Daily    bumetanide  4 mg Oral BID    busPIRone  15 mg Oral TID    desvenlafaxine succinate  100 mg Oral Daily    fluticasone-vilanterol  1 puff Inhalation Daily    gabapentin  100 mg Oral TID    lamoTRIgine  100 mg Oral BID    levothyroxine  75 mcg Oral Daily    magnesium oxide  400 mg Oral BID    pantoprazole  40 mg Oral Daily    potassium chloride 10%  40 mEq Oral Once    potassium chloride SA  60 mEq Oral BID    pravastatin  40 mg Oral Daily    sodium chloride 0.9%  3 mL Intravenous Q8H    warfarin  7.5 mg Oral Daily     PRN Meds:acetaminophen, gelatin adsorbable 12-7 mm top sponge, hydrocodone-acetaminophen 10-325mg, loperamide, ondansetron, ondansetron, oxyCODONE-acetaminophen    Review of patient's allergies indicates:   Allergen Reactions    Sulfa (sulfonamide antibiotics) Rash     Objective:     Vital Signs (Most Recent):  Temp: 97.6 °F (36.4 °C) (12/25/17 0731)  Pulse: 80 (12/25/17 0731)  Resp: 20 (12/25/17 0731)  BP: 107/66 (12/25/17 0731)  SpO2: 96 % (12/25/17 0731) Vital Signs (24h Range):  Temp:  [96.4 °F (35.8 °C)-98.6 °F (37 °C)] 97.6 °F (36.4 °C)  Pulse:  [] 80  Resp:  [15-83] 20  SpO2:  [92 %-98 %] 96 %  BP: (105-120)/(59-74) 107/66     Patient Vitals for the past 72 hrs (Last 3 readings):   Weight   12/25/17 0400 85 kg (187 lb 6.3 oz)   12/24/17 0600 85.9 kg (189 lb 6 oz)   12/23/17 0500 85.5 kg (188 lb 7.9 oz)     Body mass index is 34.27 kg/m².      Intake/Output Summary (Last 24 hours) at 12/25/17 075  Last data filed at 12/25/17 0436   Gross per 24 hour   Intake             2110 ml   Output             2425 ml   Net             -315 ml        Hemodynamic Parameters:           Physical Exam   Constitutional: She is oriented to person, place, and time. She appears well-developed and well-nourished.   HENT:   Head: Normocephalic and atraumatic.   Eyes: Conjunctivae and EOM are normal. Pupils are equal, round, and reactive to light.   Neck: Normal range of motion. Neck supple. No JVD present. No thyromegaly present.   Cardiovascular: Normal rate and regular rhythm.    Grade III/VI systolic murmur LSB   Pulmonary/Chest: Effort normal and breath sounds normal.   Abdominal: Soft. Bowel sounds are normal.   Musculoskeletal: Normal range of motion. She exhibits no edema.   Neurological: She is alert and oriented to person, place, and time.   Skin: Skin is warm and dry. Capillary refill takes less than 2 seconds.   Psychiatric: She has a normal mood and affect. Her behavior is normal. Judgment and thought content normal.       Significant Labs:  CBC:    Recent Labs  Lab 12/23/17 0345 12/24/17  0516 12/25/17  0400   WBC 5.23 4.37 4.30   RBC 3.95* 3.90* 3.70*   HGB 12.5 12.2 11.8*   HCT 36.9* 36.3* 35.8*   * 146* 150   MCV 93 93 97   MCH 31.6* 31.3* 31.9*   MCHC 33.9 33.6 33.0     BNP:    Recent Labs  Lab 12/18/17  0911   *     CMP:    Recent Labs  Lab 12/23/17  0345 12/24/17  0516 12/25/17  0400   GLU 91 82 78   CALCIUM 9.4 9.6 9.3   ALBUMIN 3.7 3.8 3.6   PROT 6.4 6.6 6.2    142 140   K 3.9 3.8 4.0   CO2 34* 29 30*   CL 99 103 102   BUN 18 20 20   CREATININE 1.0 1.0 1.1   ALKPHOS 87 86 81   ALT 23 21 22   AST 22 25 24   BILITOT 0.5 0.5 0.5      Coagulation:     Recent Labs  Lab 12/23/17  0345 12/24/17  0516 12/25/17  0400   INR 1.5* 1.4* 1.5*     LDH:  No results for input(s): LDH in the last 72 hours.  Microbiology:  Microbiology Results (last 7 days)     ** No results found for the last 168 hours. **          I have reviewed all pertinent labs within the past 24 hours.    Estimated Creatinine Clearance: 57.8 mL/min (based on SCr of 1.1  mg/dL).

## 2017-12-26 DIAGNOSIS — I27.20 PULMONARY HYPERTENSION: Primary | ICD-10-CM

## 2017-12-26 LAB
ALBUMIN SERPL BCP-MCNC: 3.6 G/DL
ALP SERPL-CCNC: 86 U/L
ALT SERPL W/O P-5'-P-CCNC: 23 U/L
ANION GAP SERPL CALC-SCNC: 10 MMOL/L
AST SERPL-CCNC: 24 U/L
BASOPHILS # BLD AUTO: 0.03 K/UL
BASOPHILS NFR BLD: 0.7 %
BILIRUB SERPL-MCNC: 0.5 MG/DL
BUN SERPL-MCNC: 18 MG/DL
CALCIUM SERPL-MCNC: 9.1 MG/DL
CHLORIDE SERPL-SCNC: 103 MMOL/L
CO2 SERPL-SCNC: 27 MMOL/L
CREAT SERPL-MCNC: 0.9 MG/DL
DIFFERENTIAL METHOD: ABNORMAL
EOSINOPHIL # BLD AUTO: 0.1 K/UL
EOSINOPHIL NFR BLD: 2.5 %
ERYTHROCYTE [DISTWIDTH] IN BLOOD BY AUTOMATED COUNT: 15.9 %
EST. GFR  (AFRICAN AMERICAN): >60 ML/MIN/1.73 M^2
EST. GFR  (NON AFRICAN AMERICAN): >60 ML/MIN/1.73 M^2
GLUCOSE SERPL-MCNC: 87 MG/DL
HCT VFR BLD AUTO: 35.4 %
HGB BLD-MCNC: 11.7 G/DL
IMM GRANULOCYTES # BLD AUTO: 0.04 K/UL
IMM GRANULOCYTES NFR BLD AUTO: 0.9 %
INR PPP: 1.6
LYMPHOCYTES # BLD AUTO: 0.7 K/UL
LYMPHOCYTES NFR BLD: 16.2 %
MAGNESIUM SERPL-MCNC: 1.9 MG/DL
MCH RBC QN AUTO: 31.7 PG
MCHC RBC AUTO-ENTMCNC: 33.1 G/DL
MCV RBC AUTO: 96 FL
MONOCYTES # BLD AUTO: 0.4 K/UL
MONOCYTES NFR BLD: 8.7 %
NEUTROPHILS # BLD AUTO: 3.1 K/UL
NEUTROPHILS NFR BLD: 71 %
NRBC BLD-RTO: 0 /100 WBC
PLATELET # BLD AUTO: 143 K/UL
PMV BLD AUTO: 11.7 FL
POTASSIUM SERPL-SCNC: 3.8 MMOL/L
PROT SERPL-MCNC: 6.2 G/DL
PROTHROMBIN TIME: 16.5 SEC
RBC # BLD AUTO: 3.69 M/UL
SODIUM SERPL-SCNC: 140 MMOL/L
WBC # BLD AUTO: 4.37 K/UL

## 2017-12-26 PROCEDURE — 25000003 PHARM REV CODE 250: Performed by: NURSE PRACTITIONER

## 2017-12-26 PROCEDURE — 83735 ASSAY OF MAGNESIUM: CPT

## 2017-12-26 PROCEDURE — 36415 COLL VENOUS BLD VENIPUNCTURE: CPT

## 2017-12-26 PROCEDURE — 20600001 HC STEP DOWN PRIVATE ROOM

## 2017-12-26 PROCEDURE — A4216 STERILE WATER/SALINE, 10 ML: HCPCS | Performed by: INTERNAL MEDICINE

## 2017-12-26 PROCEDURE — 25000003 PHARM REV CODE 250: Performed by: PHYSICIAN ASSISTANT

## 2017-12-26 PROCEDURE — 25000003 PHARM REV CODE 250: Performed by: INTERNAL MEDICINE

## 2017-12-26 PROCEDURE — 94640 AIRWAY INHALATION TREATMENT: CPT

## 2017-12-26 PROCEDURE — 25000242 PHARM REV CODE 250 ALT 637 W/ HCPCS: Performed by: INTERNAL MEDICINE

## 2017-12-26 PROCEDURE — 99231 SBSQ HOSP IP/OBS SF/LOW 25: CPT | Mod: GV,,, | Performed by: INTERNAL MEDICINE

## 2017-12-26 PROCEDURE — 85025 COMPLETE CBC W/AUTO DIFF WBC: CPT

## 2017-12-26 PROCEDURE — 25000003 PHARM REV CODE 250: Performed by: STUDENT IN AN ORGANIZED HEALTH CARE EDUCATION/TRAINING PROGRAM

## 2017-12-26 PROCEDURE — 63600175 PHARM REV CODE 636 W HCPCS: Performed by: INTERNAL MEDICINE

## 2017-12-26 PROCEDURE — 99900035 HC TECH TIME PER 15 MIN (STAT)

## 2017-12-26 PROCEDURE — 85610 PROTHROMBIN TIME: CPT

## 2017-12-26 PROCEDURE — 80053 COMPREHEN METABOLIC PANEL: CPT

## 2017-12-26 PROCEDURE — 27000221 HC OXYGEN, UP TO 24 HOURS

## 2017-12-26 RX ORDER — LANOLIN ALCOHOL/MO/W.PET/CERES
400 CREAM (GRAM) TOPICAL 2 TIMES DAILY
Refills: 0 | COMMUNITY
Start: 2017-12-26 | End: 2018-06-22

## 2017-12-26 RX ORDER — WARFARIN SODIUM 5 MG/1
10 TABLET ORAL DAILY
Qty: 60 TABLET | Refills: 11 | Status: ON HOLD | OUTPATIENT
Start: 2017-12-26 | End: 2018-01-11

## 2017-12-26 RX ORDER — ONDANSETRON 8 MG/1
8 TABLET, ORALLY DISINTEGRATING ORAL EVERY 8 HOURS PRN
Qty: 30 TABLET | Refills: 6 | Status: SHIPPED | OUTPATIENT
Start: 2017-12-26

## 2017-12-26 RX ORDER — POTASSIUM CHLORIDE 20 MEQ/1
60 TABLET, EXTENDED RELEASE ORAL 2 TIMES DAILY
Qty: 180 TABLET | Refills: 6 | Status: ON HOLD | OUTPATIENT
Start: 2017-12-26 | End: 2018-02-23

## 2017-12-26 RX ADMIN — MAGNESIUM OXIDE TAB 400 MG (241.3 MG ELEMENTAL MG) 400 MG: 400 (241.3 MG) TAB at 09:12

## 2017-12-26 RX ADMIN — ALBUTEROL SULFATE 2.5 MG: 2.5 SOLUTION RESPIRATORY (INHALATION) at 11:12

## 2017-12-26 RX ADMIN — FLUTICASONE FUROATE AND VILANTEROL TRIFENATATE 1 PUFF: 100; 25 POWDER RESPIRATORY (INHALATION) at 08:12

## 2017-12-26 RX ADMIN — ALBUTEROL SULFATE 2.5 MG: 2.5 SOLUTION RESPIRATORY (INHALATION) at 08:12

## 2017-12-26 RX ADMIN — GABAPENTIN 100 MG: 100 CAPSULE ORAL at 02:12

## 2017-12-26 RX ADMIN — Medication 3 ML: at 02:12

## 2017-12-26 RX ADMIN — TREPROSTINIL 28 NG/KG/MIN: 100 INJECTION, SOLUTION INTRAVENOUS; SUBCUTANEOUS at 09:12

## 2017-12-26 RX ADMIN — AMLODIPINE BESYLATE 5 MG: 5 TABLET ORAL at 08:12

## 2017-12-26 RX ADMIN — BUSPIRONE HYDROCHLORIDE 15 MG: 5 TABLET ORAL at 05:12

## 2017-12-26 RX ADMIN — Medication: at 09:12

## 2017-12-26 RX ADMIN — BUSPIRONE HYDROCHLORIDE 15 MG: 5 TABLET ORAL at 09:12

## 2017-12-26 RX ADMIN — Medication 3 ML: at 09:12

## 2017-12-26 RX ADMIN — LAMOTRIGINE 100 MG: 100 TABLET ORAL at 09:12

## 2017-12-26 RX ADMIN — OXYCODONE HYDROCHLORIDE AND ACETAMINOPHEN 1 TABLET: 5; 325 TABLET ORAL at 09:12

## 2017-12-26 RX ADMIN — GABAPENTIN 100 MG: 100 CAPSULE ORAL at 09:12

## 2017-12-26 RX ADMIN — BUMETANIDE 4 MG: 1 TABLET ORAL at 05:12

## 2017-12-26 RX ADMIN — MAGNESIUM OXIDE TAB 400 MG (241.3 MG ELEMENTAL MG) 400 MG: 400 (241.3 MG) TAB at 08:12

## 2017-12-26 RX ADMIN — ALBUTEROL SULFATE 2.5 MG: 2.5 SOLUTION RESPIRATORY (INHALATION) at 03:12

## 2017-12-26 RX ADMIN — OXYCODONE HYDROCHLORIDE AND ACETAMINOPHEN 1 TABLET: 5; 325 TABLET ORAL at 06:12

## 2017-12-26 RX ADMIN — PRAVASTATIN SODIUM 40 MG: 40 TABLET ORAL at 08:12

## 2017-12-26 RX ADMIN — GABAPENTIN 100 MG: 100 CAPSULE ORAL at 05:12

## 2017-12-26 RX ADMIN — BUMETANIDE 4 MG: 1 TABLET ORAL at 08:12

## 2017-12-26 RX ADMIN — PANTOPRAZOLE SODIUM 40 MG: 40 TABLET, DELAYED RELEASE ORAL at 08:12

## 2017-12-26 RX ADMIN — WARFARIN SODIUM 10 MG: 5 TABLET ORAL at 05:12

## 2017-12-26 RX ADMIN — HYDROCODONE BITARTRATE AND ACETAMINOPHEN 1 TABLET: 10; 325 TABLET ORAL at 12:12

## 2017-12-26 RX ADMIN — BUSPIRONE HYDROCHLORIDE 15 MG: 5 TABLET ORAL at 02:12

## 2017-12-26 RX ADMIN — ALBUTEROL SULFATE 2.5 MG: 2.5 SOLUTION RESPIRATORY (INHALATION) at 04:12

## 2017-12-26 RX ADMIN — HYDROCODONE BITARTRATE AND ACETAMINOPHEN 1 TABLET: 10; 325 TABLET ORAL at 01:12

## 2017-12-26 RX ADMIN — LEVOTHYROXINE SODIUM 75 MCG: 75 TABLET ORAL at 05:12

## 2017-12-26 RX ADMIN — DESVENLAFAXINE SUCCINATE 100 MG: 50 TABLET, FILM COATED, EXTENDED RELEASE ORAL at 08:12

## 2017-12-26 RX ADMIN — POTASSIUM CHLORIDE 60 MEQ: 1500 TABLET, EXTENDED RELEASE ORAL at 08:12

## 2017-12-26 RX ADMIN — POTASSIUM CHLORIDE 60 MEQ: 1500 TABLET, EXTENDED RELEASE ORAL at 09:12

## 2017-12-26 NOTE — PLAN OF CARE
[facility]   Heart Transplant Clinic  [address] [place] [address]   [phone] after hours        HOME  HEALTH ORDERS      Admit to Home Health    Diagnosis:   Patient Active Problem List   Diagnosis    Pulmonary hypertension, group 1    Chronic respiratory failure with hypoxia    Hypothyroidism    Hypertension    Dyslipidemia    Bipolar disorder    Acute on chronic right heart failure    Hx of tracheostomy    GERD (gastroesophageal reflux disease)    Acute on chronic diastolic heart failure    Cardiomegaly    Pulmonary nodules    Chronic pulmonary heart disease       Patient is homebound due to:  Debility    Diet: Cardiac diet with 1500 cc fluid restriction    Acitivities: As tolerated    Nursing:   SN to complete comprehensive assessment including routine vital signs. Instruct on disease process and s/s of complications to report to MD. Review/verify medication list sent home with the patient at time of discharge  and instruct patient/caregiver as needed. Frequency may be adjusted depending on start of care date.    Notify MD if SBP > 160 or < 90; DBP > 90 or < 50; HR > 120 or < 50; Temp > 101; Weight gain >3lbs in 1 day or 5lbs in 1 week.      LABS:  SN to perform labs: INR starting Thursday, 12/28/17, then as directed by Coumadin Clinic       HOME INFUSION THERAPY:    SN to perform Central Line Care.  Review Central Line Care          **For questions or concerns, please call [phone] and ask for Pre-Heart transplant clinic, M-F 8-5. After hours, weekends, call [phone] and ask for the Heart Transplant Cardiologist on call.**      Central :   - Sterile dressing changes are done weekly and as needed.   - Use chlor-hexadine scrub to cleanse site, apply Biopatch to insertion site,       apply securement device dressing   - If sterile gauze is under dressing to control oozing,                 dressing change must be  performed every 24 hours until gauze is not needed.        Send initial Home Health orders to HTS attending physician on call.  Send follow up questions to [phone] or fax:                    Pulmonary Hypertension    [phone]

## 2017-12-26 NOTE — ASSESSMENT & PLAN NOTE
- Severe PH with markedly reduced CI also severe RV dysfunction. UPTRAVI stopped and started on remodulin 12/10. She is currently on 28 ng/kg/min. Gomze placed. Awaiting final education of her caregiver in [place] and home supplies  - PASP 88 by TTE 12/8/17  Symptoms on 29 so decreased to 28 with resolution therein. Discussed with Dr. Cullen 12/18- will leave at this dose for now  ded down to 483 from 1489  - Continue Bumex 4 mg bid   - Continue Coumadin (started this admit - will need to be enrolled in Coumadin Clinic upon discharge)

## 2017-12-26 NOTE — PLAN OF CARE
Problem: Patient Care Overview  Goal: Plan of Care Review  Outcome: Ongoing (interventions implemented as appropriate)  Pt remained free from injury during shift. VSS. Pt met with Remodulin rep from [facility]. Mixing medication. Pt verbalized understanding and competency with med mixing per rep. Pain controlled with 10mg Lortab. Ambulating with no issues. Remains on 3L NC and Bipap at night. Appetite good. Pt running NS on Tele. Call light in reach. Will continue to monitor.

## 2017-12-26 NOTE — PLAN OF CARE
[facility]   Heart Transplant Clinic  [address] [place] [address]   [phone] after hours        HOME  HEALTH ORDERS      Admit to Home Health    Diagnosis:   Patient Active Problem List   Diagnosis    Pulmonary hypertension, group 1    Chronic respiratory failure with hypoxia    Hypothyroidism    Hypertension    Dyslipidemia    Bipolar disorder    Acute on chronic right heart failure    Hx of tracheostomy    GERD (gastroesophageal reflux disease)    Acute on chronic diastolic heart failure    Cardiomegaly    Pulmonary nodules    Chronic pulmonary heart disease       Patient is homebound due to:  Debility    Diet: Cardiac diet with 1500 cc fluid restriction    Acitivities: As tolerated    Nursing:   SN to complete comprehensive assessment including routine vital signs. Instruct on disease process and s/s of complications to report to MD. Review/verify medication list sent home with the patient at time of discharge  and instruct patient/caregiver as needed. Frequency may be adjusted depending on start of care date.    Notify MD if SBP > 160 or < 90; DBP > 90 or < 50; HR > 120 or < 50; Temp > 101; Weight gain >3lbs in 1 day or 5lbs in 1 week.    Labs: INR starting Thursday, 12/28/17, then as directed by Coumadin Clinic        HOME INFUSION THERAPY:   SN to perform Central Line Care.  Review Central Line Care      Central :   - Sterile dressing changes are done weekly and as needed.   - Use chlor-hexadine scrub to cleanse site, apply Biopatch to insertion site,       apply securement device dressing   - Posi-flow caps are changed weekly and after EVERY lab draw.   - If sterile gauze is under dressing to control oozing,                 dressing change must be performed every 24 hours until gauze is not needed.              Send initial Home Health orders to [facility] attending physician on call.  Send follow up questions to [phone]  or fax:                      Pulmonary Hypertension           [phone]

## 2017-12-26 NOTE — SUBJECTIVE & OBJECTIVE
Interval History: Feels well; no overnight events    Continuous Infusions:   sodium chloride 0.9%      treprostinil (REMODULIN) infusion 28 ng/kg/min (12/25/17 1939)    veletri/remodulin cassette      veletri/remodulin tubing       Scheduled Meds:   albuterol sulfate  2.5 mg Nebulization Q4H    amLODIPine  5 mg Oral Daily    bumetanide  4 mg Oral BID    busPIRone  15 mg Oral TID    desvenlafaxine succinate  100 mg Oral Daily    fluticasone-vilanterol  1 puff Inhalation Daily    gabapentin  100 mg Oral TID    lamoTRIgine  100 mg Oral BID    levothyroxine  75 mcg Oral Daily    magnesium oxide  400 mg Oral BID    pantoprazole  40 mg Oral Daily    potassium chloride 10%  40 mEq Oral Once    potassium chloride SA  60 mEq Oral BID    pravastatin  40 mg Oral Daily    sodium chloride 0.9%  3 mL Intravenous Q8H    warfarin  10 mg Oral Daily     PRN Meds:acetaminophen, gelatin adsorbable 12-7 mm top sponge, hydrocodone-acetaminophen 10-325mg, loperamide, ondansetron, ondansetron, oxyCODONE-acetaminophen    Review of patient's allergies indicates:   Allergen Reactions    Sulfa (sulfonamide antibiotics) Rash     Objective:     Vital Signs (Most Recent):  Temp: 97.6 °F (36.4 °C) (12/26/17 0712)  Pulse: 81 (12/26/17 0712)  Resp: 18 (12/26/17 0712)  BP: 111/71 (12/26/17 0712)  SpO2: 99 % (12/26/17 0712) Vital Signs (24h Range):  Temp:  [97.5 °F (36.4 °C)-98.7 °F (37.1 °C)] 97.6 °F (36.4 °C)  Pulse:  [] 81  Resp:  [16-20] 18  SpO2:  [93 %-99 %] 99 %  BP: ()/(52-71) 111/71     Patient Vitals for the past 72 hrs (Last 3 readings):   Weight   12/26/17 0400 85.3 kg (188 lb 0.8 oz)   12/25/17 0400 85 kg (187 lb 6.3 oz)   12/24/17 0600 85.9 kg (189 lb 6 oz)     Body mass index is 34.4 kg/m².      Intake/Output Summary (Last 24 hours) at 12/26/17 9156  Last data filed at 12/26/17 0400   Gross per 24 hour   Intake             1700 ml   Output             2425 ml   Net             -725 ml       Hemodynamic  Parameters:           Physical Exam   Constitutional: She is oriented to person, place, and time. She appears well-developed and well-nourished.   HENT:   Head: Normocephalic and atraumatic.   Eyes: Conjunctivae and EOM are normal. Pupils are equal, round, and reactive to light.   Neck: Normal range of motion. Neck supple. No JVD present. No thyromegaly present.   Cardiovascular: Normal rate and regular rhythm.    Grade III/VI systolic murmur LSB   Pulmonary/Chest: Effort normal and breath sounds normal.   Abdominal: Soft. Bowel sounds are normal.   Musculoskeletal: Normal range of motion. She exhibits no edema.   Neurological: She is alert and oriented to person, place, and time.   Skin: Skin is warm and dry. Capillary refill takes less than 2 seconds.   Psychiatric: She has a normal mood and affect. Her behavior is normal. Judgment and thought content normal.       Significant Labs:  CBC:    Recent Labs  Lab 12/24/17 0516 12/25/17  0400 12/26/17  0505   WBC 4.37 4.30 4.37   RBC 3.90* 3.70* 3.69*   HGB 12.2 11.8* 11.7*   HCT 36.3* 35.8* 35.4*   * 150 143*   MCV 93 97 96   MCH 31.3* 31.9* 31.7*   MCHC 33.6 33.0 33.1     BNP:  No results for input(s): BNP in the last 168 hours.    Invalid input(s): BNPTRIAGELBLO  CMP:    Recent Labs  Lab 12/24/17  0516 12/25/17  0400 12/26/17  0505   GLU 82 78 87   CALCIUM 9.6 9.3 9.1   ALBUMIN 3.8 3.6 3.6   PROT 6.6 6.2 6.2    140 140   K 3.8 4.0 3.8   CO2 29 30* 27    102 103   BUN 20 20 18   CREATININE 1.0 1.1 0.9   ALKPHOS 86 81 86   ALT 21 22 23   AST 25 24 24   BILITOT 0.5 0.5 0.5      Coagulation:     Recent Labs  Lab 12/24/17  0516 12/25/17  0400 12/26/17  0504   INR 1.4* 1.5* 1.6*     LDH:  No results for input(s): LDH in the last 72 hours.  Microbiology:  Microbiology Results (last 7 days)     ** No results found for the last 168 hours. **          I have reviewed all pertinent labs within the past 24 hours.    Estimated Creatinine Clearance: 70.7 mL/min  (based on SCr of 0.9 mg/dL).

## 2017-12-26 NOTE — PROGRESS NOTES
[facility]  Heart Transplant  Progress Note    Patient Name: Sue Smith  MRN: [id number]  Admission Date: 12/8/2017  Hospital Length of Stay: 18 days  Attending Physician: Shelby De La Paz MD  Primary Care Provider: Paddy Guerrier DO  Principal Problem:Pulmonary hypertension    Subjective:     Interval History: Feels well; no overnight events    Continuous Infusions:   sodium chloride 0.9%      treprostinil (REMODULIN) infusion 28 ng/kg/min (12/25/17 1939)    veletri/remodulin cassette      veletri/remodulin tubing       Scheduled Meds:   albuterol sulfate  2.5 mg Nebulization Q4H    amLODIPine  5 mg Oral Daily    bumetanide  4 mg Oral BID    busPIRone  15 mg Oral TID    desvenlafaxine succinate  100 mg Oral Daily    fluticasone-vilanterol  1 puff Inhalation Daily    gabapentin  100 mg Oral TID    lamoTRIgine  100 mg Oral BID    levothyroxine  75 mcg Oral Daily    magnesium oxide  400 mg Oral BID    pantoprazole  40 mg Oral Daily    potassium chloride 10%  40 mEq Oral Once    potassium chloride SA  60 mEq Oral BID    pravastatin  40 mg Oral Daily    sodium chloride 0.9%  3 mL Intravenous Q8H    warfarin  10 mg Oral Daily     PRN Meds:acetaminophen, gelatin adsorbable 12-7 mm top sponge, hydrocodone-acetaminophen 10-325mg, loperamide, ondansetron, ondansetron, oxyCODONE-acetaminophen    Review of patient's allergies indicates:   Allergen Reactions    Sulfa (sulfonamide antibiotics) Rash     Objective:     Vital Signs (Most Recent):  Temp: 97.6 °F (36.4 °C) (12/26/17 0712)  Pulse: 81 (12/26/17 0712)  Resp: 18 (12/26/17 0712)  BP: 111/71 (12/26/17 0712)  SpO2: 99 % (12/26/17 0712) Vital Signs (24h Range):  Temp:  [97.5 °F (36.4 °C)-98.7 °F (37.1 °C)] 97.6 °F (36.4 °C)  Pulse:  [] 81  Resp:  [16-20] 18  SpO2:  [93 %-99 %] 99 %  BP: ()/(52-71) 111/71     Patient Vitals for the past 72 hrs (Last 3 readings):   Weight   12/26/17 0400 85.3 kg (188 lb 0.8 oz)   12/25/17 0400  85 kg (187 lb 6.3 oz)   12/24/17 0600 85.9 kg (189 lb 6 oz)     Body mass index is 34.4 kg/m².      Intake/Output Summary (Last 24 hours) at 12/26/17 0758  Last data filed at 12/26/17 0400   Gross per 24 hour   Intake             1700 ml   Output             2425 ml   Net             -725 ml       Hemodynamic Parameters:           Physical Exam   Constitutional: She is oriented to person, place, and time. She appears well-developed and well-nourished.   HENT:   Head: Normocephalic and atraumatic.   Eyes: Conjunctivae and EOM are normal. Pupils are equal, round, and reactive to light.   Neck: Normal range of motion. Neck supple. No JVD present. No thyromegaly present.   Cardiovascular: Normal rate and regular rhythm.    Grade III/VI systolic murmur LSB   Pulmonary/Chest: Effort normal and breath sounds normal.   Abdominal: Soft. Bowel sounds are normal.   Musculoskeletal: Normal range of motion. She exhibits no edema.   Neurological: She is alert and oriented to person, place, and time.   Skin: Skin is warm and dry. Capillary refill takes less than 2 seconds.   Psychiatric: She has a normal mood and affect. Her behavior is normal. Judgment and thought content normal.       Significant Labs:  CBC:    Recent Labs  Lab 12/24/17  0516 12/25/17  0400 12/26/17  0505   WBC 4.37 4.30 4.37   RBC 3.90* 3.70* 3.69*   HGB 12.2 11.8* 11.7*   HCT 36.3* 35.8* 35.4*   * 150 143*   MCV 93 97 96   MCH 31.3* 31.9* 31.7*   MCHC 33.6 33.0 33.1     BNP:  No results for input(s): BNP in the last 168 hours.    Invalid input(s): BNPTRIAGELBLO  CMP:    Recent Labs  Lab 12/24/17  0516 12/25/17  0400 12/26/17  0505   GLU 82 78 87   CALCIUM 9.6 9.3 9.1   ALBUMIN 3.8 3.6 3.6   PROT 6.6 6.2 6.2    140 140   K 3.8 4.0 3.8   CO2 29 30* 27    102 103   BUN 20 20 18   CREATININE 1.0 1.1 0.9   ALKPHOS 86 81 86   ALT 21 22 23   AST 25 24 24   BILITOT 0.5 0.5 0.5      Coagulation:     Recent Labs  Lab 12/24/17  0516 12/25/17  0400  12/26/17  0504   INR 1.4* 1.5* 1.6*     LDH:  No results for input(s): LDH in the last 72 hours.  Microbiology:  Microbiology Results (last 7 days)     ** No results found for the last 168 hours. **          I have reviewed all pertinent labs within the past 24 hours.    Estimated Creatinine Clearance: 70.7 mL/min (based on SCr of 0.9 mg/dL).          Assessment and Plan:     55 yo female with Group I PAH w/ low CI and RV failure.  She has transitioned from Uptravi to Remodulin on this admission and is currently on 29ng/kg/min increased 12/16/17 as she awaits insurance approval.    * Pulmonary hypertension, group 1    - Severe PH with markedly reduced CI also severe RV dysfunction. UPTRAVI stopped and started on remodulin 12/10. She is currently on 28 ng/kg/min. Gomez placed. Awaiting final education of her caregiver in [place] and home supplies  - PASP 88 by TTE 12/8/17  Symptoms on 29 so decreased to 28 with resolution therein. Discussed with Dr. Cullen 12/18- will leave at this dose for now  ded down to 483 from 1489  - Continue Bumex 4 mg bid   - Continue Coumadin (started this admit - will need to be enrolled in Coumadin Clinic upon discharge)          Acute on chronic right heart failure    - Continue Bumex 4 mg bid          Chronic respiratory failure with hypoxia    - Continue supplemental O2 (on 3 LPM at home, currently requiring 3-4 LPM here)   - continue to wean as tolerated and monitor O2 sats  - BiPAP at night        GERD (gastroesophageal reflux disease)    Continue home medications.         Bipolar disorder    Continue home medications.         Dyslipidemia    Continue home medications.         Hypertension    - Continue Norvasc        Hypothyroidism    - [place] [id number]  Heart Transplant  [facility]

## 2017-12-27 VITALS
BODY MASS INDEX: 34.61 KG/M2 | HEART RATE: 105 BPM | HEIGHT: 62 IN | WEIGHT: 188.06 LBS | RESPIRATION RATE: 17 BRPM | TEMPERATURE: 98 F | SYSTOLIC BLOOD PRESSURE: 123 MMHG | OXYGEN SATURATION: 88 % | DIASTOLIC BLOOD PRESSURE: 57 MMHG

## 2017-12-27 LAB
ALBUMIN SERPL BCP-MCNC: 3.5 G/DL
ALP SERPL-CCNC: 90 U/L
ALT SERPL W/O P-5'-P-CCNC: 20 U/L
ANION GAP SERPL CALC-SCNC: 12 MMOL/L
AST SERPL-CCNC: 20 U/L
BASOPHILS # BLD AUTO: 0.02 K/UL
BASOPHILS NFR BLD: 0.5 %
BILIRUB SERPL-MCNC: 0.4 MG/DL
BUN SERPL-MCNC: 19 MG/DL
CALCIUM SERPL-MCNC: 9.5 MG/DL
CHLORIDE SERPL-SCNC: 102 MMOL/L
CO2 SERPL-SCNC: 28 MMOL/L
CREAT SERPL-MCNC: 0.9 MG/DL
DIFFERENTIAL METHOD: ABNORMAL
EOSINOPHIL # BLD AUTO: 0.1 K/UL
EOSINOPHIL NFR BLD: 3 %
ERYTHROCYTE [DISTWIDTH] IN BLOOD BY AUTOMATED COUNT: 16 %
EST. GFR  (AFRICAN AMERICAN): >60 ML/MIN/1.73 M^2
EST. GFR  (NON AFRICAN AMERICAN): >60 ML/MIN/1.73 M^2
GLUCOSE SERPL-MCNC: 82 MG/DL
HCT VFR BLD AUTO: 34.6 %
HGB BLD-MCNC: 11.3 G/DL
IMM GRANULOCYTES # BLD AUTO: 0.03 K/UL
IMM GRANULOCYTES NFR BLD AUTO: 0.8 %
INR PPP: 1.9
LYMPHOCYTES # BLD AUTO: 0.7 K/UL
LYMPHOCYTES NFR BLD: 17.5 %
MAGNESIUM SERPL-MCNC: 2 MG/DL
MCH RBC QN AUTO: 30.5 PG
MCHC RBC AUTO-ENTMCNC: 32.7 G/DL
MCV RBC AUTO: 93 FL
MONOCYTES # BLD AUTO: 0.4 K/UL
MONOCYTES NFR BLD: 9 %
NEUTROPHILS # BLD AUTO: 2.8 K/UL
NEUTROPHILS NFR BLD: 69.2 %
NRBC BLD-RTO: 0 /100 WBC
PLATELET # BLD AUTO: 158 K/UL
PMV BLD AUTO: 11.9 FL
POTASSIUM SERPL-SCNC: 3.7 MMOL/L
PROT SERPL-MCNC: 6.1 G/DL
PROTHROMBIN TIME: 19.1 SEC
RBC # BLD AUTO: 3.71 M/UL
SODIUM SERPL-SCNC: 142 MMOL/L
WBC # BLD AUTO: 4 K/UL

## 2017-12-27 PROCEDURE — 25000003 PHARM REV CODE 250: Performed by: INTERNAL MEDICINE

## 2017-12-27 PROCEDURE — A4216 STERILE WATER/SALINE, 10 ML: HCPCS | Performed by: INTERNAL MEDICINE

## 2017-12-27 PROCEDURE — 25000003 PHARM REV CODE 250: Performed by: STUDENT IN AN ORGANIZED HEALTH CARE EDUCATION/TRAINING PROGRAM

## 2017-12-27 PROCEDURE — 27000221 HC OXYGEN, UP TO 24 HOURS

## 2017-12-27 PROCEDURE — 25000242 PHARM REV CODE 250 ALT 637 W/ HCPCS: Performed by: INTERNAL MEDICINE

## 2017-12-27 PROCEDURE — 85610 PROTHROMBIN TIME: CPT

## 2017-12-27 PROCEDURE — 94640 AIRWAY INHALATION TREATMENT: CPT

## 2017-12-27 PROCEDURE — 94660 CPAP INITIATION&MGMT: CPT

## 2017-12-27 PROCEDURE — 94761 N-INVAS EAR/PLS OXIMETRY MLT: CPT

## 2017-12-27 PROCEDURE — 99238 HOSP IP/OBS DSCHRG MGMT 30/<: CPT | Mod: GV,,, | Performed by: INTERNAL MEDICINE

## 2017-12-27 PROCEDURE — 83735 ASSAY OF MAGNESIUM: CPT

## 2017-12-27 PROCEDURE — 85025 COMPLETE CBC W/AUTO DIFF WBC: CPT

## 2017-12-27 PROCEDURE — 80053 COMPREHEN METABOLIC PANEL: CPT

## 2017-12-27 PROCEDURE — 99900035 HC TECH TIME PER 15 MIN (STAT)

## 2017-12-27 PROCEDURE — 25000003 PHARM REV CODE 250: Performed by: NURSE PRACTITIONER

## 2017-12-27 PROCEDURE — 36415 COLL VENOUS BLD VENIPUNCTURE: CPT

## 2017-12-27 RX ADMIN — ALBUTEROL SULFATE 2.5 MG: 2.5 SOLUTION RESPIRATORY (INHALATION) at 10:12

## 2017-12-27 RX ADMIN — DESVENLAFAXINE SUCCINATE 100 MG: 50 TABLET, FILM COATED, EXTENDED RELEASE ORAL at 09:12

## 2017-12-27 RX ADMIN — LAMOTRIGINE 100 MG: 100 TABLET ORAL at 09:12

## 2017-12-27 RX ADMIN — Medication 3 ML: at 05:12

## 2017-12-27 RX ADMIN — HYDROCODONE BITARTRATE AND ACETAMINOPHEN 1 TABLET: 10; 325 TABLET ORAL at 09:12

## 2017-12-27 RX ADMIN — BUMETANIDE 4 MG: 1 TABLET ORAL at 09:12

## 2017-12-27 RX ADMIN — BUSPIRONE HYDROCHLORIDE 15 MG: 5 TABLET ORAL at 05:12

## 2017-12-27 RX ADMIN — GABAPENTIN 100 MG: 100 CAPSULE ORAL at 05:12

## 2017-12-27 RX ADMIN — ALBUTEROL SULFATE 2.5 MG: 2.5 SOLUTION RESPIRATORY (INHALATION) at 03:12

## 2017-12-27 RX ADMIN — PRAVASTATIN SODIUM 40 MG: 40 TABLET ORAL at 09:12

## 2017-12-27 RX ADMIN — FLUTICASONE FUROATE AND VILANTEROL TRIFENATATE 1 PUFF: 100; 25 POWDER RESPIRATORY (INHALATION) at 09:12

## 2017-12-27 RX ADMIN — GABAPENTIN 100 MG: 100 CAPSULE ORAL at 01:12

## 2017-12-27 RX ADMIN — BUSPIRONE HYDROCHLORIDE 15 MG: 5 TABLET ORAL at 01:12

## 2017-12-27 RX ADMIN — ALBUTEROL SULFATE 2.5 MG: 2.5 SOLUTION RESPIRATORY (INHALATION) at 08:12

## 2017-12-27 RX ADMIN — MAGNESIUM OXIDE TAB 400 MG (241.3 MG ELEMENTAL MG) 400 MG: 400 (241.3 MG) TAB at 09:12

## 2017-12-27 RX ADMIN — LEVOTHYROXINE SODIUM 75 MCG: 75 TABLET ORAL at 05:12

## 2017-12-27 RX ADMIN — POTASSIUM CHLORIDE 60 MEQ: 1500 TABLET, EXTENDED RELEASE ORAL at 09:12

## 2017-12-27 RX ADMIN — AMLODIPINE BESYLATE 5 MG: 5 TABLET ORAL at 09:12

## 2017-12-27 RX ADMIN — PANTOPRAZOLE SODIUM 40 MG: 40 TABLET, DELAYED RELEASE ORAL at 09:12

## 2017-12-27 RX ADMIN — Medication 3 ML: at 02:12

## 2017-12-27 NOTE — PROGRESS NOTES
Patient AVS given, verbalized understanding, all questions answered. Patient left the hospital on home remodulin and oxygen. Patient discharged.

## 2017-12-27 NOTE — PLAN OF CARE
Problem: Patient Care Overview  Goal: Plan of Care Review  Outcome: Ongoing (interventions implemented as appropriate)  Pt is AAOx4, free from fall/injury so far this shift, in bed wearing non-skid footwear, bed in low/locked position, call bell next to pt. Pt VSS, no tele SR, on 3L while awake and wears BiPAP at night. Pt c/o neck pain, controlled c ordered prn percocet PO. Pt is afebrile at this time. Proper hand hygiene performed before and after pt care activities. Pt has remodulin gtt infusing to RS perkins @ 28ng/kg/min c DW of 85kg, 57cc/24hrs. Remodulin cassette changed around 2200 this shift. Remodulin home teaching completed on day shift yd. Pt will possibly go home td. Pt reminded to use call bell to call for assistance, pt verbalizes understanding. Will continue to monitor.

## 2017-12-27 NOTE — PROGRESS NOTES
DISCHARGE    SW to pt's room for d/c plan. Pt presents as aaox3 with calm affect. Pt reports in agreement with plan to d/c home today with home health via [facility] ph [phone], fax [phone]. SW faxed referral to [facility] and confirmed with  2000 auth was received. Per NP, pt qualifies for home O2 and order has been placed. SW confirmed with [facility] order was received and auth request sent to [facility], although approval could take several hours and pt may need to stay overnight. Pt reports she does not want to wait and would like to go home today without O2. SW confirms NP in agreement with plan and DME company can deliver home O2 to pt's home tomorrow. Pt denies transportation issues. No other needs voiced at this time. SW providing psychosocial and counseling support, education, resources, and d/c planning as needed. SW remains available.

## 2017-12-27 NOTE — DISCHARGE SUMMARY
[facility]  Heart Transplant  Discharge Summary        Patient Name: Sue Smith  MRN: [id number]  Admission Date: 12/8/2017  Hospital Length of Stay: 18 days  Discharge Date and Time: 12/27/2017  Attending Physician: Lola Paul MD   Discharging Provider: Joan Soliz NP  Primary Care Provider: aPddy Guerrier DO      HPI: 56 yo female with severe WHO Group 1 pulmonary HTN, on Uptravi, chronic hypoxic respiratory failure, on home O2 and BiPAP q HS, h/o trach 4 years ago and short term vent dependence was admitted from Dr. Cullen' clinic with A/C RHF.      Procedure(s) (LRB):  INSERTION-CATHETER-GOMEZ (single-lumen) (Right)      Hospital Course: She was diuresed for 3 days with IV Lasix, then is had to be stopped 2/2 elevation in creatinine. Was transitioned back to Bumex 4 mg bid and UOP remained adequate. The Uptraiv was transitioned to IV Remodulin, which was uptitrated with dose parked at 28 ng/kg/min 2/2 side effects. Coumadin was initiated during this hospital stay with INR goal 2.0-3.0. She felt better on the Remodulin. Discharge was held up by education of both patient and a friend in [place], who will be her primary caregiver, as well as insurance approval for the IV Remodulin and supplies. Gomez was placed 12/14/17. She was enrolled iin the Coumadin clinic for monitoring with her first INR to be checked Thursday, 12/28/17. She will f/u in the Pulmonary HTN clinic in 1 month    She will be sent home with home O2:  Pt 90% sat on RA at rest.  Pt 93% sat with 2-3L O2 NC  Pt 90% sat with ambulation on 2LNC  Pt 88% sat with ambulation on RA               Consults          Status Ordering Provider       Inpatient consult to General Surgery  Once     Provider:  (Not yet assigned)     Completed DENY PHILIPPE       Inpatient consult to PICC team (NIAS)  Once     Provider:  (Not yet assigned)     Completed ALEKSANDRA MONTAÑO             Significant Diagnostic Studies: See above          Pending Diagnostic Studies:      None                 Final Active Diagnoses:     Diagnosis Date Noted POA    PRINCIPAL PROBLEM:  Pulmonary hypertension, group 1 [I27.20] 10/04/2017 Yes    Chronic respiratory failure with hypoxia [J96.11] 10/05/2017 Yes    Hypothyroidism [E03.9] 10/05/2017 Yes    Hypertension [I10]   Yes    Dyslipidemia [E78.5]   Yes    Bipolar disorder [F31.9]   Yes    GERD (gastroesophageal reflux disease) [K21.9]   Yes       Problems Resolved During this Admission:     Diagnosis Date Noted Date Resolved POA      Discharged Condition: stable     Disposition: Home with home health     Follow Up:      Follow-up Information      PULMONARY, HYPERTENSION CLINIC In 1 month.    Specialty:  Pulmonary Disease                   Patient Instructions:          Ambulatory referral to Anticoagulation Monitoring   Referral Priority: Routine Referral Type: Consultation   Referral Reason: Specialty Services Required     Requested Specialty: Cardiology     Number of Visits Requested: 1         Medications:  Reconciled Home Medications:         Current Discharge Medication List             START taking these medications     Details   magnesium oxide (MAG-OX) 400 mg tablet Take 1 tablet (400 mg total) by mouth 2 (two) times daily.  Refills: 0       ondansetron (ZOFRAN-ODT) 8 MG TbDL Take 1 tablet (8 mg total) by mouth every 8 (eight) hours as needed.  Qty: 30 tablet, Refills: 6       sodium chloride 0.9% 0.9 % SolP 100 mL with treprostinil 1 mg/mL Soln 6,000,000 ng Inject 2,380 ng/min into the vein continuous.       warfarin (COUMADIN) 5 MG tablet Take 2 tablets (10 mg total) by mouth Daily. Or as directed by Coumadin clinic  Qty: 60 tablet, Refills: 11                 CONTINUE these medications which have CHANGED     Details   potassium chloride SA (K-DUR,KLOR-CON) 20 MEQ tablet Take 3 tablets (60 mEq total) by mouth 2 (two) times daily.  Qty: 180 tablet, Refills: 6                 CONTINUE these  medications which have NOT CHANGED     Details   albuterol (PROVENTIL) 2.5 mg /3 mL (0.083 %) nebulizer solution Take 2.5 mg by nebulization every 6 (six) hours as needed for Wheezing. Rescue       amlodipine (NORVASC) 5 MG tablet Take 5 mg by mouth once daily.       budesonide-formoterol 80-4.5 mcg (SYMBICORT) 80-4.5 mcg/actuation HFAA Inhale 2 puffs into the lungs 2 (two) times daily. Controller       bumetanide (BUMEX) 2 MG tablet Take 2 tablets (4 mg total) by mouth 2 (two) times daily.  Qty: 120 tablet, Refills: 11       busPIRone (BUSPAR) 15 MG tablet Take 15 mg by mouth 3 (three) times daily.       desvenlafaxine succinate (PRISTIQ) 100 MG Tb24 Take 100 mg by mouth once daily.       gabapentin (NEURONTIN) 100 MG capsule Take 1 capsule (100 mg total) by mouth 3 (three) times daily.  Qty: 90 capsule, Refills: 11     Associated Diagnoses: Pain       hydrocodone-acetaminophen 10-325mg (NORCO)  mg Tab Take 1 tablet by mouth every 8 (eight) hours as needed for Pain.       lamotrigine (LAMICTAL) 100 MG tablet Take 100 mg by mouth 2 (two) times daily.       levothyroxine (SYNTHROID) 75 MCG tablet Take 75 mcg by mouth once daily.       lurasidone (LATUDA) 60 mg Tab tablet Take 60 mg by mouth once daily.       metOLazone (ZAROXOLYN) 2.5 MG tablet Take 1 tablet (2.5 mg total) by mouth daily as needed. Take for wt gain 3# overnight or 5# in 1 wk, with extra potassium  Qty: 30 tablet, Refills: 11       pantoprazole (PROTONIX) 40 MG tablet Take 40 mg by mouth once daily.       pravastatin (PRAVACHOL) 40 MG tablet Take 40 mg by mouth once daily.                STOP taking these medications         selexipag 200 mcg (140)- 800 mcg (60) DsPk Comments:   Reason for Stopping:

## 2017-12-28 ENCOUNTER — ANTI-COAG VISIT (OUTPATIENT)
Dept: CARDIOLOGY | Facility: CLINIC | Age: 57
End: 2017-12-28

## 2017-12-28 DIAGNOSIS — I27.21 PAH (PULMONARY ARTERY HYPERTENSION): Primary | ICD-10-CM

## 2017-12-28 DIAGNOSIS — I27.20 PULMONARY HYPERTENSION: ICD-10-CM

## 2017-12-28 DIAGNOSIS — Z79.01 LONG-TERM (CURRENT) USE OF ANTICOAGULANTS: ICD-10-CM

## 2017-12-28 NOTE — PROGRESS NOTES
58yo pt with PMHx: Pulmonary hypertension, group 1, Chronic respiratory failure with hypoxia, Hypothyroidism, Hypertension, Dyslipidemia, Bipolar disorder, Acute on chronic right heart failure, Hx of tracheostomy, GERD (gastroesophageal reflux disease), Acute on chronic diastolic heart failure, Cardiomegaly, Pulmonary nodules, Chronic pulmonary heart disease. coumadin doses given during admit updated in calendar,  per notes will need INR 12/28.   Dc on 10mg coumadin daily per medcard (5mg tabs)  Home health [facility] confirmed that they have orders for PT/INR today. lmfcb pt

## 2017-12-29 ENCOUNTER — ANTI-COAG VISIT (OUTPATIENT)
Dept: CARDIOLOGY | Facility: CLINIC | Age: 57
End: 2017-12-29

## 2017-12-29 ENCOUNTER — PATIENT OUTREACH (OUTPATIENT)
Dept: ADMINISTRATIVE | Facility: CLINIC | Age: 57
End: 2017-12-29

## 2017-12-29 ENCOUNTER — TELEPHONE (OUTPATIENT)
Dept: TRANSPLANT | Facility: CLINIC | Age: 57
End: 2017-12-29

## 2017-12-29 ENCOUNTER — NURSE TRIAGE (OUTPATIENT)
Dept: ADMINISTRATIVE | Facility: CLINIC | Age: 57
End: 2017-12-29

## 2017-12-29 LAB — INR PPP: 2.2

## 2017-12-29 NOTE — TELEPHONE ENCOUNTER
"Patient reports "still feeling like I am going to pass out." Pt denies actual LOC since being home from hospital. Pt states she "caught a cold" the day she got home from the hospital. She denies fever. Patient has not increased Remodulin since coming home, because she has felt "too bad" with the cold to do so. She completely understands importance of increasing Remodulin with each cassette change, as tolerated, when she has better recovered from URI.     Patient also reports Celestine with [facility] visited today, and assisted w/Remodulin management. Patient states that all went well w/visit, and she is managing Remodulin without difficulty at this time.     Advised patient that if she loses consciousness, she should go to ER immediately. Patient also advised to move from sitting to standing position slowly, and to ration her activity. Patient verbalized understanding of all and states "I am going to take it easy until I am able to increase the Remodulin more."   "

## 2017-12-29 NOTE — TELEPHONE ENCOUNTER
Pt states that since being home she has started with productive cough with white phlegm, nasal congestion with white discharge and increasing SOB. OK at rest but when she gets up she can walk a few steps before having to sit back down. She describes this as worse than in hospital. Instructed on protocol and agrees to call someone to bring her to nearest ER.

## 2017-12-29 NOTE — TELEPHONE ENCOUNTER
"    Reason for Disposition   Recent illness requiring prolonged bedrest (i.e., immobilization)    Answer Assessment - Initial Assessment Questions  1. RESPIRATORY STATUS: "Describe your breathing?" (e.g., wheezing, shortness of breath, unable to speak, severe coughing)       SOB, at rest "ok", when I get up, it worsens  2. ONSET: "When did this breathing problem begin?"       Worse since home from hospital  3. PATTERN "Does the difficult breathing come and go, or has it been constant since it started?"       Comes and goes  4. SEVERITY: "How bad is your breathing?" (e.g., mild, moderate, severe)     - MILD: No SOB at rest, mild SOB with walking, speaks normally in sentences, can lay down, no retractions, pulse < 100.     - MODERATE: SOB at rest, SOB with minimal exertion and prefers to sit, cannot lie down flat, speaks in phrases, mild retractions, audible wheezing, pulse 100-120.     - SEVERE: Very SOB at rest, speaks in single words, struggling to breathe, sitting hunched forward, retractions, pulse > 120       moderate  5. RECURRENT SYMPTOM: "Have you had difficulty breathing before?" If so, ask: "When was the last time?" and "What happened that time?"       Yes, history  Of pulmonary htn  6. CARDIAC HISTORY: "Do you have any history of heart disease?" (e.g., heart attack, angina, bypass surgery, angioplasty)       no  7. LUNG HISTORY: "Do you have any history of lung disease?"  (e.g., pulmonary embolus, asthma, emphysema)      Yes, pulmonary htn  8. CAUSE: "What do you think is causing the breathing problem?"       Don't know  9. OTHER SYMPTOMS: "Do you have any other symptoms? (e.g., dizziness, runny nose, cough, chest pain, fever)      Cough, congestion,  10. PREGNANCY: "Is there any chance you are pregnant?" "When was your last menstrual period?"        no  11. TRAVEL: "Have you traveled out of the country in the last month?" (e.g., travel history, exposures)        no    Protocols used: ST BREATHING " DIFFICULTY-A-AH

## 2017-12-29 NOTE — PROGRESS NOTES
Please forward this important TCC information to your provider in order to maximize the post discharge care delivery of this patient.    C3 nurse spoke with Sue Smith  for a TCC post hospital discharge follow up call. The patient does not have a scheduled HOSFU appointment with Paddy Guerrier DO  within 7-14 days post hospital discharge date 12\27\17. C3 nurse was unable to schedule HOSFU appointment in [facility].      Respectfully,  Ileana PattersonRN  [facility]    [email or web address]       Please do not reply to this message, as this inbox is not routinely monitored.

## 2018-01-02 ENCOUNTER — TELEPHONE (OUTPATIENT)
Dept: TRANSPLANT | Facility: CLINIC | Age: 58
End: 2018-01-02

## 2018-01-02 NOTE — TELEPHONE ENCOUNTER
Home Health nurse called to report that patient had an 8lb weight gain since 12/29 (different scale was used) with 3+ edema to BLE and some wheezing noted to R posterior Lung. Patient's vitals are stable - 93% on 3LNC, /77, HR 87.   Patient takes Bumex, 2 mg, twice daily. Per MD, patient should take Metolazone 2.5 mg, 1/2 hour before dose of Bumex in the morning and on Thursday morning as well. Patient will also increase potassium to 80 mEq, BID for those two days. HH RN will draw a CMP and BNP on Friday and will call with update. Patient was instructed to go to the ER if she becomes too short of breath or other symptoms develop.  Confirmed with HH that they would not draw blood from patient's line.

## 2018-01-03 ENCOUNTER — HISTORICAL (OUTPATIENT)
Dept: ADMINISTRATIVE | Facility: HOSPITAL | Age: 58
End: 2018-01-03

## 2018-01-03 ENCOUNTER — TELEPHONE (OUTPATIENT)
Dept: TRANSPLANT | Facility: CLINIC | Age: 58
End: 2018-01-03

## 2018-01-03 ENCOUNTER — HOSPITAL ENCOUNTER (INPATIENT)
Facility: HOSPITAL | Age: 58
LOS: 7 days | Discharge: HOME HEALTH | DRG: 292 | End: 2018-01-11
Attending: EMERGENCY MEDICINE | Admitting: INTERNAL MEDICINE
Payer: MEDICARE

## 2018-01-03 ENCOUNTER — ANTI-COAG VISIT (OUTPATIENT)
Dept: CARDIOLOGY | Facility: CLINIC | Age: 58
End: 2018-01-03

## 2018-01-03 ENCOUNTER — DOCUMENTATION ONLY (OUTPATIENT)
Dept: TRANSPLANT | Facility: CLINIC | Age: 58
End: 2018-01-03

## 2018-01-03 DIAGNOSIS — R06.02 SHORTNESS OF BREATH: ICD-10-CM

## 2018-01-03 DIAGNOSIS — Z79.01 LONG-TERM (CURRENT) USE OF ANTICOAGULANTS: ICD-10-CM

## 2018-01-03 DIAGNOSIS — E78.5 DYSLIPIDEMIA: ICD-10-CM

## 2018-01-03 DIAGNOSIS — R09.02 HYPOXIA: Primary | ICD-10-CM

## 2018-01-03 DIAGNOSIS — E03.9 HYPOTHYROIDISM, UNSPECIFIED TYPE: ICD-10-CM

## 2018-01-03 DIAGNOSIS — I50.813 ACUTE ON CHRONIC RIGHT HEART FAILURE: ICD-10-CM

## 2018-01-03 DIAGNOSIS — I27.20 PULMONARY HYPERTENSION: ICD-10-CM

## 2018-01-03 DIAGNOSIS — I10 ESSENTIAL HYPERTENSION: ICD-10-CM

## 2018-01-03 DIAGNOSIS — K21.9 GASTROESOPHAGEAL REFLUX DISEASE, ESOPHAGITIS PRESENCE NOT SPECIFIED: ICD-10-CM

## 2018-01-03 DIAGNOSIS — J96.11 CHRONIC RESPIRATORY FAILURE WITH HYPOXIA: ICD-10-CM

## 2018-01-03 DIAGNOSIS — F31.9 BIPOLAR AFFECTIVE DISORDER, REMISSION STATUS UNSPECIFIED: ICD-10-CM

## 2018-01-03 DIAGNOSIS — E87.70 HYPERVOLEMIA, UNSPECIFIED HYPERVOLEMIA TYPE: ICD-10-CM

## 2018-01-03 LAB
ALBUMIN SERPL BCP-MCNC: 3.5 G/DL
ALP SERPL-CCNC: 113 U/L
ALT SERPL W/O P-5'-P-CCNC: 25 U/L
ANION GAP SERPL CALC-SCNC: 11 MMOL/L
AST SERPL-CCNC: 32 U/L
BASOPHILS # BLD AUTO: 0.03 K/UL
BASOPHILS NFR BLD: 0.5 %
BILIRUB SERPL-MCNC: 0.5 MG/DL
BNP SERPL-MCNC: 1112 PG/ML
BUN SERPL-MCNC: 18 MG/DL
CALCIUM SERPL-MCNC: 8.7 MG/DL
CHLORIDE SERPL-SCNC: 102 MMOL/L
CO2 SERPL-SCNC: 29 MMOL/L
CREAT SERPL-MCNC: 1.4 MG/DL
DIFFERENTIAL METHOD: ABNORMAL
EOSINOPHIL # BLD AUTO: 0 K/UL
EOSINOPHIL NFR BLD: 0.2 %
ERYTHROCYTE [DISTWIDTH] IN BLOOD BY AUTOMATED COUNT: 16.3 %
EST. GFR  (AFRICAN AMERICAN): 48.5 ML/MIN/1.73 M^2
EST. GFR  (NON AFRICAN AMERICAN): 42 ML/MIN/1.73 M^2
GLUCOSE SERPL-MCNC: 128 MG/DL
HCT VFR BLD AUTO: 36.4 %
HGB BLD-MCNC: 12.1 G/DL
IMM GRANULOCYTES # BLD AUTO: 0.12 K/UL
IMM GRANULOCYTES NFR BLD AUTO: 2 %
INR PPP: 4
LYMPHOCYTES # BLD AUTO: 0.4 K/UL
LYMPHOCYTES NFR BLD: 7.2 %
MCH RBC QN AUTO: 31.2 PG
MCHC RBC AUTO-ENTMCNC: 33.2 G/DL
MCV RBC AUTO: 94 FL
MONOCYTES # BLD AUTO: 0 K/UL
MONOCYTES NFR BLD: 0.7 %
NEUTROPHILS # BLD AUTO: 5.3 K/UL
NEUTROPHILS NFR BLD: 89.4 %
NRBC BLD-RTO: 0 /100 WBC
PLATELET # BLD AUTO: 168 K/UL
PMV BLD AUTO: 11.7 FL
POTASSIUM SERPL-SCNC: 3.2 MMOL/L
PROT SERPL-MCNC: 6.2 G/DL
RBC # BLD AUTO: 3.88 M/UL
SODIUM SERPL-SCNC: 142 MMOL/L
TROPONIN I SERPL DL<=0.01 NG/ML-MCNC: 0.03 NG/ML
WBC # BLD AUTO: 5.95 K/UL

## 2018-01-03 PROCEDURE — 84484 ASSAY OF TROPONIN QUANT: CPT

## 2018-01-03 PROCEDURE — 80053 COMPREHEN METABOLIC PANEL: CPT

## 2018-01-03 PROCEDURE — 20600001 HC STEP DOWN PRIVATE ROOM

## 2018-01-03 PROCEDURE — 84100 ASSAY OF PHOSPHORUS: CPT

## 2018-01-03 PROCEDURE — 93010 ELECTROCARDIOGRAM REPORT: CPT | Mod: ,,, | Performed by: INTERNAL MEDICINE

## 2018-01-03 PROCEDURE — 83735 ASSAY OF MAGNESIUM: CPT

## 2018-01-03 PROCEDURE — 83880 ASSAY OF NATRIURETIC PEPTIDE: CPT

## 2018-01-03 PROCEDURE — 85610 PROTHROMBIN TIME: CPT

## 2018-01-03 PROCEDURE — 93005 ELECTROCARDIOGRAM TRACING: CPT

## 2018-01-03 PROCEDURE — 99285 EMERGENCY DEPT VISIT HI MDM: CPT | Mod: 25

## 2018-01-03 PROCEDURE — 96365 THER/PROPH/DIAG IV INF INIT: CPT

## 2018-01-03 PROCEDURE — 85025 COMPLETE CBC W/AUTO DIFF WBC: CPT

## 2018-01-03 NOTE — TELEPHONE ENCOUNTER
"Called [facility] ER and spoke with Mrs. Smith's nurse, Ailyn.She stated that they were going to admit Mrs. Smith for CHF exacerbation, COPD, Hypokalemia and Hypoxia but they were concerned about the Remodulin because the "family member" left. Advised RN that patient would need to be transferred to [facility] and if that was not possible they would need to call [facility] and have them walk through each step of the cassette change process.   Ailyn had physician call [facility] to initiate transfer immediately.  PH Coordinator notifed Dr. Bosch of impending transfer. Patient's friend, Radha, says that patient has all remodulin supplies with her including her emergency bag.  Notified [facility].  "

## 2018-01-03 NOTE — PROGRESS NOTES
Laura with [facility] called in a verbal result dated 1/03/18 as: INR -4.0, hard copy to be faxed. Laura also reports that the Patient had an 8 poun weight gain and as of today had an extra 5 pound weight gain, yesterday Patient was seen due to complaining of SOB, bilateral leg swelling, on 1/02 Dr. Cullen prescribed Metolazone-2.5mg daily, Bumetanide -2mg, and extra potassium, today while at the Patient's home the nurse states Dr. Cullen instructed the Patient to go to the ER

## 2018-01-03 NOTE — TELEPHONE ENCOUNTER
"Report from Celestine MEJIA, [facility] Note -   "Her friend Radha just left me a voice message asking if the hospital pharmacy at [facility] can perform the mix tomorrow. I called her back and she expressed concern that Sue is not taking care of herself and eating gumbo with all kinds of salt. She also reports telling the pharmacy staff at the hospital not to do anything related to Remodulin until she talked to myself or you. I told her that any PAH issues need to be addressed at [facility] so she can be properly treated. Just wanted to let you know that they have admitted her in case you need to do anything to have her transferred. Let me know if I can do anything.  Thanks,"    Notified MD and contacted [facility].  "

## 2018-01-03 NOTE — TELEPHONE ENCOUNTER
"Received call from Bebe, nurse from pt's HH. Bebe reports 5 lb weight gain overnight, making net gain of 13 lbs over last week. Pt does not necessarily endorse increased shortness of breath, but RN states "I am worried about her. She is very, very edematous, 2-3+ pitting edema to lower extremities. She was on 3 1/2 L O2 when I arrived and her sats were 93%. I put her down to 3L, since this is where she was supposed to be, and she is 91%." Parker states that patient endorses "congested" cough that is "no longer productive." Per Dr Cullen, patient should proceed to ER. Pt instructed, via Bebe, to bring Remodulin and associated supplies with her, and to notify ER staff that they are not to manipulate patient's Gomez catheter. Bebe verbalized understanding/agreement.  "

## 2018-01-04 PROBLEM — E87.70 VOLUME OVERLOAD: Status: ACTIVE | Noted: 2018-01-04

## 2018-01-04 PROBLEM — R09.02 HYPOXIA: Status: RESOLVED | Noted: 2018-01-04 | Resolved: 2018-01-04

## 2018-01-04 PROBLEM — R09.02 HYPOXIA: Status: ACTIVE | Noted: 2018-01-04

## 2018-01-04 PROBLEM — Z79.01 LONG-TERM (CURRENT) USE OF ANTICOAGULANTS: Status: RESOLVED | Noted: 2017-12-28 | Resolved: 2018-01-04

## 2018-01-04 LAB
ANION GAP SERPL CALC-SCNC: 14 MMOL/L
ANION GAP SERPL CALC-SCNC: 18 MMOL/L
BUN SERPL-MCNC: 22 MG/DL
BUN SERPL-MCNC: 25 MG/DL
CALCIUM SERPL-MCNC: 10.1 MG/DL
CALCIUM SERPL-MCNC: 9.8 MG/DL
CHLORIDE SERPL-SCNC: 95 MMOL/L
CHLORIDE SERPL-SCNC: 96 MMOL/L
CO2 SERPL-SCNC: 28 MMOL/L
CO2 SERPL-SCNC: 32 MMOL/L
CREAT SERPL-MCNC: 1.4 MG/DL
CREAT SERPL-MCNC: 1.5 MG/DL
EST. GFR  (AFRICAN AMERICAN): 44.6 ML/MIN/1.73 M^2
EST. GFR  (AFRICAN AMERICAN): 48.5 ML/MIN/1.73 M^2
EST. GFR  (NON AFRICAN AMERICAN): 38.7 ML/MIN/1.73 M^2
EST. GFR  (NON AFRICAN AMERICAN): 42 ML/MIN/1.73 M^2
GLUCOSE SERPL-MCNC: 126 MG/DL
GLUCOSE SERPL-MCNC: 150 MG/DL
INR PPP: 3.1
INR PPP: 3.2
MAGNESIUM SERPL-MCNC: 1.6 MG/DL
MAGNESIUM SERPL-MCNC: 2 MG/DL
PHOSPHATE SERPL-MCNC: 5 MG/DL
POTASSIUM SERPL-SCNC: 2.8 MMOL/L
POTASSIUM SERPL-SCNC: 3 MMOL/L
PROTHROMBIN TIME: 30.8 SEC
PROTHROMBIN TIME: 32.2 SEC
SODIUM SERPL-SCNC: 141 MMOL/L
SODIUM SERPL-SCNC: 142 MMOL/L

## 2018-01-04 PROCEDURE — 25000003 PHARM REV CODE 250: Performed by: PHYSICIAN ASSISTANT

## 2018-01-04 PROCEDURE — 63600175 PHARM REV CODE 636 W HCPCS: Mod: JG | Performed by: INTERNAL MEDICINE

## 2018-01-04 PROCEDURE — 27000221 HC OXYGEN, UP TO 24 HOURS

## 2018-01-04 PROCEDURE — 25000242 PHARM REV CODE 250 ALT 637 W/ HCPCS: Performed by: INTERNAL MEDICINE

## 2018-01-04 PROCEDURE — 85610 PROTHROMBIN TIME: CPT

## 2018-01-04 PROCEDURE — 36415 COLL VENOUS BLD VENIPUNCTURE: CPT

## 2018-01-04 PROCEDURE — 94640 AIRWAY INHALATION TREATMENT: CPT

## 2018-01-04 PROCEDURE — 80048 BASIC METABOLIC PNL TOTAL CA: CPT

## 2018-01-04 PROCEDURE — 25000003 PHARM REV CODE 250: Performed by: INTERNAL MEDICINE

## 2018-01-04 PROCEDURE — 20600001 HC STEP DOWN PRIVATE ROOM

## 2018-01-04 PROCEDURE — 99232 SBSQ HOSP IP/OBS MODERATE 35: CPT | Mod: ,,, | Performed by: INTERNAL MEDICINE

## 2018-01-04 PROCEDURE — 83735 ASSAY OF MAGNESIUM: CPT

## 2018-01-04 PROCEDURE — A4216 STERILE WATER/SALINE, 10 ML: HCPCS | Performed by: INTERNAL MEDICINE

## 2018-01-04 PROCEDURE — 63600175 PHARM REV CODE 636 W HCPCS: Performed by: PHYSICIAN ASSISTANT

## 2018-01-04 PROCEDURE — 80048 BASIC METABOLIC PNL TOTAL CA: CPT | Mod: 91

## 2018-01-04 PROCEDURE — 63600175 PHARM REV CODE 636 W HCPCS: Performed by: INTERNAL MEDICINE

## 2018-01-04 PROCEDURE — 25000003 PHARM REV CODE 250: Performed by: EMERGENCY MEDICINE

## 2018-01-04 RX ORDER — DESVENLAFAXINE SUCCINATE 50 MG/1
100 TABLET, EXTENDED RELEASE ORAL DAILY
Status: DISCONTINUED | OUTPATIENT
Start: 2018-01-04 | End: 2018-01-11 | Stop reason: HOSPADM

## 2018-01-04 RX ORDER — POTASSIUM CHLORIDE 20 MEQ/1
40 TABLET, EXTENDED RELEASE ORAL 2 TIMES DAILY
Status: DISCONTINUED | OUTPATIENT
Start: 2018-01-04 | End: 2018-01-04

## 2018-01-04 RX ORDER — ASPIRIN 325 MG
325 TABLET ORAL
Status: COMPLETED | OUTPATIENT
Start: 2018-01-04 | End: 2018-01-04

## 2018-01-04 RX ORDER — ACETAMINOPHEN 325 MG/1
650 TABLET ORAL EVERY 6 HOURS PRN
Status: DISCONTINUED | OUTPATIENT
Start: 2018-01-04 | End: 2018-01-04

## 2018-01-04 RX ORDER — AMLODIPINE BESYLATE 5 MG/1
5 TABLET ORAL DAILY
Status: DISCONTINUED | OUTPATIENT
Start: 2018-01-04 | End: 2018-01-11 | Stop reason: HOSPADM

## 2018-01-04 RX ORDER — OXYCODONE AND ACETAMINOPHEN 5; 325 MG/1; MG/1
1 TABLET ORAL EVERY 4 HOURS PRN
Status: DISCONTINUED | OUTPATIENT
Start: 2018-01-04 | End: 2018-01-04

## 2018-01-04 RX ORDER — PRAVASTATIN SODIUM 40 MG/1
40 TABLET ORAL DAILY
Status: DISCONTINUED | OUTPATIENT
Start: 2018-01-04 | End: 2018-01-11 | Stop reason: HOSPADM

## 2018-01-04 RX ORDER — BUTALBITAL, ACETAMINOPHEN AND CAFFEINE 50; 325; 40 MG/1; MG/1; MG/1
1 TABLET ORAL EVERY 4 HOURS PRN
Status: DISCONTINUED | OUTPATIENT
Start: 2018-01-04 | End: 2018-01-11 | Stop reason: HOSPADM

## 2018-01-04 RX ORDER — WARFARIN SODIUM 5 MG/1
5 TABLET ORAL ONCE
Status: COMPLETED | OUTPATIENT
Start: 2018-01-04 | End: 2018-01-04

## 2018-01-04 RX ORDER — PANTOPRAZOLE SODIUM 40 MG/1
40 TABLET, DELAYED RELEASE ORAL DAILY
Status: DISCONTINUED | OUTPATIENT
Start: 2018-01-04 | End: 2018-01-11 | Stop reason: HOSPADM

## 2018-01-04 RX ORDER — LOPERAMIDE HYDROCHLORIDE 2 MG/1
2 CAPSULE ORAL EVERY 4 HOURS PRN
Status: DISCONTINUED | OUTPATIENT
Start: 2018-01-04 | End: 2018-01-11 | Stop reason: HOSPADM

## 2018-01-04 RX ORDER — FUROSEMIDE 10 MG/ML
80 INJECTION INTRAMUSCULAR; INTRAVENOUS ONCE
Status: COMPLETED | OUTPATIENT
Start: 2018-01-04 | End: 2018-01-04

## 2018-01-04 RX ORDER — GABAPENTIN 100 MG/1
100 CAPSULE ORAL 3 TIMES DAILY
Status: DISCONTINUED | OUTPATIENT
Start: 2018-01-04 | End: 2018-01-11 | Stop reason: HOSPADM

## 2018-01-04 RX ORDER — ONDANSETRON 8 MG/1
8 TABLET, ORALLY DISINTEGRATING ORAL EVERY 8 HOURS PRN
Status: DISCONTINUED | OUTPATIENT
Start: 2018-01-04 | End: 2018-01-11 | Stop reason: HOSPADM

## 2018-01-04 RX ORDER — HYDROCODONE BITARTRATE AND ACETAMINOPHEN 10; 325 MG/1; MG/1
1 TABLET ORAL EVERY 6 HOURS PRN
Status: DISCONTINUED | OUTPATIENT
Start: 2018-01-04 | End: 2018-01-11 | Stop reason: HOSPADM

## 2018-01-04 RX ORDER — LANOLIN ALCOHOL/MO/W.PET/CERES
400 CREAM (GRAM) TOPICAL 2 TIMES DAILY
Status: DISCONTINUED | OUTPATIENT
Start: 2018-01-04 | End: 2018-01-11 | Stop reason: HOSPADM

## 2018-01-04 RX ORDER — BUSPIRONE HYDROCHLORIDE 5 MG/1
15 TABLET ORAL 3 TIMES DAILY
Status: DISCONTINUED | OUTPATIENT
Start: 2018-01-04 | End: 2018-01-11 | Stop reason: HOSPADM

## 2018-01-04 RX ORDER — ONDANSETRON 2 MG/ML
4 INJECTION INTRAMUSCULAR; INTRAVENOUS EVERY 8 HOURS PRN
Status: DISCONTINUED | OUTPATIENT
Start: 2018-01-04 | End: 2018-01-09

## 2018-01-04 RX ORDER — LAMOTRIGINE 100 MG/1
100 TABLET ORAL 2 TIMES DAILY
Status: DISCONTINUED | OUTPATIENT
Start: 2018-01-04 | End: 2018-01-11 | Stop reason: HOSPADM

## 2018-01-04 RX ORDER — POTASSIUM CHLORIDE 7.45 MG/ML
10 INJECTION INTRAVENOUS
Status: COMPLETED | OUTPATIENT
Start: 2018-01-04 | End: 2018-01-04

## 2018-01-04 RX ORDER — POTASSIUM CHLORIDE 20 MEQ/1
60 TABLET, EXTENDED RELEASE ORAL 3 TIMES DAILY
Status: DISCONTINUED | OUTPATIENT
Start: 2018-01-04 | End: 2018-01-11 | Stop reason: HOSPADM

## 2018-01-04 RX ORDER — LEVOTHYROXINE SODIUM 75 UG/1
75 TABLET ORAL
Status: DISCONTINUED | OUTPATIENT
Start: 2018-01-04 | End: 2018-01-11 | Stop reason: HOSPADM

## 2018-01-04 RX ORDER — POTASSIUM CHLORIDE 20 MEQ/1
60 TABLET, EXTENDED RELEASE ORAL ONCE
Status: COMPLETED | OUTPATIENT
Start: 2018-01-04 | End: 2018-01-04

## 2018-01-04 RX ORDER — ALBUTEROL SULFATE 0.83 MG/ML
2.5 SOLUTION RESPIRATORY (INHALATION) EVERY 4 HOURS
Status: DISCONTINUED | OUTPATIENT
Start: 2018-01-04 | End: 2018-01-11 | Stop reason: HOSPADM

## 2018-01-04 RX ORDER — FLUTICASONE FUROATE AND VILANTEROL 100; 25 UG/1; UG/1
1 POWDER RESPIRATORY (INHALATION) DAILY
Status: DISCONTINUED | OUTPATIENT
Start: 2018-01-04 | End: 2018-01-11 | Stop reason: HOSPADM

## 2018-01-04 RX ORDER — WARFARIN SODIUM 5 MG/1
10 TABLET ORAL DAILY
Status: DISCONTINUED | OUTPATIENT
Start: 2018-01-04 | End: 2018-01-04

## 2018-01-04 RX ORDER — SODIUM CHLORIDE 0.9 % (FLUSH) 0.9 %
3 SYRINGE (ML) INJECTION EVERY 8 HOURS
Status: DISCONTINUED | OUTPATIENT
Start: 2018-01-04 | End: 2018-01-11 | Stop reason: HOSPADM

## 2018-01-04 RX ORDER — ONDANSETRON 8 MG/1
8 TABLET, ORALLY DISINTEGRATING ORAL EVERY 8 HOURS PRN
Status: DISCONTINUED | OUTPATIENT
Start: 2018-01-04 | End: 2018-01-04

## 2018-01-04 RX ADMIN — ALBUTEROL SULFATE 2.5 MG: 2.5 SOLUTION RESPIRATORY (INHALATION) at 07:01

## 2018-01-04 RX ADMIN — BUSPIRONE HYDROCHLORIDE 15 MG: 5 TABLET ORAL at 03:01

## 2018-01-04 RX ADMIN — LEVOTHYROXINE SODIUM 75 MCG: 75 TABLET ORAL at 05:01

## 2018-01-04 RX ADMIN — MAGNESIUM SULFATE HEPTAHYDRATE 3 G: 500 INJECTION, SOLUTION INTRAMUSCULAR; INTRAVENOUS at 09:01

## 2018-01-04 RX ADMIN — POTASSIUM CHLORIDE 60 MEQ: 1500 TABLET, EXTENDED RELEASE ORAL at 08:01

## 2018-01-04 RX ADMIN — HYDROCODONE BITARTRATE AND ACETAMINOPHEN 1 TABLET: 10; 325 TABLET ORAL at 09:01

## 2018-01-04 RX ADMIN — FUROSEMIDE 80 MG: 10 INJECTION, SOLUTION INTRAMUSCULAR; INTRAVENOUS at 05:01

## 2018-01-04 RX ADMIN — LAMOTRIGINE 100 MG: 100 TABLET ORAL at 09:01

## 2018-01-04 RX ADMIN — MAGNESIUM OXIDE TAB 400 MG (241.3 MG ELEMENTAL MG) 400 MG: 400 (241.3 MG) TAB at 09:01

## 2018-01-04 RX ADMIN — MAGNESIUM OXIDE TAB 400 MG (241.3 MG ELEMENTAL MG) 400 MG: 400 (241.3 MG) TAB at 08:01

## 2018-01-04 RX ADMIN — GABAPENTIN 100 MG: 100 CAPSULE ORAL at 03:01

## 2018-01-04 RX ADMIN — ALBUTEROL SULFATE 2.5 MG: 2.5 SOLUTION RESPIRATORY (INHALATION) at 03:01

## 2018-01-04 RX ADMIN — HYDROCODONE BITARTRATE AND ACETAMINOPHEN 1 TABLET: 10; 325 TABLET ORAL at 06:01

## 2018-01-04 RX ADMIN — WARFARIN SODIUM 5 MG: 5 TABLET ORAL at 04:01

## 2018-01-04 RX ADMIN — ALBUTEROL SULFATE 2.5 MG: 2.5 SOLUTION RESPIRATORY (INHALATION) at 05:01

## 2018-01-04 RX ADMIN — PANTOPRAZOLE SODIUM 40 MG: 40 TABLET, DELAYED RELEASE ORAL at 09:01

## 2018-01-04 RX ADMIN — Medication 3 ML: at 08:01

## 2018-01-04 RX ADMIN — LAMOTRIGINE 100 MG: 100 TABLET ORAL at 08:01

## 2018-01-04 RX ADMIN — DESVENLAFAXINE SUCCINATE 100 MG: 50 TABLET, EXTENDED RELEASE ORAL at 09:01

## 2018-01-04 RX ADMIN — POTASSIUM CHLORIDE 60 MEQ: 1500 TABLET, EXTENDED RELEASE ORAL at 04:01

## 2018-01-04 RX ADMIN — BUTALBITAL, ACETAMINOPHEN, AND CAFFEINE 1 TABLET: 50; 325; 40 TABLET ORAL at 03:01

## 2018-01-04 RX ADMIN — PRAVASTATIN SODIUM 40 MG: 40 TABLET ORAL at 09:01

## 2018-01-04 RX ADMIN — ALBUTEROL SULFATE 2.5 MG: 2.5 SOLUTION RESPIRATORY (INHALATION) at 11:01

## 2018-01-04 RX ADMIN — AMLODIPINE BESYLATE 5 MG: 5 TABLET ORAL at 09:01

## 2018-01-04 RX ADMIN — POTASSIUM CHLORIDE 10 MEQ: 10 INJECTION, SOLUTION INTRAVENOUS at 03:01

## 2018-01-04 RX ADMIN — BUSPIRONE HYDROCHLORIDE 15 MG: 5 TABLET ORAL at 08:01

## 2018-01-04 RX ADMIN — GABAPENTIN 100 MG: 100 CAPSULE ORAL at 05:01

## 2018-01-04 RX ADMIN — FUROSEMIDE 10 MG/HR: 10 INJECTION, SOLUTION INTRAMUSCULAR; INTRAVENOUS at 10:01

## 2018-01-04 RX ADMIN — POTASSIUM CHLORIDE 40 MEQ: 1500 TABLET, EXTENDED RELEASE ORAL at 09:01

## 2018-01-04 RX ADMIN — ASPIRIN 325 MG ORAL TABLET 325 MG: 325 PILL ORAL at 12:01

## 2018-01-04 RX ADMIN — Medication 3 ML: at 04:01

## 2018-01-04 RX ADMIN — ONDANSETRON 8 MG: 8 TABLET, ORALLY DISINTEGRATING ORAL at 02:01

## 2018-01-04 RX ADMIN — FUROSEMIDE 20 MG/HR: 10 INJECTION, SOLUTION INTRAMUSCULAR; INTRAVENOUS at 03:01

## 2018-01-04 RX ADMIN — TREPROSTINIL 38 NG/KG/MIN: 100 INJECTION, SOLUTION INTRAVENOUS; SUBCUTANEOUS at 07:01

## 2018-01-04 RX ADMIN — GABAPENTIN 100 MG: 100 CAPSULE ORAL at 08:01

## 2018-01-04 RX ADMIN — POTASSIUM CHLORIDE 10 MEQ: 10 INJECTION, SOLUTION INTRAVENOUS at 01:01

## 2018-01-04 RX ADMIN — BUSPIRONE HYDROCHLORIDE 15 MG: 5 TABLET ORAL at 05:01

## 2018-01-04 RX ADMIN — Medication 3 ML: at 05:01

## 2018-01-04 RX ADMIN — FLUTICASONE FUROATE AND VILANTEROL TRIFENATATE 1 PUFF: 100; 25 POWDER RESPIRATORY (INHALATION) at 12:01

## 2018-01-04 RX ADMIN — POTASSIUM CHLORIDE 60 MEQ: 1500 TABLET, EXTENDED RELEASE ORAL at 05:01

## 2018-01-04 NOTE — ASSESSMENT & PLAN NOTE
- WHO Class 1 Severe PHTN  - Continued on Valetrie; current dose is 38 ng/kg/min  - Pt self escalated the dose over the past week based on the dosing sheet provided by the pharmacy.  - Currently she dose not have that sheet on her, to consult pharmacy to adjust the dose  - Will assess on daily basis  - Pt was started on warfarin this past admission with target INR 2-3  - INR today is 3.1; will decrease the dose or hold it according to the staff decision.   - Monitor closely the effect of medication

## 2018-01-04 NOTE — SUBJECTIVE & OBJECTIVE
Past Medical History:   Diagnosis Date    Bipolar disorder     CHF (congestive heart failure)     Dyslipidemia     GERD (gastroesophageal reflux disease)     Hx of tracheostomy     Decanulated / surgically removed in 2013? Does not currently have tracheostomy    Hypertension     Hypothyroidism     Oxygen dependent     3L around the clock     Pulmonary HTN     Pulmonary hypertension, group 1 10/4/2017    Pulmonary nodules 10/10/2017    Respiratory failure, chronic        Past Surgical History:   Procedure Laterality Date    BACK SURGERY      PERICARDIAL WINDOW  2013    WA LEFT HEART CATH,PERCUTANEOUS      Cardiac Cath, Left Heart    TUBAL LIGATION         Review of patient's allergies indicates:   Allergen Reactions    Sulfa (sulfonamide antibiotics) Rash       No current facility-administered medications on file prior to encounter.      Current Outpatient Prescriptions on File Prior to Encounter   Medication Sig    albuterol (PROVENTIL) 2.5 mg /3 mL (0.083 %) nebulizer solution Take 2.5 mg by nebulization every 6 (six) hours as needed for Wheezing. Rescue    amlodipine (NORVASC) 5 MG tablet Take 5 mg by mouth once daily.    budesonide-formoterol 80-4.5 mcg (SYMBICORT) 80-4.5 mcg/actuation HFAA Inhale 2 puffs into the lungs 2 (two) times daily. Controller    bumetanide (BUMEX) 2 MG tablet Take 2 tablets (4 mg total) by mouth 2 (two) times daily.    busPIRone (BUSPAR) 15 MG tablet Take 15 mg by mouth 3 (three) times daily.    desvenlafaxine succinate (PRISTIQ) 100 MG Tb24 Take 100 mg by mouth once daily.    gabapentin (NEURONTIN) 100 MG capsule Take 1 capsule (100 mg total) by mouth 3 (three) times daily.    hydrocodone-acetaminophen 10-325mg (NORCO)  mg Tab Take 1 tablet by mouth every 8 (eight) hours as needed for Pain.    lamotrigine (LAMICTAL) 100 MG tablet Take 100 mg by mouth 2 (two) times daily.    levothyroxine (SYNTHROID) 75 MCG tablet Take 75 mcg by mouth once daily.     lurasidone (LATUDA) 60 mg Tab tablet Take 60 mg by mouth once daily.    magnesium oxide (MAG-OX) 400 mg tablet Take 1 tablet (400 mg total) by mouth 2 (two) times daily.    metOLazone (ZAROXOLYN) 2.5 MG tablet Take 1 tablet (2.5 mg total) by mouth daily as needed. Take for wt gain 3# overnight or 5# in 1 wk, with extra potassium    ondansetron (ZOFRAN-ODT) 8 MG TbDL Take 1 tablet (8 mg total) by mouth every 8 (eight) hours as needed.    pantoprazole (PROTONIX) 40 MG tablet Take 40 mg by mouth once daily.    potassium chloride SA (K-DUR,KLOR-CON) 20 MEQ tablet Take 3 tablets (60 mEq total) by mouth 2 (two) times daily.    pravastatin (PRAVACHOL) 40 MG tablet Take 40 mg by mouth once daily.    sodium chloride 0.9% 0.9 % SolP 100 mL with treprostinil 1 mg/mL Soln 6,000,000 ng Inject 2,380 ng/min into the vein continuous.    warfarin (COUMADIN) 5 MG tablet Take 2 tablets (10 mg total) by mouth Daily. Or as directed by Coumadin clinic     Family History     Problem Relation (Age of Onset)    Cancer Mother, Sister    Hypertension Mother, Father        Social History Main Topics    Smoking status: Former Smoker     Types: Cigarettes     Start date: 1/1/1979     Quit date: 1/5/1997    Smokeless tobacco: Never Used    Alcohol use No    Drug use: No    Sexual activity: No     Review of Systems   Constitution: Positive for weight gain. Negative for decreased appetite and weakness.   HENT: Positive for congestion. Negative for ear pain, hoarse voice, odynophagia and stridor.    Eyes: Negative.    Cardiovascular: Positive for dyspnea on exertion and leg swelling. Negative for chest pain, irregular heartbeat, near-syncope, palpitations and syncope.   Respiratory: Positive for cough and shortness of breath. Negative for hemoptysis, snoring and wheezing.    Endocrine: Negative.    Hematologic/Lymphatic: Negative.    Skin: Negative.    Musculoskeletal: Negative.    Gastrointestinal: Negative.    Genitourinary: Negative.     Neurological: Negative.    Psychiatric/Behavioral: Negative.      Objective:     Vital Signs (Most Recent):  Temp: 98.4 °F (36.9 °C) (01/03/18 1919)  Pulse: 81 (01/04/18 0302)  Resp: 16 (01/04/18 0302)  BP: (!) 102/54 (01/04/18 0302)  SpO2: (!) 89 % (01/04/18 0304) Vital Signs (24h Range):  Temp:  [98.4 °F (36.9 °C)] 98.4 °F (36.9 °C)  Pulse:  [81-88] 81  Resp:  [16-21] 16  SpO2:  [86 %-96 %] 89 %  BP: (102-118)/(54-72) 102/54     Weight: 90.7 kg (200 lb)  Body mass index is 36.58 kg/m².    SpO2: (!) 89 %       No intake or output data in the 24 hours ending 01/04/18 0419    Lines/Drains/Airways     Central Venous Catheter Line                 Tunneled Central Line Insertion/Assessment - Single Lumen  12/14/17 1209 right internal jugular 20 days          Peripheral Intravenous Line                 Peripheral IV - Single Lumen 01/03/18 1 day                Physical Exam   Constitutional: She is oriented to person, place, and time. She appears well-developed and well-nourished. No distress.   HENT:   Head: Normocephalic and atraumatic.   Mouth/Throat: Oropharynx is clear and moist.   Eyes: EOM are normal. Pupils are equal, round, and reactive to light. Right eye exhibits no discharge. Left eye exhibits no discharge. No scleral icterus.   Neck: Normal range of motion. Neck supple. No JVD present. No tracheal deviation present. No thyromegaly present.   Cardiovascular: Normal rate, regular rhythm and intact distal pulses.    Pulmonary/Chest: Effort normal and breath sounds normal. No stridor. No respiratory distress. She has no rales. She exhibits no tenderness.   Abdominal: Soft. Bowel sounds are normal. She exhibits no distension and no mass. There is no rebound.   Musculoskeletal: Normal range of motion. She exhibits edema. She exhibits no tenderness or deformity.   Lymphadenopathy:     She has no cervical adenopathy.   Neurological: She is alert and oriented to person, place, and time. She has normal reflexes.  She displays normal reflexes. No cranial nerve deficit. She exhibits normal muscle tone. Coordination normal.   Skin: Skin is warm. No rash noted. She is not diaphoretic. No erythema. No pallor.   Psychiatric: She has a normal mood and affect. Judgment and thought content normal.   Nursing note and vitals reviewed.      Significant Labs:   CMP   Recent Labs  Lab 01/03/18  2306      K 3.2*      CO2 29   *   BUN 18   CREATININE 1.4   CALCIUM 8.7   PROT 6.2   ALBUMIN 3.5   BILITOT 0.5   ALKPHOS 113   AST 32   ALT 25   ANIONGAP 11   ESTGFRAFRICA 48.5*   EGFRNONAA 42.0*   , CBC   Recent Labs  Lab 01/03/18  2306   WBC 5.95   HGB 12.1   HCT 36.4*       and INR   Recent Labs  Lab 01/03/18 01/03/18  2306   INR 4.0 3.1*       Significant Imaging: X-Ray: CXR: X-Ray Chest 1 View (CXR): No results found for this visit on 01/03/18.

## 2018-01-04 NOTE — ED NOTES
Dr. John with [facility] paged for admission orders -  Spoke with dr. John awaiting orders

## 2018-01-04 NOTE — CONSULTS
Ms. Smith is a 58 yo F with a history of WHO Group 1 pHTN on remodulin, chronic hypoxic respiratory failure on home O2 (3L), history of trach and short term vent dependence 4 years ago who presents with 13 pound weight gain, increased lower extremity edema and increased dyspnea.    She was seen at [facility], but because of the Remodulin infusion was transferred to [facility].    Currently, she is lying in bed in no acute distress.  She states that she ate gumbo on New Years and has had weight gain, increased dyspnea and increased lower extremity edema for the past several days.  She has been compliant with her medications.    She is on her home O2 of 3L and satting well.    Exam is notable for 3+ peripheral edema, R IJ catheter, and johann wheezing in the lower lobes.    Labs are pending.    Plan:    -Will admit to [facility] under [facility].  - Pt has remodulin supplies  - Lasix IV 80 mg x 1 and then lasix drip at 20 mg/hr  - Monitor electrolytes as she is prone to hypokalemia    Discussed with Dr. Bosch and [facility] hospitalist.    La Aly MD, MPH  Cardiology Fellow

## 2018-01-04 NOTE — NURSING
Remodulin dose increased to 32ng/kg/min at 1245 once HA was decreased. VSS after increase. Pt ambulated to BR and when came back to bed vomited lunch. Zofran given.

## 2018-01-04 NOTE — ASSESSMENT & PLAN NOTE
- Pt last echo with normal EF and indeterminate diastolic indices and RV with severe dysfunction.   - Physical exam with trace LE edema, 1+ sacral edema, JVD in the supraclavicular fossa  - Pt has received 80 mg IV push of Lasix in ED; to be continued on lasix drip at 10 mg/hr.   - Follow up the clinical status.    -Fluid restriction to 1.5 Litres, 2 g sodium dietary restriction and strict intake output monitoring.

## 2018-01-04 NOTE — ED NOTES
spoke with dr. Aly with cardiology  To clarify lasix infusion and ivp - was advised to contact [facility] -  Called x 2 with no answer - will continue to call

## 2018-01-04 NOTE — PROGRESS NOTES
Admit Note     Met with patient to assess needs. Patient is a 57 y.o.  female, admitted  for pulmonary hypertension.      Patient admitted from home on 1/3/2018 .  At this time, patient presents as alert and oriented x 4 and good eye contact.  At this time, patients caregiver is not in attendance.     Household/Family Systems     Patient resides alone at     [address]  [place] [address].      Pt reports this is assisted living, however pt reports she does not receive any assistance.  Pt lives 2.5 hours from [facility]. [place] close to [place]    Support system includes daughter, father and a few friends. Patient does not have dependents that are need of being cared for.     Patients primary caregiver is self.  Pt's cell:  [phone]    Additional emergency contacts:  Prabha Smith (daughter, lives in [place]) [phone]  U.JACKIE Clive (father, lives in [place]) [phone]    During admission, patient's caregiver plans to stay at home.  Confirmed patient and patients caregivers do not have access to reliable transportation.    Cognitive Status/Learning     Patient reports reading ability as 12th grade and states patient does not have difficulty with reading, writing, seeing, hearing and memory. Pt reports difficulty with comprehension and learning.  Pt wears glasses.   Patient reports patient learns best by one on one.   Needed: No.   Highest education level: High School (9-12) or GED    Vocation/Disability   .  Working for Income: No  If no, reason not working: Disability    Patient is disabled due to Biolar disorder since 25-30 years.  Prior to disability, patient  was employed as a .    Adherence     Patient reports a medium level of adherence to patients health care regimen. Pt reports she has not been following her diet.    Adherence counseling and education provided. Patient verbalizes understanding.    Substance Use    Patient reports the following substance  usage.    Tobacco: none.  Pt smoked from 0087-8109. Pt used to smoke 1-2ppd.   Alcohol: none, patient denies any use.  Illicit Drugs/Non-prescribed Medications: none, patient denies any use.  Patient states clear understanding of the potential impact of substance use.  Substance abstinence/cessation counseling, education and resources provided and reviewed.     Services Utilizing/ADLS    Infusion Service: Prior to admission, patient utilizing? no  Home Health: Prior to admission, patient utilizing? [phone], [phone] fax,  is with   [phone].  was seeing pt 2x a week.   DME: Prior to admission, home 02 with portables, 3LPM via [employer].  Pt also has a nebulizer and shower chair.   Pulmonary/Cardiac Rehab: Prior to admission, no  Dialysis:  Prior to admission, no  Transplant Specialty Pharmacy:  Prior to admission, no.    Prior to admission, patient reports patient was mostly  independent with ADLS.  The pt does not drive. The pt's daughter and father can help with transportation around [facility], however not to [place].  The pt reports either her friend Rivka or Lexi should be able to assist with transportation home.   Patient reports patient is not able to care for self at this time due to compromised medical condition (as documented in medical record) and physical weakness..  Patient indicates a willingness to care for self once medically cleared to do so.    Insurance/Medications    Insured by   Payor/Plan Subscr  Sex Relation Sub. Ins. ID Effective Group Num   1[facility] HERACL[facility]ARCIA 1960 Female  [id number] [address]      Primary Insurance (for UNOS reporting): Public Insurance - Medicare FFS (Fee For Service)  Secondary Insurance (for UNOS reporting): None    Patient reports patient is able to obtain and afford medications at this time and at time of discharge.    Living Will/Healthcare Power of      Patient states patient does not have a LW and/or HCPA.   provided education regarding LW and HCPA and the completion of forms.    Coping/Mental Health    Patient reports coping adequately at this time.   Patient indicates mental health difficulties. The pt has a long history of Bipolar disorder with anxiety and depression.  Pt reports she is taking multiple medications to assist with same. Pt reports he medication works well.  Worker provided general support.      Discharge Planning    At time of discharge, patient plans to return to patient's home under the care of self.  Patients friend will transport patient.  Per rounds today, expected discharge date has not been medically determined at this time, however the pt reports she will be discharged on Sunday.  Patient and patients caregiver  verbalize understanding and are involved in treatment planning and discharge process.    Additional Concerns    Patient is being followed for needs, education, resources, information, emotional support, supportive counseling, and for supportive and skilled discharge plan of care.  providing ongoing psychosocial support, education, resources and d/c planning as needed.  SW remains available. Patient denies additional needs and/or concerns at this time. Patient verbalizes understanding and agreement with information reviewed, social work availability, and how to access available resources as needed.

## 2018-01-04 NOTE — ASSESSMENT & PLAN NOTE
- In the setting of severe Pulm HTN  - Continue on supplemental oxygen via nasal canula at the rate of 3 L/min

## 2018-01-04 NOTE — PLAN OF CARE
Problem: Patient Care Overview  Goal: Plan of Care Review  Outcome: Ongoing (interventions implemented as appropriate)  Pt c/o SOB at rest and exertion. States SOB is less today compared to yesterday. O2 remains at 3L/NC. Lasix inf at 10mg/hr. Remodulin dose increased to 32ng/kg/min as ordered. UOP adequate. Afebrile. Mg+2=1.6 yesterday. Mg+2  3grams IV given. K+=2.8. K+po given. At 1105, repeat K+=3.0 and Mg+2=2.0. K+IV given this afternoon after Mg+2 IV completed. Reinforced to wear non-skid socks and call for assistance with ambulation to prevent falling. Verbalized understanding. Norco relieved HA and neck pain this am. Fioricet given this afternoon for HA. Vomited X1 today. Zofran given. No further N/V. Continuing to monitor.

## 2018-01-04 NOTE — HPI
Ms. Smith is 58 yo female with severe WHO Group 1 pulmonary HTN, on Uptravi, chronic hypoxic respiratory failure, on home O2 and BiPAP q HS, h/o trach 4 years ago and short term vent dependence was recently discharged after treatment for acute worsening of respiratory failure in the setting of severe pulmonary HTN. However the patient came back today after the home health nurse observed her to be short of breath with lower extremity swelling. The pt reported that since discharge a week ago, she caught cold, with persistent dry cough without fever.   Pt denied CP, or palpitations but endorsed that her activities have not improved since discharge.   She reported NYHA Class 2 to 3 symptoms and have gained weight of around 15 pounds since discharge

## 2018-01-04 NOTE — ED PROVIDER NOTES
Encounter Date: 1/3/2018    SCRIBE #1 NOTE: I, Lito Fishman, am scribing for, and in the presence of,  Dr. Hyatt. I have scribed the following portions of the note - the EKG reading.       History     Chief Complaint   Patient presents with    Shortness of Breath     SOB Transfer from [facility].     58y/o F w/ severe PAH on remodulin transfer from OSH for PAH exacerbation. Patient has been noticing increased SOB and leg swelling over the last week that worsened despite her 3L home O2 and medications. She denies chest pain, fever, or bleeding. She was recently seen here and discharged for similar episode. She went to OSH where they found that she was hypoxic on ABG 7.49/37/55/28 and O2 sat mid 80s on 3L when she normally is low to mid 90s. They transferred her here as her pulmonary team is here and they cannot care for the remodulin. OSH spoke with Dr. Bosch who accepted transfer here for admission.       The history is provided by the patient and medical records.   Shortness of Breath   This is a recurrent problem. The average episode lasts 1 week. The problem occurs frequently.The current episode started more than 2 days ago. The problem has not changed since onset.Associated symptoms include leg swelling. Pertinent negatives include no fever, no chest pain, no abdominal pain and no rash. Treatments tried: home oxygen. She has had prior hospitalizations. She has had prior ED visits.     Review of patient's allergies indicates:   Allergen Reactions    Sulfa (sulfonamide antibiotics) Rash     Past Medical History:   Diagnosis Date    Bipolar disorder     CHF (congestive heart failure)     Dyslipidemia     GERD (gastroesophageal reflux disease)     Hx of tracheostomy     Decanulated / surgically removed in 2013? Does not currently have tracheostomy    Hypertension     Hypothyroidism     Oxygen dependent     3L around the clock     Pulmonary HTN     Pulmonary hypertension, group 1 10/4/2017     Pulmonary nodules 10/10/2017    Respiratory failure, chronic      Past Surgical History:   Procedure Laterality Date    BACK SURGERY      PERICARDIAL WINDOW  2013    AL LEFT HEART CATH,PERCUTANEOUS      Cardiac Cath, Left Heart    TUBAL LIGATION       Family History   Problem Relation Age of Onset    Hypertension Mother     Cancer Mother     Hypertension Father     Cancer Sister      Social History   Substance Use Topics    Smoking status: Former Smoker     Types: Cigarettes     Start date: 1/1/1979     Quit date: 1/5/1997    Smokeless tobacco: Never Used    Alcohol use No     Review of Systems   Constitutional: Negative for fever.   HENT: Negative for trouble swallowing.    Eyes: Negative for pain.   Respiratory: Positive for shortness of breath.    Cardiovascular: Positive for leg swelling. Negative for chest pain.   Gastrointestinal: Negative for abdominal pain and blood in stool.   Genitourinary: Negative for dysuria and hematuria.   Musculoskeletal:        Chronic neck pain unchanged   Skin: Negative for rash.   Neurological: Negative for syncope.   Psychiatric/Behavioral: Negative for confusion.       Physical Exam     Initial Vitals [01/03/18 1919]   BP Pulse Resp Temp SpO2   118/72 88 18 98.4 °F (36.9 °C) 96 %      MAP       87.33         Physical Exam    Nursing note and vitals reviewed.  Constitutional: She appears well-nourished.   Ill appearing, uncomfortable   HENT:   Head: Normocephalic and atraumatic.   Eyes: Conjunctivae and EOM are normal. Pupils are equal, round, and reactive to light.   Cardiovascular: Normal rate, regular rhythm and normal heart sounds.   Pulmonary/Chest: No stridor. No respiratory distress.   Abdominal: Soft. She exhibits no distension. There is no tenderness.   Musculoskeletal: She exhibits edema (symmetric in lower extremities).   Neurological: She is alert.   Skin: Skin is warm and dry.         ED Course   Procedures  Labs Reviewed   CBC W/ AUTO DIFFERENTIAL -  Abnormal; Notable for the following:        Result Value    RBC 3.88 (*)     Hematocrit 36.4 (*)     MCH 31.2 (*)     RDW 16.3 (*)     Immature Granulocytes 2.0 (*)     Immature Grans (Abs) 0.12 (*)     Lymph # 0.4 (*)     Mono # 0.0 (*)     Gran% 89.4 (*)     Lymph% 7.2 (*)     Mono% 0.7 (*)     All other components within normal limits   COMPREHENSIVE METABOLIC PANEL - Abnormal; Notable for the following:     Potassium 3.2 (*)     Glucose 128 (*)     eGFR if  48.5 (*)     eGFR if non  42.0 (*)     All other components within normal limits   TROPONIN I - Abnormal; Notable for the following:     Troponin I 0.028 (*)     All other components within normal limits   B-TYPE NATRIURETIC PEPTIDE - Abnormal; Notable for the following:     BNP 1,112 (*)     All other components within normal limits   PROTIME-INR - Abnormal; Notable for the following:     Prothrombin Time 30.8 (*)     INR 3.1 (*)     All other components within normal limits   PHOSPHORUS - Abnormal; Notable for the following:     Phosphorus 5.0 (*)     All other components within normal limits   CBC W/ AUTO DIFFERENTIAL   MAGNESIUM   PHOSPHORUS   COMPREHENSIVE METABOLIC PANEL   MAGNESIUM   BASIC METABOLIC PANEL   PROTIME-INR     EKG Readings: (Independently Interpreted)   ST depression in leads II, AVF, and V5, T- wave inversion in leads V2 and V3. No changes from previous EKG on November 9th.     X-Ray Chest AP Portable   Final Result         Cardiomegaly with mild bibasilar edema.            Electronically signed by: TAL COSTA MD   Date:     01/03/18   Time:    23:22              Medical Decision Making:   History:   Old Medical Records: I decided to obtain old medical records.  Independently Interpreted Test(s):   I have ordered and independently interpreted EKG Reading(s) - see prior notes  Clinical Tests:   Lab Tests: Ordered and Reviewed  Radiological Study: Ordered and Reviewed  Medical Tests: Ordered and  Reviewed  Other:   I have discussed this case with another health care provider.       APC / Resident Notes:   PGY-4 MDM: Patient w/ chronic severe PAH on medication presents w/ worsening SOB. Hypoxic here to mid 80s on 3L improved to 90s w/ O2 to 5L. No significant respiratory distress. Labs show elevated BNP and mildly elevated troponin; EKG w/ T wave inversion in inferior leads not changed from previous, no ST elevation. Mild edema seen on CXR. Cardiology team aware of patient and has notified transplant team for admission and further treatment.    Monica Carlin MD  PGY-4 EM 12:33 AM 1/4/2018           Scribe Attestation:   Scribe #1: I performed the above scribed service and the documentation accurately describes the services I performed. I attest to the accuracy of the note.  Comments: I, Dr. Brayan Hyatt, personally performed the services described in this documentation. All medical record entries made by the scribe were at my direction and in my presence.  I have reviewed the chart and agree that the record reflects my personal performance and is accurate and complete. Brayan Hyatt 3:44 AM 01/04/2018      Attending Attestation:   Physician Attestation Statement for Resident:  As the supervising MD   Physician Attestation Statement: I have personally seen and examined this patient.   I agree with the above history. -:   As the supervising MD I agree with the above PE.    As the supervising MD I agree with the above treatment, course, plan, and disposition.  I have reviewed and agree with the residents interpretation of the following: lab data, x-rays and EKG.                    ED Course      Clinical Impression:   The primary encounter diagnosis was Hypoxia. Diagnoses of Shortness of breath and Pulmonary hypertension were also pertinent to this visit.                           Brayan Hyatt MD  01/04/18 0454

## 2018-01-04 NOTE — PLAN OF CARE
Met with patient whom stated she pulls up 2mls of the 5.0mg/ml vial of Remodulin and mixes with 98mls of dliluent.  DW 85kg  HOME CADD pump 38ml/24hrs  HOME Concentration = 100,000ng/ml  Dose = 31ng/kg/min    Patient was switched to Hospital mixed cassette with a concentration of 60,000ng/ml and a CADD rate of 63ml/24hrs.    Dose verified with SAVAGE Arango RN and this RN.

## 2018-01-04 NOTE — H&P
[facility]  Cardiology  History and Physical     Patient Name: Sue Smith  MRN: [id number]  Admission Date: 1/3/2018  Code Status: Full Code   Attending Provider: Maty Bosch MD   Primary Care Physician: Paddy Guerrier DO  Principal Problem:Pulmonary hypertension    Patient information was obtained from patient, past medical records and ER records.     Subjective:     Chief Complaint:  Shortness of breath , weight gain,.      HPI:  Ms. Smith is 58 yo female with severe WHO Group 1 pulmonary HTN, on Uptravi, chronic hypoxic respiratory failure, on home O2 and BiPAP q HS, h/o trach 4 years ago and short term vent dependence was recently discharged after treatment for acute worsening of respiratory failure in the setting of severe pulmonary HTN. However the patient came back today after the home health nurse observed her to be short of breath with lower extremity swelling. The pt reported that since discharge a week ago, she caught cold, with persistent dry cough without fever.   Pt denied CP, or palpitations but endorsed that her activities have not improved since discharge.   She reported NYHA Class 2 to 3 symptoms and have gained weight of around 15 pounds since discharge    Past Medical History:   Diagnosis Date    Bipolar disorder     CHF (congestive heart failure)     Dyslipidemia     GERD (gastroesophageal reflux disease)     Hx of tracheostomy     Decanulated / surgically removed in 2013? Does not currently have tracheostomy    Hypertension     Hypothyroidism     Oxygen dependent     3L around the clock     Pulmonary HTN     Pulmonary hypertension, group 1 10/4/2017    Pulmonary nodules 10/10/2017    Respiratory failure, chronic        Past Surgical History:   Procedure Laterality Date    BACK SURGERY      PERICARDIAL WINDOW  2013    [place] LEFT HEART CATH,PERCUTANEOUS      Cardiac Cath, Left Heart    TUBAL LIGATION         Review of patient's allergies indicates:   Allergen  Reactions    Sulfa (sulfonamide antibiotics) Rash       No current facility-administered medications on file prior to encounter.      Current Outpatient Prescriptions on File Prior to Encounter   Medication Sig    albuterol (PROVENTIL) 2.5 mg /3 mL (0.083 %) nebulizer solution Take 2.5 mg by nebulization every 6 (six) hours as needed for Wheezing. Rescue    amlodipine (NORVASC) 5 MG tablet Take 5 mg by mouth once daily.    budesonide-formoterol 80-4.5 mcg (SYMBICORT) 80-4.5 mcg/actuation HFAA Inhale 2 puffs into the lungs 2 (two) times daily. Controller    bumetanide (BUMEX) 2 MG tablet Take 2 tablets (4 mg total) by mouth 2 (two) times daily.    busPIRone (BUSPAR) 15 MG tablet Take 15 mg by mouth 3 (three) times daily.    desvenlafaxine succinate (PRISTIQ) 100 MG Tb24 Take 100 mg by mouth once daily.    gabapentin (NEURONTIN) 100 MG capsule Take 1 capsule (100 mg total) by mouth 3 (three) times daily.    hydrocodone-acetaminophen 10-325mg (NORCO)  mg Tab Take 1 tablet by mouth every 8 (eight) hours as needed for Pain.    lamotrigine (LAMICTAL) 100 MG tablet Take 100 mg by mouth 2 (two) times daily.    levothyroxine (SYNTHROID) 75 MCG tablet Take 75 mcg by mouth once daily.    lurasidone (LATUDA) 60 mg Tab tablet Take 60 mg by mouth once daily.    magnesium oxide (MAG-OX) 400 mg tablet Take 1 tablet (400 mg total) by mouth 2 (two) times daily.    metOLazone (ZAROXOLYN) 2.5 MG tablet Take 1 tablet (2.5 mg total) by mouth daily as needed. Take for wt gain 3# overnight or 5# in 1 wk, with extra potassium    ondansetron (ZOFRAN-ODT) 8 MG TbDL Take 1 tablet (8 mg total) by mouth every 8 (eight) hours as needed.    pantoprazole (PROTONIX) 40 MG tablet Take 40 mg by mouth once daily.    potassium chloride SA (K-DUR,KLOR-CON) 20 MEQ tablet Take 3 tablets (60 mEq total) by mouth 2 (two) times daily.    pravastatin (PRAVACHOL) 40 MG tablet Take 40 mg by mouth once daily.    sodium chloride 0.9% 0.9 %  SolP 100 mL with treprostinil 1 mg/mL Soln 6,000,000 ng Inject 2,380 ng/min into the vein continuous.    warfarin (COUMADIN) 5 MG tablet Take 2 tablets (10 mg total) by mouth Daily. Or as directed by Coumadin clinic     Family History     Problem Relation (Age of Onset)    Cancer Mother, Sister    Hypertension Mother, Father        Social History Main Topics    Smoking status: Former Smoker     Types: Cigarettes     Start date: 1/1/1979     Quit date: 1/5/1997    Smokeless tobacco: Never Used    Alcohol use No    Drug use: No    Sexual activity: No     Review of Systems   Constitution: Positive for weight gain. Negative for decreased appetite and weakness.   HENT: Positive for congestion. Negative for ear pain, hoarse voice, odynophagia and stridor.    Eyes: Negative.    Cardiovascular: Positive for dyspnea on exertion and leg swelling. Negative for chest pain, irregular heartbeat, near-syncope, palpitations and syncope.   Respiratory: Positive for cough and shortness of breath. Negative for hemoptysis, snoring and wheezing.    Endocrine: Negative.    Hematologic/Lymphatic: Negative.    Skin: Negative.    Musculoskeletal: Negative.    Gastrointestinal: Negative.    Genitourinary: Negative.    Neurological: Negative.    Psychiatric/Behavioral: Negative.      Objective:     Vital Signs (Most Recent):  Temp: 98.4 °F (36.9 °C) (01/03/18 1919)  Pulse: 81 (01/04/18 0302)  Resp: 16 (01/04/18 0302)  BP: (!) 102/54 (01/04/18 0302)  SpO2: (!) 89 % (01/04/18 0304) Vital Signs (24h Range):  Temp:  [98.4 °F (36.9 °C)] 98.4 °F (36.9 °C)  Pulse:  [81-88] 81  Resp:  [16-21] 16  SpO2:  [86 %-96 %] 89 %  BP: (102-118)/(54-72) 102/54     Weight: 90.7 kg (200 lb)  Body mass index is 36.58 kg/m².    SpO2: (!) 89 %       No intake or output data in the 24 hours ending 01/04/18 0419    Lines/Drains/Airways     Central Venous Catheter Line                 Tunneled Central Line Insertion/Assessment - Single Lumen  12/14/17 1209 right  internal jugular 20 days          Peripheral Intravenous Line                 Peripheral IV - Single Lumen 01/03/18 1 day                Physical Exam   Constitutional: She is oriented to person, place, and time. She appears well-developed and well-nourished. No distress.   HENT:   Head: Normocephalic and atraumatic.   Mouth/Throat: Oropharynx is clear and moist.   Eyes: EOM are normal. Pupils are equal, round, and reactive to light. Right eye exhibits no discharge. Left eye exhibits no discharge. No scleral icterus.   Neck: Normal range of motion. Neck supple. No JVD present. No tracheal deviation present. No thyromegaly present.   Cardiovascular: Normal rate, regular rhythm and intact distal pulses.    Pulmonary/Chest: Effort normal and breath sounds normal. No stridor. No respiratory distress. She has no rales. She exhibits no tenderness.   Abdominal: Soft. Bowel sounds are normal. She exhibits no distension and no mass. There is no rebound.   Musculoskeletal: Normal range of motion. She exhibits edema. She exhibits no tenderness or deformity.   Lymphadenopathy:     She has no cervical adenopathy.   Neurological: She is alert and oriented to person, place, and time. She has normal reflexes. She displays normal reflexes. No cranial nerve deficit. She exhibits normal muscle tone. Coordination normal.   Skin: Skin is warm. No rash noted. She is not diaphoretic. No erythema. No pallor.   Psychiatric: She has a normal mood and affect. Judgment and thought content normal.   Nursing note and vitals reviewed.      Significant Labs:   CMP   Recent Labs  Lab 01/03/18  2306      K 3.2*      CO2 29   *   BUN 18   CREATININE 1.4   CALCIUM 8.7   PROT 6.2   ALBUMIN 3.5   BILITOT 0.5   ALKPHOS 113   AST 32   ALT 25   ANIONGAP 11   ESTGFRAFRICA 48.5*   EGFRNONAA 42.0*   , CBC   Recent Labs  Lab 01/03/18 2306   WBC 5.95   HGB 12.1   HCT 36.4*       and INR   Recent Labs  Lab 01/03/18 01/03/18  2306   INR  4.0 3.1*       Significant Imaging: X-Ray: CXR: X-Ray Chest 1 View (CXR): No results found for this visit on 01/03/18.    Assessment and Plan:     * Pulmonary hypertension, group 1    - WHO Class 1 Severe PHTN  - Continued on Valetrie; current dose is 38 ng/kg/min  - Pt self escalated the dose over the past week based on the dosing sheet provided by the pharmacy.  - Currently she dose not have that sheet on her, to consult pharmacy to adjust the dose  - Will assess on daily basis  - Pt was started on warfarin this past admission with target INR 2-3  - INR today is 3.1; will decrease the dose or hold it according to the staff decision.   - Monitor closely the effect of medication           Volume overload    - Pt last echo with normal EF and indeterminate diastolic indices and RV with severe dysfunction.   - Physical exam with trace LE edema, 1+ sacral edema, JVD in the supraclavicular fossa  - Pt has received 80 mg IV push of Lasix in ED; to be continued on lasix drip at 10 mg/hr.   - Follow up the clinical status.    -Fluid restriction to 1.5 Litres, 2 g sodium dietary restriction and strict intake output monitoring.         GERD (gastroesophageal reflux disease)    - Continue Pantoprazole 40 mg daily        Bipolar disorder    - Currently non-suicidal, non-homicidal.  - Continue home doses of Busperon and Desvanlafaxin.         Dyslipidemia    - Continue statin at current dose of Pravastatin 40 mg         Hypertension    - Currently stable.   - Continue Amlodipine at current dose.         Hypothyroidism    - No symptoms or signs  - Continue home dose of 75 mcg daily.         Chronic respiratory failure with hypoxia    - In the setting of severe Pulm HTN  - Continue on supplemental oxygen via nasal canula at the rate of 3 L/min              VTE Risk Mitigation         Ordered     warfarin tablet 6 mg  Daily     Route:  Oral        01/04/18 0402          Joshua John MD  Cardiology   [facility]  [place]

## 2018-01-04 NOTE — ED NOTES
Sue Smith, an 57 y.o. female presents to the ED  As a transfer from [facility] for pulmonology - pt has hx of PAH and is on on home remodulin - next to be changed at 8 am  - has her home supply - reports wheezing  And has no used her home inhaler - reports difficulty taking care of self and home, but has home health nurse   Denies cp     Chief Complaint   Patient presents with    Shortness of Breath     SOB Transfer from [facility].     Review of patient's allergies indicates:   Allergen Reactions    Sulfa (sulfonamide antibiotics) Rash     Past Medical History:   Diagnosis Date    Bipolar disorder     CHF (congestive heart failure)     Dyslipidemia     GERD (gastroesophageal reflux disease)     Hx of tracheostomy     Decanulated / surgically removed in 2013? Does not currently have tracheostomy    Hypertension     Hypothyroidism     Oxygen dependent     3L around the clock     Pulmonary HTN     Pulmonary hypertension, group 1 10/4/2017    Pulmonary nodules 10/10/2017    Respiratory failure, chronic

## 2018-01-04 NOTE — PLAN OF CARE
Discussed plan care for IV Remodulin with Deanne Peñaloza PA-C:    Will increase by 1 ng/kg/min as tolerated by the patient.   Patient current;y is complaining of a HA and was recently given PRN Norco for pain.     RN to re-assess HA and possibly increase to 32ng/kg/min around ~ 1200noon

## 2018-01-05 LAB
ALLENS TEST: ABNORMAL
ANION GAP SERPL CALC-SCNC: 11 MMOL/L
ANION GAP SERPL CALC-SCNC: 11 MMOL/L
BASOPHILS # BLD AUTO: 0.04 K/UL
BASOPHILS NFR BLD: 0.5 %
BUN SERPL-MCNC: 22 MG/DL
BUN SERPL-MCNC: 27 MG/DL
CALCIUM SERPL-MCNC: 10.1 MG/DL
CALCIUM SERPL-MCNC: 9.3 MG/DL
CHLORIDE SERPL-SCNC: 92 MMOL/L
CHLORIDE SERPL-SCNC: 92 MMOL/L
CO2 SERPL-SCNC: 37 MMOL/L
CO2 SERPL-SCNC: 42 MMOL/L
CREAT SERPL-MCNC: 1.4 MG/DL
CREAT SERPL-MCNC: 1.5 MG/DL
DELSYS: ABNORMAL
DIFFERENTIAL METHOD: ABNORMAL
EOSINOPHIL # BLD AUTO: 0.1 K/UL
EOSINOPHIL NFR BLD: 1.8 %
ERYTHROCYTE [DISTWIDTH] IN BLOOD BY AUTOMATED COUNT: 16.8 %
EST. GFR  (AFRICAN AMERICAN): 44.6 ML/MIN/1.73 M^2
EST. GFR  (AFRICAN AMERICAN): 48.5 ML/MIN/1.73 M^2
EST. GFR  (NON AFRICAN AMERICAN): 38.7 ML/MIN/1.73 M^2
EST. GFR  (NON AFRICAN AMERICAN): 42 ML/MIN/1.73 M^2
FIO2: 37
FLOW: 4
GLUCOSE SERPL-MCNC: 104 MG/DL
GLUCOSE SERPL-MCNC: 86 MG/DL
HCO3 UR-SCNC: 42.5 MMOL/L (ref 24–28)
HCT VFR BLD AUTO: 38.1 %
HGB BLD-MCNC: 12.4 G/DL
IMM GRANULOCYTES # BLD AUTO: 0.08 K/UL
IMM GRANULOCYTES NFR BLD AUTO: 1 %
INR PPP: 4.7
LYMPHOCYTES # BLD AUTO: 1.1 K/UL
LYMPHOCYTES NFR BLD: 14.4 %
MAGNESIUM SERPL-MCNC: 1.8 MG/DL
MAGNESIUM SERPL-MCNC: 1.9 MG/DL
MCH RBC QN AUTO: 30.8 PG
MCHC RBC AUTO-ENTMCNC: 32.5 G/DL
MCV RBC AUTO: 95 FL
MODE: ABNORMAL
MONOCYTES # BLD AUTO: 0.5 K/UL
MONOCYTES NFR BLD: 6 %
NEUTROPHILS # BLD AUTO: 6 K/UL
NEUTROPHILS NFR BLD: 76.3 %
NRBC BLD-RTO: 0 /100 WBC
PCO2 BLDA: 53.7 MMHG (ref 35–45)
PH SMN: 7.51 [PH] (ref 7.35–7.45)
PLATELET # BLD AUTO: 176 K/UL
PMV BLD AUTO: 11.2 FL
PO2 BLDA: 44 MMHG (ref 80–100)
POC BE: 19 MMOL/L
POC SATURATED O2: 82 % (ref 95–100)
POC TCO2: 44 MMOL/L (ref 23–27)
POTASSIUM SERPL-SCNC: 3.1 MMOL/L
POTASSIUM SERPL-SCNC: 3.7 MMOL/L
PROTHROMBIN TIME: 48 SEC
RBC # BLD AUTO: 4.02 M/UL
SAMPLE: ABNORMAL
SITE: ABNORMAL
SODIUM SERPL-SCNC: 140 MMOL/L
SODIUM SERPL-SCNC: 145 MMOL/L
SP02: 81
WBC # BLD AUTO: 7.83 K/UL

## 2018-01-05 PROCEDURE — 25000003 PHARM REV CODE 250: Performed by: INTERNAL MEDICINE

## 2018-01-05 PROCEDURE — 63600175 PHARM REV CODE 636 W HCPCS: Performed by: INTERNAL MEDICINE

## 2018-01-05 PROCEDURE — 20600001 HC STEP DOWN PRIVATE ROOM

## 2018-01-05 PROCEDURE — 27000190 HC CPAP FULL FACE MASK W/VALVE

## 2018-01-05 PROCEDURE — 99232 SBSQ HOSP IP/OBS MODERATE 35: CPT | Mod: ,,, | Performed by: INTERNAL MEDICINE

## 2018-01-05 PROCEDURE — 85610 PROTHROMBIN TIME: CPT

## 2018-01-05 PROCEDURE — A4216 STERILE WATER/SALINE, 10 ML: HCPCS | Performed by: INTERNAL MEDICINE

## 2018-01-05 PROCEDURE — 27000221 HC OXYGEN, UP TO 24 HOURS

## 2018-01-05 PROCEDURE — 83735 ASSAY OF MAGNESIUM: CPT | Mod: 91

## 2018-01-05 PROCEDURE — 99900035 HC TECH TIME PER 15 MIN (STAT)

## 2018-01-05 PROCEDURE — 36415 COLL VENOUS BLD VENIPUNCTURE: CPT

## 2018-01-05 PROCEDURE — 80048 BASIC METABOLIC PNL TOTAL CA: CPT | Mod: 91

## 2018-01-05 PROCEDURE — 25000003 PHARM REV CODE 250: Performed by: PHYSICIAN ASSISTANT

## 2018-01-05 PROCEDURE — 80048 BASIC METABOLIC PNL TOTAL CA: CPT

## 2018-01-05 PROCEDURE — 94761 N-INVAS EAR/PLS OXIMETRY MLT: CPT

## 2018-01-05 PROCEDURE — 25000242 PHARM REV CODE 250 ALT 637 W/ HCPCS: Performed by: INTERNAL MEDICINE

## 2018-01-05 PROCEDURE — 94640 AIRWAY INHALATION TREATMENT: CPT

## 2018-01-05 PROCEDURE — 94660 CPAP INITIATION&MGMT: CPT

## 2018-01-05 PROCEDURE — 63600175 PHARM REV CODE 636 W HCPCS: Performed by: PHYSICIAN ASSISTANT

## 2018-01-05 PROCEDURE — 85025 COMPLETE CBC W/AUTO DIFF WBC: CPT

## 2018-01-05 PROCEDURE — 63600175 PHARM REV CODE 636 W HCPCS: Mod: JG | Performed by: INTERNAL MEDICINE

## 2018-01-05 RX ORDER — POTASSIUM CHLORIDE 750 MG/1
40 CAPSULE, EXTENDED RELEASE ORAL ONCE
Status: COMPLETED | OUTPATIENT
Start: 2018-01-05 | End: 2018-01-05

## 2018-01-05 RX ORDER — LURASIDONE HYDROCHLORIDE 20 MG/1
60 TABLET, FILM COATED ORAL DAILY
Status: DISCONTINUED | OUTPATIENT
Start: 2018-01-05 | End: 2018-01-11 | Stop reason: HOSPADM

## 2018-01-05 RX ORDER — MAGNESIUM SULFATE HEPTAHYDRATE 40 MG/ML
2 INJECTION, SOLUTION INTRAVENOUS ONCE
Status: COMPLETED | OUTPATIENT
Start: 2018-01-05 | End: 2018-01-05

## 2018-01-05 RX ADMIN — BUTALBITAL, ACETAMINOPHEN, AND CAFFEINE 1 TABLET: 50; 325; 40 TABLET ORAL at 11:01

## 2018-01-05 RX ADMIN — ALBUTEROL SULFATE 2.5 MG: 2.5 SOLUTION RESPIRATORY (INHALATION) at 11:01

## 2018-01-05 RX ADMIN — LAMOTRIGINE 100 MG: 100 TABLET ORAL at 09:01

## 2018-01-05 RX ADMIN — GABAPENTIN 100 MG: 100 CAPSULE ORAL at 06:01

## 2018-01-05 RX ADMIN — BUSPIRONE HYDROCHLORIDE 15 MG: 5 TABLET ORAL at 01:01

## 2018-01-05 RX ADMIN — MAGNESIUM OXIDE TAB 400 MG (241.3 MG ELEMENTAL MG) 400 MG: 400 (241.3 MG) TAB at 09:01

## 2018-01-05 RX ADMIN — POTASSIUM CHLORIDE 40 MEQ: 750 CAPSULE, EXTENDED RELEASE ORAL at 09:01

## 2018-01-05 RX ADMIN — Medication 3 ML: at 06:01

## 2018-01-05 RX ADMIN — GABAPENTIN 100 MG: 100 CAPSULE ORAL at 01:01

## 2018-01-05 RX ADMIN — POTASSIUM CHLORIDE 60 MEQ: 1500 TABLET, EXTENDED RELEASE ORAL at 06:01

## 2018-01-05 RX ADMIN — POTASSIUM CHLORIDE 60 MEQ: 1500 TABLET, EXTENDED RELEASE ORAL at 01:01

## 2018-01-05 RX ADMIN — ONDANSETRON 4 MG: 2 INJECTION INTRAMUSCULAR; INTRAVENOUS at 01:01

## 2018-01-05 RX ADMIN — ALBUTEROL SULFATE 2.5 MG: 2.5 SOLUTION RESPIRATORY (INHALATION) at 12:01

## 2018-01-05 RX ADMIN — ALBUTEROL SULFATE 2.5 MG: 2.5 SOLUTION RESPIRATORY (INHALATION) at 03:01

## 2018-01-05 RX ADMIN — PANTOPRAZOLE SODIUM 40 MG: 40 TABLET, DELAYED RELEASE ORAL at 09:01

## 2018-01-05 RX ADMIN — Medication 3 ML: at 09:01

## 2018-01-05 RX ADMIN — FUROSEMIDE 10 MG/HR: 10 INJECTION, SOLUTION INTRAMUSCULAR; INTRAVENOUS at 09:01

## 2018-01-05 RX ADMIN — BUSPIRONE HYDROCHLORIDE 15 MG: 5 TABLET ORAL at 09:01

## 2018-01-05 RX ADMIN — ONDANSETRON 8 MG: 8 TABLET, ORALLY DISINTEGRATING ORAL at 10:01

## 2018-01-05 RX ADMIN — ALBUTEROL SULFATE 2.5 MG: 2.5 SOLUTION RESPIRATORY (INHALATION) at 08:01

## 2018-01-05 RX ADMIN — TREPROSTINIL 32 NG/KG/MIN: 200 INJECTION, SOLUTION INTRAVENOUS; SUBCUTANEOUS at 08:01

## 2018-01-05 RX ADMIN — BUSPIRONE HYDROCHLORIDE 15 MG: 5 TABLET ORAL at 06:01

## 2018-01-05 RX ADMIN — LEVOTHYROXINE SODIUM 75 MCG: 75 TABLET ORAL at 06:01

## 2018-01-05 RX ADMIN — FLUTICASONE FUROATE AND VILANTEROL TRIFENATATE 1 PUFF: 100; 25 POWDER RESPIRATORY (INHALATION) at 09:01

## 2018-01-05 RX ADMIN — PRAVASTATIN SODIUM 40 MG: 40 TABLET ORAL at 09:01

## 2018-01-05 RX ADMIN — DESVENLAFAXINE SUCCINATE 100 MG: 50 TABLET, EXTENDED RELEASE ORAL at 09:01

## 2018-01-05 RX ADMIN — LURASIDONE HYDROCHLORIDE 60 MG: 20 TABLET, FILM COATED ORAL at 04:01

## 2018-01-05 RX ADMIN — HYDROCODONE BITARTRATE AND ACETAMINOPHEN 1 TABLET: 10; 325 TABLET ORAL at 04:01

## 2018-01-05 RX ADMIN — GABAPENTIN 100 MG: 100 CAPSULE ORAL at 09:01

## 2018-01-05 RX ADMIN — Medication 3 ML: at 02:01

## 2018-01-05 RX ADMIN — HYDROCODONE BITARTRATE AND ACETAMINOPHEN 1 TABLET: 10; 325 TABLET ORAL at 08:01

## 2018-01-05 RX ADMIN — AMLODIPINE BESYLATE 5 MG: 5 TABLET ORAL at 09:01

## 2018-01-05 RX ADMIN — MAGNESIUM SULFATE IN WATER 2 G: 40 INJECTION, SOLUTION INTRAVENOUS at 06:01

## 2018-01-05 RX ADMIN — POTASSIUM CHLORIDE 60 MEQ: 1500 TABLET, EXTENDED RELEASE ORAL at 09:01

## 2018-01-05 NOTE — PROGRESS NOTES
"- Pt states she uses BiPAP while asleep - not ordered currently - asked pt if she knows what settings she uses, pt states, "I do not know."  - Called Dr. John and obtained order for BiPAP HS. Dr. John stated he would enter orders for 40% FiO2 and that [facility] can adjust settings as appropriate.  - Called [facility] to inform them of new order and that pt will need a machine brought to the room. RRT stated they will place pt on BiPAP as soon as they can. Pt is on 5 L humidified nasal cannula in the meantime.    Addendum 2330: Called [facility] re: pt not being placed on BiPAP yet. RRT stated she is still looking for a BiPAP machine.  Addendum 0005: Pt placed on BiPAP at 40% FiO2. Pt appears resting comfortably. SpO2 91%.  "

## 2018-01-05 NOTE — ASSESSMENT & PLAN NOTE
- Severe PH with h/o markedly reduced CI and severe RV dysfunction. On Remodulin, uptitrating- now at 32 ng/kg/min.   - PASP 88 by TTE 12/8/17  - On Coumadin. INR supratherapeutic today so hold coumadin

## 2018-01-05 NOTE — PLAN OF CARE
Problem: Patient Care Overview  Goal: Plan of Care Review  -Pt free from fall or injury so far this shift. Instructed to call if assistance needed, verbalized understanding.   -Cardiac monitoring in progress, currently SR. Remodulin dose ^ to 33 ng, rate @ 67 cc/24 hrs. Pt tolerating ok. Pt nauseous, relieved with IV Zofran. Pt with a HA, relieved with Fiorcet. Will continue to ^ Remodulin by 1 mg  every 6 hours as tolerated by pt, next ^ will be at 1600  -Sats currently 92% on 6 L NC, pt on continuous pulse ox. ABG's today, see previous note. Respiratory treatments continued.   -Potassium 3.1, PO replacement given.   -Pain moderately controlled with PRN Norco.  -Afebrile. Hand hygiene reinforced. Daily labs monitored.

## 2018-01-05 NOTE — SUBJECTIVE & OBJECTIVE
Interval History: Had N/V and HA yesterday with increasing Remodulin. Feeling better this am    Continuous Infusions:   furosemide (LASIX) 1 mg/mL infusion (non-titrating) 10 mg/hr (01/04/18 2215)    treprostinil (REMODULIN) infusion 33 ng/kg/min (01/05/18 0934)    veletri/remodulin cassette      veletri/remodulin tubing       Scheduled Meds:   albuterol sulfate  2.5 mg Nebulization Q4H    amLODIPine  5 mg Oral Daily    busPIRone  15 mg Oral TID    desvenlafaxine succinate  100 mg Oral Daily    fluticasone-vilanterol  1 puff Inhalation Daily    gabapentin  100 mg Oral TID    lamoTRIgine  100 mg Oral BID    levothyroxine  75 mcg Oral Before breakfast    magnesium oxide  400 mg Oral BID    pantoprazole  40 mg Oral Daily    potassium chloride SA  60 mEq Oral TID    pravastatin  40 mg Oral Daily    sodium chloride 0.9%  3 mL Intravenous Q8H     PRN Meds:butalbital-acetaminophen-caffeine -40 mg, hydrocodone-acetaminophen 10-325mg, loperamide, ondansetron, ondansetron    Review of patient's allergies indicates:   Allergen Reactions    Sulfa (sulfonamide antibiotics) Rash     Objective:     Vital Signs (Most Recent):  Temp: 97.7 °F (36.5 °C) (01/05/18 1145)  Pulse: 84 (01/05/18 1145)  Resp: 14 (01/05/18 1145)  BP: (!) 104/56 (01/05/18 1145)  SpO2: (!) 90 % (01/05/18 1145) Vital Signs (24h Range):  Temp:  [96.9 °F (36.1 °C)-98.2 °F (36.8 °C)] 97.7 °F (36.5 °C)  Pulse:  [] 84  Resp:  [14-20] 14  SpO2:  [81 %-94 %] 90 %  BP: (101-136)/(55-76) 104/56     Patient Vitals for the past 72 hrs (Last 3 readings):   Weight   01/05/18 0700 85.3 kg (188 lb 1.6 oz)   01/04/18 0445 87.5 kg (192 lb 12.8 oz)   01/03/18 1919 90.7 kg (200 lb)     Body mass index is 34.4 kg/m².      Intake/Output Summary (Last 24 hours) at 01/05/18 1458  Last data filed at 01/05/18 1300   Gross per 24 hour   Intake           1959.5 ml   Output             3825 ml   Net          -1865.5 ml       Hemodynamic Parameters:            Physical Exam   Constitutional: She is oriented to person, place, and time. She appears well-developed and well-nourished.   Neck: Normal range of motion. Neck supple. JVD (To jaw) present.   Cardiovascular: Normal rate and regular rhythm.  Exam reveals no gallop and no friction rub.    No murmur heard.  Pulmonary/Chest: Effort normal and breath sounds normal. She has no wheezes. She has no rales.   Abdominal: Soft. Bowel sounds are normal. There is no tenderness.   Musculoskeletal: She exhibits edema (Trace bilat LE edema).   Neurological: She is alert and oriented to person, place, and time.   Skin: Skin is warm and dry.       Significant Labs:  CBC:    Recent Labs  Lab 01/03/18 2306   WBC 5.95   RBC 3.88*   HGB 12.1   HCT 36.4*      MCV 94   MCH 31.2*   MCHC 33.2     BNP:    Recent Labs  Lab 01/03/18 2306   BNP 1,112*     CMP:    Recent Labs  Lab 01/03/18 2306 01/04/18  0619 01/04/18  1105 01/05/18  0447   * 150* 126* 86   CALCIUM 8.7 9.8 10.1 10.1   ALBUMIN 3.5  --   --   --    PROT 6.2  --   --   --     142 141 145   K 3.2* 2.8* 3.0* 3.1*   CO2 29 28 32* 42*    96 95 92*   BUN 18 22* 25* 22*   CREATININE 1.4 1.5* 1.4 1.4   ALKPHOS 113  --   --   --    ALT 25  --   --   --    AST 32  --   --   --    BILITOT 0.5  --   --   --       Coagulation:     Recent Labs  Lab 01/03/18 2306 01/04/18  0619 01/05/18  0447   INR 3.1* 3.2* 4.7*     LDH:  No results for input(s): LDH in the last 72 hours.  Microbiology:  Microbiology Results (last 7 days)     ** No results found for the last 168 hours. **          I have reviewed all pertinent labs within the past 24 hours.    Estimated Creatinine Clearance: 45.5 mL/min (based on SCr of 1.4 mg/dL).    Diagnostic Results:  I have reviewed all pertinent imaging results/findings within the past 24 hours.

## 2018-01-05 NOTE — PROGRESS NOTES
[facility]  Heart Transplant  Progress Note    Patient Name: Sue Smith  MRN: [id number]  Admission Date: 1/3/2018  Hospital Length of Stay: 1 days  Attending Physician: Maty Bosch MD  Primary Care Provider: Paddy Guerrier DO  Principal Problem:Acute on chronic right heart failure    Subjective:     Interval History: Had N/V and HA yesterday with increasing Remodulin. Feeling better this am    Continuous Infusions:   furosemide (LASIX) 1 mg/mL infusion (non-titrating) 10 mg/hr (01/04/18 2215)    treprostinil (REMODULIN) infusion 33 ng/kg/min (01/05/18 0934)    veletri/remodulin cassette      veletri/remodulin tubing       Scheduled Meds:   albuterol sulfate  2.5 mg Nebulization Q4H    amLODIPine  5 mg Oral Daily    busPIRone  15 mg Oral TID    desvenlafaxine succinate  100 mg Oral Daily    fluticasone-vilanterol  1 puff Inhalation Daily    gabapentin  100 mg Oral TID    lamoTRIgine  100 mg Oral BID    levothyroxine  75 mcg Oral Before breakfast    magnesium oxide  400 mg Oral BID    pantoprazole  40 mg Oral Daily    potassium chloride SA  60 mEq Oral TID    pravastatin  40 mg Oral Daily    sodium chloride 0.9%  3 mL Intravenous Q8H     PRN Meds:butalbital-acetaminophen-caffeine -40 mg, hydrocodone-acetaminophen 10-325mg, loperamide, ondansetron, ondansetron    Review of patient's allergies indicates:   Allergen Reactions    Sulfa (sulfonamide antibiotics) Rash     Objective:     Vital Signs (Most Recent):  Temp: 97.7 °F (36.5 °C) (01/05/18 1145)  Pulse: 84 (01/05/18 1145)  Resp: 14 (01/05/18 1145)  BP: (!) 104/56 (01/05/18 1145)  SpO2: (!) 90 % (01/05/18 1145) Vital Signs (24h Range):  Temp:  [96.9 °F (36.1 °C)-98.2 °F (36.8 °C)] 97.7 °F (36.5 °C)  Pulse:  [] 84  Resp:  [14-20] 14  SpO2:  [81 %-94 %] 90 %  BP: (101-136)/(55-76) 104/56     Patient Vitals for the past 72 hrs (Last 3 readings):   Weight   01/05/18 0700 85.3 kg (188 lb 1.6 oz)   01/04/18 0445 87.5 kg  (192 lb 12.8 oz)   01/03/18 1919 90.7 kg (200 lb)     Body mass index is 34.4 kg/m².      Intake/Output Summary (Last 24 hours) at 01/05/18 1458  Last data filed at 01/05/18 1300   Gross per 24 hour   Intake           1959.5 ml   Output             3825 ml   Net          -1865.5 ml       Hemodynamic Parameters:           Physical Exam   Constitutional: She is oriented to person, place, and time. She appears well-developed and well-nourished.   Neck: Normal range of motion. Neck supple. JVD (To jaw) present.   Cardiovascular: Normal rate and regular rhythm.  Exam reveals no gallop and no friction rub.    No murmur heard.  Pulmonary/Chest: Effort normal and breath sounds normal. She has no wheezes. She has no rales.   Abdominal: Soft. Bowel sounds are normal. There is no tenderness.   Musculoskeletal: She exhibits edema (Trace bilat LE edema).   Neurological: She is alert and oriented to person, place, and time.   Skin: Skin is warm and dry.       Significant Labs:  CBC:    Recent Labs  Lab 01/03/18 2306   WBC 5.95   RBC 3.88*   HGB 12.1   HCT 36.4*      MCV 94   MCH 31.2*   MCHC 33.2     BNP:    Recent Labs  Lab 01/03/18 2306   BNP 1,112*     CMP:    Recent Labs  Lab 01/03/18 2306 01/04/18 0619 01/04/18  1105 01/05/18 0447   * 150* 126* 86   CALCIUM 8.7 9.8 10.1 10.1   ALBUMIN 3.5  --   --   --    PROT 6.2  --   --   --     142 141 145   K 3.2* 2.8* 3.0* 3.1*   CO2 29 28 32* 42*    96 95 92*   BUN 18 22* 25* 22*   CREATININE 1.4 1.5* 1.4 1.4   ALKPHOS 113  --   --   --    ALT 25  --   --   --    AST 32  --   --   --    BILITOT 0.5  --   --   --       Coagulation:     Recent Labs  Lab 01/03/18 2306 01/04/18 0619 01/05/18  0447   INR 3.1* 3.2* 4.7*     LDH:  No results for input(s): LDH in the last 72 hours.  Microbiology:  Microbiology Results (last 7 days)     ** No results found for the last 168 hours. **          I have reviewed all pertinent labs within the past 24  hours.    Estimated Creatinine Clearance: 45.5 mL/min (based on SCr of 1.4 mg/dL).    Diagnostic Results:  I have reviewed all pertinent imaging results/findings within the past 24 hours.    Assessment and Plan:     No notes on file    * Acute on chronic right heart failure    -Cont Lasix drip 10 mg/hr. Net neg 3.3L in 24 hours        Pulmonary hypertension, group 1    - Severe PH with h/o markedly reduced CI and severe RV dysfunction. On Remodulin, uptitrating- now at 32 ng/kg/min.   - PASP 88 by TTE 12/8/17  - On Coumadin. INR supratherapeutic today so hold coumadin        Chronic respiratory failure with hypoxia    - Continue supplemental O2 (on 3 LPM at home). Hypoxic this am (82%) on 4L, increase to O2 to 6L  - BiPAP at night        Hypothyroidism    -Cont Synthroid        Bipolar disorder    -Continue home medications- buspirone, desvenlafaxine, lamotrigine, lurasidone.         Hypertension    -Cont amlodipine            Deanne Tello PA-C  Heart Transplant  [facility]

## 2018-01-05 NOTE — ASSESSMENT & PLAN NOTE
- Continue supplemental O2 (on 3 LPM at home). Hypoxic this am (82%) on 4L, increase to O2 to 6L  - BiPAP at night

## 2018-01-05 NOTE — PROGRESS NOTES
- Pt is due for a Remodulin rate increase by 1 ng/kg/min to a new rate of 33 ng/kg/min (which equates to a CADD rate of 67 mL/24 hr).  - Pt had nausea/vomiting and a headache associated with Thursday afternoon's rate increase. Pt is now wearing her BiPAP mask for the night and she is concerned about nausea/vomiting associated with administering a rate increase at this time.  - Per handoff report, pt has had difficulty tolerating Remodulin rate increases in the past. Pt appears resting comfortably at this time so will not titrate Remodulin at this time due to pt's concerns re: side effects. Will continue to monitor.

## 2018-01-05 NOTE — PROGRESS NOTES
ABG's resulted, (PH 7.507, PCO2 53.7, PO2 44, HCO3 42.5, SO2% 82) Notified IRISH Alicea, says to increase O2 to 6L and call back in 10 minutes with Sat result.

## 2018-01-05 NOTE — PROGRESS NOTES
Sats 90% on 6L O2, pt denies feeling more SOB. Notified IRISH Alicea, says ok to keep sats 90% or greater. PA also states will ^ Remodulin to 34 ng at 1530, if pt can tolerate.

## 2018-01-05 NOTE — PROGRESS NOTES
Pt resting in bed, talking on the phone. Respiratory at the BS to give a treatment and check an ABG. Sats 80% on 4L NC, pt not in any distress. Informed IRISH Alicea, says to increase O2 level to 5L after ABG is drawn. PA also states to keep sats >90% while in hospital.

## 2018-01-06 LAB
ANION GAP SERPL CALC-SCNC: 15 MMOL/L
BASOPHILS # BLD AUTO: 0.06 K/UL
BASOPHILS NFR BLD: 0.8 %
BUN SERPL-MCNC: 23 MG/DL
CALCIUM SERPL-MCNC: 9.3 MG/DL
CHLORIDE SERPL-SCNC: 95 MMOL/L
CO2 SERPL-SCNC: 33 MMOL/L
CREAT SERPL-MCNC: 1.5 MG/DL
DIFFERENTIAL METHOD: ABNORMAL
EOSINOPHIL # BLD AUTO: 0.2 K/UL
EOSINOPHIL NFR BLD: 2.4 %
ERYTHROCYTE [DISTWIDTH] IN BLOOD BY AUTOMATED COUNT: 16.7 %
EST. GFR  (AFRICAN AMERICAN): 44.6 ML/MIN/1.73 M^2
EST. GFR  (NON AFRICAN AMERICAN): 38.7 ML/MIN/1.73 M^2
GLUCOSE SERPL-MCNC: 74 MG/DL
HCT VFR BLD AUTO: 38.7 %
HGB BLD-MCNC: 12.8 G/DL
IMM GRANULOCYTES # BLD AUTO: 0.1 K/UL
IMM GRANULOCYTES NFR BLD AUTO: 1.3 %
INR PPP: 3.3
LYMPHOCYTES # BLD AUTO: 0.8 K/UL
LYMPHOCYTES NFR BLD: 10.9 %
MAGNESIUM SERPL-MCNC: 2.5 MG/DL
MCH RBC QN AUTO: 31.5 PG
MCHC RBC AUTO-ENTMCNC: 33.1 G/DL
MCV RBC AUTO: 95 FL
MONOCYTES # BLD AUTO: 0.4 K/UL
MONOCYTES NFR BLD: 5.9 %
NEUTROPHILS # BLD AUTO: 5.9 K/UL
NEUTROPHILS NFR BLD: 78.7 %
NRBC BLD-RTO: 0 /100 WBC
PLATELET # BLD AUTO: 185 K/UL
PMV BLD AUTO: 12 FL
POTASSIUM SERPL-SCNC: 3.5 MMOL/L
PROTHROMBIN TIME: 33.5 SEC
RBC # BLD AUTO: 4.06 M/UL
SODIUM SERPL-SCNC: 143 MMOL/L
WBC # BLD AUTO: 7.51 K/UL

## 2018-01-06 PROCEDURE — 99900035 HC TECH TIME PER 15 MIN (STAT)

## 2018-01-06 PROCEDURE — 20600001 HC STEP DOWN PRIVATE ROOM

## 2018-01-06 PROCEDURE — 80048 BASIC METABOLIC PNL TOTAL CA: CPT

## 2018-01-06 PROCEDURE — 25000242 PHARM REV CODE 250 ALT 637 W/ HCPCS: Performed by: INTERNAL MEDICINE

## 2018-01-06 PROCEDURE — 94761 N-INVAS EAR/PLS OXIMETRY MLT: CPT

## 2018-01-06 PROCEDURE — 85025 COMPLETE CBC W/AUTO DIFF WBC: CPT

## 2018-01-06 PROCEDURE — 36415 COLL VENOUS BLD VENIPUNCTURE: CPT

## 2018-01-06 PROCEDURE — 85610 PROTHROMBIN TIME: CPT

## 2018-01-06 PROCEDURE — 25000003 PHARM REV CODE 250: Performed by: INTERNAL MEDICINE

## 2018-01-06 PROCEDURE — 27000221 HC OXYGEN, UP TO 24 HOURS

## 2018-01-06 PROCEDURE — A4216 STERILE WATER/SALINE, 10 ML: HCPCS | Performed by: INTERNAL MEDICINE

## 2018-01-06 PROCEDURE — 94640 AIRWAY INHALATION TREATMENT: CPT

## 2018-01-06 PROCEDURE — 94660 CPAP INITIATION&MGMT: CPT

## 2018-01-06 PROCEDURE — 83735 ASSAY OF MAGNESIUM: CPT

## 2018-01-06 PROCEDURE — 99233 SBSQ HOSP IP/OBS HIGH 50: CPT | Mod: ,,, | Performed by: INTERNAL MEDICINE

## 2018-01-06 PROCEDURE — 25000003 PHARM REV CODE 250: Performed by: PHYSICIAN ASSISTANT

## 2018-01-06 RX ADMIN — ALBUTEROL SULFATE 2.5 MG: 2.5 SOLUTION RESPIRATORY (INHALATION) at 08:01

## 2018-01-06 RX ADMIN — LAMOTRIGINE 100 MG: 100 TABLET ORAL at 09:01

## 2018-01-06 RX ADMIN — DESVENLAFAXINE SUCCINATE 100 MG: 50 TABLET, EXTENDED RELEASE ORAL at 09:01

## 2018-01-06 RX ADMIN — LURASIDONE HYDROCHLORIDE 60 MG: 20 TABLET, FILM COATED ORAL at 09:01

## 2018-01-06 RX ADMIN — POTASSIUM CHLORIDE 60 MEQ: 1500 TABLET, EXTENDED RELEASE ORAL at 01:01

## 2018-01-06 RX ADMIN — ALBUTEROL SULFATE 2.5 MG: 2.5 SOLUTION RESPIRATORY (INHALATION) at 11:01

## 2018-01-06 RX ADMIN — LEVOTHYROXINE SODIUM 75 MCG: 75 TABLET ORAL at 06:01

## 2018-01-06 RX ADMIN — ALBUTEROL SULFATE 2.5 MG: 2.5 SOLUTION RESPIRATORY (INHALATION) at 03:01

## 2018-01-06 RX ADMIN — Medication 3 ML: at 01:01

## 2018-01-06 RX ADMIN — GABAPENTIN 100 MG: 100 CAPSULE ORAL at 09:01

## 2018-01-06 RX ADMIN — POTASSIUM CHLORIDE 60 MEQ: 1500 TABLET, EXTENDED RELEASE ORAL at 06:01

## 2018-01-06 RX ADMIN — AMLODIPINE BESYLATE 5 MG: 5 TABLET ORAL at 09:01

## 2018-01-06 RX ADMIN — HYDROCODONE BITARTRATE AND ACETAMINOPHEN 1 TABLET: 10; 325 TABLET ORAL at 04:01

## 2018-01-06 RX ADMIN — MAGNESIUM OXIDE TAB 400 MG (241.3 MG ELEMENTAL MG) 400 MG: 400 (241.3 MG) TAB at 09:01

## 2018-01-06 RX ADMIN — POTASSIUM CHLORIDE 60 MEQ: 1500 TABLET, EXTENDED RELEASE ORAL at 09:01

## 2018-01-06 RX ADMIN — GABAPENTIN 100 MG: 100 CAPSULE ORAL at 06:01

## 2018-01-06 RX ADMIN — BUSPIRONE HYDROCHLORIDE 15 MG: 5 TABLET ORAL at 09:01

## 2018-01-06 RX ADMIN — BUSPIRONE HYDROCHLORIDE 15 MG: 5 TABLET ORAL at 06:01

## 2018-01-06 RX ADMIN — PRAVASTATIN SODIUM 40 MG: 40 TABLET ORAL at 09:01

## 2018-01-06 RX ADMIN — BUSPIRONE HYDROCHLORIDE 15 MG: 5 TABLET ORAL at 01:01

## 2018-01-06 RX ADMIN — GABAPENTIN 100 MG: 100 CAPSULE ORAL at 01:01

## 2018-01-06 RX ADMIN — Medication 3 ML: at 06:01

## 2018-01-06 RX ADMIN — PANTOPRAZOLE SODIUM 40 MG: 40 TABLET, DELAYED RELEASE ORAL at 09:01

## 2018-01-06 RX ADMIN — FLUTICASONE FUROATE AND VILANTEROL TRIFENATATE 1 PUFF: 100; 25 POWDER RESPIRATORY (INHALATION) at 09:01

## 2018-01-06 RX ADMIN — ALBUTEROL SULFATE 2.5 MG: 2.5 SOLUTION RESPIRATORY (INHALATION) at 07:01

## 2018-01-06 RX ADMIN — HYDROCODONE BITARTRATE AND ACETAMINOPHEN 1 TABLET: 10; 325 TABLET ORAL at 07:01

## 2018-01-06 RX ADMIN — ALBUTEROL SULFATE 2.5 MG: 2.5 SOLUTION RESPIRATORY (INHALATION) at 05:01

## 2018-01-06 NOTE — PROGRESS NOTES
Pt is asleep in room and mouth breathing with O2 5L N at 66%, pt awaken easily.  Applied venti mask while asleep at 6L, 30% sats are now 82% and increasing. Will continue to monitor.

## 2018-01-06 NOTE — PROGRESS NOTES
Heart Failure Progress Note  Attending Physician: Maty Bosch MD  Hospital Day: 4    Subjective:   Interval History: No overnight events.  titrating remodulin.  On bipap 10/5 40% O2.      Medications:   Continuous Infusions:   furosemide (LASIX) 1 mg/mL infusion (non-titrating) 10 mg/hr (01/05/18 2112)    treprostinil (REMODULIN) infusion 36 ng/kg/min (01/06/18 0411)    veletri/remodulin cassette      veletri/remodulin tubing         Scheduled Meds:   albuterol sulfate  2.5 mg Nebulization Q4H    amLODIPine  5 mg Oral Daily    busPIRone  15 mg Oral TID    desvenlafaxine succinate  100 mg Oral Daily    fluticasone-vilanterol  1 puff Inhalation Daily    gabapentin  100 mg Oral TID    lamoTRIgine  100 mg Oral BID    levothyroxine  75 mcg Oral Before breakfast    lurasidone  60 mg Oral Daily    magnesium oxide  400 mg Oral BID    pantoprazole  40 mg Oral Daily    potassium chloride SA  60 mEq Oral TID    pravastatin  40 mg Oral Daily    sodium chloride 0.9%  3 mL Intravenous Q8H     PRN Meds:butalbital-acetaminophen-caffeine -40 mg, hydrocodone-acetaminophen 10-325mg, loperamide, ondansetron, ondansetron    ----------------------------------------------------------------------------------------------------------------------  Home medications:   No current facility-administered medications on file prior to encounter.      Current Outpatient Prescriptions on File Prior to Encounter   Medication Sig Dispense Refill    albuterol (PROVENTIL) 2.5 mg /3 mL (0.083 %) nebulizer solution Take 2.5 mg by nebulization every 6 (six) hours as needed for Wheezing. Rescue      amlodipine (NORVASC) 5 MG tablet Take 5 mg by mouth once daily.      budesonide-formoterol 80-4.5 mcg (SYMBICORT) 80-4.5 mcg/actuation HFAA Inhale 2 puffs into the lungs 2 (two) times daily. Controller      bumetanide (BUMEX) 2 MG tablet Take 2 tablets (4 mg total) by mouth 2 (two) times daily. 120 tablet 11    busPIRone  "(BUSPAR) 15 MG tablet Take 15 mg by mouth 3 (three) times daily.      desvenlafaxine succinate (PRISTIQ) 100 MG Tb24 Take 100 mg by mouth once daily.      gabapentin (NEURONTIN) 100 MG capsule Take 1 capsule (100 mg total) by mouth 3 (three) times daily. 90 capsule 11    hydrocodone-acetaminophen 10-325mg (NORCO)  mg Tab Take 1 tablet by mouth every 8 (eight) hours as needed for Pain.      lamotrigine (LAMICTAL) 100 MG tablet Take 100 mg by mouth 2 (two) times daily.      levothyroxine (SYNTHROID) 75 MCG tablet Take 75 mcg by mouth once daily.      lurasidone (LATUDA) 60 mg Tab tablet Take 60 mg by mouth once daily.      magnesium oxide (MAG-OX) 400 mg tablet Take 1 tablet (400 mg total) by mouth 2 (two) times daily.  0    metOLazone (ZAROXOLYN) 2.5 MG tablet Take 1 tablet (2.5 mg total) by mouth daily as needed. Take for wt gain 3# overnight or 5# in 1 wk, with extra potassium 30 tablet 11    ondansetron (ZOFRAN-ODT) 8 MG TbDL Take 1 tablet (8 mg total) by mouth every 8 (eight) hours as needed. 30 tablet 6    pantoprazole (PROTONIX) 40 MG tablet Take 40 mg by mouth once daily.      potassium chloride SA (K-DUR,KLOR-CON) 20 MEQ tablet Take 3 tablets (60 mEq total) by mouth 2 (two) times daily. 180 tablet 6    pravastatin (PRAVACHOL) 40 MG tablet Take 40 mg by mouth once daily.      sodium chloride 0.9% 0.9 % SolP 100 mL with treprostinil 1 mg/mL Soln 6,000,000 ng Inject 2,380 ng/min into the vein continuous.      warfarin (COUMADIN) 5 MG tablet Take 2 tablets (10 mg total) by mouth Daily. Or as directed by Coumadin clinic 60 tablet 11         Objective:     Vitals:  Temp:  [97.5 °F (36.4 °C)-98.2 °F (36.8 °C)]   Pulse:  []   Resp:  [14-22]   BP: ()/(53-79)   SpO2:  [66 %-93 %]     BP (!) 108/56 (BP Location: Left arm, Patient Position: Sitting)   Pulse 82   Temp 97.6 °F (36.4 °C) (Oral)   Resp 18   Ht 5' 2" (1.575 m)   Wt 84.9 kg (187 lb 2.7 oz)   SpO2 (!) 92%   Breastfeeding? " No   BMI 34.23 kg/m²  I/O's:    Intake/Output Summary (Last 24 hours) at 01/06/18 0726  Last data filed at 01/06/18 0622   Gross per 24 hour   Intake          1658.67 ml   Output             2950 ml   Net         -1291.33 ml       Wt Readings from Last 10 Encounters:   01/06/18 84.9 kg (187 lb 2.7 oz)   12/26/17 85.3 kg (188 lb 0.8 oz)   12/08/17 90 kg (198 lb 6.6 oz)   12/08/17 86.6 kg (191 lb)   11/13/17 82.7 kg (182 lb 5.1 oz)   10/24/17 88.3 kg (194 lb 10.7 oz)   10/24/17 89 kg (196 lb 3.4 oz)   10/11/17 86.4 kg (190 lb 7.6 oz)        General Appearance:    Alert, cooperative, no distress   Lungs:     Soft crackles bilaterally, respirations unlabored    Heart:   JVP 20 cm H2O at 45 degrees, Regular rate and rhythm, S1 and S2 normal, no murmur, rub or gallop   Abdomen:     Soft, non-tender, bowel sounds active all four quadrants,     no masses, no organomegaly   Extremities:   trace edema b/l, pulses +1 b/l         Labs:       Recent Labs  Lab 01/05/18  0447 01/05/18  1529 01/06/18  0416    140 143   K 3.1* 3.7 3.5   CL 92* 92* 95   CO2 42* 37* 33*   BUN 22* 27* 23*   CREATININE 1.4 1.5* 1.5*   GLU 86 104 74   ANIONGAP 11 11 15       Recent Labs  Lab 01/03/18  2306   AST 32   ALT 25   ALKPHOS 113   BILITOT 0.5   ALBUMIN 3.5       Recent Labs  Lab 01/05/18  1121   PH 7.507*   PCO2 53.7*   PO2 44*   HCO3 42.5*   POCSATURATED 82*        Recent Labs  Lab 01/03/18  2306 01/05/18  1529 01/06/18  0416   WBC 5.95 7.83 7.51   HGB 12.1 12.4 12.8   HCT 36.4* 38.1 38.7    176 185   GRAN 89.4*  5.3 76.3*  6.0 78.7*  5.9       Recent Labs  Lab 01/04/18  0619 01/05/18  0447 01/06/18  0416   INR 3.2* 4.7* 3.3*       Recent Labs  Lab 01/03/18  2306   TROPONINI 0.028*   BNP 1,112*        EF   Date Value Ref Range Status   12/08/2017 55 55 - 65    10/05/2017 55 55 - 65        Assessment and Plan:      No notes on file         * Acute on chronic right heart failure     -Cont Lasix drip 10 mg/hr as continues to be  volume overloaded  -UO overnight -3L  -K repleated TID and Mg BID as standing orders       Pulmonary hypertension, group 1     - Severe PH with h/o markedly reduced CI and severe RV dysfunction. On Remodulin, uptitrating- now at 36 ng/kg/min.   - PASP 88 by TTE 12/8/17  - On Coumadin. INR continues to be supratherapeutic today so hold coumadin       Chronic respiratory failure with hypoxia     - Continue supplemental O2 (on 3 LPM at home). Hypoxic this am (82%) on 4L, increase to O2 to 6L  - BiPAP at night       Hypothyroidism     -Cont Synthroid       Bipolar disorder     -Continue home medications- buspirone, desvenlafaxine, lamotrigine, lurasidone.        Hypertension     -Cont amlodipine           Signed:  La Lundberg MD  Cardiology Hospitalist  Pager: [phone]  1/6/2018 7:26 AM

## 2018-01-07 LAB
ANION GAP SERPL CALC-SCNC: 10 MMOL/L
BASOPHILS # BLD AUTO: 0.04 K/UL
BASOPHILS NFR BLD: 0.6 %
BUN SERPL-MCNC: 23 MG/DL
CALCIUM SERPL-MCNC: 9.3 MG/DL
CHLORIDE SERPL-SCNC: 93 MMOL/L
CO2 SERPL-SCNC: 38 MMOL/L
CREAT SERPL-MCNC: 1.1 MG/DL
DIFFERENTIAL METHOD: ABNORMAL
EOSINOPHIL # BLD AUTO: 0.2 K/UL
EOSINOPHIL NFR BLD: 3 %
ERYTHROCYTE [DISTWIDTH] IN BLOOD BY AUTOMATED COUNT: 16.3 %
EST. GFR  (AFRICAN AMERICAN): >60 ML/MIN/1.73 M^2
EST. GFR  (NON AFRICAN AMERICAN): 56.3 ML/MIN/1.73 M^2
GLUCOSE SERPL-MCNC: 85 MG/DL
HCT VFR BLD AUTO: 39.3 %
HGB BLD-MCNC: 12.7 G/DL
IMM GRANULOCYTES # BLD AUTO: 0.07 K/UL
IMM GRANULOCYTES NFR BLD AUTO: 1.1 %
INR PPP: 1.8
LYMPHOCYTES # BLD AUTO: 1 K/UL
LYMPHOCYTES NFR BLD: 16.2 %
MAGNESIUM SERPL-MCNC: 2 MG/DL
MCH RBC QN AUTO: 31 PG
MCHC RBC AUTO-ENTMCNC: 32.3 G/DL
MCV RBC AUTO: 96 FL
MONOCYTES # BLD AUTO: 0.3 K/UL
MONOCYTES NFR BLD: 5.3 %
NEUTROPHILS # BLD AUTO: 4.6 K/UL
NEUTROPHILS NFR BLD: 73.8 %
NRBC BLD-RTO: 0 /100 WBC
PLATELET # BLD AUTO: 170 K/UL
PMV BLD AUTO: 11.8 FL
POTASSIUM SERPL-SCNC: 3.2 MMOL/L
POTASSIUM SERPL-SCNC: 4.3 MMOL/L
PROTHROMBIN TIME: 18.1 SEC
RBC # BLD AUTO: 4.1 M/UL
SODIUM SERPL-SCNC: 141 MMOL/L
WBC # BLD AUTO: 6.23 K/UL

## 2018-01-07 PROCEDURE — 94660 CPAP INITIATION&MGMT: CPT

## 2018-01-07 PROCEDURE — 27000221 HC OXYGEN, UP TO 24 HOURS

## 2018-01-07 PROCEDURE — 25000003 PHARM REV CODE 250: Performed by: INTERNAL MEDICINE

## 2018-01-07 PROCEDURE — 25000242 PHARM REV CODE 250 ALT 637 W/ HCPCS: Performed by: INTERNAL MEDICINE

## 2018-01-07 PROCEDURE — 85025 COMPLETE CBC W/AUTO DIFF WBC: CPT

## 2018-01-07 PROCEDURE — 94640 AIRWAY INHALATION TREATMENT: CPT

## 2018-01-07 PROCEDURE — 83735 ASSAY OF MAGNESIUM: CPT

## 2018-01-07 PROCEDURE — 94761 N-INVAS EAR/PLS OXIMETRY MLT: CPT

## 2018-01-07 PROCEDURE — 36415 COLL VENOUS BLD VENIPUNCTURE: CPT

## 2018-01-07 PROCEDURE — 85610 PROTHROMBIN TIME: CPT

## 2018-01-07 PROCEDURE — 99900035 HC TECH TIME PER 15 MIN (STAT)

## 2018-01-07 PROCEDURE — 63600175 PHARM REV CODE 636 W HCPCS: Mod: JG | Performed by: INTERNAL MEDICINE

## 2018-01-07 PROCEDURE — 20600001 HC STEP DOWN PRIVATE ROOM

## 2018-01-07 PROCEDURE — 84132 ASSAY OF SERUM POTASSIUM: CPT

## 2018-01-07 PROCEDURE — 99233 SBSQ HOSP IP/OBS HIGH 50: CPT | Mod: ,,, | Performed by: INTERNAL MEDICINE

## 2018-01-07 PROCEDURE — 63600175 PHARM REV CODE 636 W HCPCS: Performed by: INTERNAL MEDICINE

## 2018-01-07 PROCEDURE — 25000003 PHARM REV CODE 250: Performed by: PHYSICIAN ASSISTANT

## 2018-01-07 PROCEDURE — A4216 STERILE WATER/SALINE, 10 ML: HCPCS | Performed by: INTERNAL MEDICINE

## 2018-01-07 PROCEDURE — 80048 BASIC METABOLIC PNL TOTAL CA: CPT

## 2018-01-07 RX ORDER — WARFARIN SODIUM 5 MG/1
5 TABLET ORAL DAILY
Status: DISCONTINUED | OUTPATIENT
Start: 2018-01-07 | End: 2018-01-11 | Stop reason: HOSPADM

## 2018-01-07 RX ADMIN — LURASIDONE HYDROCHLORIDE 60 MG: 20 TABLET, FILM COATED ORAL at 09:01

## 2018-01-07 RX ADMIN — HYDROCODONE BITARTRATE AND ACETAMINOPHEN 1 TABLET: 10; 325 TABLET ORAL at 01:01

## 2018-01-07 RX ADMIN — DESVENLAFAXINE SUCCINATE 100 MG: 50 TABLET, EXTENDED RELEASE ORAL at 09:01

## 2018-01-07 RX ADMIN — PANTOPRAZOLE SODIUM 40 MG: 40 TABLET, DELAYED RELEASE ORAL at 09:01

## 2018-01-07 RX ADMIN — HYDROCODONE BITARTRATE AND ACETAMINOPHEN 1 TABLET: 10; 325 TABLET ORAL at 08:01

## 2018-01-07 RX ADMIN — LAMOTRIGINE 100 MG: 100 TABLET ORAL at 08:01

## 2018-01-07 RX ADMIN — LEVOTHYROXINE SODIUM 75 MCG: 75 TABLET ORAL at 04:01

## 2018-01-07 RX ADMIN — GABAPENTIN 100 MG: 100 CAPSULE ORAL at 08:01

## 2018-01-07 RX ADMIN — ALBUTEROL SULFATE 2.5 MG: 2.5 SOLUTION RESPIRATORY (INHALATION) at 09:01

## 2018-01-07 RX ADMIN — ALBUTEROL SULFATE 2.5 MG: 2.5 SOLUTION RESPIRATORY (INHALATION) at 07:01

## 2018-01-07 RX ADMIN — GABAPENTIN 100 MG: 100 CAPSULE ORAL at 02:01

## 2018-01-07 RX ADMIN — GABAPENTIN 100 MG: 100 CAPSULE ORAL at 04:01

## 2018-01-07 RX ADMIN — BUTALBITAL, ACETAMINOPHEN, AND CAFFEINE 1 TABLET: 50; 325; 40 TABLET ORAL at 12:01

## 2018-01-07 RX ADMIN — ALBUTEROL SULFATE 2.5 MG: 2.5 SOLUTION RESPIRATORY (INHALATION) at 12:01

## 2018-01-07 RX ADMIN — Medication 3 ML: at 02:01

## 2018-01-07 RX ADMIN — PRAVASTATIN SODIUM 40 MG: 40 TABLET ORAL at 09:01

## 2018-01-07 RX ADMIN — MAGNESIUM OXIDE TAB 400 MG (241.3 MG ELEMENTAL MG) 400 MG: 400 (241.3 MG) TAB at 09:01

## 2018-01-07 RX ADMIN — HYDROCODONE BITARTRATE AND ACETAMINOPHEN 1 TABLET: 10; 325 TABLET ORAL at 04:01

## 2018-01-07 RX ADMIN — ALBUTEROL SULFATE 2.5 MG: 2.5 SOLUTION RESPIRATORY (INHALATION) at 05:01

## 2018-01-07 RX ADMIN — MAGNESIUM OXIDE TAB 400 MG (241.3 MG ELEMENTAL MG) 400 MG: 400 (241.3 MG) TAB at 08:01

## 2018-01-07 RX ADMIN — LAMOTRIGINE 100 MG: 100 TABLET ORAL at 09:01

## 2018-01-07 RX ADMIN — WARFARIN SODIUM 5 MG: 5 TABLET ORAL at 05:01

## 2018-01-07 RX ADMIN — BUSPIRONE HYDROCHLORIDE 15 MG: 5 TABLET ORAL at 02:01

## 2018-01-07 RX ADMIN — FLUTICASONE FUROATE AND VILANTEROL TRIFENATATE 1 PUFF: 100; 25 POWDER RESPIRATORY (INHALATION) at 09:01

## 2018-01-07 RX ADMIN — ALBUTEROL SULFATE 2.5 MG: 2.5 SOLUTION RESPIRATORY (INHALATION) at 01:01

## 2018-01-07 RX ADMIN — POTASSIUM CHLORIDE 60 MEQ: 1500 TABLET, EXTENDED RELEASE ORAL at 02:01

## 2018-01-07 RX ADMIN — Medication 3 ML: at 08:01

## 2018-01-07 RX ADMIN — TREPROSTINIL 41 NG/KG/MIN: 200 INJECTION, SOLUTION INTRAVENOUS; SUBCUTANEOUS at 10:01

## 2018-01-07 RX ADMIN — ALBUTEROL SULFATE 2.5 MG: 2.5 SOLUTION RESPIRATORY (INHALATION) at 11:01

## 2018-01-07 RX ADMIN — FUROSEMIDE 10 MG/HR: 10 INJECTION, SOLUTION INTRAMUSCULAR; INTRAVENOUS at 12:01

## 2018-01-07 RX ADMIN — POTASSIUM CHLORIDE 60 MEQ: 1500 TABLET, EXTENDED RELEASE ORAL at 08:01

## 2018-01-07 RX ADMIN — AMLODIPINE BESYLATE 5 MG: 5 TABLET ORAL at 09:01

## 2018-01-07 RX ADMIN — BUSPIRONE HYDROCHLORIDE 15 MG: 5 TABLET ORAL at 04:01

## 2018-01-07 RX ADMIN — Medication 3 ML: at 04:01

## 2018-01-07 RX ADMIN — BUSPIRONE HYDROCHLORIDE 15 MG: 5 TABLET ORAL at 08:01

## 2018-01-07 RX ADMIN — POTASSIUM CHLORIDE 60 MEQ: 1500 TABLET, EXTENDED RELEASE ORAL at 04:01

## 2018-01-07 NOTE — PROGRESS NOTES
Heart Failure Progress Note  Attending Physician: Maty Bosch MD  Hospital Day: 5    Subjective:   Interval History: No overnight events.  Cr significantly improved with diuresis.    Medications:   Continuous Infusions:   furosemide (LASIX) 1 mg/mL infusion (non-titrating) 10 mg/hr (01/07/18 0023)    treprostinil (REMODULIN) infusion 40 ng/kg/min (01/07/18 0413)    veletri/remodulin cassette      veletri/remodulin tubing         Scheduled Meds:   albuterol sulfate  2.5 mg Nebulization Q4H    amLODIPine  5 mg Oral Daily    busPIRone  15 mg Oral TID    desvenlafaxine succinate  100 mg Oral Daily    fluticasone-vilanterol  1 puff Inhalation Daily    gabapentin  100 mg Oral TID    lamoTRIgine  100 mg Oral BID    levothyroxine  75 mcg Oral Before breakfast    lurasidone  60 mg Oral Daily    magnesium oxide  400 mg Oral BID    pantoprazole  40 mg Oral Daily    potassium chloride SA  60 mEq Oral TID    pravastatin  40 mg Oral Daily    sodium chloride 0.9%  3 mL Intravenous Q8H    warfarin  5 mg Oral Daily     PRN Meds:butalbital-acetaminophen-caffeine -40 mg, hydrocodone-acetaminophen 10-325mg, loperamide, ondansetron, ondansetron    ----------------------------------------------------------------------------------------------------------------------  Home medications:   No current facility-administered medications on file prior to encounter.      Current Outpatient Prescriptions on File Prior to Encounter   Medication Sig Dispense Refill    albuterol (PROVENTIL) 2.5 mg /3 mL (0.083 %) nebulizer solution Take 2.5 mg by nebulization every 6 (six) hours as needed for Wheezing. Rescue      amlodipine (NORVASC) 5 MG tablet Take 5 mg by mouth once daily.      budesonide-formoterol 80-4.5 mcg (SYMBICORT) 80-4.5 mcg/actuation HFAA Inhale 2 puffs into the lungs 2 (two) times daily. Controller      bumetanide (BUMEX) 2 MG tablet Take 2 tablets (4 mg total) by mouth 2 (two) times daily. 120  "tablet 11    busPIRone (BUSPAR) 15 MG tablet Take 15 mg by mouth 3 (three) times daily.      desvenlafaxine succinate (PRISTIQ) 100 MG Tb24 Take 100 mg by mouth once daily.      gabapentin (NEURONTIN) 100 MG capsule Take 1 capsule (100 mg total) by mouth 3 (three) times daily. 90 capsule 11    hydrocodone-acetaminophen 10-325mg (NORCO)  mg Tab Take 1 tablet by mouth every 8 (eight) hours as needed for Pain.      lamotrigine (LAMICTAL) 100 MG tablet Take 100 mg by mouth 2 (two) times daily.      levothyroxine (SYNTHROID) 75 MCG tablet Take 75 mcg by mouth once daily.      lurasidone (LATUDA) 60 mg Tab tablet Take 60 mg by mouth once daily.      magnesium oxide (MAG-OX) 400 mg tablet Take 1 tablet (400 mg total) by mouth 2 (two) times daily.  0    metOLazone (ZAROXOLYN) 2.5 MG tablet Take 1 tablet (2.5 mg total) by mouth daily as needed. Take for wt gain 3# overnight or 5# in 1 wk, with extra potassium 30 tablet 11    ondansetron (ZOFRAN-ODT) 8 MG TbDL Take 1 tablet (8 mg total) by mouth every 8 (eight) hours as needed. 30 tablet 6    pantoprazole (PROTONIX) 40 MG tablet Take 40 mg by mouth once daily.      potassium chloride SA (K-DUR,KLOR-CON) 20 MEQ tablet Take 3 tablets (60 mEq total) by mouth 2 (two) times daily. 180 tablet 6    pravastatin (PRAVACHOL) 40 MG tablet Take 40 mg by mouth once daily.      sodium chloride 0.9% 0.9 % SolP 100 mL with treprostinil 1 mg/mL Soln 6,000,000 ng Inject 2,380 ng/min into the vein continuous.      warfarin (COUMADIN) 5 MG tablet Take 2 tablets (10 mg total) by mouth Daily. Or as directed by Coumadin clinic 60 tablet 11         Objective:     Vitals:  Temp:  [97.6 °F (36.4 °C)-98.6 °F (37 °C)]   Pulse:  [70-95]   Resp:  [13-20]   BP: ()/(52-85)   SpO2:  [62 %-97 %]     /63 (Patient Position: Lying)   Pulse 95   Temp 98.2 °F (36.8 °C) (Oral)   Resp 18   Ht 5' 2" (1.575 m)   Wt 86.2 kg (190 lb 0.6 oz)   SpO2 (!) 93%   Breastfeeding? No   " BMI 34.76 kg/m²  I/O's:    Intake/Output Summary (Last 24 hours) at 01/07/18 1019  Last data filed at 01/07/18 0432   Gross per 24 hour   Intake          1121.66 ml   Output             2050 ml   Net          -928.34 ml       Wt Readings from Last 10 Encounters:   01/07/18 86.2 kg (190 lb 0.6 oz)   12/26/17 85.3 kg (188 lb 0.8 oz)   12/08/17 90 kg (198 lb 6.6 oz)   12/08/17 86.6 kg (191 lb)   11/13/17 82.7 kg (182 lb 5.1 oz)   10/24/17 88.3 kg (194 lb 10.7 oz)   10/24/17 89 kg (196 lb 3.4 oz)   10/11/17 86.4 kg (190 lb 7.6 oz)          General Appearance:    Alert, cooperative, no distress   Lungs:     Soft crackles bilaterally, respirations unlabored    Heart:   JVP 14 cm H2O at 45 degrees, Regular rate and rhythm, S1 and S2 normal, no murmur, rub or gallop   Abdomen:     Soft, non-tender, bowel sounds active all four quadrants,     no masses, no organomegaly   Extremities:   +1 edema b/l, pulses not palpable 2/2 to edema     Labs:       Recent Labs  Lab 01/05/18  1529 01/06/18  0416 01/07/18  0550    143 141   K 3.7 3.5 3.2*   CL 92* 95 93*   CO2 37* 33* 38*   BUN 27* 23* 23*   CREATININE 1.5* 1.5* 1.1    74 85   ANIONGAP 11 15 10       Recent Labs  Lab 01/03/18  2306   AST 32   ALT 25   ALKPHOS 113   BILITOT 0.5   ALBUMIN 3.5       Recent Labs  Lab 01/05/18  1121   PH 7.507*   PCO2 53.7*   PO2 44*   HCO3 42.5*   POCSATURATED 82*        Recent Labs  Lab 01/05/18  1529 01/06/18  0416 01/07/18  0550   WBC 7.83 7.51 6.23   HGB 12.4 12.8 12.7   HCT 38.1 38.7 39.3    185 170   GRAN 76.3*  6.0 78.7*  5.9 73.8*  4.6       Recent Labs  Lab 01/05/18  0447 01/06/18  0416 01/07/18  0550   INR 4.7* 3.3* 1.8*       Recent Labs  Lab 01/03/18  2306   TROPONINI 0.028*   BNP 1,112*      Micro:   Blood Cultures  No results found for: LABBLOO  Urine Cultures  No results found for: LABURIN    EF   Date Value Ref Range Status   12/08/2017 55 55 - 65    10/05/2017 55 55 - 65        * Acute on chronic right heart  failure     -Cont Lasix drip 10 mg/hr   -UO overnight -2L  -Pt had some FREDDY likely 2/2 to cardiorenal syndrome as Cr now 1.1 from 1.5 yesterday after diuresis  -K repleated 60 mEq TID and Mg BID as standing orders  -Check K and Mg 4pm       Pulmonary hypertension, group 1     - Severe PH with h/o markedly reduced CI and severe RV dysfunction. On Remodulin, uptitrating- now at 40 ng/kg/min.   - PASP 88 by TTE 12/8/17  - On Coumadin. INR now subtherapeutic today from supratherapeutic yesterday  - willl reinitate coumadin at 5 mg tonight, note that home dose per coumadin clinic note is 5 mg Fri, Sat, Sun and 10 mg other days       Chronic respiratory failure with hypoxia     - Continue supplemental O2 (on 3 LPM at home). Hypoxic this am (82%) on 4L, increase to O2 to 6L  - BiPAP at night       Hypothyroidism     -Cont Synthroid       Bipolar disorder     -Continue home medications- buspirone, desvenlafaxine, lamotrigine, lurasidone.        Hypertension     -Cont amlodipine       Signed:  aL Lundberg MD  Cardiology Hospitalist  Pager: [phone]  1/7/2018 10:19 AM

## 2018-01-07 NOTE — PLAN OF CARE
Problem: Patient Care Overview  Goal: Plan of Care Review  Outcome: Ongoing (interventions implemented as appropriate)  -AAOx4  -Pt on BiPAP O2 sats > 88%.   -Tele SR with continuous pulse ox.   -Continuous Remodulin gtt infusing currently @ 39ng/kg/min with a dosing weight of 85kg = 80mL/24hrs. Tolerating well. Will titrate dose up 1ng/kg/min @ 0400. Will continue to monitor.   -Lasix gtt @ 10mg/hr. Urine output thus far this shift 1150mL.   -See flowsheet for assessment details    Problem: Infection, Risk/Actual (Adult)  Goal: Identify Related Risk Factors and Signs and Symptoms  Related risk factors and signs and symptoms are identified upon initiation of Human Response Clinical Practice Guideline (CPG)   Outcome: Ongoing (interventions implemented as appropriate)  -afebrile, tmax 98.1    Problem: Fall Risk (Adult)  Goal: Identify Related Risk Factors and Signs and Symptoms  Related risk factors and signs and symptoms are identified upon initiation of Human Response Clinical Practice Guideline (CPG)   Outcome: Ongoing (interventions implemented as appropriate)  -Pt on bedrest with bathroom privileges. Pt able to ambulate to and from bedside commode independently but encouraged to call if assistance needed.   -Pt free from falls/injuries thus far this shift.   -Bed in low, locked position. Bed rails up x2. Call light within reach.

## 2018-01-07 NOTE — PLAN OF CARE
Problem: Patient Care Overview  Goal: Plan of Care Review  Outcome: Ongoing (interventions implemented as appropriate)  Pt AAO x 4. Pt independent. Pt instructed to call if assistance is needed. Pt receiving Remodulin. Dose titrated up by 1ng/kg/min every 6 hours as tolerated. Pt tolerating well.   Current rate as of 1600 is 38 ng/kg/min dosing weight 85 kg running at at rate of 78 ml/24 hours.   Pt on 5 L NC, SpO2 90- 92%. Respiratory treatments continued. Venti mask placed on pt while she sleeps due to pt breathing through her mouth and O 2 sats dropping. Pt complaining of neck pain moderately controlled with Norco PRN.   Pt free of falls and injury. Pt wearing nonslip socks when out of bed, bed in lowest position, wheels locked, side rails up x 2, call light within reach.

## 2018-01-08 PROBLEM — R06.02 SHORTNESS OF BREATH: Status: ACTIVE | Noted: 2018-01-08

## 2018-01-08 LAB
ALBUMIN SERPL BCP-MCNC: 3.5 G/DL
ALP SERPL-CCNC: 101 U/L
ALT SERPL W/O P-5'-P-CCNC: 14 U/L
ANION GAP SERPL CALC-SCNC: 10 MMOL/L
AST SERPL-CCNC: 20 U/L
BASOPHILS # BLD AUTO: 0.03 K/UL
BASOPHILS NFR BLD: 0.5 %
BILIRUB SERPL-MCNC: 0.6 MG/DL
BUN SERPL-MCNC: 17 MG/DL
CALCIUM SERPL-MCNC: 9.2 MG/DL
CHLORIDE SERPL-SCNC: 96 MMOL/L
CO2 SERPL-SCNC: 35 MMOL/L
CREAT SERPL-MCNC: 1 MG/DL
DIFFERENTIAL METHOD: ABNORMAL
EOSINOPHIL # BLD AUTO: 0.2 K/UL
EOSINOPHIL NFR BLD: 3.9 %
ERYTHROCYTE [DISTWIDTH] IN BLOOD BY AUTOMATED COUNT: 16.1 %
EST. GFR  (AFRICAN AMERICAN): >60 ML/MIN/1.73 M^2
EST. GFR  (NON AFRICAN AMERICAN): >60 ML/MIN/1.73 M^2
GLUCOSE SERPL-MCNC: 76 MG/DL
HCT VFR BLD AUTO: 38.7 %
HGB BLD-MCNC: 12.6 G/DL
IMM GRANULOCYTES # BLD AUTO: 0.09 K/UL
IMM GRANULOCYTES NFR BLD AUTO: 1.5 %
INR PPP: 1.2
LYMPHOCYTES # BLD AUTO: 1 K/UL
LYMPHOCYTES NFR BLD: 16.4 %
MAGNESIUM SERPL-MCNC: 1.9 MG/DL
MCH RBC QN AUTO: 30.7 PG
MCHC RBC AUTO-ENTMCNC: 32.6 G/DL
MCV RBC AUTO: 94 FL
MONOCYTES # BLD AUTO: 0.4 K/UL
MONOCYTES NFR BLD: 5.9 %
NEUTROPHILS # BLD AUTO: 4.4 K/UL
NEUTROPHILS NFR BLD: 71.8 %
NRBC BLD-RTO: 0 /100 WBC
PLATELET # BLD AUTO: 164 K/UL
PMV BLD AUTO: 11.3 FL
POTASSIUM SERPL-SCNC: 3.3 MMOL/L
PROT SERPL-MCNC: 6.3 G/DL
PROTHROMBIN TIME: 12.4 SEC
RBC # BLD AUTO: 4.1 M/UL
SODIUM SERPL-SCNC: 141 MMOL/L
WBC # BLD AUTO: 6.1 K/UL

## 2018-01-08 PROCEDURE — A4216 STERILE WATER/SALINE, 10 ML: HCPCS | Performed by: INTERNAL MEDICINE

## 2018-01-08 PROCEDURE — 20600001 HC STEP DOWN PRIVATE ROOM

## 2018-01-08 PROCEDURE — 25000003 PHARM REV CODE 250: Performed by: INTERNAL MEDICINE

## 2018-01-08 PROCEDURE — 63600175 PHARM REV CODE 636 W HCPCS: Mod: JG | Performed by: INTERNAL MEDICINE

## 2018-01-08 PROCEDURE — 99232 SBSQ HOSP IP/OBS MODERATE 35: CPT | Mod: ,,, | Performed by: INTERNAL MEDICINE

## 2018-01-08 PROCEDURE — 99900035 HC TECH TIME PER 15 MIN (STAT)

## 2018-01-08 PROCEDURE — 25000242 PHARM REV CODE 250 ALT 637 W/ HCPCS: Performed by: INTERNAL MEDICINE

## 2018-01-08 PROCEDURE — 36415 COLL VENOUS BLD VENIPUNCTURE: CPT

## 2018-01-08 PROCEDURE — 94660 CPAP INITIATION&MGMT: CPT

## 2018-01-08 PROCEDURE — 83735 ASSAY OF MAGNESIUM: CPT

## 2018-01-08 PROCEDURE — 94761 N-INVAS EAR/PLS OXIMETRY MLT: CPT

## 2018-01-08 PROCEDURE — 25000003 PHARM REV CODE 250: Performed by: PHYSICIAN ASSISTANT

## 2018-01-08 PROCEDURE — 85610 PROTHROMBIN TIME: CPT

## 2018-01-08 PROCEDURE — 85025 COMPLETE CBC W/AUTO DIFF WBC: CPT

## 2018-01-08 PROCEDURE — 63600175 PHARM REV CODE 636 W HCPCS: Performed by: PHYSICIAN ASSISTANT

## 2018-01-08 PROCEDURE — 94640 AIRWAY INHALATION TREATMENT: CPT

## 2018-01-08 PROCEDURE — 63600175 PHARM REV CODE 636 W HCPCS: Performed by: INTERNAL MEDICINE

## 2018-01-08 PROCEDURE — 27000221 HC OXYGEN, UP TO 24 HOURS

## 2018-01-08 PROCEDURE — 80053 COMPREHEN METABOLIC PANEL: CPT

## 2018-01-08 RX ORDER — POTASSIUM CHLORIDE 7.45 MG/ML
10 INJECTION INTRAVENOUS
Status: COMPLETED | OUTPATIENT
Start: 2018-01-08 | End: 2018-01-08

## 2018-01-08 RX ORDER — PROMETHAZINE HYDROCHLORIDE 25 MG/1
25 TABLET ORAL EVERY 6 HOURS PRN
Status: DISCONTINUED | OUTPATIENT
Start: 2018-01-08 | End: 2018-01-11 | Stop reason: HOSPADM

## 2018-01-08 RX ADMIN — PROMETHAZINE HYDROCHLORIDE 25 MG: 25 TABLET ORAL at 06:01

## 2018-01-08 RX ADMIN — GABAPENTIN 100 MG: 100 CAPSULE ORAL at 05:01

## 2018-01-08 RX ADMIN — LAMOTRIGINE 100 MG: 100 TABLET ORAL at 08:01

## 2018-01-08 RX ADMIN — POTASSIUM CHLORIDE 10 MEQ: 10 INJECTION, SOLUTION INTRAVENOUS at 10:01

## 2018-01-08 RX ADMIN — ALBUTEROL SULFATE 2.5 MG: 2.5 SOLUTION RESPIRATORY (INHALATION) at 03:01

## 2018-01-08 RX ADMIN — DESVENLAFAXINE SUCCINATE 100 MG: 50 TABLET, EXTENDED RELEASE ORAL at 08:01

## 2018-01-08 RX ADMIN — LAMOTRIGINE 100 MG: 100 TABLET ORAL at 09:01

## 2018-01-08 RX ADMIN — LURASIDONE HYDROCHLORIDE 60 MG: 20 TABLET, FILM COATED ORAL at 08:01

## 2018-01-08 RX ADMIN — Medication 3 ML: at 02:01

## 2018-01-08 RX ADMIN — HYDROCODONE BITARTRATE AND ACETAMINOPHEN 1 TABLET: 10; 325 TABLET ORAL at 12:01

## 2018-01-08 RX ADMIN — POTASSIUM CHLORIDE 60 MEQ: 1500 TABLET, EXTENDED RELEASE ORAL at 05:01

## 2018-01-08 RX ADMIN — BUTALBITAL, ACETAMINOPHEN, AND CAFFEINE 1 TABLET: 50; 325; 40 TABLET ORAL at 03:01

## 2018-01-08 RX ADMIN — MAGNESIUM OXIDE TAB 400 MG (241.3 MG ELEMENTAL MG) 400 MG: 400 (241.3 MG) TAB at 09:01

## 2018-01-08 RX ADMIN — FUROSEMIDE 10 MG/HR: 10 INJECTION, SOLUTION INTRAMUSCULAR; INTRAVENOUS at 12:01

## 2018-01-08 RX ADMIN — TREPROSTINIL 44 NG/KG/MIN: 200 INJECTION, SOLUTION INTRAVENOUS; SUBCUTANEOUS at 10:01

## 2018-01-08 RX ADMIN — BUSPIRONE HYDROCHLORIDE 15 MG: 5 TABLET ORAL at 09:01

## 2018-01-08 RX ADMIN — FLUTICASONE FUROATE AND VILANTEROL TRIFENATATE 1 PUFF: 100; 25 POWDER RESPIRATORY (INHALATION) at 08:01

## 2018-01-08 RX ADMIN — PRAVASTATIN SODIUM 40 MG: 40 TABLET ORAL at 08:01

## 2018-01-08 RX ADMIN — ONDANSETRON 4 MG: 2 INJECTION INTRAMUSCULAR; INTRAVENOUS at 04:01

## 2018-01-08 RX ADMIN — AMLODIPINE BESYLATE 5 MG: 5 TABLET ORAL at 08:01

## 2018-01-08 RX ADMIN — ALBUTEROL SULFATE 2.5 MG: 2.5 SOLUTION RESPIRATORY (INHALATION) at 11:01

## 2018-01-08 RX ADMIN — BUSPIRONE HYDROCHLORIDE 15 MG: 5 TABLET ORAL at 05:01

## 2018-01-08 RX ADMIN — ONDANSETRON 8 MG: 8 TABLET, ORALLY DISINTEGRATING ORAL at 03:01

## 2018-01-08 RX ADMIN — ALBUTEROL SULFATE 2.5 MG: 2.5 SOLUTION RESPIRATORY (INHALATION) at 08:01

## 2018-01-08 RX ADMIN — WARFARIN SODIUM 5 MG: 5 TABLET ORAL at 04:01

## 2018-01-08 RX ADMIN — LEVOTHYROXINE SODIUM 75 MCG: 75 TABLET ORAL at 05:01

## 2018-01-08 RX ADMIN — PANTOPRAZOLE SODIUM 40 MG: 40 TABLET, DELAYED RELEASE ORAL at 08:01

## 2018-01-08 RX ADMIN — TREPROSTINIL 44 NG/KG/MIN: 200 INJECTION, SOLUTION INTRAVENOUS; SUBCUTANEOUS at 04:01

## 2018-01-08 RX ADMIN — MAGNESIUM OXIDE TAB 400 MG (241.3 MG ELEMENTAL MG) 400 MG: 400 (241.3 MG) TAB at 08:01

## 2018-01-08 RX ADMIN — ALBUTEROL SULFATE 2.5 MG: 2.5 SOLUTION RESPIRATORY (INHALATION) at 07:01

## 2018-01-08 RX ADMIN — GABAPENTIN 100 MG: 100 CAPSULE ORAL at 09:01

## 2018-01-08 RX ADMIN — POTASSIUM CHLORIDE 60 MEQ: 1500 TABLET, EXTENDED RELEASE ORAL at 09:01

## 2018-01-08 NOTE — PROGRESS NOTES
Pt c/o headache and just overall fatigue and generally feeling unwell. IRISH Alicea notified.  Will hold this scheduled titration for 1400 and wait until 2000 this evening depending on how pt is feeling.      PA also notified pt on Venti mask 5L w/ O2 sats >90%.  Pt w/ desats down to 88% on nasal cannula.  Mouth breathing noted.     No new orders given from PA.    Will continue to monitor pt.

## 2018-01-08 NOTE — ASSESSMENT & PLAN NOTE
- Severe PH with h/o markedly reduced CI and severe RV dysfunction. On Remodulin, uptitrating- now at 44 ng/kg/min.   - PASP 88 by TTE 12/8/17  - On Coumadin. INR subtherapeutic today.

## 2018-01-08 NOTE — SUBJECTIVE & OBJECTIVE
Interval History: Continues to feel better. Up to 44 ng on Remodulin    Continuous Infusions:   furosemide (LASIX) 1 mg/mL infusion (non-titrating) 10 mg/hr (01/08/18 0030)    treprostinil (REMODULIN) infusion 45 ng/kg/min (01/08/18 1014)    veletri/remodulin cassette      veletri/remodulin tubing       Scheduled Meds:   albuterol sulfate  2.5 mg Nebulization Q4H    amLODIPine  5 mg Oral Daily    busPIRone  15 mg Oral TID    desvenlafaxine succinate  100 mg Oral Daily    fluticasone-vilanterol  1 puff Inhalation Daily    gabapentin  100 mg Oral TID    lamoTRIgine  100 mg Oral BID    levothyroxine  75 mcg Oral Before breakfast    lurasidone  60 mg Oral Daily    magnesium oxide  400 mg Oral BID    pantoprazole  40 mg Oral Daily    potassium chloride SA  60 mEq Oral TID    pravastatin  40 mg Oral Daily    sodium chloride 0.9%  3 mL Intravenous Q8H    warfarin  5 mg Oral Daily     PRN Meds:butalbital-acetaminophen-caffeine -40 mg, hydrocodone-acetaminophen 10-325mg, loperamide, ondansetron, ondansetron    Review of patient's allergies indicates:   Allergen Reactions    Sulfa (sulfonamide antibiotics) Rash     Objective:     Vital Signs (Most Recent):  Temp: 98.2 °F (36.8 °C) (01/08/18 1117)  Pulse: 83 (01/08/18 1117)  Resp: 14 (01/08/18 1117)  BP: (!) 100/52 (01/08/18 1117)  SpO2: (!) 91 % (01/08/18 1145) Vital Signs (24h Range):  Temp:  [97.5 °F (36.4 °C)-98.5 °F (36.9 °C)] 98.2 °F (36.8 °C)  Pulse:  [69-92] 83  Resp:  [12-20] 14  SpO2:  [89 %-98 %] 91 %  BP: (100-132)/(52-68) 100/52     Patient Vitals for the past 72 hrs (Last 3 readings):   Weight   01/08/18 0515 87.4 kg (192 lb 10.9 oz)   01/07/18 0430 86.2 kg (190 lb 0.6 oz)   01/06/18 0413 84.9 kg (187 lb 2.7 oz)     Body mass index is 35.24 kg/m².      Intake/Output Summary (Last 24 hours) at 01/08/18 1445  Last data filed at 01/08/18 1036   Gross per 24 hour   Intake             1300 ml   Output             2800 ml   Net             -1500 ml       Hemodynamic Parameters:           Physical Exam   Constitutional: She is oriented to person, place, and time. She appears well-developed and well-nourished.   Neck: Normal range of motion. Neck supple. JVD (To jaw) present.   Cardiovascular: Normal rate and regular rhythm.  Exam reveals no gallop and no friction rub.    No murmur heard.  Pulmonary/Chest: Effort normal and breath sounds normal. She has no wheezes. She has no rales.   Abdominal: Soft. Bowel sounds are normal. There is no tenderness.   Musculoskeletal: She exhibits edema (Trace bilat LE edema).   Neurological: She is alert and oriented to person, place, and time.   Skin: Skin is warm and dry.       Significant Labs:  CBC:    Recent Labs  Lab 01/06/18  0416 01/07/18  0550 01/08/18  0441   WBC 7.51 6.23 6.10   RBC 4.06 4.10 4.10   HGB 12.8 12.7 12.6   HCT 38.7 39.3 38.7    170 164   MCV 95 96 94   MCH 31.5* 31.0 30.7   MCHC 33.1 32.3 32.6     BNP:    Recent Labs  Lab 01/03/18  2306   BNP 1,112*     CMP:    Recent Labs  Lab 01/03/18  2306  01/06/18  0416 01/07/18  0550 01/07/18  1654 01/08/18  0441   *  < > 74 85  --  76   CALCIUM 8.7  < > 9.3 9.3  --  9.2   ALBUMIN 3.5  --   --   --   --  3.5   PROT 6.2  --   --   --   --  6.3     < > 143 141  --  141   K 3.2*  < > 3.5 3.2* 4.3 3.3*   CO2 29  < > 33* 38*  --  35*     < > 95 93*  --  96   BUN 18  < > 23* 23*  --  17   CREATININE 1.4  < > 1.5* 1.1  --  1.0   ALKPHOS 113  --   --   --   --  101   ALT 25  --   --   --   --  14   AST 32  --   --   --   --  20   BILITOT 0.5  --   --   --   --  0.6   < > = values in this interval not displayed.   Coagulation:     Recent Labs  Lab 01/06/18  0416 01/07/18  0550 01/08/18  0441   INR 3.3* 1.8* 1.2     LDH:  No results for input(s): LDH in the last 72 hours.  Microbiology:  Microbiology Results (last 7 days)     ** No results found for the last 168 hours. **          I have reviewed all pertinent labs within the past 24  hours.    Estimated Creatinine Clearance: 64.5 mL/min (based on SCr of 1 mg/dL).    Diagnostic Results:  I have reviewed all pertinent imaging results/findings within the past 24 hours.

## 2018-01-08 NOTE — PROGRESS NOTES
[facility]  Heart Transplant  Progress Note    Patient Name: Sue Smith  MRN: [id number]  Admission Date: 1/3/2018  Hospital Length of Stay: 4 days  Attending Physician: Maty Bosch MD  Primary Care Provider: Paddy Guerrier DO  Principal Problem:Acute on chronic right heart failure    Subjective:     Interval History: Continues to feel better. Up to 44 ng on Remodulin    Continuous Infusions:   furosemide (LASIX) 1 mg/mL infusion (non-titrating) 10 mg/hr (01/08/18 0030)    treprostinil (REMODULIN) infusion 45 ng/kg/min (01/08/18 1014)    veletri/remodulin cassette      veletri/remodulin tubing       Scheduled Meds:   albuterol sulfate  2.5 mg Nebulization Q4H    amLODIPine  5 mg Oral Daily    busPIRone  15 mg Oral TID    desvenlafaxine succinate  100 mg Oral Daily    fluticasone-vilanterol  1 puff Inhalation Daily    gabapentin  100 mg Oral TID    lamoTRIgine  100 mg Oral BID    levothyroxine  75 mcg Oral Before breakfast    lurasidone  60 mg Oral Daily    magnesium oxide  400 mg Oral BID    pantoprazole  40 mg Oral Daily    potassium chloride SA  60 mEq Oral TID    pravastatin  40 mg Oral Daily    sodium chloride 0.9%  3 mL Intravenous Q8H    warfarin  5 mg Oral Daily     PRN Meds:butalbital-acetaminophen-caffeine -40 mg, hydrocodone-acetaminophen 10-325mg, loperamide, ondansetron, ondansetron    Review of patient's allergies indicates:   Allergen Reactions    Sulfa (sulfonamide antibiotics) Rash     Objective:     Vital Signs (Most Recent):  Temp: 98.2 °F (36.8 °C) (01/08/18 1117)  Pulse: 83 (01/08/18 1117)  Resp: 14 (01/08/18 1117)  BP: (!) 100/52 (01/08/18 1117)  SpO2: (!) 91 % (01/08/18 1145) Vital Signs (24h Range):  Temp:  [97.5 °F (36.4 °C)-98.5 °F (36.9 °C)] 98.2 °F (36.8 °C)  Pulse:  [69-92] 83  Resp:  [12-20] 14  SpO2:  [89 %-98 %] 91 %  BP: (100-132)/(52-68) 100/52     Patient Vitals for the past 72 hrs (Last 3 readings):   Weight   01/08/18 0515 87.4 kg  (192 lb 10.9 oz)   01/07/18 0430 86.2 kg (190 lb 0.6 oz)   01/06/18 0413 84.9 kg (187 lb 2.7 oz)     Body mass index is 35.24 kg/m².      Intake/Output Summary (Last 24 hours) at 01/08/18 1445  Last data filed at 01/08/18 1036   Gross per 24 hour   Intake             1300 ml   Output             2800 ml   Net            -1500 ml       Hemodynamic Parameters:           Physical Exam   Constitutional: She is oriented to person, place, and time. She appears well-developed and well-nourished.   Neck: Normal range of motion. Neck supple. JVD (To jaw) present.   Cardiovascular: Normal rate and regular rhythm.  Exam reveals no gallop and no friction rub.    No murmur heard.  Pulmonary/Chest: Effort normal and breath sounds normal. She has no wheezes. She has no rales.   Abdominal: Soft. Bowel sounds are normal. There is no tenderness.   Musculoskeletal: She exhibits edema (Trace bilat LE edema).   Neurological: She is alert and oriented to person, place, and time.   Skin: Skin is warm and dry.       Significant Labs:  CBC:    Recent Labs  Lab 01/06/18  0416 01/07/18  0550 01/08/18  0441   WBC 7.51 6.23 6.10   RBC 4.06 4.10 4.10   HGB 12.8 12.7 12.6   HCT 38.7 39.3 38.7    170 164   MCV 95 96 94   MCH 31.5* 31.0 30.7   MCHC 33.1 32.3 32.6     BNP:    Recent Labs  Lab 01/03/18  2306   BNP 1,112*     CMP:    Recent Labs  Lab 01/03/18  2306  01/06/18  0416 01/07/18  0550 01/07/18  1654 01/08/18  0441   *  < > 74 85  --  76   CALCIUM 8.7  < > 9.3 9.3  --  9.2   ALBUMIN 3.5  --   --   --   --  3.5   PROT 6.2  --   --   --   --  6.3     < > 143 141  --  141   K 3.2*  < > 3.5 3.2* 4.3 3.3*   CO2 29  < > 33* 38*  --  35*     < > 95 93*  --  96   BUN 18  < > 23* 23*  --  17   CREATININE 1.4  < > 1.5* 1.1  --  1.0   ALKPHOS 113  --   --   --   --  101   ALT 25  --   --   --   --  14   AST 32  --   --   --   --  20   BILITOT 0.5  --   --   --   --  0.6   < > = values in this interval not displayed.    Coagulation:     Recent Labs  Lab 01/06/18  0416 01/07/18  0550 01/08/18  0441   INR 3.3* 1.8* 1.2     LDH:  No results for input(s): LDH in the last 72 hours.  Microbiology:  Microbiology Results (last 7 days)     ** No results found for the last 168 hours. **          I have reviewed all pertinent labs within the past 24 hours.    Estimated Creatinine Clearance: 64.5 mL/min (based on SCr of 1 mg/dL).    Diagnostic Results:  I have reviewed all pertinent imaging results/findings within the past 24 hours.    Assessment and Plan:     No notes on file    * Acute on chronic right heart failure    -Cont Lasix drip 10 mg/hr. Net neg 1.9 L in 24 hours        Pulmonary hypertension, group 1    - Severe PH with h/o markedly reduced CI and severe RV dysfunction. On Remodulin, uptitrating- now at 44 ng/kg/min.   - PASP 88 by TTE 12/8/17  - On Coumadin. INR subtherapeutic today.         Chronic respiratory failure with hypoxia    - Continue supplemental O2 (on 3 LPM at home). Hypoxic this am (82%) on 4L, increase to O2 to 6L  - BiPAP at night        Hypothyroidism    -Cont Synthroid        Bipolar disorder    -Continue home medications- buspirone, desvenlafaxine, lamotrigine, lurasidone.         Hypertension    -Cont amlodipine            Deanne Tello PA-C  Heart Transplant  [facility]

## 2018-01-08 NOTE — PLAN OF CARE
Problem: Patient Care Overview  Goal: Plan of Care Review  Outcome: Ongoing (interventions implemented as appropriate)  -AAOx4  -Pt on BiPAP O2 sats > 90%.   -Tele SR with continuous pulse ox.   -Continuous Remodulin gtt infusing currently @ 43ng/kg/min with a dosing weight of 85kg = 80mL/24hrs. Tolerating well. Will titrate dose up 1ng/kg/min @ 0400. Will continue to monitor.   -Lasix gtt @ 10mg/hr. Urine output 400mL  -See flowsheet for assessment details    Problem: Infection, Risk/Actual (Adult)  Goal: Identify Related Risk Factors and Signs and Symptoms  Related risk factors and signs and symptoms are identified upon initiation of Human Response Clinical Practice Guideline (CPG)   Outcome: Ongoing (interventions implemented as appropriate)  -afebrile, tmax 98.5    Problem: Fall Risk (Adult)  Goal: Identify Related Risk Factors and Signs and Symptoms  Related risk factors and signs and symptoms are identified upon initiation of Human Response Clinical Practice Guideline (CPG)   Outcome: Ongoing (interventions implemented as appropriate)  -Pt on bedrest with bathroom privileges. Pt able to ambulate to and from bedside commode independently but encouraged to call if assistance needed.   -Pt free from falls/injuries thus far this shift.   -Bed in low, locked position. Bed rails up x2. Call light within reach.

## 2018-01-09 PROBLEM — E87.70 HYPERVOLEMIA: Status: ACTIVE | Noted: 2018-01-09

## 2018-01-09 LAB
ALBUMIN SERPL BCP-MCNC: 3.9 G/DL
ALP SERPL-CCNC: 109 U/L
ALT SERPL W/O P-5'-P-CCNC: 14 U/L
ANION GAP SERPL CALC-SCNC: 10 MMOL/L
AST SERPL-CCNC: 25 U/L
BASOPHILS # BLD AUTO: 0.05 K/UL
BASOPHILS NFR BLD: 0.5 %
BILIRUB SERPL-MCNC: 0.7 MG/DL
BNP SERPL-MCNC: 772 PG/ML
BUN SERPL-MCNC: 12 MG/DL
CALCIUM SERPL-MCNC: 10 MG/DL
CHLORIDE SERPL-SCNC: 97 MMOL/L
CO2 SERPL-SCNC: 35 MMOL/L
CREAT SERPL-MCNC: 0.9 MG/DL
DIFFERENTIAL METHOD: ABNORMAL
EOSINOPHIL # BLD AUTO: 0 K/UL
EOSINOPHIL NFR BLD: 0.4 %
ERYTHROCYTE [DISTWIDTH] IN BLOOD BY AUTOMATED COUNT: 16 %
EST. GFR  (AFRICAN AMERICAN): >60 ML/MIN/1.73 M^2
EST. GFR  (NON AFRICAN AMERICAN): >60 ML/MIN/1.73 M^2
GLUCOSE SERPL-MCNC: 101 MG/DL
HCT VFR BLD AUTO: 43.6 %
HGB BLD-MCNC: 14.5 G/DL
IMM GRANULOCYTES # BLD AUTO: 0.19 K/UL
IMM GRANULOCYTES NFR BLD AUTO: 2 %
INR PPP: 1.1
LYMPHOCYTES # BLD AUTO: 0.5 K/UL
LYMPHOCYTES NFR BLD: 5.5 %
MAGNESIUM SERPL-MCNC: 2.2 MG/DL
MCH RBC QN AUTO: 31 PG
MCHC RBC AUTO-ENTMCNC: 33.3 G/DL
MCV RBC AUTO: 93 FL
MONOCYTES # BLD AUTO: 0.3 K/UL
MONOCYTES NFR BLD: 3.3 %
NEUTROPHILS # BLD AUTO: 8.3 K/UL
NEUTROPHILS NFR BLD: 88.3 %
NRBC BLD-RTO: 0 /100 WBC
PLATELET # BLD AUTO: 191 K/UL
PMV BLD AUTO: 11.6 FL
POTASSIUM SERPL-SCNC: 4 MMOL/L
PROT SERPL-MCNC: 6.9 G/DL
PROTHROMBIN TIME: 12.1 SEC
RBC # BLD AUTO: 4.68 M/UL
SODIUM SERPL-SCNC: 142 MMOL/L
WBC # BLD AUTO: 9.35 K/UL

## 2018-01-09 PROCEDURE — 94660 CPAP INITIATION&MGMT: CPT

## 2018-01-09 PROCEDURE — 25000242 PHARM REV CODE 250 ALT 637 W/ HCPCS: Performed by: INTERNAL MEDICINE

## 2018-01-09 PROCEDURE — 36415 COLL VENOUS BLD VENIPUNCTURE: CPT

## 2018-01-09 PROCEDURE — 99232 SBSQ HOSP IP/OBS MODERATE 35: CPT | Mod: ,,, | Performed by: INTERNAL MEDICINE

## 2018-01-09 PROCEDURE — 94640 AIRWAY INHALATION TREATMENT: CPT

## 2018-01-09 PROCEDURE — 99900035 HC TECH TIME PER 15 MIN (STAT)

## 2018-01-09 PROCEDURE — 20600001 HC STEP DOWN PRIVATE ROOM

## 2018-01-09 PROCEDURE — 63600175 PHARM REV CODE 636 W HCPCS: Performed by: INTERNAL MEDICINE

## 2018-01-09 PROCEDURE — 63600175 PHARM REV CODE 636 W HCPCS: Mod: JG | Performed by: INTERNAL MEDICINE

## 2018-01-09 PROCEDURE — 25000003 PHARM REV CODE 250: Performed by: INTERNAL MEDICINE

## 2018-01-09 PROCEDURE — 94761 N-INVAS EAR/PLS OXIMETRY MLT: CPT

## 2018-01-09 PROCEDURE — A4216 STERILE WATER/SALINE, 10 ML: HCPCS | Performed by: INTERNAL MEDICINE

## 2018-01-09 PROCEDURE — 83735 ASSAY OF MAGNESIUM: CPT

## 2018-01-09 PROCEDURE — 27000221 HC OXYGEN, UP TO 24 HOURS

## 2018-01-09 PROCEDURE — 85610 PROTHROMBIN TIME: CPT

## 2018-01-09 PROCEDURE — 25000003 PHARM REV CODE 250: Performed by: PHYSICIAN ASSISTANT

## 2018-01-09 PROCEDURE — 80053 COMPREHEN METABOLIC PANEL: CPT

## 2018-01-09 PROCEDURE — 83880 ASSAY OF NATRIURETIC PEPTIDE: CPT

## 2018-01-09 PROCEDURE — 85025 COMPLETE CBC W/AUTO DIFF WBC: CPT

## 2018-01-09 PROCEDURE — 63600175 PHARM REV CODE 636 W HCPCS: Performed by: PHYSICIAN ASSISTANT

## 2018-01-09 RX ORDER — FUROSEMIDE 10 MG/ML
80 INJECTION INTRAMUSCULAR; INTRAVENOUS 2 TIMES DAILY
Status: DISCONTINUED | OUTPATIENT
Start: 2018-01-09 | End: 2018-01-10

## 2018-01-09 RX ORDER — ONDANSETRON 2 MG/ML
8 INJECTION INTRAMUSCULAR; INTRAVENOUS EVERY 8 HOURS PRN
Status: DISCONTINUED | OUTPATIENT
Start: 2018-01-09 | End: 2018-01-11 | Stop reason: HOSPADM

## 2018-01-09 RX ADMIN — FLUTICASONE FUROATE AND VILANTEROL TRIFENATATE 1 PUFF: 100; 25 POWDER RESPIRATORY (INHALATION) at 11:01

## 2018-01-09 RX ADMIN — MAGNESIUM OXIDE TAB 400 MG (241.3 MG ELEMENTAL MG) 400 MG: 400 (241.3 MG) TAB at 11:01

## 2018-01-09 RX ADMIN — ONDANSETRON 4 MG: 2 INJECTION INTRAMUSCULAR; INTRAVENOUS at 06:01

## 2018-01-09 RX ADMIN — AMLODIPINE BESYLATE 5 MG: 5 TABLET ORAL at 11:01

## 2018-01-09 RX ADMIN — LEVOTHYROXINE SODIUM 75 MCG: 75 TABLET ORAL at 06:01

## 2018-01-09 RX ADMIN — ALBUTEROL SULFATE 2.5 MG: 2.5 SOLUTION RESPIRATORY (INHALATION) at 01:01

## 2018-01-09 RX ADMIN — ALBUTEROL SULFATE 2.5 MG: 2.5 SOLUTION RESPIRATORY (INHALATION) at 05:01

## 2018-01-09 RX ADMIN — POTASSIUM CHLORIDE 60 MEQ: 1500 TABLET, EXTENDED RELEASE ORAL at 06:01

## 2018-01-09 RX ADMIN — FUROSEMIDE 10 MG/HR: 10 INJECTION, SOLUTION INTRAMUSCULAR; INTRAVENOUS at 01:01

## 2018-01-09 RX ADMIN — PANTOPRAZOLE SODIUM 40 MG: 40 TABLET, DELAYED RELEASE ORAL at 11:01

## 2018-01-09 RX ADMIN — GABAPENTIN 100 MG: 100 CAPSULE ORAL at 09:01

## 2018-01-09 RX ADMIN — GABAPENTIN 100 MG: 100 CAPSULE ORAL at 02:01

## 2018-01-09 RX ADMIN — ALBUTEROL SULFATE 2.5 MG: 2.5 SOLUTION RESPIRATORY (INHALATION) at 12:01

## 2018-01-09 RX ADMIN — LURASIDONE HYDROCHLORIDE 60 MG: 20 TABLET, FILM COATED ORAL at 11:01

## 2018-01-09 RX ADMIN — PRAVASTATIN SODIUM 40 MG: 40 TABLET ORAL at 11:01

## 2018-01-09 RX ADMIN — TREPROSTINIL 45 NG/KG/MIN: 200 INJECTION, SOLUTION INTRAVENOUS; SUBCUTANEOUS at 10:01

## 2018-01-09 RX ADMIN — LAMOTRIGINE 100 MG: 100 TABLET ORAL at 11:01

## 2018-01-09 RX ADMIN — ALBUTEROL SULFATE 2.5 MG: 2.5 SOLUTION RESPIRATORY (INHALATION) at 08:01

## 2018-01-09 RX ADMIN — ONDANSETRON 8 MG: 8 TABLET, ORALLY DISINTEGRATING ORAL at 08:01

## 2018-01-09 RX ADMIN — FUROSEMIDE 80 MG: 10 INJECTION, SOLUTION INTRAMUSCULAR; INTRAVENOUS at 05:01

## 2018-01-09 RX ADMIN — MAGNESIUM OXIDE TAB 400 MG (241.3 MG ELEMENTAL MG) 400 MG: 400 (241.3 MG) TAB at 09:01

## 2018-01-09 RX ADMIN — POTASSIUM CHLORIDE 60 MEQ: 1500 TABLET, EXTENDED RELEASE ORAL at 02:01

## 2018-01-09 RX ADMIN — LAMOTRIGINE 100 MG: 100 TABLET ORAL at 09:01

## 2018-01-09 RX ADMIN — POTASSIUM CHLORIDE 60 MEQ: 1500 TABLET, EXTENDED RELEASE ORAL at 09:01

## 2018-01-09 RX ADMIN — ALBUTEROL SULFATE 2.5 MG: 2.5 SOLUTION RESPIRATORY (INHALATION) at 03:01

## 2018-01-09 RX ADMIN — ALBUTEROL SULFATE 2.5 MG: 2.5 SOLUTION RESPIRATORY (INHALATION) at 11:01

## 2018-01-09 RX ADMIN — Medication 3 ML: at 05:01

## 2018-01-09 RX ADMIN — BUSPIRONE HYDROCHLORIDE 15 MG: 5 TABLET ORAL at 02:01

## 2018-01-09 RX ADMIN — WARFARIN SODIUM 5 MG: 5 TABLET ORAL at 05:01

## 2018-01-09 RX ADMIN — DESVENLAFAXINE SUCCINATE 100 MG: 50 TABLET, EXTENDED RELEASE ORAL at 11:01

## 2018-01-09 RX ADMIN — BUSPIRONE HYDROCHLORIDE 15 MG: 5 TABLET ORAL at 09:01

## 2018-01-09 RX ADMIN — ALBUTEROL SULFATE 2.5 MG: 2.5 SOLUTION RESPIRATORY (INHALATION) at 07:01

## 2018-01-09 NOTE — PLAN OF CARE
Problem: Patient Care Overview  Goal: Plan of Care Review  Outcome: Ongoing (interventions implemented as appropriate)  Patient with continued c/o nausea this am. Patient reports nausea has been relieved since Remodulin titrated down to 44 ng/kg/min and denies any other complaints at this time. Oxygen titrated to 4L NC, sats >92% on continuous pulse ox. SR on telemetry. Lasix gtt discontinued this am - transitioned to 80mg IVP.  today (down from 1112 on admit). OOB to chair this afternoon.

## 2018-01-09 NOTE — PROGRESS NOTES
Dr. Lynne notified pt w/ continuous n/v despite PO and IV zofran given.  Last titration done around 10 am.  Titration scheduled for 4 pm HELD due to headache and n/v.  Pt now with emesis x4.  No other medication ordered for n/v.  Pt also with desats down into lower 80s.  Pt noted to either have oxygen off or asleep while on nasal cannula.  Pt asked multiple times to keep oxygen on and to use venti mask when sleeping.  Pt now wide awake on nasal cannula 6L w/ O2 sats >90%.     Per MD, orders to be placed for promethazine. No other orders given.    Will continue to monitor and update MD if further changes occur.

## 2018-01-09 NOTE — PHYSICIAN QUERY
PT Name: Sue Smith  MR #: [id number]     Physician Query Form - Documentation Clarification      CDS/: Lexi Moyer RN, CDI               Contact information:[phone]    This form is a permanent document in the medical record.     Query Date: January 9, 2018    By submitting this query, we are merely seeking further clarification of documentation. Please utilize your independent clinical judgment when addressing the question(s) below.    The Medical record reflects the following:    Supporting Clinical Findings Location in Medical Record     Potassium Chloride  IVPB  X 2 doses    Potassium Chloride Oral 3 X's day     Potassium Chloride  IVPB  X 2 doses       MAR 1/4    MAR  Start 1/4    MAR 1/8     Potassium = 3.2-> 2.8-> 3.0-> 3.1-> 3.5       Labs 1/3, 1/4, 1/5, 1/6                                                                            Doctor, Please specify diagnosis or diagnoses associated with above clinical findings.    Provider Use Only    [ X ]  Hypokalemia    [  ]  Other___________________.                                                                                                               [  ] Clinically undetermined

## 2018-01-09 NOTE — ASSESSMENT & PLAN NOTE
- Continue supplemental O2 (on 3 LPM at home).Currently requiring 6L O2- wean today  - BiPAP at night

## 2018-01-09 NOTE — PROGRESS NOTES
Spoke with IRISH Paul re: patient's c/o nausea this morning and emesis episode just prior to this shift. Will continue to hold Remodulin titration for now. Lasix gtt to be d/c'd. Awaiting BNP. Will continue to monitor.

## 2018-01-09 NOTE — PROGRESS NOTES
[facility]  Heart Transplant  Progress Note    Patient Name: Sue Smith  MRN: [id number]  Admission Date: 1/3/2018  Hospital Length of Stay: 5 days  Attending Physician: Alan Richmond MD  Primary Care Provider: Paddy Guerrier DO  Principal Problem:Acute on chronic right heart failure    Subjective:     Interval History: N/V and HA overnight. Still nauseated this am    Continuous Infusions:   treprostinil (REMODULIN) infusion 44 ng/kg/min (01/09/18 1259)    veletri/remodulin cassette      veletri/remodulin tubing       Scheduled Meds:   albuterol sulfate  2.5 mg Nebulization Q4H    amLODIPine  5 mg Oral Daily    busPIRone  15 mg Oral TID    desvenlafaxine succinate  100 mg Oral Daily    fluticasone-vilanterol  1 puff Inhalation Daily    furosemide  80 mg Intravenous BID    gabapentin  100 mg Oral TID    lamoTRIgine  100 mg Oral BID    levothyroxine  75 mcg Oral Before breakfast    lurasidone  60 mg Oral Daily    magnesium oxide  400 mg Oral BID    pantoprazole  40 mg Oral Daily    potassium chloride SA  60 mEq Oral TID    pravastatin  40 mg Oral Daily    sodium chloride 0.9%  3 mL Intravenous Q8H    warfarin  5 mg Oral Daily     PRN Meds:butalbital-acetaminophen-caffeine -40 mg, hydrocodone-acetaminophen 10-325mg, loperamide, ondansetron, ondansetron, promethazine    Review of patient's allergies indicates:   Allergen Reactions    Sulfa (sulfonamide antibiotics) Rash     Objective:     Vital Signs (Most Recent):  Temp: 97.8 °F (36.6 °C) (01/09/18 1130)  Pulse: 90 (01/09/18 1300)  Resp: 16 (01/09/18 1203)  BP: 136/79 (01/09/18 1130)  SpO2: (!) 93 % (01/09/18 1300) Vital Signs (24h Range):  Temp:  [97.3 °F (36.3 °C)-97.9 °F (36.6 °C)] 97.8 °F (36.6 °C)  Pulse:  [] 90  Resp:  [14-20] 16  SpO2:  [83 %-97 %] 93 %  BP: (102-136)/(59-81) 136/79     Patient Vitals for the past 72 hrs (Last 3 readings):   Weight   01/09/18 0630 86.3 kg (190 lb 4.1 oz)   01/08/18 0515 87.4  kg (192 lb 10.9 oz)   01/07/18 0430 86.2 kg (190 lb 0.6 oz)     Body mass index is 34.8 kg/m².      Intake/Output Summary (Last 24 hours) at 01/09/18 1359  Last data filed at 01/09/18 1200   Gross per 24 hour   Intake              975 ml   Output             1450 ml   Net             -475 ml       Hemodynamic Parameters:           Physical Exam   Constitutional: She is oriented to person, place, and time. She appears well-developed and well-nourished.   Neck: Normal range of motion. Neck supple. No JVD (To jaw) present.   Cardiovascular: Normal rate and regular rhythm.  Exam reveals no gallop and no friction rub.    No murmur heard.  Pulmonary/Chest: Effort normal and breath sounds normal. She has no wheezes. She has no rales.   Abdominal: Soft. Bowel sounds are normal. There is no tenderness.   Musculoskeletal: She exhibits no edema.   Neurological: She is alert and oriented to person, place, and time.   Skin: Skin is warm and dry.       Significant Labs:  CBC:    Recent Labs  Lab 01/07/18  0550 01/08/18  0441 01/09/18  0518   WBC 6.23 6.10 9.35   RBC 4.10 4.10 4.68   HGB 12.7 12.6 14.5   HCT 39.3 38.7 43.6    164 191   MCV 96 94 93   MCH 31.0 30.7 31.0   MCHC 32.3 32.6 33.3     BNP:    Recent Labs  Lab 01/03/18  2306 01/09/18  0518   BNP 1,112* 772*     CMP:    Recent Labs  Lab 01/03/18  2306  01/07/18  0550 01/07/18  1654 01/08/18  0441 01/09/18  0518   *  < > 85  --  76 101   CALCIUM 8.7  < > 9.3  --  9.2 10.0   ALBUMIN 3.5  --   --   --  3.5 3.9   PROT 6.2  --   --   --  6.3 6.9     < > 141  --  141 142   K 3.2*  < > 3.2* 4.3 3.3* 4.0   CO2 29  < > 38*  --  35* 35*     < > 93*  --  96 97   BUN 18  < > 23*  --  17 12   CREATININE 1.4  < > 1.1  --  1.0 0.9   ALKPHOS 113  --   --   --  101 109   ALT 25  --   --   --  14 14   AST 32  --   --   --  20 25   BILITOT 0.5  --   --   --  0.6 0.7   < > = values in this interval not displayed.   Coagulation:     Recent Labs  Lab 01/07/18  0689  01/08/18  0441 01/09/18  0518   INR 1.8* 1.2 1.1     LDH:  No results for input(s): LDH in the last 72 hours.  Microbiology:  Microbiology Results (last 7 days)     ** No results found for the last 168 hours. **          I have reviewed all pertinent labs within the past 24 hours.    Estimated Creatinine Clearance: 71.2 mL/min (based on SCr of 0.9 mg/dL).    Diagnostic Results:  I have reviewed all pertinent imaging results/findings within the past 24 hours.    Assessment and Plan:     No notes on file    * Acute on chronic right heart failure    -Appears euvolemic today. Transition from Lasix drip to IVP Lasix today        Pulmonary hypertension, group 1    - Severe PH with h/o markedly reduced CI and severe RV dysfunction. On Remodulin, uptitrating- now at 45 ng/kg/min but pt with N/V so will hold off on continuing to uptitrate.   - PASP 88 by TTE 12/8/17  - On Coumadin. INR subtherapeutic today.         Chronic respiratory failure with hypoxia    - Continue supplemental O2 (on 3 LPM at home).Currently requiring 6L O2- wean today  - BiPAP at night        Hypothyroidism    -Cont Synthroid        Bipolar affective disorder    -Continue home medications- buspirone, desvenlafaxine, lamotrigine, lurasidone.         Essential hypertension    -Cont amlodipine            Deanne Tello PA-C  Heart Transplant  [facility]

## 2018-01-09 NOTE — PLAN OF CARE
[facility]   Heart Transplant/[facility]   [address] [place] [address]   [phone] after hours [phone] fax   HOME HEALTH ORDERS     Admit to Home Health   Diagnosis:  Patient Active Problem List   Diagnosis    Pulmonary hypertension, group 1    Chronic respiratory failure with hypoxia    Hypothyroidism    Essential hypertension    Dyslipidemia    Bipolar affective disorder    Acute on chronic right heart failure    Hx of tracheostomy    Gastroesophageal reflux disease    Acute on chronic diastolic heart failure    Cardiomegaly    Pulmonary nodules    Chronic pulmonary heart disease    Hypervolemia    Shortness of breath       Patient is homebound due to: PH on IV Remodulin    Diet: 2 gm N diet, 1.5 L fluid restriction    Acitivities: As tolerated    Nursing:   SN to complete comprehensive assessment including routine vital signs. Instruct on disease process and s/s of complications to report to MD. Review/verify medication list sent home with the patient at time of discharge and instruct patient/caregiver as needed. Frequency may be adjusted depending on start of care date.     Notify MD if SBP > 160 or < 90; DBP > 90 or < 50; HR > 120 or < 50; Temp > 101; Weight gain >3lbs in 1 day or 5lbs in 1 week.      LABS: SN to perform labs: CMP, BNP, Mg, CBC q week    Lasix 80 mg IVP q week as needed for weight gain more than 5 pounds in a week, 3 pounds in a day, worsening LE or abdominal edema, or worsening SOB    HOME INFUSION THERAPY:     Pt only to do infusion of medication  SN Central Line Care.   Review Central Line Care    **For questions or concerns, please call [phone] and ask for Pulmonary Hypertension clinic, M-F 8-5. After hours, weekends, call [phone] and ask for the Heart Transplant Cardiologist on call.**     Central :   - Sterile dressing changes are done weekly and as needed.   - Use chlor-hexadine scrub  to cleanse site, apply Biopatch to insertion site,   apply securement device dressing   - If sterile gauze is under dressing to control oozing,   dressing change must be performed every 24 hours until gauze is not needed.     Send follow up questions to [phone] or [phone].

## 2018-01-09 NOTE — ASSESSMENT & PLAN NOTE
- Severe PH with h/o markedly reduced CI and severe RV dysfunction. On Remodulin, uptitrating- now at 45 ng/kg/min but pt with N/V so will hold off on continuing to uptitrate.   - PASP 88 by TTE 12/8/17  - On Coumadin. INR subtherapeutic today.

## 2018-01-09 NOTE — SUBJECTIVE & OBJECTIVE
Interval History: N/V and HA overnight. Still nauseated this am    Continuous Infusions:   treprostinil (REMODULIN) infusion 44 ng/kg/min (01/09/18 1259)    veletri/remodulin cassette      veletri/remodulin tubing       Scheduled Meds:   albuterol sulfate  2.5 mg Nebulization Q4H    amLODIPine  5 mg Oral Daily    busPIRone  15 mg Oral TID    desvenlafaxine succinate  100 mg Oral Daily    fluticasone-vilanterol  1 puff Inhalation Daily    furosemide  80 mg Intravenous BID    gabapentin  100 mg Oral TID    lamoTRIgine  100 mg Oral BID    levothyroxine  75 mcg Oral Before breakfast    lurasidone  60 mg Oral Daily    magnesium oxide  400 mg Oral BID    pantoprazole  40 mg Oral Daily    potassium chloride SA  60 mEq Oral TID    pravastatin  40 mg Oral Daily    sodium chloride 0.9%  3 mL Intravenous Q8H    warfarin  5 mg Oral Daily     PRN Meds:butalbital-acetaminophen-caffeine -40 mg, hydrocodone-acetaminophen 10-325mg, loperamide, ondansetron, ondansetron, promethazine    Review of patient's allergies indicates:   Allergen Reactions    Sulfa (sulfonamide antibiotics) Rash     Objective:     Vital Signs (Most Recent):  Temp: 97.8 °F (36.6 °C) (01/09/18 1130)  Pulse: 90 (01/09/18 1300)  Resp: 16 (01/09/18 1203)  BP: 136/79 (01/09/18 1130)  SpO2: (!) 93 % (01/09/18 1300) Vital Signs (24h Range):  Temp:  [97.3 °F (36.3 °C)-97.9 °F (36.6 °C)] 97.8 °F (36.6 °C)  Pulse:  [] 90  Resp:  [14-20] 16  SpO2:  [83 %-97 %] 93 %  BP: (102-136)/(59-81) 136/79     Patient Vitals for the past 72 hrs (Last 3 readings):   Weight   01/09/18 0630 86.3 kg (190 lb 4.1 oz)   01/08/18 0515 87.4 kg (192 lb 10.9 oz)   01/07/18 0430 86.2 kg (190 lb 0.6 oz)     Body mass index is 34.8 kg/m².      Intake/Output Summary (Last 24 hours) at 01/09/18 1359  Last data filed at 01/09/18 1200   Gross per 24 hour   Intake              975 ml   Output             1450 ml   Net             -475 ml       Hemodynamic Parameters:            Physical Exam   Constitutional: She is oriented to person, place, and time. She appears well-developed and well-nourished.   Neck: Normal range of motion. Neck supple. No JVD (To jaw) present.   Cardiovascular: Normal rate and regular rhythm.  Exam reveals no gallop and no friction rub.    No murmur heard.  Pulmonary/Chest: Effort normal and breath sounds normal. She has no wheezes. She has no rales.   Abdominal: Soft. Bowel sounds are normal. There is no tenderness.   Musculoskeletal: She exhibits no edema.   Neurological: She is alert and oriented to person, place, and time.   Skin: Skin is warm and dry.       Significant Labs:  CBC:    Recent Labs  Lab 01/07/18  0550 01/08/18 0441 01/09/18 0518   WBC 6.23 6.10 9.35   RBC 4.10 4.10 4.68   HGB 12.7 12.6 14.5   HCT 39.3 38.7 43.6    164 191   MCV 96 94 93   MCH 31.0 30.7 31.0   MCHC 32.3 32.6 33.3     BNP:    Recent Labs  Lab 01/03/18 2306 01/09/18 0518   BNP 1,112* 772*     CMP:    Recent Labs  Lab 01/03/18  2306  01/07/18  0550 01/07/18  1654 01/08/18 0441 01/09/18 0518   *  < > 85  --  76 101   CALCIUM 8.7  < > 9.3  --  9.2 10.0   ALBUMIN 3.5  --   --   --  3.5 3.9   PROT 6.2  --   --   --  6.3 6.9     < > 141  --  141 142   K 3.2*  < > 3.2* 4.3 3.3* 4.0   CO2 29  < > 38*  --  35* 35*     < > 93*  --  96 97   BUN 18  < > 23*  --  17 12   CREATININE 1.4  < > 1.1  --  1.0 0.9   ALKPHOS 113  --   --   --  101 109   ALT 25  --   --   --  14 14   AST 32  --   --   --  20 25   BILITOT 0.5  --   --   --  0.6 0.7   < > = values in this interval not displayed.   Coagulation:     Recent Labs  Lab 01/07/18  0550 01/08/18  0441 01/09/18  0518   INR 1.8* 1.2 1.1     LDH:  No results for input(s): LDH in the last 72 hours.  Microbiology:  Microbiology Results (last 7 days)     ** No results found for the last 168 hours. **          I have reviewed all pertinent labs within the past 24 hours.    Estimated Creatinine Clearance: 71.2 mL/min  (based on SCr of 0.9 mg/dL).    Diagnostic Results:  I have reviewed all pertinent imaging results/findings within the past 24 hours.

## 2018-01-10 LAB
ALBUMIN SERPL BCP-MCNC: 3.8 G/DL
ALP SERPL-CCNC: 116 U/L
ALT SERPL W/O P-5'-P-CCNC: 15 U/L
ANION GAP SERPL CALC-SCNC: 11 MMOL/L
AST SERPL-CCNC: 23 U/L
BASOPHILS # BLD AUTO: 0.05 K/UL
BASOPHILS NFR BLD: 0.6 %
BILIRUB SERPL-MCNC: 0.6 MG/DL
BUN SERPL-MCNC: 17 MG/DL
CALCIUM SERPL-MCNC: 10.1 MG/DL
CHLORIDE SERPL-SCNC: 99 MMOL/L
CO2 SERPL-SCNC: 32 MMOL/L
CREAT SERPL-MCNC: 1.2 MG/DL
DIFFERENTIAL METHOD: ABNORMAL
EOSINOPHIL # BLD AUTO: 0.2 K/UL
EOSINOPHIL NFR BLD: 2.4 %
ERYTHROCYTE [DISTWIDTH] IN BLOOD BY AUTOMATED COUNT: 16 %
EST. GFR  (AFRICAN AMERICAN): 58.4 ML/MIN/1.73 M^2
EST. GFR  (NON AFRICAN AMERICAN): 50.6 ML/MIN/1.73 M^2
GLUCOSE SERPL-MCNC: 79 MG/DL
HCT VFR BLD AUTO: 44.9 %
HGB BLD-MCNC: 14.5 G/DL
IMM GRANULOCYTES # BLD AUTO: 0.12 K/UL
IMM GRANULOCYTES NFR BLD AUTO: 1.4 %
INR PPP: 1.2
LYMPHOCYTES # BLD AUTO: 1.2 K/UL
LYMPHOCYTES NFR BLD: 13.9 %
MAGNESIUM SERPL-MCNC: 2.5 MG/DL
MCH RBC QN AUTO: 30.4 PG
MCHC RBC AUTO-ENTMCNC: 32.3 G/DL
MCV RBC AUTO: 94 FL
MONOCYTES # BLD AUTO: 0.5 K/UL
MONOCYTES NFR BLD: 5.4 %
NEUTROPHILS # BLD AUTO: 6.5 K/UL
NEUTROPHILS NFR BLD: 76.3 %
NRBC BLD-RTO: 0 /100 WBC
PLATELET # BLD AUTO: 152 K/UL
PMV BLD AUTO: 12 FL
POTASSIUM SERPL-SCNC: 4.4 MMOL/L
PROT SERPL-MCNC: 6.7 G/DL
PROTHROMBIN TIME: 12.3 SEC
RBC # BLD AUTO: 4.77 M/UL
SODIUM SERPL-SCNC: 142 MMOL/L
WBC # BLD AUTO: 8.48 K/UL

## 2018-01-10 PROCEDURE — 85025 COMPLETE CBC W/AUTO DIFF WBC: CPT

## 2018-01-10 PROCEDURE — A4216 STERILE WATER/SALINE, 10 ML: HCPCS | Performed by: INTERNAL MEDICINE

## 2018-01-10 PROCEDURE — 94761 N-INVAS EAR/PLS OXIMETRY MLT: CPT

## 2018-01-10 PROCEDURE — 36415 COLL VENOUS BLD VENIPUNCTURE: CPT

## 2018-01-10 PROCEDURE — 99900035 HC TECH TIME PER 15 MIN (STAT)

## 2018-01-10 PROCEDURE — 25000003 PHARM REV CODE 250: Performed by: PHYSICIAN ASSISTANT

## 2018-01-10 PROCEDURE — 27000221 HC OXYGEN, UP TO 24 HOURS

## 2018-01-10 PROCEDURE — 94660 CPAP INITIATION&MGMT: CPT

## 2018-01-10 PROCEDURE — 83735 ASSAY OF MAGNESIUM: CPT

## 2018-01-10 PROCEDURE — 85610 PROTHROMBIN TIME: CPT

## 2018-01-10 PROCEDURE — 63600175 PHARM REV CODE 636 W HCPCS: Mod: JG | Performed by: INTERNAL MEDICINE

## 2018-01-10 PROCEDURE — 63600175 PHARM REV CODE 636 W HCPCS: Performed by: PHYSICIAN ASSISTANT

## 2018-01-10 PROCEDURE — 25000242 PHARM REV CODE 250 ALT 637 W/ HCPCS: Performed by: INTERNAL MEDICINE

## 2018-01-10 PROCEDURE — 25000003 PHARM REV CODE 250: Performed by: INTERNAL MEDICINE

## 2018-01-10 PROCEDURE — 94640 AIRWAY INHALATION TREATMENT: CPT

## 2018-01-10 PROCEDURE — 20600001 HC STEP DOWN PRIVATE ROOM

## 2018-01-10 PROCEDURE — 80053 COMPREHEN METABOLIC PANEL: CPT

## 2018-01-10 PROCEDURE — 99232 SBSQ HOSP IP/OBS MODERATE 35: CPT | Mod: ,,, | Performed by: INTERNAL MEDICINE

## 2018-01-10 RX ORDER — BUMETANIDE 1 MG/1
4 TABLET ORAL 2 TIMES DAILY
Status: DISCONTINUED | OUTPATIENT
Start: 2018-01-10 | End: 2018-01-11 | Stop reason: HOSPADM

## 2018-01-10 RX ADMIN — POTASSIUM CHLORIDE 60 MEQ: 1500 TABLET, EXTENDED RELEASE ORAL at 01:01

## 2018-01-10 RX ADMIN — DESVENLAFAXINE SUCCINATE 100 MG: 50 TABLET, EXTENDED RELEASE ORAL at 09:01

## 2018-01-10 RX ADMIN — PRAVASTATIN SODIUM 40 MG: 40 TABLET ORAL at 09:01

## 2018-01-10 RX ADMIN — HYDROCODONE BITARTRATE AND ACETAMINOPHEN 1 TABLET: 10; 325 TABLET ORAL at 09:01

## 2018-01-10 RX ADMIN — HYDROCODONE BITARTRATE AND ACETAMINOPHEN 1 TABLET: 10; 325 TABLET ORAL at 04:01

## 2018-01-10 RX ADMIN — BUSPIRONE HYDROCHLORIDE 15 MG: 5 TABLET ORAL at 08:01

## 2018-01-10 RX ADMIN — LEVOTHYROXINE SODIUM 75 MCG: 75 TABLET ORAL at 06:01

## 2018-01-10 RX ADMIN — ALBUTEROL SULFATE 2.5 MG: 2.5 SOLUTION RESPIRATORY (INHALATION) at 08:01

## 2018-01-10 RX ADMIN — FLUTICASONE FUROATE AND VILANTEROL TRIFENATATE 1 PUFF: 100; 25 POWDER RESPIRATORY (INHALATION) at 09:01

## 2018-01-10 RX ADMIN — GABAPENTIN 100 MG: 100 CAPSULE ORAL at 08:01

## 2018-01-10 RX ADMIN — GABAPENTIN 100 MG: 100 CAPSULE ORAL at 06:01

## 2018-01-10 RX ADMIN — GABAPENTIN 100 MG: 100 CAPSULE ORAL at 01:01

## 2018-01-10 RX ADMIN — MAGNESIUM OXIDE TAB 400 MG (241.3 MG ELEMENTAL MG) 400 MG: 400 (241.3 MG) TAB at 09:01

## 2018-01-10 RX ADMIN — BUSPIRONE HYDROCHLORIDE 15 MG: 5 TABLET ORAL at 01:01

## 2018-01-10 RX ADMIN — MAGNESIUM OXIDE TAB 400 MG (241.3 MG ELEMENTAL MG) 400 MG: 400 (241.3 MG) TAB at 08:01

## 2018-01-10 RX ADMIN — AMLODIPINE BESYLATE 5 MG: 5 TABLET ORAL at 09:01

## 2018-01-10 RX ADMIN — BUSPIRONE HYDROCHLORIDE 15 MG: 5 TABLET ORAL at 06:01

## 2018-01-10 RX ADMIN — Medication 3 ML: at 09:01

## 2018-01-10 RX ADMIN — LAMOTRIGINE 100 MG: 100 TABLET ORAL at 09:01

## 2018-01-10 RX ADMIN — ALBUTEROL SULFATE 2.5 MG: 2.5 SOLUTION RESPIRATORY (INHALATION) at 11:01

## 2018-01-10 RX ADMIN — LURASIDONE HYDROCHLORIDE 60 MG: 20 TABLET, FILM COATED ORAL at 09:01

## 2018-01-10 RX ADMIN — PANTOPRAZOLE SODIUM 40 MG: 40 TABLET, DELAYED RELEASE ORAL at 09:01

## 2018-01-10 RX ADMIN — ALBUTEROL SULFATE 2.5 MG: 2.5 SOLUTION RESPIRATORY (INHALATION) at 03:01

## 2018-01-10 RX ADMIN — WARFARIN SODIUM 5 MG: 5 TABLET ORAL at 05:01

## 2018-01-10 RX ADMIN — POTASSIUM CHLORIDE 60 MEQ: 1500 TABLET, EXTENDED RELEASE ORAL at 06:01

## 2018-01-10 RX ADMIN — POTASSIUM CHLORIDE 60 MEQ: 1500 TABLET, EXTENDED RELEASE ORAL at 08:01

## 2018-01-10 RX ADMIN — FUROSEMIDE 80 MG: 10 INJECTION, SOLUTION INTRAMUSCULAR; INTRAVENOUS at 09:01

## 2018-01-10 RX ADMIN — TREPROSTINIL 44 NG/KG/MIN: 200 INJECTION, SOLUTION INTRAVENOUS; SUBCUTANEOUS at 10:01

## 2018-01-10 RX ADMIN — ALBUTEROL SULFATE 2.5 MG: 2.5 SOLUTION RESPIRATORY (INHALATION) at 07:01

## 2018-01-10 RX ADMIN — Medication 3 ML: at 02:01

## 2018-01-10 RX ADMIN — LAMOTRIGINE 100 MG: 100 TABLET ORAL at 08:01

## 2018-01-10 RX ADMIN — BUMETANIDE 4 MG: 1 TABLET ORAL at 08:01

## 2018-01-10 NOTE — PROGRESS NOTES
[facility]  Heart Transplant  Progress Note    Patient Name: Sue Smith  MRN: [id number]  Admission Date: 1/3/2018  Hospital Length of Stay: 6 days  Attending Physician: Alan Richmond MD  Primary Care Provider: Paddy Guerrier DO  Principal Problem:Acute on chronic right heart failure    Subjective:     Interval History: No acute events overnight. Continues to make good amount of urine. N/V and headache have resolved. Planning for discharge tomorrow.     Continuous Infusions:   treprostinil (REMODULIN) infusion 44 ng/kg/min (01/10/18 1018)    veletri/remodulin cassette      veletri/remodulin tubing       Scheduled Meds:   albuterol sulfate  2.5 mg Nebulization Q4H    amLODIPine  5 mg Oral Daily    bumetanide  4 mg Oral BID    busPIRone  15 mg Oral TID    desvenlafaxine succinate  100 mg Oral Daily    fluticasone-vilanterol  1 puff Inhalation Daily    gabapentin  100 mg Oral TID    lamoTRIgine  100 mg Oral BID    levothyroxine  75 mcg Oral Before breakfast    lurasidone  60 mg Oral Daily    magnesium oxide  400 mg Oral BID    pantoprazole  40 mg Oral Daily    potassium chloride SA  60 mEq Oral TID    pravastatin  40 mg Oral Daily    sodium chloride 0.9%  3 mL Intravenous Q8H    warfarin  5 mg Oral Daily     PRN Meds:butalbital-acetaminophen-caffeine -40 mg, hydrocodone-acetaminophen 10-325mg, loperamide, ondansetron, ondansetron, promethazine    Review of patient's allergies indicates:   Allergen Reactions    Sulfa (sulfonamide antibiotics) Rash     Objective:     Vital Signs (Most Recent):  Temp: 98.3 °F (36.8 °C) (01/10/18 1155)  Pulse: 88 (01/10/18 1155)  Resp: 19 (01/10/18 1155)  BP: 111/60 (01/10/18 1155)  SpO2: 96 % (01/10/18 1155) Vital Signs (24h Range):  Temp:  [97.6 °F (36.4 °C)-98.8 °F (37.1 °C)] 98.3 °F (36.8 °C)  Pulse:  [] 88  Resp:  [16-27] 19  SpO2:  [85 %-97 %] 96 %  BP: (107-141)/(60-86) 111/60     Patient Vitals for the past 72 hrs (Last 3  readings):   Weight   01/09/18 0630 86.3 kg (190 lb 4.1 oz)   01/08/18 0515 87.4 kg (192 lb 10.9 oz)     Body mass index is 34.8 kg/m².      Intake/Output Summary (Last 24 hours) at 01/10/18 1416  Last data filed at 01/10/18 1400   Gross per 24 hour   Intake             1485 ml   Output             1050 ml   Net              435 ml     Physical Exam   Constitutional: She is oriented to person, place, and time. She appears well-developed and well-nourished.   Neck: Normal range of motion. Neck supple. JVD (To jaw) present.   Cardiovascular: Normal rate and regular rhythm.  Exam reveals no gallop and no friction rub.    No murmur heard.  Pulmonary/Chest: Effort normal and breath sounds normal. She has no wheezes. She has no rales.   Abdominal: Soft. Bowel sounds are normal. There is no tenderness.   Musculoskeletal: She exhibits no edema.   Neurological: She is alert and oriented to person, place, and time.   Skin: Skin is warm and dry.     Significant Labs:  CBC:    Recent Labs  Lab 01/08/18 0441 01/09/18 0518 01/10/18  0615   WBC 6.10 9.35 8.48   RBC 4.10 4.68 4.77   HGB 12.6 14.5 14.5   HCT 38.7 43.6 44.9    191 152   MCV 94 93 94   MCH 30.7 31.0 30.4   MCHC 32.6 33.3 32.3     BNP:    Recent Labs  Lab 01/03/18  2306 01/09/18 0518   BNP 1,112* 772*     CMP:    Recent Labs  Lab 01/08/18 0441 01/09/18 0518 01/10/18  0615   GLU 76 101 79   CALCIUM 9.2 10.0 10.1   ALBUMIN 3.5 3.9 3.8   PROT 6.3 6.9 6.7    142 142   K 3.3* 4.0 4.4   CO2 35* 35* 32*   CL 96 97 99   BUN 17 12 17   CREATININE 1.0 0.9 1.2   ALKPHOS 101 109 116   ALT 14 14 15   AST 20 25 23   BILITOT 0.6 0.7 0.6      Coagulation:     Recent Labs  Lab 01/08/18 0441 01/09/18 0518 01/10/18  0615   INR 1.2 1.1 1.2     I have reviewed all pertinent labs within the past 24 hours.    Estimated Creatinine Clearance: 53.4 mL/min (based on SCr of 1.2 mg/dL).    Diagnostic Results:  I have reviewed all pertinent imaging results/findings within the  past 24 hours.    Assessment and Plan:     No notes on file    * Acute on chronic right heart failure    - Euvolemic / Warm on exam   - Will switch from IV lasix to home bumex dose (4mg BID)   - Placed HH orders to allow IV lasix administration at home   - Will have her follow up in clinic in a week. Expect D/C in next 24 - 48 hours         Pulmonary hypertension, group 1    - Severe PH with h/o markedly reduced CI and severe RV dysfunction.   - Continue IV Remodulin at 45 ng/kg/min  - Continue coumadin. INR subtherapuetic.   - Will need repeat INR Friday and follow up with coumadin clinic   - Will not plan for bridge with lovenox         Chronic respiratory failure with hypoxia    - Continue supplemental O2 (on 3 LPM at home)  - BiPAP at night        Bipolar affective disorder    -Continue home medications: buspirone, desvenlafaxine, lamotrigine, lurasidone.         Essential hypertension    - Continue PTA amlodipine        Hypothyroidism    - Continue PTA Synthroid            Pedro frankel, DO  Heart Transplant  [facility]

## 2018-01-10 NOTE — ASSESSMENT & PLAN NOTE
- Severe PH with h/o markedly reduced CI and severe RV dysfunction.   - Continue IV Remodulin at 45 ng/kg/min  - Continue coumadin. INR subtherapuetic.   - Will need repeat INR Friday and follow up with coumadin clinic   - Will not plan for bridge with lovenox

## 2018-01-10 NOTE — ASSESSMENT & PLAN NOTE
- Euvolemic / Warm on exam   - Will switch from IV lasix to home bumex dose (4mg BID)   - Placed HH orders to allow IV lasix administration at home   - Will have her follow up in clinic in a week. Expect D/C in next 24 - 48 hours

## 2018-01-10 NOTE — PLAN OF CARE
Problem: Patient Care Overview  Goal: Plan of Care Review  Outcome: Ongoing (interventions implemented as appropriate)  Remodulin still at 44 ng/kg/min. Patient changed cassette with RN supervision but did require some prompting. Patient continues to tolerate dose without difficulty - denies any N/V/HA since dose down-titrated yesterday. Patient did c/o neck pain this am - reports having stenosis; pain relieved by prn Lomax. OOB to chair all day. IVP Lasix d/c'd, patient transitioned to PO Bumex. Attempting to wean oxygen down to or close to home oxygen dose of 3L. Currently at 4.5 L, sats 91% on continuous pulse ox. Patient educated on importance of adhering to 1500cc fluid restriction. SR on telemetry. Plan is to discharge patient tomorrow or Friday.

## 2018-01-10 NOTE — SUBJECTIVE & OBJECTIVE
Interval History: No acute events overnight. Continues to make good amount of urine. N/V and headache have resolved. Planning for discharge tomorrow.     Continuous Infusions:   treprostinil (REMODULIN) infusion 44 ng/kg/min (01/10/18 1018)    veletri/remodulin cassette      veletri/remodulin tubing       Scheduled Meds:   albuterol sulfate  2.5 mg Nebulization Q4H    amLODIPine  5 mg Oral Daily    bumetanide  4 mg Oral BID    busPIRone  15 mg Oral TID    desvenlafaxine succinate  100 mg Oral Daily    fluticasone-vilanterol  1 puff Inhalation Daily    gabapentin  100 mg Oral TID    lamoTRIgine  100 mg Oral BID    levothyroxine  75 mcg Oral Before breakfast    lurasidone  60 mg Oral Daily    magnesium oxide  400 mg Oral BID    pantoprazole  40 mg Oral Daily    potassium chloride SA  60 mEq Oral TID    pravastatin  40 mg Oral Daily    sodium chloride 0.9%  3 mL Intravenous Q8H    warfarin  5 mg Oral Daily     PRN Meds:butalbital-acetaminophen-caffeine -40 mg, hydrocodone-acetaminophen 10-325mg, loperamide, ondansetron, ondansetron, promethazine    Review of patient's allergies indicates:   Allergen Reactions    Sulfa (sulfonamide antibiotics) Rash     Objective:     Vital Signs (Most Recent):  Temp: 98.3 °F (36.8 °C) (01/10/18 1155)  Pulse: 88 (01/10/18 1155)  Resp: 19 (01/10/18 1155)  BP: 111/60 (01/10/18 1155)  SpO2: 96 % (01/10/18 1155) Vital Signs (24h Range):  Temp:  [97.6 °F (36.4 °C)-98.8 °F (37.1 °C)] 98.3 °F (36.8 °C)  Pulse:  [] 88  Resp:  [16-27] 19  SpO2:  [85 %-97 %] 96 %  BP: (107-141)/(60-86) 111/60     Patient Vitals for the past 72 hrs (Last 3 readings):   Weight   01/09/18 0630 86.3 kg (190 lb 4.1 oz)   01/08/18 0515 87.4 kg (192 lb 10.9 oz)     Body mass index is 34.8 kg/m².      Intake/Output Summary (Last 24 hours) at 01/10/18 1416  Last data filed at 01/10/18 1400   Gross per 24 hour   Intake             1485 ml   Output             1050 ml   Net              435  ml     Physical Exam   Constitutional: She is oriented to person, place, and time. She appears well-developed and well-nourished.   Neck: Normal range of motion. Neck supple. JVD (To jaw) present.   Cardiovascular: Normal rate and regular rhythm.  Exam reveals no gallop and no friction rub.    No murmur heard.  Pulmonary/Chest: Effort normal and breath sounds normal. She has no wheezes. She has no rales.   Abdominal: Soft. Bowel sounds are normal. There is no tenderness.   Musculoskeletal: She exhibits no edema.   Neurological: She is alert and oriented to person, place, and time.   Skin: Skin is warm and dry.     Significant Labs:  CBC:    Recent Labs  Lab 01/08/18 0441 01/09/18  0518 01/10/18  0615   WBC 6.10 9.35 8.48   RBC 4.10 4.68 4.77   HGB 12.6 14.5 14.5   HCT 38.7 43.6 44.9    191 152   MCV 94 93 94   MCH 30.7 31.0 30.4   MCHC 32.6 33.3 32.3     BNP:    Recent Labs  Lab 01/03/18  2306 01/09/18  0518   BNP 1,112* 772*     CMP:    Recent Labs  Lab 01/08/18 0441 01/09/18  0518 01/10/18  0615   GLU 76 101 79   CALCIUM 9.2 10.0 10.1   ALBUMIN 3.5 3.9 3.8   PROT 6.3 6.9 6.7    142 142   K 3.3* 4.0 4.4   CO2 35* 35* 32*   CL 96 97 99   BUN 17 12 17   CREATININE 1.0 0.9 1.2   ALKPHOS 101 109 116   ALT 14 14 15   AST 20 25 23   BILITOT 0.6 0.7 0.6      Coagulation:     Recent Labs  Lab 01/08/18  0441 01/09/18  0518 01/10/18  0615   INR 1.2 1.1 1.2     I have reviewed all pertinent labs within the past 24 hours.    Estimated Creatinine Clearance: 53.4 mL/min (based on SCr of 1.2 mg/dL).    Diagnostic Results:  I have reviewed all pertinent imaging results/findings within the past 24 hours.

## 2018-01-11 ENCOUNTER — DOCUMENTATION ONLY (OUTPATIENT)
Dept: TRANSPLANT | Facility: CLINIC | Age: 58
End: 2018-01-11

## 2018-01-11 VITALS
HEIGHT: 62 IN | SYSTOLIC BLOOD PRESSURE: 110 MMHG | BODY MASS INDEX: 35.01 KG/M2 | TEMPERATURE: 99 F | DIASTOLIC BLOOD PRESSURE: 60 MMHG | WEIGHT: 190.25 LBS | HEART RATE: 91 BPM | OXYGEN SATURATION: 94 % | RESPIRATION RATE: 17 BRPM

## 2018-01-11 LAB
ALBUMIN SERPL BCP-MCNC: 3.8 G/DL
ALP SERPL-CCNC: 102 U/L
ALT SERPL W/O P-5'-P-CCNC: 13 U/L
ANION GAP SERPL CALC-SCNC: 12 MMOL/L
AST SERPL-CCNC: 19 U/L
BASOPHILS # BLD AUTO: 0.05 K/UL
BASOPHILS NFR BLD: 0.8 %
BILIRUB SERPL-MCNC: 0.5 MG/DL
BUN SERPL-MCNC: 15 MG/DL
CALCIUM SERPL-MCNC: 9.5 MG/DL
CHLORIDE SERPL-SCNC: 98 MMOL/L
CO2 SERPL-SCNC: 31 MMOL/L
CREAT SERPL-MCNC: 1 MG/DL
DIFFERENTIAL METHOD: ABNORMAL
EOSINOPHIL # BLD AUTO: 0.2 K/UL
EOSINOPHIL NFR BLD: 2.9 %
ERYTHROCYTE [DISTWIDTH] IN BLOOD BY AUTOMATED COUNT: 15.5 %
EST. GFR  (AFRICAN AMERICAN): >60 ML/MIN/1.73 M^2
EST. GFR  (NON AFRICAN AMERICAN): >60 ML/MIN/1.73 M^2
GLUCOSE SERPL-MCNC: 79 MG/DL
HCT VFR BLD AUTO: 41.5 %
HGB BLD-MCNC: 13.5 G/DL
IMM GRANULOCYTES # BLD AUTO: 0.07 K/UL
IMM GRANULOCYTES NFR BLD AUTO: 1.1 %
INR PPP: 1.1
LYMPHOCYTES # BLD AUTO: 1.2 K/UL
LYMPHOCYTES NFR BLD: 19.2 %
MAGNESIUM SERPL-MCNC: 2.1 MG/DL
MCH RBC QN AUTO: 30.4 PG
MCHC RBC AUTO-ENTMCNC: 32.5 G/DL
MCV RBC AUTO: 94 FL
MONOCYTES # BLD AUTO: 0.4 K/UL
MONOCYTES NFR BLD: 5.9 %
NEUTROPHILS # BLD AUTO: 4.4 K/UL
NEUTROPHILS NFR BLD: 70.1 %
NRBC BLD-RTO: 0 /100 WBC
PLATELET # BLD AUTO: 163 K/UL
PMV BLD AUTO: 12.2 FL
POTASSIUM SERPL-SCNC: 3.6 MMOL/L
PROT SERPL-MCNC: 6.6 G/DL
PROTHROMBIN TIME: 11.4 SEC
RBC # BLD AUTO: 4.44 M/UL
SODIUM SERPL-SCNC: 141 MMOL/L
WBC # BLD AUTO: 6.25 K/UL

## 2018-01-11 PROCEDURE — A4216 STERILE WATER/SALINE, 10 ML: HCPCS | Performed by: INTERNAL MEDICINE

## 2018-01-11 PROCEDURE — 85025 COMPLETE CBC W/AUTO DIFF WBC: CPT

## 2018-01-11 PROCEDURE — 25000242 PHARM REV CODE 250 ALT 637 W/ HCPCS: Performed by: INTERNAL MEDICINE

## 2018-01-11 PROCEDURE — 99232 SBSQ HOSP IP/OBS MODERATE 35: CPT | Mod: ,,, | Performed by: INTERNAL MEDICINE

## 2018-01-11 PROCEDURE — 80053 COMPREHEN METABOLIC PANEL: CPT

## 2018-01-11 PROCEDURE — 25000003 PHARM REV CODE 250: Performed by: INTERNAL MEDICINE

## 2018-01-11 PROCEDURE — 27000221 HC OXYGEN, UP TO 24 HOURS

## 2018-01-11 PROCEDURE — 25000003 PHARM REV CODE 250: Performed by: PHYSICIAN ASSISTANT

## 2018-01-11 PROCEDURE — 83735 ASSAY OF MAGNESIUM: CPT

## 2018-01-11 PROCEDURE — 94761 N-INVAS EAR/PLS OXIMETRY MLT: CPT

## 2018-01-11 PROCEDURE — 94640 AIRWAY INHALATION TREATMENT: CPT

## 2018-01-11 PROCEDURE — 85610 PROTHROMBIN TIME: CPT

## 2018-01-11 PROCEDURE — 36415 COLL VENOUS BLD VENIPUNCTURE: CPT

## 2018-01-11 RX ORDER — POTASSIUM CHLORIDE 750 MG/1
20 CAPSULE, EXTENDED RELEASE ORAL ONCE
Status: COMPLETED | OUTPATIENT
Start: 2018-01-11 | End: 2018-01-11

## 2018-01-11 RX ORDER — WARFARIN SODIUM 5 MG/1
7.5 TABLET ORAL DAILY
Qty: 45 TABLET | Refills: 11 | Status: ON HOLD
Start: 2018-01-11 | End: 2018-02-23

## 2018-01-11 RX ADMIN — ALBUTEROL SULFATE 2.5 MG: 2.5 SOLUTION RESPIRATORY (INHALATION) at 04:01

## 2018-01-11 RX ADMIN — AMLODIPINE BESYLATE 5 MG: 5 TABLET ORAL at 09:01

## 2018-01-11 RX ADMIN — DESVENLAFAXINE SUCCINATE 100 MG: 50 TABLET, EXTENDED RELEASE ORAL at 09:01

## 2018-01-11 RX ADMIN — BUSPIRONE HYDROCHLORIDE 15 MG: 5 TABLET ORAL at 01:01

## 2018-01-11 RX ADMIN — GABAPENTIN 100 MG: 100 CAPSULE ORAL at 01:01

## 2018-01-11 RX ADMIN — BUSPIRONE HYDROCHLORIDE 15 MG: 5 TABLET ORAL at 04:01

## 2018-01-11 RX ADMIN — MAGNESIUM OXIDE TAB 400 MG (241.3 MG ELEMENTAL MG) 400 MG: 400 (241.3 MG) TAB at 09:01

## 2018-01-11 RX ADMIN — LAMOTRIGINE 100 MG: 100 TABLET ORAL at 09:01

## 2018-01-11 RX ADMIN — POTASSIUM CHLORIDE 20 MEQ: 750 CAPSULE, EXTENDED RELEASE ORAL at 09:01

## 2018-01-11 RX ADMIN — FLUTICASONE FUROATE AND VILANTEROL TRIFENATATE 1 PUFF: 100; 25 POWDER RESPIRATORY (INHALATION) at 09:01

## 2018-01-11 RX ADMIN — BUMETANIDE 4 MG: 1 TABLET ORAL at 09:01

## 2018-01-11 RX ADMIN — ALBUTEROL SULFATE 2.5 MG: 2.5 SOLUTION RESPIRATORY (INHALATION) at 12:01

## 2018-01-11 RX ADMIN — LEVOTHYROXINE SODIUM 75 MCG: 75 TABLET ORAL at 04:01

## 2018-01-11 RX ADMIN — LURASIDONE HYDROCHLORIDE 60 MG: 20 TABLET, FILM COATED ORAL at 09:01

## 2018-01-11 RX ADMIN — PANTOPRAZOLE SODIUM 40 MG: 40 TABLET, DELAYED RELEASE ORAL at 09:01

## 2018-01-11 RX ADMIN — PRAVASTATIN SODIUM 40 MG: 40 TABLET ORAL at 09:01

## 2018-01-11 RX ADMIN — Medication 3 ML: at 06:01

## 2018-01-11 RX ADMIN — GABAPENTIN 100 MG: 100 CAPSULE ORAL at 04:01

## 2018-01-11 RX ADMIN — POTASSIUM CHLORIDE 60 MEQ: 1500 TABLET, EXTENDED RELEASE ORAL at 04:01

## 2018-01-11 RX ADMIN — POTASSIUM CHLORIDE 60 MEQ: 1500 TABLET, EXTENDED RELEASE ORAL at 01:01

## 2018-01-11 RX ADMIN — ALBUTEROL SULFATE 2.5 MG: 2.5 SOLUTION RESPIRATORY (INHALATION) at 08:01

## 2018-01-11 NOTE — DISCHARGE SUMMARY
[facility]  Heart Transplant  Discharge Summary      Patient Name: Sue Smith  MRN: [id number]  Admission Date: 1/3/2018  Hospital Length of Stay: 7 days  Discharge Date and Time: 01/11/2018 4:38 PM  Attending Physician: Valeria att. providers found   Discharging Provider: Deanne Tello PA-C  Primary Care Provider: Paddy Guerrier DO     HPI: 56 yo female with severe WHO Group 1 pulmonary HTN on IV Remodulin, chronic hypoxic respiratory failure, on home O2 and BiPAP QHS (currently non complaint), h/o trach 4 years ago and short term vent dependence was recently discharged after treatment for acute worsening of respiratory failure in the setting of severe pulmonary HTN. However the patient came back today after the home health nurse observed her to be short of breath with lower extremity swelling. The pt reported that since discharge a week ago, she caught cold, with persistent dry cough without fever.   Pt denied CP, or palpitations but endorsed that her activities have not improved since discharge.   She reported NYHA Class 2 to 3 symptoms and have gained weight of around 15 pounds since discharge    * No surgery found *     Hospital Course: Pt was admitted to [facility] for acute on Chronic RV failure. She was started on Lasix drip and diuresed well on this. At end of admission, she was net negative 8.9L total and is feeing better. She was transitioned back to PO Bumex 4 BID priro to d/c.  IV Remodulin was continued to be uptitrated during hospitalization and she will be d/c'ed on 44 ng. She will have Home Health nursing with weekly labs and INR on Mon 1/15/17. Also sent Home Health orders for Lasix IVP weekly PRN weight gain, swelling SOB to try to help keep pt euvolemic and out of the hospital. She will have f/u in PH clinic in 2 weeks with labs and 6MWT.    Consults         Status Ordering Provider     Inpatient consult to Cardiology  Once     Provider:  (Not yet assigned)    Completed CHRISTINA KEENAN  A.              Pending Diagnostic Studies:     None        Final Active Diagnoses:    Diagnosis Date Noted POA    PRINCIPAL PROBLEM:  Acute on chronic right heart failure [I50.813]  Yes    Pulmonary hypertension, group 1 [I27.20] 10/04/2017 Yes    Chronic respiratory failure with hypoxia [J96.11] 10/05/2017 Yes    Hypothyroidism [E03.9] 10/05/2017 Yes    Bipolar affective disorder [F31.9]  Yes    Hypervolemia [E87.70] 01/09/2018 Yes    Shortness of breath [R06.02] 01/08/2018 Yes    Essential hypertension [I10]  Yes    Dyslipidemia [E78.5]  Yes    Gastroesophageal reflux disease [K21.9]  Yes      Problems Resolved During this Admission:    Diagnosis Date Noted Date Resolved POA    Hypoxia [R09.02] 01/04/2018 01/04/2018 Yes    Long-term (current) use of anticoagulants [Z79.01] 12/28/2017 01/04/2018 Not Applicable      Discharged Condition: stable    Disposition: Home-Health Care Svc        Patient Instructions:     Diet Adult Regular   Order Specific Question Answer Comments   Na restriction, if any: 2gNa    Fluid restriction: Fluid - 1500mL      Activity as tolerated     Notify your health care provider if you experience any of the following:  temperature >100.4     Notify your health care provider if you experience any of the following:  persistent nausea and vomiting or diarrhea     Notify your health care provider if you experience any of the following:  severe uncontrolled pain     Notify your health care provider if you experience any of the following:  redness, tenderness, or signs of infection (pain, swelling, redness, odor or green/yellow discharge around incision site)     Notify your health care provider if you experience any of the following:  difficulty breathing or increased cough     Notify your health care provider if you experience any of the following:  severe persistent headache     Notify your health care provider if you experience any of the following:  persistent dizziness,  light-headedness, or visual disturbances     Notify your health care provider if you experience any of the following:  increased confusion or weakness       Medications:  Reconciled Home Medications:   Discharge Medication List as of 1/11/2018  1:19 PM      CONTINUE these medications which have CHANGED    Details   warfarin (COUMADIN) 5 MG tablet Take 1.5 tablets (7.5 mg total) by mouth Daily. Then as directed by Coumadin clinic, Starting Thu 1/11/2018, Until Fri 1/11/2019, No Print         CONTINUE these medications which have NOT CHANGED    Details   albuterol (PROVENTIL) 2.5 mg /3 mL (0.083 %) nebulizer solution Take 2.5 mg by nebulization every 6 (six) hours as needed for Wheezing. Rescue, Historical Med      amlodipine (NORVASC) 5 MG tablet Take 5 mg by mouth once daily., Historical Med      budesonide-formoterol 80-4.5 mcg (SYMBICORT) 80-4.5 mcg/actuation HFAA Inhale 2 puffs into the lungs 2 (two) times daily. Controller, Historical Med      bumetanide (BUMEX) 2 MG tablet Take 2 tablets (4 mg total) by mouth 2 (two) times daily., Starting Mon 11/13/2017, Until Tue 11/13/2018, Normal      busPIRone (BUSPAR) 15 MG tablet Take 15 mg by mouth 3 (three) times daily., Historical Med      desvenlafaxine succinate (PRISTIQ) 100 MG Tb24 Take 100 mg by mouth once daily., Historical Med      gabapentin (NEURONTIN) 100 MG capsule Take 1 capsule (100 mg total) by mouth 3 (three) times daily., Starting Thu 11/30/2017, Normal      hydrocodone-acetaminophen 10-325mg (NORCO)  mg Tab Take 1 tablet by mouth every 8 (eight) hours as needed for Pain., Historical Med      lamotrigine (LAMICTAL) 100 MG tablet Take 100 mg by mouth 2 (two) times daily., Historical Med      levothyroxine (SYNTHROID) 75 MCG tablet Take 75 mcg by mouth once daily., Historical Med      lurasidone (LATUDA) 60 mg Tab tablet Take 60 mg by mouth once daily., Historical Med      magnesium oxide (MAG-OX) 400 mg tablet Take 1 tablet (400 mg total) by mouth  2 (two) times daily., Starting Tue 12/26/2017, OTC      ondansetron (ZOFRAN-ODT) 8 MG TbDL Take 1 tablet (8 mg total) by mouth every 8 (eight) hours as needed., Starting Tue 12/26/2017, Normal      pantoprazole (PROTONIX) 40 MG tablet Take 40 mg by mouth once daily., Historical Med      potassium chloride SA (K-DUR,KLOR-CON) 20 MEQ tablet Take 3 tablets (60 mEq total) by mouth 2 (two) times daily., Starting Tue 12/26/2017, Normal      pravastatin (PRAVACHOL) 40 MG tablet Take 40 mg by mouth once daily., Historical Med      sodium chloride 0.9% 0.9 % SolP 100 mL with treprostinil 1 mg/mL Soln 6,000,000 ng Inject 2,380 ng/min into the vein continuous., Starting Tue 12/26/2017, No Print         STOP taking these medications       metOLazone (ZAROXOLYN) 2.5 MG tablet Comments:   Reason for Stopping:               Deanne Tello PA-C  Heart Transplant  [facility]

## 2018-01-11 NOTE — PROGRESS NOTES
Pt switched over from hospital remodulin concentration and pump to home remodulin cassette and pump without any issues.  Former concentration pulled back, line flushed, and then primed with new home concentration.  No issues noted.  Verified with charge LILIAM Azul.    Will continue to monitor.

## 2018-01-11 NOTE — HPI
56 yo female with severe WHO Group 1 pulmonary HTN on IV Remodulin, chronic hypoxic respiratory failure, on home O2 and BiPAP QHS (currently non complaint), h/o trach 4 years ago and short term vent dependence was recently discharged after treatment for acute worsening of respiratory failure in the setting of severe pulmonary HTN. However the patient came back today after the home health nurse observed her to be short of breath with lower extremity swelling. The pt reported that since discharge a week ago, she caught cold, with persistent dry cough without fever.   Pt denied CP, or palpitations but endorsed that her activities have not improved since discharge.   She reported NYHA Class 2 to 3 symptoms and have gained weight of around 15 pounds since discharge

## 2018-01-11 NOTE — ASSESSMENT & PLAN NOTE
- Severe PH with h/o markedly reduced CI and severe RV dysfunction.   - Continue IV Remodulin at 44 ng/kg/min  - Continue coumadin. INR subtherapuetic.

## 2018-01-11 NOTE — SUBJECTIVE & OBJECTIVE
Interval History: Feeling much better today. Ready to go home     Continuous Infusions:   treprostinil (REMODULIN) infusion 44 ng/kg/min (01/10/18 1018)    veletri/remodulin cassette      veletri/remodulin tubing       Scheduled Meds:   albuterol sulfate  2.5 mg Nebulization Q4H    amLODIPine  5 mg Oral Daily    bumetanide  4 mg Oral BID    busPIRone  15 mg Oral TID    desvenlafaxine succinate  100 mg Oral Daily    fluticasone-vilanterol  1 puff Inhalation Daily    gabapentin  100 mg Oral TID    lamoTRIgine  100 mg Oral BID    levothyroxine  75 mcg Oral Before breakfast    lurasidone  60 mg Oral Daily    magnesium oxide  400 mg Oral BID    pantoprazole  40 mg Oral Daily    potassium chloride SA  60 mEq Oral TID    pravastatin  40 mg Oral Daily    sodium chloride 0.9%  3 mL Intravenous Q8H    warfarin  5 mg Oral Daily     PRN Meds:butalbital-acetaminophen-caffeine -40 mg, hydrocodone-acetaminophen 10-325mg, loperamide, ondansetron, ondansetron, promethazine    Review of patient's allergies indicates:   Allergen Reactions    Sulfa (sulfonamide antibiotics) Rash     Objective:     Vital Signs (Most Recent):  Temp: 98.5 °F (36.9 °C) (01/11/18 1233)  Pulse: 91 (01/11/18 1233)  Resp: 17 (01/11/18 1233)  BP: 110/60 (01/11/18 1233)  SpO2: (!) 94 % (01/11/18 1233) Vital Signs (24h Range):  Temp:  [97.2 °F (36.2 °C)-98.8 °F (37.1 °C)] 98.5 °F (36.9 °C)  Pulse:  [] 91  Resp:  [12-22] 17  SpO2:  [88 %-100 %] 94 %  BP: ()/(58-76) 110/60     Patient Vitals for the past 72 hrs (Last 3 readings):   Weight   01/09/18 0630 86.3 kg (190 lb 4.1 oz)     Body mass index is 34.8 kg/m².      Intake/Output Summary (Last 24 hours) at 01/11/18 1236  Last data filed at 01/11/18 0900   Gross per 24 hour   Intake             1155 ml   Output             1925 ml   Net             -770 ml     Physical Exam   Constitutional: She is oriented to person, place, and time. She appears well-developed and  well-nourished.   Neck: Normal range of motion. Neck supple. No JVD present.   Cardiovascular: Normal rate and regular rhythm.  Exam reveals no gallop and no friction rub.    No murmur heard.  Pulmonary/Chest: Effort normal and breath sounds normal. She has no wheezes. She has no rales.   Abdominal: Soft. Bowel sounds are normal. There is no tenderness.   Musculoskeletal: She exhibits no edema.   Neurological: She is alert and oriented to person, place, and time.   Skin: Skin is warm and dry.     Significant Labs:  CBC:    Recent Labs  Lab 01/09/18  0518 01/10/18  0615 01/11/18  0454   WBC 9.35 8.48 6.25   RBC 4.68 4.77 4.44   HGB 14.5 14.5 13.5   HCT 43.6 44.9 41.5    152 163   MCV 93 94 94   MCH 31.0 30.4 30.4   MCHC 33.3 32.3 32.5     BNP:    Recent Labs  Lab 01/09/18 0518   *     CMP:    Recent Labs  Lab 01/09/18  0518 01/10/18  0615 01/11/18  0454    79 79   CALCIUM 10.0 10.1 9.5   ALBUMIN 3.9 3.8 3.8   PROT 6.9 6.7 6.6    142 141   K 4.0 4.4 3.6   CO2 35* 32* 31*   CL 97 99 98   BUN 12 17 15   CREATININE 0.9 1.2 1.0   ALKPHOS 109 116 102   ALT 14 15 13   AST 25 23 19   BILITOT 0.7 0.6 0.5      Coagulation:     Recent Labs  Lab 01/09/18  0518 01/10/18  0615 01/11/18  0454   INR 1.1 1.2 1.1     I have reviewed all pertinent labs within the past 24 hours.    Estimated Creatinine Clearance: 64.1 mL/min (based on SCr of 1 mg/dL).    Diagnostic Results:  I have reviewed all pertinent imaging results/findings within the past 24 hours.

## 2018-01-11 NOTE — PROGRESS NOTES
Discharge instructions given to and reviewed with pt and daughter.  Central line care and s/s of infection reviewed with both parties.  Dressing to [place] changed.  Line CDI, no signs of infection noted.  All understanding verbalized.  Next scheduled appointment for labs and doctors visit on 1/26, pt aware.  Home health nurse to see pt this weekend.  Pt and daughter seen leaving unit via wheelchair.  Home portable oxygen tank in use. No issues.  VSS before discharge.

## 2018-01-11 NOTE — HOSPITAL COURSE
Pt was admitted to [facility] for acute on Chronic RV failure. She was started on Lasix drip and diuresed well on this. At end of admission, she was net negative 8.9L total and is feeing better. She was transitioned back to PO Bumex 4 BID priro to d/c.  IV Remodulin was continued to be uptitrated during hospitalization and she will be d/c'ed on 44 ng. She will have Home Health nursing with weekly labs and INR on Mon 1/15/17. Also sent Home Health orders for Lasix IVP weekly PRN weight gain, swelling SOB to try to help keep pt euvolemic and out of the hospital. She will have f/u in PH clinic in 2 weeks with labs and 6MWT.

## 2018-01-11 NOTE — ASSESSMENT & PLAN NOTE
- Continue supplemental O2 (on 3 LPM at home)  - BiPAP at night. Pt has not had her Bipap at home for 3-4 months. She will call and make appt with her local sleep center to get new one ASAP

## 2018-01-11 NOTE — PROGRESS NOTES
DISCHARGE    SW to pt's room for d/c plan. Pt presents as aaox3 with calm affect. Pt reports in agreement with plan to d/c home today with resumed home health via  [facility] ph [phone], fax [phone] and IVP lasix via [facility]. Sw spoke with [facility] rep and confirmed they will give auth for d/c today. SW faxed resume orders to  [facility] and confirmed receipt. SW confirmed [facility] is aware of d/c and has access to pt's chart via epic. SW confirmed pt's family will provide ride home and will bring portable O2. No other needs voiced at this time. SW providing psychosocial and counseling support, education, resources, and d/c planning as needed. SW remains available.

## 2018-01-11 NOTE — PROGRESS NOTES
[facility]  Heart Transplant  Progress Note    Patient Name: Sue Smith  MRN: [id number]  Admission Date: 1/3/2018  Hospital Length of Stay: 7 days  Attending Physician: Alan Richmond MD  Primary Care Provider: Paddy Guerrier DO  Principal Problem:Acute on chronic right heart failure    Subjective:     Interval History: Feeling much better today. Ready to go home     Continuous Infusions:   treprostinil (REMODULIN) infusion 44 ng/kg/min (01/10/18 1018)    veletri/remodulin cassette      veletri/remodulin tubing       Scheduled Meds:   albuterol sulfate  2.5 mg Nebulization Q4H    amLODIPine  5 mg Oral Daily    bumetanide  4 mg Oral BID    busPIRone  15 mg Oral TID    desvenlafaxine succinate  100 mg Oral Daily    fluticasone-vilanterol  1 puff Inhalation Daily    gabapentin  100 mg Oral TID    lamoTRIgine  100 mg Oral BID    levothyroxine  75 mcg Oral Before breakfast    lurasidone  60 mg Oral Daily    magnesium oxide  400 mg Oral BID    pantoprazole  40 mg Oral Daily    potassium chloride SA  60 mEq Oral TID    pravastatin  40 mg Oral Daily    sodium chloride 0.9%  3 mL Intravenous Q8H    warfarin  5 mg Oral Daily     PRN Meds:butalbital-acetaminophen-caffeine -40 mg, hydrocodone-acetaminophen 10-325mg, loperamide, ondansetron, ondansetron, promethazine    Review of patient's allergies indicates:   Allergen Reactions    Sulfa (sulfonamide antibiotics) Rash     Objective:     Vital Signs (Most Recent):  Temp: 98.5 °F (36.9 °C) (01/11/18 1233)  Pulse: 91 (01/11/18 1233)  Resp: 17 (01/11/18 1233)  BP: 110/60 (01/11/18 1233)  SpO2: (!) 94 % (01/11/18 1233) Vital Signs (24h Range):  Temp:  [97.2 °F (36.2 °C)-98.8 °F (37.1 °C)] 98.5 °F (36.9 °C)  Pulse:  [] 91  Resp:  [12-22] 17  SpO2:  [88 %-100 %] 94 %  BP: ()/(58-76) 110/60     Patient Vitals for the past 72 hrs (Last 3 readings):   Weight   01/09/18 0630 86.3 kg (190 lb 4.1 oz)     Body mass index is 34.8  kg/m².      Intake/Output Summary (Last 24 hours) at 01/11/18 1236  Last data filed at 01/11/18 0900   Gross per 24 hour   Intake             1155 ml   Output             1925 ml   Net             -770 ml     Physical Exam   Constitutional: She is oriented to person, place, and time. She appears well-developed and well-nourished.   Neck: Normal range of motion. Neck supple. No JVD present.   Cardiovascular: Normal rate and regular rhythm.  Exam reveals no gallop and no friction rub.    No murmur heard.  Pulmonary/Chest: Effort normal and breath sounds normal. She has no wheezes. She has no rales.   Abdominal: Soft. Bowel sounds are normal. There is no tenderness.   Musculoskeletal: She exhibits no edema.   Neurological: She is alert and oriented to person, place, and time.   Skin: Skin is warm and dry.     Significant Labs:  CBC:    Recent Labs  Lab 01/09/18  0518 01/10/18  0615 01/11/18  0454   WBC 9.35 8.48 6.25   RBC 4.68 4.77 4.44   HGB 14.5 14.5 13.5   HCT 43.6 44.9 41.5    152 163   MCV 93 94 94   MCH 31.0 30.4 30.4   MCHC 33.3 32.3 32.5     BNP:    Recent Labs  Lab 01/09/18 0518   *     CMP:    Recent Labs  Lab 01/09/18  0518 01/10/18  0615 01/11/18  0454    79 79   CALCIUM 10.0 10.1 9.5   ALBUMIN 3.9 3.8 3.8   PROT 6.9 6.7 6.6    142 141   K 4.0 4.4 3.6   CO2 35* 32* 31*   CL 97 99 98   BUN 12 17 15   CREATININE 0.9 1.2 1.0   ALKPHOS 109 116 102   ALT 14 15 13   AST 25 23 19   BILITOT 0.7 0.6 0.5      Coagulation:     Recent Labs  Lab 01/09/18  0518 01/10/18  0615 01/11/18  0454   INR 1.1 1.2 1.1     I have reviewed all pertinent labs within the past 24 hours.    Estimated Creatinine Clearance: 64.1 mL/min (based on SCr of 1 mg/dL).    Diagnostic Results:  I have reviewed all pertinent imaging results/findings within the past 24 hours.    Assessment and Plan:     No notes on file    * Acute on chronic right heart failure    - Euvolemic / Warm on exam   - Transitioned from IVP  Lasix to Bumex yesterday.   - Placed HH orders to allow IV lasix PRN administration at home with Home Health  - Will have her follow up in clinic in a week.          Pulmonary hypertension, group 1    - Severe PH with h/o markedly reduced CI and severe RV dysfunction.   - Continue IV Remodulin at 44 ng/kg/min  - Continue coumadin. INR subtherapuetic.           Chronic respiratory failure with hypoxia    - Continue supplemental O2 (on 3 LPM at home)  - BiPAP at night. Pt has not had her Bipap at home for 3-4 months. She will call and make appt with her local sleep center to get new one ASAP        Hypothyroidism    - Continue PTA Synthroid        Bipolar affective disorder    -Continue home medications: buspirone, desvenlafaxine, lamotrigine, lurasidone.         Essential hypertension    - Continue PTA amlodipine            Deanne Tello PA-C  Heart Transplant  [facility]

## 2018-01-11 NOTE — ASSESSMENT & PLAN NOTE
- Euvolemic / Warm on exam   - Transitioned from IVP Lasix to Bumex yesterday.   - Placed HH orders to allow IV lasix PRN administration at home with Home Health  - Will have her follow up in clinic in a week.

## 2018-01-13 ENCOUNTER — PATIENT OUTREACH (OUTPATIENT)
Dept: ADMINISTRATIVE | Facility: CLINIC | Age: 58
End: 2018-01-13

## 2018-01-13 NOTE — PROGRESS NOTES
C3 nurse attempted to contact patient. No answer. The following message was left for the patient to return the call:  Good afternoon I am a nurse calling on behalf of [facility] from the [facility].  This is a Transitional Care Call for Sue Smith. When you have a moment please contact us at [phone] or [phone] Monday through Friday, between the hours of 8 am to 4 pm. We look forward to speaking with you. On behalf of [facility] have a nice day.    The patient does not have a scheduled HOSFU appointment within 7-14 days post hospital discharge date 1/11/18. Message sent to Physician staff to assist with HOSFU appointment scheduling.

## 2018-01-15 ENCOUNTER — ANTI-COAG VISIT (OUTPATIENT)
Dept: CARDIOLOGY | Facility: CLINIC | Age: 58
End: 2018-01-15

## 2018-01-15 DIAGNOSIS — I27.20 PULMONARY HYPERTENSION: ICD-10-CM

## 2018-01-15 LAB — INR PPP: 1.2

## 2018-01-15 NOTE — PATIENT INSTRUCTIONS
Right-Sided Congestive Heart Failure (CHF)    The heart is a large muscle that pumps blood throughout the body. Blood carries oxygen to all the organs (including the brain), muscles, and skin. After the body takes the oxygen out of the blood, the blood returns to the heart. The right side of the heart collects that blood and pumps it to the lungs to get fresh oxygen. This oxygen-rich blood from the lungs then returns to the left side of the heart, where it is pumped back out to the rest of the body and the brain, starting the process all over.  Heart failure (HF) occurs when the heart muscle is weakened. This can occur after a heart attack or with disease of the coronary arteries that affects the heart over time, and in turn affects the pumping action of the heart.  When the right side of the heart is weakened, it cant handle the blood it is getting from the rest of the body. This blood returns to the heart through veins. When too much pressure builds up in the veins, fluid leaks out into the tissues. Gravity then causes that fluid to spread to those parts of the body that are the lowest. So one of the first symptoms of heart failure is swelling in the feet and ankles. If the condition worsens, the swelling can even go up past the knees. In more severe cases, the liver may also become congested with extra fluid.  Causes of right-sided heart failure  · Coronary artery disease  · Heart attack in the past (heart attack is also known as acute myocardial infarction, or AMI)  · High blood pressure, particularly in the pulmonary circulation  · Damaged heart valve  · Diabetes  · Obesity  · Cigarette smoking  · Alcohol abuse  · Increased pressure of the lungs weakening the right side of the heart  Treatment  Heart failure is a chronic condition. There is no cure. The purpose of medical treatment is to improve the pumping action of the heart, and remove excess water and fluid from the body. A number of medicines can help  reach this goal, improve symptoms, and prevent the heart from becoming weaker. In some cases of severe heart failure, mechanical devices can be implanted to help with the heart's pumping function. Another major goal is to better treat the causes of heart failure, such as diabetes, high blood pressure, and your lifestyle.  Home care  · Check your weight every day. A sudden increase in weight gain could mean worsening heart failure.  ¨ Use the same scale every day.  ¨ Weigh yourself at the same time every day.  ¨ Make sure the scale is on the floor, not on a rug.  ¨ Keep a record of your weight every day so your healthcare provider can see it. If you are not given a log sheet for this, keep a separate journal for this purpose.   · Cut back on how much salt (sodium) you eat:  ¨ Your healthcare provider will tell you  what level of salt you can have daily, usually 2,000 mg or less.  ¨ Avoid high-salt foods. These include olives, pickles, smoked meats, processed foods, and salted potato chips.  ¨ Don't add salt to your food at the table. Use only small amounts of salt when cooking.  · Follow your healthcare provider's recommendations about how much fluid you should have.  · Stop smoking.  · Cut back on the amount of alcohol you drink.  · Lose weight if you are overweight. The excess weight adds a lot of stress on the workload of the heart.  · Stay active. Talk with your provider about an exercise program that is safe for your heart.  · Keep your feet elevated to reduce swelling. Ask your provider about support hose as a preventive treatment for daytime leg swelling.  · Follow your healthcare provider's instructions closely.  Besides taking your medicine as instructed, an important part of treatment is lifestyle changes. These include diet, physical activity, stopping smoking, and weight control.  Improve your diet. Often in the hospital, people are given a heart healthy diet. This includes more fresh foods, lower fat,  less processed foods, and lower salt.  Follow-up care  Follow up with your healthcare provider, or as advised. Make sure to keep any appointments that were made for you. This can help better control heart failure.  If an X-ray was done, you will be told of any new findings that may affect your care.  Call 911  Call 911 if you:  · Become severely short of breath  · Feel lightheaded, or feel like you might pass out or faint  · Have chest pain or discomfort that's different than usual, the medicines your provider told you to use for this don't help, or the pain lasts longer than 10 to 15 minutes  · You suddenly develop a rapid heart rate  When to seek medical advice  Call your healthcare provider right away if you have any of these signs. They may mean your heart failure is getting worse:  · Sudden weight gain. This means more than 2 or more pounds in 1 day or 5 pounds in 1 week, or whatever weight gain you were told to report by your provider.  · Trouble breathing not related to being active  · New or increased swelling of your legs or ankles  · Swelling or pain in your abdomen  · Breathing trouble at night. This means waking up short of breath or needing more pillows to elevate your upper body to breathe.  · Frequent coughing that doesnt go away  · Feeling much more tired than usual  Date Last Reviewed: 1/4/2016  © 5586-6331 [employer]. [address] [place] [address], [place] [address]. All rights reserved. This information is not intended as a substitute for professional medical care. Always follow your healthcare professional's instructions.

## 2018-01-15 NOTE — PROGRESS NOTES
Saige with [facility] claled in a verbal result dated 1/15/18 as: INR -1.2 / PT -14.3, unable to get a hard copy faxed due to the office being closed today,  hard copy to be faxed tomorrow once office is opened again

## 2018-01-16 ENCOUNTER — TELEPHONE (OUTPATIENT)
Dept: TRANSPLANT | Facility: CLINIC | Age: 58
End: 2018-01-16

## 2018-01-17 NOTE — PHYSICIAN QUERY
PT Name: Sue Smith  MR #: [id number]     Physician Query Form - Documentation Clarification      CDS/: Mary Jane Pereyra               Contact information:[email or web address]    This form is a permanent document in the medical record.     Query Date: January 17, 2018    By submitting this query, we are merely seeking further clarification of documentation. Please utilize your independent clinical judgment when addressing the question(s) below.    The Medical record reflects the following:    Supporting Clinical Findings Location in Medical Record      Pt had some FREDDY likely 2/2 to cardiorenal syndrome as Cr now 1.1 from 1.5 yesterday after diuresis       PN 1/7       Cr 1.4----->.1.5    GFR 42---->58.7        Labs 1/3---->1/6                                                                              Doctor, Please specify diagnosis or diagnoses associated with above clinical findings.    Provider Use Only  X  -------- Acute renal  Failure    -------- other     --------- Clinically unable to determine                                                                                                                            [  ] Clinically undetermined

## 2018-01-18 ENCOUNTER — NURSE TRIAGE (OUTPATIENT)
Dept: ADMINISTRATIVE | Facility: CLINIC | Age: 58
End: 2018-01-18

## 2018-01-18 NOTE — PROGRESS NOTES
[facility] called today 01/18/18 to reschedule appointment 01/18/18 to 01/19/18. The Office is still closed today.

## 2018-01-18 NOTE — TELEPHONE ENCOUNTER
"  Answer Assessment - Initial Assessment Questions  1. REASON FOR CALL or QUESTION: "What is your reason for calling today?" or "How can I best help you?" or "What question do you have that I can help answer?"      Returning call from message to KRYSTA Turner RN message/request routed to staff nurse to contact patient.    Protocols used: ST INFORMATION ONLY CALL-A-AH    "

## 2018-01-19 ENCOUNTER — ANTI-COAG VISIT (OUTPATIENT)
Dept: CARDIOLOGY | Facility: CLINIC | Age: 58
End: 2018-01-19

## 2018-01-19 ENCOUNTER — TELEPHONE (OUTPATIENT)
Dept: TRANSPLANT | Facility: CLINIC | Age: 58
End: 2018-01-19

## 2018-01-19 LAB — INR PPP: 1.6

## 2018-01-22 ENCOUNTER — ANTI-COAG VISIT (OUTPATIENT)
Dept: CARDIOLOGY | Facility: CLINIC | Age: 58
End: 2018-01-22

## 2018-01-22 DIAGNOSIS — I27.20 PULMONARY HYPERTENSION: ICD-10-CM

## 2018-01-22 LAB — INR PPP: 2

## 2018-01-24 ENCOUNTER — TELEPHONE (OUTPATIENT)
Dept: TRANSPLANT | Facility: CLINIC | Age: 58
End: 2018-01-24

## 2018-01-24 LAB
EXT ALBUMIN: 4.8
EXT ALT: 26
EXT AST: 24
EXT BUN: 21
EXT CALCIUM: 9.3
EXT CHLORIDE: 97
EXT CREATININE: 1.3 MG/DL
EXT GLUCOSE: 59
EXT HEMATOCRIT: 45
EXT HEMOGLOBIN: 15.5
EXT MAGNESIUM: 1.8
EXT PLATELETS: 215
EXT POTASSIUM: 2.77
EXT PROTEIN TOTAL: 7.8
EXT SODIUM: 137 MMOL/L
EXT WBC: 7.4
NT-PROBNP SERPL-MCNC: 4795 PG/ML

## 2018-01-24 NOTE — TELEPHONE ENCOUNTER
Called patient and instructed her to take an extra dose of potassium (60 mEq) today and tomorrow. Patient repeated instructions back.   PH Coordinator called  agency and ordered repeat CMP, BNP on Friday, per Dr. CONSTANZA Paul.

## 2018-01-24 NOTE — TELEPHONE ENCOUNTER
"Called patient to discuss results of lab work drawn yesterday. Advised patient that her potassium was low. Patient states that she has been taking her potassium, 60 mEq, twice daily. Also confirmed that she takes Bumex 4 mg, twice daily. Patient endorses feeling "good" with no complaints at this time.   *noted on results that critical glucose was called to Key Espitia MD  Notified Dr. Paul of lab results.  "

## 2018-01-25 ENCOUNTER — TELEPHONE (OUTPATIENT)
Dept: TRANSPLANT | Facility: CLINIC | Age: 58
End: 2018-01-25

## 2018-01-25 NOTE — TELEPHONE ENCOUNTER
----- Message from Arlyn Senior sent at 1/25/2018  8:21 AM CST -----  Contact: Mona [phone] with [facility] [address] [place] [address] please call Mona in ref to labs for this pt. She wants to know if the labs that are schedule and the labs that Dr. Paul called in can be drawn at the same time?    Thanks

## 2018-01-26 ENCOUNTER — OFFICE VISIT (OUTPATIENT)
Dept: TRANSPLANT | Facility: CLINIC | Age: 58
End: 2018-01-26
Payer: MEDICARE

## 2018-01-26 ENCOUNTER — HOSPITAL ENCOUNTER (OUTPATIENT)
Dept: PULMONOLOGY | Facility: CLINIC | Age: 58
Discharge: HOME | End: 2018-01-26
Payer: MEDICARE

## 2018-01-26 VITALS — WEIGHT: 187.38 LBS | HEIGHT: 62 IN | BODY MASS INDEX: 34.48 KG/M2

## 2018-01-26 VITALS
SYSTOLIC BLOOD PRESSURE: 134 MMHG | DIASTOLIC BLOOD PRESSURE: 80 MMHG | BODY MASS INDEX: 33.25 KG/M2 | WEIGHT: 187.63 LBS | HEART RATE: 88 BPM | HEIGHT: 63 IN

## 2018-01-26 DIAGNOSIS — I50.812 CHRONIC RIGHT HEART FAILURE: ICD-10-CM

## 2018-01-26 DIAGNOSIS — I27.9 CHRONIC PULMONARY HEART DISEASE: ICD-10-CM

## 2018-01-26 DIAGNOSIS — J96.11 CHRONIC RESPIRATORY FAILURE WITH HYPOXIA: ICD-10-CM

## 2018-01-26 DIAGNOSIS — F41.9 ANXIETY: ICD-10-CM

## 2018-01-26 DIAGNOSIS — I50.32 CHRONIC DIASTOLIC HEART FAILURE: ICD-10-CM

## 2018-01-26 DIAGNOSIS — I10 ESSENTIAL HYPERTENSION: ICD-10-CM

## 2018-01-26 DIAGNOSIS — I27.20 PULMONARY HYPERTENSION: Primary | ICD-10-CM

## 2018-01-26 PROCEDURE — 99214 OFFICE O/P EST MOD 30 MIN: CPT | Mod: S$GLB,,, | Performed by: INTERNAL MEDICINE

## 2018-01-26 PROCEDURE — 94618 PULMONARY STRESS TESTING: CPT | Mod: S$GLB,,, | Performed by: INTERNAL MEDICINE

## 2018-01-26 PROCEDURE — 99999 PR PBB SHADOW E&M-EST. PATIENT-LVL III: CPT | Mod: PBBFAC,,, | Performed by: INTERNAL MEDICINE

## 2018-01-26 RX ORDER — ALPRAZOLAM 1 MG/1
0.5 TABLET ORAL 3 TIMES DAILY PRN
Qty: 45 TABLET | Refills: 0 | Status: SHIPPED | OUTPATIENT
Start: 2018-01-26

## 2018-01-26 RX ORDER — DESVENLAFAXINE SUCCINATE 50 MG/1
50 TABLET, EXTENDED RELEASE ORAL 2 TIMES DAILY
Refills: 2 | Status: ON HOLD | COMMUNITY
Start: 2018-01-02 | End: 2018-02-17 | Stop reason: SDUPTHER

## 2018-01-26 RX ORDER — DULOXETIN HYDROCHLORIDE 60 MG/1
CAPSULE, DELAYED RELEASE ORAL
COMMUNITY
Start: 2013-12-02 | End: 2018-01-26

## 2018-01-26 RX ORDER — AZITHROMYCIN 250 MG/1
TABLET, FILM COATED ORAL
Refills: 0 | Status: ON HOLD | COMMUNITY
Start: 2018-01-25 | End: 2018-02-17

## 2018-01-26 RX ORDER — ALPRAZOLAM 1 MG/1
TABLET ORAL
COMMUNITY
Start: 2013-11-30 | End: 2018-01-26 | Stop reason: SDUPTHER

## 2018-01-26 RX ORDER — CYCLOBENZAPRINE HCL 10 MG
TABLET ORAL
Refills: 2 | COMMUNITY
Start: 2018-01-24 | End: 2018-06-22

## 2018-01-26 RX ORDER — ARIPIPRAZOLE 15 MG/1
TABLET ORAL
COMMUNITY
Start: 2013-12-12 | End: 2018-01-26

## 2018-01-26 NOTE — PROCEDURES
Sue Smith is a 57 y.o.  female patient, who presents for a 6 minute walk test ordered by MD Jhony.  The diagnosis is Pulmonary Hypertension.  The patient's BMI is 34.3 kg/m2.  Predicted distance (lower limit of normal) is 337.49 meters.      Test Results:    The test was completed without stopping.    The total time walked was 360 seconds.  During walking, the patient reported:  Dyspnea, Lightheadedness. The patient used no assistive devices and supplemental oxygen during testing.     01/26/2018---------Distance: 304.8 meters (1000 feet)     O2 Sat % Supplemental Oxygen Heart Rate Blood Pressure Erasmo Scale   Pre-exercise  (Resting) 97 % 3 L/M 91 bpm 115/55 mmHg 0   During Exercise 89 % 3 L/M 110 bpm 113/59 mmHg 2   Post-exercise  (Recovery) 96 % 3 L/M  101 bpm   mmHg       Recovery Time: 74 seconds    Performing nurse/tech: ANAHI Griffith RRT      PREVIOUS STUDY:   The patient had a previous study.  12/08/2017---------Distance: 273.41 meters (897 feet)       O2 Sat % Supplemental Oxygen Heart Rate Blood Pressure Erasmo Scale   Pre-exercise  (Resting) 96 % 3 L/M 86 bpm 133/63 mmHg 3   During Exercise 89 % 3 L/M 102 bpm 112/63 mmHg 5-6   Post-exercise  (Recovery) 92 % 3 L/M  87 bpm             CLINICAL INTERPRETATION:  Six minute walk distance is 304.8 meters (1000 feet) with light dyspnea.  During exercise, there was significant desaturation while breathing supplemental oxygen.  Both blood pressure and heart rate remained stable with walking.  Tachycardia was present prior to exercise.  The patient reported non-pulmonary symptoms during exercise.  Since the previous study in December 2017, exercise capacity is unchanged.  Based upon age and body mass index, exercise capacity is less than predicted.

## 2018-01-26 NOTE — PROGRESS NOTES
The pt will be starting a course of azithromycin.  No change to warfarin dose, as no DDI expected.

## 2018-01-26 NOTE — PROGRESS NOTES
Subjective:    Patient ID:  Sue Smith is a 57 y.o. female who presents for    [facility]   57 y.o. WF with PMH severe WHO Group 1 PAH previously on Uptravi but now on IV remodulin, chronic hypoxic respiratory failure, chronic RV failure, on home O2 and Bipap, and h/o trach in past (>4 years ago, eventually decannulated) who presents for hospital f/u. Additionally, records from OSH have her labeled as COPD and Obesity hypoventilation however consult by pulmonologist (Dr. Hardik Magallaens) states explicitly that PFTs done in 2015 showed only mild restriction and were not consistent with COPD. In addition he states that though she is mildly obese her pCO2 is normal at baseline ruling out obesity hypoventilation. She has previous tobacco abuse (stopped 15 yrs ago) - SHERIE (unclear severity) has been on trilogy vent for BiPAP at night. She was recently admitted (1/3-1/11/18) for volume overload (her 3rd admission in last 3 months; admitted 12/8-12/27/17 prior to that). She was started on Lasix drip and diuresed well on this. At end of admission, she was net negative 8.9L total and is feeing better. She was transitioned back to PO Bumex 4 BID priro to d/c with once weekly prn IVP lasix 80mg. IV Remodulin was continued to be uptitrated during hospitalization and she was d/c'ed on 44 ng.    Today, she reports doing well since discharge. She has lost ~3lbs since going home. She denies f/c/n/v/d. She is tolerating the Remodulin well and has been able to up-titrate to 53ng so far without any side effects. She feels better than she has in a while and her only complaint today is anxiety. She is no longer taking Cymbalta or Abilify and has an appt at the end of February with her Psychiatrist. No other complaints.                 BNP: 535 (down from 772)    No 6MWT today  Last clinic visit 12/8/17: Distance 273.5 meters   10/24/2017---------Distance: 365.76 meters (1200 feet)    Review of Systems   Constitution: Negative for chills, decreased  "appetite, fever and weakness.   Cardiovascular: Positive for dyspnea on exertion (Improved WHO FC II-III). Negative for chest pain, irregular heartbeat, leg swelling, orthopnea, palpitations, paroxysmal nocturnal dyspnea and syncope.   Respiratory: Positive for shortness of breath (improved). Negative for cough, sputum production and wheezing.    Skin: Negative for poor wound healing.   Gastrointestinal: Negative for bloating, abdominal pain, constipation, diarrhea, nausea and vomiting.   Psychiatric/Behavioral: Negative for altered mental status.        Objective: /80 (BP Location: Left arm, Patient Position: Sitting, BP Method: Large (Automatic))   Pulse 88   Ht 5' 3" (1.6 m)   Wt 85.1 kg (187 lb 9.8 oz)   BMI 33.23 kg/m²      Physical Exam   Constitutional: She is oriented to person, place, and time. She appears well-developed and well-nourished. No distress.   HENT:   Head: Normocephalic and atraumatic.   Neck: Normal range of motion. Neck supple. No JVD present. No thyromegaly present.   Cardiovascular: Normal rate, regular rhythm, normal heart sounds and intact distal pulses.  Exam reveals no gallop and no friction rub.    No murmur heard.  Prominent P2   Pulmonary/Chest: Effort normal and breath sounds normal. No respiratory distress. She has no wheezes. She has no rales. She exhibits no tenderness.   Abdominal: Soft. Bowel sounds are normal. She exhibits no distension. There is no tenderness. There is no rebound and no guarding.   Musculoskeletal: She exhibits no edema.   Neurological: She is alert and oriented to person, place, and time. She has normal reflexes.   Skin: Skin is warm and dry. She is not diaphoretic.     BMP  Lab Results   Component Value Date     01/26/2018    K 3.2 (L) 01/26/2018     01/26/2018    CO2 28 01/26/2018    BUN 22 (H) 01/26/2018    CREATININE 1.2 01/26/2018    CALCIUM 9.8 01/26/2018    ANIONGAP 10 01/26/2018    ESTGFRAFRICA 58.4 (A) 01/26/2018    EGFRNONAA " 50.6 (A) 01/26/2018       Assessment:       1. Pulmonary hypertension, group 1    2. Chronic respiratory failure with hypoxia    3. Chronic right heart failure    4. Chronic pulmonary heart disease    5. Chronic diastolic heart failure    6. Essential hypertension    7. Anxiety         Plan:   WHO Group 1 PAH, severe; Chronic RV Failure  - Who group I Functional Class III   - Warm and Dry today   - Euvolumic on exam that corresponds with BNP and weight   - cont Remodulin and up-titration per dosing sheet; cont O2  - RTC 2 months with labs and 6MWT    Anxiety  - will give 30-day supply of Xanax with no refills; I told patient that she is to get it refilled by her Psychiatrist in the future    Recommend 2 gram sodium restriction and 1500cc fluid restriction.  Encourage physical activity with graded exercise program.  Requested patient to weigh themselves daily, and to notify us if their weight increases by more than 3 lbs in 1 day or 5 lbs in 1 week.     No Follow-up on file.    CLAUDIA Santana MD  Transplant Cardiology

## 2018-02-02 ENCOUNTER — SOCIAL WORK (OUTPATIENT)
Dept: TRANSPLANT | Facility: HOSPITAL | Age: 58
End: 2018-02-02

## 2018-02-02 ENCOUNTER — TELEPHONE (OUTPATIENT)
Dept: TRANSPLANT | Facility: CLINIC | Age: 58
End: 2018-02-02

## 2018-02-02 NOTE — TELEPHONE ENCOUNTER
Had a long conversation with the nurse at [facility]. We reviewed Mrs. Smith's plan of care. Discussed patient's IV Remodulin and made very clear that HH should not access the line or manipulate the pump. Patient will handle all IV PH medication. HH is asked to assist with central line dressing change only. Reiterated that all blood draws and med administrations would need to be done peripherally. Gave name of Specialty Pharmacy for 24 hour assistance, if any emergency should arise with PH medication. Given PH Coordinator's direct line and fax number. Also supplied Coumadin Clinic information. Faxed last H&P and current med list to HH nurse.   A lab draw will be done on 2/5/18 and results faxed to this office.

## 2018-02-02 NOTE — PROGRESS NOTES
Per COREY Kebede RN pt requests to change home health agencies due to HH nurse administered lasix through line dedicated for Remodulin only. SW faxed orders and referral to [facility] to get a new home health. N: [phone], fax [phone]. Marly from [facility] phoned  to confirm receipt and state [facility] has been assigned and understands error previous  nurse made. [facility]:  [phone], fax [phone]. Pt will be admitted by home health tomorrow. SW remains available.

## 2018-02-02 NOTE — TELEPHONE ENCOUNTER
"Received fax from  2000 stating that upon visit at approximately 6:00 pm Tuesday evening, RN noted a 5lb weight gain over 3 days and patient report of "increased shortness of breath." Based on this information the RN initiated the PRN IVP Lasix protocol. According to the note, the RN administered the lasix through patient's "central line." The note also stated that the RN made three attempts to draw blood but was unsuccessful.     PH Coordinator called RN to confirm information. RN stated that she had indeed given lasix through patient's dedicated IV Remodulin line. Informed RN that the orders specifically state not to access that line for any reason. Explained that her action could have caused serious harm or death to patient. RN apologized. She asked how she would administer the IVP lasix and PH Coordinator said that it would need to be given through a peripheral IV. RN then stated that patient was a difficult stick and that she had asked patient to go to [facility] on Friday for lab work. Patient informed RN that she could not do that.    After completing call to RN, called the patient. Mrs. Smith stated that she told the RN not to use her line. She then asked, "how she would have received the medicine?" Reeducated the patient. Strongly reiterated to patient that she must advocate for herself whenever possible and state that no one can access that line. Patient can always direct medical personnel to call the PH office. PH Coordinator recommended that we change HH agencies and patient was agreeable. Patient also states that she could not get to the lab on Friday. The RN had told her the night before and she could not arrange transportation. Instructed patient to try to go Monday. Patient knows to go directly to the ER if her symptoms worsen.    PH Coordinator contacted Celestine at [facility] to ask for more teaching for patient. Also contacted social work to arrange change in HH agency.  "

## 2018-02-05 ENCOUNTER — ANTI-COAG VISIT (OUTPATIENT)
Dept: CARDIOLOGY | Facility: CLINIC | Age: 58
End: 2018-02-05

## 2018-02-05 DIAGNOSIS — I27.20 PULMONARY HYPERTENSION: ICD-10-CM

## 2018-02-05 LAB — INR PPP: 4.2

## 2018-02-05 NOTE — PROGRESS NOTES
Verbal result taken from __Jossie at [facility] _______. PT/INR 4.2/46.6_______ Date drawn__2/5/18______ Hardcopy to be faxed.

## 2018-02-06 ENCOUNTER — TELEPHONE (OUTPATIENT)
Dept: TRANSPLANT | Facility: CLINIC | Age: 58
End: 2018-02-06

## 2018-02-06 LAB
EXT ALBUMIN: 3.8
EXT ALT: 32
EXT AST: 29
EXT BUN: 18
EXT CALCIUM: 8.9
EXT CHLORIDE: 103
EXT CREATININE: 0.99 MG/DL
EXT HEMATOCRIT: 38.8
EXT HEMOGLOBIN: 12.9
EXT MAGNESIUM: 1.8
EXT PLATELETS: 147
EXT POTASSIUM: 4
EXT PROTEIN TOTAL: 7.1
EXT SODIUM: 138 MMOL/L
EXT WBC: 7.1
NT-PRO-BNP (RIVER PARISHES): 4785 PG/ML

## 2018-02-06 NOTE — TELEPHONE ENCOUNTER
Received lab results. After review, Dr. Cullen want to  change Potassium to 80 mEq, BID and continue weekly labs on Mondays. HH RN read back order and will contact the patient.

## 2018-02-08 ENCOUNTER — ANTI-COAG VISIT (OUTPATIENT)
Dept: CARDIOLOGY | Facility: CLINIC | Age: 58
End: 2018-02-08

## 2018-02-08 LAB — INR PPP: 2.4

## 2018-02-09 ENCOUNTER — TELEPHONE (OUTPATIENT)
Dept: TRANSPLANT | Facility: CLINIC | Age: 58
End: 2018-02-09

## 2018-02-14 ENCOUNTER — TELEPHONE (OUTPATIENT)
Dept: TRANSPLANT | Facility: CLINIC | Age: 58
End: 2018-02-14

## 2018-02-14 LAB
EXT ALBUMIN: 3.8
EXT ALT: 41
EXT AST: 39
EXT BUN: 22
EXT CALCIUM: 8.3
EXT CHLORIDE: 102
EXT CREATININE: 1.19 MG/DL
EXT MAGNESIUM: 1.5
EXT POTASSIUM: 3.4
EXT PROTEIN TOTAL: 7.1
EXT SODIUM: 139 MMOL/L
NT-PRO-BNP (RIVER PARISHES): 7523 PG/ML

## 2018-02-14 NOTE — TELEPHONE ENCOUNTER
Spoke with HH RN. Gave order, per Dr. Santana to give PRN Lasix, 80 mg, IVP through peripheral IV and have patient take additional 40 mEq of potassium today. Request lab redraw on Friday.  Orders received and read back.

## 2018-02-15 ENCOUNTER — TELEPHONE (OUTPATIENT)
Dept: TRANSPLANT | Facility: CLINIC | Age: 58
End: 2018-02-15

## 2018-02-15 ENCOUNTER — ANTI-COAG VISIT (OUTPATIENT)
Dept: CARDIOLOGY | Facility: CLINIC | Age: 58
End: 2018-02-15

## 2018-02-15 LAB — INR PPP: 2.5

## 2018-02-15 NOTE — TELEPHONE ENCOUNTER
Returned call. Arley from [facility] called to report patient is more short of breath and oxygen saturation at 83%. She administered 80 mg Lasix IVP, peripherally, per MD order. Confirmed that patient will take an extra 40 mEq of potassium today. Will advise patient to go to ED if symptoms do not improve or worsen.    PH Coordinator will call patient to check on status.

## 2018-02-15 NOTE — TELEPHONE ENCOUNTER
----- Message from Kristi Arroyo sent at 2/15/2018  9:37 AM CST -----  Contact: Arley/MILENA  Please call Arley at [phone]. Patient oxygen is 83%, SOB and dizziness for a few days. Also need to discuss the Potassium dosage    Thank you

## 2018-02-15 NOTE — TELEPHONE ENCOUNTER
"Called patient to f/u. Patient states that she still "feels bad" and that she does not feel like she has gone to the bathroom a lot since receiving the IVP Lasix this AM. Patient states that her O2 sat is around 80% on 4L at rest. Patient also states that she does not want to go to the ER as she does not want to come to [place].   Per Dr. Santana patient is to take 4 mg total of Bumex this evening. If patient does not begin to feel better or symptoms worsen she needs to go directly to the ED.   Patient will have labs drawn by in the morning and PH Coordinator will follow up with patient as well.  "

## 2018-02-16 ENCOUNTER — TELEPHONE (OUTPATIENT)
Dept: TRANSPLANT | Facility: CLINIC | Age: 58
End: 2018-02-16

## 2018-02-16 ENCOUNTER — HISTORICAL (OUTPATIENT)
Dept: ADMINISTRATIVE | Facility: HOSPITAL | Age: 58
End: 2018-02-16

## 2018-02-16 ENCOUNTER — HOSPITAL ENCOUNTER (INPATIENT)
Facility: HOSPITAL | Age: 58
LOS: 7 days | Discharge: HOME HEALTH | DRG: 314 | End: 2018-02-23
Attending: INTERNAL MEDICINE | Admitting: INTERNAL MEDICINE
Payer: MEDICARE

## 2018-02-16 DIAGNOSIS — I27.20 PULMONARY HYPERTENSION: Primary | ICD-10-CM

## 2018-02-16 DIAGNOSIS — R06.02 SHORTNESS OF BREATH: ICD-10-CM

## 2018-02-16 DIAGNOSIS — I50.813 ACUTE ON CHRONIC RIGHT-SIDED HEART FAILURE: ICD-10-CM

## 2018-02-16 DIAGNOSIS — I51.7 CARDIOMEGALY: ICD-10-CM

## 2018-02-16 DIAGNOSIS — I27.20 PULMONARY HYPERTENSION: ICD-10-CM

## 2018-02-16 LAB
BNP (B-TYPE NATRIURETIC PEP): 751
EXT BUN: 22
EXT CALCIUM: 9.2
EXT CHLORIDE: 101
EXT CREATININE: 1.26 MG/DL
EXT GLUCOSE: 75
EXT POTASSIUM: 4.8
EXT SODIUM: 139 MMOL/L

## 2018-02-16 PROCEDURE — 20600001 HC STEP DOWN PRIVATE ROOM

## 2018-02-16 NOTE — TELEPHONE ENCOUNTER
"RN called to report that she is in home with patient to draw labs and patient is "symptomatic." Patient O2 sat 80-85% on 4L, at rest. Patient reports swelling in abdominal region. RN will run labs STAT, but also advised that patient will most likely need to go to the ED as discussed yesterday. Asked RN to remind patient to change Remodulin cassette, if not changed recently, and take all medication and supplies with her to ED. Patient is aware that she will be transferred to [facility] in setting of admission.   Notified MD. Will call patient and RN back once labs have resulted.  "

## 2018-02-17 PROBLEM — R19.7 DIARRHEA: Status: ACTIVE | Noted: 2018-02-17

## 2018-02-17 PROBLEM — I50.813 ACUTE ON CHRONIC RIGHT-SIDED HEART FAILURE: Status: ACTIVE | Noted: 2018-02-17

## 2018-02-17 LAB
ALBUMIN SERPL BCP-MCNC: 3.7 G/DL
ALP SERPL-CCNC: 144 U/L
ALT SERPL W/O P-5'-P-CCNC: 18 U/L
ANION GAP SERPL CALC-SCNC: 12 MMOL/L
ANION GAP SERPL CALC-SCNC: 14 MMOL/L
AST SERPL-CCNC: 25 U/L
BASOPHILS # BLD AUTO: 0.03 K/UL
BASOPHILS NFR BLD: 0.5 %
BILIRUB SERPL-MCNC: 0.7 MG/DL
BNP SERPL-MCNC: 573 PG/ML
BUN SERPL-MCNC: 15 MG/DL
BUN SERPL-MCNC: 18 MG/DL
CALCIUM SERPL-MCNC: 9 MG/DL
CALCIUM SERPL-MCNC: 9.3 MG/DL
CHLORIDE SERPL-SCNC: 102 MMOL/L
CHLORIDE SERPL-SCNC: 103 MMOL/L
CO2 SERPL-SCNC: 22 MMOL/L
CO2 SERPL-SCNC: 26 MMOL/L
CREAT SERPL-MCNC: 1.1 MG/DL
CREAT SERPL-MCNC: 1.2 MG/DL
DIFFERENTIAL METHOD: ABNORMAL
EOSINOPHIL # BLD AUTO: 0.1 K/UL
EOSINOPHIL NFR BLD: 1.8 %
ERYTHROCYTE [DISTWIDTH] IN BLOOD BY AUTOMATED COUNT: 17.5 %
EST. GFR  (AFRICAN AMERICAN): 58.4 ML/MIN/1.73 M^2
EST. GFR  (AFRICAN AMERICAN): >60 ML/MIN/1.73 M^2
EST. GFR  (NON AFRICAN AMERICAN): 50.6 ML/MIN/1.73 M^2
EST. GFR  (NON AFRICAN AMERICAN): 56.3 ML/MIN/1.73 M^2
GLUCOSE SERPL-MCNC: 150 MG/DL
GLUCOSE SERPL-MCNC: 81 MG/DL
HCT VFR BLD AUTO: 39.1 %
HGB BLD-MCNC: 12.5 G/DL
IMM GRANULOCYTES # BLD AUTO: 0.05 K/UL
IMM GRANULOCYTES NFR BLD AUTO: 0.8 %
INR PPP: 2.5
LYMPHOCYTES # BLD AUTO: 0.8 K/UL
LYMPHOCYTES NFR BLD: 13 %
MAGNESIUM SERPL-MCNC: 1.7 MG/DL
MCH RBC QN AUTO: 29.6 PG
MCHC RBC AUTO-ENTMCNC: 32 G/DL
MCV RBC AUTO: 93 FL
MONOCYTES # BLD AUTO: 0.4 K/UL
MONOCYTES NFR BLD: 6.4 %
NEUTROPHILS # BLD AUTO: 4.7 K/UL
NEUTROPHILS NFR BLD: 77.5 %
NRBC BLD-RTO: 0 /100 WBC
PLATELET # BLD AUTO: 152 K/UL
PMV BLD AUTO: 10.9 FL
POTASSIUM SERPL-SCNC: 3 MMOL/L
POTASSIUM SERPL-SCNC: 3.6 MMOL/L
PROT SERPL-MCNC: 6.6 G/DL
PROTHROMBIN TIME: 23.8 SEC
RBC # BLD AUTO: 4.22 M/UL
SODIUM SERPL-SCNC: 139 MMOL/L
SODIUM SERPL-SCNC: 140 MMOL/L
WBC # BLD AUTO: 6.07 K/UL

## 2018-02-17 PROCEDURE — 83735 ASSAY OF MAGNESIUM: CPT | Mod: 91

## 2018-02-17 PROCEDURE — 94640 AIRWAY INHALATION TREATMENT: CPT

## 2018-02-17 PROCEDURE — 25000003 PHARM REV CODE 250: Performed by: INTERNAL MEDICINE

## 2018-02-17 PROCEDURE — 99900035 HC TECH TIME PER 15 MIN (STAT)

## 2018-02-17 PROCEDURE — 36415 COLL VENOUS BLD VENIPUNCTURE: CPT

## 2018-02-17 PROCEDURE — 85025 COMPLETE CBC W/AUTO DIFF WBC: CPT

## 2018-02-17 PROCEDURE — 27000221 HC OXYGEN, UP TO 24 HOURS

## 2018-02-17 PROCEDURE — 94660 CPAP INITIATION&MGMT: CPT

## 2018-02-17 PROCEDURE — 93010 ELECTROCARDIOGRAM REPORT: CPT | Mod: GV,,, | Performed by: INTERNAL MEDICINE

## 2018-02-17 PROCEDURE — A4216 STERILE WATER/SALINE, 10 ML: HCPCS | Performed by: INTERNAL MEDICINE

## 2018-02-17 PROCEDURE — 85610 PROTHROMBIN TIME: CPT

## 2018-02-17 PROCEDURE — 27000190 HC CPAP FULL FACE MASK W/VALVE

## 2018-02-17 PROCEDURE — 84132 ASSAY OF SERUM POTASSIUM: CPT

## 2018-02-17 PROCEDURE — 80048 BASIC METABOLIC PNL TOTAL CA: CPT

## 2018-02-17 PROCEDURE — 63600175 PHARM REV CODE 636 W HCPCS: Performed by: INTERNAL MEDICINE

## 2018-02-17 PROCEDURE — 99223 1ST HOSP IP/OBS HIGH 75: CPT | Mod: AI,,, | Performed by: INTERNAL MEDICINE

## 2018-02-17 PROCEDURE — 94761 N-INVAS EAR/PLS OXIMETRY MLT: CPT

## 2018-02-17 PROCEDURE — 83880 ASSAY OF NATRIURETIC PEPTIDE: CPT

## 2018-02-17 PROCEDURE — 20600001 HC STEP DOWN PRIVATE ROOM

## 2018-02-17 PROCEDURE — 93005 ELECTROCARDIOGRAM TRACING: CPT

## 2018-02-17 PROCEDURE — 25000242 PHARM REV CODE 250 ALT 637 W/ HCPCS: Performed by: INTERNAL MEDICINE

## 2018-02-17 PROCEDURE — 27100171 HC OXYGEN HIGH FLOW UP TO 24 HOURS

## 2018-02-17 PROCEDURE — 80053 COMPREHEN METABOLIC PANEL: CPT

## 2018-02-17 RX ORDER — FUROSEMIDE 10 MG/ML
80 INJECTION INTRAMUSCULAR; INTRAVENOUS 2 TIMES DAILY
Status: DISCONTINUED | OUTPATIENT
Start: 2018-02-17 | End: 2018-02-18

## 2018-02-17 RX ORDER — SODIUM CHLORIDE 0.9 % (FLUSH) 0.9 %
3 SYRINGE (ML) INJECTION EVERY 8 HOURS
Status: DISCONTINUED | OUTPATIENT
Start: 2018-02-17 | End: 2018-02-23 | Stop reason: HOSPADM

## 2018-02-17 RX ORDER — PRAVASTATIN SODIUM 40 MG/1
40 TABLET ORAL DAILY
Status: DISCONTINUED | OUTPATIENT
Start: 2018-02-17 | End: 2018-02-23 | Stop reason: HOSPADM

## 2018-02-17 RX ORDER — LANOLIN ALCOHOL/MO/W.PET/CERES
400 CREAM (GRAM) TOPICAL 2 TIMES DAILY
Status: DISCONTINUED | OUTPATIENT
Start: 2018-02-17 | End: 2018-02-23 | Stop reason: HOSPADM

## 2018-02-17 RX ORDER — GABAPENTIN 100 MG/1
100 CAPSULE ORAL 3 TIMES DAILY
Status: DISCONTINUED | OUTPATIENT
Start: 2018-02-17 | End: 2018-02-23 | Stop reason: HOSPADM

## 2018-02-17 RX ORDER — WARFARIN SODIUM 5 MG/1
5 TABLET ORAL ONCE
Status: DISCONTINUED | OUTPATIENT
Start: 2018-02-17 | End: 2018-02-17

## 2018-02-17 RX ORDER — FLUTICASONE FUROATE AND VILANTEROL 100; 25 UG/1; UG/1
1 POWDER RESPIRATORY (INHALATION) DAILY
Status: DISCONTINUED | OUTPATIENT
Start: 2018-02-17 | End: 2018-02-23 | Stop reason: HOSPADM

## 2018-02-17 RX ORDER — HYDROCODONE BITARTRATE AND ACETAMINOPHEN 10; 325 MG/1; MG/1
1 TABLET ORAL EVERY 8 HOURS PRN
Status: DISCONTINUED | OUTPATIENT
Start: 2018-02-17 | End: 2018-02-23 | Stop reason: HOSPADM

## 2018-02-17 RX ORDER — POTASSIUM CHLORIDE 20 MEQ/1
60 TABLET, EXTENDED RELEASE ORAL ONCE
Status: COMPLETED | OUTPATIENT
Start: 2018-02-17 | End: 2018-02-17

## 2018-02-17 RX ORDER — PANTOPRAZOLE SODIUM 40 MG/1
40 TABLET, DELAYED RELEASE ORAL DAILY
Status: DISCONTINUED | OUTPATIENT
Start: 2018-02-17 | End: 2018-02-23 | Stop reason: HOSPADM

## 2018-02-17 RX ORDER — WARFARIN SODIUM 5 MG/1
5 TABLET ORAL
Status: DISCONTINUED | OUTPATIENT
Start: 2018-02-17 | End: 2018-02-20

## 2018-02-17 RX ORDER — BUSPIRONE HYDROCHLORIDE 5 MG/1
15 TABLET ORAL 3 TIMES DAILY
Status: DISCONTINUED | OUTPATIENT
Start: 2018-02-17 | End: 2018-02-23 | Stop reason: HOSPADM

## 2018-02-17 RX ORDER — ALBUTEROL SULFATE 0.83 MG/ML
2.5 SOLUTION RESPIRATORY (INHALATION) EVERY 4 HOURS
Status: DISCONTINUED | OUTPATIENT
Start: 2018-02-17 | End: 2018-02-23 | Stop reason: HOSPADM

## 2018-02-17 RX ORDER — WARFARIN SODIUM 5 MG/1
10 TABLET ORAL ONCE
Status: COMPLETED | OUTPATIENT
Start: 2018-02-17 | End: 2018-02-17

## 2018-02-17 RX ORDER — WARFARIN SODIUM 5 MG/1
10 TABLET ORAL ONCE
Status: DISCONTINUED | OUTPATIENT
Start: 2018-02-17 | End: 2018-02-17

## 2018-02-17 RX ORDER — WARFARIN SODIUM 5 MG/1
10 TABLET ORAL
Status: DISCONTINUED | OUTPATIENT
Start: 2018-02-19 | End: 2018-02-20

## 2018-02-17 RX ORDER — LEVOTHYROXINE SODIUM 75 UG/1
75 TABLET ORAL
Status: DISCONTINUED | OUTPATIENT
Start: 2018-02-17 | End: 2018-02-23 | Stop reason: HOSPADM

## 2018-02-17 RX ORDER — POTASSIUM CHLORIDE 20 MEQ/1
60 TABLET, EXTENDED RELEASE ORAL 2 TIMES DAILY
Status: DISCONTINUED | OUTPATIENT
Start: 2018-02-17 | End: 2018-02-23 | Stop reason: HOSPADM

## 2018-02-17 RX ORDER — AMLODIPINE BESYLATE 5 MG/1
5 TABLET ORAL DAILY
Status: DISCONTINUED | OUTPATIENT
Start: 2018-02-17 | End: 2018-02-23 | Stop reason: HOSPADM

## 2018-02-17 RX ORDER — ALPRAZOLAM 0.5 MG/1
0.5 TABLET ORAL 3 TIMES DAILY PRN
Status: DISCONTINUED | OUTPATIENT
Start: 2018-02-17 | End: 2018-02-23 | Stop reason: HOSPADM

## 2018-02-17 RX ORDER — LAMOTRIGINE 100 MG/1
100 TABLET ORAL 2 TIMES DAILY
Status: DISCONTINUED | OUTPATIENT
Start: 2018-02-17 | End: 2018-02-23 | Stop reason: HOSPADM

## 2018-02-17 RX ORDER — CYCLOBENZAPRINE HCL 5 MG
10 TABLET ORAL 3 TIMES DAILY PRN
Status: DISCONTINUED | OUTPATIENT
Start: 2018-02-17 | End: 2018-02-23 | Stop reason: HOSPADM

## 2018-02-17 RX ORDER — ONDANSETRON 8 MG/1
8 TABLET, ORALLY DISINTEGRATING ORAL EVERY 8 HOURS PRN
Status: DISCONTINUED | OUTPATIENT
Start: 2018-02-17 | End: 2018-02-23 | Stop reason: HOSPADM

## 2018-02-17 RX ORDER — DESVENLAFAXINE SUCCINATE 50 MG/1
100 TABLET, EXTENDED RELEASE ORAL DAILY
Status: DISCONTINUED | OUTPATIENT
Start: 2018-02-17 | End: 2018-02-23 | Stop reason: HOSPADM

## 2018-02-17 RX ORDER — LURASIDONE HYDROCHLORIDE 40 MG/1
40 TABLET, FILM COATED ORAL DAILY
Status: DISCONTINUED | OUTPATIENT
Start: 2018-02-17 | End: 2018-02-23 | Stop reason: HOSPADM

## 2018-02-17 RX ADMIN — BUSPIRONE HYDROCHLORIDE 15 MG: 5 TABLET ORAL at 08:02

## 2018-02-17 RX ADMIN — Medication 3 ML: at 02:02

## 2018-02-17 RX ADMIN — ALBUTEROL SULFATE 2.5 MG: 2.5 SOLUTION RESPIRATORY (INHALATION) at 05:02

## 2018-02-17 RX ADMIN — DESVENLAFAXINE SUCCINATE 100 MG: 50 TABLET, FILM COATED, EXTENDED RELEASE ORAL at 08:02

## 2018-02-17 RX ADMIN — FLUTICASONE FUROATE AND VILANTEROL TRIFENATATE 1 PUFF: 100; 25 POWDER RESPIRATORY (INHALATION) at 08:02

## 2018-02-17 RX ADMIN — HYDROCODONE BITARTRATE AND ACETAMINOPHEN 1 TABLET: 10; 325 TABLET ORAL at 04:02

## 2018-02-17 RX ADMIN — ALBUTEROL SULFATE 2.5 MG: 2.5 SOLUTION RESPIRATORY (INHALATION) at 01:02

## 2018-02-17 RX ADMIN — LEVOTHYROXINE SODIUM 75 MCG: 75 TABLET ORAL at 05:02

## 2018-02-17 RX ADMIN — GABAPENTIN 100 MG: 100 CAPSULE ORAL at 05:02

## 2018-02-17 RX ADMIN — PANTOPRAZOLE SODIUM 40 MG: 40 TABLET, DELAYED RELEASE ORAL at 08:02

## 2018-02-17 RX ADMIN — Medication 3 ML: at 05:02

## 2018-02-17 RX ADMIN — ALBUTEROL SULFATE 2.5 MG: 2.5 SOLUTION RESPIRATORY (INHALATION) at 11:02

## 2018-02-17 RX ADMIN — MAGNESIUM OXIDE TAB 400 MG (241.3 MG ELEMENTAL MG) 400 MG: 400 (241.3 MG) TAB at 08:02

## 2018-02-17 RX ADMIN — ALBUTEROL SULFATE 2.5 MG: 2.5 SOLUTION RESPIRATORY (INHALATION) at 08:02

## 2018-02-17 RX ADMIN — FUROSEMIDE 80 MG: 10 INJECTION, SOLUTION INTRAMUSCULAR; INTRAVENOUS at 08:02

## 2018-02-17 RX ADMIN — POTASSIUM CHLORIDE 60 MEQ: 1500 TABLET, EXTENDED RELEASE ORAL at 04:02

## 2018-02-17 RX ADMIN — LAMOTRIGINE 100 MG: 100 TABLET ORAL at 08:02

## 2018-02-17 RX ADMIN — POTASSIUM CHLORIDE 60 MEQ: 1500 TABLET, EXTENDED RELEASE ORAL at 08:02

## 2018-02-17 RX ADMIN — WARFARIN SODIUM 5 MG: 5 TABLET ORAL at 04:02

## 2018-02-17 RX ADMIN — BUSPIRONE HYDROCHLORIDE 15 MG: 5 TABLET ORAL at 05:02

## 2018-02-17 RX ADMIN — ALBUTEROL SULFATE 2.5 MG: 2.5 SOLUTION RESPIRATORY (INHALATION) at 04:02

## 2018-02-17 RX ADMIN — BUSPIRONE HYDROCHLORIDE 15 MG: 5 TABLET ORAL at 01:02

## 2018-02-17 RX ADMIN — GABAPENTIN 100 MG: 100 CAPSULE ORAL at 08:02

## 2018-02-17 RX ADMIN — FUROSEMIDE 80 MG: 10 INJECTION, SOLUTION INTRAMUSCULAR; INTRAVENOUS at 04:02

## 2018-02-17 RX ADMIN — WARFARIN SODIUM 10 MG: 5 TABLET ORAL at 05:02

## 2018-02-17 RX ADMIN — AMLODIPINE BESYLATE 5 MG: 5 TABLET ORAL at 08:02

## 2018-02-17 RX ADMIN — POTASSIUM CHLORIDE 60 MEQ: 1500 TABLET, EXTENDED RELEASE ORAL at 07:02

## 2018-02-17 RX ADMIN — LURASIDONE HYDROCHLORIDE 40 MG: 40 TABLET, FILM COATED ORAL at 08:02

## 2018-02-17 RX ADMIN — PRAVASTATIN SODIUM 40 MG: 40 TABLET ORAL at 08:02

## 2018-02-17 RX ADMIN — GABAPENTIN 100 MG: 100 CAPSULE ORAL at 01:02

## 2018-02-17 NOTE — ASSESSMENT & PLAN NOTE
continue IV remodulin  -continue home PO meds and PRN nebs  -titrate to home O2 needs:  unsually sats in 90's on 4L; currently on 10 L due to pleural effusion and RV failure.   -continue coumadin 5mg Sat and Sun, 10 mg other days per last coumadin clinic note

## 2018-02-17 NOTE — ASSESSMENT & PLAN NOTE
Pt is currently asymptomatic.  Denies cold intolerance, constipation, malaise, hair loss  -continue home dose medication: levothyroxine 75 mcg daily

## 2018-02-17 NOTE — PLAN OF CARE
Pt admitted to [facility] via EMS. VSS, afebrile, SpO2 93% on CPAP. Home dose Remodulin infusing at 60 ng/kg/min or 37 mL/24 hr; last changed today. Plan to switch to hospital concentration during day. Telemetry monitoring-SR. Dr Lundberg notified of pt arrival and at bedside to assess pt. 12 lead EKG and CXR done. 2D echo planned for AM. Labs monitored. Plan to start Lasix 80 mg IVP BID in AM. Pt reported diarrhea-C.diff precautions initiated and stool specimen pending. Bedside commode in use. Pt oriented to room, use of call bell, and fall precautions. See flowsheet for assessment findings. Will continue to monitor.

## 2018-02-17 NOTE — ASSESSMENT & PLAN NOTE
-continue IV remodulin  -continue home PO meds and PRN nebs  -titrate to home O2 needs:  unsually sats in 90's on 4L  -RHC when euvolemic as above  -continue coumadin 5mg Sat and Sun, 10 mg other days per last coumadin clinic note  -gave 10 mg coumadin tonight (her home dose) as was therapeutic at 2.5, not given at [facility]

## 2018-02-17 NOTE — ASSESSMENT & PLAN NOTE
Pt is currently hypervolemic  JVP is 12 cm H2O  Trace peripheral edema   Change in daily weight:  BNP pending   -CXR from [facility] suggests R>L developing pleural effusions  -given IV lasix at [facility] and started diuresing well  -start lasix IV 80 mg BID, hold home bumex for now  -replete electrolytes as needed  -daily weights  -strict I/O  -echo in the AM

## 2018-02-17 NOTE — SUBJECTIVE & OBJECTIVE
Past Medical History:   Diagnosis Date    Bipolar disorder     CHF (congestive heart failure)     Dyslipidemia     GERD (gastroesophageal reflux disease)     Hx of tracheostomy     Decanulated / surgically removed in 2013? Does not currently have tracheostomy    Hypertension     Hypothyroidism     Oxygen dependent     3L around the clock     Pulmonary HTN     Pulmonary hypertension, group 1 10/4/2017    Pulmonary nodules 10/10/2017    Respiratory failure, chronic        Past Surgical History:   Procedure Laterality Date    BACK SURGERY      PERICARDIAL WINDOW  2013    AL LEFT HEART CATH,PERCUTANEOUS      Cardiac Cath, Left Heart    TUBAL LIGATION         Review of patient's allergies indicates:   Allergen Reactions    Sulfa (sulfonamide antibiotics) Rash       No current facility-administered medications for this encounter.      Family History     Problem Relation (Age of Onset)    Cancer Mother, Sister    Hypertension Mother, Father        Social History Main Topics    Smoking status: Former Smoker     Types: Cigarettes     Start date: 1/1/1979     Quit date: 1/5/1997    Smokeless tobacco: Never Used    Alcohol use No    Drug use: No    Sexual activity: No     Review of Systems   Constitutional: Positive for unexpected weight change. Negative for activity change and appetite change.   Respiratory: Positive for shortness of breath. Negative for apnea, cough, choking and chest tightness.    Cardiovascular: Positive for leg swelling. Negative for chest pain and palpitations.   Neurological: Positive for dizziness and weakness. Negative for syncope.     Objective:     Vital Signs (Most Recent):  Temp: 97.3 °F (36.3 °C) (02/16/18 2343)  Pulse: 88 (02/16/18 2343)  Resp: 18 (02/16/18 2343)  BP: 113/66 (02/16/18 2343)  SpO2: (!) 93 % (02/16/18 2343) Vital Signs (24h Range):  Temp:  [97.3 °F (36.3 °C)] 97.3 °F (36.3 °C)  Pulse:  [88] 88  Resp:  [18] 18  SpO2:  [93 %] 93 %  BP: (113)/(66) 113/66      Patient Vitals for the past 72 hrs (Last 3 readings):   Weight   02/16/18 2343 86.3 kg (190 lb 4.8 oz)     Body mass index is 33.71 kg/m².    No intake or output data in the 24 hours ending 02/17/18 0011    Physical Exam    General Appearance:    Alert, cooperative, no distress   Lungs:     Clear to auscultation bilaterally, respirations unlabored    Heart:    JVP 12 cm H2O, Regular rate and rhythm, HS S1+S2+no added sounds   Abdomen:     Soft, non-tender, bowel sounds active, no masses, no organomegaly   Extremities:   trace edema b/l, pulses +1 b/l

## 2018-02-17 NOTE — ASSESSMENT & PLAN NOTE
-possibly 2/2 to SE of remodulin  -check CDiff   -if does not resolve tomorrow, check stool cx, WBC

## 2018-02-17 NOTE — SUBJECTIVE & OBJECTIVE
Interval History: Pt remained on 10 L of supplemental O2,  With intermittent Lasix at 80 BID, pt made significant volume of urine. Will monitor closely.       Review of Systems   Constitution: Negative.   Respiratory: Positive for shortness of breath.    Endocrine: Negative.    Musculoskeletal: Negative.    Gastrointestinal: Negative.    Genitourinary: Negative.    Neurological: Negative.    Psychiatric/Behavioral: Negative.      Objective:     Vital Signs (Most Recent):  Temp: 98 °F (36.7 °C) (02/17/18 1607)  Pulse: 97 (02/17/18 1607)  Resp: 20 (02/17/18 1607)  BP: 100/62 (02/17/18 1607)  SpO2: (!) 88 % (02/17/18 1607) Vital Signs (24h Range):  Temp:  [97.3 °F (36.3 °C)-98.3 °F (36.8 °C)] 98 °F (36.7 °C)  Pulse:  [] 97  Resp:  [18-22] 20  SpO2:  [87 %-93 %] 88 %  BP: (100-113)/(62-69) 100/62     Weight: 86.7 kg (191 lb 3.2 oz)  Body mass index is 34.97 kg/m².     SpO2: (!) 88 %  O2 Device (Oxygen Therapy): High Flow nasal Cannula      Intake/Output Summary (Last 24 hours) at 02/17/18 1736  Last data filed at 02/17/18 1600   Gross per 24 hour   Intake                0 ml   Output             2250 ml   Net            -2250 ml       Lines/Drains/Airways     Central Venous Catheter Line                 Tunneled Central Line Insertion/Assessment - Single Lumen  12/14/17 1209 right internal jugular 65 days          Peripheral Intravenous Line                 Peripheral IV - Single Lumen 02/16/18 Right Hand 1 day         Peripheral IV - Single Lumen 02/17/18 1325 Left Forearm less than 1 day                Physical Exam   Constitutional: She is oriented to person, place, and time. She appears well-developed and well-nourished.   HENT:   Head: Normocephalic and atraumatic.   Mouth/Throat: Oropharynx is clear and moist.   Eyes: Conjunctivae and EOM are normal. Pupils are equal, round, and reactive to light.   Neck: Normal range of motion. Neck supple. No tracheal deviation present. No thyromegaly present.    Pulmonary/Chest: Effort normal and breath sounds normal.   Abdominal: Soft. Bowel sounds are normal.   Musculoskeletal: Normal range of motion. She exhibits no edema, tenderness or deformity.   Lymphadenopathy:     She has no cervical adenopathy.   Neurological: She is alert and oriented to person, place, and time. She has normal reflexes. She displays normal reflexes. No cranial nerve deficit. She exhibits normal muscle tone. Coordination normal.   Skin: Skin is warm and dry. No rash noted. No erythema. No pallor.   Psychiatric: She has a normal mood and affect. Her behavior is normal. Judgment normal.   Nursing note and vitals reviewed.      Significant Labs:   CMP   Recent Labs  Lab 02/17/18  0118 02/17/18  1325    140   K 3.6 3.0*    102   CO2 22* 26   GLU 81 150*   BUN 18 15   CREATININE 1.2 1.1   CALCIUM 9.3 9.0   PROT 6.6  --    ALBUMIN 3.7  --    BILITOT 0.7  --    ALKPHOS 144*  --    AST 25  --    ALT 18  --    ANIONGAP 14 12   ESTGFRAFRICA 58.4* >60.0   EGFRNONAA 50.6* 56.3*   , CBC   Recent Labs  Lab 02/17/18  0354   WBC 6.07   HGB 12.5   HCT 39.1       and INR   Recent Labs  Lab 02/17/18  0118   INR 2.5*       Significant Imaging: X-Ray: CXR: X-Ray Chest 1 View (CXR):   Results for orders placed or performed during the hospital encounter of 02/16/18   X-Ray Chest 1 View    Narrative    Chest AP portable    Indication:chf .    Comparison:January 3, 2018.    Findings:     Right IJ catheter tip overlies the SVC.    Cardiomegaly with mild bibasilar edema.    Heart and lungs unchanged when allowing for differences in technique and positioning.    Impression    Cardiomegaly with mild bibasilar edema.        Electronically signed by: TAL COSTA MD  Date:     02/17/18  Time:    02:21

## 2018-02-17 NOTE — HPI
57 y.o. WF with PMH severe WHO Group 1 PAH previously on Uptravi but now on IV remodulin, chronic hypoxic respiratory failure, chronic RV failure, on home O2 and Bipap, and h/o trach in past (>4 years ago, eventually decannulated) who presents as a transfer from [facility] with increased SOB over the past 2 days and a recent 15 lb weight gain.  Transferred here as [facility] unable to support IV remodulin.  Recent diarrhea over past 2 days.  Denies N/V, fever, chills, body ache.  No recent sick contacts.    Of note, OSH have her labeled as COPD and Obesity hypoventilation however consult by pulmonologist (Dr. Hardik Magallanes) states explicitly that PFTs done in 2015 showed only mild restriction and were not consistent with COPD. In addition he states that though she is mildly obese her pCO2 is normal at baseline ruling out obesity hypoventilation. She has previous tobacco abuse (stopped 15 yrs ago) - SHERIE (unclear severity) has been on trilogy vent for BiPAP at night. She was recently admitted (1/3-1/11/18) for volume overload (her 3rd admission in last 3 months; admitted 12/8-12/27/17 prior to that). She was started on Lasix drip and diuresed well on this. At end of admission, she was net negative 8.9L total and is feeing better. She was transitioned back to PO Bumex 4 BID priro to d/c with once weekly prn IVP lasix 80mg. IV Remodulin was continued to be uptitrated during hospitalization and she was d/c'ed on 44 ng.     Last BNP in [facility] was 535 (down from 772)  BNP at [facility] was 563, no INR reported at [facility], pt states that she did not get her coumadin tonight    INR goal:   2.0-3.0   TTR:   46.1 % (1.2 mo)   Maintenance plan:   5 mg (5 mg x 1) on Sun, Sat; 10 mg (5 mg x 2) all other days

## 2018-02-17 NOTE — ASSESSMENT & PLAN NOTE
- Volume overloaded on exam.   - JVP upto jaw.   - Impressive response to IV intermittent Lasix 80 BID  - Repleting electrolytes.   - Monitor closely and downtitrated supplemental O2; target Puls Ox at 90-92

## 2018-02-17 NOTE — PLAN OF CARE
Problem: Patient Care Overview  Goal: Plan of Care Review  Outcome: Ongoing (interventions implemented as appropriate)  Pt aao x3. Vss. No acute distress. Pt's home remodulin infusing. Will switch to our Remodulin cartridge tomorrow, at usual time to change. Dose verified. Pt started on IV lasix. Pt on 10L highflow O2. Will continue to reevaluate O2 needs as pt is diuresed. Bedside commode in place. Pt steady on her feet, but asking for help. See assessment for full chart details. Will continue to monitor, assess and adjust care as needed.

## 2018-02-17 NOTE — PROGRESS NOTES
[facility]  Cardiology  Progress Note    Patient Name: Sue Smith  MRN: [id number]  Admission Date: 2/16/2018  Hospital Length of Stay: 1 days  Code Status: Full Code   Attending Physician: Madi Santana MD   Primary Care Physician: Paddy Guerrier DO  Expected Discharge Date:   Principal Problem:Acute on chronic right-sided heart failure    Subjective:     Hospital Course:   No notes on file    Interval History: Pt remained on 10 L of supplemental O2,  With intermittent Lasix at 80 BID, pt made significant volume of urine. Will monitor closely.       Review of Systems   Constitution: Negative.   Respiratory: Positive for shortness of breath.    Endocrine: Negative.    Musculoskeletal: Negative.    Gastrointestinal: Negative.    Genitourinary: Negative.    Neurological: Negative.    Psychiatric/Behavioral: Negative.      Objective:     Vital Signs (Most Recent):  Temp: 98 °F (36.7 °C) (02/17/18 1607)  Pulse: 97 (02/17/18 1607)  Resp: 20 (02/17/18 1607)  BP: 100/62 (02/17/18 1607)  SpO2: (!) 88 % (02/17/18 1607) Vital Signs (24h Range):  Temp:  [97.3 °F (36.3 °C)-98.3 °F (36.8 °C)] 98 °F (36.7 °C)  Pulse:  [] 97  Resp:  [18-22] 20  SpO2:  [87 %-93 %] 88 %  BP: (100-113)/(62-69) 100/62     Weight: 86.7 kg (191 lb 3.2 oz)  Body mass index is 34.97 kg/m².     SpO2: (!) 88 %  O2 Device (Oxygen Therapy): High Flow nasal Cannula      Intake/Output Summary (Last 24 hours) at 02/17/18 1736  Last data filed at 02/17/18 1600   Gross per 24 hour   Intake                0 ml   Output             2250 ml   Net            -2250 ml       Lines/Drains/Airways     Central Venous Catheter Line                 Tunneled Central Line Insertion/Assessment - Single Lumen  12/14/17 1209 right internal jugular 65 days          Peripheral Intravenous Line                 Peripheral IV - Single Lumen 02/16/18 Right Hand 1 day         Peripheral IV - Single Lumen 02/17/18 1325 Left Forearm less than 1 day                 Physical Exam   Constitutional: She is oriented to person, place, and time. She appears well-developed and well-nourished.   HENT:   Head: Normocephalic and atraumatic.   Mouth/Throat: Oropharynx is clear and moist.   Eyes: Conjunctivae and EOM are normal. Pupils are equal, round, and reactive to light.   Neck: Normal range of motion. Neck supple. No tracheal deviation present. No thyromegaly present.   Pulmonary/Chest: Effort normal and breath sounds normal.   Abdominal: Soft. Bowel sounds are normal.   Musculoskeletal: Normal range of motion. She exhibits no edema, tenderness or deformity.   Lymphadenopathy:     She has no cervical adenopathy.   Neurological: She is alert and oriented to person, place, and time. She has normal reflexes. She displays normal reflexes. No cranial nerve deficit. She exhibits normal muscle tone. Coordination normal.   Skin: Skin is warm and dry. No rash noted. No erythema. No pallor.   Psychiatric: She has a normal mood and affect. Her behavior is normal. Judgment normal.   Nursing note and vitals reviewed.      Significant Labs:   CMP   Recent Labs  Lab 02/17/18  0118 02/17/18  1325    140   K 3.6 3.0*    102   CO2 22* 26   GLU 81 150*   BUN 18 15   CREATININE 1.2 1.1   CALCIUM 9.3 9.0   PROT 6.6  --    ALBUMIN 3.7  --    BILITOT 0.7  --    ALKPHOS 144*  --    AST 25  --    ALT 18  --    ANIONGAP 14 12   ESTGFRAFRICA 58.4* >60.0   EGFRNONAA 50.6* 56.3*   , CBC   Recent Labs  Lab 02/17/18  0354   WBC 6.07   HGB 12.5   HCT 39.1       and INR   Recent Labs  Lab 02/17/18  0118   INR 2.5*       Significant Imaging: X-Ray: CXR: X-Ray Chest 1 View (CXR):   Results for orders placed or performed during the hospital encounter of 02/16/18   X-Ray Chest 1 View    Narrative    Chest AP portable    Indication:chf .    Comparison:January 3, 2018.    Findings:     Right IJ catheter tip overlies the SVC.    Cardiomegaly with mild bibasilar edema.    Heart and lungs unchanged  when allowing for differences in technique and positioning.    Impression    Cardiomegaly with mild bibasilar edema.        Electronically signed by: TAL COSTA MD  Date:     02/17/18  Time:    02:21      Assessment and Plan:     Brief HPI: 57 YOCW with Group 1 Pulm HTN, and RV failure, admitted for worsening SOB/respiratory failure, in the setting of worsening RV failure; plan to diurese and continue IV remoduline    * Acute on chronic right-sided heart failure    - Volume overloaded on exam.   - JVP upto jaw.   - Impressive response to IV intermittent Lasix 80 BID  - Repleting electrolytes.   - Monitor closely and downtitrated supplemental O2; target Puls Ox at 90-92        Diarrhea    possibly 2/2 to SE of remoduline, however ruling our infectious etiology; no sick contact.         Pulmonary hypertension, group 1    continue IV remodulin  -continue home PO meds and PRN nebs  -titrate to home O2 needs:  unsually sats in 90's on 4L; currently on 10 L due to pleural effusion and RV failure.   -continue coumadin 5mg Sat and Sun, 10 mg other days per last coumadin clinic note              VTE Risk Mitigation         Ordered     warfarin (COUMADIN) tablet 10 mg  Every Mon, Tues, Wed, Thurs, Fri     Route:  Oral        02/17/18 0102     warfarin (COUMADIN) tablet 5 mg  Every Sat, Sun     Route:  Oral        02/17/18 0102          Joshua John MD  Cardiology  [facility]

## 2018-02-18 PROBLEM — J96.20 ACUTE ON CHRONIC RESPIRATORY FAILURE: Status: ACTIVE | Noted: 2018-02-18

## 2018-02-18 LAB
ANION GAP SERPL CALC-SCNC: 12 MMOL/L
BUN SERPL-MCNC: 16 MG/DL
CALCIUM SERPL-MCNC: 9.5 MG/DL
CHLORIDE SERPL-SCNC: 103 MMOL/L
CO2 SERPL-SCNC: 26 MMOL/L
CREAT SERPL-MCNC: 1.2 MG/DL
EST. GFR  (AFRICAN AMERICAN): 58.4 ML/MIN/1.73 M^2
EST. GFR  (NON AFRICAN AMERICAN): 50.6 ML/MIN/1.73 M^2
GLUCOSE SERPL-MCNC: 137 MG/DL
INR PPP: 3.5
MAGNESIUM SERPL-MCNC: 1.6 MG/DL
POTASSIUM SERPL-SCNC: 3.8 MMOL/L
POTASSIUM SERPL-SCNC: 4.1 MMOL/L
PROTHROMBIN TIME: 33.7 SEC
SODIUM SERPL-SCNC: 141 MMOL/L

## 2018-02-18 PROCEDURE — 80048 BASIC METABOLIC PNL TOTAL CA: CPT

## 2018-02-18 PROCEDURE — 99900035 HC TECH TIME PER 15 MIN (STAT)

## 2018-02-18 PROCEDURE — 20600001 HC STEP DOWN PRIVATE ROOM

## 2018-02-18 PROCEDURE — 94799 UNLISTED PULMONARY SVC/PX: CPT

## 2018-02-18 PROCEDURE — 94660 CPAP INITIATION&MGMT: CPT

## 2018-02-18 PROCEDURE — A4216 STERILE WATER/SALINE, 10 ML: HCPCS | Performed by: INTERNAL MEDICINE

## 2018-02-18 PROCEDURE — 94640 AIRWAY INHALATION TREATMENT: CPT

## 2018-02-18 PROCEDURE — 99233 SBSQ HOSP IP/OBS HIGH 50: CPT | Mod: ,,, | Performed by: INTERNAL MEDICINE

## 2018-02-18 PROCEDURE — 63600175 PHARM REV CODE 636 W HCPCS: Performed by: INTERNAL MEDICINE

## 2018-02-18 PROCEDURE — 25000003 PHARM REV CODE 250: Performed by: INTERNAL MEDICINE

## 2018-02-18 PROCEDURE — 85610 PROTHROMBIN TIME: CPT

## 2018-02-18 PROCEDURE — 94761 N-INVAS EAR/PLS OXIMETRY MLT: CPT

## 2018-02-18 PROCEDURE — 25000242 PHARM REV CODE 250 ALT 637 W/ HCPCS: Performed by: INTERNAL MEDICINE

## 2018-02-18 PROCEDURE — 27000221 HC OXYGEN, UP TO 24 HOURS

## 2018-02-18 PROCEDURE — 36415 COLL VENOUS BLD VENIPUNCTURE: CPT

## 2018-02-18 RX ORDER — FUROSEMIDE 10 MG/ML
80 INJECTION INTRAMUSCULAR; INTRAVENOUS 3 TIMES DAILY
Status: DISCONTINUED | OUTPATIENT
Start: 2018-02-18 | End: 2018-02-22

## 2018-02-18 RX ADMIN — BUSPIRONE HYDROCHLORIDE 15 MG: 5 TABLET ORAL at 10:02

## 2018-02-18 RX ADMIN — PANTOPRAZOLE SODIUM 40 MG: 40 TABLET, DELAYED RELEASE ORAL at 08:02

## 2018-02-18 RX ADMIN — TREPROSTINIL 60 NG/KG/MIN: 100 INJECTION, SOLUTION INTRAVENOUS; SUBCUTANEOUS at 12:02

## 2018-02-18 RX ADMIN — FUROSEMIDE 80 MG: 10 INJECTION, SOLUTION INTRAMUSCULAR; INTRAVENOUS at 10:02

## 2018-02-18 RX ADMIN — GABAPENTIN 100 MG: 100 CAPSULE ORAL at 10:02

## 2018-02-18 RX ADMIN — AMLODIPINE BESYLATE 5 MG: 5 TABLET ORAL at 08:02

## 2018-02-18 RX ADMIN — MAGNESIUM OXIDE TAB 400 MG (241.3 MG ELEMENTAL MG) 400 MG: 400 (241.3 MG) TAB at 10:02

## 2018-02-18 RX ADMIN — HYDROCODONE BITARTRATE AND ACETAMINOPHEN 1 TABLET: 10; 325 TABLET ORAL at 05:02

## 2018-02-18 RX ADMIN — ALBUTEROL SULFATE 2.5 MG: 2.5 SOLUTION RESPIRATORY (INHALATION) at 08:02

## 2018-02-18 RX ADMIN — LEVOTHYROXINE SODIUM 75 MCG: 75 TABLET ORAL at 05:02

## 2018-02-18 RX ADMIN — FLUTICASONE FUROATE AND VILANTEROL TRIFENATATE 1 PUFF: 100; 25 POWDER RESPIRATORY (INHALATION) at 08:02

## 2018-02-18 RX ADMIN — POTASSIUM CHLORIDE 60 MEQ: 1500 TABLET, EXTENDED RELEASE ORAL at 08:02

## 2018-02-18 RX ADMIN — ALBUTEROL SULFATE 2.5 MG: 2.5 SOLUTION RESPIRATORY (INHALATION) at 03:02

## 2018-02-18 RX ADMIN — DESVENLAFAXINE SUCCINATE 100 MG: 50 TABLET, FILM COATED, EXTENDED RELEASE ORAL at 08:02

## 2018-02-18 RX ADMIN — BUSPIRONE HYDROCHLORIDE 15 MG: 5 TABLET ORAL at 05:02

## 2018-02-18 RX ADMIN — ALBUTEROL SULFATE 2.5 MG: 2.5 SOLUTION RESPIRATORY (INHALATION) at 12:02

## 2018-02-18 RX ADMIN — POTASSIUM CHLORIDE 60 MEQ: 1500 TABLET, EXTENDED RELEASE ORAL at 10:02

## 2018-02-18 RX ADMIN — LAMOTRIGINE 100 MG: 100 TABLET ORAL at 10:02

## 2018-02-18 RX ADMIN — BUSPIRONE HYDROCHLORIDE 15 MG: 5 TABLET ORAL at 02:02

## 2018-02-18 RX ADMIN — FUROSEMIDE 80 MG: 10 INJECTION, SOLUTION INTRAMUSCULAR; INTRAVENOUS at 08:02

## 2018-02-18 RX ADMIN — PRAVASTATIN SODIUM 40 MG: 40 TABLET ORAL at 08:02

## 2018-02-18 RX ADMIN — MAGNESIUM OXIDE TAB 400 MG (241.3 MG ELEMENTAL MG) 400 MG: 400 (241.3 MG) TAB at 08:02

## 2018-02-18 RX ADMIN — WARFARIN SODIUM 5 MG: 5 TABLET ORAL at 06:02

## 2018-02-18 RX ADMIN — GABAPENTIN 100 MG: 100 CAPSULE ORAL at 05:02

## 2018-02-18 RX ADMIN — LURASIDONE HYDROCHLORIDE 40 MG: 40 TABLET, FILM COATED ORAL at 08:02

## 2018-02-18 RX ADMIN — FUROSEMIDE 80 MG: 10 INJECTION, SOLUTION INTRAMUSCULAR; INTRAVENOUS at 02:02

## 2018-02-18 RX ADMIN — Medication 3 ML: at 02:02

## 2018-02-18 RX ADMIN — LAMOTRIGINE 100 MG: 100 TABLET ORAL at 08:02

## 2018-02-18 RX ADMIN — GABAPENTIN 100 MG: 100 CAPSULE ORAL at 02:02

## 2018-02-18 RX ADMIN — ALBUTEROL SULFATE 2.5 MG: 2.5 SOLUTION RESPIRATORY (INHALATION) at 04:02

## 2018-02-18 NOTE — PROGRESS NOTES
6 beat run of VTach.  K was low today, just given another 60 mEq by nurse.  Will check electrolytes at midnight and replete as needed.

## 2018-02-18 NOTE — ASSESSMENT & PLAN NOTE
-Continue IV remodulin at current dose of 60 ng/kg/min   - Complicated by RV failure leading to pleural effusion and respiratory failure.   -Continue PRN nebs  - Supplemental o2 at 11 LPM; Titrate down  to home O2 needs:  unsually sats in 90's on 4L; -continue coumadin 5mg Sat and Sun, 10 mg other days per last coumadin clinic note

## 2018-02-18 NOTE — ASSESSMENT & PLAN NOTE
-In the setting of bilateral pleural effusion due to poor RV functions in the setting of WHO 1 Pulm HTN  -Continue supplemental O2 and diuretic therapy.   - Will get a CT chest with contrast to rule out other possible etiologies.

## 2018-02-18 NOTE — ASSESSMENT & PLAN NOTE
- Volume overloaded on exam and chest xray with pleural effusion.   - JVP upto jaw.   - Impressive response to IV intermittent Lasix 80 BID; however continued dependence on high dose of supplemental oxygen so increased dose to TID.   - Repleting electrolytes.   - Monitor closely and downtitrated supplemental O2; target Puls Ox at 90-92

## 2018-02-18 NOTE — PLAN OF CARE
Problem: Fall Risk (Adult)  Goal: Absence of Falls  Patient will demonstrate the desired outcomes by discharge/transition of care.   Outcome: Ongoing (interventions implemented as appropriate)  Fall precautions maintained. Bed wheels locked, bed in lowest position, upper SR up x 2, call light in reach, non-skid socks when OOB. Instructed pt to call for assistance as needed.     Problem: Patient Care Overview  Goal: Plan of Care Review  Outcome: Ongoing (interventions implemented as appropriate)  Pt with 6-beat run V-tach this shift, asymptomatic. Received IVP lasix and PO K+ as ordered, recheck labs at midnight. VSS. Reports LANE and SOB. HFNC 11 liters with SpO2 > 88%. Excellent UOP following lasix. Will change to hospital CADD pump and remodulin concentration in am. Will cont to monitor.

## 2018-02-18 NOTE — SUBJECTIVE & OBJECTIVE
Interval History: Pt reported feeling better, however any mobility outside of the bed causes her to decompensate her respiratory status, currently on 11 LPM of supplemental oxygen.     Review of Systems   Constitution: Positive for malaise/fatigue.   HENT: Positive for congestion.    Eyes: Negative.    Cardiovascular: Positive for dyspnea on exertion.   Respiratory: Positive for shortness of breath and sleep disturbances due to breathing.    Skin: Negative.    Musculoskeletal: Negative.    Gastrointestinal: Negative.    Genitourinary: Negative.      Objective:     Vital Signs (Most Recent):  Temp: 98.6 °F (37 °C) (02/18/18 1546)  Pulse: 87 (02/18/18 1546)  Resp: 18 (02/18/18 1546)  BP: (!) 108/57 (02/18/18 1546)  SpO2: 95 % (02/18/18 1546) Vital Signs (24h Range):  Temp:  [96.1 °F (35.6 °C)-99 °F (37.2 °C)] 98.6 °F (37 °C)  Pulse:  [] 87  Resp:  [16-20] 18  SpO2:  [86 %-96 %] 95 %  BP: ()/(52-83) 108/57     Weight: 86.8 kg (191 lb 4.8 oz)  Body mass index is 34.99 kg/m².     SpO2: 95 %  O2 Device (Oxygen Therapy): High Flow nasal Cannula      Intake/Output Summary (Last 24 hours) at 02/18/18 1635  Last data filed at 02/18/18 1537   Gross per 24 hour   Intake              300 ml   Output             2700 ml   Net            -2400 ml       Lines/Drains/Airways     Central Venous Catheter Line                 Tunneled Central Line Insertion/Assessment - Single Lumen  12/14/17 1209 right internal jugular 66 days          Peripheral Intravenous Line                 Peripheral IV - Single Lumen 02/17/18 1325 Left Forearm 1 day                Physical Exam   Constitutional: She is oriented to person, place, and time. She appears well-developed and well-nourished.   HENT:   Head: Normocephalic and atraumatic.   Mouth/Throat: Oropharynx is clear and moist.   Eyes: Conjunctivae and EOM are normal. Pupils are equal, round, and reactive to light. Right eye exhibits no discharge. Left eye exhibits no discharge.    Neck: Normal range of motion. Neck supple. No JVD present. No tracheal deviation present. No thyromegaly present.   Cardiovascular:   Systolic murmur   Pulmonary/Chest: Effort normal and breath sounds normal. No respiratory distress. She has no rales. She exhibits no tenderness.   Abdominal: Soft. Bowel sounds are normal. She exhibits no distension and no mass. There is no rebound.   Musculoskeletal: Normal range of motion. She exhibits no edema, tenderness or deformity.   Lymphadenopathy:     She has no cervical adenopathy.   Neurological: She is alert and oriented to person, place, and time. She has normal reflexes. She displays normal reflexes. No cranial nerve deficit. She exhibits normal muscle tone. Coordination normal.   Skin: Skin is warm.   Psychiatric: She has a normal mood and affect. Her behavior is normal. Judgment normal.   Nursing note and vitals reviewed.      Significant Labs:   CMP   Recent Labs  Lab 02/17/18  0118 02/17/18  1325 02/17/18  2342 02/18/18  0845    140  --  141   K 3.6 3.0* 3.8 4.1    102  --  103   CO2 22* 26  --  26   GLU 81 150*  --  137*   BUN 18 15  --  16   CREATININE 1.2 1.1  --  1.2   CALCIUM 9.3 9.0  --  9.5   PROT 6.6  --   --   --    ALBUMIN 3.7  --   --   --    BILITOT 0.7  --   --   --    ALKPHOS 144*  --   --   --    AST 25  --   --   --    ALT 18  --   --   --    ANIONGAP 14 12  --  12   ESTGFRAFRICA 58.4* >60.0  --  58.4*   EGFRNONAA 50.6* 56.3*  --  50.6*   , CBC   Recent Labs  Lab 02/17/18  0354   WBC 6.07   HGB 12.5   HCT 39.1       and INR   Recent Labs  Lab 02/17/18  0118 02/18/18  1338   INR 2.5* 3.5*       Significant Imaging: X-Ray: CXR: X-Ray Chest 1 View (CXR):   Results for orders placed or performed during the hospital encounter of 02/16/18   X-Ray Chest 1 View    Narrative    Chest AP portable    Indication:chf .    Comparison:January 3, 2018.    Findings:     Right IJ catheter tip overlies the SVC.    Cardiomegaly with mild bibasilar  edema.    Heart and lungs unchanged when allowing for differences in technique and positioning.    Impression    Cardiomegaly with mild bibasilar edema.        Electronically signed by: TAL COSTA MD  Date:     02/17/18  Time:    02:21

## 2018-02-18 NOTE — PROGRESS NOTES
[facility]  Cardiology  Progress Note    Patient Name: Sue Smith  MRN: [id number]  Admission Date: 2/16/2018  Hospital Length of Stay: 2 days  Code Status: Full Code   Attending Physician: Madi Santana MD   Primary Care Physician: Paddy Guerrier DO  Expected Discharge Date:   Principal Problem:Acute on chronic right-sided heart failure    Subjective:     Hospital Course:   No notes on file    Interval History: Pt reported feeling better, however any mobility outside of the bed causes her to decompensate her respiratory status, currently on 11 LPM of supplemental oxygen.     Review of Systems   Constitution: Positive for malaise/fatigue.   HENT: Positive for congestion.    Eyes: Negative.    Cardiovascular: Positive for dyspnea on exertion.   Respiratory: Positive for shortness of breath and sleep disturbances due to breathing.    Skin: Negative.    Musculoskeletal: Negative.    Gastrointestinal: Negative.    Genitourinary: Negative.      Objective:     Vital Signs (Most Recent):  Temp: 98.6 °F (37 °C) (02/18/18 1546)  Pulse: 87 (02/18/18 1546)  Resp: 18 (02/18/18 1546)  BP: (!) 108/57 (02/18/18 1546)  SpO2: 95 % (02/18/18 1546) Vital Signs (24h Range):  Temp:  [96.1 °F (35.6 °C)-99 °F (37.2 °C)] 98.6 °F (37 °C)  Pulse:  [] 87  Resp:  [16-20] 18  SpO2:  [86 %-96 %] 95 %  BP: ()/(52-83) 108/57     Weight: 86.8 kg (191 lb 4.8 oz)  Body mass index is 34.99 kg/m².     SpO2: 95 %  O2 Device (Oxygen Therapy): High Flow nasal Cannula      Intake/Output Summary (Last 24 hours) at 02/18/18 1635  Last data filed at 02/18/18 1537   Gross per 24 hour   Intake              300 ml   Output             2700 ml   Net            -2400 ml       Lines/Drains/Airways     Central Venous Catheter Line                 Tunneled Central Line Insertion/Assessment - Single Lumen  12/14/17 1209 right internal jugular 66 days          Peripheral Intravenous Line                 Peripheral IV - Single Lumen  02/17/18 1325 Left Forearm 1 day                Physical Exam   Constitutional: She is oriented to person, place, and time. She appears well-developed and well-nourished.   HENT:   Head: Normocephalic and atraumatic.   Mouth/Throat: Oropharynx is clear and moist.   Eyes: Conjunctivae and EOM are normal. Pupils are equal, round, and reactive to light. Right eye exhibits no discharge. Left eye exhibits no discharge.   Neck: Normal range of motion. Neck supple. No JVD present. No tracheal deviation present. No thyromegaly present.   Cardiovascular:   Systolic murmur   Pulmonary/Chest: Effort normal and breath sounds normal. No respiratory distress. She has no rales. She exhibits no tenderness.   Abdominal: Soft. Bowel sounds are normal. She exhibits no distension and no mass. There is no rebound.   Musculoskeletal: Normal range of motion. She exhibits no edema, tenderness or deformity.   Lymphadenopathy:     She has no cervical adenopathy.   Neurological: She is alert and oriented to person, place, and time. She has normal reflexes. She displays normal reflexes. No cranial nerve deficit. She exhibits normal muscle tone. Coordination normal.   Skin: Skin is warm.   Psychiatric: She has a normal mood and affect. Her behavior is normal. Judgment normal.   Nursing note and vitals reviewed.      Significant Labs:   CMP   Recent Labs  Lab 02/17/18  0118 02/17/18  1325 02/17/18  2342 02/18/18  0845    140  --  141   K 3.6 3.0* 3.8 4.1    102  --  103   CO2 22* 26  --  26   GLU 81 150*  --  137*   BUN 18 15  --  16   CREATININE 1.2 1.1  --  1.2   CALCIUM 9.3 9.0  --  9.5   PROT 6.6  --   --   --    ALBUMIN 3.7  --   --   --    BILITOT 0.7  --   --   --    ALKPHOS 144*  --   --   --    AST 25  --   --   --    ALT 18  --   --   --    ANIONGAP 14 12  --  12   ESTGFRAFRICA 58.4* >60.0  --  58.4*   EGFRNONAA 50.6* 56.3*  --  50.6*   , CBC   Recent Labs  Lab 02/17/18  0354   WBC 6.07   HGB 12.5   HCT 39.1        and INR   Recent Labs  Lab 02/17/18  0118 02/18/18  1338   INR 2.5* 3.5*       Significant Imaging: X-Ray: CXR: X-Ray Chest 1 View (CXR):   Results for orders placed or performed during the hospital encounter of 02/16/18   X-Ray Chest 1 View    Narrative    Chest AP portable    Indication:chf .    Comparison:January 3, 2018.    Findings:     Right IJ catheter tip overlies the SVC.    Cardiomegaly with mild bibasilar edema.    Heart and lungs unchanged when allowing for differences in technique and positioning.    Impression    Cardiomegaly with mild bibasilar edema.        Electronically signed by: TAL COSTA MD  Date:     02/17/18  Time:    02:21      Assessment and Plan:     Brief HPI: 57 YOCW with PMH of WHO Pul HTN, with respiratory failure, being treated with Diuretic therapy and worked for respiratory failure.     * Acute on chronic right-sided heart failure    - Volume overloaded on exam and chest xray with pleural effusion.   - JVP upto jaw.   - Impressive response to IV intermittent Lasix 80 BID; however continued dependence on high dose of supplemental oxygen so increased dose to TID.   - Repleting electrolytes.   - Monitor closely and downtitrated supplemental O2; target Puls Ox at 90-92        Acute on chronic respiratory failure    -In the setting of bilateral pleural effusion due to poor RV functions in the setting of WHO 1 Pulm HTN  -Continue supplemental O2 and diuretic therapy.   - Will get a CT chest with contrast to rule out other possible etiologies.         Diarrhea    - iMPROVING.         Essential hypertension    - Stable for now.   - Continue Amlodipine at 10 mg daily.         Hypothyroidism    - Asymptomatic, continue home dose of Levothyroxine        Pulmonary hypertension, group 1    -Continue IV remodulin at current dose of 60 ng/kg/min   - Complicated by RV failure leading to pleural effusion and respiratory failure.   -Continue PRN nebs  - Supplemental o2 at 11 LPM; Titrate down  to  home O2 needs:  unsually sats in 90's on 4L; -continue coumadin 5mg Sat and Sun, 10 mg other days per last coumadin clinic note              VTE Risk Mitigation         Ordered     warfarin (COUMADIN) tablet 10 mg  Every Mon, Tues, Wed, Thurs, Fri     Route:  Oral        02/17/18 0102     warfarin (COUMADIN) tablet 5 mg  Every Sat, Sun     Route:  Oral        02/17/18 0102          Joshua John MD  Cardiology  [facility]

## 2018-02-19 LAB
ANION GAP SERPL CALC-SCNC: 11 MMOL/L
BUN SERPL-MCNC: 14 MG/DL
CALCIUM SERPL-MCNC: 9.1 MG/DL
CHLORIDE SERPL-SCNC: 99 MMOL/L
CO2 SERPL-SCNC: 31 MMOL/L
CREAT SERPL-MCNC: 0.9 MG/DL
EST. GFR  (AFRICAN AMERICAN): >60 ML/MIN/1.73 M^2
EST. GFR  (NON AFRICAN AMERICAN): >60 ML/MIN/1.73 M^2
ESTIMATED PA SYSTOLIC PRESSURE: 101.86
GLOBAL PERICARDIAL EFFUSION: ABNORMAL
GLUCOSE SERPL-MCNC: 81 MG/DL
INR PPP: 2.6
MAGNESIUM SERPL-MCNC: 1.5 MG/DL
POTASSIUM SERPL-SCNC: 3.3 MMOL/L
POTASSIUM SERPL-SCNC: 4.3 MMOL/L
PROTHROMBIN TIME: 25.4 SEC
RETIRED EF AND QEF - SEE NOTES: 60 (ref 55–65)
SODIUM SERPL-SCNC: 141 MMOL/L
TRICUSPID VALVE REGURGITATION: ABNORMAL

## 2018-02-19 PROCEDURE — 83735 ASSAY OF MAGNESIUM: CPT

## 2018-02-19 PROCEDURE — 93306 TTE W/DOPPLER COMPLETE: CPT

## 2018-02-19 PROCEDURE — G8979 MOBILITY GOAL STATUS: HCPCS | Mod: CH

## 2018-02-19 PROCEDURE — A4216 STERILE WATER/SALINE, 10 ML: HCPCS | Performed by: INTERNAL MEDICINE

## 2018-02-19 PROCEDURE — 20600001 HC STEP DOWN PRIVATE ROOM

## 2018-02-19 PROCEDURE — 25000003 PHARM REV CODE 250: Performed by: INTERNAL MEDICINE

## 2018-02-19 PROCEDURE — 85610 PROTHROMBIN TIME: CPT

## 2018-02-19 PROCEDURE — 36415 COLL VENOUS BLD VENIPUNCTURE: CPT

## 2018-02-19 PROCEDURE — 27000221 HC OXYGEN, UP TO 24 HOURS

## 2018-02-19 PROCEDURE — 94761 N-INVAS EAR/PLS OXIMETRY MLT: CPT

## 2018-02-19 PROCEDURE — 84132 ASSAY OF SERUM POTASSIUM: CPT

## 2018-02-19 PROCEDURE — G8978 MOBILITY CURRENT STATUS: HCPCS | Mod: CH

## 2018-02-19 PROCEDURE — 94660 CPAP INITIATION&MGMT: CPT

## 2018-02-19 PROCEDURE — 80048 BASIC METABOLIC PNL TOTAL CA: CPT

## 2018-02-19 PROCEDURE — 63600175 PHARM REV CODE 636 W HCPCS: Performed by: INTERNAL MEDICINE

## 2018-02-19 PROCEDURE — 94640 AIRWAY INHALATION TREATMENT: CPT

## 2018-02-19 PROCEDURE — 25000242 PHARM REV CODE 250 ALT 637 W/ HCPCS: Performed by: INTERNAL MEDICINE

## 2018-02-19 PROCEDURE — 97161 PT EVAL LOW COMPLEX 20 MIN: CPT

## 2018-02-19 PROCEDURE — 93306 TTE W/DOPPLER COMPLETE: CPT | Mod: 26,GV,, | Performed by: INTERNAL MEDICINE

## 2018-02-19 PROCEDURE — 99900035 HC TECH TIME PER 15 MIN (STAT)

## 2018-02-19 PROCEDURE — 99233 SBSQ HOSP IP/OBS HIGH 50: CPT | Mod: ,,, | Performed by: INTERNAL MEDICINE

## 2018-02-19 PROCEDURE — 97116 GAIT TRAINING THERAPY: CPT

## 2018-02-19 PROCEDURE — G8980 MOBILITY D/C STATUS: HCPCS | Mod: CH

## 2018-02-19 RX ADMIN — AMLODIPINE BESYLATE 5 MG: 5 TABLET ORAL at 08:02

## 2018-02-19 RX ADMIN — FUROSEMIDE 80 MG: 10 INJECTION, SOLUTION INTRAMUSCULAR; INTRAVENOUS at 09:02

## 2018-02-19 RX ADMIN — PRAVASTATIN SODIUM 40 MG: 40 TABLET ORAL at 08:02

## 2018-02-19 RX ADMIN — ALBUTEROL SULFATE 2.5 MG: 2.5 SOLUTION RESPIRATORY (INHALATION) at 12:02

## 2018-02-19 RX ADMIN — ALBUTEROL SULFATE 2.5 MG: 2.5 SOLUTION RESPIRATORY (INHALATION) at 11:02

## 2018-02-19 RX ADMIN — GABAPENTIN 100 MG: 100 CAPSULE ORAL at 02:02

## 2018-02-19 RX ADMIN — Medication 3 ML: at 02:02

## 2018-02-19 RX ADMIN — MAGNESIUM OXIDE TAB 400 MG (241.3 MG ELEMENTAL MG) 400 MG: 400 (241.3 MG) TAB at 09:02

## 2018-02-19 RX ADMIN — FUROSEMIDE 80 MG: 10 INJECTION, SOLUTION INTRAMUSCULAR; INTRAVENOUS at 02:02

## 2018-02-19 RX ADMIN — TREPROSTINIL 60 NG/KG/MIN: 100 INJECTION, SOLUTION INTRAVENOUS; SUBCUTANEOUS at 01:02

## 2018-02-19 RX ADMIN — Medication 3 ML: at 10:02

## 2018-02-19 RX ADMIN — FUROSEMIDE 80 MG: 10 INJECTION, SOLUTION INTRAMUSCULAR; INTRAVENOUS at 06:02

## 2018-02-19 RX ADMIN — WARFARIN SODIUM 10 MG: 5 TABLET ORAL at 06:02

## 2018-02-19 RX ADMIN — BUSPIRONE HYDROCHLORIDE 15 MG: 5 TABLET ORAL at 06:02

## 2018-02-19 RX ADMIN — Medication: at 01:02

## 2018-02-19 RX ADMIN — BUSPIRONE HYDROCHLORIDE 15 MG: 5 TABLET ORAL at 09:02

## 2018-02-19 RX ADMIN — ALBUTEROL SULFATE 2.5 MG: 2.5 SOLUTION RESPIRATORY (INHALATION) at 07:02

## 2018-02-19 RX ADMIN — ALBUTEROL SULFATE 2.5 MG: 2.5 SOLUTION RESPIRATORY (INHALATION) at 08:02

## 2018-02-19 RX ADMIN — LURASIDONE HYDROCHLORIDE 40 MG: 40 TABLET, FILM COATED ORAL at 08:02

## 2018-02-19 RX ADMIN — HYDROCODONE BITARTRATE AND ACETAMINOPHEN 1 TABLET: 10; 325 TABLET ORAL at 06:02

## 2018-02-19 RX ADMIN — FLUTICASONE FUROATE AND VILANTEROL TRIFENATATE 1 PUFF: 100; 25 POWDER RESPIRATORY (INHALATION) at 08:02

## 2018-02-19 RX ADMIN — GABAPENTIN 100 MG: 100 CAPSULE ORAL at 06:02

## 2018-02-19 RX ADMIN — DESVENLAFAXINE SUCCINATE 100 MG: 50 TABLET, FILM COATED, EXTENDED RELEASE ORAL at 08:02

## 2018-02-19 RX ADMIN — LAMOTRIGINE 100 MG: 100 TABLET ORAL at 09:02

## 2018-02-19 RX ADMIN — BUSPIRONE HYDROCHLORIDE 15 MG: 5 TABLET ORAL at 02:02

## 2018-02-19 RX ADMIN — POTASSIUM CHLORIDE 60 MEQ: 1500 TABLET, EXTENDED RELEASE ORAL at 08:02

## 2018-02-19 RX ADMIN — GABAPENTIN 100 MG: 100 CAPSULE ORAL at 09:02

## 2018-02-19 RX ADMIN — LAMOTRIGINE 100 MG: 100 TABLET ORAL at 08:02

## 2018-02-19 RX ADMIN — LEVOTHYROXINE SODIUM 75 MCG: 75 TABLET ORAL at 06:02

## 2018-02-19 RX ADMIN — ALBUTEROL SULFATE 2.5 MG: 2.5 SOLUTION RESPIRATORY (INHALATION) at 03:02

## 2018-02-19 RX ADMIN — MAGNESIUM OXIDE TAB 400 MG (241.3 MG ELEMENTAL MG) 400 MG: 400 (241.3 MG) TAB at 08:02

## 2018-02-19 RX ADMIN — PANTOPRAZOLE SODIUM 40 MG: 40 TABLET, DELAYED RELEASE ORAL at 08:02

## 2018-02-19 RX ADMIN — Medication 3 ML: at 08:02

## 2018-02-19 RX ADMIN — POTASSIUM CHLORIDE 60 MEQ: 1500 TABLET, EXTENDED RELEASE ORAL at 09:02

## 2018-02-19 NOTE — PROGRESS NOTES
Cardiology Progress Note  Attending Physician: Madi Santana MD  Hospital Day: 4    Subjective:   Interval History: 6 beat run of VTach; K was low so was repleted and K, Mg repeated overnight; out 2L overnight; sating 92% on 9L; KENNETH ordered as it wasn't actually ordered ; Resp viral panel reordered as ordered incorrectly;     Medications:   Continuous Infusions:   treprostinil (REMODULIN) infusion 60 ng/kg/min (02/18/18 1210)    veletri/remodulin cassette      veletri/remodulin tubing         Scheduled Meds:   albuterol sulfate  2.5 mg Nebulization Q4H    amLODIPine  5 mg Oral Daily    busPIRone  15 mg Oral TID    desvenlafaxine succinate  100 mg Oral Daily    fluticasone-vilanterol  1 puff Inhalation Daily    furosemide  80 mg Intravenous TID    gabapentin  100 mg Oral TID    lamoTRIgine  100 mg Oral BID    levothyroxine  75 mcg Oral Before breakfast    lurasidone  40 mg Oral Daily    magnesium oxide  400 mg Oral BID    pantoprazole  40 mg Oral Daily    potassium chloride SA  60 mEq Oral BID    pravastatin  40 mg Oral Daily    sodium chloride 0.9%  3 mL Intravenous Q8H    warfarin  10 mg Oral Every Mon, Tues, Wed, Thurs, Fri    warfarin  5 mg Oral Every Sat, Sun     PRN Meds:ALPRAZolam, cyclobenzaprine, hydrocodone-acetaminophen 10-325mg, ondansetron    ----------------------------------------------------------------------------------------------------------------------  Home medications:   No current facility-administered medications on file prior to encounter.      Current Outpatient Prescriptions on File Prior to Encounter   Medication Sig Dispense Refill    albuterol (PROVENTIL) 2.5 mg /3 mL (0.083 %) nebulizer solution Take 2.5 mg by nebulization every 6 (six) hours as needed for Wheezing. Rescue      ALPRAZolam (XANAX) 1 MG tablet Take 0.5 tablets (0.5 mg total) by mouth 3 (three) times daily as needed for Anxiety. 45 tablet 0    amlodipine (NORVASC) 5 MG tablet Take 5 mg by  mouth once daily.      budesonide-formoterol 80-4.5 mcg (SYMBICORT) 80-4.5 mcg/actuation HFAA Inhale 2 puffs into the lungs 2 (two) times daily. Controller      bumetanide (BUMEX) 2 MG tablet Take 2 tablets (4 mg total) by mouth 2 (two) times daily. 120 tablet 11    busPIRone (BUSPAR) 15 MG tablet Take 15 mg by mouth 3 (three) times daily.      cyclobenzaprine (FLEXERIL) 10 MG tablet TAKE 1 TABLET BY MOUTH THREE TIMES DAILY MAY CAUSE DROWSINESS  2    desvenlafaxine succinate (PRISTIQ) 100 MG Tb24 Take 100 mg by mouth once daily.      gabapentin (NEURONTIN) 100 MG capsule Take 1 capsule (100 mg total) by mouth 3 (three) times daily. 90 capsule 11    hydrocodone-acetaminophen 10-325mg (NORCO)  mg Tab Take 1 tablet by mouth every 8 (eight) hours as needed for Pain.      lamotrigine (LAMICTAL) 100 MG tablet Take 100 mg by mouth 2 (two) times daily.      levothyroxine (SYNTHROID) 75 MCG tablet Take 75 mcg by mouth once daily.      lurasidone (LATUDA) 60 mg Tab tablet Take 40 mg by mouth once daily.       magnesium oxide (MAG-OX) 400 mg tablet Take 1 tablet (400 mg total) by mouth 2 (two) times daily.  0    ondansetron (ZOFRAN-ODT) 8 MG TbDL Take 1 tablet (8 mg total) by mouth every 8 (eight) hours as needed. 30 tablet 6    pantoprazole (PROTONIX) 40 MG tablet Take 40 mg by mouth once daily.      potassium chloride SA (K-DUR,KLOR-CON) 20 MEQ tablet Take 3 tablets (60 mEq total) by mouth 2 (two) times daily. 180 tablet 6    pravastatin (PRAVACHOL) 40 MG tablet Take 40 mg by mouth once daily.      sodium chloride 0.9% 0.9 % SolP 100 mL with treprostinil 1 mg/mL Soln 6,000,000 ng Inject 2,380 ng/min into the vein continuous.      warfarin (COUMADIN) 5 MG tablet Take 1.5 tablets (7.5 mg total) by mouth Daily. Then as directed by Coumadin clinic 45 tablet 11         Objective:     Vitals:  Temp:  [97.9 °F (36.6 °C)-99 °F (37.2 °C)]   Pulse:  [74-96]   Resp:  [14-20]   BP: ()/(52-69)   SpO2:  [89  "%-97 %]     /61 (BP Location: Right arm, Patient Position: Lying)   Pulse 96   Temp 97.9 °F (36.6 °C) (Oral)   Resp 18   Ht 5' 2" (1.575 m)   Wt 84.7 kg (186 lb 11.2 oz)   SpO2 (!) 91%   Breastfeeding? No   BMI 34.15 kg/m²  I/O's:    Intake/Output Summary (Last 24 hours) at 02/19/18 0921  Last data filed at 02/19/18 0600   Gross per 24 hour   Intake              360 ml   Output             4300 ml   Net            -3940 ml       Wt Readings from Last 10 Encounters:   02/19/18 84.7 kg (186 lb 11.2 oz)   01/26/18 85 kg (187 lb 6.3 oz)   01/26/18 85.1 kg (187 lb 9.8 oz)   01/09/18 86.3 kg (190 lb 4.1 oz)   12/26/17 85.3 kg (188 lb 0.8 oz)   12/08/17 90 kg (198 lb 6.6 oz)   12/08/17 86.6 kg (191 lb)   11/13/17 82.7 kg (182 lb 5.1 oz)   10/24/17 88.3 kg (194 lb 10.7 oz)   10/24/17 89 kg (196 lb 3.4 oz)        General Appearance:    Alert, cooperative, no distress   Lungs:     Soft crackles bilaterally, respirations unlabored    Heart:    JVP 12 cm H2O at 45 degrees, Regular rate and rhythm, S1  and S2 normal, no murmur, rub or gallop   Abdomen:     Soft, non-tender, bowel sounds active all four quadrants,     no masses, no organomegaly   Extremities:   +2 edema b/l, pulses +1 b/l     Labs:       Recent Labs  Lab 02/17/18  1325  02/18/18  0845 02/19/18  0521     --  141 141   K 3.0*  < > 4.1 3.3*     --  103 99   CO2 26  --  26 31*   BUN 15  --  16 14   CREATININE 1.1  --  1.2 0.9   *  --  137* 81   ANIONGAP 12  --  12 11   < > = values in this interval not displayed.    Recent Labs  Lab 02/17/18  0118   AST 25   ALT 18   ALKPHOS 144*   BILITOT 0.7   ALBUMIN 3.7     No results for input(s): PH, PCO2, PO2, HCO3, POCSATURATED in the last 168 hours.     Recent Labs  Lab 02/17/18  0354   WBC 6.07   HGB 12.5   HCT 39.1      GRAN 77.5*  4.7       Recent Labs  Lab 02/17/18  0118 02/18/18  1338 02/19/18  0521   INR 2.5* 3.5* 2.6*       Recent Labs  Lab 02/17/18  0122   *      Micro: "   Blood Cultures  No results found for: LABBLOO  Urine Cultures  No results found for: LABURIN    CT chest:     -table clustered micronodules noted within the right upper lobe, unchanged when compared to the previous CT and likely reflecting small airways inflammation or infection.    Interval development of trace bilateral dependent pleural effusions with associated compressive atelectasis of the adjacent lung, right slightly greater than left.    Subsegmental atelectasis within the left upper lobe, lingula and right middle lobe.    5 mm pulmonary nodule within the apical segment of the right upper lobe. Per Fleischner Society 2017 guidelines: in a low risk patient, no follow up recommend. In a high risk patient history of cancer/ smoker, consider 12 month CT chest follow up to evaluate for stability or change.    Prominent pulmonary arteries, a finding that can be seen the setting of pulmonary arterial hypertension.    Cardiomegaly, coronary artery atherosclerosis and mild calcification of the aortic annulus.      EF   Date Value Ref Range Status   12/08/2017 55 55 - 65    10/05/2017 55 55 - 65      Assessment:      57 YOCW with PMH of WHO Pul HTN, with respiratory failure, being treated with Diuretic therapy and worked for respiratory failure.     Plan:          * Acute on chronic right-sided heart failure     - Volume overloaded on exam and chest xray with pleural effusion.   - Lasix IV dose increased yesterday from 80 mg BID to TID; however continued dependence on high dose of supplemental oxygen   - 4L net negative for past 24 hours  - Repleting electrolytes  - check K, Mg at 2pm  - Monitor closely and downtitrated supplemental O2; target Puls Ox at 90-92  -CT chest performed overnight; result above  - concern for PNA vs ILD as pt doesn't seem to be improving fast enough  -KENNETH and respiratory viral panel ordered       Acute on chronic respiratory failure     -In the setting of bilateral pleural effusion due to  poor RV functions in the setting of WHO 1 Pulm HTN  -Continue supplemental O2 and diuretic therapy.   -CT chest performed overnight; result above       Diarrhea     -resolved, last BM was on 2/17       Essential hypertension     - Stable for now  - Continue amlodipine 5 mg daily       Hypothyroidism     - Asymptomatic, continue home dose of Levothyroxine       Pulmonary hypertension, group 1     -Continue IV remodulin at current dose of 60 ng/kg/min   - Complicated by RV failure leading to pleural effusion and respiratory failure.   -Continue PRN nebs  - Supplemental O2 at 9 LPM; Titrate down  to home O2 needs:  unsually sats in 90's on 4L  -continue coumadin 5mg Sat and Sun, 10 mg other days per last coumadin clinic note   -INR 2.6 today                      VTE Risk Mitigation          Ordered       warfarin (COUMADIN) tablet 10 mg  Every Mon, Tues, Wed, Thurs, Fri     Route:  Oral        02/17/18 0102       warfarin (COUMADIN) tablet 5 mg  Every Sat, Sun     Route:  Oral        02/17/18 0102          Signed:  La Lundberg MD  Cardiology Hospitalist  Pager: [phone]  2/19/2018 9:21 AM

## 2018-02-19 NOTE — PLAN OF CARE
Problem: Patient Care Overview  Goal: Plan of Care Review  Outcome: Ongoing (interventions implemented as appropriate)  Pt AAOx4, VSS, in NAD throughout shift.  Pt up to bedside commode or bathroom throughout shift.  O2 tank left in restroom with nasal cannula attached so pt can use either O2 source.  Pt sats 91% or more throughout shift.  Pt denies pain, has few complaints.  RN changed cassette today, pt tolerating Remodulin infusion well.  Pt tolerating all medications and interventions well.  RN maintaining fall precaution measures throughout shift.  Pt denies pain or other need at this time; RN will continue to monitor, assess, and alter plan of care as needed until report given to oncoming night shift nurse.

## 2018-02-19 NOTE — PROGRESS NOTES
Admit Note     Met with patient to assess needs. Patient is a 57 y.o.  female, admitted for for pulmonary hypertension and acute on chronic right side heart failure.     Patient admitted from home on 2/16/2018 .  At this time, patient presents as alert and oriented x 4, pleasant and good eye contact.  At this time, patients caregiver is not currently in attendance.     Household/Family Systems     Patient resides alone  At     [address]  [place] [address].   2.5 hours from [facility].  [place] is close to [place]     Pt reports this is assisted living, however pt reports she does not receive any assistance.   Support system includes daughter, father and a few friends.    Patient does not have dependents that are need of being cared for.     Patients primary caregiver is self.   Pt's cell:  [phone]  Additional emergency contacts:  Prabha Smith (daughter, lives in [place] with her young daughter)   NERY Clive (father, lives in [place]) [phone]    During admission, patient's caregiver plans to stay at home.  Confirmed patient and patients caregivers do not have access to reliable transportation.    Cognitive Status/Learning     Patient reports reading ability as 12th grade and states patient does not have difficulty with reading, writing, seeing, hearing and memory.Pt reports difficulty with comprehension and learning.  Pt wears glasses.   Patient reports patient learns best by one on one.   Needed: No.   Highest education level: High School (9-12) or GED    Vocation/Disability   .  Working for Income: No  If no, reason not working: Disability  Patient is disabled due to bipolar disorder  since 25-30 years.  Prior to disability, patient  was employed as a .    Adherence     Patient reports a high level of adherence to patients health care regimen. Pt reports since last admission she has been really watching her salt and fluid intake.   Adherence counseling and  education provided. Patient verbalizes understanding.    Substance Use    Patient reports the following substance usage.    Tobacco: none.  Pt smoke from 7517-9110, 1-2 ppd.   Alcohol: none, patient denies any use.  Illicit Drugs/Non-prescribed Medications: none, patient denies any use.  Patient states clear understanding of the potential impact of substance use.  Substance abstinence/cessation counseling, education and resources provided and reviewed.     Services Utilizing/ADLS    Infusion Service: Prior to admission, patient utilizing? no  Home Health: Prior to admission, patient utilizing? yes [facility] [phone], fax [phone]. 2x a week.  Pt would like to use the same [employer] company when discharged  DME: Prior to admission, yes home 02 with portables 3LPM via [employer]. Pt also has a nebulizar and shower chair.   Pulmonary/Cardiac Rehab: Prior to admission, no  Dialysis:  Prior to admission, no  Transplant Specialty Pharmacy:  Prior to admission, no.    Prior to admission, patient reports patient was independent with ADLS and was not driving. The pt's daughter and father can help with transportation around their home town, but not to and from [place]. The pt has two friends, Rivka and Lexi can assist with transportation.   Patient reports patient is not able to care for self at this time due to compromised medical condition (as documented in medical record) and physical weakness..  Patient indicates a willingness to care for self once medically cleared to do so.    Insurance/Medications    Insured by   Payor/Plan Subscr  Sex Relation Sub. Ins. ID Effective Group Num   1. [employer]* HERACLIO GARCIA 1960 Female  [id number] [address]      Primary Insurance (for UNOS reporting): Public Insurance - Medicare FFS (Fee For Service)  Secondary Insurance (for UNOS reporting): None    Patient reports patient is able to obtain and afford  medications at this time and at time of discharge.    Living Will/Healthcare Power of     Patient states patient does not have a LW and/or HCPA.   provided education regarding LW and HCPA and the completion of forms.    Coping/Mental Health    Patient is coping adequately with the aid of  family members.   Patient indicates mental health difficulties. The pt has a long history of Bipolar disorder with anxiety and depression. Pt reports she is taking multiple medications to assist with same.  The pt reports her depression has increased due to a future housing change. The pt reports that at the end of March she will move to [place] to live with her daughter and young granddaughter. The pt reports this is a very good move, however she has to give her dog away because the dog does not like her granddaughter.  Worker provided support.      Discharge Planning    At time of discharge, patient plans to return to patient's home under the care of self.  Patients friend will transport patient.  Per rounds today, expected discharge date has not been medically determined at this time, however the pt thinks she may be discharged by Friday.  Patient and patients caregiver  verbalize understanding and are involved in treatment planning and discharge process.    Additional Concerns    Patient is being followed for needs, education, resources, information, emotional support, supportive counseling, and for supportive and skilled discharge plan of care.  providing ongoing psychosocial support, education, resources and d/c planning as needed.  SW remains available. Patient denies additional needs and/or concerns at this time. Patient verbalizes understanding and agreement with information reviewed, social work availability, and how to access available resources as needed.

## 2018-02-20 LAB
ANION GAP SERPL CALC-SCNC: 12 MMOL/L
BUN SERPL-MCNC: 17 MG/DL
CALCIUM SERPL-MCNC: 9.1 MG/DL
CHLORIDE SERPL-SCNC: 95 MMOL/L
CO2 SERPL-SCNC: 33 MMOL/L
CREAT SERPL-MCNC: 0.9 MG/DL
EST. GFR  (AFRICAN AMERICAN): >60 ML/MIN/1.73 M^2
EST. GFR  (NON AFRICAN AMERICAN): >60 ML/MIN/1.73 M^2
FLUAV AG SPEC QL IA: NEGATIVE
FLUBV AG SPEC QL IA: NEGATIVE
GLUCOSE SERPL-MCNC: 70 MG/DL
INR PPP: 1.8
MAGNESIUM SERPL-MCNC: 2 MG/DL
MAGNESIUM SERPL-MCNC: 2.1 MG/DL
POTASSIUM SERPL-SCNC: 2.9 MMOL/L
POTASSIUM SERPL-SCNC: 3.9 MMOL/L
PROTHROMBIN TIME: 17.7 SEC
SODIUM SERPL-SCNC: 140 MMOL/L
SPECIMEN SOURCE: NORMAL

## 2018-02-20 PROCEDURE — 99233 SBSQ HOSP IP/OBS HIGH 50: CPT | Mod: ,,, | Performed by: INTERNAL MEDICINE

## 2018-02-20 PROCEDURE — 85610 PROTHROMBIN TIME: CPT

## 2018-02-20 PROCEDURE — 25000003 PHARM REV CODE 250: Performed by: INTERNAL MEDICINE

## 2018-02-20 PROCEDURE — 84132 ASSAY OF SERUM POTASSIUM: CPT

## 2018-02-20 PROCEDURE — 27100171 HC OXYGEN HIGH FLOW UP TO 24 HOURS

## 2018-02-20 PROCEDURE — 63600175 PHARM REV CODE 636 W HCPCS: Performed by: INTERNAL MEDICINE

## 2018-02-20 PROCEDURE — 87400 INFLUENZA A/B EACH AG IA: CPT | Mod: 59

## 2018-02-20 PROCEDURE — 87632 RESP VIRUS 6-11 TARGETS: CPT

## 2018-02-20 PROCEDURE — 80048 BASIC METABOLIC PNL TOTAL CA: CPT

## 2018-02-20 PROCEDURE — 99900035 HC TECH TIME PER 15 MIN (STAT)

## 2018-02-20 PROCEDURE — 27000221 HC OXYGEN, UP TO 24 HOURS

## 2018-02-20 PROCEDURE — 83735 ASSAY OF MAGNESIUM: CPT | Mod: 91

## 2018-02-20 PROCEDURE — 25000242 PHARM REV CODE 250 ALT 637 W/ HCPCS: Performed by: INTERNAL MEDICINE

## 2018-02-20 PROCEDURE — 36415 COLL VENOUS BLD VENIPUNCTURE: CPT

## 2018-02-20 PROCEDURE — 94640 AIRWAY INHALATION TREATMENT: CPT

## 2018-02-20 PROCEDURE — 20600001 HC STEP DOWN PRIVATE ROOM

## 2018-02-20 PROCEDURE — 27100092 HC HIGH FLOW DELIVERY CANNULA

## 2018-02-20 PROCEDURE — 94660 CPAP INITIATION&MGMT: CPT

## 2018-02-20 PROCEDURE — 94761 N-INVAS EAR/PLS OXIMETRY MLT: CPT

## 2018-02-20 RX ORDER — WARFARIN SODIUM 5 MG/1
10 TABLET ORAL ONCE
Status: COMPLETED | OUTPATIENT
Start: 2018-02-20 | End: 2018-02-20

## 2018-02-20 RX ADMIN — POTASSIUM CHLORIDE 60 MEQ: 1500 TABLET, EXTENDED RELEASE ORAL at 08:02

## 2018-02-20 RX ADMIN — LAMOTRIGINE 100 MG: 100 TABLET ORAL at 08:02

## 2018-02-20 RX ADMIN — AMLODIPINE BESYLATE 5 MG: 5 TABLET ORAL at 08:02

## 2018-02-20 RX ADMIN — ALBUTEROL SULFATE 2.5 MG: 2.5 SOLUTION RESPIRATORY (INHALATION) at 03:02

## 2018-02-20 RX ADMIN — BUSPIRONE HYDROCHLORIDE 15 MG: 5 TABLET ORAL at 12:02

## 2018-02-20 RX ADMIN — GABAPENTIN 100 MG: 100 CAPSULE ORAL at 12:02

## 2018-02-20 RX ADMIN — GABAPENTIN 100 MG: 100 CAPSULE ORAL at 04:02

## 2018-02-20 RX ADMIN — MAGNESIUM OXIDE TAB 400 MG (241.3 MG ELEMENTAL MG) 400 MG: 400 (241.3 MG) TAB at 08:02

## 2018-02-20 RX ADMIN — BUSPIRONE HYDROCHLORIDE 15 MG: 5 TABLET ORAL at 08:02

## 2018-02-20 RX ADMIN — Medication: at 12:02

## 2018-02-20 RX ADMIN — LEVOTHYROXINE SODIUM 75 MCG: 75 TABLET ORAL at 04:02

## 2018-02-20 RX ADMIN — TREPROSTINIL 60 NG/KG/MIN: 100 INJECTION, SOLUTION INTRAVENOUS; SUBCUTANEOUS at 12:02

## 2018-02-20 RX ADMIN — BUSPIRONE HYDROCHLORIDE 15 MG: 5 TABLET ORAL at 04:02

## 2018-02-20 RX ADMIN — GABAPENTIN 100 MG: 100 CAPSULE ORAL at 08:02

## 2018-02-20 RX ADMIN — DESVENLAFAXINE SUCCINATE 100 MG: 50 TABLET, FILM COATED, EXTENDED RELEASE ORAL at 08:02

## 2018-02-20 RX ADMIN — PRAVASTATIN SODIUM 40 MG: 40 TABLET ORAL at 08:02

## 2018-02-20 RX ADMIN — LURASIDONE HYDROCHLORIDE 40 MG: 40 TABLET, FILM COATED ORAL at 10:02

## 2018-02-20 RX ADMIN — FUROSEMIDE 80 MG: 10 INJECTION, SOLUTION INTRAMUSCULAR; INTRAVENOUS at 08:02

## 2018-02-20 RX ADMIN — ALBUTEROL SULFATE 2.5 MG: 2.5 SOLUTION RESPIRATORY (INHALATION) at 08:02

## 2018-02-20 RX ADMIN — ALBUTEROL SULFATE 2.5 MG: 2.5 SOLUTION RESPIRATORY (INHALATION) at 11:02

## 2018-02-20 RX ADMIN — PANTOPRAZOLE SODIUM 40 MG: 40 TABLET, DELAYED RELEASE ORAL at 08:02

## 2018-02-20 RX ADMIN — FLUTICASONE FUROATE AND VILANTEROL TRIFENATATE 1 PUFF: 100; 25 POWDER RESPIRATORY (INHALATION) at 08:02

## 2018-02-20 RX ADMIN — FUROSEMIDE 80 MG: 10 INJECTION, SOLUTION INTRAMUSCULAR; INTRAVENOUS at 04:02

## 2018-02-20 RX ADMIN — ALBUTEROL SULFATE 2.5 MG: 2.5 SOLUTION RESPIRATORY (INHALATION) at 12:02

## 2018-02-20 RX ADMIN — WARFARIN SODIUM 10 MG: 5 TABLET ORAL at 05:02

## 2018-02-20 RX ADMIN — FUROSEMIDE 80 MG: 10 INJECTION, SOLUTION INTRAMUSCULAR; INTRAVENOUS at 12:02

## 2018-02-20 NOTE — PT/OT/SLP EVAL
"Physical Therapy Evaluation and Discharge Note    Patient Name:  Sue Smith   MRN:  [id number]    Recommendations:     Discharge Recommendations:  home   Discharge Equipment Recommendations: none   Barriers to discharge: None    Assessment:     Sue Smith is a 57 y.o. female admitted with a medical diagnosis of Acute on chronic right-sided heart failure.  Pt presents with PMH severe WHO Group 1 PAH previously on Uptravi but now on IV remodulin, chronic hypoxic respiratory failure, chronic RV failure, on home O2 and Bipap, and h/o trach in past (>4 years ago, eventually decannulated) who presents as a transfer from [facility] with increased SOB over the past 2 days and a recent 15 lb weight gain.  Transferred here as [facility] unable to support IV remodulin.  Recent diarrhea over past 2 days.At this time, patient is functioning at their prior level of function and does not require further acute PT services.     Recent Surgery: * No surgery found *      Plan:     During this hospitalization, patient does not require further acute PT services.  Please re-consult if situation changes.     Plan of Care Reviewed with: patient    Subjective     Communicated with nursing prior to session.  Patient found in supine upon PT entry to room, agreeable to evaluation.      "In my neck, I have stenosis."    Chief Complaint: Neck pain  Patient comments/goals: To go home  Pain/Comfort:  · Pain Rating 1: 6/10  · Location 1: neck  · Pain Addressed 1: Nurse notified, Pre-medicate for activity, Distraction    Patients cultural, spiritual, Mandaen conflicts given the current situation: no    Living Environment:  Pt lives alone, apartment, [address].  Pt has friends that are able to drive.   Prior to admission, patients level of function was independent with functional transfers, gait, and ADLs.  Patient has the following equipment: grab bar, shower chair, oxygen.  DME owned (not currently used): none.  Upon discharge, " patient will have assistance from friends.    Objective:     Patient found with: peripheral IV, central line, telemetry     General Precautions: Standard, fall   Orthopedic Precautions:N/A   Braces: N/A     Exams:    · Cognitive Exam:  Patient is oriented to Person, Place, Time and Situation and follows 100% of multi step commands   · Fine Motor Coordination:    · -       Intact  Left hand thumb/finger opposition skills and Right hand thumb/finger opposition skills  · Gross Motor Coordination:  WFL  · Postural Exam:  Patient presented with the following abnormalities:    · -       No postural abnormalities identified  · Sensation:    · -       Intact  light/touch B LEs  · Skin Integrity/Edema:      · -       Skin integrity: Visible skin intact  · -       Edema: None noted B LEs    · RUE ROM: WFL  · RUE Strength: WFL  · LUE ROM: WFL  · LUE Strength: WFL    · RLE ROM: WFL  · RLE Strength: WFL  · LLE ROM: WFL  · LLE Strength: WFL    Functional Mobility:    · Bed Mobility:     · Scooting: independence  · Supine to Sit: independence  · Sit to Supine: independence    · Transfers:     · Sit to Stand:  independence with no AD  · Bed to Chair: independence with  no AD  using  Stand Pivot    · Gait: Pt amb 1000', independently, on 10L/min of supp O2 via NC, min SOB noted, pt able to push O2 tank    · Balance: independent: with dynamic standing balance without AD    AM-PAC 6 CLICK MOBILITY  Total Score:24       Therapeutic Activities and Exercises:   Whiteboard updated  6MWT: Pt amb 910' = 277.37m, 6/10 RPE following  Gait speed = 0.77 m/s  10x sit<>stand: 27.20 sec, 8/10 RPE following  Replaced supp O2 to 8.5 L/min following gait training    Patient left supine with all lines intact, call button in reach and nursing notified.    GOALS:    Physical Therapy Goals     Not on file                History:     Past Medical History:   Diagnosis Date    Bipolar disorder     CHF (congestive heart failure)     Dyslipidemia     GERD  (gastroesophageal reflux disease)     Hx of tracheostomy     Decanulated / surgically removed in 2013? Does not currently have tracheostomy    Hypertension     Hypothyroidism     Oxygen dependent     3L around the clock     Pulmonary HTN     Pulmonary hypertension, group 1 10/4/2017    Pulmonary nodules 10/10/2017    Respiratory failure, chronic        Past Surgical History:   Procedure Laterality Date    BACK SURGERY      PERICARDIAL WINDOW  2013    [place] LEFT HEART CATH,PERCUTANEOUS      Cardiac Cath, Left Heart    TUBAL LIGATION         Clinical Decision Making:     History  Co-morbidities and personal factors that may impact the plan of care Examination  Body Structures and Functions, activity limitations and participation restrictions that may impact the plan of care Clinical Presentation   Decision Making/ Complexity Score   Co-morbidities:   [] Time since onset of injury / illness / exacerbation  [x] Status of current condition  []Patient's cognitive status and safety concerns    [] Multiple Medical Problems (see med hx)  Personal Factors:   [] Patient's age  [] Prior Level of function   [] Patient's home situation (environment and family support)  [] Patient's level of motivation  [] Expected progression of patient      HISTORY:(criteria)    [] 66200 - no personal factors/history    [x] 85373 - has 1-2 personal factor/comorbidity     [] 45875 - has >3 personal factor/comorbidity     Body Regions:  [] Objective examination findings  [] Head     []  Neck  [x] Trunk   [] Upper Extremity  [] Lower Extremity    Body Systems:  [] For communication ability, affect, cognition, language, and learning style: the assessment of the ability to make needs known, consciousness, orientation (person, place, and time), expected emotional /behavioral responses, and learning preferences (eg, learning barriers, education  needs)  [] For the neuromuscular system: a general assessment of gross coordinated movement (eg,  balance, gait, locomotion, transfers, and transitions) and motor function  (motor control and motor learning)  [] For the musculoskeletal system: the assessment of gross symmetry, gross range of motion, gross strength, height, and weight  [] For the integumentary system: the assessment of pliability(texture), presence of scar formation, skin color, and skin integrity  [x] For cardiovascular/pulmonary system: the assessment of heart rate, respiratory rate, blood pressure, and edema     Activity limitations:    [] Patient's cognitive status and saf ety concerns          [x] Status of current condition      [] Weight bearing restriction  [] Cardiopulmunary Restriction    Participation Restrictions:   [] Goals and goal agreement with the patient     [] Rehab potential (prognosis) and probable outcome      Examination of Body System: (criteria)    [x] 14941 - addressing 1-2 elements    [] 83276 - addressing a total of 3 or more elements     [] 97928 -  Addressing a total of 4 or more elements         Clinical Presentation: (criteria)  Stable - 68885     On examination of body system using standardized tests and measures patient presents with 1-2 elements from any of the following: body structures and functions, activity limitations, and/or participation restrictions.  Leading to a clinical presentation that is considered stable and/or uncomplicated                              Clinical Decision Making  (Eval Complexity):  Low- 42718     Time Tracking:     PT Received On: 02/19/18  PT Start Time: 1738     PT Stop Time: 1800  PT Total Time (min): 22 min     Billable Minutes: Evaluation 10 and Gait Training 12      Lexi Coy, PT  02/19/2018

## 2018-02-20 NOTE — PLAN OF CARE
Problem: Patient Care Overview  Goal: Plan of Care Review  Pt aaox4 vswnl and no c/o pain. Bed in low position and callbell within reach. 9liters hiflow maintained with cpap at bedtime. faie20-52%. remodulin 60ng/kg/min per cadd pump changed 1300 dayshift. Pt receiving lasix ivp and good uop. Telemetry maintained nsr. Standard precautions maintained and pt ambulates independently.

## 2018-02-20 NOTE — PLAN OF CARE
Problem: Patient Care Overview  Goal: Plan of Care Review  Outcome: Ongoing (interventions implemented as appropriate)  Pt aao x 4. Pt currently in bed with bed in lowest/locked position. Call light in reach. Free from falls/injury and wearing non-skid footwear when ambulating. Patient has walked 2x today with nasal cannula. Respiratory swab collected, results pending. O2 titrated to keep above 90%, pt currently on 6L HFNC with O2 91%. No c/o pain. IV lasix given, see flowsheets for documentation. Pt complying with fluid restriction.     Will continue to monitor, assess, and adjust care as needed.

## 2018-02-20 NOTE — PROGRESS NOTES
Heart Failure Progress Note  Attending Physician: Madi Santana MD  Hospital Day: 5    Subjective:   Interval History: Pt feels well.  Still diuresing well.  O2 requirements have decreased to 7L.    Medications:   Continuous Infusions:   treprostinil (REMODULIN) infusion 60 ng/kg/min (02/19/18 1310)    veletri/remodulin cassette      veletri/remodulin tubing         Scheduled Meds:   albuterol sulfate  2.5 mg Nebulization Q4H    amLODIPine  5 mg Oral Daily    busPIRone  15 mg Oral TID    desvenlafaxine succinate  100 mg Oral Daily    fluticasone-vilanterol  1 puff Inhalation Daily    furosemide  80 mg Intravenous TID    gabapentin  100 mg Oral TID    lamoTRIgine  100 mg Oral BID    levothyroxine  75 mcg Oral Before breakfast    lurasidone  40 mg Oral Daily    magnesium oxide  400 mg Oral BID    pantoprazole  40 mg Oral Daily    potassium chloride SA  60 mEq Oral BID    pravastatin  40 mg Oral Daily    sodium chloride 0.9%  3 mL Intravenous Q8H    warfarin  10 mg Oral Every Mon, Tues, Wed, Thurs, Fri    warfarin  5 mg Oral Every Sat, Sun     PRN Meds:ALPRAZolam, cyclobenzaprine, hydrocodone-acetaminophen 10-325mg, ondansetron    ----------------------------------------------------------------------------------------------------------------------  Home medications:   No current facility-administered medications on file prior to encounter.      Current Outpatient Prescriptions on File Prior to Encounter   Medication Sig Dispense Refill    albuterol (PROVENTIL) 2.5 mg /3 mL (0.083 %) nebulizer solution Take 2.5 mg by nebulization every 6 (six) hours as needed for Wheezing. Rescue      ALPRAZolam (XANAX) 1 MG tablet Take 0.5 tablets (0.5 mg total) by mouth 3 (three) times daily as needed for Anxiety. 45 tablet 0    amlodipine (NORVASC) 5 MG tablet Take 5 mg by mouth once daily.      budesonide-formoterol 80-4.5 mcg (SYMBICORT) 80-4.5 mcg/actuation HFAA Inhale 2 puffs into the lungs 2  (two) times daily. Controller      bumetanide (BUMEX) 2 MG tablet Take 2 tablets (4 mg total) by mouth 2 (two) times daily. 120 tablet 11    busPIRone (BUSPAR) 15 MG tablet Take 15 mg by mouth 3 (three) times daily.      cyclobenzaprine (FLEXERIL) 10 MG tablet TAKE 1 TABLET BY MOUTH THREE TIMES DAILY MAY CAUSE DROWSINESS  2    desvenlafaxine succinate (PRISTIQ) 100 MG Tb24 Take 100 mg by mouth once daily.      gabapentin (NEURONTIN) 100 MG capsule Take 1 capsule (100 mg total) by mouth 3 (three) times daily. 90 capsule 11    hydrocodone-acetaminophen 10-325mg (NORCO)  mg Tab Take 1 tablet by mouth every 8 (eight) hours as needed for Pain.      lamotrigine (LAMICTAL) 100 MG tablet Take 100 mg by mouth 2 (two) times daily.      levothyroxine (SYNTHROID) 75 MCG tablet Take 75 mcg by mouth once daily.      lurasidone (LATUDA) 60 mg Tab tablet Take 40 mg by mouth once daily.       magnesium oxide (MAG-OX) 400 mg tablet Take 1 tablet (400 mg total) by mouth 2 (two) times daily.  0    ondansetron (ZOFRAN-ODT) 8 MG TbDL Take 1 tablet (8 mg total) by mouth every 8 (eight) hours as needed. 30 tablet 6    pantoprazole (PROTONIX) 40 MG tablet Take 40 mg by mouth once daily.      potassium chloride SA (K-DUR,KLOR-CON) 20 MEQ tablet Take 3 tablets (60 mEq total) by mouth 2 (two) times daily. 180 tablet 6    pravastatin (PRAVACHOL) 40 MG tablet Take 40 mg by mouth once daily.      sodium chloride 0.9% 0.9 % SolP 100 mL with treprostinil 1 mg/mL Soln 6,000,000 ng Inject 2,380 ng/min into the vein continuous.      warfarin (COUMADIN) 5 MG tablet Take 1.5 tablets (7.5 mg total) by mouth Daily. Then as directed by Coumadin clinic 45 tablet 11         Objective:     Vitals:  Temp:  [97.4 °F (36.3 °C)-98.4 °F (36.9 °C)]   Pulse:  []   Resp:  [14-20]   BP: ()/(62-69)   SpO2:  [91 %-98 %]     /62 (BP Location: Right arm, Patient Position: Sitting)   Pulse 87   Temp 98.4 °F (36.9 °C) (Oral)   Resp  "14   Ht 5' 2" (1.575 m)   Wt 84.4 kg (186 lb 1.6 oz)   SpO2 97%   Breastfeeding? No   BMI 34.04 kg/m²  I/O's:    Intake/Output Summary (Last 24 hours) at 02/20/18 0832  Last data filed at 02/20/18 0714   Gross per 24 hour   Intake             1450 ml   Output             4280 ml   Net            -2830 ml       Wt Readings from Last 10 Encounters:   02/20/18 84.4 kg (186 lb 1.6 oz)   01/26/18 85 kg (187 lb 6.3 oz)   01/26/18 85.1 kg (187 lb 9.8 oz)   01/09/18 86.3 kg (190 lb 4.1 oz)   12/26/17 85.3 kg (188 lb 0.8 oz)   12/08/17 90 kg (198 lb 6.6 oz)   12/08/17 86.6 kg (191 lb)   11/13/17 82.7 kg (182 lb 5.1 oz)   10/24/17 88.3 kg (194 lb 10.7 oz)   10/24/17 89 kg (196 lb 3.4 oz)        General Appearance:    Alert, cooperative, no distress   Lungs:     Soft crackles bilaterally, respirations unlabored    Heart:   JVP 12 cm H2O at 45 degrees, Regular rate and rhythm, S1 and S2 normal, no murmur, rub or gallop   Abdomen:     Soft, non-tender, bowel sounds active all four quadrants,     no masses, no organomegaly   Extremities:   +1 edema b/l, pulses +1 b/l     Labs:       Recent Labs  Lab 02/18/18  0845 02/19/18  0521  02/20/18  0421    141  --  140   K 4.1 3.3*  < > 2.9*    99  --  95   CO2 26 31*  --  33*   BUN 16 14  --  17   CREATININE 1.2 0.9  --  0.9   * 81  --  70   ANIONGAP 12 11  --  12   < > = values in this interval not displayed.    Recent Labs  Lab 02/17/18  0118   AST 25   ALT 18   ALKPHOS 144*   BILITOT 0.7   ALBUMIN 3.7      Recent Labs  Lab 02/17/18  0354   WBC 6.07   HGB 12.5   HCT 39.1      GRAN 77.5*  4.7       Recent Labs  Lab 02/17/18  0118 02/18/18  1338 02/19/18  0521   INR 2.5* 3.5* 2.6*       Recent Labs  Lab 02/17/18  0122   *          EF   Date Value Ref Range Status   02/19/2018 60 55 - 65    12/08/2017 55 55 - 65    10/05/2017 55 55 - 65      Assessment:      57 YOCW with PMH of WHO Pul HTN, with respiratory failure, being treated with Diuretic " therapy and worked for respiratory failure.      Plan:            * Acute on chronic right-sided heart failure     - continue Lasix IV dose 80 mgTID, diuresing well, - 4.2L out for past 24 hours  - Repleting electrolytes as needed, has standing orders  - Monitor closely and downtitrated supplemental O2; target Puls Ox at 90-92  - Cr remains ok at 0.9 today, BUN 17  -KENNETH and respiratory viral panel ordered pending  -Echo performed yesterday showing sever RV dysfunction and PAP~100       Acute on chronic respiratory failure     -In the setting of bilateral pleural effusion due to poor RV functions in the setting of WHO 1 Pulm HTN  -Continue supplemental O2 and diuretic therapy  -CT chest performed overnight; result above       Essential hypertension     - Stable for now  - Continue amlodipine 5 mg daily       Hypothyroidism     - Asymptomatic, continue home dose of Levothyroxine       Pulmonary hypertension, group 1     -Continue IV remodulin at current dose of 60 ng/kg/min   - Complicated by RV failure leading to pleural effusion and respiratory failure.   -Continue PRN nebs  - Supplemental O2 at 7 LPM; Titrate down  to home O2 needs:  unsually sats in 90's on 4L at home  -continue coumadin 5mg Sat and Sun, 10 mg other days per last coumadin clinic note   -INR 2.6 today                                  VTE Risk Mitigation          Ordered       warfarin (COUMADIN) tablet 10 mg  Every Mon, Tues, Wed, Thurs, Fri     Route:  Oral        02/17/18 0102       warfarin (COUMADIN) tablet 5 mg  Every Sat, Sun     Route:  Oral        02/17/18 0102         Signed:  La Lundberg MD  Cardiology Hospitalist  Pager: [phone]  2/20/2018 8:32 AM

## 2018-02-21 LAB
ANION GAP SERPL CALC-SCNC: 14 MMOL/L
BASOPHILS # BLD AUTO: 0.03 K/UL
BASOPHILS NFR BLD: 0.7 %
BUN SERPL-MCNC: 20 MG/DL
CALCIUM SERPL-MCNC: 9.2 MG/DL
CHLORIDE SERPL-SCNC: 94 MMOL/L
CO2 SERPL-SCNC: 33 MMOL/L
CREAT SERPL-MCNC: 1 MG/DL
DIFFERENTIAL METHOD: ABNORMAL
ENTEROVIRUS: NOT DETECTED
EOSINOPHIL # BLD AUTO: 0.2 K/UL
EOSINOPHIL NFR BLD: 4.1 %
ERYTHROCYTE [DISTWIDTH] IN BLOOD BY AUTOMATED COUNT: 16.5 %
EST. GFR  (AFRICAN AMERICAN): >60 ML/MIN/1.73 M^2
EST. GFR  (NON AFRICAN AMERICAN): >60 ML/MIN/1.73 M^2
GLUCOSE SERPL-MCNC: 74 MG/DL
HCT VFR BLD AUTO: 39 %
HGB BLD-MCNC: 12.5 G/DL
HUMAN BOCAVIRUS: NOT DETECTED
HUMAN CORONAVIRUS, COMMON COLD VIRUS: NOT DETECTED
IMM GRANULOCYTES # BLD AUTO: 0.01 K/UL
IMM GRANULOCYTES NFR BLD AUTO: 0.2 %
INFLUENZA A - H1N1-09: NOT DETECTED
INR PPP: 1.6
LYMPHOCYTES # BLD AUTO: 0.7 K/UL
LYMPHOCYTES NFR BLD: 16 %
MAGNESIUM SERPL-MCNC: 2 MG/DL
MCH RBC QN AUTO: 29.3 PG
MCHC RBC AUTO-ENTMCNC: 32.1 G/DL
MCV RBC AUTO: 92 FL
MONOCYTES # BLD AUTO: 0.3 K/UL
MONOCYTES NFR BLD: 7.1 %
NEUTROPHILS # BLD AUTO: 3.2 K/UL
NEUTROPHILS NFR BLD: 71.9 %
NRBC BLD-RTO: 0 /100 WBC
PARAINFLUENZA: NOT DETECTED
PLATELET # BLD AUTO: 127 K/UL
PMV BLD AUTO: 11.1 FL
POTASSIUM SERPL-SCNC: 3.3 MMOL/L
PROTHROMBIN TIME: 15.9 SEC
RBC # BLD AUTO: 4.26 M/UL
RVP - ADENOVIRUS: NOT DETECTED
RVP - HUMAN METAPNEUMOVIRUS (HMPV): NOT DETECTED
RVP - INFLUENZA A: NOT DETECTED
RVP - INFLUENZA B: NOT DETECTED
RVP - RESPIRATORY SYNCTIAL VIRUS (RSV) A: NOT DETECTED
RVP - RESPIRATORY VIRAL PANEL, SOURCE: NORMAL
RVP - RHINOVIRUS: NOT DETECTED
SODIUM SERPL-SCNC: 141 MMOL/L
WBC # BLD AUTO: 4.38 K/UL

## 2018-02-21 PROCEDURE — 94660 CPAP INITIATION&MGMT: CPT

## 2018-02-21 PROCEDURE — A4216 STERILE WATER/SALINE, 10 ML: HCPCS | Performed by: INTERNAL MEDICINE

## 2018-02-21 PROCEDURE — 99900035 HC TECH TIME PER 15 MIN (STAT)

## 2018-02-21 PROCEDURE — 20600001 HC STEP DOWN PRIVATE ROOM

## 2018-02-21 PROCEDURE — 94640 AIRWAY INHALATION TREATMENT: CPT

## 2018-02-21 PROCEDURE — 94761 N-INVAS EAR/PLS OXIMETRY MLT: CPT

## 2018-02-21 PROCEDURE — 27000221 HC OXYGEN, UP TO 24 HOURS

## 2018-02-21 PROCEDURE — 25000003 PHARM REV CODE 250: Performed by: INTERNAL MEDICINE

## 2018-02-21 PROCEDURE — 80048 BASIC METABOLIC PNL TOTAL CA: CPT

## 2018-02-21 PROCEDURE — 83735 ASSAY OF MAGNESIUM: CPT

## 2018-02-21 PROCEDURE — 25000242 PHARM REV CODE 250 ALT 637 W/ HCPCS: Performed by: INTERNAL MEDICINE

## 2018-02-21 PROCEDURE — 85610 PROTHROMBIN TIME: CPT

## 2018-02-21 PROCEDURE — 99233 SBSQ HOSP IP/OBS HIGH 50: CPT | Mod: ,,, | Performed by: INTERNAL MEDICINE

## 2018-02-21 PROCEDURE — 85025 COMPLETE CBC W/AUTO DIFF WBC: CPT

## 2018-02-21 PROCEDURE — 63600175 PHARM REV CODE 636 W HCPCS: Mod: JG | Performed by: INTERNAL MEDICINE

## 2018-02-21 PROCEDURE — 36415 COLL VENOUS BLD VENIPUNCTURE: CPT

## 2018-02-21 RX ORDER — WARFARIN SODIUM 5 MG/1
10 TABLET ORAL DAILY
Status: DISCONTINUED | OUTPATIENT
Start: 2018-02-21 | End: 2018-02-23 | Stop reason: HOSPADM

## 2018-02-21 RX ADMIN — FUROSEMIDE 80 MG: 10 INJECTION, SOLUTION INTRAMUSCULAR; INTRAVENOUS at 12:02

## 2018-02-21 RX ADMIN — Medication: at 12:02

## 2018-02-21 RX ADMIN — FUROSEMIDE 80 MG: 10 INJECTION, SOLUTION INTRAMUSCULAR; INTRAVENOUS at 08:02

## 2018-02-21 RX ADMIN — FLUTICASONE FUROATE AND VILANTEROL TRIFENATATE 1 PUFF: 100; 25 POWDER RESPIRATORY (INHALATION) at 08:02

## 2018-02-21 RX ADMIN — GABAPENTIN 100 MG: 100 CAPSULE ORAL at 12:02

## 2018-02-21 RX ADMIN — BUSPIRONE HYDROCHLORIDE 15 MG: 5 TABLET ORAL at 12:02

## 2018-02-21 RX ADMIN — GABAPENTIN 100 MG: 100 CAPSULE ORAL at 05:02

## 2018-02-21 RX ADMIN — WARFARIN SODIUM 10 MG: 5 TABLET ORAL at 04:02

## 2018-02-21 RX ADMIN — Medication 3 ML: at 06:02

## 2018-02-21 RX ADMIN — ALBUTEROL SULFATE 2.5 MG: 2.5 SOLUTION RESPIRATORY (INHALATION) at 12:02

## 2018-02-21 RX ADMIN — POTASSIUM CHLORIDE 60 MEQ: 1500 TABLET, EXTENDED RELEASE ORAL at 08:02

## 2018-02-21 RX ADMIN — PANTOPRAZOLE SODIUM 40 MG: 40 TABLET, DELAYED RELEASE ORAL at 08:02

## 2018-02-21 RX ADMIN — LAMOTRIGINE 100 MG: 100 TABLET ORAL at 08:02

## 2018-02-21 RX ADMIN — MAGNESIUM OXIDE TAB 400 MG (241.3 MG ELEMENTAL MG) 400 MG: 400 (241.3 MG) TAB at 08:02

## 2018-02-21 RX ADMIN — TREPROSTINIL 60 NG/KG/MIN: 100 INJECTION, SOLUTION INTRAVENOUS; SUBCUTANEOUS at 12:02

## 2018-02-21 RX ADMIN — BUSPIRONE HYDROCHLORIDE 15 MG: 5 TABLET ORAL at 05:02

## 2018-02-21 RX ADMIN — Medication 3 ML: at 08:02

## 2018-02-21 RX ADMIN — FUROSEMIDE 80 MG: 10 INJECTION, SOLUTION INTRAMUSCULAR; INTRAVENOUS at 05:02

## 2018-02-21 RX ADMIN — BUSPIRONE HYDROCHLORIDE 15 MG: 5 TABLET ORAL at 08:02

## 2018-02-21 RX ADMIN — DESVENLAFAXINE SUCCINATE 100 MG: 50 TABLET, FILM COATED, EXTENDED RELEASE ORAL at 08:02

## 2018-02-21 RX ADMIN — PRAVASTATIN SODIUM 40 MG: 40 TABLET ORAL at 08:02

## 2018-02-21 RX ADMIN — GABAPENTIN 100 MG: 100 CAPSULE ORAL at 08:02

## 2018-02-21 RX ADMIN — LURASIDONE HYDROCHLORIDE 40 MG: 40 TABLET, FILM COATED ORAL at 08:02

## 2018-02-21 RX ADMIN — LEVOTHYROXINE SODIUM 75 MCG: 75 TABLET ORAL at 05:02

## 2018-02-21 RX ADMIN — ALBUTEROL SULFATE 2.5 MG: 2.5 SOLUTION RESPIRATORY (INHALATION) at 08:02

## 2018-02-21 RX ADMIN — HYDROCODONE BITARTRATE AND ACETAMINOPHEN 1 TABLET: 10; 325 TABLET ORAL at 07:02

## 2018-02-21 NOTE — SUBJECTIVE & OBJECTIVE
Interval History:     No acute overnight event. Breathing better and nasal canula oxygen almost back to baseline at 4LC, currently at 6L. Able to ambulate in the hallway x2 yesterday with minimum difficulty.    Continuous Infusions:   treprostinil (REMODULIN) infusion 60 ng/kg/min (02/20/18 1240)    veletri/remodulin cassette      veletri/remodulin tubing       Scheduled Meds:   albuterol sulfate  2.5 mg Nebulization Q4H    amLODIPine  5 mg Oral Daily    busPIRone  15 mg Oral TID    desvenlafaxine succinate  100 mg Oral Daily    fluticasone-vilanterol  1 puff Inhalation Daily    furosemide  80 mg Intravenous TID    gabapentin  100 mg Oral TID    lamoTRIgine  100 mg Oral BID    levothyroxine  75 mcg Oral Before breakfast    lurasidone  40 mg Oral Daily    magnesium oxide  400 mg Oral BID    magnesium sulfate IVPB  1 g Intravenous Once    pantoprazole  40 mg Oral Daily    potassium chloride SA  60 mEq Oral BID    pravastatin  40 mg Oral Daily    sodium chloride 0.9%  3 mL Intravenous Q8H    warfarin  10 mg Oral Daily     PRN Meds:ALPRAZolam, cyclobenzaprine, hydrocodone-acetaminophen 10-325mg, ondansetron    Review of patient's allergies indicates:   Allergen Reactions    Sulfa (sulfonamide antibiotics) Rash     Objective:     Vital Signs (Most Recent):  Temp: 97.7 °F (36.5 °C) (02/21/18 0725)  Pulse: 93 (02/21/18 1052)  Resp: 20 (02/21/18 0854)  BP: (!) 100/55 (02/21/18 0725)  SpO2: (!) 91 % (02/21/18 0854) Vital Signs (24h Range):  Temp:  [96.4 °F (35.8 °C)-98.5 °F (36.9 °C)] 97.7 °F (36.5 °C)  Pulse:  [] 93  Resp:  [15-20] 20  SpO2:  [90 %-96 %] 91 %  BP: ()/(55-78) 100/55     Patient Vitals for the past 72 hrs (Last 3 readings):   Weight   02/21/18 0400 83 kg (182 lb 14.4 oz)   02/20/18 0400 84.4 kg (186 lb 1.6 oz)   02/19/18 0519 84.7 kg (186 lb 11.2 oz)     Body mass index is 33.45 kg/m².      Intake/Output Summary (Last 24 hours) at 02/21/18 1122  Last data filed at 02/21/18  0800   Gross per 24 hour   Intake              840 ml   Output             4800 ml   Net            -3960 ml       Hemodynamic Parameters:       Telemetry: reviewed and no events noted    Physical Exam   Constitutional: She is oriented to person, place, and time.   obese   HENT:   Head: Normocephalic.   Neck: No JVD present.   Cardiovascular: Normal rate, regular rhythm and normal heart sounds.  Exam reveals no gallop and no friction rub.    Pulmonary/Chest: Effort normal and breath sounds normal. She has no wheezes.   - dependent bibasilar crackles   Abdominal: Soft. Bowel sounds are normal.   Musculoskeletal: She exhibits no edema.   Neurological: She is alert and oriented to person, place, and time.   Skin: Skin is warm and dry.   Nursing note and vitals reviewed.      Significant Labs:  CBC:    Recent Labs  Lab 02/17/18  0354 02/21/18  0455   WBC 6.07 4.38   RBC 4.22 4.26   HGB 12.5 12.5   HCT 39.1 39.0    127*   MCV 93 92   MCH 29.6 29.3   MCHC 32.0 32.1     BNP:    Recent Labs  Lab 02/17/18  0122   *     CMP:    Recent Labs  Lab 02/17/18  0118  02/19/18  0521  02/20/18  0421 02/20/18  1040 02/21/18  0454   GLU 81  < > 81  --  70  --  74   CALCIUM 9.3  < > 9.1  --  9.1  --  9.2   ALBUMIN 3.7  --   --   --   --   --   --    PROT 6.6  --   --   --   --   --   --      < > 141  --  140  --  141   K 3.6  < > 3.3*  < > 2.9* 3.9 3.3*   CO2 22*  < > 31*  --  33*  --  33*     < > 99  --  95  --  94*   BUN 18  < > 14  --  17  --  20   CREATININE 1.2  < > 0.9  --  0.9  --  1.0   ALKPHOS 144*  --   --   --   --   --   --    ALT 18  --   --   --   --   --   --    AST 25  --   --   --   --   --   --    BILITOT 0.7  --   --   --   --   --   --    < > = values in this interval not displayed.   Coagulation:     Recent Labs  Lab 02/19/18  0521 02/20/18  0918 02/21/18  0454   INR 2.6* 1.8* 1.6*     LDH:  No results for input(s): LDH in the last 72 hours.  Microbiology:  Microbiology Results (last 7 days)      Procedure Component Value Units Date/Time    Respiratory Viral Panel by PCR [facility]; Nasal Swab [[id number]] Collected:  02/20/18 1052    Order Status:  Sent Specimen:  Respiratory Updated:  02/20/18 1119    Respiratory Viral Panel by PCR [facility]; Nasal Swab [[id number]]     Order Status:  Canceled Specimen:  Respiratory     Respiratory Viral Panel by PCR [facility]; Nasal Swab [[id number]]     Order Status:  Canceled Specimen:  Respiratory     Clostridium difficile EIA [[id number]]     Order Status:  Canceled Specimen:  Stool from Stool           I have reviewed all pertinent labs within the past 24 hours.    Estimated Creatinine Clearance: 62.8 mL/min (based on SCr of 1 mg/dL).    Diagnostic Results:  CT: No results found in the last 24 hours.

## 2018-02-21 NOTE — ASSESSMENT & PLAN NOTE
- secondary to fluid overload  - improving with profuse diuresis  - continue lasix IV push as current  - respiratory panel pending result  - wean oxygen off to baseline of 4LNC with diuresis  - ? RHC tomorrow

## 2018-02-21 NOTE — PROGRESS NOTES
[facility]  Heart Transplant  Progress Note    Patient Name: Sue Smith  MRN: [id number]  Admission Date: 2/16/2018  Hospital Length of Stay: 5 days  Attending Physician: Madi Santana MD  Primary Care Provider: Paddy Guerrier DO  Principal Problem:Acute on chronic right-sided heart failure    Subjective:     Interval History:     No acute overnight event. Breathing better and nasal canula oxygen almost back to baseline at 4LC, currently at 6L. Able to ambulate in the hallway x2 yesterday with minimum difficulty.    Continuous Infusions:   treprostinil (REMODULIN) infusion 60 ng/kg/min (02/20/18 1240)    veletri/remodulin cassette      veletri/remodulin tubing       Scheduled Meds:   albuterol sulfate  2.5 mg Nebulization Q4H    amLODIPine  5 mg Oral Daily    busPIRone  15 mg Oral TID    desvenlafaxine succinate  100 mg Oral Daily    fluticasone-vilanterol  1 puff Inhalation Daily    furosemide  80 mg Intravenous TID    gabapentin  100 mg Oral TID    lamoTRIgine  100 mg Oral BID    levothyroxine  75 mcg Oral Before breakfast    lurasidone  40 mg Oral Daily    magnesium oxide  400 mg Oral BID    magnesium sulfate IVPB  1 g Intravenous Once    pantoprazole  40 mg Oral Daily    potassium chloride SA  60 mEq Oral BID    pravastatin  40 mg Oral Daily    sodium chloride 0.9%  3 mL Intravenous Q8H    warfarin  10 mg Oral Daily     PRN Meds:ALPRAZolam, cyclobenzaprine, hydrocodone-acetaminophen 10-325mg, ondansetron    Review of patient's allergies indicates:   Allergen Reactions    Sulfa (sulfonamide antibiotics) Rash     Objective:     Vital Signs (Most Recent):  Temp: 97.7 °F (36.5 °C) (02/21/18 0725)  Pulse: 93 (02/21/18 1052)  Resp: 20 (02/21/18 0854)  BP: (!) 100/55 (02/21/18 0725)  SpO2: (!) 91 % (02/21/18 0854) Vital Signs (24h Range):  Temp:  [96.4 °F (35.8 °C)-98.5 °F (36.9 °C)] 97.7 °F (36.5 °C)  Pulse:  [] 93  Resp:  [15-20] 20  SpO2:  [90 %-96 %] 91 %  BP:  ()/(55-78) 100/55     Patient Vitals for the past 72 hrs (Last 3 readings):   Weight   02/21/18 0400 83 kg (182 lb 14.4 oz)   02/20/18 0400 84.4 kg (186 lb 1.6 oz)   02/19/18 0519 84.7 kg (186 lb 11.2 oz)     Body mass index is 33.45 kg/m².      Intake/Output Summary (Last 24 hours) at 02/21/18 1122  Last data filed at 02/21/18 0800   Gross per 24 hour   Intake              840 ml   Output             4800 ml   Net            -3960 ml       Hemodynamic Parameters:       Telemetry: reviewed and no events noted    Physical Exam   Constitutional: She is oriented to person, place, and time.   obese   HENT:   Head: Normocephalic.   Neck: No JVD present.   Cardiovascular: Normal rate, regular rhythm and normal heart sounds.  Exam reveals no gallop and no friction rub.    Pulmonary/Chest: Effort normal and breath sounds normal. She has no wheezes.   - dependent bibasilar crackles   Abdominal: Soft. Bowel sounds are normal.   Musculoskeletal: She exhibits no edema.   Neurological: She is alert and oriented to person, place, and time.   Skin: Skin is warm and dry.   Nursing note and vitals reviewed.      Significant Labs:  CBC:    Recent Labs  Lab 02/17/18  0354 02/21/18  0455   WBC 6.07 4.38   RBC 4.22 4.26   HGB 12.5 12.5   HCT 39.1 39.0    127*   MCV 93 92   MCH 29.6 29.3   MCHC 32.0 32.1     BNP:    Recent Labs  Lab 02/17/18  0122   *     CMP:    Recent Labs  Lab 02/17/18  0118  02/19/18  0521  02/20/18  0421 02/20/18  1040 02/21/18  0454   GLU 81  < > 81  --  70  --  74   CALCIUM 9.3  < > 9.1  --  9.1  --  9.2   ALBUMIN 3.7  --   --   --   --   --   --    PROT 6.6  --   --   --   --   --   --      < > 141  --  140  --  141   K 3.6  < > 3.3*  < > 2.9* 3.9 3.3*   CO2 22*  < > 31*  --  33*  --  33*     < > 99  --  95  --  94*   BUN 18  < > 14  --  17  --  20   CREATININE 1.2  < > 0.9  --  0.9  --  1.0   ALKPHOS 144*  --   --   --   --   --   --    ALT 18  --   --   --   --   --   --    AST  25  --   --   --   --   --   --    BILITOT 0.7  --   --   --   --   --   --    < > = values in this interval not displayed.   Coagulation:     Recent Labs  Lab 02/19/18  0521 02/20/18  0918 02/21/18  0454   INR 2.6* 1.8* 1.6*     LDH:  No results for input(s): LDH in the last 72 hours.  Microbiology:  Microbiology Results (last 7 days)     Procedure Component Value Units Date/Time    Respiratory Viral Panel by PCR [facility]; Nasal Swab [[id number]] Collected:  02/20/18 1052    Order Status:  Sent Specimen:  Respiratory Updated:  02/20/18 1119    Respiratory Viral Panel by PCR [facility]; Nasal Swab [[id number]]     Order Status:  Canceled Specimen:  Respiratory     Respiratory Viral Panel by PCR [facility]; Nasal Swab [[id number]]     Order Status:  Canceled Specimen:  Respiratory     Clostridium difficile EIA [[id number]]     Order Status:  Canceled Specimen:  Stool from Stool           I have reviewed all pertinent labs within the past 24 hours.    Estimated Creatinine Clearance: 62.8 mL/min (based on SCr of 1 mg/dL).    Diagnostic Results:  CT: No results found in the last 24 hours.    Assessment and Plan:     57 y.o. WF with PMH severe WHO Group 1 PAH previously on Uptravi but now on IV remodulin, chronic hypoxic respiratory failure, chronic RV failure, on home O2 and Bipap, and h/o trach in past (>4 years ago, eventually decannulated) who presents as a transfer from [facility] with increased SOB over the past 2 days and a recent 15 lb weight gain.  Transferred here as [facility] unable to support IV remodulin.  Recent diarrhea over past 2 days.  Denies N/V, fever, chills, body ache.  No recent sick contacts.    Of note, OSH have her labeled as COPD and Obesity hypoventilation however consult by pulmonologist (Dr. Hardik Magallanes) states explicitly that PFTs done in 2015 showed only mild restriction and were not consistent with COPD. In addition he states that though she is mildly obese her pCO2 is normal at  baseline ruling out obesity hypoventilation. She has previous tobacco abuse (stopped 15 yrs ago) - SHERIE (unclear severity) has been on trilogy vent for BiPAP at night. She was recently admitted (1/3-1/11/18) for volume overload (her 3rd admission in last 3 months; admitted 12/8-12/27/17 prior to that). She was started on Lasix drip and diuresed well on this. At end of admission, she was net negative 8.9L total and is feeing better. She was transitioned back to PO Bumex 4 BID priro to d/c with once weekly prn IVP lasix 80mg. IV Remodulin was continued to be uptitrated during hospitalization and she was d/c'ed on 44 ng.     Last BNP in [facility] was 535 (down from 772)  BNP at [facility] was 563, no INR reported at [facility], pt states that she did not get her coumadin tonight    INR goal:   2.0-3.0   TTR:   46.1 % (1.2 mo)   Maintenance plan:   5 mg (5 mg x 1) on Sun, Sat; 10 mg (5 mg x 2) all other days                 Acute on chronic respiratory failure    - secondary to fluid overload  - improving with profuse diuresis  - continue lasix IV push as current  - respiratory panel pending result but less likely infectious  - wean oxygen off to baseline of 4LNC with diuresis  - ? RHC tomorrow          Diarrhea    -possibly 2/2 to SE of Remodulin or viral infection  - C diff negative  - resolved        Essential hypertension    -currently controlled  -continue home PO BP meds         Hypothyroidism    Pt is currently asymptomatic.  Denies cold intolerance, constipation, malaise, hair loss  -continue home dose medication: levothyroxine 75 mcg daily        Pulmonary hypertension, group 1    -continue IV Remodulin at 60ng  -continue PRN nebs treatment   -wean off oxygen to 4LN (home baseline)  -? RHC in one day  - Coumadin 10mg today ( goal 2-3) per our facility  - continue PT/OT. Declined home PT            Discussed plan of care with Dr. Santana the attending on service    Cristian Peñaolza MD  Heart Transplant  [facility]  [place]

## 2018-02-21 NOTE — PLAN OF CARE
Problem: Patient Care Overview  Goal: Plan of Care Review  Outcome: Ongoing (interventions implemented as appropriate)  Pt aao x 4. Pt currently in bed with bed in lowest/locked position. Call light in reach. Free from falls/injury this shift and wearing non-skid footwear when ambulating. Pt has ambulated unit 2 x today. Pt received IV Lasix today, possible transition to PO Lasix per MD notes. I/Os monitored, see flowsheets for documentation. Pt currently on 6 L HFNC, O2 @ 92%.     Will continue to monitor, assess, and adjust care as needed.

## 2018-02-21 NOTE — HOSPITAL COURSE
2/21  - continue lasix 80mg TID for one day  - will decide on possible torsemide addition tomorrow  2/22  - doing well and weaning off oxygen to home level of 4LNC  - Diuretics changed to Torsemide 100mg BID today  2/23  Discharge summary    Patient initially had difficulty with dyspnea and hypoxia requiring high flow 10LNC oxygen support. Lasix 80mg BID was changed to TID with good response and significant volume loss of total 16.7 liters. She also had CT chest that was negative for other infectious/non infectious etiology. Respiratory panel including influenza and Gi panel for reported diarrhea on presentation were all negative. Blood cultures x2 were also negative. She improved after escalation of lasix dose frequency and was able to wean off oxygen to her baseline NC at 4 liters. She mentioned that Bumex and pulsed lasix dose at home were not effective in unloading her fluid accumulation. Diuretics were changed from Bumex to Torsemide 100mg oral BID with good Urine output ( 4.2L) overnight. She reported improvement in ambulation with less dyspnea. She will be taking torsemide onward as well as lasix 80mg biweekly as needed for weight gain and dyspnea. Her potassium was noted to be low, around 3.3 during stay despite being on 60 meq BID.  We increased her home dose to 80mg BID and plan to have scheduled lab draw for BMP and Mag from next week. She does have a home health care nurse already in place. Patient will continue home Remodulin dose of 60ng/kg/min and fu with PHTN clinic per scheduled.

## 2018-02-21 NOTE — ASSESSMENT & PLAN NOTE
-Pt hypervolemic on admission   - lasix 80mg TID as current. Will consider changing to po bumex from tomorrow and possibly add torsemide  - I/O and daily weights  -replete electrolytes as needed  - repeat echo shows severe RA and Multivalvular insufficiency  - ? RHC tomorrow

## 2018-02-21 NOTE — ASSESSMENT & PLAN NOTE
-continue IV Remodulin at 60ng  -continue PRN nebs treatment   -wean off oxygen to 4LN (home baseline)  -? RHC in one day  - Coumadin 10mg today ( goal 2-3) per our facility  - continue PT/OT. Declined home PT

## 2018-02-22 LAB
ANION GAP SERPL CALC-SCNC: 10 MMOL/L
BASOPHILS # BLD AUTO: 0.03 K/UL
BASOPHILS NFR BLD: 0.7 %
BUN SERPL-MCNC: 18 MG/DL
CALCIUM SERPL-MCNC: 9.7 MG/DL
CHLORIDE SERPL-SCNC: 93 MMOL/L
CO2 SERPL-SCNC: 37 MMOL/L
CREAT SERPL-MCNC: 1.1 MG/DL
DIFFERENTIAL METHOD: ABNORMAL
EOSINOPHIL # BLD AUTO: 0.2 K/UL
EOSINOPHIL NFR BLD: 5.2 %
ERYTHROCYTE [DISTWIDTH] IN BLOOD BY AUTOMATED COUNT: 16.5 %
EST. GFR  (AFRICAN AMERICAN): >60 ML/MIN/1.73 M^2
EST. GFR  (NON AFRICAN AMERICAN): 56.3 ML/MIN/1.73 M^2
GLUCOSE SERPL-MCNC: 86 MG/DL
HCT VFR BLD AUTO: 39.8 %
HGB BLD-MCNC: 12.9 G/DL
IMM GRANULOCYTES # BLD AUTO: 0.01 K/UL
IMM GRANULOCYTES NFR BLD AUTO: 0.2 %
INR PPP: 1.5
LYMPHOCYTES # BLD AUTO: 0.8 K/UL
LYMPHOCYTES NFR BLD: 18.9 %
MAGNESIUM SERPL-MCNC: 2.2 MG/DL
MCH RBC QN AUTO: 29.6 PG
MCHC RBC AUTO-ENTMCNC: 32.4 G/DL
MCV RBC AUTO: 91 FL
MONOCYTES # BLD AUTO: 0.3 K/UL
MONOCYTES NFR BLD: 6.1 %
NEUTROPHILS # BLD AUTO: 2.9 K/UL
NEUTROPHILS NFR BLD: 68.9 %
NRBC BLD-RTO: 0 /100 WBC
PLATELET # BLD AUTO: 122 K/UL
PMV BLD AUTO: 10.9 FL
POTASSIUM SERPL-SCNC: 3.1 MMOL/L
PROTHROMBIN TIME: 15.2 SEC
RBC # BLD AUTO: 4.36 M/UL
SODIUM SERPL-SCNC: 140 MMOL/L
WBC # BLD AUTO: 4.24 K/UL

## 2018-02-22 PROCEDURE — 25000003 PHARM REV CODE 250: Performed by: INTERNAL MEDICINE

## 2018-02-22 PROCEDURE — 85610 PROTHROMBIN TIME: CPT

## 2018-02-22 PROCEDURE — A4216 STERILE WATER/SALINE, 10 ML: HCPCS | Performed by: INTERNAL MEDICINE

## 2018-02-22 PROCEDURE — 80048 BASIC METABOLIC PNL TOTAL CA: CPT

## 2018-02-22 PROCEDURE — 99233 SBSQ HOSP IP/OBS HIGH 50: CPT | Mod: GV,,, | Performed by: INTERNAL MEDICINE

## 2018-02-22 PROCEDURE — 63600175 PHARM REV CODE 636 W HCPCS: Mod: JG | Performed by: INTERNAL MEDICINE

## 2018-02-22 PROCEDURE — 20600001 HC STEP DOWN PRIVATE ROOM

## 2018-02-22 PROCEDURE — 27000221 HC OXYGEN, UP TO 24 HOURS

## 2018-02-22 PROCEDURE — 85025 COMPLETE CBC W/AUTO DIFF WBC: CPT

## 2018-02-22 PROCEDURE — 99900035 HC TECH TIME PER 15 MIN (STAT)

## 2018-02-22 PROCEDURE — 25000242 PHARM REV CODE 250 ALT 637 W/ HCPCS: Performed by: INTERNAL MEDICINE

## 2018-02-22 PROCEDURE — 94640 AIRWAY INHALATION TREATMENT: CPT

## 2018-02-22 PROCEDURE — 36415 COLL VENOUS BLD VENIPUNCTURE: CPT

## 2018-02-22 PROCEDURE — 83735 ASSAY OF MAGNESIUM: CPT

## 2018-02-22 PROCEDURE — 94660 CPAP INITIATION&MGMT: CPT

## 2018-02-22 PROCEDURE — 94761 N-INVAS EAR/PLS OXIMETRY MLT: CPT

## 2018-02-22 RX ORDER — TORSEMIDE 20 MG/1
100 TABLET ORAL 2 TIMES DAILY
Status: DISCONTINUED | OUTPATIENT
Start: 2018-02-22 | End: 2018-02-23 | Stop reason: HOSPADM

## 2018-02-22 RX ORDER — POTASSIUM CHLORIDE 20 MEQ/1
40 TABLET, EXTENDED RELEASE ORAL ONCE
Status: COMPLETED | OUTPATIENT
Start: 2018-02-22 | End: 2018-02-22

## 2018-02-22 RX ADMIN — ALBUTEROL SULFATE 2.5 MG: 2.5 SOLUTION RESPIRATORY (INHALATION) at 12:02

## 2018-02-22 RX ADMIN — AMLODIPINE BESYLATE 5 MG: 5 TABLET ORAL at 09:02

## 2018-02-22 RX ADMIN — POTASSIUM CHLORIDE 60 MEQ: 1500 TABLET, EXTENDED RELEASE ORAL at 08:02

## 2018-02-22 RX ADMIN — ALBUTEROL SULFATE 2.5 MG: 2.5 SOLUTION RESPIRATORY (INHALATION) at 04:02

## 2018-02-22 RX ADMIN — LURASIDONE HYDROCHLORIDE 40 MG: 40 TABLET, FILM COATED ORAL at 09:02

## 2018-02-22 RX ADMIN — LAMOTRIGINE 100 MG: 100 TABLET ORAL at 09:02

## 2018-02-22 RX ADMIN — ALBUTEROL SULFATE 2.5 MG: 2.5 SOLUTION RESPIRATORY (INHALATION) at 11:02

## 2018-02-22 RX ADMIN — GABAPENTIN 100 MG: 100 CAPSULE ORAL at 05:02

## 2018-02-22 RX ADMIN — TREPROSTINIL 60 NG/KG/MIN: 100 INJECTION, SOLUTION INTRAVENOUS; SUBCUTANEOUS at 12:02

## 2018-02-22 RX ADMIN — PANTOPRAZOLE SODIUM 40 MG: 40 TABLET, DELAYED RELEASE ORAL at 09:02

## 2018-02-22 RX ADMIN — TORSEMIDE 100 MG: 20 TABLET ORAL at 08:02

## 2018-02-22 RX ADMIN — LEVOTHYROXINE SODIUM 75 MCG: 75 TABLET ORAL at 05:02

## 2018-02-22 RX ADMIN — GABAPENTIN 100 MG: 100 CAPSULE ORAL at 08:02

## 2018-02-22 RX ADMIN — BUSPIRONE HYDROCHLORIDE 15 MG: 5 TABLET ORAL at 01:02

## 2018-02-22 RX ADMIN — FLUTICASONE FUROATE AND VILANTEROL TRIFENATATE 1 PUFF: 100; 25 POWDER RESPIRATORY (INHALATION) at 10:02

## 2018-02-22 RX ADMIN — POTASSIUM CHLORIDE 60 MEQ: 1500 TABLET, EXTENDED RELEASE ORAL at 09:02

## 2018-02-22 RX ADMIN — ALBUTEROL SULFATE 2.5 MG: 2.5 SOLUTION RESPIRATORY (INHALATION) at 03:02

## 2018-02-22 RX ADMIN — LAMOTRIGINE 100 MG: 100 TABLET ORAL at 08:02

## 2018-02-22 RX ADMIN — POTASSIUM CHLORIDE 40 MEQ: 1500 TABLET, EXTENDED RELEASE ORAL at 09:02

## 2018-02-22 RX ADMIN — PRAVASTATIN SODIUM 40 MG: 40 TABLET ORAL at 09:02

## 2018-02-22 RX ADMIN — GABAPENTIN 100 MG: 100 CAPSULE ORAL at 01:02

## 2018-02-22 RX ADMIN — FUROSEMIDE 80 MG: 10 INJECTION, SOLUTION INTRAMUSCULAR; INTRAVENOUS at 05:02

## 2018-02-22 RX ADMIN — ALBUTEROL SULFATE 2.5 MG: 2.5 SOLUTION RESPIRATORY (INHALATION) at 08:02

## 2018-02-22 RX ADMIN — Medication 3 ML: at 05:02

## 2018-02-22 RX ADMIN — Medication 3 ML: at 01:02

## 2018-02-22 RX ADMIN — MAGNESIUM OXIDE TAB 400 MG (241.3 MG ELEMENTAL MG) 400 MG: 400 (241.3 MG) TAB at 08:02

## 2018-02-22 RX ADMIN — TORSEMIDE 100 MG: 20 TABLET ORAL at 11:02

## 2018-02-22 RX ADMIN — BUSPIRONE HYDROCHLORIDE 15 MG: 5 TABLET ORAL at 05:02

## 2018-02-22 RX ADMIN — MAGNESIUM OXIDE TAB 400 MG (241.3 MG ELEMENTAL MG) 400 MG: 400 (241.3 MG) TAB at 09:02

## 2018-02-22 RX ADMIN — WARFARIN SODIUM 10 MG: 5 TABLET ORAL at 04:02

## 2018-02-22 RX ADMIN — DESVENLAFAXINE SUCCINATE 100 MG: 50 TABLET, FILM COATED, EXTENDED RELEASE ORAL at 09:02

## 2018-02-22 RX ADMIN — BUSPIRONE HYDROCHLORIDE 15 MG: 5 TABLET ORAL at 08:02

## 2018-02-22 NOTE — PLAN OF CARE
Problem: Patient Care Overview  Goal: Plan of Care Review  Outcome: Ongoing (interventions implemented as appropriate)  Afebrile. Reinforced to wear non-skid socks when ambulating to prevent falling. Verbalized understanding. Lasix IV discontinued. Demadex po given. Diuresing. K+=3.1. KCL 100meq po given as ordered this am. Pt ambulates in [place] doing several laps wearing O2 at 4L/NC. States feeling better. Remodulin infusing at 82ml/24hrs. Cartridge changed today  And verified with SAVAGE Arango RN.

## 2018-02-22 NOTE — ASSESSMENT & PLAN NOTE
-continue IV Remodulin at 60ng  -continue PRN nebs treatment   - oxygen weaned off to home level   - No plan for RHC  - Coumadin 10mg today ( goal 2-3) per our facility  - continue PT/OT. Declined home PT  - plan of care in the after discharge ordered

## 2018-02-22 NOTE — SUBJECTIVE & OBJECTIVE
Interval History:     No acute overnight event. Ambulating more in the hallway with no difficulty. Continues to have adequate UOP. Denies chest pain or cough.    Continuous Infusions:   treprostinil (REMODULIN) infusion 60 ng/kg/min (02/21/18 1241)    veletri/remodulin cassette      veletri/remodulin tubing       Scheduled Meds:   albuterol sulfate  2.5 mg Nebulization Q4H    amLODIPine  5 mg Oral Daily    busPIRone  15 mg Oral TID    desvenlafaxine succinate  100 mg Oral Daily    fluticasone-vilanterol  1 puff Inhalation Daily    gabapentin  100 mg Oral TID    lamoTRIgine  100 mg Oral BID    levothyroxine  75 mcg Oral Before breakfast    lurasidone  40 mg Oral Daily    magnesium oxide  400 mg Oral BID    magnesium sulfate IVPB  1 g Intravenous Once    pantoprazole  40 mg Oral Daily    potassium chloride SA  60 mEq Oral BID    pravastatin  40 mg Oral Daily    sodium chloride 0.9%  3 mL Intravenous Q8H    torsemide  100 mg Oral BID    warfarin  10 mg Oral Daily     PRN Meds:ALPRAZolam, cyclobenzaprine, hydrocodone-acetaminophen 10-325mg, ondansetron    Review of patient's allergies indicates:   Allergen Reactions    Sulfa (sulfonamide antibiotics) Rash     Objective:     Vital Signs (Most Recent):  Temp: 98.7 °F (37.1 °C) (02/22/18 1048)  Pulse: 99 (02/22/18 1223)  Resp: 16 (02/22/18 1223)  BP: (!) 106/56 (02/22/18 1048)  SpO2: (!) 92 % (02/22/18 1223) Vital Signs (24h Range):  Temp:  [97.7 °F (36.5 °C)-98.7 °F (37.1 °C)] 98.7 °F (37.1 °C)  Pulse:  [65-99] 99  Resp:  [14-20] 16  SpO2:  [88 %-97 %] 92 %  BP: (101-118)/(56-63) 106/56     Patient Vitals for the past 72 hrs (Last 3 readings):   Weight   02/22/18 0715 84.1 kg (185 lb 6.5 oz)   02/21/18 0400 83 kg (182 lb 14.4 oz)   02/20/18 0400 84.4 kg (186 lb 1.6 oz)     Body mass index is 33.91 kg/m².      Intake/Output Summary (Last 24 hours) at 02/22/18 1228  Last data filed at 02/22/18 0735   Gross per 24 hour   Intake              820 ml    Output             2675 ml   Net            -1855 ml       Hemodynamic Parameters:       Telemetry:     Physical Exam     Constitutional: She is oriented to person, place, and time.   obese   HENT:   Head: Normocephalic.   Neck: No JVD present.   Cardiovascular: Normal rate, regular rhythm and normal heart sounds.  Exam reveals no gallop and no friction rub.    Pulmonary/Chest: Effort normal and breath sounds normal. She has no wheezes.   Abdominal: Soft. Bowel sounds are normal.   Musculoskeletal: She exhibits no edema.   Neurological: She is alert and oriented to person, place, and time.   Skin: Skin is warm and dry.   Nursing note and vitals reviewed.    Significant Labs:  CBC:    Recent Labs  Lab 02/17/18  0354 02/21/18  0455 02/22/18  0532   WBC 6.07 4.38 4.24   RBC 4.22 4.26 4.36   HGB 12.5 12.5 12.9   HCT 39.1 39.0 39.8    127* 122*   MCV 93 92 91   MCH 29.6 29.3 29.6   MCHC 32.0 32.1 32.4     BNP:    Recent Labs  Lab 02/17/18  0122   *     CMP:    Recent Labs  Lab 02/17/18  0118  02/20/18  0421 02/20/18  1040 02/21/18  0454 02/22/18  0532   GLU 81  < > 70  --  74 86   CALCIUM 9.3  < > 9.1  --  9.2 9.7   ALBUMIN 3.7  --   --   --   --   --    PROT 6.6  --   --   --   --   --      < > 140  --  141 140   K 3.6  < > 2.9* 3.9 3.3* 3.1*   CO2 22*  < > 33*  --  33* 37*     < > 95  --  94* 93*   BUN 18  < > 17  --  20 18   CREATININE 1.2  < > 0.9  --  1.0 1.1   ALKPHOS 144*  --   --   --   --   --    ALT 18  --   --   --   --   --    AST 25  --   --   --   --   --    BILITOT 0.7  --   --   --   --   --    < > = values in this interval not displayed.   Coagulation:     Recent Labs  Lab 02/20/18  0918 02/21/18  0454 02/22/18  0532   INR 1.8* 1.6* 1.5*     LDH:  No results for input(s): LDH in the last 72 hours.  Microbiology:  Microbiology Results (last 7 days)     Procedure Component Value Units Date/Time    Respiratory Viral Panel by PCR [facility]; Nasal Swab [[id number]] Collected:   02/20/18 1052    Order Status:  Completed Specimen:  Respiratory Updated:  02/21/18 1130     Respiratory Virus Panel, source Nasal Swab     RVP - Adenovirus Not Detected     Comment: Detects Serotypes B and E. Detection of Serotype C may   be limited. If Adenovirus infection is suspected and a   Not Detected result is returned the sample should be   re-tested for Adenovirus using an independent method  (e.g. [employer] Adenovirus Quantitative Real-Time  PCR test.          Enterovirus Not Detected     Comment: Cross-reactivity has been observed between certain Rhinovirus  strains and the Enterovirus assay.          Human Bocavirus Not Detected     Human Coronavirus Not Detected     Comment: The Human Coronavirus assay detects Human coronavirus types  229E, OC43,NL63 and HKU1.          RVP - Human Metapneumovirus (hMPV) Not Detected     RVP - Influenza A Not Detected     Influenza A - G3S0-43 Not Detected     RVP - Influenza B Not Detected     Parainfluenza Not Detected     Respiratory Syncytial VirusVirus (RSV) A Not Detected     Comment: The Respiratory Syncytial Viral assay detects types A and B,  however it does not distinguish between the two.          RVP - Rhinovirus Not Detected     Comment: Cross-Reactivity has been observed between certain   Rhinovirus strains and the Enterovirus assay.  Target Enriched Mulitplex Polymerase Chain Reaction (TEM-PCR)  allows for the detection of multiple pathogens out of a single  reaction.  This test was developed and its performance   characteristics determined by [employer].  It has not   been cleared or approved by the U.S.Food and Drug Administration.  Results should be used in conjunction with clinical findings,   and should not form the sole basis for a diagnosis or treatment  decision.  TEM-PCR is a licensed technology of [employer].         Narrative:       Receiving Lab:->[facility]    Respiratory Viral Panel by PCR [facility]; Nasal Swab  [[id number]]     Order Status:  Canceled Specimen:  Respiratory     Respiratory Viral Panel by PCR [facility]; Nasal Swab [[id number]]     Order Status:  Canceled Specimen:  Respiratory     Clostridium difficile EIA [[id number]]     Order Status:  Canceled Specimen:  Stool from Stool           BMP:   Recent Labs  Lab 02/22/18  0532   GLU 86      K 3.1*   CL 93*   CO2 37*   BUN 18   CREATININE 1.1   CALCIUM 9.7   MG 2.2     I have reviewed all pertinent labs within the past 24 hours.    Estimated Creatinine Clearance: 57.4 mL/min (based on SCr of 1.1 mg/dL).    Diagnostic Results:

## 2018-02-22 NOTE — PROGRESS NOTES
[facility]  Heart Transplant  Progress Note    Patient Name: Sue Smith  MRN: [id number]  Admission Date: 2/16/2018  Hospital Length of Stay: 6 days  Attending Physician: Madi Santana MD  Primary Care Provider: Paddy Guerrier DO  Principal Problem:Acute on chronic right-sided heart failure    Subjective:     Interval History:     No acute overnight event. Ambulating more in the hallway with no difficulty. Continues to have adequate UOP. Denies chest pain or cough.    Continuous Infusions:   treprostinil (REMODULIN) infusion 60 ng/kg/min (02/21/18 1241)    veletri/remodulin cassette      veletri/remodulin tubing       Scheduled Meds:   albuterol sulfate  2.5 mg Nebulization Q4H    amLODIPine  5 mg Oral Daily    busPIRone  15 mg Oral TID    desvenlafaxine succinate  100 mg Oral Daily    fluticasone-vilanterol  1 puff Inhalation Daily    gabapentin  100 mg Oral TID    lamoTRIgine  100 mg Oral BID    levothyroxine  75 mcg Oral Before breakfast    lurasidone  40 mg Oral Daily    magnesium oxide  400 mg Oral BID    magnesium sulfate IVPB  1 g Intravenous Once    pantoprazole  40 mg Oral Daily    potassium chloride SA  60 mEq Oral BID    pravastatin  40 mg Oral Daily    sodium chloride 0.9%  3 mL Intravenous Q8H    torsemide  100 mg Oral BID    warfarin  10 mg Oral Daily     PRN Meds:ALPRAZolam, cyclobenzaprine, hydrocodone-acetaminophen 10-325mg, ondansetron    Review of patient's allergies indicates:   Allergen Reactions    Sulfa (sulfonamide antibiotics) Rash     Objective:     Vital Signs (Most Recent):  Temp: 98.7 °F (37.1 °C) (02/22/18 1048)  Pulse: 99 (02/22/18 1223)  Resp: 16 (02/22/18 1223)  BP: (!) 106/56 (02/22/18 1048)  SpO2: (!) 92 % (02/22/18 1223) Vital Signs (24h Range):  Temp:  [97.7 °F (36.5 °C)-98.7 °F (37.1 °C)] 98.7 °F (37.1 °C)  Pulse:  [65-99] 99  Resp:  [14-20] 16  SpO2:  [88 %-97 %] 92 %  BP: (101-118)/(56-63) 106/56     Patient Vitals for the past 72  hrs (Last 3 readings):   Weight   02/22/18 0715 84.1 kg (185 lb 6.5 oz)   02/21/18 0400 83 kg (182 lb 14.4 oz)   02/20/18 0400 84.4 kg (186 lb 1.6 oz)     Body mass index is 33.91 kg/m².      Intake/Output Summary (Last 24 hours) at 02/22/18 1228  Last data filed at 02/22/18 0735   Gross per 24 hour   Intake              820 ml   Output             2675 ml   Net            -1855 ml       Hemodynamic Parameters:       Telemetry:     Physical Exam     Constitutional: She is oriented to person, place, and time.   obese   HENT:   Head: Normocephalic.   Neck: No JVD present.   Cardiovascular: Normal rate, regular rhythm and normal heart sounds.  Exam reveals no gallop and no friction rub.    Pulmonary/Chest: Effort normal and breath sounds normal. She has no wheezes.   Abdominal: Soft. Bowel sounds are normal.   Musculoskeletal: She exhibits no edema.   Neurological: She is alert and oriented to person, place, and time.   Skin: Skin is warm and dry.   Nursing note and vitals reviewed.    Significant Labs:  CBC:    Recent Labs  Lab 02/17/18  0354 02/21/18  0455 02/22/18  0532   WBC 6.07 4.38 4.24   RBC 4.22 4.26 4.36   HGB 12.5 12.5 12.9   HCT 39.1 39.0 39.8    127* 122*   MCV 93 92 91   MCH 29.6 29.3 29.6   MCHC 32.0 32.1 32.4     BNP:    Recent Labs  Lab 02/17/18  0122   *     CMP:    Recent Labs  Lab 02/17/18  0118  02/20/18  0421 02/20/18  1040 02/21/18  0454 02/22/18  0532   GLU 81  < > 70  --  74 86   CALCIUM 9.3  < > 9.1  --  9.2 9.7   ALBUMIN 3.7  --   --   --   --   --    PROT 6.6  --   --   --   --   --      < > 140  --  141 140   K 3.6  < > 2.9* 3.9 3.3* 3.1*   CO2 22*  < > 33*  --  33* 37*     < > 95  --  94* 93*   BUN 18  < > 17  --  20 18   CREATININE 1.2  < > 0.9  --  1.0 1.1   ALKPHOS 144*  --   --   --   --   --    ALT 18  --   --   --   --   --    AST 25  --   --   --   --   --    BILITOT 0.7  --   --   --   --   --    < > = values in this interval not displayed.   Coagulation:      Recent Labs  Lab 02/20/18  0918 02/21/18  0454 02/22/18  0532   INR 1.8* 1.6* 1.5*     LDH:  No results for input(s): LDH in the last 72 hours.  Microbiology:  Microbiology Results (last 7 days)     Procedure Component Value Units Date/Time    Respiratory Viral Panel by PCR [facility]; Nasal Swab [[id number]] Collected:  02/20/18 1052    Order Status:  Completed Specimen:  Respiratory Updated:  02/21/18 1130     Respiratory Virus Panel, source Nasal Swab     RVP - Adenovirus Not Detected     Comment: Detects Serotypes B and E. Detection of Serotype C may   be limited. If Adenovirus infection is suspected and a   Not Detected result is returned the sample should be   re-tested for Adenovirus using an independent method  (e.g. [employer] Adenovirus Quantitative Real-Time  PCR test.          Enterovirus Not Detected     Comment: Cross-reactivity has been observed between certain Rhinovirus  strains and the Enterovirus assay.          Human Bocavirus Not Detected     Human Coronavirus Not Detected     Comment: The Human Coronavirus assay detects Human coronavirus types  229E, OC43,NL63 and HKU1.          RVP - Human Metapneumovirus (hMPV) Not Detected     RVP - Influenza A Not Detected     Influenza A - Q3O7-48 Not Detected     RVP - Influenza B Not Detected     Parainfluenza Not Detected     Respiratory Syncytial VirusVirus (RSV) A Not Detected     Comment: The Respiratory Syncytial Viral assay detects types A and B,  however it does not distinguish between the two.          RVP - Rhinovirus Not Detected     Comment: Cross-Reactivity has been observed between certain   Rhinovirus strains and the Enterovirus assay.  Target Enriched Mulitplex Polymerase Chain Reaction (TEM-PCR)  allows for the detection of multiple pathogens out of a single  reaction.  This test was developed and its performance   characteristics determined by [employer].  It has not   been cleared or approved by the U.S.Food and Drug  Administration.  Results should be used in conjunction with clinical findings,   and should not form the sole basis for a diagnosis or treatment  decision.  TEM-PCR is a licensed technology of [employer].         Narrative:       Receiving Lab:->[facility]    Respiratory Viral Panel by PCR [facility]; Nasal Swab [[id number]]     Order Status:  Canceled Specimen:  Respiratory     Respiratory Viral Panel by PCR [facility]; Nasal Swab [[id number]]     Order Status:  Canceled Specimen:  Respiratory     Clostridium difficile EIA [[id number]]     Order Status:  Canceled Specimen:  Stool from Stool           BMP:   Recent Labs  Lab 02/22/18  0532   GLU 86      K 3.1*   CL 93*   CO2 37*   BUN 18   CREATININE 1.1   CALCIUM 9.7   MG 2.2     I have reviewed all pertinent labs within the past 24 hours.    Estimated Creatinine Clearance: 57.4 mL/min (based on SCr of 1.1 mg/dL).    Diagnostic Results:      Assessment and Plan:     57 y.o. WF with PMH severe WHO Group 1 PAH previously on Uptravi but now on IV remodulin, chronic hypoxic respiratory failure, chronic RV failure, on home O2 and Bipap, and h/o trach in past (>4 years ago, eventually decannulated) who presents as a transfer from [facility] with increased SOB over the past 2 days and a recent 15 lb weight gain.  Transferred here as [facility] unable to support IV remodulin.  Recent diarrhea over past 2 days.  Denies N/V, fever, chills, body ache.  No recent sick contacts.    Of note, OSH have her labeled as COPD and Obesity hypoventilation however consult by pulmonologist (Dr. Hardik Magallanes) states explicitly that PFTs done in 2015 showed only mild restriction and were not consistent with COPD. In addition he states that though she is mildly obese her pCO2 is normal at baseline ruling out obesity hypoventilation. She has previous tobacco abuse (stopped 15 yrs ago) - SHERIE (unclear severity) has been on trilogy vent for BiPAP at night. She was  recently admitted (1/3-1/11/18) for volume overload (her 3rd admission in last 3 months; admitted 12/8-12/27/17 prior to that). She was started on Lasix drip and diuresed well on this. At end of admission, she was net negative 8.9L total and is feeing better. She was transitioned back to PO Bumex 4 BID priro to d/c with once weekly prn IVP lasix 80mg. IV Remodulin was continued to be uptitrated during hospitalization and she was d/c'ed on 44 ng.     Last BNP in [facility] was 535 (down from 772)  BNP at [facility] was 563, no INR reported at [facility], pt states that she did not get her coumadin tonight    INR goal:   2.0-3.0   TTR:   46.1 % (1.2 mo)   Maintenance plan:   5 mg (5 mg x 1) on Sun, Sat; 10 mg (5 mg x 2) all other days       * Acute on chronic right-sided heart failure      - Pt hypervolemic on admission   - started torsemide today. Will give 80mg lasix bi weekly after discharge as needed for symptomatic change  - continue I/O (- 1.3) for the last 24 hrs and daily weights. Total negative fluid loss of 14.5L since admission  - replete electrolytes as needed  - repeat echo shows severe RA and Multivalvular insufficiency.  - Plan on d/c tomorrow              Acute on chronic respiratory failure    - secondary to fluid overload  - improving with profuse diuresis  - respiratory panel negative for agent          Diarrhea    -possibly 2/2 to SE of Remodulin or viral infection  - C diff negative  - resolved        Essential hypertension    -currently controlled  -continue home PO BP meds         Hypothyroidism    Pt is currently asymptomatic.  Denies cold intolerance, constipation, malaise, hair loss  -continue home dose medication: levothyroxine 75 mcg daily        Pulmonary hypertension, group 1    -continue IV Remodulin at 60ng  -continue PRN nebs treatment   - oxygen weaned off to home level   - No plan for RHC  - Coumadin 10mg today ( goal 2-3) per our facility  - continue PT/OT. Declined home PT  - plan of care  in the after discharge ordered            Discussed plan of care with Dr. Santana the attending on service.    Cristian Peñaloza MD  Heart Transplant  [facility]

## 2018-02-22 NOTE — PHYSICIAN QUERY
PT Name: Sue Smith  MR #: [id number]     Physician Query Form - Documentation Clarification      CDS/: Angela Saunders RN, CCDS             Contact information: [email or web address]    This form is a permanent document in the medical record.     Query Date: February 22, 2018    By submitting this query, we are merely seeking further clarification of documentation. Please utilize your independent clinical judgment when addressing the question(s) below.    The Medical record reflects the following:    Supporting Clinical Findings Location in Medical Record   Mg 1.6-->1.5  K+ 3.0-->3.3     6 beat run of VTach; K was low so was repleted and K, Mg repeated overnight  Repleting electrolytes  - check K, Mg at 2pm 2/17, 2/19 lab      2/19 prog note     Magnesium oxide 400 mg po2 x daily    Potassium chloride 60 meq po x1  Potassium chloride 60 meq po 2 x daily         2/17- 2/22 mar    2/17 mar  2/17- 2/22 mar                                                                            Doctor, Please specify diagnosis or diagnoses associated with above clinical findings.    Provider Use Only      (  X  )  Hypomagnesemia and Hypokalemia    (    )  Hypomagnesemia    (    )  Hypokalemia    (    )  Other_____________                                                                                                                           [  ] Clinically undetermined

## 2018-02-22 NOTE — ASSESSMENT & PLAN NOTE
- Pt hypervolemic on admission   - started torsemide today. Will give 80mg lasix bi weekly after discharge as needed for symptomatic change  - continue I/O and daily weights  - replete electrolytes as needed  - repeat echo shows severe RA and Multivalvular insufficiency.  - Plan on d/c tomorrow

## 2018-02-22 NOTE — PLAN OF CARE
[facility]   Heart Transplant Clinic  [address] [place] [address]   [phone] after hours        HOME  HEALTH ORDERS      Admit to Home Health    Diagnosis:   Patient Active Problem List   Diagnosis    Pulmonary hypertension, group 1    Chronic respiratory failure with hypoxia    Hypothyroidism    Essential hypertension    Dyslipidemia    Bipolar affective disorder    Chronic right heart failure    Hx of tracheostomy    Gastroesophageal reflux disease    Chronic diastolic heart failure    Cardiomegaly    Pulmonary nodules    Chronic pulmonary heart disease    Hypervolemia    Shortness of breath    Acute on chronic right-sided heart failure    Diarrhea    Acute on chronic respiratory failure       Patient is homebound due to:  PULMONARY HTN    Diet: LOW SODIUM    Acitivities: as tolerated    Nursing:   SN to complete comprehensive assessment including routine vital signs. Instruct on disease process and s/s of complications to report to MD. Review/verify medication list sent home with the patient at time of discharge  and instruct patient/caregiver as needed. Frequency may be adjusted depending on start of care date.    Notify MD if SBP > 160 or < 90; DBP > 90 or < 50; HR > 120 or < 50; Temp > 101; Weight gain >3lbs in 1 day or 5lbs in 1 week.   Other:       LABS:  SN to perform labs:     CMP, BNP, Mg, CBC q week    INR on Mon 2/26/18 with results sent to coumadin clinic.      Administer (drug and dose):     Lasix 80 mg IVP twice weekly as needed for weight gain more than 5 pounds in a week, 3 pounds in a day, worsening LE or abdominal edema, or worsening SOB    Central :   - Sterile dressing changes are done weekly and as needed.   - Use chlor-hexadine scrub to cleanse site, apply Biopatch to insertion site,       apply securement device dressing   - If sterile gauze is under dressing to control oozing,                  dressing change must be performed every 24 hours until gauze is not needed.          Send follow up questions to [phone] or [phone].

## 2018-02-22 NOTE — ASSESSMENT & PLAN NOTE
- secondary to fluid overload  - improving with profuse diuresis  - respiratory panel negative for agent

## 2018-02-23 VITALS
DIASTOLIC BLOOD PRESSURE: 59 MMHG | BODY MASS INDEX: 35.06 KG/M2 | HEART RATE: 89 BPM | WEIGHT: 190.5 LBS | HEIGHT: 62 IN | OXYGEN SATURATION: 95 % | RESPIRATION RATE: 13 BRPM | TEMPERATURE: 99 F | SYSTOLIC BLOOD PRESSURE: 102 MMHG

## 2018-02-23 LAB
ANION GAP SERPL CALC-SCNC: 8 MMOL/L
BASOPHILS # BLD AUTO: 0.04 K/UL
BASOPHILS NFR BLD: 0.8 %
BUN SERPL-MCNC: 17 MG/DL
CALCIUM SERPL-MCNC: 9.4 MG/DL
CHLORIDE SERPL-SCNC: 94 MMOL/L
CO2 SERPL-SCNC: 38 MMOL/L
CREAT SERPL-MCNC: 1.1 MG/DL
DIFFERENTIAL METHOD: ABNORMAL
EOSINOPHIL # BLD AUTO: 0.2 K/UL
EOSINOPHIL NFR BLD: 4.6 %
ERYTHROCYTE [DISTWIDTH] IN BLOOD BY AUTOMATED COUNT: 16.2 %
EST. GFR  (AFRICAN AMERICAN): >60 ML/MIN/1.73 M^2
EST. GFR  (NON AFRICAN AMERICAN): 56.3 ML/MIN/1.73 M^2
GLUCOSE SERPL-MCNC: 92 MG/DL
HCT VFR BLD AUTO: 40 %
HGB BLD-MCNC: 12.7 G/DL
IMM GRANULOCYTES # BLD AUTO: 0.04 K/UL
IMM GRANULOCYTES NFR BLD AUTO: 0.8 %
INR PPP: 1.7
LYMPHOCYTES # BLD AUTO: 0.8 K/UL
LYMPHOCYTES NFR BLD: 16 %
MAGNESIUM SERPL-MCNC: 2.1 MG/DL
MCH RBC QN AUTO: 29.1 PG
MCHC RBC AUTO-ENTMCNC: 31.8 G/DL
MCV RBC AUTO: 92 FL
MONOCYTES # BLD AUTO: 0.4 K/UL
MONOCYTES NFR BLD: 7 %
NEUTROPHILS # BLD AUTO: 3.5 K/UL
NEUTROPHILS NFR BLD: 70.8 %
NRBC BLD-RTO: 0 /100 WBC
PLATELET # BLD AUTO: 145 K/UL
PMV BLD AUTO: 11.4 FL
POTASSIUM SERPL-SCNC: 3.3 MMOL/L
PROTHROMBIN TIME: 16.7 SEC
RBC # BLD AUTO: 4.36 M/UL
SODIUM SERPL-SCNC: 140 MMOL/L
WBC # BLD AUTO: 4.99 K/UL

## 2018-02-23 PROCEDURE — 25000003 PHARM REV CODE 250: Performed by: INTERNAL MEDICINE

## 2018-02-23 PROCEDURE — 99233 SBSQ HOSP IP/OBS HIGH 50: CPT | Mod: ,,, | Performed by: INTERNAL MEDICINE

## 2018-02-23 PROCEDURE — 94640 AIRWAY INHALATION TREATMENT: CPT

## 2018-02-23 PROCEDURE — 85025 COMPLETE CBC W/AUTO DIFF WBC: CPT

## 2018-02-23 PROCEDURE — 80048 BASIC METABOLIC PNL TOTAL CA: CPT

## 2018-02-23 PROCEDURE — 27000221 HC OXYGEN, UP TO 24 HOURS

## 2018-02-23 PROCEDURE — 36415 COLL VENOUS BLD VENIPUNCTURE: CPT

## 2018-02-23 PROCEDURE — 83735 ASSAY OF MAGNESIUM: CPT

## 2018-02-23 PROCEDURE — 94660 CPAP INITIATION&MGMT: CPT

## 2018-02-23 PROCEDURE — 85610 PROTHROMBIN TIME: CPT

## 2018-02-23 PROCEDURE — 94761 N-INVAS EAR/PLS OXIMETRY MLT: CPT

## 2018-02-23 PROCEDURE — 25000242 PHARM REV CODE 250 ALT 637 W/ HCPCS: Performed by: INTERNAL MEDICINE

## 2018-02-23 PROCEDURE — 99900035 HC TECH TIME PER 15 MIN (STAT)

## 2018-02-23 RX ORDER — FUROSEMIDE 10 MG/ML
80 INJECTION INTRAMUSCULAR; INTRAVENOUS
Qty: 160 ML | Refills: 0 | Status: SHIPPED | OUTPATIENT
Start: 2018-02-26

## 2018-02-23 RX ORDER — WARFARIN SODIUM 5 MG/1
TABLET ORAL
Qty: 45 TABLET | Refills: 11 | Status: ON HOLD
Start: 2018-02-23 | End: 2018-04-05

## 2018-02-23 RX ORDER — TORSEMIDE 100 MG/1
100 TABLET ORAL DAILY
Qty: 30 TABLET | Refills: 11 | Status: SHIPPED | OUTPATIENT
Start: 2018-02-23 | End: 2018-03-13 | Stop reason: SDUPTHER

## 2018-02-23 RX ORDER — POTASSIUM CHLORIDE 20 MEQ/1
80 TABLET, EXTENDED RELEASE ORAL 2 TIMES DAILY
Qty: 180 TABLET | Refills: 6 | Status: ON HOLD | OUTPATIENT
Start: 2018-02-23 | End: 2018-04-22

## 2018-02-23 RX ORDER — WARFARIN SODIUM 5 MG/1
5 TABLET ORAL DAILY
Qty: 45 TABLET | Refills: 11
Start: 2018-02-23 | End: 2018-02-23

## 2018-02-23 RX ADMIN — LURASIDONE HYDROCHLORIDE 40 MG: 40 TABLET, FILM COATED ORAL at 08:02

## 2018-02-23 RX ADMIN — MAGNESIUM OXIDE TAB 400 MG (241.3 MG ELEMENTAL MG) 400 MG: 400 (241.3 MG) TAB at 08:02

## 2018-02-23 RX ADMIN — AMLODIPINE BESYLATE 5 MG: 5 TABLET ORAL at 08:02

## 2018-02-23 RX ADMIN — LAMOTRIGINE 100 MG: 100 TABLET ORAL at 08:02

## 2018-02-23 RX ADMIN — PANTOPRAZOLE SODIUM 40 MG: 40 TABLET, DELAYED RELEASE ORAL at 08:02

## 2018-02-23 RX ADMIN — FLUTICASONE FUROATE AND VILANTEROL TRIFENATATE 1 PUFF: 100; 25 POWDER RESPIRATORY (INHALATION) at 08:02

## 2018-02-23 RX ADMIN — PRAVASTATIN SODIUM 40 MG: 40 TABLET ORAL at 08:02

## 2018-02-23 RX ADMIN — BUSPIRONE HYDROCHLORIDE 15 MG: 5 TABLET ORAL at 05:02

## 2018-02-23 RX ADMIN — GABAPENTIN 100 MG: 100 CAPSULE ORAL at 05:02

## 2018-02-23 RX ADMIN — TORSEMIDE 100 MG: 20 TABLET ORAL at 11:02

## 2018-02-23 RX ADMIN — LEVOTHYROXINE SODIUM 75 MCG: 75 TABLET ORAL at 05:02

## 2018-02-23 RX ADMIN — ALBUTEROL SULFATE 2.5 MG: 2.5 SOLUTION RESPIRATORY (INHALATION) at 12:02

## 2018-02-23 RX ADMIN — DESVENLAFAXINE SUCCINATE 100 MG: 50 TABLET, FILM COATED, EXTENDED RELEASE ORAL at 08:02

## 2018-02-23 RX ADMIN — ALBUTEROL SULFATE 2.5 MG: 2.5 SOLUTION RESPIRATORY (INHALATION) at 03:02

## 2018-02-23 RX ADMIN — ALBUTEROL SULFATE 2.5 MG: 2.5 SOLUTION RESPIRATORY (INHALATION) at 08:02

## 2018-02-23 RX ADMIN — POTASSIUM CHLORIDE 60 MEQ: 1500 TABLET, EXTENDED RELEASE ORAL at 08:02

## 2018-02-23 NOTE — DISCHARGE SUMMARY
[facility]  Heart Transplant  Discharge Summary      Patient Name: Sue Smith  MRN: [id number]  Admission Date: 2/16/2018  Hospital Length of Stay: 7 days  Discharge Date and Time: 02/23/2018 12:16 PM  Attending Physician: Madi Santana MD   Discharging Provider: Cristian Peñaloza MD  Primary Care Provider: Paddy Guerrier DO     HPI:   57 y.o. WF with PMH severe WHO Group 1 PAH previously on Uptravi but now on IV remodulin, chronic hypoxic respiratory failure, chronic RV failure, on home O2 and Bipap, and h/o trach in past (>4 years ago, eventually decannulated) who presents as a transfer from [facility] with increased SOB over the past 2 days and a recent 15 lb weight gain.  Transferred here as [facility] unable to support IV remodulin.  Recent diarrhea over past 2 days.  Denies N/V, fever, chills, body ache.  No recent sick contacts.    Of note, OSH have her labeled as COPD and Obesity hypoventilation however consult by pulmonologist (Dr. Hardik Magallanes) states explicitly that PFTs done in 2015 showed only mild restriction and were not consistent with COPD. In addition he states that though she is mildly obese her pCO2 is normal at baseline ruling out obesity hypoventilation. She has previous tobacco abuse (stopped 15 yrs ago) - SHERIE (unclear severity) has been on trilogy vent for BiPAP at night. She was recently admitted (1/3-1/11/18) for volume overload (her 3rd admission in last 3 months; admitted 12/8-12/27/17 prior to that). She was started on Lasix drip and diuresed well on this. At end of admission, she was net negative 8.9L total and is feeing better. She was transitioned back to PO Bumex 4 BID priro to d/c with once weekly prn IVP lasix 80mg. IV Remodulin was continued to be uptitrated during hospitalization and she was d/c'ed on 44 ng.     Last BNP in [facility] was 535 (down from 772)  BNP at [place] was 563, no INR reported at [place], pt states that she did not get her  coumadin tonight    INR goal:   2.0-3.0   TTR:   46.1 % (1.2 mo)   Maintenance plan:   5 mg (5 mg x 1) on Sun, Sat; 10 mg (5 mg x 2) all other days       * No surgery found *     Hospital Course: 2/21  - continue lasix 80mg TID for one day  - will decide on possible torsemide addition tomorrow  2/22  - doing well and weaning off oxygen to home level of 4LNC  - Diuretics changed to Torsemide 100mg BID today  2/23  Discharge summary    Patient initially had difficulty with dyspnea and hypoxia requiring high flow 10LNC oxygen support. Lasix 80mg BID was changed to TID with good response and significant volume loss of total 16.7 liters. She also had CT chest that was negative for other infectious/non infectious etiology. Respiratory panel including influenza and Gi panel for reported diarrhea on presentation were all negative. Blood cultures x2 were also negative. She improved after escalation of lasix dose frequency and was able to wean off oxygen to her baseline NC at 4 liters. She mentioned that Bumex and pulsed lasix dose at home were not effective in unloading her fluid accumulation. Diuretics were changed from Bumex to Torsemide 100mg oral BID with good Urine output ( 4.2L) overnight. She reported improvement in ambulation with less dyspnea. She will be taking torsemide onward as well as lasix 80mg biweekly as needed for weight gain and dyspnea. Her potassium was noted to be low, around 3.3 during stay despite being on 60 meq BID.  We increased her home dose to 80mg BID and plan to have scheduled lab draw for BMP and Mag from next week. She does have a home health care nurse already in place. Patient will continue home Remodulin dose of 60ng/kg/min and fu with PHTN clinic per scheduled. INR today is 1.7 with goal of 1.5-2.5 for PHTN. She will continue home coumadin dose without change.    Consults         Status Ordering Provider     Inpatient consult to PICC team (FARSHAD)  Once     Provider:  (Not yet assigned)     Completed JOHN LOPEZ          Significant Diagnostic Studies: Labs:   BMP:     Recent Labs  Lab 02/22/18  0532 02/23/18  0616   GLU 86 92    140   K 3.1* 3.3*   CL 93* 94*   CO2 37* 38*   BUN 18 17   CREATININE 1.1 1.1   CALCIUM 9.7 9.4   MG 2.2 2.1   , CBC     Recent Labs  Lab 02/22/18  0532 02/23/18  0616   WBC 4.24 4.99   HGB 12.9 12.7   HCT 39.8 40.0   * 145*    and INR   Lab Results   Component Value Date    INR 1.7 (H) 02/23/2018    INR 1.5 (H) 02/22/2018    INR 1.6 (H) 02/21/2018     Microbiology: Blood Culture No results found for: LABBLOO    Pending Diagnostic Studies:     None        Final Active Diagnoses:    Diagnosis Date Noted POA    PRINCIPAL PROBLEM:  Acute on chronic right-sided heart failure [I50.813] 02/17/2018 Unknown    Acute on chronic respiratory failure [J96.20] 02/18/2018 Unknown    Diarrhea [R19.7] 02/17/2018 Unknown    Hypothyroidism [E03.9] 10/05/2017 Yes    Essential hypertension [I10]  Yes    Pulmonary hypertension, group 1 [I27.20] 10/04/2017 Yes      Problems Resolved During this Admission:    Diagnosis Date Noted Date Resolved POA      Discharged Condition: good    Disposition: Home-Health Care [facility]    Follow Up:    Patient Instructions:   No discharge procedures on file.  Medications:  Reconciled Home Medications:   Current Discharge Medication List      START taking these medications    Details   furosemide (LASIX) 10 mg/mL injection Inject 8 mLs (80 mg total) into the vein twice a week. As needed for weight gain of > 5 lbs or SOB  Qty: 160 mL, Refills: 0      torsemide (DEMADEX) 100 MG Tab Take 1 tablet (100 mg total) by mouth once daily.  Qty: 30 tablet, Refills: 11         CONTINUE these medications which have CHANGED    Details   potassium chloride SA (K-DUR,KLOR-CON) 20 MEQ tablet Take 4 tablets (80 mEq total) by mouth 2 (two) times daily.  Qty: 180 tablet, Refills: 6      warfarin (COUMADIN) 5 MG tablet 5 mg (5 mg x 1) on Sun, Sat; 10 mg (5 mg x 2) all  other days  Qty: 45 tablet, Refills: 11         CONTINUE these medications which have NOT CHANGED    Details   albuterol (PROVENTIL) 2.5 mg /3 mL (0.083 %) nebulizer solution Take 2.5 mg by nebulization every 6 (six) hours as needed for Wheezing. Rescue      ALPRAZolam (XANAX) 1 MG tablet Take 0.5 tablets (0.5 mg total) by mouth 3 (three) times daily as needed for Anxiety.  Qty: 45 tablet, Refills: 0    Associated Diagnoses: Anxiety      amlodipine (NORVASC) 5 MG tablet Take 5 mg by mouth once daily.      budesonide-formoterol 80-4.5 mcg (SYMBICORT) 80-4.5 mcg/actuation HFAA Inhale 2 puffs into the lungs 2 (two) times daily. Controller      busPIRone (BUSPAR) 15 MG tablet Take 15 mg by mouth 3 (three) times daily.      cyclobenzaprine (FLEXERIL) 10 MG tablet TAKE 1 TABLET BY MOUTH THREE TIMES DAILY MAY CAUSE DROWSINESS  Refills: 2      desvenlafaxine succinate (PRISTIQ) 100 MG Tb24 Take 100 mg by mouth once daily.      gabapentin (NEURONTIN) 100 MG capsule Take 1 capsule (100 mg total) by mouth 3 (three) times daily.  Qty: 90 capsule, Refills: 11    Associated Diagnoses: Pain      hydrocodone-acetaminophen 10-325mg (NORCO)  mg Tab Take 1 tablet by mouth every 8 (eight) hours as needed for Pain.      lamotrigine (LAMICTAL) 100 MG tablet Take 100 mg by mouth 2 (two) times daily.      levothyroxine (SYNTHROID) 75 MCG tablet Take 75 mcg by mouth once daily.      lurasidone (LATUDA) 60 mg Tab tablet Take 40 mg by mouth once daily.       magnesium oxide (MAG-OX) 400 mg tablet Take 1 tablet (400 mg total) by mouth 2 (two) times daily.  Refills: 0      ondansetron (ZOFRAN-ODT) 8 MG TbDL Take 1 tablet (8 mg total) by mouth every 8 (eight) hours as needed.  Qty: 30 tablet, Refills: 6      pantoprazole (PROTONIX) 40 MG tablet Take 40 mg by mouth once daily.      pravastatin (PRAVACHOL) 40 MG tablet Take 40 mg by mouth once daily.      sodium chloride 0.9% 0.9 % SolP 100 mL with treprostinil 1 mg/mL Soln 6,000,000 ng  Inject 2,380 ng/min into the vein continuous.         STOP taking these medications       bumetanide (BUMEX) 2 MG tablet Comments:   Reason for Stopping:             Discussed plan of d/c with Dr. Santana the attending in service    Cristian Peñaloza MD  Heart Transplant  [facility]

## 2018-02-23 NOTE — ASSESSMENT & PLAN NOTE
- chronic and worsened by fluid overload  - viral panel negative including influenza  - improved with profuse diuresis  - patient has chronic hypoxia and recently had sleep study. She is scheduled for CPAP fitting in 2 days after d/c with pulmonary  - Being discharged on 4LNC (home baseline).

## 2018-02-23 NOTE — PLAN OF CARE
Problem: Fall Risk (Adult)  Goal: Absence of Falls  Patient will demonstrate the desired outcomes by discharge/transition of care.   Outcome: Ongoing (interventions implemented as appropriate)  Fall precautions maintained. Bed wheels locked, bed in lowest position, upper SR up x 2, call light in reach, non-skid socks when OOB. Instructed pt to call for assistance as needed.      Problem: Patient Care Overview  Goal: Plan of Care Review  Outcome: Ongoing (interventions implemented as appropriate)  No acute events overnight. VSS. O2 NC 4 liters with SpO2 > 88%. Transitioned to PO diuretic. Remodulin to right CW tunneled cath. Possible d/c tomorrow. Will cont to monitor.

## 2018-02-23 NOTE — PROGRESS NOTES
DISCHARGE    SW to pt's room for d/c plan. Pt presents as aaox3 with calm affect. Pt reports in agreement with plan to d/c home today with resumed home health via [facility] ph [phone], fax [phone] and IVP lasix via [facility]. Sw spoke with [employer] rep and confirmed they will give auth for d/c today. SW faxed resume orders to [facility] and confirmed receipt. SW confirmed [facility] is aware of d/c and has access to pt's chart via epic. SW confirmed pt's friend will provide ride home and will bring portable O2. No other needs voiced at this time. SW providing psychosocial and counseling support, education, resources, and d/c planning as needed. SW remains available.

## 2018-02-23 NOTE — NURSING
Patient ready for dc home. Patient mixed home Remodulin cassette prior to leaving. Printed AVS reviewed and patient to  needed Rx from local pharmacy. Home health arranged per SW and followup appts per AVS. Patient verbalizes understanding of dc instructions. Friend here with w/c to transport patient home with her portable O2.

## 2018-02-23 NOTE — ASSESSMENT & PLAN NOTE
-  Due to prior ineffective treatment  -  Repeat echo shows severe RA and Multivalvular insufficiency.   -Total loss of about 17 liters fluid after  Lasix 80mg IV push for four days  - Bumex stopped and Torsemide 100mg BID started yesterday and to continue on d/c. Also lasix 80mg IV biweekly as needed to continue on d/c  - Potassium dose increased to 80mg from 60mg oral BID  - BMP and mag as scheduled next week  - home health to continue after d/c

## 2018-02-26 ENCOUNTER — PATIENT OUTREACH (OUTPATIENT)
Dept: ADMINISTRATIVE | Facility: CLINIC | Age: 58
End: 2018-02-26

## 2018-02-26 ENCOUNTER — TELEPHONE (OUTPATIENT)
Dept: TRANSPLANT | Facility: CLINIC | Age: 58
End: 2018-02-26

## 2018-02-26 NOTE — TELEPHONE ENCOUNTER
Spoke with  nurse in the field. They were able to draw labs, but unable to administer Lasix after several attempts. Patient does not want to go to the ER because she does not want to be admitted. Currently in no distress. After further review and speaking with the patient, it was discovered that her Torsemide RX was incorrect. Patient is to take Torsemide, 100 mg, BID. Mrs. Smith will take her second dose now. Awaiting STAT labs.   Relayed all to   (Jossie). They will visit patient tomorrow. If weight gain then will attempt to admin 80 mg IVP Lasix per order.  MD made away of all.

## 2018-02-26 NOTE — PROGRESS NOTES
The pt was recently admitted from 2/16 through 2/23 for SOB.  She was discharged with lasix and torsemide.  See calendar for recent INRs and warfarin doses.  She reports resuming her past stable weekly dose of warfarin upon discharge on 2/23.  Her  agency will check an INR for us today.

## 2018-02-26 NOTE — TELEPHONE ENCOUNTER
HH RN called to report patient weight gain of 4lbs overnight (191 to 195lbs). Patient endorses taking her Torsemide. Does not feel SOB at this time. MD ok to administer 80 mg lasix, IVP for weight gain >3lbs in a 24 hour period.

## 2018-02-26 NOTE — PATIENT INSTRUCTIONS
Right-Sided Congestive Heart Failure (CHF)    The heart is a large muscle that pumps blood throughout the body. Blood carries oxygen to all the organs (including the brain), muscles, and skin. After the body takes the oxygen out of the blood, the blood returns to the heart. The right side of the heart collects that blood and pumps it to the lungs to get fresh oxygen. This oxygen-rich blood from the lungs then returns to the left side of the heart, where it is pumped back out to the rest of the body and the brain, starting the process all over.  Heart failure (HF) occurs when the heart muscle is weakened. This can occur after a heart attack or with disease of the coronary arteries that affects the heart over time, and in turn affects the pumping action of the heart.  When the right side of the heart is weakened, it cant handle the blood it is getting from the rest of the body. This blood returns to the heart through veins. When too much pressure builds up in the veins, fluid leaks out into the tissues. Gravity then causes that fluid to spread to those parts of the body that are the lowest. So one of the first symptoms of heart failure is swelling in the feet and ankles. If the condition worsens, the swelling can even go up past the knees. In more severe cases, the liver may also become congested with extra fluid.  Causes of right-sided heart failure  · Coronary artery disease  · Heart attack in the past (heart attack is also known as acute myocardial infarction, or AMI)  · High blood pressure, particularly in the pulmonary circulation  · Damaged heart valve  · Diabetes  · Obesity  · Cigarette smoking  · Alcohol abuse  · Increased pressure of the lungs weakening the right side of the heart  Treatment  Heart failure is a chronic condition. There is no cure. The purpose of medical treatment is to improve the pumping action of the heart, and remove excess water and fluid from the body. A number of medicines can help  reach this goal, improve symptoms, and prevent the heart from becoming weaker. In some cases of severe heart failure, mechanical devices can be implanted to help with the heart's pumping function. Another major goal is to better treat the causes of heart failure, such as diabetes, high blood pressure, and your lifestyle.  Home care  · Check your weight every day. A sudden increase in weight gain could mean worsening heart failure.  ¨ Use the same scale every day.  ¨ Weigh yourself at the same time every day.  ¨ Make sure the scale is on the floor, not on a rug.  ¨ Keep a record of your weight every day so your healthcare provider can see it. If you are not given a log sheet for this, keep a separate journal for this purpose.   · Cut back on how much salt (sodium) you eat:  ¨ Your healthcare provider will tell you  what level of salt you can have daily, usually 2,000 mg or less.  ¨ Avoid high-salt foods. These include olives, pickles, smoked meats, processed foods, and salted potato chips.  ¨ Don't add salt to your food at the table. Use only small amounts of salt when cooking.  · Follow your healthcare provider's recommendations about how much fluid you should have.  · Stop smoking.  · Cut back on the amount of alcohol you drink.  · Lose weight if you are overweight. The excess weight adds a lot of stress on the workload of the heart.  · Stay active. Talk with your provider about an exercise program that is safe for your heart.  · Keep your feet elevated to reduce swelling. Ask your provider about support hose as a preventive treatment for daytime leg swelling.  · Follow your healthcare provider's instructions closely.  Besides taking your medicine as instructed, an important part of treatment is lifestyle changes. These include diet, physical activity, stopping smoking, and weight control.  Improve your diet. Often in the hospital, people are given a heart healthy diet. This includes more fresh foods, lower fat,  less processed foods, and lower salt.  Follow-up care  Follow up with your healthcare provider, or as advised. Make sure to keep any appointments that were made for you. This can help better control heart failure.  If an X-ray was done, you will be told of any new findings that may affect your care.  Call 911  Call 911 if you:  · Become severely short of breath  · Feel lightheaded, or feel like you might pass out or faint  · Have chest pain or discomfort that's different than usual, the medicines your provider told you to use for this don't help, or the pain lasts longer than 10 to 15 minutes  · You suddenly develop a rapid heart rate  When to seek medical advice  Call your healthcare provider right away if you have any of these signs. They may mean your heart failure is getting worse:  · Sudden weight gain. This means more than 2 or more pounds in 1 day or 5 pounds in 1 week, or whatever weight gain you were told to report by your provider.  · Trouble breathing not related to being active  · New or increased swelling of your legs or ankles  · Swelling or pain in your abdomen  · Breathing trouble at night. This means waking up short of breath or needing more pillows to elevate your upper body to breathe.  · Frequent coughing that doesnt go away  · Feeling much more tired than usual  Date Last Reviewed: 1/4/2016  © 0213-5814 [employer]. [address] [place] [address], [place] [address]. All rights reserved. This information is not intended as a substitute for professional medical care. Always follow your healthcare professional's instructions.

## 2018-02-27 ENCOUNTER — ANTI-COAG VISIT (OUTPATIENT)
Dept: CARDIOLOGY | Facility: CLINIC | Age: 58
End: 2018-02-27

## 2018-02-27 ENCOUNTER — TELEPHONE (OUTPATIENT)
Dept: TRANSPLANT | Facility: CLINIC | Age: 58
End: 2018-02-27

## 2018-02-27 ENCOUNTER — DOCUMENTATION ONLY (OUTPATIENT)
Dept: TRANSPLANT | Facility: CLINIC | Age: 58
End: 2018-02-27

## 2018-02-27 DIAGNOSIS — I27.20 PULMONARY HYPERTENSION: ICD-10-CM

## 2018-02-27 LAB — INR PPP: 1.3

## 2018-02-27 NOTE — TELEPHONE ENCOUNTER
Received lab results and reviewed with Dr. Santana. No changes to medication today. Advised [facility].

## 2018-03-05 ENCOUNTER — TELEPHONE (OUTPATIENT)
Dept: TRANSPLANT | Facility: CLINIC | Age: 58
End: 2018-03-05

## 2018-03-05 LAB — INR PPP: 1.53

## 2018-03-05 NOTE — TELEPHONE ENCOUNTER
"Called number listed for callback, wrong number. Contacted home health number "We don't have a Eda who works here." Connected to Yelena, who reported that nurse in field stated pt passed out last week and needed to report that information.     Contacted patient, who reports she lost consciousness on Saturday. States "I was just getting up and then I was laying on the floor. I hit my head, but it wasn't that bad." Pt states she did not seek care or f/u after this incident. She was encouraged to see her PCP for follow up as soon as possible, and asked to contact 911 or go to ER if this every happens again. Pt has no other complaints at this time. Dr Cullen notified of all.     "

## 2018-03-05 NOTE — TELEPHONE ENCOUNTER
----- Message from Pretty Pitt sent at 3/5/2018  2:05 PM CST -----  Contact: Eda- [facility]- [phone]  The Nurse would like to have a call back from the nurse regarding the Pt.  She reports that the Pt passed out over the weekend and is refusing to go to the ER. Please call her if you have any questions. She can be reached @ [phone]. Thanks, Pretty

## 2018-03-06 ENCOUNTER — TELEPHONE (OUTPATIENT)
Dept: TRANSPLANT | Facility: CLINIC | Age: 58
End: 2018-03-06

## 2018-03-06 ENCOUNTER — ANTI-COAG VISIT (OUTPATIENT)
Dept: CARDIOLOGY | Facility: CLINIC | Age: 58
End: 2018-03-06

## 2018-03-06 NOTE — TELEPHONE ENCOUNTER
Lab results reviewed by MD. Instructed HH to admin 40 mg IVP Lasix and repeat BMP, BNP on Friday. Also will discontinue coumadin. Notified Cou[facility]in Clinic of this order.  Sera RICKETTS is contacting the patient this evening to relay instructions.

## 2018-03-07 ENCOUNTER — ANTI-COAG VISIT (OUTPATIENT)
Dept: CARDIOLOGY | Facility: CLINIC | Age: 58
End: 2018-03-07

## 2018-03-07 ENCOUNTER — TELEPHONE (OUTPATIENT)
Dept: TRANSPLANT | Facility: CLINIC | Age: 58
End: 2018-03-07

## 2018-03-07 DIAGNOSIS — I27.20 PULMONARY HYPERTENSION: ICD-10-CM

## 2018-03-07 NOTE — PROGRESS NOTES
Yudi/nurse-Dr Faina Cullen called 03/07/18 to inform us that pt has been discontinued off coumadin. Please dc from [facility].

## 2018-03-07 NOTE — TELEPHONE ENCOUNTER
"Mrs. Smith wants a home health aide that can help with ADL's, cooking and light housekeeping. She states that her insurance needs a list of her medications and a note from Dr. Cullen. Patient states that she does not qualify for Medicaid because of income requirements. She says, she does need help and states, "I've been passing out. Mrs. Smith says that she does not want to move in with her daughter because she likes her space and needs quiet. She says her daughter has a child and it would be hard to live there. She would rather live independently with some help.    PH Coordinator spoke with  and it was agreed to send an order for a home health aide to patient's existing HH agency and go from there. Order sent.    Additionally, patient offered that the agency had given her lasix today, but gave no further information.   "

## 2018-03-07 NOTE — PROGRESS NOTES
"Per telephone note on 3/6: "Also will discontinue coumadin. Notified Coumadin Clinic of this order.  RNSera is contacting the patient this evening to relay instructions."  As the pt is no longer taking coumadin, I am discharging her from the Coumadin Clinic at this time.  "

## 2018-03-12 ENCOUNTER — TELEPHONE (OUTPATIENT)
Dept: TRANSPLANT | Facility: CLINIC | Age: 58
End: 2018-03-12

## 2018-03-12 LAB
EXT BUN: 24
EXT CALCIUM: 8.7
EXT CHLORIDE: 102
EXT CREATININE: 1.49 MG/DL
EXT GLUCOSE: 124
EXT POTASSIUM: 3
EXT SODIUM: 139 MMOL/L
NT-PRO-BNP (RIVER PARISHES): 6435 PG/ML

## 2018-03-12 NOTE — TELEPHONE ENCOUNTER
MD reviewed lab results and received update from LUCIEN RN today, 3/12. Patient had a 3lb weight over the weekend, reports being a little more short of breath and having fluid in her abdomen. Per MD, instructed LUCIEN RN to administer 40 mg IVP lasix and have patient take an additional 40 mEq potassium. Will recheck labs on Wednesday. Labs pending today as well.

## 2018-03-13 DIAGNOSIS — E87.70 HYPERVOLEMIA, UNSPECIFIED HYPERVOLEMIA TYPE: Primary | ICD-10-CM

## 2018-03-13 RX ORDER — TORSEMIDE 100 MG/1
100 TABLET ORAL 2 TIMES DAILY
Qty: 60 TABLET | Refills: 11 | Status: ON HOLD | OUTPATIENT
Start: 2018-03-13 | End: 2018-04-22 | Stop reason: HOSPADM

## 2018-03-14 LAB
ALP ISOS SERPL LEV INH-CCNC: 189 U/L
EXT ALBUMIN: 3.9
EXT ALBUMIN: 4.1
EXT ALT: 29
EXT ALT: 33
EXT AST: 21
EXT AST: 28
EXT BUN: 23
EXT BUN: 24
EXT CALCIUM: 8.5
EXT CALCIUM: 8.6
EXT CHLORIDE: 101
EXT CHLORIDE: 103
EXT CREATININE: 1.58 MG/DL
EXT CREATININE: 1.58 MG/DL
EXT GLUCOSE: 115
EXT GLUCOSE: 80
EXT HEMATOCRIT: 42.7
EXT HEMOGLOBIN: 13.4
EXT MAGNESIUM: 1.5
EXT MAGNESIUM: 1.9
EXT PLATELETS: 147
EXT POTASSIUM: 4.3
EXT POTASSIUM: 4.7
EXT PROTEIN TOTAL: 6.6
EXT PROTEIN TOTAL: 7.2
EXT SODIUM: 138 MMOL/L
EXT SODIUM: 140 MMOL/L
EXT WBC: 7.2
NT-PRO-BNP (RIVER PARISHES): 6411
NT-PRO-BNP (RIVER PARISHES): 7261

## 2018-03-21 ENCOUNTER — TELEPHONE (OUTPATIENT)
Dept: TRANSPLANT | Facility: CLINIC | Age: 58
End: 2018-03-21

## 2018-03-26 ENCOUNTER — TELEPHONE (OUTPATIENT)
Dept: TRANSPLANT | Facility: CLINIC | Age: 58
End: 2018-03-26

## 2018-03-26 LAB
EXT ALBUMIN: 3.9
EXT ALT: 26
EXT AST: 23
EXT BUN: 21
EXT CALCIUM: 8.9
EXT CHLORIDE: 101
EXT CREATININE: 1.45 MG/DL
EXT GLUCOSE: 165
EXT HEMATOCRIT: 41.4
EXT HEMOGLOBIN: 13.1
EXT MAGNESIUM: 1.8
EXT PLATELETS: 139
EXT POTASSIUM: 3.8
EXT PROTEIN TOTAL: 7
EXT SODIUM: 139 MMOL/L
EXT WBC: 6.1
NT-PRO-BNP (RIVER PARISHES): 5458

## 2018-03-27 ENCOUNTER — TELEPHONE (OUTPATIENT)
Dept: TRANSPLANT | Facility: CLINIC | Age: 58
End: 2018-03-27

## 2018-03-27 LAB
EXT ALBUMIN: 3.6
EXT ALT: 24
EXT AST: 20
EXT BUN: 17
EXT CALCIUM: 8.5
EXT CHLORIDE: 104
EXT CREATININE: 1.44 MG/DL
EXT GLUCOSE: 80
EXT POTASSIUM: 3.45
EXT PROTEIN TOTAL: 6.5
EXT SODIUM: 141 MMOL/L
NT-PRO-BNP (RIVER PARISHES): 5284

## 2018-03-27 NOTE — TELEPHONE ENCOUNTER
"Spoke with patient who says she "feels pregnant" and is a little more short of breath. She endorses weight gain of 5lbs over the weekend.   [facility] also communicated weight gain and tanya labs yesterday. Results received and reviewed by Dr. Cullen.  Per MD, admin 80 mg IVP lasix now, with extra dose of potassium, 20 mEq. Will redraw labs on Thursday morning. All communicated to [facility] RN.  "

## 2018-03-28 ENCOUNTER — OFFICE VISIT (OUTPATIENT)
Dept: TRANSPLANT | Facility: CLINIC | Age: 58
End: 2018-03-28
Payer: MEDICARE

## 2018-03-28 ENCOUNTER — HOSPITAL ENCOUNTER (OUTPATIENT)
Dept: PULMONOLOGY | Facility: CLINIC | Age: 58
Discharge: HOME | End: 2018-03-28
Payer: MEDICARE

## 2018-03-28 VITALS — WEIGHT: 202 LBS | BODY MASS INDEX: 37.17 KG/M2 | HEIGHT: 62 IN

## 2018-03-28 VITALS
OXYGEN SATURATION: 89 % | WEIGHT: 203.94 LBS | HEART RATE: 94 BPM | BODY MASS INDEX: 36.14 KG/M2 | DIASTOLIC BLOOD PRESSURE: 64 MMHG | SYSTOLIC BLOOD PRESSURE: 113 MMHG | HEIGHT: 63 IN

## 2018-03-28 DIAGNOSIS — J96.11 CHRONIC RESPIRATORY FAILURE WITH HYPOXIA: ICD-10-CM

## 2018-03-28 DIAGNOSIS — I10 ESSENTIAL HYPERTENSION: ICD-10-CM

## 2018-03-28 DIAGNOSIS — I50.812 CHRONIC RIGHT HEART FAILURE: ICD-10-CM

## 2018-03-28 DIAGNOSIS — E78.5 DYSLIPIDEMIA: ICD-10-CM

## 2018-03-28 DIAGNOSIS — R06.02 SHORTNESS OF BREATH: ICD-10-CM

## 2018-03-28 DIAGNOSIS — I50.32 CHRONIC DIASTOLIC HEART FAILURE: ICD-10-CM

## 2018-03-28 DIAGNOSIS — I27.9 CHRONIC PULMONARY HEART DISEASE: ICD-10-CM

## 2018-03-28 DIAGNOSIS — I27.20 PULMONARY HYPERTENSION: Primary | ICD-10-CM

## 2018-03-28 DIAGNOSIS — I27.20 PULMONARY HYPERTENSION: ICD-10-CM

## 2018-03-28 PROBLEM — I50.813 ACUTE ON CHRONIC RIGHT-SIDED HEART FAILURE: Status: RESOLVED | Noted: 2018-02-17 | Resolved: 2018-03-28

## 2018-03-28 PROBLEM — J96.20 ACUTE ON CHRONIC RESPIRATORY FAILURE: Status: RESOLVED | Noted: 2018-02-18 | Resolved: 2018-03-28

## 2018-03-28 PROCEDURE — 99999 PR PBB SHADOW E&M-EST. PATIENT-LVL III: CPT | Mod: PBBFAC,,, | Performed by: INTERNAL MEDICINE

## 2018-03-28 PROCEDURE — 94618 PULMONARY STRESS TESTING: CPT | Mod: S$GLB,,, | Performed by: INTERNAL MEDICINE

## 2018-03-28 PROCEDURE — 3078F DIAST BP <80 MM HG: CPT | Mod: CPTII,S$GLB,, | Performed by: INTERNAL MEDICINE

## 2018-03-28 PROCEDURE — 3074F SYST BP LT 130 MM HG: CPT | Mod: CPTII,S$GLB,, | Performed by: INTERNAL MEDICINE

## 2018-03-28 PROCEDURE — 99214 OFFICE O/P EST MOD 30 MIN: CPT | Mod: S$GLB,,, | Performed by: INTERNAL MEDICINE

## 2018-03-28 RX ORDER — METOLAZONE 2.5 MG/1
2.5 TABLET ORAL DAILY
Qty: 30 TABLET | Refills: 11 | Status: ON HOLD | OUTPATIENT
Start: 2018-03-28 | End: 2018-04-22 | Stop reason: HOSPADM

## 2018-03-28 NOTE — PROGRESS NOTES
Patient, Sue Smith (MRN #[id number]), presented with a recorded BMI of 36.12 kg/m^2 and a documented comorbidity(s):  - Hypertension  - Hyperlipidemia  - Atrial Fibrillation  to which the severe obesity is a contributing factor. This is consistent with the definition of severe obesity (BMI 35.0-35.9) with comorbidity (ICD-10 E66.01, Z68.35). The patient's severe obesity was monitored, evaluated, addressed and/or treated. This addendum to the medical record is made on 03/28/2018.

## 2018-03-28 NOTE — PATIENT INSTRUCTIONS
Add metolazone 30 minutes before morning torsemide Thurs and Friday  Then going forward, Check your weights every morning after getting out of bed and urinating. If your weight goes up 3# overnight or 5# in one week take the metolazone before your torsemide in the morning.  Labs early next week     We will send you a new dosing sheet to push your remodulin up to 75 ng and apply for you to start adempas 1mg three times a day with food    Keep salt intake to under 2000 mg sodium, fluids to under 2 L (64 oz)

## 2018-03-28 NOTE — PROGRESS NOTES
Subjective:    Patient ID:  Sue Smith is a 57 y.o. female who presents for    [facility]   57 y.o. WF with PMH severe WHO Group 1 PAH previously on Uptravi but now on IV remodulin, chronic hypoxic respiratory failure, chronic RV failure, on home O2 and Bipap, and h/o trach in past (>4 years ago, eventually decannulated) who presents for hospital f/u. Additionally, records from OSH have her labeled as COPD and Obesity hypoventilation however consult by pulmonologist (Dr. Hardik Magallanes) states explicitly that PFTs done in 2015 showed only mild restriction and were not consistent with COPD. In addition he states that though she is mildly obese her pCO2 is normal at baseline ruling out obesity hypoventilation. She has previous tobacco abuse (stopped 15 yrs ago) - SHERIE (unclear severity) has been on trilogy vent for BiPAP at night. She was recently admitted (1/3-1/11/18) for volume overload (her 3rd admission in last 3 months; admitted 12/8-12/27/17 prior to that). She was started on Lasix drip and diuresed well on this. At end of admission, she was net negative 8.9L total and is feeing better. She was transitioned back to PO Bumex 4 BID priro to d/c with once weekly prn IVP lasix 80mg. IV Remodulin was continued to be uptitrated during hospitalization and she was d/c'ed on 44 ng.    Since last visit pt started swelling again on Fri- has been getting IVP lasix via  but doesn't seem to be helping- gained another # last night  Torsemide is 100mg bid which doesn't make her urinate much, though it did at first- - thinks the IV lasix makes her urinate more but just for a day- she does her own cooking- no salt, does sometimes orosco food (shrimp/fish occasionally) and bakes a lot  Before she started swelling her breathing was good, but the last couple of days its been bad- holds fluid in her abdomen from the back around the front- feels full quickly- she's hungry and then takes a few bites and she's full    Current dose remodulin 60  "ng/kg/min- no Se, f/c/line issues                TTE 2/19/18  CONCLUSIONS     1 - Normal left ventricular systolic function (EF 60-65%).     2 - Severe right ventricular enlargement with severely depressed systolic function.     3 - Right atrial enlargement.     4 - Severe tricuspid regurgitation.     5 - Pulmonary hypertension. The estimated PA systolic pressure is 102 mmHg.     6 - Increased central venous pressure.     6mw today 322 m (304.8m in jan    )                                              O2 sat 91  ->85  %                                                           HR 86  ->103                                                                    /66   ->119 /63                                                         Erasmo   4  -> 5-6    Last clinic visit 12/8/17: Distance 273.5 meters   10/24/2017---------Distance: 365.76 meters (1200 feet)    Review of Systems   Constitution: Positive for weight gain. Negative for chills, decreased appetite, fever, weakness and malaise/fatigue.   HENT: Negative.    Eyes: Negative.    Cardiovascular: Positive for dyspnea on exertion (Improved WHO FC II-III) and leg swelling. Negative for chest pain, irregular heartbeat, near-syncope, orthopnea, palpitations, paroxysmal nocturnal dyspnea and syncope.   Respiratory: Positive for shortness of breath (improved). Negative for cough, sputum production and wheezing.    Endocrine: Negative.    Skin: Negative.  Negative for poor wound healing.   Musculoskeletal: Negative.    Gastrointestinal: Positive for bloating and abdominal pain. Negative for change in bowel habit, constipation, diarrhea, nausea and vomiting.   Neurological: Negative for dizziness and light-headedness.   Psychiatric/Behavioral: Negative for altered mental status and depression.        Objective: /64   Pulse 94   Ht 5' 3" (1.6 m)   Wt 92.5 kg (203 lb 14.8 oz)   SpO2 (!) 89% Comment: Pt on 4L of O2 at reading  BMI 36.12 kg/m²      Physical Exam "   Constitutional: She is oriented to person, place, and time. She appears well-developed and well-nourished. No distress.   HENT:   Head: Normocephalic and atraumatic.   Eyes: Right eye exhibits no discharge. Left eye exhibits no discharge.   Neck: Normal range of motion. Neck supple. JVD present. No thyromegaly present.   Cardiovascular: Normal rate, regular rhythm, normal heart sounds and intact distal pulses.  Exam reveals no gallop and no friction rub.    No murmur heard.  Prominent P2   Pulmonary/Chest: Effort normal and breath sounds normal. No respiratory distress. She has no wheezes. She has no rales. She exhibits no tenderness.   Abdominal: Soft. Bowel sounds are normal. She exhibits distension. There is no tenderness. There is no rebound and no guarding.   Musculoskeletal: Normal range of motion. She exhibits edema. She exhibits no tenderness.   Neurological: She is alert and oriented to person, place, and time. She has normal reflexes. No cranial nerve deficit. Coordination normal.   Skin: Skin is warm and dry. No rash noted. She is not diaphoretic.   Psychiatric: She has a normal mood and affect. Judgment and thought content normal.     Lab Results   Component Value Date     (H) 03/28/2018    BNP 5,284 03/26/2018     03/28/2018    K 3.8 03/28/2018    MG 1.9 03/28/2018    CL 98 03/28/2018    CO2 30 (H) 03/28/2018    BUN 11 03/28/2018    CREATININE 1.4 03/28/2018    GLU 70 03/28/2018    HGBA1C 5.5 10/05/2017    AST 23 03/28/2018    ALT 18 03/28/2018    ALBUMIN 4.1 03/28/2018    PROT 6.9 03/28/2018    BILITOT 0.8 03/28/2018       Magnesium   Date Value Ref Range Status   03/28/2018 1.9 1.6 - 2.6 mg/dL Final       Lab Results   Component Value Date    WBC 6.97 03/28/2018    HGB 14.5 03/28/2018    HCT 46.8 03/28/2018    MCV 90 03/28/2018     (L) 03/28/2018       Lab Results   Component Value Date    INR 1.53 03/05/2018    INR 1.3 02/27/2018    INR 1.7 (H) 02/23/2018       NT-pro-BNP   (rFan Mittal)   Date Value Ref Range Status   03/26/2018 5,284  Final   03/19/2018 5,458  Final   03/14/2018 6,411  Final     BNP   Date Value Ref Range Status   03/28/2018 823 (H) 0 - 99 pg/mL Final     Comment:     Values of less than 100 pg/ml are consistent with non-CHF populations.       No results found for: LDH          Assessment:       1. Pulmonary hypertension, group 1- on 60 ng IV remodulin with volume overload and increased BNP today- echo in Feb still with severely enlarged and depressed RV   2. Chronic diastolic heart failure    3. Chronic pulmonary heart disease    4. Chronic respiratory failure with hypoxia    5. Chronic right heart failure    6. Dyslipidemia    7. Essential hypertension    8. Shortness of breath         Plan:   WHO Group 1 PAH, severe; Chronic RV Failure  - Who group I Functional Class III   - Warm and Wet today-> add metolazone 2.5mg before am demadex alina and Fri- then follow daily wts and take for Wt gain 3# overnight or 5# in 1 wk  Would like to repeat CMP and BNP early next week    -  - cont Remodulin and up-titration to new goal 75 ng/kg/min and apply for adempas 1mg tid    cont O2 ATC    - RTC 1 month with labs and 6MWT      Recommend 2 gram sodium restriction and 1500cc fluid restriction.  Encourage physical activity with graded exercise program.  Requested patient to weigh themselves daily, and to notify us if their weight increases by more than 3 lbs in 1 day or 5 lbs in 1 week.

## 2018-03-29 ENCOUNTER — TELEPHONE (OUTPATIENT)
Dept: TRANSPLANT | Facility: CLINIC | Age: 58
End: 2018-03-29

## 2018-03-29 NOTE — PROCEDURES
Sue Smith is a 57 y.o.  female patient, who presents for a 6 minute walk test ordered by Madi Santana MD.  The diagnosis is Pulmonary Hypertension.  The patient's BMI is 37 kg/m2.  Predicted distance (lower limit of normal) is 320.64 meters.      Test Results:    The test was completed without stopping.  The total time walked was 360 seconds.  During walking, the patient reported:  Dyspnea, Dizziness, Lightheadedness.  The patient used supplemental oxygen during testing.     03/28/2018---------Distance: 321.87 meters (1056 feet)     O2 Sat % Supplemental Oxygen Heart Rate Blood Pressure Erasmo Scale   Pre-exercise  (Resting) 91 % 4 L/M 86 bpm 109/66 mmHg 4   During Exercise 85 % 4 L/M 103 bpm 119/63 mmHg 5-6   Post-exercise  (Recovery) 93 % 4 L/M  90 bpm       Recovery Time:  164 seconds    Performing nurse/tech:  CLAUDIA Alvarenga RRT      PREVIOUS STUDY:   01/26/2018---------Distance: 304.8 meters (1000 feet)       O2 Sat % Supplemental Oxygen Heart Rate Blood Pressure Erasmo Scale   Pre-exercise  (Resting) 97 % 3 L/M 91 bpm 115/55 mmHg 0   During Exercise 89 % 3 L/M 110 bpm 113/59 mmHg 2   Post-exercise  (Recovery) 96 % 3 L/M  101 bpm   mmHg        CLINICAL INTERPRETATION:  Six minute walk distance is 321.87 meters (1056 feet) with heavy dyspnea.  During exercise, there was significant desaturation while breathing supplemental oxygen.  Both blood pressure and heart rate remained stable with walking.  The patient reported non-pulmonary symptoms during exercise.  Since the previous study in January 2018, exercise capacity is unchanged.  Based upon age and body mass index, exercise capacity is normal.

## 2018-04-02 ENCOUNTER — TELEPHONE (OUTPATIENT)
Dept: TRANSPLANT | Facility: CLINIC | Age: 58
End: 2018-04-02

## 2018-04-02 LAB
EXT ALBUMIN: 3.8
EXT ALT: 27
EXT AST: 34
EXT BUN: 34
EXT CALCIUM: 8.6
EXT CHLORIDE: 91
EXT CREATININE: 2.16 MG/DL
EXT GLUCOSE: 80
EXT HEMATOCRIT: 42.9
EXT HEMOGLOBIN: 13.5
EXT MAGNESIUM: 1.8
EXT POTASSIUM: 3.22
EXT PROTEIN TOTAL: 7.3
EXT SODIUM: 136 MMOL/L
EXT WBC: 6.6
NT-PRO-BNP (RIVER PARISHES): 7295 PG/ML

## 2018-04-03 ENCOUNTER — TELEPHONE (OUTPATIENT)
Dept: TRANSPLANT | Facility: CLINIC | Age: 58
End: 2018-04-03

## 2018-04-03 NOTE — TELEPHONE ENCOUNTER
Called patient to advise that she come to [facility], per MD, after review of labs. Patient understood and said that she thought she might have to come. She endorses a little fluid coming off with use of Metolazone but still feels swollen. Patient requests time to get her things together and is able to get a ride for tomorrow morning. She will bring all of her supplies, medication and dosing sheet with her. Mrs. Smith knows to go to her local ER if her symptoms worsen and she does not feel like she can make it to [place].    HTS team notified.    PH Coordinator notified [employer] of the plan.

## 2018-04-04 ENCOUNTER — TELEPHONE (OUTPATIENT)
Dept: TRANSPLANT | Facility: CLINIC | Age: 58
End: 2018-04-04

## 2018-04-04 ENCOUNTER — HISTORICAL (OUTPATIENT)
Dept: ADMINISTRATIVE | Facility: HOSPITAL | Age: 58
End: 2018-04-04

## 2018-04-05 ENCOUNTER — HOSPITAL ENCOUNTER (INPATIENT)
Facility: HOSPITAL | Age: 58
LOS: 17 days | Discharge: HOME HEALTH | DRG: 286 | End: 2018-04-22
Attending: INTERNAL MEDICINE | Admitting: INTERNAL MEDICINE
Payer: MEDICARE

## 2018-04-05 DIAGNOSIS — I50.33 ACUTE ON CHRONIC DIASTOLIC HEART FAILURE: ICD-10-CM

## 2018-04-05 DIAGNOSIS — J96.11 CHRONIC RESPIRATORY FAILURE WITH HYPOXIA: ICD-10-CM

## 2018-04-05 DIAGNOSIS — R06.02 SOB (SHORTNESS OF BREATH): ICD-10-CM

## 2018-04-05 DIAGNOSIS — L76.82 BLEEDING AT INSERTION SITE: ICD-10-CM

## 2018-04-05 DIAGNOSIS — I27.20 PULMONARY HYPERTENSION: Primary | ICD-10-CM

## 2018-04-05 DIAGNOSIS — I27.20 PULMONARY HYPERTENSION: ICD-10-CM

## 2018-04-05 DIAGNOSIS — E87.70 VOLUME OVERLOAD: ICD-10-CM

## 2018-04-05 DIAGNOSIS — Z76.82 LUNG TRANSPLANT CANDIDATE: ICD-10-CM

## 2018-04-05 DIAGNOSIS — I36.9 NONRHEUMATIC TRICUSPID VALVE DISORDER: ICD-10-CM

## 2018-04-05 LAB
ALBUMIN SERPL BCP-MCNC: 4 G/DL
ALP SERPL-CCNC: 194 U/L
ALT SERPL W/O P-5'-P-CCNC: 17 U/L
ANION GAP SERPL CALC-SCNC: 10 MMOL/L
ANION GAP SERPL CALC-SCNC: 10 MMOL/L
ANION GAP SERPL CALC-SCNC: 12 MMOL/L
APTT BLDCRRT: 22.4 SEC
AST SERPL-CCNC: 23 U/L
BASOPHILS # BLD AUTO: 0.03 K/UL
BASOPHILS NFR BLD: 0.5 %
BILIRUB SERPL-MCNC: 0.7 MG/DL
BNP SERPL-MCNC: 539 PG/ML
BUN SERPL-MCNC: 23 MG/DL
BUN SERPL-MCNC: 25 MG/DL
BUN SERPL-MCNC: 26 MG/DL
CALCIUM SERPL-MCNC: 9.3 MG/DL
CALCIUM SERPL-MCNC: 9.3 MG/DL
CALCIUM SERPL-MCNC: 9.4 MG/DL
CHLORIDE SERPL-SCNC: 97 MMOL/L
CHLORIDE SERPL-SCNC: 97 MMOL/L
CHLORIDE SERPL-SCNC: 99 MMOL/L
CO2 SERPL-SCNC: 30 MMOL/L
CO2 SERPL-SCNC: 30 MMOL/L
CO2 SERPL-SCNC: 33 MMOL/L
CREAT SERPL-MCNC: 1.1 MG/DL
CREAT SERPL-MCNC: 1.3 MG/DL
CREAT SERPL-MCNC: 1.3 MG/DL
DIASTOLIC DYSFUNCTION: NO
DIFFERENTIAL METHOD: ABNORMAL
EOSINOPHIL # BLD AUTO: 0.2 K/UL
EOSINOPHIL NFR BLD: 2.7 %
ERYTHROCYTE [DISTWIDTH] IN BLOOD BY AUTOMATED COUNT: 18.3 %
EST. GFR  (AFRICAN AMERICAN): 53 ML/MIN/1.73 M^2
EST. GFR  (AFRICAN AMERICAN): 53 ML/MIN/1.73 M^2
EST. GFR  (AFRICAN AMERICAN): >60 ML/MIN/1.73 M^2
EST. GFR  (NON AFRICAN AMERICAN): 46 ML/MIN/1.73 M^2
EST. GFR  (NON AFRICAN AMERICAN): 46 ML/MIN/1.73 M^2
EST. GFR  (NON AFRICAN AMERICAN): 56.3 ML/MIN/1.73 M^2
ESTIMATED PA SYSTOLIC PRESSURE: 79
GLUCOSE SERPL-MCNC: 147 MG/DL
GLUCOSE SERPL-MCNC: 87 MG/DL
GLUCOSE SERPL-MCNC: 97 MG/DL
HCT VFR BLD AUTO: 41.7 %
HGB BLD-MCNC: 13 G/DL
IMM GRANULOCYTES # BLD AUTO: 0.07 K/UL
IMM GRANULOCYTES NFR BLD AUTO: 1.1 %
INR PPP: 1.1
LYMPHOCYTES # BLD AUTO: 0.8 K/UL
LYMPHOCYTES NFR BLD: 13.1 %
MAGNESIUM SERPL-MCNC: 2.1 MG/DL
MCH RBC QN AUTO: 27.5 PG
MCHC RBC AUTO-ENTMCNC: 31.2 G/DL
MCV RBC AUTO: 88 FL
MONOCYTES # BLD AUTO: 0.4 K/UL
MONOCYTES NFR BLD: 7 %
NEUTROPHILS # BLD AUTO: 4.8 K/UL
NEUTROPHILS NFR BLD: 75.6 %
NRBC BLD-RTO: 0 /100 WBC
PHOSPHATE SERPL-MCNC: 4.2 MG/DL
PLATELET # BLD AUTO: 131 K/UL
PMV BLD AUTO: 10.4 FL
POTASSIUM SERPL-SCNC: 2.7 MMOL/L
POTASSIUM SERPL-SCNC: 3.3 MMOL/L
POTASSIUM SERPL-SCNC: 3.5 MMOL/L
PROT SERPL-MCNC: 6.7 G/DL
PROTHROMBIN TIME: 11.8 SEC
RBC # BLD AUTO: 4.73 M/UL
RETIRED EF AND QEF - SEE NOTES: 60 (ref 55–65)
SODIUM SERPL-SCNC: 137 MMOL/L
SODIUM SERPL-SCNC: 139 MMOL/L
SODIUM SERPL-SCNC: 142 MMOL/L
TRICUSPID VALVE REGURGITATION: ABNORMAL
WBC # BLD AUTO: 6.33 K/UL

## 2018-04-05 PROCEDURE — 84100 ASSAY OF PHOSPHORUS: CPT

## 2018-04-05 PROCEDURE — 25000003 PHARM REV CODE 250: Performed by: INTERNAL MEDICINE

## 2018-04-05 PROCEDURE — 85025 COMPLETE CBC W/AUTO DIFF WBC: CPT

## 2018-04-05 PROCEDURE — 80048 BASIC METABOLIC PNL TOTAL CA: CPT | Mod: 91

## 2018-04-05 PROCEDURE — 93306 TTE W/DOPPLER COMPLETE: CPT

## 2018-04-05 PROCEDURE — 63600175 PHARM REV CODE 636 W HCPCS: Performed by: INTERNAL MEDICINE

## 2018-04-05 PROCEDURE — 93306 TTE W/DOPPLER COMPLETE: CPT | Mod: 26,GV,, | Performed by: INTERNAL MEDICINE

## 2018-04-05 PROCEDURE — 99900035 HC TECH TIME PER 15 MIN (STAT)

## 2018-04-05 PROCEDURE — 83735 ASSAY OF MAGNESIUM: CPT

## 2018-04-05 PROCEDURE — 63600175 PHARM REV CODE 636 W HCPCS: Mod: JG | Performed by: INTERNAL MEDICINE

## 2018-04-05 PROCEDURE — 93010 ELECTROCARDIOGRAM REPORT: CPT | Mod: GW,,, | Performed by: INTERNAL MEDICINE

## 2018-04-05 PROCEDURE — 85730 THROMBOPLASTIN TIME PARTIAL: CPT

## 2018-04-05 PROCEDURE — 94660 CPAP INITIATION&MGMT: CPT

## 2018-04-05 PROCEDURE — 80053 COMPREHEN METABOLIC PANEL: CPT

## 2018-04-05 PROCEDURE — 85610 PROTHROMBIN TIME: CPT

## 2018-04-05 PROCEDURE — 25000242 PHARM REV CODE 250 ALT 637 W/ HCPCS: Performed by: INTERNAL MEDICINE

## 2018-04-05 PROCEDURE — 99223 1ST HOSP IP/OBS HIGH 75: CPT | Mod: AI,GW,, | Performed by: INTERNAL MEDICINE

## 2018-04-05 PROCEDURE — 94761 N-INVAS EAR/PLS OXIMETRY MLT: CPT

## 2018-04-05 PROCEDURE — 27000190 HC CPAP FULL FACE MASK W/VALVE

## 2018-04-05 PROCEDURE — 80048 BASIC METABOLIC PNL TOTAL CA: CPT

## 2018-04-05 PROCEDURE — 83880 ASSAY OF NATRIURETIC PEPTIDE: CPT

## 2018-04-05 PROCEDURE — 36415 COLL VENOUS BLD VENIPUNCTURE: CPT

## 2018-04-05 PROCEDURE — 20600001 HC STEP DOWN PRIVATE ROOM

## 2018-04-05 RX ORDER — LEVOTHYROXINE SODIUM 75 UG/1
75 TABLET ORAL DAILY
Status: DISCONTINUED | OUTPATIENT
Start: 2018-04-05 | End: 2018-04-22 | Stop reason: HOSPADM

## 2018-04-05 RX ORDER — ACETAMINOPHEN 325 MG/1
650 TABLET ORAL EVERY 6 HOURS PRN
Status: DISCONTINUED | OUTPATIENT
Start: 2018-04-05 | End: 2018-04-22 | Stop reason: HOSPADM

## 2018-04-05 RX ORDER — ENOXAPARIN SODIUM 100 MG/ML
40 INJECTION SUBCUTANEOUS EVERY 24 HOURS
Status: DISCONTINUED | OUTPATIENT
Start: 2018-04-05 | End: 2018-04-22 | Stop reason: HOSPADM

## 2018-04-05 RX ORDER — PANTOPRAZOLE SODIUM 40 MG/1
40 TABLET, DELAYED RELEASE ORAL DAILY
Status: DISCONTINUED | OUTPATIENT
Start: 2018-04-05 | End: 2018-04-20

## 2018-04-05 RX ORDER — SODIUM CHLORIDE 0.9 % (FLUSH) 0.9 %
3 SYRINGE (ML) INJECTION EVERY 8 HOURS
Status: DISCONTINUED | OUTPATIENT
Start: 2018-04-05 | End: 2018-04-22 | Stop reason: HOSPADM

## 2018-04-05 RX ORDER — ALPRAZOLAM 0.5 MG/1
0.5 TABLET ORAL 3 TIMES DAILY PRN
Status: DISCONTINUED | OUTPATIENT
Start: 2018-04-05 | End: 2018-04-05

## 2018-04-05 RX ORDER — GABAPENTIN 100 MG/1
100 CAPSULE ORAL 3 TIMES DAILY
Status: DISCONTINUED | OUTPATIENT
Start: 2018-04-05 | End: 2018-04-22 | Stop reason: HOSPADM

## 2018-04-05 RX ORDER — POTASSIUM CHLORIDE 750 MG/1
60 CAPSULE, EXTENDED RELEASE ORAL 3 TIMES DAILY
Status: DISCONTINUED | OUTPATIENT
Start: 2018-04-05 | End: 2018-04-05

## 2018-04-05 RX ORDER — FUROSEMIDE 10 MG/ML
80 INJECTION INTRAMUSCULAR; INTRAVENOUS ONCE
Status: COMPLETED | OUTPATIENT
Start: 2018-04-05 | End: 2018-04-05

## 2018-04-05 RX ORDER — LAMOTRIGINE 100 MG/1
100 TABLET ORAL 2 TIMES DAILY
Status: DISCONTINUED | OUTPATIENT
Start: 2018-04-05 | End: 2018-04-22 | Stop reason: HOSPADM

## 2018-04-05 RX ORDER — FLUTICASONE FUROATE AND VILANTEROL 100; 25 UG/1; UG/1
1 POWDER RESPIRATORY (INHALATION) DAILY
Status: DISCONTINUED | OUTPATIENT
Start: 2018-04-05 | End: 2018-04-22 | Stop reason: HOSPADM

## 2018-04-05 RX ORDER — LOPERAMIDE HYDROCHLORIDE 2 MG/1
2 CAPSULE ORAL EVERY 4 HOURS PRN
Status: DISCONTINUED | OUTPATIENT
Start: 2018-04-05 | End: 2018-04-22 | Stop reason: HOSPADM

## 2018-04-05 RX ORDER — POTASSIUM CHLORIDE 20 MEQ/1
60 TABLET, EXTENDED RELEASE ORAL ONCE
Status: COMPLETED | OUTPATIENT
Start: 2018-04-05 | End: 2018-04-05

## 2018-04-05 RX ORDER — LURASIDONE HYDROCHLORIDE 40 MG/1
40 TABLET, FILM COATED ORAL DAILY
Status: DISCONTINUED | OUTPATIENT
Start: 2018-04-05 | End: 2018-04-22 | Stop reason: HOSPADM

## 2018-04-05 RX ORDER — DESVENLAFAXINE SUCCINATE 50 MG/1
100 TABLET, EXTENDED RELEASE ORAL DAILY
Status: DISCONTINUED | OUTPATIENT
Start: 2018-04-05 | End: 2018-04-22 | Stop reason: HOSPADM

## 2018-04-05 RX ORDER — ONDANSETRON 8 MG/1
8 TABLET, ORALLY DISINTEGRATING ORAL EVERY 8 HOURS PRN
Status: DISCONTINUED | OUTPATIENT
Start: 2018-04-05 | End: 2018-04-22 | Stop reason: HOSPADM

## 2018-04-05 RX ORDER — PRAVASTATIN SODIUM 40 MG/1
40 TABLET ORAL DAILY
Status: DISCONTINUED | OUTPATIENT
Start: 2018-04-05 | End: 2018-04-22 | Stop reason: HOSPADM

## 2018-04-05 RX ORDER — TIOTROPIUM BROMIDE 18 UG/1
1 CAPSULE ORAL; RESPIRATORY (INHALATION) DAILY
Status: DISCONTINUED | OUTPATIENT
Start: 2018-04-05 | End: 2018-04-22 | Stop reason: HOSPADM

## 2018-04-05 RX ORDER — ONDANSETRON 2 MG/ML
4 INJECTION INTRAMUSCULAR; INTRAVENOUS EVERY 8 HOURS PRN
Status: DISCONTINUED | OUTPATIENT
Start: 2018-04-05 | End: 2018-04-05

## 2018-04-05 RX ORDER — POTASSIUM CHLORIDE 20 MEQ/1
40 TABLET, EXTENDED RELEASE ORAL ONCE
Status: COMPLETED | OUTPATIENT
Start: 2018-04-05 | End: 2018-04-05

## 2018-04-05 RX ORDER — BUSPIRONE HYDROCHLORIDE 5 MG/1
15 TABLET ORAL 3 TIMES DAILY
Status: DISCONTINUED | OUTPATIENT
Start: 2018-04-05 | End: 2018-04-22 | Stop reason: HOSPADM

## 2018-04-05 RX ORDER — ONDANSETRON 8 MG/1
8 TABLET, ORALLY DISINTEGRATING ORAL EVERY 8 HOURS PRN
Status: DISCONTINUED | OUTPATIENT
Start: 2018-04-05 | End: 2018-04-05

## 2018-04-05 RX ORDER — HYDROCODONE BITARTRATE AND ACETAMINOPHEN 10; 325 MG/1; MG/1
1 TABLET ORAL EVERY 8 HOURS PRN
Status: DISCONTINUED | OUTPATIENT
Start: 2018-04-05 | End: 2018-04-22 | Stop reason: HOSPADM

## 2018-04-05 RX ORDER — LANOLIN ALCOHOL/MO/W.PET/CERES
400 CREAM (GRAM) TOPICAL 2 TIMES DAILY
Status: DISCONTINUED | OUTPATIENT
Start: 2018-04-05 | End: 2018-04-22 | Stop reason: HOSPADM

## 2018-04-05 RX ORDER — POTASSIUM CHLORIDE 7.45 MG/ML
10 INJECTION INTRAVENOUS
Status: COMPLETED | OUTPATIENT
Start: 2018-04-05 | End: 2018-04-05

## 2018-04-05 RX ORDER — AMLODIPINE BESYLATE 5 MG/1
5 TABLET ORAL DAILY
Status: DISCONTINUED | OUTPATIENT
Start: 2018-04-05 | End: 2018-04-10

## 2018-04-05 RX ORDER — FLUTICASONE FUROATE AND VILANTEROL 100; 25 UG/1; UG/1
1 POWDER RESPIRATORY (INHALATION) DAILY
COMMUNITY

## 2018-04-05 RX ADMIN — BUSPIRONE HYDROCHLORIDE 15 MG: 5 TABLET ORAL at 09:04

## 2018-04-05 RX ADMIN — DESVENLAFAXINE SUCCINATE 100 MG: 50 TABLET, FILM COATED, EXTENDED RELEASE ORAL at 10:04

## 2018-04-05 RX ADMIN — POTASSIUM CHLORIDE 60 MEQ: 1500 TABLET, EXTENDED RELEASE ORAL at 04:04

## 2018-04-05 RX ADMIN — LEVOTHYROXINE SODIUM 75 MCG: 75 TABLET ORAL at 10:04

## 2018-04-05 RX ADMIN — GABAPENTIN 100 MG: 100 CAPSULE ORAL at 10:04

## 2018-04-05 RX ADMIN — FUROSEMIDE 10 MG/HR: 10 INJECTION, SOLUTION INTRAVENOUS at 01:04

## 2018-04-05 RX ADMIN — POTASSIUM CHLORIDE 10 MEQ: 10 INJECTION, SOLUTION INTRAVENOUS at 11:04

## 2018-04-05 RX ADMIN — AMLODIPINE BESYLATE 5 MG: 5 TABLET ORAL at 10:04

## 2018-04-05 RX ADMIN — POTASSIUM CHLORIDE 10 MEQ: 10 INJECTION, SOLUTION INTRAVENOUS at 10:04

## 2018-04-05 RX ADMIN — MAGNESIUM OXIDE TAB 400 MG (241.3 MG ELEMENTAL MG) 400 MG: 400 (241.3 MG) TAB at 10:04

## 2018-04-05 RX ADMIN — GABAPENTIN 100 MG: 100 CAPSULE ORAL at 02:04

## 2018-04-05 RX ADMIN — FUROSEMIDE 80 MG: 10 INJECTION, SOLUTION INTRAMUSCULAR; INTRAVENOUS at 09:04

## 2018-04-05 RX ADMIN — ENOXAPARIN SODIUM 40 MG: 100 INJECTION SUBCUTANEOUS at 04:04

## 2018-04-05 RX ADMIN — GABAPENTIN 100 MG: 100 CAPSULE ORAL at 09:04

## 2018-04-05 RX ADMIN — BUSPIRONE HYDROCHLORIDE 15 MG: 5 TABLET ORAL at 02:04

## 2018-04-05 RX ADMIN — LAMOTRIGINE 100 MG: 100 TABLET ORAL at 09:04

## 2018-04-05 RX ADMIN — TREPROSTINIL 60 NG/KG/MIN: 100 INJECTION, SOLUTION INTRAVENOUS; SUBCUTANEOUS at 02:04

## 2018-04-05 RX ADMIN — MAGNESIUM OXIDE TAB 400 MG (241.3 MG ELEMENTAL MG) 400 MG: 400 (241.3 MG) TAB at 09:04

## 2018-04-05 RX ADMIN — BUSPIRONE HYDROCHLORIDE 15 MG: 5 TABLET ORAL at 10:04

## 2018-04-05 RX ADMIN — FLUTICASONE FUROATE AND VILANTEROL TRIFENATATE 1 PUFF: 100; 25 POWDER RESPIRATORY (INHALATION) at 10:04

## 2018-04-05 RX ADMIN — PANTOPRAZOLE SODIUM 40 MG: 40 TABLET, DELAYED RELEASE ORAL at 10:04

## 2018-04-05 RX ADMIN — FUROSEMIDE 20 MG/HR: 10 INJECTION, SOLUTION INTRAVENOUS at 09:04

## 2018-04-05 RX ADMIN — POTASSIUM CHLORIDE 60 MEQ: 1500 TABLET, EXTENDED RELEASE ORAL at 06:04

## 2018-04-05 RX ADMIN — PRAVASTATIN SODIUM 40 MG: 40 TABLET ORAL at 10:04

## 2018-04-05 RX ADMIN — LURASIDONE HYDROCHLORIDE 40 MG: 40 TABLET, FILM COATED ORAL at 10:04

## 2018-04-05 RX ADMIN — TIOTROPIUM BROMIDE 18 MCG: 18 CAPSULE ORAL; RESPIRATORY (INHALATION) at 10:04

## 2018-04-05 RX ADMIN — POTASSIUM CHLORIDE 10 MEQ: 10 INJECTION, SOLUTION INTRAVENOUS at 08:04

## 2018-04-05 RX ADMIN — POTASSIUM CHLORIDE 10 MEQ: 10 INJECTION, SOLUTION INTRAVENOUS at 06:04

## 2018-04-05 RX ADMIN — FUROSEMIDE 80 MG: 10 INJECTION, SOLUTION INTRAMUSCULAR; INTRAVENOUS at 01:04

## 2018-04-05 RX ADMIN — POTASSIUM CHLORIDE 40 MEQ: 1500 TABLET, EXTENDED RELEASE ORAL at 01:04

## 2018-04-05 RX ADMIN — LAMOTRIGINE 100 MG: 100 TABLET ORAL at 10:04

## 2018-04-05 NOTE — PLAN OF CARE
Problem: Patient Care Overview  Goal: Plan of Care Review  Outcome: Ongoing (interventions implemented as appropriate)  56 y/o F pt arrived to [facility] @ 0000 from [facility] via ambulance with c/o inc SOB and abdominal distention. Pt given Lasix 40mg in Ed, UOP 1200cc. Pt AAOx4, VSS, in bed with upper siderails raised x2, bed in lowest/locked position, call light/personal belongings within reach. Pt instructed to call for assistance. Pt verbalizes understanding. Pt afebrile at this time.  Proper hand hygiene performed before and after pt care. Pt on venti mask O2 @ 14L 55%. O2 sat 91-92%. Pt has Remodulin home pump infusion thru R [facility]cl[facility] 60ng/kg/min, DW 85kg, 37ml/24h. Pt denies any SOB or trouble breathing at the moment. Pt has LE edema +3. Pt red in color. EKG ordered upon arrival. MD Iva ordered Lasix IVP 80mg and Lasix 10mg 5ml/hr upon arrival. UOP 1000cc so far. Tele, NSR. Will continue to monitor pt.

## 2018-04-05 NOTE — PLAN OF CARE
Remodulin HOME DOSING Variables:  --met with patient to discuss and confirm her Home Remodulin Variables,   I confirmed the following variables with the patient, [employer] Dosing Sheet, and CADD pump    Drug = Remodulin  Route = IV  DW = 85kg  Vial = 5mg/ml  PUMP = CADD Legacy -1  Remodulin mls = 4mls  Diluent mls = 96mls  Final Concentration = 200,000ng/ml  CADD rate = 37mls/24hrs  Reservoir volume remaining @ 9am 4/5 = 33mls    Plan of Care:  Patient will be switched to Hospital mixed cassette tomorrow am 4/6 ~9am.  Patient aware the CADD rate will change due to concentration change but dose will remain the same.   Patient understands once on Hospital Cassette that nursing will manage her Remodulin.     Orders placed and dosing sheet faxed to Pharmacy at [phone]    Upon Discharge patient will mix her home cassette and return to her home pump rate unless the dose is changed this hospital admission.     I confirmed with the patient she has all needed supplies and Remodulin for discharge.

## 2018-04-05 NOTE — PROGRESS NOTES
[facility]  Heart Transplant  Progress Note    Patient Name: Sue Smith  MRN: [id number]  Admission Date: 4/5/2018  Hospital Length of Stay: 0 days  Attending Physician: Madi Santana MD  Primary Care Provider: Paddy Guerrier DO  Principal Problem:Acute on chronic diastolic heart failure    Subjective:     Interval History:     Doing well this morning but was earlier requiring high level oxygen through non re breather that has been substituted for NC at 4 liters. She also report mild to moderate decrease in abd girth with diuresis. Potassium being replaced and no cramps reported. Her Remodulin is at 60ng with plan to titrate up to 75ng    Continuous Infusions:   furosemide (LASIX) 2 mg/mL infusion (non-titrating) Stopped (04/05/18 0600)    treprostinil (REMODULIN) infusion      veletri/remodulin cassette      veletri/remodulin tubing       Scheduled Meds:   amLODIPine  5 mg Oral Daily    busPIRone  15 mg Oral TID    desvenlafaxine succinate  100 mg Oral Daily    enoxaparin  40 mg Subcutaneous Daily    fluticasone-vilanterol  1 puff Inhalation Daily    furosemide  80 mg Intravenous Once    gabapentin  100 mg Oral TID    lamoTRIgine  100 mg Oral BID    levothyroxine  75 mcg Oral Daily    lurasidone  40 mg Oral Daily    magnesium oxide  400 mg Oral BID    pantoprazole  40 mg Oral Daily    potassium chloride  60 mEq Oral TID    potassium chloride  40 mEq Oral Once    pravastatin  40 mg Oral Daily    sodium chloride 0.9%  3 mL Intravenous Q8H    tiotropium  1 capsule Inhalation Daily     PRN Meds:acetaminophen, hydrocodone-acetaminophen 10-325mg, loperamide, ondansetron    Review of patient's allergies indicates:   Allergen Reactions    Sulfa (sulfonamide antibiotics) Rash     Objective:     Vital Signs (Most Recent):  Temp: 97.6 °F (36.4 °C) (04/05/18 1038)  Pulse: 83 (04/05/18 1100)  Resp: 18 (04/05/18 1038)  BP: 123/75 (04/05/18 1038)  SpO2: (!) 93 % (04/05/18 1038) Vital  Signs (24h Range):  Temp:  [97.5 °F (36.4 °C)-98.1 °F (36.7 °C)] 97.6 °F (36.4 °C)  Pulse:  [80-94] 83  Resp:  [18-22] 18  SpO2:  [92 %-96 %] 93 %  BP: (117-134)/(66-77) 123/75     Patient Vitals for the past 72 hrs (Last 3 readings):   Weight   04/05/18 0500 88 kg (194 lb 0.1 oz)   04/05/18 0020 91 kg (200 lb 9.9 oz)     Body mass index is 35.48 kg/m².      Intake/Output Summary (Last 24 hours) at 04/05/18 1240  Last data filed at 04/05/18 0641   Gross per 24 hour   Intake              850 ml   Output             1200 ml   Net             -350 ml       Hemodynamic Parameters:       Telemetry:     Physical Exam   Constitutional: She is oriented to person, place, and time.   Morbidly obese   Neck: JVD (to jawline. ABD edema without fluid wave) present.   Cardiovascular: Normal rate, regular rhythm and normal heart sounds.  Exam reveals no gallop and no friction rub.    +p2 split   Pulmonary/Chest: Effort normal and breath sounds normal.   Abdominal: Soft. Bowel sounds are normal.   Musculoskeletal: She exhibits no edema or tenderness.   Neurological: She is alert and oriented to person, place, and time.   Skin: Skin is warm and dry.   Psychiatric:   anxious   Nursing note and vitals reviewed.           Significant Labs:  CBC:    Recent Labs  Lab 04/05/18  0433   WBC 6.33   RBC 4.73   HGB 13.0   HCT 41.7   *   MCV 88   MCH 27.5   MCHC 31.2*     BNP:    Recent Labs  Lab 04/02/18 04/05/18  0433   BNP 7,295 539*     CMP:    Recent Labs  Lab 04/05/18  0434 04/05/18  0834   GLU 97 147*   CALCIUM 9.4 9.3   ALBUMIN 4.0  --    PROT 6.7  --     139   K 2.7* 3.3*   CO2 33* 30*   CL 99 97   BUN 26* 23*   CREATININE 1.3 1.3   ALKPHOS 194*  --    ALT 17  --    AST 23  --    BILITOT 0.7  --       Coagulation:     Recent Labs  Lab 04/05/18  0433   INR 1.1   APTT 22.4     LDH:  No results for input(s): LDH in the last 72 hours.  Microbiology:  Microbiology Results (last 7 days)     ** No results found for the last 168  hours. **          I have reviewed all pertinent labs within the past 24 hours.    Estimated Creatinine Clearance: 49.8 mL/min (based on SCr of 1.3 mg/dL).    Diagnostic Results:  CXR: No results found in the last 24 hours.    Assessment and Plan:     Ms. Smith is a 57 y.o. Female with a past medical history of PHTN WHO Group 1 on IV Remodulin, chronic hypoxic respiratory failure, chronic RV failure on home O2 and BiPAP who presented to [facility] with SOB. She has been having SOB for roughly 1 week, which was progressively worsening. She stated that she sleeps in a recliner currently until she gets a new BiPAP machine. She does wake up short of breath, but is not clear about LE edema. She does state that her weight has been progressively increasing though. She states that she has no symptoms from the Veletri currently.    * Acute on chronic diastolic heart failure    --JVD elevated and trace LE edema  -  Refractory to oral agents at home  --lasix bolus 80mg x1 and lasix 10mg gtt restarted after potassium replacement  - hold home dose torsemide and metolazone  - echo to review status  --strict I&O's  - Low salt diet        Gastroesophageal reflux disease    --continue protonix        Chronic respiratory failure with hypoxia    --resumed to home dose NC oxygen supplement  - BIPAP for nights if needed        Pulmonary hypertension, group 1    --continue remodulin. Will plan to titrate up per Dr. Cullen plan on 3/28/2018 once she is euvolemic  - diuresis restarted after potassium replacement  --repeat 2D echo pending          Discussed plan of care with Dr. Esther Peñaloza MD  Heart Transplant  [facility]

## 2018-04-05 NOTE — PROGRESS NOTES
Pt changed to hospital concentration cassette - CADD pump now infusing at 82cc/24hr.  Line aspirated of home concentration and primed with new concentration 90,000 ng/cc

## 2018-04-05 NOTE — HPI
Ms. Smith is a 57 y.o. Female with a past medical history of PHTN WHO Group 1 on IV Remodulin, chronic hypoxic respiratory failure, chronic RV failure on home O2 and BiPAP who presented to [facility] with SOB. She has been having SOB for roughly 1 week, which was progressively worsening. She stated that she sleeps in a recliner currently until she gets a new BiPAP machine. She does wake up short of breath, but is not clear about LE edema. She does state that her weight has been progressively increasing though. She states that she has no symptoms from the Veletri currently.

## 2018-04-05 NOTE — H&P
[facility]  Heart Transplant  H&P    Patient Name: Sue Smith  MRN: [id number]  Admission Date: 4/5/2018  Attending Physician: Madi Santana MD  Primary Care Provider: Paddy Guerrier DO  Principal Problem:<principal problem not specified>    Subjective:     History of Present Illness:  Ms. Smith is a 57 y.o. Female with a past medical history of PHTN WHO Group 1 on IV Remodulin, chronic hypoxic respiratory failure, chronic RV failure on home O2 and BiPAP who presented to [facility] with SOB. She has been having SOB for roughly 1 week, which was progressively worsening. She stated that she sleeps in a recliner currently until she gets a new BiPAP machine. She does wake up short of breath, but is not clear about LE edema. She does state that her weight has been progressively increasing though. She states that she has no symptoms from the Veletri currently.    Past Medical History:   Diagnosis Date    Bipolar disorder     CHF (congestive heart failure)     Dyslipidemia     GERD (gastroesophageal reflux disease)     Hx of tracheostomy     Decanulated / surgically removed in 2013? Does not currently have tracheostomy    Hypertension     Hypothyroidism     Oxygen dependent     3L around the clock     Pulmonary HTN     Pulmonary hypertension, group 1 10/4/2017    Pulmonary nodules 10/10/2017    Respiratory failure, chronic        Past Surgical History:   Procedure Laterality Date    BACK SURGERY      PERICARDIAL WINDOW  2013    [place] LEFT HEART CATH,PERCUTANEOUS      Cardiac Cath, Left Heart    TUBAL LIGATION         Review of patient's allergies indicates:   Allergen Reactions    Sulfa (sulfonamide antibiotics) Rash       Current Facility-Administered Medications   Medication    acetaminophen tablet 650 mg    amLODIPine tablet 5 mg    busPIRone tablet 15 mg    desvenlafaxine succinate 24 hr tablet 100 mg    fluticasone-vilanterol 100-25 mcg/dose diskus inhaler 1 puff     furosemide (LASIX) 2 mg/mL in sodium chloride 0.9% 100 mL infusion (conc: 2 mg/mL)    gabapentin capsule 100 mg    hydrocodone-acetaminophen 10-325mg per tablet 1 tablet    lamoTRIgine tablet 100 mg    levothyroxine tablet 75 mcg    loperamide capsule 2 mg    lurasidone tablet 40 mg    magnesium oxide tablet 400 mg    ondansetron disintegrating tablet 8 mg    pantoprazole EC tablet 40 mg    pravastatin tablet 40 mg    sodium chloride 0.9% flush 3 mL    tiotropium inhalation capsule 18 mcg     Family History     Problem Relation (Age of Onset)    Cancer Mother, Sister    Hypertension Mother, Father        Social History Main Topics    Smoking status: Former Smoker     Types: Cigarettes     Start date: 1/1/1979     Quit date: 1/5/1997    Smokeless tobacco: Never Used    Alcohol use No    Drug use: No    Sexual activity: No     Review of Systems   Constitutional: Negative for activity change, chills, fatigue and fever.   HENT: Negative.    Respiratory: Positive for shortness of breath. Negative for cough.    Cardiovascular: Negative for chest pain, palpitations and leg swelling.   Gastrointestinal: Positive for abdominal distention. Negative for diarrhea, nausea and vomiting.   Genitourinary: Negative for difficulty urinating, dysuria and urgency.   Musculoskeletal: Negative for arthralgias and myalgias.   Skin: Negative for color change and rash.   Neurological: Negative for dizziness, weakness, light-headedness and numbness.     Objective:     Vital Signs (Most Recent):  Temp: 98.1 °F (36.7 °C) (04/05/18 0020)  Pulse: 86 (04/05/18 0020)  Resp: 18 (04/05/18 0020)  BP: 121/77 (04/05/18 0020)  SpO2: 96 % (04/05/18 0020) Vital Signs (24h Range):  Temp:  [98.1 °F (36.7 °C)] 98.1 °F (36.7 °C)  Pulse:  [86] 86  Resp:  [18] 18  SpO2:  [96 %] 96 %  BP: (121)/(77) 121/77     Patient Vitals for the past 72 hrs (Last 3 readings):   Weight   04/05/18 0020 91 kg (200 lb 9.9 oz)     Body mass index is 36.69  kg/m².    No intake or output data in the 24 hours ending 04/05/18 0148    Physical Exam   Constitutional: Vital signs are normal. She appears well-developed and well-nourished. She is active and cooperative.  Non-toxic appearance. No distress.   HENT:   Head: Normocephalic and atraumatic.   Mouth/Throat: Uvula is midline, oropharynx is clear and moist and mucous membranes are normal.   Eyes: Conjunctivae and lids are normal. Pupils are equal, round, and reactive to light.   Neck: JVD present. Normal carotid pulses present. Carotid bruit is not present.   Cardiovascular: Normal rate, regular rhythm, S1 normal, normal heart sounds and normal pulses.  Exam reveals no gallop.    No murmur heard.  Pulses:       Radial pulses are 2+ on the right side, and 2+ on the left side.        Dorsalis pedis pulses are 2+ on the right side, and 2+ on the left side.        Posterior tibial pulses are 2+ on the right side, and 2+ on the left side.   Loud S2, warm periphery, trace LE edema   Pulmonary/Chest: Effort normal. No accessory muscle usage. No respiratory distress. She has decreased breath sounds in the right lower field and the left lower field. She has no wheezes. She has no rhonchi. She has no rales.   Abdominal: Soft. Bowel sounds are normal. She exhibits distension. She exhibits no mass. There is no tenderness.   Neurological: She is alert.   Skin: Skin is warm, dry and intact.       Significant Labs:  CBC:  No results for input(s): WBC, RBC, HGB, HCT, PLT, MCV, MCH, MCHC in the last 168 hours.  BNP:    Recent Labs  Lab 04/02/18   BNP 7,295     CMP:  No results for input(s): GLU, CALCIUM, ALBUMIN, PROT, NA, K, CO2, CL, BUN, CREATININE, ALKPHOS, ALT, AST, BILITOT in the last 168 hours.   Coagulation:   No results for input(s): PT, INR, APTT in the last 168 hours.  LDH:  No results for input(s): LDH in the last 72 hours.  Microbiology:  Microbiology Results (last 7 days)     ** No results found for the last 168 hours. **           I have reviewed all pertinent labs within the past 24 hours.    Diagnostic Results:  I have reviewed all pertinent imaging results/findings within the past 24 hours.    Assessment/Plan:     Acute on chronic diastolic heart failure    --JVD elevated and trace LE edema  --diurese with lasix 80 IV with 10 gtt after  --strict I&O's        Gastroesophageal reflux disease    --continue protonix        Chronic respiratory failure with hypoxia    --currently on venti mask. Will order BiPAP for nights.        Pulmonary hypertension, group 1    --continue remodulin  --repeat 2D echo pending            Kelly Enrique MD  Heart Transplant  [facility]

## 2018-04-05 NOTE — PROGRESS NOTES
"Admit Note     Met with patient to assess needs. Patient is a 57 y.o. single female, admitted for Volume overload [E87.70] per medical record.       Patient admitted from outside facility on 4/5/2018 .  At this time, patient presents as alert and oriented x 4, good eye contact, calm, communicative, cooperative and asking and answering questions appropriately.        Household/Family Systems     Patient resides alone at     [address]  [place] [address].      Support system includes daughter, father, and friend. Pt reports this is "assisted living", however does not receive assistance from facility. Pt does not have dependents that are need of being cared for.     Pt has two daughters. Prabha is 37 and Radha is 27.      Patients primary caregiver is self.     Confirmed patients contact information is     Pt's cell: [phone]  Father's contact: [phone], NERY Duffy.       Confirmed patient does have access to reliable transportation.    Cognitive Status/Learning     Patient reports reading ability as 12th grade and states patient does not have difficulty with reading, writing, seeing, hearing and memory.  Patient reports patient learns best by hands on.   Needed: No.   Highest education level: High School (9-12) or GED    Vocation/Disability   .  Working for Income: No  If no, reason not working: Disability  Patient is disabled due to bipolar disorder since 25-30 years. Prior to disablity, patient was employed as a .      Adherence     Patient reports a high level of adherence to patients health care regimen.  Adherence counseling and education provided. Patient verbalizes understanding.    Substance Use    Patient reports the following substance usage.    Tobacco: none, patient denies any use. Pt smokes from [id number], 1-2 ppd.   Alcohol: none, patient denies any use.  Illicit Drugs/Non-prescribed Medications: none, patient denies any use.  Patient states clear understanding of " the potential impact of substance use.  Substance abstinence/cessation counseling, education and resources provided and reviewed.     Services Utilizing/ADLS    Infusion Service: Prior to admission, patient utilizing? no  Home Health: Prior to admission, patient utilizing? yes Nursing specialties [phone], fax [phone].  DME: Prior to admission, yes. Pt reports having a shower chair O2 with portables 3L[facility].   Pulmonary/Cardiac Rehab: Prior to admission, no  Dialysis:  Prior to admission, no  Transplant Specialty Pharmacy:  Prior to admission, no.    Prior to admission, patient reports patient was independent with ADLS and was not driving. Pt's friend transports patient.  Patient reports patient is not able to care for self at this time due to compromised medical condition (as documented in medical record) and physical weakness..   Patient indicates a willingness to care for self once medically cleared to do so.    Insurance/Medications    Insured by   Payor/Plan Subscr  Sex Relation Sub. Ins. ID Effective Group Num   1. [employer]* HERACLIO GARCIA 1960 Female  [id number]                                   [address]      Primary Insurance (for UNOS reporting): Public Insurance - Medicare FFS (Fee For Service)  Secondary Insurance (for UNOS reporting): None    Patient reports patient is able to obtain and afford medications at this time and at time of discharge.    Living Will/Healthcare Power of     Patient states patient does not have a LW and/or HCPA.   provided education regarding LW and HCPA and the completion of forms.    Coping/Mental Health    Patient is coping adequately with the aid of  family members and friends. Patient denies mental health difficulties at this time. Pt reports as Voodoo and requests social work consult spiritual care for [facility] .     Discharge Planning    At time of discharge, patient plans to return to  patient's home under the care of self.  Patients friend will transport patient.  Per rounds today, expected discharge date has not been medically determined at this time. Patient verbalizes understanding and are involved in treatment planning and discharge process.    Additional Concerns    Patient is being followed for needs, education, resources, information, emotional support, supportive counseling, and for supportive and skilled discharge plan of care.  remains available. Patient denies additional needs and/or concerns at this time. Patient verbalizes understanding and agreement with information reviewed, social work availability, and how to access available resources as needed.

## 2018-04-05 NOTE — PLAN OF CARE
Problem: Patient Care Overview  Goal: Plan of Care Review  Outcome: Ongoing (interventions implemented as appropriate)  Pt AAO - very sleepy today from transferring from outside hospital overnight.  4L home O2 - usually saturates 93-94%- here 85% while sleeping on nasal canula. Resp placed bipap  And pt able to snap during the day - awake in the afternoon on 5L NC 93-94%.   Potassium 40meqIV given after K+2.7- repeat 3.3 - KDur given x2 doses and lasix gtt restarted.  Diuresing well- able to walk to the restroom with ease.  Abdomen distended. Central line  Dressing change to [facility] perkins changed.  Remodulin infusing with hospital concentration -  Dw:85kg- Titrating remodulin 1ng Q12hrs.  Reminded to adhere to  1500cc FR.

## 2018-04-05 NOTE — SUBJECTIVE & OBJECTIVE
Past Medical History:   Diagnosis Date    Bipolar disorder     CHF (congestive heart failure)     Dyslipidemia     GERD (gastroesophageal reflux disease)     Hx of tracheostomy     Decanulated / surgically removed in 2013? Does not currently have tracheostomy    Hypertension     Hypothyroidism     Oxygen dependent     3L around the clock     Pulmonary HTN     Pulmonary hypertension, group 1 10/4/2017    Pulmonary nodules 10/10/2017    Respiratory failure, chronic        Past Surgical History:   Procedure Laterality Date    BACK SURGERY      PERICARDIAL WINDOW  2013    [place] LEFT HEART CATH,PERCUTANEOUS      Cardiac Cath, Left Heart    TUBAL LIGATION         Review of patient's allergies indicates:   Allergen Reactions    Sulfa (sulfonamide antibiotics) Rash       Current Facility-Administered Medications   Medication    acetaminophen tablet 650 mg    amLODIPine tablet 5 mg    busPIRone tablet 15 mg    desvenlafaxine succinate 24 hr tablet 100 mg    fluticasone-vilanterol 100-25 mcg/dose diskus inhaler 1 puff    furosemide (LASIX) 2 mg/mL in sodium chloride 0.9% 100 mL infusion (conc: 2 mg/mL)    gabapentin capsule 100 mg    hydrocodone-acetaminophen 10-325mg per tablet 1 tablet    lamoTRIgine tablet 100 mg    levothyroxine tablet 75 mcg    loperamide capsule 2 mg    lurasidone tablet 40 mg    magnesium oxide tablet 400 mg    ondansetron disintegrating tablet 8 mg    pantoprazole EC tablet 40 mg    pravastatin tablet 40 mg    sodium chloride 0.9% flush 3 mL    tiotropium inhalation capsule 18 mcg     Family History     Problem Relation (Age of Onset)    Cancer Mother, Sister    Hypertension Mother, Father        Social History Main Topics    Smoking status: Former Smoker     Types: Cigarettes     Start date: 1/1/1979     Quit date: 1/5/1997    Smokeless tobacco: Never Used    Alcohol use No    Drug use: No    Sexual activity: No     Review of Systems   Constitutional: Negative for  activity change, chills, fatigue and fever.   HENT: Negative.    Respiratory: Positive for shortness of breath. Negative for cough.    Cardiovascular: Negative for chest pain, palpitations and leg swelling.   Gastrointestinal: Positive for abdominal distention. Negative for diarrhea, nausea and vomiting.   Genitourinary: Negative for difficulty urinating, dysuria and urgency.   Musculoskeletal: Negative for arthralgias and myalgias.   Skin: Negative for color change and rash.   Neurological: Negative for dizziness, weakness, light-headedness and numbness.     Objective:     Vital Signs (Most Recent):  Temp: 98.1 °F (36.7 °C) (04/05/18 0020)  Pulse: 86 (04/05/18 0020)  Resp: 18 (04/05/18 0020)  BP: 121/77 (04/05/18 0020)  SpO2: 96 % (04/05/18 0020) Vital Signs (24h Range):  Temp:  [98.1 °F (36.7 °C)] 98.1 °F (36.7 °C)  Pulse:  [86] 86  Resp:  [18] 18  SpO2:  [96 %] 96 %  BP: (121)/(77) 121/77     Patient Vitals for the past 72 hrs (Last 3 readings):   Weight   04/05/18 0020 91 kg (200 lb 9.9 oz)     Body mass index is 36.69 kg/m².    No intake or output data in the 24 hours ending 04/05/18 0148    Physical Exam   Constitutional: Vital signs are normal. She appears well-developed and well-nourished. She is active and cooperative.  Non-toxic appearance. No distress.   HENT:   Head: Normocephalic and atraumatic.   Mouth/Throat: Uvula is midline, oropharynx is clear and moist and mucous membranes are normal.   Eyes: Conjunctivae and lids are normal. Pupils are equal, round, and reactive to light.   Neck: JVD present. Normal carotid pulses present. Carotid bruit is not present.   Cardiovascular: Normal rate, regular rhythm, S1 normal, normal heart sounds and normal pulses.  Exam reveals no gallop.    No murmur heard.  Pulses:       Radial pulses are 2+ on the right side, and 2+ on the left side.        Dorsalis pedis pulses are 2+ on the right side, and 2+ on the left side.        Posterior tibial pulses are 2+ on the  right side, and 2+ on the left side.   Loud S2, warm periphery, trace LE edema   Pulmonary/Chest: Effort normal. No accessory muscle usage. No respiratory distress. She has decreased breath sounds in the right lower field and the left lower field. She has no wheezes. She has no rhonchi. She has no rales.   Abdominal: Soft. Bowel sounds are normal. She exhibits distension. She exhibits no mass. There is no tenderness.   Neurological: She is alert.   Skin: Skin is warm, dry and intact.       Significant Labs:  CBC:  No results for input(s): WBC, RBC, HGB, HCT, PLT, MCV, MCH, MCHC in the last 168 hours.  BNP:    Recent Labs  Lab 04/02/18   BNP 7,295     CMP:  No results for input(s): GLU, CALCIUM, ALBUMIN, PROT, NA, K, CO2, CL, BUN, CREATININE, ALKPHOS, ALT, AST, BILITOT in the last 168 hours.   Coagulation:   No results for input(s): PT, INR, APTT in the last 168 hours.  LDH:  No results for input(s): LDH in the last 72 hours.  Microbiology:  Microbiology Results (last 7 days)     ** No results found for the last 168 hours. **          I have reviewed all pertinent labs within the past 24 hours.    Diagnostic Results:  I have reviewed all pertinent imaging results/findings within the past 24 hours.

## 2018-04-05 NOTE — ASSESSMENT & PLAN NOTE
--JVD elevated and trace LE edema  -  Refractory to oral agents at home  --lasix bolus 80mg x1 and lasix 10mg gtt restarted after potassium replacement  - hold home dose torsemide and metolazone  - echo to review status  --strict I&O's  - Low salt diet

## 2018-04-05 NOTE — ASSESSMENT & PLAN NOTE
--continue remodulin. Will plan to titrate up per Dr. Cullen plan on 3/28/2018  - diuresis restarted after potassium replacement  --repeat 2D echo pending

## 2018-04-05 NOTE — HOSPITAL COURSE
4/5/2018  - lasix gtt and titrate remodulin up to 75ng  4/6/2018  - lasix at 20gtt and remodulin being up titrated. Goal is 75ng  4/7  - No change in rx. Replace potassium. Added aldactone  4/8  - continuing to diurese well.   4/16  - RHC today. Lung transplant to evaluate for possible tx  4/17  - Still diuresing. Lung transplant visit and considering addison as Op. Recommend wt loss  4/18  - hypokalemia this morning. Net negative 6L  4/19  - diuresing and replacing potassium  4/20  - Lasix stopped and Bumex BID to be started. Aldactone increased to 100mg BID  - anticipate going home in 1-2 days  -4/21  - doing well. Bumex 2mg Bid ongoing with continued good diuresis.    4/22  - patient underwent profuse diuresis with high dose lasix (40gtt) and metolazone pulse. She lost about 41 Liters of fluid with significant weight loss ( error in wt. recording as in patient). Talmo between treatment she had a RHC that showed Significantly elevated left and right side filling pressures as well as severe pulmonary HTN. CO/CI were  3.4/1.8. We continued diuresing and replacing her potassium aggressively until her oxygen requirement fell to 2l. At home she previously required 3-4 Liters and was on 8liters high flow NC on admission. Patient had signed DNR- forms initially on this admission due to critical status of her condition but she resumed Full code on wanting to pursue lung transplant. She had in patient consult with the service but on review, she opted to put off evaluation due to requirement to move to [facility] for about 2-3 months during the process and also have reliable care takers at hand. She will think about this in future and wants to remain full code. We had advised patient to stay back for better review of her response to oral diuretics but she insisted on leaving.  Her Remodulin dose was titrated up to 75ng and she is on adempas 0.5mg TID. An attempt to increase adempas to 1mg TID was not successful due to  hypotension. We started her on aldactone and increased to 100mg BID before d/c. We also started her on Bumex 2mg BID and also decreased her Potassium replacement to 20mg BID.  Bumex and Aldactone supply was obtained from a local pharmacy before patient left hospital. We discussed that she will stop taking Torsemide and Metolazone until further review by home health nurse or during clinic fu. She will get a repeat (BMP) in two days closer to her home due to potential development of Hyperkalemia with the started new dose of Aldactone. Patient will fu with PH clinic as scheduled. She has oxygen and remodulin supply arranged for D/C.

## 2018-04-05 NOTE — PROGRESS NOTES
to see pt in order to assess needs and complete assessment.  The pt reports she is not feeling well enough to talk at this time.  Transplant  will follow to complete assessment.

## 2018-04-05 NOTE — SUBJECTIVE & OBJECTIVE
Interval History:     Doing well this morning but was earlier requiring high level oxygen through non re breather that has been substituted for NC at 4 liters. She also report mild to moderate decrease in abd girth with diuresis. Potassium being replaced and no cramps reported. Her Remodulin is at 60ng with plan to titrate up to 75ng    Continuous Infusions:   furosemide (LASIX) 2 mg/mL infusion (non-titrating) Stopped (04/05/18 0600)    treprostinil (REMODULIN) infusion      veletri/remodulin cassette      veletri/remodulin tubing       Scheduled Meds:   amLODIPine  5 mg Oral Daily    busPIRone  15 mg Oral TID    desvenlafaxine succinate  100 mg Oral Daily    enoxaparin  40 mg Subcutaneous Daily    fluticasone-vilanterol  1 puff Inhalation Daily    furosemide  80 mg Intravenous Once    gabapentin  100 mg Oral TID    lamoTRIgine  100 mg Oral BID    levothyroxine  75 mcg Oral Daily    lurasidone  40 mg Oral Daily    magnesium oxide  400 mg Oral BID    pantoprazole  40 mg Oral Daily    potassium chloride  60 mEq Oral TID    potassium chloride  40 mEq Oral Once    pravastatin  40 mg Oral Daily    sodium chloride 0.9%  3 mL Intravenous Q8H    tiotropium  1 capsule Inhalation Daily     PRN Meds:acetaminophen, hydrocodone-acetaminophen 10-325mg, loperamide, ondansetron    Review of patient's allergies indicates:   Allergen Reactions    Sulfa (sulfonamide antibiotics) Rash     Objective:     Vital Signs (Most Recent):  Temp: 97.6 °F (36.4 °C) (04/05/18 1038)  Pulse: 83 (04/05/18 1100)  Resp: 18 (04/05/18 1038)  BP: 123/75 (04/05/18 1038)  SpO2: (!) 93 % (04/05/18 1038) Vital Signs (24h Range):  Temp:  [97.5 °F (36.4 °C)-98.1 °F (36.7 °C)] 97.6 °F (36.4 °C)  Pulse:  [80-94] 83  Resp:  [18-22] 18  SpO2:  [92 %-96 %] 93 %  BP: (117-134)/(66-77) 123/75     Patient Vitals for the past 72 hrs (Last 3 readings):   Weight   04/05/18 0500 88 kg (194 lb 0.1 oz)   04/05/18 0020 91 kg (200 lb 9.9 oz)     Body  mass index is 35.48 kg/m².      Intake/Output Summary (Last 24 hours) at 04/05/18 1240  Last data filed at 04/05/18 0641   Gross per 24 hour   Intake              850 ml   Output             1200 ml   Net             -350 ml       Hemodynamic Parameters:       Telemetry:     Physical Exam   Constitutional: She is oriented to person, place, and time.   Morbidly obese   Neck: JVD (to jawline. ABD edema without fluid wave) present.   Cardiovascular: Normal rate, regular rhythm and normal heart sounds.  Exam reveals no gallop and no friction rub.    +p2 split   Pulmonary/Chest: Effort normal and breath sounds normal.   Abdominal: Soft. Bowel sounds are normal.   Musculoskeletal: She exhibits no edema or tenderness.   Neurological: She is alert and oriented to person, place, and time.   Skin: Skin is warm and dry.   Psychiatric:   anxious   Nursing note and vitals reviewed.           Significant Labs:  CBC:    Recent Labs  Lab 04/05/18  0433   WBC 6.33   RBC 4.73   HGB 13.0   HCT 41.7   *   MCV 88   MCH 27.5   MCHC 31.2*     BNP:    Recent Labs  Lab 04/02/18 04/05/18  0433   BNP 7,295 539*     CMP:    Recent Labs  Lab 04/05/18  0434 04/05/18  0834   GLU 97 147*   CALCIUM 9.4 9.3   ALBUMIN 4.0  --    PROT 6.7  --     139   K 2.7* 3.3*   CO2 33* 30*   CL 99 97   BUN 26* 23*   CREATININE 1.3 1.3   ALKPHOS 194*  --    ALT 17  --    AST 23  --    BILITOT 0.7  --       Coagulation:     Recent Labs  Lab 04/05/18  0433   INR 1.1   APTT 22.4     LDH:  No results for input(s): LDH in the last 72 hours.  Microbiology:  Microbiology Results (last 7 days)     ** No results found for the last 168 hours. **          I have reviewed all pertinent labs within the past 24 hours.    Estimated Creatinine Clearance: 49.8 mL/min (based on SCr of 1.3 mg/dL).    Diagnostic Results:  CXR: No results found in the last 24 hours.

## 2018-04-06 LAB
ALBUMIN SERPL BCP-MCNC: 3.9 G/DL
ALP SERPL-CCNC: 180 U/L
ALT SERPL W/O P-5'-P-CCNC: 14 U/L
ANION GAP SERPL CALC-SCNC: 11 MMOL/L
ANION GAP SERPL CALC-SCNC: 7 MMOL/L
ANION GAP SERPL CALC-SCNC: 8 MMOL/L
AST SERPL-CCNC: 19 U/L
BASOPHILS # BLD AUTO: 0.03 K/UL
BASOPHILS NFR BLD: 0.6 %
BILIRUB SERPL-MCNC: 0.8 MG/DL
BUN SERPL-MCNC: 20 MG/DL
BUN SERPL-MCNC: 20 MG/DL
BUN SERPL-MCNC: 24 MG/DL
CALCIUM SERPL-MCNC: 8.9 MG/DL
CALCIUM SERPL-MCNC: 9.1 MG/DL
CALCIUM SERPL-MCNC: 9.5 MG/DL
CHLORIDE SERPL-SCNC: 97 MMOL/L
CHLORIDE SERPL-SCNC: 98 MMOL/L
CHLORIDE SERPL-SCNC: 99 MMOL/L
CO2 SERPL-SCNC: 31 MMOL/L
CO2 SERPL-SCNC: 31 MMOL/L
CO2 SERPL-SCNC: 32 MMOL/L
CREAT SERPL-MCNC: 1 MG/DL
CREAT SERPL-MCNC: 1.1 MG/DL
CREAT SERPL-MCNC: 1.3 MG/DL
DIFFERENTIAL METHOD: ABNORMAL
EOSINOPHIL # BLD AUTO: 0.2 K/UL
EOSINOPHIL NFR BLD: 2.8 %
ERYTHROCYTE [DISTWIDTH] IN BLOOD BY AUTOMATED COUNT: 18.4 %
EST. GFR  (AFRICAN AMERICAN): 53 ML/MIN/1.73 M^2
EST. GFR  (AFRICAN AMERICAN): >60 ML/MIN/1.73 M^2
EST. GFR  (AFRICAN AMERICAN): >60 ML/MIN/1.73 M^2
EST. GFR  (NON AFRICAN AMERICAN): 46 ML/MIN/1.73 M^2
EST. GFR  (NON AFRICAN AMERICAN): 56.3 ML/MIN/1.73 M^2
EST. GFR  (NON AFRICAN AMERICAN): >60 ML/MIN/1.73 M^2
GLUCOSE SERPL-MCNC: 102 MG/DL
GLUCOSE SERPL-MCNC: 152 MG/DL
GLUCOSE SERPL-MCNC: 91 MG/DL
HCT VFR BLD AUTO: 41.2 %
HGB BLD-MCNC: 12.6 G/DL
IMM GRANULOCYTES # BLD AUTO: 0.04 K/UL
IMM GRANULOCYTES NFR BLD AUTO: 0.8 %
LYMPHOCYTES # BLD AUTO: 0.6 K/UL
LYMPHOCYTES NFR BLD: 10.9 %
MAGNESIUM SERPL-MCNC: 2 MG/DL
MCH RBC QN AUTO: 27.4 PG
MCHC RBC AUTO-ENTMCNC: 30.6 G/DL
MCV RBC AUTO: 90 FL
MONOCYTES # BLD AUTO: 0.4 K/UL
MONOCYTES NFR BLD: 6.8 %
NEUTROPHILS # BLD AUTO: 4.2 K/UL
NEUTROPHILS NFR BLD: 78.1 %
NRBC BLD-RTO: 0 /100 WBC
PHOSPHATE SERPL-MCNC: 3.7 MG/DL
PLATELET # BLD AUTO: 127 K/UL
PMV BLD AUTO: 11.4 FL
POTASSIUM SERPL-SCNC: 2.7 MMOL/L
POTASSIUM SERPL-SCNC: 4.6 MMOL/L
POTASSIUM SERPL-SCNC: 4.7 MMOL/L
PROT SERPL-MCNC: 6.4 G/DL
RBC # BLD AUTO: 4.6 M/UL
SODIUM SERPL-SCNC: 136 MMOL/L
SODIUM SERPL-SCNC: 139 MMOL/L
SODIUM SERPL-SCNC: 139 MMOL/L
WBC # BLD AUTO: 5.31 K/UL

## 2018-04-06 PROCEDURE — 20600001 HC STEP DOWN PRIVATE ROOM

## 2018-04-06 PROCEDURE — 83735 ASSAY OF MAGNESIUM: CPT

## 2018-04-06 PROCEDURE — 80048 BASIC METABOLIC PNL TOTAL CA: CPT

## 2018-04-06 PROCEDURE — 25000242 PHARM REV CODE 250 ALT 637 W/ HCPCS: Performed by: INTERNAL MEDICINE

## 2018-04-06 PROCEDURE — 80048 BASIC METABOLIC PNL TOTAL CA: CPT | Mod: 91

## 2018-04-06 PROCEDURE — 85025 COMPLETE CBC W/AUTO DIFF WBC: CPT

## 2018-04-06 PROCEDURE — 25000003 PHARM REV CODE 250: Performed by: INTERNAL MEDICINE

## 2018-04-06 PROCEDURE — 80053 COMPREHEN METABOLIC PANEL: CPT

## 2018-04-06 PROCEDURE — 94761 N-INVAS EAR/PLS OXIMETRY MLT: CPT

## 2018-04-06 PROCEDURE — 63600175 PHARM REV CODE 636 W HCPCS: Performed by: INTERNAL MEDICINE

## 2018-04-06 PROCEDURE — 27100171 HC OXYGEN HIGH FLOW UP TO 24 HOURS

## 2018-04-06 PROCEDURE — 94660 CPAP INITIATION&MGMT: CPT

## 2018-04-06 PROCEDURE — 84100 ASSAY OF PHOSPHORUS: CPT

## 2018-04-06 PROCEDURE — 99233 SBSQ HOSP IP/OBS HIGH 50: CPT | Mod: ,,, | Performed by: INTERNAL MEDICINE

## 2018-04-06 PROCEDURE — 99900035 HC TECH TIME PER 15 MIN (STAT)

## 2018-04-06 PROCEDURE — 27000221 HC OXYGEN, UP TO 24 HOURS

## 2018-04-06 PROCEDURE — 36415 COLL VENOUS BLD VENIPUNCTURE: CPT

## 2018-04-06 RX ORDER — POTASSIUM CHLORIDE 20 MEQ/1
60 TABLET, EXTENDED RELEASE ORAL ONCE
Status: COMPLETED | OUTPATIENT
Start: 2018-04-06 | End: 2018-04-06

## 2018-04-06 RX ORDER — POTASSIUM CHLORIDE 7.45 MG/ML
10 INJECTION INTRAVENOUS
Status: DISPENSED | OUTPATIENT
Start: 2018-04-06 | End: 2018-04-06

## 2018-04-06 RX ORDER — POTASSIUM CHLORIDE 750 MG/1
60 CAPSULE, EXTENDED RELEASE ORAL EVERY 4 HOURS
Status: DISCONTINUED | OUTPATIENT
Start: 2018-04-06 | End: 2018-04-06

## 2018-04-06 RX ORDER — POTASSIUM CHLORIDE 20 MEQ/1
60 TABLET, EXTENDED RELEASE ORAL 2 TIMES DAILY
Status: DISCONTINUED | OUTPATIENT
Start: 2018-04-06 | End: 2018-04-07

## 2018-04-06 RX ADMIN — GABAPENTIN 100 MG: 100 CAPSULE ORAL at 08:04

## 2018-04-06 RX ADMIN — LURASIDONE HYDROCHLORIDE 40 MG: 40 TABLET, FILM COATED ORAL at 08:04

## 2018-04-06 RX ADMIN — LAMOTRIGINE 100 MG: 100 TABLET ORAL at 08:04

## 2018-04-06 RX ADMIN — MAGNESIUM OXIDE TAB 400 MG (241.3 MG ELEMENTAL MG) 400 MG: 400 (241.3 MG) TAB at 08:04

## 2018-04-06 RX ADMIN — FLUTICASONE FUROATE AND VILANTEROL TRIFENATATE 1 PUFF: 100; 25 POWDER RESPIRATORY (INHALATION) at 08:04

## 2018-04-06 RX ADMIN — POTASSIUM CHLORIDE 10 MEQ: 10 INJECTION, SOLUTION INTRAVENOUS at 06:04

## 2018-04-06 RX ADMIN — BUSPIRONE HYDROCHLORIDE 15 MG: 5 TABLET ORAL at 08:04

## 2018-04-06 RX ADMIN — ENOXAPARIN SODIUM 40 MG: 100 INJECTION SUBCUTANEOUS at 05:04

## 2018-04-06 RX ADMIN — AMLODIPINE BESYLATE 5 MG: 5 TABLET ORAL at 08:04

## 2018-04-06 RX ADMIN — POTASSIUM CHLORIDE 60 MEQ: 1500 TABLET, EXTENDED RELEASE ORAL at 08:04

## 2018-04-06 RX ADMIN — PANTOPRAZOLE SODIUM 40 MG: 40 TABLET, DELAYED RELEASE ORAL at 08:04

## 2018-04-06 RX ADMIN — POTASSIUM CHLORIDE 10 MEQ: 10 INJECTION, SOLUTION INTRAVENOUS at 10:04

## 2018-04-06 RX ADMIN — LEVOTHYROXINE SODIUM 75 MCG: 75 TABLET ORAL at 08:04

## 2018-04-06 RX ADMIN — BUSPIRONE HYDROCHLORIDE 15 MG: 5 TABLET ORAL at 03:04

## 2018-04-06 RX ADMIN — POTASSIUM CHLORIDE 60 MEQ: 750 CAPSULE, EXTENDED RELEASE ORAL at 06:04

## 2018-04-06 RX ADMIN — POTASSIUM CHLORIDE 60 MEQ: 1500 TABLET, EXTENDED RELEASE ORAL at 10:04

## 2018-04-06 RX ADMIN — FUROSEMIDE 20 MG/HR: 10 INJECTION, SOLUTION INTRAVENOUS at 10:04

## 2018-04-06 RX ADMIN — POTASSIUM CHLORIDE 10 MEQ: 10 INJECTION, SOLUTION INTRAVENOUS at 08:04

## 2018-04-06 RX ADMIN — GABAPENTIN 100 MG: 100 CAPSULE ORAL at 03:04

## 2018-04-06 RX ADMIN — FUROSEMIDE 20 MG/HR: 10 INJECTION, SOLUTION INTRAVENOUS at 01:04

## 2018-04-06 RX ADMIN — PRAVASTATIN SODIUM 40 MG: 40 TABLET ORAL at 08:04

## 2018-04-06 RX ADMIN — TREPROSTINIL 62 NG/KG/MIN: 100 INJECTION, SOLUTION INTRAVENOUS; SUBCUTANEOUS at 04:04

## 2018-04-06 RX ADMIN — TIOTROPIUM BROMIDE 18 MCG: 18 CAPSULE ORAL; RESPIRATORY (INHALATION) at 08:04

## 2018-04-06 RX ADMIN — DESVENLAFAXINE SUCCINATE 100 MG: 50 TABLET, FILM COATED, EXTENDED RELEASE ORAL at 08:04

## 2018-04-06 NOTE — PROGRESS NOTES
[facility]  Heart Transplant  Progress Note    Patient Name: Sue Smith  MRN: [id number]  Admission Date: 4/5/2018  Hospital Length of Stay: 1 days  Attending Physician: Madi Santana MD  Primary Care Provider: Paddy Guerrier DO  Principal Problem:Acute on chronic diastolic heart failure    Subjective:     Interval History:     Seen this morning at bedside. Diuresing well with net negative 1.4 liters since lasix dose was increased to 20gtt last night. Able to wean off oxygen and currently at 5 Liters (baseline- 4). Still report some abd fullness which has improved from admission. Potassium being replaced as needed and remodulin being up titrated with goal of 75ng currently at 62.    Continuous Infusions:   furosemide (LASIX) 2 mg/mL infusion (non-titrating) 20 mg/hr (04/05/18 2146)    treprostinil (REMODULIN) infusion 62 ng/kg/min (04/06/18 0300)    veletri/remodulin cassette      veletri/remodulin tubing       Scheduled Meds:   amLODIPine  5 mg Oral Daily    busPIRone  15 mg Oral TID    desvenlafaxine succinate  100 mg Oral Daily    enoxaparin  40 mg Subcutaneous Daily    fluticasone-vilanterol  1 puff Inhalation Daily    gabapentin  100 mg Oral TID    lamoTRIgine  100 mg Oral BID    levothyroxine  75 mcg Oral Daily    lurasidone  40 mg Oral Daily    magnesium oxide  400 mg Oral BID    pantoprazole  40 mg Oral Daily    potassium chloride  10 mEq Intravenous Q1H    potassium chloride  60 mEq Oral BID    pravastatin  40 mg Oral Daily    sodium chloride 0.9%  3 mL Intravenous Q8H    tiotropium  1 capsule Inhalation Daily     PRN Meds:acetaminophen, hydrocodone-acetaminophen 10-325mg, loperamide, ondansetron    Review of patient's allergies indicates:   Allergen Reactions    Sulfa (sulfonamide antibiotics) Rash     Objective:     Vital Signs (Most Recent):  Temp: 98.9 °F (37.2 °C) (04/06/18 0818)  Pulse: 88 (04/06/18 0822)  Resp: (!) 22 (04/06/18 0822)  BP: 124/68 (04/06/18  0818)  SpO2: (!) 92 % (04/06/18 0822) Vital Signs (24h Range):  Temp:  [97.5 °F (36.4 °C)-98.9 °F (37.2 °C)] 98.9 °F (37.2 °C)  Pulse:  [78-92] 88  Resp:  [17-22] 22  SpO2:  [89 %-100 %] 92 %  BP: ()/(55-75) 124/68     Patient Vitals for the past 72 hrs (Last 3 readings):   Weight   04/05/18 0500 88 kg (194 lb 0.1 oz)   04/05/18 0020 91 kg (200 lb 9.9 oz)     Body mass index is 35.48 kg/m².      Intake/Output Summary (Last 24 hours) at 04/06/18 1021  Last data filed at 04/06/18 1000   Gross per 24 hour   Intake          1844.67 ml   Output             3275 ml   Net         -1430.33 ml       Hemodynamic Parameters:       Telemetry:     Physical Exam     Constitutional: She is oriented to person, place, and time.   Morbidly obese   Neck: JVD (to jawline. ABD edema without fluid wave) present.   Cardiovascular: Normal rate, regular rhythm and normal heart sounds.  Exam reveals no gallop and no friction rub.    +p2 split   Pulmonary/Chest: Effort normal and breath sounds normal.   Abdominal: Soft. Bowel sounds are normal.   Musculoskeletal: She exhibits no edema or tenderness.   Neurological: She is alert and oriented to person, place, and time.   Skin: Skin is warm and dry.   Psychiatric:   anxious   Nursing note and vitals reviewed.         Significant Labs:  CBC:    Recent Labs  Lab 04/05/18  0433 04/06/18  0412   WBC 6.33 5.31   RBC 4.73 4.60   HGB 13.0 12.6   HCT 41.7 41.2   * 127*   MCV 88 90   MCH 27.5 27.4   MCHC 31.2* 30.6*     BNP:    Recent Labs  Lab 04/02/18 04/05/18  0433   BNP 7,295 539*     CMP:    Recent Labs  Lab 04/05/18  0434 04/05/18  0834 04/05/18  1545 04/06/18  0412   GLU 97 147* 87 91   CALCIUM 9.4 9.3 9.3 9.1   ALBUMIN 4.0  --   --  3.9   PROT 6.7  --   --  6.4    139 137 139   K 2.7* 3.3* 3.5 2.7*   CO2 33* 30* 30* 31*   CL 99 97 97 97   BUN 26* 23* 25* 20   CREATININE 1.3 1.3 1.1 1.0   ALKPHOS 194*  --   --  180*   ALT 17  --   --  14   AST 23  --   --  19   BILITOT 0.7  --    --  0.8      Coagulation:     Recent Labs  Lab 04/05/18  0433   INR 1.1   APTT 22.4     LDH:  No results for input(s): LDH in the last 72 hours.  Microbiology:  Microbiology Results (last 7 days)     ** No results found for the last 168 hours. **          I have reviewed all pertinent labs within the past 24 hours.    Estimated Creatinine Clearance: 64.8 mL/min (based on SCr of 1 mg/dL).    Diagnostic Results:    Echo summary    CONCLUSIONS     1 - Normal left ventricular systolic function (EF 60-65%).     2 - No wall motion abnormalities.     3 - Normal left ventricular diastolic function.     4 - Right atrial enlargement.     5 - Right ventricular enlargement with severely depressed systolic function.     6 - Moderate tricuspid regurgitation.     7 - Increased central venous pressure.     8 - Pulmonary hypertension. The estimated PA systolic pressure is 79 mmHg.      Assessment and Plan:     Ms. Smith is a 57 y.o. Female with a past medical history of PHTN WHO Group 1 on IV Remodulin, chronic hypoxic respiratory failure, chronic RV failure on home O2 and BiPAP who presented to [facility] with SOB. She has been having SOB for roughly 1 week, which was progressively worsening. She stated that she sleeps in a recliner currently until she gets a new BiPAP machine. She does wake up short of breath, but is not clear about LE edema. She does state that her weight has been progressively increasing though. She states that she has no symptoms from the Veletri currently.    * Acute on chronic diastolic heart failure    -- Wet and warm on exam  --JVD elevated and trace LE edema  -  Refractory to oral agents at home on presentation  -- lasix increased to 20gtt due to decreased UOP. Potassium being replaced as needed. Goal is to keep >4 or thereby  -- Net negative 1.5L since increasing lasix  - hold home dose torsemide and metolazone for now  -- echo shows EF of 60-75% with high PA at 79mmhg  --strict I&O's  - Low salt  diet        Gastroesophageal reflux disease    --continue protonix        Chronic respiratory failure with hypoxia    --resumed to home dose NC oxygen supplement  - BIPAP for nights if needed  -- close to baseline NC at 4LNC        Pulmonary hypertension, group 1    --continue remodulin. uptitrating to goal of 75ng  - diuresis restarted after potassium replacement  --repeat echo shows high PA pressure, 79mmhg and increased CVP          Discussed plan of care with Dr. Esther Peñaloza MD  Heart Transplant  [facility]

## 2018-04-06 NOTE — SUBJECTIVE & OBJECTIVE
Interval History:     Seen this morning at bedside. Diuresing well with net negative 1.4 liters since lasix dose was increased to 20gtt last night. Able to wean off oxygen and currently at 5 Liters (baseline- 4). Still report some abd fullness which has improved from admission. Potassium being replaced as needed and remodulin being up titrated with goal of 75ng currently at 62.    Continuous Infusions:   furosemide (LASIX) 2 mg/mL infusion (non-titrating) 20 mg/hr (04/05/18 2146)    treprostinil (REMODULIN) infusion 62 ng/kg/min (04/06/18 0300)    veletri/remodulin cassette      veletri/remodulin tubing       Scheduled Meds:   amLODIPine  5 mg Oral Daily    busPIRone  15 mg Oral TID    desvenlafaxine succinate  100 mg Oral Daily    enoxaparin  40 mg Subcutaneous Daily    fluticasone-vilanterol  1 puff Inhalation Daily    gabapentin  100 mg Oral TID    lamoTRIgine  100 mg Oral BID    levothyroxine  75 mcg Oral Daily    lurasidone  40 mg Oral Daily    magnesium oxide  400 mg Oral BID    pantoprazole  40 mg Oral Daily    potassium chloride  10 mEq Intravenous Q1H    potassium chloride  60 mEq Oral BID    pravastatin  40 mg Oral Daily    sodium chloride 0.9%  3 mL Intravenous Q8H    tiotropium  1 capsule Inhalation Daily     PRN Meds:acetaminophen, hydrocodone-acetaminophen 10-325mg, loperamide, ondansetron    Review of patient's allergies indicates:   Allergen Reactions    Sulfa (sulfonamide antibiotics) Rash     Objective:     Vital Signs (Most Recent):  Temp: 98.9 °F (37.2 °C) (04/06/18 0818)  Pulse: 88 (04/06/18 0822)  Resp: (!) 22 (04/06/18 0822)  BP: 124/68 (04/06/18 0818)  SpO2: (!) 92 % (04/06/18 0822) Vital Signs (24h Range):  Temp:  [97.5 °F (36.4 °C)-98.9 °F (37.2 °C)] 98.9 °F (37.2 °C)  Pulse:  [78-92] 88  Resp:  [17-22] 22  SpO2:  [89 %-100 %] 92 %  BP: ()/(55-75) 124/68     Patient Vitals for the past 72 hrs (Last 3 readings):   Weight   04/05/18 0500 88 kg (194 lb 0.1 oz)    04/05/18 0020 91 kg (200 lb 9.9 oz)     Body mass index is 35.48 kg/m².      Intake/Output Summary (Last 24 hours) at 04/06/18 1021  Last data filed at 04/06/18 1000   Gross per 24 hour   Intake          1844.67 ml   Output             3275 ml   Net         -1430.33 ml       Hemodynamic Parameters:       Telemetry:     Physical Exam     Constitutional: She is oriented to person, place, and time.   Morbidly obese   Neck: JVD (to jawline. ABD edema without fluid wave) present.   Cardiovascular: Normal rate, regular rhythm and normal heart sounds.  Exam reveals no gallop and no friction rub.    +p2 split   Pulmonary/Chest: Effort normal and breath sounds normal.   Abdominal: Soft. Bowel sounds are normal.   Musculoskeletal: She exhibits no edema or tenderness.   Neurological: She is alert and oriented to person, place, and time.   Skin: Skin is warm and dry.   Psychiatric:   anxious   Nursing note and vitals reviewed.         Significant Labs:  CBC:    Recent Labs  Lab 04/05/18  0433 04/06/18  0412   WBC 6.33 5.31   RBC 4.73 4.60   HGB 13.0 12.6   HCT 41.7 41.2   * 127*   MCV 88 90   MCH 27.5 27.4   MCHC 31.2* 30.6*     BNP:    Recent Labs  Lab 04/02/18 04/05/18  0433   BNP 7,295 539*     CMP:    Recent Labs  Lab 04/05/18  0434 04/05/18  0834 04/05/18  1545 04/06/18  0412   GLU 97 147* 87 91   CALCIUM 9.4 9.3 9.3 9.1   ALBUMIN 4.0  --   --  3.9   PROT 6.7  --   --  6.4    139 137 139   K 2.7* 3.3* 3.5 2.7*   CO2 33* 30* 30* 31*   CL 99 97 97 97   BUN 26* 23* 25* 20   CREATININE 1.3 1.3 1.1 1.0   ALKPHOS 194*  --   --  180*   ALT 17  --   --  14   AST 23  --   --  19   BILITOT 0.7  --   --  0.8      Coagulation:     Recent Labs  Lab 04/05/18  0433   INR 1.1   APTT 22.4     LDH:  No results for input(s): LDH in the last 72 hours.  Microbiology:  Microbiology Results (last 7 days)     ** No results found for the last 168 hours. **          I have reviewed all pertinent labs within the past 24  hours.    Estimated Creatinine Clearance: 64.8 mL/min (based on SCr of 1 mg/dL).    Diagnostic Results:    Echo summary    CONCLUSIONS     1 - Normal left ventricular systolic function (EF 60-65%).     2 - No wall motion abnormalities.     3 - Normal left ventricular diastolic function.     4 - Right atrial enlargement.     5 - Right ventricular enlargement with severely depressed systolic function.     6 - Moderate tricuspid regurgitation.     7 - Increased central venous pressure.     8 - Pulmonary hypertension. The estimated PA systolic pressure is 79 mmHg.

## 2018-04-06 NOTE — PLAN OF CARE
Problem: Patient Care Overview  Goal: Plan of Care Review  Outcome: Ongoing (interventions implemented as appropriate)  Pt AAO and in good spirits.  Slept well overnight. Titrating remodulin 1ng Q12 hr- increased to 63ng/kg/min; DW:85kg infusing to [facility] perkins at 86cc/24hr- goal of 75ng.   Pt receiving IV alsxi 20mg/hr to diurese - k+2.7 this am - received KDUR x 2 doses + scheduled + 40 MeQ K+ piggybacks.  Uo decreased in the afternoon - changed IV and UO increased- clear yellow urine. Pt 4-5L NC 89-93%. Uses bipap at night. Pt commented that abdomen seems to be less distended.  No complaints of pain. Eating 100% of meals and adhereing to 1.5L FR.  Pt up to chair for all of the afternoon. Plan for discharge next week after diuresis. Repeat K+4.6 and cr 1.3

## 2018-04-06 NOTE — ASSESSMENT & PLAN NOTE
--resumed to home dose NC oxygen supplement  - BIPAP for nights if needed  -- close to baseline NC at 4LNC

## 2018-04-06 NOTE — PROGRESS NOTES
Pt's K now 2.7. VSS, telemetry monitoring already in place. Iva CHRISTOPHER, notified. Orders placed for PO and IV K(see Mar for details). Will continue to monitor

## 2018-04-06 NOTE — ASSESSMENT & PLAN NOTE
-- Wet and warm on exam  --JVD elevated and trace LE edema  -  Refractory to oral agents at home on presentation  -- lasix increased to 20gtt due to decreased UOP. Potassium being replaced as needed. Goal is to keep >4 or thereby  -- Net negative 1.5L since increasing lasix  - hold home dose torsemide and metolazone for now  -- echo shows EF of 60-75% with high PA at 79mmhg  --strict I&O's  - Low salt diet

## 2018-04-06 NOTE — ASSESSMENT & PLAN NOTE
--continue remodulin. uptitrating to goal of 75ng  - diuresis restarted after potassium replacement  --repeat echo shows high PA pressure, 79mmhg and increased CVP

## 2018-04-06 NOTE — PLAN OF CARE
Problem: Patient Care Overview  Goal: Plan of Care Review  Outcome: Ongoing (interventions implemented as appropriate)  Pt AAO. VSS, see flowsheet for further assessment details.    Telemetry monitoring in place; HR 78-94, NSR.    Pulm htn mgmt in progress. Remodulin titrated up as ordered. Gtt no infusing at 62ng/kg/min(DW 85, 84mls/24hrs). Pt tolerated titration well; no obvious s/s of discomfort or complication. R subclavian hickmann dressing remains CDI. O2 levels stable at 90-94% on 4L via N/C/BiPAP.    FVO mgmt in progress; Lasix gtt increased to 20mg/hr and 80mg Lasix bolus given. UO monitored; 1700mls s/p Lasix increase/bolus.    Pt up with minimal assist. No new skin breakdown noted.    Fall precautions in place; pt remains free of injury. Mari labs monitored. NO acute events overnight.

## 2018-04-07 LAB
ALBUMIN SERPL BCP-MCNC: 3.9 G/DL
ALP SERPL-CCNC: 188 U/L
ALT SERPL W/O P-5'-P-CCNC: 13 U/L
ANION GAP SERPL CALC-SCNC: 11 MMOL/L
ANION GAP SERPL CALC-SCNC: 9 MMOL/L
AST SERPL-CCNC: 18 U/L
BASOPHILS # BLD AUTO: 0.03 K/UL
BASOPHILS NFR BLD: 0.6 %
BILIRUB SERPL-MCNC: 0.8 MG/DL
BUN SERPL-MCNC: 20 MG/DL
BUN SERPL-MCNC: 24 MG/DL
CALCIUM SERPL-MCNC: 9.2 MG/DL
CALCIUM SERPL-MCNC: 9.4 MG/DL
CHLORIDE SERPL-SCNC: 97 MMOL/L
CHLORIDE SERPL-SCNC: 99 MMOL/L
CO2 SERPL-SCNC: 30 MMOL/L
CO2 SERPL-SCNC: 32 MMOL/L
CREAT SERPL-MCNC: 1.2 MG/DL
CREAT SERPL-MCNC: 1.3 MG/DL
DIFFERENTIAL METHOD: ABNORMAL
EOSINOPHIL # BLD AUTO: 0.2 K/UL
EOSINOPHIL NFR BLD: 3.6 %
ERYTHROCYTE [DISTWIDTH] IN BLOOD BY AUTOMATED COUNT: 17.9 %
EST. GFR  (AFRICAN AMERICAN): 53 ML/MIN/1.73 M^2
EST. GFR  (AFRICAN AMERICAN): 58.4 ML/MIN/1.73 M^2
EST. GFR  (NON AFRICAN AMERICAN): 46 ML/MIN/1.73 M^2
EST. GFR  (NON AFRICAN AMERICAN): 50.6 ML/MIN/1.73 M^2
GLUCOSE SERPL-MCNC: 82 MG/DL
GLUCOSE SERPL-MCNC: 88 MG/DL
HCT VFR BLD AUTO: 40.8 %
HGB BLD-MCNC: 12.7 G/DL
IMM GRANULOCYTES # BLD AUTO: 0.04 K/UL
IMM GRANULOCYTES NFR BLD AUTO: 0.8 %
LYMPHOCYTES # BLD AUTO: 0.8 K/UL
LYMPHOCYTES NFR BLD: 16.1 %
MAGNESIUM SERPL-MCNC: 2.2 MG/DL
MCH RBC QN AUTO: 27.9 PG
MCHC RBC AUTO-ENTMCNC: 31.1 G/DL
MCV RBC AUTO: 90 FL
MONOCYTES # BLD AUTO: 0.4 K/UL
MONOCYTES NFR BLD: 7.2 %
NEUTROPHILS # BLD AUTO: 3.6 K/UL
NEUTROPHILS NFR BLD: 71.7 %
NRBC BLD-RTO: 0 /100 WBC
PHOSPHATE SERPL-MCNC: 3.5 MG/DL
PLATELET # BLD AUTO: 142 K/UL
PMV BLD AUTO: 12 FL
POTASSIUM SERPL-SCNC: 2.8 MMOL/L
POTASSIUM SERPL-SCNC: 4.6 MMOL/L
PROT SERPL-MCNC: 6.4 G/DL
RBC # BLD AUTO: 4.55 M/UL
SODIUM SERPL-SCNC: 138 MMOL/L
SODIUM SERPL-SCNC: 140 MMOL/L
WBC # BLD AUTO: 5.03 K/UL

## 2018-04-07 PROCEDURE — 80053 COMPREHEN METABOLIC PANEL: CPT

## 2018-04-07 PROCEDURE — 94660 CPAP INITIATION&MGMT: CPT

## 2018-04-07 PROCEDURE — 36415 COLL VENOUS BLD VENIPUNCTURE: CPT

## 2018-04-07 PROCEDURE — 99233 SBSQ HOSP IP/OBS HIGH 50: CPT | Mod: ,,, | Performed by: INTERNAL MEDICINE

## 2018-04-07 PROCEDURE — 25000003 PHARM REV CODE 250: Performed by: INTERNAL MEDICINE

## 2018-04-07 PROCEDURE — 99900035 HC TECH TIME PER 15 MIN (STAT)

## 2018-04-07 PROCEDURE — 27100171 HC OXYGEN HIGH FLOW UP TO 24 HOURS

## 2018-04-07 PROCEDURE — 25000242 PHARM REV CODE 250 ALT 637 W/ HCPCS: Performed by: INTERNAL MEDICINE

## 2018-04-07 PROCEDURE — 63600175 PHARM REV CODE 636 W HCPCS: Performed by: INTERNAL MEDICINE

## 2018-04-07 PROCEDURE — 83735 ASSAY OF MAGNESIUM: CPT

## 2018-04-07 PROCEDURE — 85025 COMPLETE CBC W/AUTO DIFF WBC: CPT

## 2018-04-07 PROCEDURE — 84100 ASSAY OF PHOSPHORUS: CPT

## 2018-04-07 PROCEDURE — 94761 N-INVAS EAR/PLS OXIMETRY MLT: CPT

## 2018-04-07 PROCEDURE — 80048 BASIC METABOLIC PNL TOTAL CA: CPT

## 2018-04-07 PROCEDURE — 20600001 HC STEP DOWN PRIVATE ROOM

## 2018-04-07 PROCEDURE — 63600175 PHARM REV CODE 636 W HCPCS: Mod: JG | Performed by: INTERNAL MEDICINE

## 2018-04-07 RX ORDER — POTASSIUM CHLORIDE 20 MEQ/1
60 TABLET, EXTENDED RELEASE ORAL ONCE
Status: COMPLETED | OUTPATIENT
Start: 2018-04-07 | End: 2018-04-07

## 2018-04-07 RX ORDER — POTASSIUM CHLORIDE 7.45 MG/ML
10 INJECTION INTRAVENOUS
Status: DISPENSED | OUTPATIENT
Start: 2018-04-07 | End: 2018-04-07

## 2018-04-07 RX ORDER — POTASSIUM CHLORIDE 20 MEQ/1
60 TABLET, EXTENDED RELEASE ORAL 3 TIMES DAILY
Status: DISCONTINUED | OUTPATIENT
Start: 2018-04-07 | End: 2018-04-18

## 2018-04-07 RX ORDER — SPIRONOLACTONE 25 MG/1
25 TABLET ORAL DAILY
Status: DISCONTINUED | OUTPATIENT
Start: 2018-04-07 | End: 2018-04-08

## 2018-04-07 RX ADMIN — MAGNESIUM OXIDE TAB 400 MG (241.3 MG ELEMENTAL MG) 400 MG: 400 (241.3 MG) TAB at 08:04

## 2018-04-07 RX ADMIN — GABAPENTIN 100 MG: 100 CAPSULE ORAL at 02:04

## 2018-04-07 RX ADMIN — PRAVASTATIN SODIUM 40 MG: 40 TABLET ORAL at 08:04

## 2018-04-07 RX ADMIN — DESVENLAFAXINE SUCCINATE 100 MG: 50 TABLET, FILM COATED, EXTENDED RELEASE ORAL at 08:04

## 2018-04-07 RX ADMIN — BUSPIRONE HYDROCHLORIDE 15 MG: 5 TABLET ORAL at 08:04

## 2018-04-07 RX ADMIN — FUROSEMIDE 20 MG/HR: 10 INJECTION, SOLUTION INTRAVENOUS at 07:04

## 2018-04-07 RX ADMIN — POTASSIUM CHLORIDE 10 MEQ: 7.46 INJECTION, SOLUTION INTRAVENOUS at 10:04

## 2018-04-07 RX ADMIN — LURASIDONE HYDROCHLORIDE 40 MG: 40 TABLET, FILM COATED ORAL at 08:04

## 2018-04-07 RX ADMIN — POTASSIUM CHLORIDE 10 MEQ: 7.46 INJECTION, SOLUTION INTRAVENOUS at 06:04

## 2018-04-07 RX ADMIN — LAMOTRIGINE 100 MG: 100 TABLET ORAL at 08:04

## 2018-04-07 RX ADMIN — TIOTROPIUM BROMIDE 18 MCG: 18 CAPSULE ORAL; RESPIRATORY (INHALATION) at 08:04

## 2018-04-07 RX ADMIN — POTASSIUM CHLORIDE 60 MEQ: 1500 TABLET, EXTENDED RELEASE ORAL at 08:04

## 2018-04-07 RX ADMIN — PANTOPRAZOLE SODIUM 40 MG: 40 TABLET, DELAYED RELEASE ORAL at 08:04

## 2018-04-07 RX ADMIN — BUSPIRONE HYDROCHLORIDE 15 MG: 5 TABLET ORAL at 02:04

## 2018-04-07 RX ADMIN — ENOXAPARIN SODIUM 40 MG: 100 INJECTION SUBCUTANEOUS at 08:04

## 2018-04-07 RX ADMIN — GABAPENTIN 100 MG: 100 CAPSULE ORAL at 08:04

## 2018-04-07 RX ADMIN — HYDROCODONE BITARTRATE AND ACETAMINOPHEN 1 TABLET: 10; 325 TABLET ORAL at 07:04

## 2018-04-07 RX ADMIN — AMLODIPINE BESYLATE 5 MG: 5 TABLET ORAL at 08:04

## 2018-04-07 RX ADMIN — TREPROSTINIL 65 NG/KG/MIN: 100 INJECTION, SOLUTION INTRAVENOUS; SUBCUTANEOUS at 03:04

## 2018-04-07 RX ADMIN — POTASSIUM CHLORIDE 60 MEQ: 1500 TABLET, EXTENDED RELEASE ORAL at 02:04

## 2018-04-07 RX ADMIN — POTASSIUM CHLORIDE 60 MEQ: 1500 TABLET, EXTENDED RELEASE ORAL at 06:04

## 2018-04-07 RX ADMIN — FLUTICASONE FUROATE AND VILANTEROL TRIFENATATE 1 PUFF: 100; 25 POWDER RESPIRATORY (INHALATION) at 08:04

## 2018-04-07 RX ADMIN — FUROSEMIDE 20 MG/HR: 10 INJECTION, SOLUTION INTRAVENOUS at 09:04

## 2018-04-07 RX ADMIN — POTASSIUM CHLORIDE 10 MEQ: 7.46 INJECTION, SOLUTION INTRAVENOUS at 08:04

## 2018-04-07 RX ADMIN — LEVOTHYROXINE SODIUM 75 MCG: 75 TABLET ORAL at 08:04

## 2018-04-07 RX ADMIN — SPIRONOLACTONE 25 MG: 25 TABLET, FILM COATED ORAL at 08:04

## 2018-04-07 NOTE — PROGRESS NOTES
- Remodulin increased by 1 ng/kg/min to 64 ng/kg/min at 0415. New CADD rate 87 mL/24 hr. CADD settings checked with charge nurse.  - Will check VS q15min x 1 hour.

## 2018-04-07 NOTE — PLAN OF CARE
Problem: Patient Care Overview  Goal: Plan of Care Review  Outcome: Ongoing (interventions implemented as appropriate)  - Pt admitted 4/5/18 (transferred from [place]) with volume overload/HF exacerbation. Pt has a history of diastolic heart failure and pulmonary hypertension requiring home oxygen use.  - Remodulin infusion (as of 0415): 64 ng/kg/min; dosing weight 85 kg; concentration 9,000,000 ng/100 mL; CADD rate 87 mL/24 hr.  - Pt's Remodulin is being titrated by 1 ng/kg/min q12h as tolerated. Next increase will be at 1600 along with cassette change. Pt's goal rate is 75 ng/kg/min.  - BPs overnight 100s-110s/50s-60s. Slight decrease after Remodulin titration.  - 2D echo on 4/5/18 showed PA systolic pressure of 79 and EF of 60%.  - Furosemide infusion: 20 mg/hr; pump rate 10 mL/hr (2:1 concentration). Pt has diuresed >2.7 L overnight. Pt had a critically low potassium yesterday, requiring multiple K+ riders; awaiting morning electrolyte panel.  - Pt is wearing 5 L humidified nasal cannula while awake and BiPAP while asleep. SpO2 94-96% overnight.  - Pt denies pain; pt denies nausea. Pt exhibits flushing from Remodulin infusion - not an acute symptom. Pt is independent and ambulatory.

## 2018-04-07 NOTE — PROGRESS NOTES
[facility]  Heart Transplant  Progress Note    Patient Name: Sue Smith  MRN: [id number]  Admission Date: 4/5/2018  Hospital Length of Stay: 2 days  Attending Physician: Madi Santana MD  Primary Care Provider: Paddy Guerrier DO  Principal Problem:Acute on chronic diastolic heart failure    Subjective:     Interval History:     No acute overnight event. Diuresing well total negative 3.2L for the last 24 hours. Noted persistent hypokalemia despite aggressive replacement. Abd girth lessening    Continuous Infusions:   furosemide (LASIX) 2 mg/mL infusion (non-titrating) 20 mg/hr (04/07/18 0926)    treprostinil (REMODULIN) infusion 64 ng/kg/min (04/07/18 4302)    veletri/remodulin cassette      veletri/remodulin tubing       Scheduled Meds:   amLODIPine  5 mg Oral Daily    busPIRone  15 mg Oral TID    desvenlafaxine succinate  100 mg Oral Daily    enoxaparin  40 mg Subcutaneous Daily    fluticasone-vilanterol  1 puff Inhalation Daily    gabapentin  100 mg Oral TID    lamoTRIgine  100 mg Oral BID    levothyroxine  75 mcg Oral Daily    lurasidone  40 mg Oral Daily    magnesium oxide  400 mg Oral BID    pantoprazole  40 mg Oral Daily    potassium chloride  60 mEq Oral TID    pravastatin  40 mg Oral Daily    sodium chloride 0.9%  3 mL Intravenous Q8H    spironolactone  25 mg Oral Daily    tiotropium  1 capsule Inhalation Daily     PRN Meds:acetaminophen, hydrocodone-acetaminophen 10-325mg, loperamide, ondansetron    Review of patient's allergies indicates:   Allergen Reactions    Sulfa (sulfonamide antibiotics) Rash     Objective:     Vital Signs (Most Recent):  Temp: 97.8 °F (36.6 °C) (04/07/18 1057)  Pulse: 89 (04/07/18 1057)  Resp: 18 (04/07/18 1057)  BP: 117/63 (04/07/18 1057)  SpO2: (!) 92 % (04/07/18 1057) Vital Signs (24h Range):  Temp:  [97 °F (36.1 °C)-98.5 °F (36.9 °C)] 97.8 °F (36.6 °C)  Pulse:  [70-89] 89  Resp:  [16-22] 18  SpO2:  [90 %-96 %] 92 %  BP:  ()/(56-78) 117/63     Patient Vitals for the past 72 hrs (Last 3 readings):   Weight   04/07/18 0615 88.1 kg (194 lb 3.6 oz)   04/05/18 0500 88 kg (194 lb 0.1 oz)   04/05/18 0020 91 kg (200 lb 9.9 oz)     Body mass index is 35.52 kg/m².      Intake/Output Summary (Last 24 hours) at 04/07/18 1258  Last data filed at 04/07/18 0926   Gross per 24 hour   Intake             1050 ml   Output             4575 ml   Net            -3525 ml       Hemodynamic Parameters:       Telemetry:     Physical Exam     Constitutional: She is oriented to person, place, and time.   Morbidly obese   Neck: JVD (to jawline. ABD edema without fluid wave) present.   Cardiovascular: Normal rate, regular rhythm and normal heart sounds.  Exam reveals no gallop and no friction rub.   +p2 split   Pulmonary/Chest: Effort normal and breath sounds normal.   Abdominal: Soft. Bowel sounds are normal.   Musculoskeletal: She exhibits no edema or tenderness.   Neurological: She is alert and oriented to person, place, and time.   Skin: Skin is warm and dry.   Psychiatric: plesant and calm today  Nursing note and vitals reviewed.       Significant Labs:  CBC:    Recent Labs  Lab 04/05/18  0433 04/06/18  0412 04/07/18  0404   WBC 6.33 5.31 5.03   RBC 4.73 4.60 4.55   HGB 13.0 12.6 12.7   HCT 41.7 41.2 40.8   * 127* 142*   MCV 88 90 90   MCH 27.5 27.4 27.9   MCHC 31.2* 30.6* 31.1*     BNP:    Recent Labs  Lab 04/02/18 04/05/18  0433   BNP 7,295 539*     CMP:    Recent Labs  Lab 04/05/18  0434  04/06/18  0412 04/06/18  1202 04/06/18  1551 04/07/18  0404   GLU 97  < > 91 102 152* 82   CALCIUM 9.4  < > 9.1 9.5 8.9 9.2   ALBUMIN 4.0  --  3.9  --   --  3.9   PROT 6.7  --  6.4  --   --  6.4     < > 139 139 136 140   K 2.7*  < > 2.7* 4.7 4.6 2.8*   CO2 33*  < > 31* 32* 31* 32*   CL 99  < > 97 99 98 97   BUN 26*  < > 20 20 24* 20   CREATININE 1.3  < > 1.0 1.1 1.3 1.2   ALKPHOS 194*  --  180*  --   --  188*   ALT 17  --  14  --   --  13   AST 23  --   19  --   --  18   BILITOT 0.7  --  0.8  --   --  0.8   < > = values in this interval not displayed.   Coagulation:     Recent Labs  Lab 04/05/18  0433   INR 1.1   APTT 22.4     LDH:  No results for input(s): LDH in the last 72 hours.  Microbiology:  Microbiology Results (last 7 days)     ** No results found for the last 168 hours. **          I have reviewed all pertinent labs within the past 24 hours.    Estimated Creatinine Clearance: 54 mL/min (based on SCr of 1.2 mg/dL).    Diagnostic Results:      Assessment and Plan:     Ms. Smith is a 57 y.o. Female with a past medical history of PHTN WHO Group 1 on IV Remodulin, chronic hypoxic respiratory failure, chronic RV failure on home O2 and BiPAP who presented to [facility] with SOB. She has been having SOB for roughly 1 week, which was progressively worsening. She stated that she sleeps in a recliner currently until she gets a new BiPAP machine. She does wake up short of breath, but is not clear about LE edema. She does state that her weight has been progressively increasing though. She states that she has no symptoms from the Veletri currently.    * Acute on chronic diastolic heart failure    -- Wet and warm on exam  --JVD elevated and trace LE edema  -  Refractory to oral agents at home on presentation  -- lasix increased to 20gtt due to decreased UOP. Potassium being replaced as needed. Goal is to keep >4 or thereby  -- Net negative 3.2L since increasing lasix  - hold home dose torsemide and metolazone for now  - Will aggressively replace potassium and add Aldactone to regimen  -- echo shows EF of 60-75% with high PA at 79mmhg  --strict I&O's  - Low salt diet        Gastroesophageal reflux disease    --continue protonix        Chronic respiratory failure with hypoxia    --resumed to home dose NC oxygen supplement  - BIPAP for nights if needed  -- back to baseline NC at 4LNC        Pulmonary hypertension, group 1    --continue remodulin. uptitrating to goal  of 75ng  -- today at 63ng without any side effects reported  --repeat echo shows high PA pressure, 79mmhg and increased CVP          Discussed plan of care with Dr. Esther Peñaloza MD  Heart Transplant  [facility]

## 2018-04-07 NOTE — ASSESSMENT & PLAN NOTE
--resumed to home dose NC oxygen supplement  - BIPAP for nights if needed  -- back to baseline NC at 4LNC

## 2018-04-07 NOTE — ASSESSMENT & PLAN NOTE
--continue remodulin. uptitrating to goal of 75ng  -- today at 63ng without any side effects reported  --repeat echo shows high PA pressure, 79mmhg and increased CVP

## 2018-04-07 NOTE — ASSESSMENT & PLAN NOTE
-- Wet and warm on exam  --JVD elevated and trace LE edema  -  Refractory to oral agents at home on presentation  -- lasix increased to 20gtt due to decreased UOP. Potassium being replaced as needed. Goal is to keep >4 or thereby  -- Net negative 3.2L since increasing lasix  - hold home dose torsemide and metolazone for now  - Will aggressively replace potassium and add Aldactone to regimen  -- echo shows EF of 60-75% with high PA at 79mmhg  --strict I&O's  - Low salt diet

## 2018-04-07 NOTE — SUBJECTIVE & OBJECTIVE
Interval History:     No acute overnight event. Diuresing well total negative 3.2L for the last 24 hours. Noted persistent hypokalemia despite aggressive replacement. Abd girth lessening    Continuous Infusions:   furosemide (LASIX) 2 mg/mL infusion (non-titrating) 20 mg/hr (04/07/18 0926)    treprostinil (REMODULIN) infusion 64 ng/kg/min (04/07/18 0415)    veletri/remodulin cassette      veletri/remodulin tubing       Scheduled Meds:   amLODIPine  5 mg Oral Daily    busPIRone  15 mg Oral TID    desvenlafaxine succinate  100 mg Oral Daily    enoxaparin  40 mg Subcutaneous Daily    fluticasone-vilanterol  1 puff Inhalation Daily    gabapentin  100 mg Oral TID    lamoTRIgine  100 mg Oral BID    levothyroxine  75 mcg Oral Daily    lurasidone  40 mg Oral Daily    magnesium oxide  400 mg Oral BID    pantoprazole  40 mg Oral Daily    potassium chloride  60 mEq Oral TID    pravastatin  40 mg Oral Daily    sodium chloride 0.9%  3 mL Intravenous Q8H    spironolactone  25 mg Oral Daily    tiotropium  1 capsule Inhalation Daily     PRN Meds:acetaminophen, hydrocodone-acetaminophen 10-325mg, loperamide, ondansetron    Review of patient's allergies indicates:   Allergen Reactions    Sulfa (sulfonamide antibiotics) Rash     Objective:     Vital Signs (Most Recent):  Temp: 97.8 °F (36.6 °C) (04/07/18 1057)  Pulse: 89 (04/07/18 1057)  Resp: 18 (04/07/18 1057)  BP: 117/63 (04/07/18 1057)  SpO2: (!) 92 % (04/07/18 1057) Vital Signs (24h Range):  Temp:  [97 °F (36.1 °C)-98.5 °F (36.9 °C)] 97.8 °F (36.6 °C)  Pulse:  [70-89] 89  Resp:  [16-22] 18  SpO2:  [90 %-96 %] 92 %  BP: ()/(56-78) 117/63     Patient Vitals for the past 72 hrs (Last 3 readings):   Weight   04/07/18 0615 88.1 kg (194 lb 3.6 oz)   04/05/18 0500 88 kg (194 lb 0.1 oz)   04/05/18 0020 91 kg (200 lb 9.9 oz)     Body mass index is 35.52 kg/m².      Intake/Output Summary (Last 24 hours) at 04/07/18 1258  Last data filed at 04/07/18 8350   Gross  per 24 hour   Intake             1050 ml   Output             4575 ml   Net            -3525 ml       Hemodynamic Parameters:       Telemetry:     Physical Exam     Constitutional: She is oriented to person, place, and time.   Morbidly obese   Neck: JVD (to jawline. ABD edema without fluid wave) present.   Cardiovascular: Normal rate, regular rhythm and normal heart sounds.  Exam reveals no gallop and no friction rub.   +p2 split   Pulmonary/Chest: Effort normal and breath sounds normal.   Abdominal: Soft. Bowel sounds are normal.   Musculoskeletal: She exhibits no edema or tenderness.   Neurological: She is alert and oriented to person, place, and time.   Skin: Skin is warm and dry.   Psychiatric: plesant and calm today  Nursing note and vitals reviewed.       Significant Labs:  CBC:    Recent Labs  Lab 04/05/18  0433 04/06/18  0412 04/07/18  0404   WBC 6.33 5.31 5.03   RBC 4.73 4.60 4.55   HGB 13.0 12.6 12.7   HCT 41.7 41.2 40.8   * 127* 142*   MCV 88 90 90   MCH 27.5 27.4 27.9   MCHC 31.2* 30.6* 31.1*     BNP:    Recent Labs  Lab 04/02/18 04/05/18  0433   BNP 7,295 539*     CMP:    Recent Labs  Lab 04/05/18  0434  04/06/18  0412 04/06/18  1202 04/06/18  1551 04/07/18  0404   GLU 97  < > 91 102 152* 82   CALCIUM 9.4  < > 9.1 9.5 8.9 9.2   ALBUMIN 4.0  --  3.9  --   --  3.9   PROT 6.7  --  6.4  --   --  6.4     < > 139 139 136 140   K 2.7*  < > 2.7* 4.7 4.6 2.8*   CO2 33*  < > 31* 32* 31* 32*   CL 99  < > 97 99 98 97   BUN 26*  < > 20 20 24* 20   CREATININE 1.3  < > 1.0 1.1 1.3 1.2   ALKPHOS 194*  --  180*  --   --  188*   ALT 17  --  14  --   --  13   AST 23  --  19  --   --  18   BILITOT 0.7  --  0.8  --   --  0.8   < > = values in this interval not displayed.   Coagulation:     Recent Labs  Lab 04/05/18  0433   INR 1.1   APTT 22.4     LDH:  No results for input(s): LDH in the last 72 hours.  Microbiology:  Microbiology Results (last 7 days)     ** No results found for the last 168 hours. **           I have reviewed all pertinent labs within the past 24 hours.    Estimated Creatinine Clearance: 54 mL/min (based on SCr of 1.2 mg/dL).    Diagnostic Results:

## 2018-04-07 NOTE — PROGRESS NOTES
- Potassium 2.8 this morning - due to diuresis. Pt has telemetry monitoring in place.  - Pt has had 3,425 mL of urine output overnight while on Lasix drip at 20 mg/hr.  - Notified Dr. Enrique who stated she will enter orders for IV potassium replacement today.    Results for HERACLIO GARCIA (MRN [id number]) as of 4/7/2018 06:28   Ref. Range 4/7/2018 04:04   Potassium Latest Ref Range: 3.5 - 5.1 mmol/L 2.8 (L)

## 2018-04-07 NOTE — PLAN OF CARE
Patient's VSS for shift. Oxygen did have to be put up to 6 liters as patient was lying in bed in non-optimal state for lung expansion. Patient still on Lasix drip at 10 ml/hr. Patient ambulated in hallway to waiting room and sat in waiting room for about 15 minutes before walking herself back to her room. Patient received 4 potassium riders and two doses of oral potassium due to low levels. Patient tolerating diet well and observing fluid restriction. Feels all excess fluid is in her belly. Remodulin titrated up a 1600 to 65 ng/kg/min. Patient tolerated change in dosage well. No signs of injury, falls, or skin breakdown.

## 2018-04-08 LAB
ALBUMIN SERPL BCP-MCNC: 3.9 G/DL
ALP SERPL-CCNC: 189 U/L
ALT SERPL W/O P-5'-P-CCNC: 14 U/L
ANION GAP SERPL CALC-SCNC: 12 MMOL/L
ANION GAP SERPL CALC-SCNC: 9 MMOL/L
AST SERPL-CCNC: 19 U/L
BASOPHILS # BLD AUTO: 0.03 K/UL
BASOPHILS NFR BLD: 0.6 %
BILIRUB SERPL-MCNC: 0.7 MG/DL
BUN SERPL-MCNC: 21 MG/DL
BUN SERPL-MCNC: 21 MG/DL
CALCIUM SERPL-MCNC: 9.2 MG/DL
CALCIUM SERPL-MCNC: 9.2 MG/DL
CHLORIDE SERPL-SCNC: 95 MMOL/L
CHLORIDE SERPL-SCNC: 97 MMOL/L
CO2 SERPL-SCNC: 30 MMOL/L
CO2 SERPL-SCNC: 32 MMOL/L
CREAT SERPL-MCNC: 1.1 MG/DL
CREAT SERPL-MCNC: 1.3 MG/DL
DIFFERENTIAL METHOD: ABNORMAL
EOSINOPHIL # BLD AUTO: 0.2 K/UL
EOSINOPHIL NFR BLD: 3.8 %
ERYTHROCYTE [DISTWIDTH] IN BLOOD BY AUTOMATED COUNT: 17.8 %
EST. GFR  (AFRICAN AMERICAN): 53 ML/MIN/1.73 M^2
EST. GFR  (AFRICAN AMERICAN): >60 ML/MIN/1.73 M^2
EST. GFR  (NON AFRICAN AMERICAN): 46 ML/MIN/1.73 M^2
EST. GFR  (NON AFRICAN AMERICAN): 56.3 ML/MIN/1.73 M^2
GLUCOSE SERPL-MCNC: 85 MG/DL
GLUCOSE SERPL-MCNC: 88 MG/DL
HCT VFR BLD AUTO: 39.9 %
HGB BLD-MCNC: 12.7 G/DL
IMM GRANULOCYTES # BLD AUTO: 0.04 K/UL
IMM GRANULOCYTES NFR BLD AUTO: 0.8 %
LYMPHOCYTES # BLD AUTO: 0.7 K/UL
LYMPHOCYTES NFR BLD: 14.6 %
MAGNESIUM SERPL-MCNC: 2.1 MG/DL
MCH RBC QN AUTO: 28.2 PG
MCHC RBC AUTO-ENTMCNC: 31.8 G/DL
MCV RBC AUTO: 89 FL
MONOCYTES # BLD AUTO: 0.3 K/UL
MONOCYTES NFR BLD: 6.8 %
NEUTROPHILS # BLD AUTO: 3.5 K/UL
NEUTROPHILS NFR BLD: 73.4 %
NRBC BLD-RTO: 0 /100 WBC
PHOSPHATE SERPL-MCNC: 3.5 MG/DL
PLATELET # BLD AUTO: 122 K/UL
PMV BLD AUTO: 11.5 FL
POTASSIUM SERPL-SCNC: 3 MMOL/L
POTASSIUM SERPL-SCNC: 3.8 MMOL/L
PROT SERPL-MCNC: 6.4 G/DL
RBC # BLD AUTO: 4.51 M/UL
SODIUM SERPL-SCNC: 136 MMOL/L
SODIUM SERPL-SCNC: 139 MMOL/L
WBC # BLD AUTO: 4.74 K/UL

## 2018-04-08 PROCEDURE — 99233 SBSQ HOSP IP/OBS HIGH 50: CPT | Mod: ,,, | Performed by: INTERNAL MEDICINE

## 2018-04-08 PROCEDURE — 94761 N-INVAS EAR/PLS OXIMETRY MLT: CPT

## 2018-04-08 PROCEDURE — 27100171 HC OXYGEN HIGH FLOW UP TO 24 HOURS

## 2018-04-08 PROCEDURE — 25000242 PHARM REV CODE 250 ALT 637 W/ HCPCS: Performed by: INTERNAL MEDICINE

## 2018-04-08 PROCEDURE — 25000003 PHARM REV CODE 250: Performed by: INTERNAL MEDICINE

## 2018-04-08 PROCEDURE — 36415 COLL VENOUS BLD VENIPUNCTURE: CPT

## 2018-04-08 PROCEDURE — 85025 COMPLETE CBC W/AUTO DIFF WBC: CPT

## 2018-04-08 PROCEDURE — 80048 BASIC METABOLIC PNL TOTAL CA: CPT

## 2018-04-08 PROCEDURE — 83735 ASSAY OF MAGNESIUM: CPT

## 2018-04-08 PROCEDURE — 63600175 PHARM REV CODE 636 W HCPCS: Performed by: INTERNAL MEDICINE

## 2018-04-08 PROCEDURE — 94660 CPAP INITIATION&MGMT: CPT

## 2018-04-08 PROCEDURE — 84100 ASSAY OF PHOSPHORUS: CPT

## 2018-04-08 PROCEDURE — 80053 COMPREHEN METABOLIC PANEL: CPT

## 2018-04-08 PROCEDURE — 20600001 HC STEP DOWN PRIVATE ROOM

## 2018-04-08 RX ORDER — SPIRONOLACTONE 25 MG/1
25 TABLET ORAL 2 TIMES DAILY
Status: DISCONTINUED | OUTPATIENT
Start: 2018-04-08 | End: 2018-04-16

## 2018-04-08 RX ADMIN — POTASSIUM CHLORIDE 60 MEQ: 1500 TABLET, EXTENDED RELEASE ORAL at 04:04

## 2018-04-08 RX ADMIN — DESVENLAFAXINE SUCCINATE 100 MG: 50 TABLET, FILM COATED, EXTENDED RELEASE ORAL at 09:04

## 2018-04-08 RX ADMIN — GABAPENTIN 100 MG: 100 CAPSULE ORAL at 04:04

## 2018-04-08 RX ADMIN — LEVOTHYROXINE SODIUM 75 MCG: 75 TABLET ORAL at 09:04

## 2018-04-08 RX ADMIN — FUROSEMIDE 20 MG/HR: 10 INJECTION, SOLUTION INTRAVENOUS at 05:04

## 2018-04-08 RX ADMIN — TIOTROPIUM BROMIDE 18 MCG: 18 CAPSULE ORAL; RESPIRATORY (INHALATION) at 09:04

## 2018-04-08 RX ADMIN — FLUTICASONE FUROATE AND VILANTEROL TRIFENATATE 1 PUFF: 100; 25 POWDER RESPIRATORY (INHALATION) at 09:04

## 2018-04-08 RX ADMIN — SPIRONOLACTONE 25 MG: 25 TABLET, FILM COATED ORAL at 09:04

## 2018-04-08 RX ADMIN — LAMOTRIGINE 100 MG: 100 TABLET ORAL at 09:04

## 2018-04-08 RX ADMIN — LAMOTRIGINE 100 MG: 100 TABLET ORAL at 08:04

## 2018-04-08 RX ADMIN — GABAPENTIN 100 MG: 100 CAPSULE ORAL at 08:04

## 2018-04-08 RX ADMIN — BUSPIRONE HYDROCHLORIDE 15 MG: 5 TABLET ORAL at 08:04

## 2018-04-08 RX ADMIN — Medication: at 04:04

## 2018-04-08 RX ADMIN — AMLODIPINE BESYLATE 5 MG: 5 TABLET ORAL at 09:04

## 2018-04-08 RX ADMIN — FUROSEMIDE 20 MG/HR: 10 INJECTION, SOLUTION INTRAVENOUS at 07:04

## 2018-04-08 RX ADMIN — MAGNESIUM OXIDE TAB 400 MG (241.3 MG ELEMENTAL MG) 400 MG: 400 (241.3 MG) TAB at 08:04

## 2018-04-08 RX ADMIN — LURASIDONE HYDROCHLORIDE 40 MG: 40 TABLET, FILM COATED ORAL at 09:04

## 2018-04-08 RX ADMIN — BUSPIRONE HYDROCHLORIDE 15 MG: 5 TABLET ORAL at 05:04

## 2018-04-08 RX ADMIN — MAGNESIUM OXIDE TAB 400 MG (241.3 MG ELEMENTAL MG) 400 MG: 400 (241.3 MG) TAB at 09:04

## 2018-04-08 RX ADMIN — SPIRONOLACTONE 25 MG: 25 TABLET, FILM COATED ORAL at 08:04

## 2018-04-08 RX ADMIN — PRAVASTATIN SODIUM 40 MG: 40 TABLET ORAL at 09:04

## 2018-04-08 RX ADMIN — BUSPIRONE HYDROCHLORIDE 15 MG: 5 TABLET ORAL at 09:04

## 2018-04-08 RX ADMIN — TREPROSTINIL 67 NG/KG/MIN: 100 INJECTION, SOLUTION INTRAVENOUS; SUBCUTANEOUS at 04:04

## 2018-04-08 RX ADMIN — GABAPENTIN 100 MG: 100 CAPSULE ORAL at 09:04

## 2018-04-08 RX ADMIN — PANTOPRAZOLE SODIUM 40 MG: 40 TABLET, DELAYED RELEASE ORAL at 09:04

## 2018-04-08 RX ADMIN — ENOXAPARIN SODIUM 40 MG: 100 INJECTION SUBCUTANEOUS at 05:04

## 2018-04-08 RX ADMIN — POTASSIUM CHLORIDE 60 MEQ: 1500 TABLET, EXTENDED RELEASE ORAL at 08:04

## 2018-04-08 RX ADMIN — POTASSIUM CHLORIDE 60 MEQ: 1500 TABLET, EXTENDED RELEASE ORAL at 09:04

## 2018-04-08 NOTE — ASSESSMENT & PLAN NOTE
--continue remodulin. uptitrating to goal of 75ng  -- today at 66ng without any side effects reported  --repeat echo shows high PA pressure, 79mmhg and increased CVP

## 2018-04-08 NOTE — PROGRESS NOTES
Respiratory Therapist switched pt to high-flow nasal cannula as humidifier on regular nasal cannula appeared about to burst on higher setting (pt was increased to 6 L earlier today). Pt is now on 7 L high-flow nasal cannula; pt will be switched to BiPAP at bedtime.

## 2018-04-08 NOTE — PLAN OF CARE
Remodulin now up to 67 ng/kg/min or 91 ml/24 hr. Patient remains AAOx4, calm, cooperative, and independent. Attempted to wean patient down on oxygen but could only go down to 5 liters per hour. Patient accidentally removed IV just after lunch. As patient is a very hard stick, had to call for house supervisor for assistance. Drip restarted around 4:30. Patient's potassium continues to be low. 3.0 this morning but up to 3.8 after potassium supplements. Patient continues to tolerate remodulin titration well. Free from falls, injury, and skin breakdown.

## 2018-04-08 NOTE — SUBJECTIVE & OBJECTIVE
Interval History:     Doing well and has no complains. Net negative 2.9 in the last 24 hrs and 7.3L since admission. abd girth. decreasing with diuresis. Veletri at 66ng today without reported side effects.    Continuous Infusions:   furosemide (LASIX) 2 mg/mL infusion (non-titrating) 20 mg/hr (04/08/18 0555)    treprostinil (REMODULIN) infusion 66 ng/kg/min (04/08/18 0400)    veletri/remodulin cassette      veletri/remodulin tubing       Scheduled Meds:   amLODIPine  5 mg Oral Daily    busPIRone  15 mg Oral TID    desvenlafaxine succinate  100 mg Oral Daily    enoxaparin  40 mg Subcutaneous Daily    fluticasone-vilanterol  1 puff Inhalation Daily    gabapentin  100 mg Oral TID    lamoTRIgine  100 mg Oral BID    levothyroxine  75 mcg Oral Daily    lurasidone  40 mg Oral Daily    magnesium oxide  400 mg Oral BID    pantoprazole  40 mg Oral Daily    potassium chloride  60 mEq Oral TID    pravastatin  40 mg Oral Daily    sodium chloride 0.9%  3 mL Intravenous Q8H    spironolactone  25 mg Oral BID    tiotropium  1 capsule Inhalation Daily     PRN Meds:acetaminophen, hydrocodone-acetaminophen 10-325mg, loperamide, ondansetron    Review of patient's allergies indicates:   Allergen Reactions    Sulfa (sulfonamide antibiotics) Rash     Objective:     Vital Signs (Most Recent):  Temp: 98.3 °F (36.8 °C) (04/08/18 1205)  Pulse: 80 (04/08/18 1205)  Resp: 20 (04/08/18 1205)  BP: (!) 91/56 (04/08/18 1205)  SpO2: 95 % (04/08/18 1205) Vital Signs (24h Range):  Temp:  [96.8 °F (36 °C)-98.4 °F (36.9 °C)] 98.3 °F (36.8 °C)  Pulse:  [70-95] 80  Resp:  [15-20] 20  SpO2:  [90 %-97 %] 95 %  BP: ()/(56-75) 91/56     Patient Vitals for the past 72 hrs (Last 3 readings):   Weight   04/08/18 0545 88.4 kg (194 lb 14.2 oz)   04/07/18 0615 88.1 kg (194 lb 3.6 oz)     Body mass index is 35.65 kg/m².      Intake/Output Summary (Last 24 hours) at 04/08/18 1336  Last data filed at 04/08/18 1200   Gross per 24 hour   Intake              1185 ml   Output             4675 ml   Net            -3490 ml       Hemodynamic Parameters:       Telemetry:    Physical Exam     Constitutional: She is oriented to person, place, and time.   Morbidly obese   Neck: JVD (to mid neck. ABD edema without fluid wave) present.   Cardiovascular: Normal rate, regular rhythm and normal heart sounds.  Exam reveals no gallop and no friction rub.   +p2 split   Pulmonary/Chest: Effort normal and breath sounds normal.   Abdominal: Soft. Bowel sounds are normal.   Musculoskeletal: She exhibits no edema or tenderness.   Neurological: She is alert and oriented to person, place, and time.   Skin: Skin is warm and dry.   Psychiatric: plesant and calm today  Nursing note and vitals reviewed.    Significant Labs:  CBC:    Recent Labs  Lab 04/06/18  0412 04/07/18  0404 04/08/18  0354   WBC 5.31 5.03 4.74   RBC 4.60 4.55 4.51   HGB 12.6 12.7 12.7   HCT 41.2 40.8 39.9   * 142* 122*   MCV 90 90 89   MCH 27.4 27.9 28.2   MCHC 30.6* 31.1* 31.8*     BNP:    Recent Labs  Lab 04/02/18 04/05/18  0433   BNP 7,295 539*     CMP:    Recent Labs  Lab 04/06/18  0412  04/07/18  0404 04/07/18  1541 04/08/18  0354   GLU 91  < > 82 88 85   CALCIUM 9.1  < > 9.2 9.4 9.2   ALBUMIN 3.9  --  3.9  --  3.9   PROT 6.4  --  6.4  --  6.4     < > 140 138 139   K 2.7*  < > 2.8* 4.6 3.0*   CO2 31*  < > 32* 30* 30*   CL 97  < > 97 99 97   BUN 20  < > 20 24* 21*   CREATININE 1.0  < > 1.2 1.3 1.1   ALKPHOS 180*  --  188*  --  189*   ALT 14  --  13  --  14   AST 19  --  18  --  19   BILITOT 0.8  --  0.8  --  0.7   < > = values in this interval not displayed.   Coagulation:     Recent Labs  Lab 04/05/18  0433   INR 1.1   APTT 22.4      LDH:  No results for input(s): LDH in the last 72 hours.  Microbiology:  Microbiology Results (last 7 days)     ** No results found for the last 168 hours. **          I have reviewed all pertinent labs within the past 24 hours.      Estimated Creatinine Clearance:  59 mL/min (based on SCr of 1.1 mg/dL).    Diagnostic Results:

## 2018-04-08 NOTE — PROGRESS NOTES
[facility]  Heart Transplant  Progress Note    Patient Name: Sue Smith  MRN: [id number]  Admission Date: 4/5/2018  Hospital Length of Stay: 3 days  Attending Physician: Madi Santana MD  Primary Care Provider: Paddy Guerrier DO  Principal Problem:Acute on chronic diastolic heart failure    Subjective:     Interval History:     Doing well and has no complains. Net negative 2.9 in the last 24 hrs and 7.3L since admission. abd girth. decreasing with diuresis. Veletri at 66ng today without reported side effects.    Continuous Infusions:   furosemide (LASIX) 2 mg/mL infusion (non-titrating) 20 mg/hr (04/08/18 0555)    treprostinil (REMODULIN) infusion 66 ng/kg/min (04/08/18 0400)    veletri/remodulin cassette      veletri/remodulin tubing       Scheduled Meds:   amLODIPine  5 mg Oral Daily    busPIRone  15 mg Oral TID    desvenlafaxine succinate  100 mg Oral Daily    enoxaparin  40 mg Subcutaneous Daily    fluticasone-vilanterol  1 puff Inhalation Daily    gabapentin  100 mg Oral TID    lamoTRIgine  100 mg Oral BID    levothyroxine  75 mcg Oral Daily    lurasidone  40 mg Oral Daily    magnesium oxide  400 mg Oral BID    pantoprazole  40 mg Oral Daily    potassium chloride  60 mEq Oral TID    pravastatin  40 mg Oral Daily    sodium chloride 0.9%  3 mL Intravenous Q8H    spironolactone  25 mg Oral BID    tiotropium  1 capsule Inhalation Daily     PRN Meds:acetaminophen, hydrocodone-acetaminophen 10-325mg, loperamide, ondansetron    Review of patient's allergies indicates:   Allergen Reactions    Sulfa (sulfonamide antibiotics) Rash     Objective:     Vital Signs (Most Recent):  Temp: 98.3 °F (36.8 °C) (04/08/18 1205)  Pulse: 80 (04/08/18 1205)  Resp: 20 (04/08/18 1205)  BP: (!) 91/56 (04/08/18 1205)  SpO2: 95 % (04/08/18 1205) Vital Signs (24h Range):  Temp:  [96.8 °F (36 °C)-98.4 °F (36.9 °C)] 98.3 °F (36.8 °C)  Pulse:  [70-95] 80  Resp:  [15-20] 20  SpO2:  [90 %-97 %] 95  %  BP: ()/(56-75) 91/56     Patient Vitals for the past 72 hrs (Last 3 readings):   Weight   04/08/18 0545 88.4 kg (194 lb 14.2 oz)   04/07/18 0615 88.1 kg (194 lb 3.6 oz)     Body mass index is 35.65 kg/m².      Intake/Output Summary (Last 24 hours) at 04/08/18 1336  Last data filed at 04/08/18 1200   Gross per 24 hour   Intake             1185 ml   Output             4675 ml   Net            -3490 ml       Hemodynamic Parameters:       Telemetry:    Physical Exam     Constitutional: She is oriented to person, place, and time.   Morbidly obese   Neck: JVD (to mid neck. ABD edema without fluid wave) present.   Cardiovascular: Normal rate, regular rhythm and normal heart sounds.  Exam reveals no gallop and no friction rub.   +p2 split   Pulmonary/Chest: Effort normal and breath sounds normal.   Abdominal: Soft. Bowel sounds are normal.   Musculoskeletal: She exhibits no edema or tenderness.   Neurological: She is alert and oriented to person, place, and time.   Skin: Skin is warm and dry.   Psychiatric: plesant and calm today  Nursing note and vitals reviewed.    Significant Labs:  CBC:    Recent Labs  Lab 04/06/18  0412 04/07/18  0404 04/08/18  0354   WBC 5.31 5.03 4.74   RBC 4.60 4.55 4.51   HGB 12.6 12.7 12.7   HCT 41.2 40.8 39.9   * 142* 122*   MCV 90 90 89   MCH 27.4 27.9 28.2   MCHC 30.6* 31.1* 31.8*     BNP:    Recent Labs  Lab 04/02/18 04/05/18  0433   BNP 7,295 539*     CMP:    Recent Labs  Lab 04/06/18  0412  04/07/18  0404 04/07/18  1541 04/08/18  0354   GLU 91  < > 82 88 85   CALCIUM 9.1  < > 9.2 9.4 9.2   ALBUMIN 3.9  --  3.9  --  3.9   PROT 6.4  --  6.4  --  6.4     < > 140 138 139   K 2.7*  < > 2.8* 4.6 3.0*   CO2 31*  < > 32* 30* 30*   CL 97  < > 97 99 97   BUN 20  < > 20 24* 21*   CREATININE 1.0  < > 1.2 1.3 1.1   ALKPHOS 180*  --  188*  --  189*   ALT 14  --  13  --  14   AST 19  --  18  --  19   BILITOT 0.8  --  0.8  --  0.7   < > = values in this interval not displayed.    Coagulation:     Recent Labs  Lab 04/05/18  0433   INR 1.1   APTT 22.4      LDH:  No results for input(s): LDH in the last 72 hours.  Microbiology:  Microbiology Results (last 7 days)     ** No results found for the last 168 hours. **          I have reviewed all pertinent labs within the past 24 hours.      Estimated Creatinine Clearance: 59 mL/min (based on SCr of 1.1 mg/dL).    Diagnostic Results:      Assessment and Plan:     Ms. Smith is a 57 y.o. Female with a past medical history of PHTN WHO Group 1 on IV Remodulin, chronic hypoxic respiratory failure, chronic RV failure on home O2 and BiPAP who presented to [facility] with SOB. She has been having SOB for roughly 1 week, which was progressively worsening. She stated that she sleeps in a recliner currently until she gets a new BiPAP machine. She does wake up short of breath, but is not clear about LE edema. She does state that her weight has been progressively increasing though. She states that she has no symptoms from the Veletri currently.    * Acute on chronic diastolic heart failure    -- Wet and warm on exam  --JVD elevated and trace LE edema  -  Refractory to oral agents at home on presentation  -- lasix increased to 20gtt due to decreased UOP. Potassium being replaced as needed. Goal is to keep >4 or thereby  -- Net negative 7.3 total and 2.9 for the last 24hrs  - continue holding home dose torsemide and metolazone for now  - Will aggressively replace potassium. Changed aldactone frequency to BID  -- echo shows EF of 60-75% with high PA at 79mmhg  --strict I&O's  - Low salt diet        Gastroesophageal reflux disease    --continue protonix        Chronic respiratory failure with hypoxia    --resumed to home dose NC oxygen supplement  -- Nurse to wean off oxygen to baseline. Keep sats 88 or above  - BIPAP for nights if needed          Pulmonary hypertension, group 1    --continue remodulin. uptitrating to goal of 75ng  -- today at 66ng without  any side effects reported  --repeat echo shows high PA pressure, 79mmhg and increased CVP          Discussed plan of care with Dr. Esther Peñaloza MD  Heart Transplant  [facility]

## 2018-04-08 NOTE — ASSESSMENT & PLAN NOTE
--resumed to home dose NC oxygen supplement  -- Nurse to wean off oxygen to baseline. Keep sats 88 or above  - BIPAP for nights if needed

## 2018-04-08 NOTE — ASSESSMENT & PLAN NOTE
-- Wet and warm on exam  --JVD elevated and trace LE edema  -  Refractory to oral agents at home on presentation  -- lasix increased to 20gtt due to decreased UOP. Potassium being replaced as needed. Goal is to keep >4 or thereby  -- Net negative 7.3 total and 2.9 for the last 24hrs  - continue holding home dose torsemide and metolazone for now  - Will aggressively replace potassium. Changed aldactone frequency to BID  -- echo shows EF of 60-75% with high PA at 79mmhg  --strict I&O's  - Low salt diet

## 2018-04-08 NOTE — PLAN OF CARE
Problem: Patient Care Overview  Goal: Plan of Care Review  Outcome: Ongoing (interventions implemented as appropriate)  - Pt admitted 4/5/18 (transferred from [place]) with volume overload/HF exacerbation. Pt has a history of diastolic heart failure and pulmonary hypertension requiring home oxygen use.  - Remodulin infusion (as of 0400): 66 ng/kg/min; dosing weight 85 kg; concentration 9,000,000 ng/100 mL; CADD rate 90 mL/24 hr.  - Pt's Remodulin is being titrated by 1 ng/kg/min q12h as tolerated. Next increase will be at 1600 along with cassette change. Pt's goal rate is 75 ng/kg/min.  - BPs overnight 90s-100s/50s-60s. HR stable in the 70s-80s all night.  - 2D echo on 4/5/18 showed PA systolic pressure of 79 and EF of 60%.  - Furosemide infusion: 20 mg/hr; pump rate 10 mL/hr (2:1 concentration). Pt has diuresed > 3.3 L overnight. Pt's potassium level 3.0 this morning - due to diuresis. Pt already has PO potassium replacement ordered - also got IV potassium replacement for the past 2 days.  - Pt is wearing 7 L high-flow nasal cannula while awake and BiPAP while asleep. SpO2 93-97% overnight.  - Pt denies pain; pt denies nausea. Pt exhibits flushing from Remodulin infusion - not an acute symptom. Pt is independent and ambulatory.

## 2018-04-08 NOTE — PROGRESS NOTES
- Remodulin increased by 1 ng/kg/min to 66 ng/kg/min at 0400. New CADD rate 90 mL/24 hr. CADD settings checked with charge nurse.  - Due to rate now at 90 mL/24 hr (at current concentration) pt may require a change in concentration to her cassettes soon. Will pass this on to day team.  - Will check VS q15min x 1 hour.

## 2018-04-09 LAB
ALBUMIN SERPL BCP-MCNC: 3.9 G/DL
ALP SERPL-CCNC: 181 U/L
ALT SERPL W/O P-5'-P-CCNC: 16 U/L
ANION GAP SERPL CALC-SCNC: 9 MMOL/L
AST SERPL-CCNC: 23 U/L
BASOPHILS # BLD AUTO: 0.02 K/UL
BASOPHILS NFR BLD: 0.4 %
BILIRUB SERPL-MCNC: 0.7 MG/DL
BUN SERPL-MCNC: 19 MG/DL
BUN SERPL-MCNC: 20 MG/DL
BUN SERPL-MCNC: 22 MG/DL
CALCIUM SERPL-MCNC: 9.2 MG/DL
CALCIUM SERPL-MCNC: 9.3 MG/DL
CALCIUM SERPL-MCNC: 9.5 MG/DL
CHLORIDE SERPL-SCNC: 96 MMOL/L
CHLORIDE SERPL-SCNC: 97 MMOL/L
CHLORIDE SERPL-SCNC: 98 MMOL/L
CO2 SERPL-SCNC: 31 MMOL/L
CO2 SERPL-SCNC: 32 MMOL/L
CO2 SERPL-SCNC: 34 MMOL/L
CREAT SERPL-MCNC: 1.2 MG/DL
CREAT SERPL-MCNC: 1.2 MG/DL
CREAT SERPL-MCNC: 1.3 MG/DL
DIFFERENTIAL METHOD: ABNORMAL
EOSINOPHIL # BLD AUTO: 0.2 K/UL
EOSINOPHIL NFR BLD: 3.4 %
ERYTHROCYTE [DISTWIDTH] IN BLOOD BY AUTOMATED COUNT: 17.9 %
EST. GFR  (AFRICAN AMERICAN): 53 ML/MIN/1.73 M^2
EST. GFR  (AFRICAN AMERICAN): 58.4 ML/MIN/1.73 M^2
EST. GFR  (AFRICAN AMERICAN): 58.4 ML/MIN/1.73 M^2
EST. GFR  (NON AFRICAN AMERICAN): 46 ML/MIN/1.73 M^2
EST. GFR  (NON AFRICAN AMERICAN): 50.6 ML/MIN/1.73 M^2
EST. GFR  (NON AFRICAN AMERICAN): 50.6 ML/MIN/1.73 M^2
GLUCOSE SERPL-MCNC: 80 MG/DL
GLUCOSE SERPL-MCNC: 85 MG/DL
GLUCOSE SERPL-MCNC: 89 MG/DL
HCT VFR BLD AUTO: 39.2 %
HGB BLD-MCNC: 12.2 G/DL
IMM GRANULOCYTES # BLD AUTO: 0.03 K/UL
IMM GRANULOCYTES NFR BLD AUTO: 0.7 %
LYMPHOCYTES # BLD AUTO: 0.7 K/UL
LYMPHOCYTES NFR BLD: 16.1 %
MAGNESIUM SERPL-MCNC: 2.2 MG/DL
MCH RBC QN AUTO: 27.6 PG
MCHC RBC AUTO-ENTMCNC: 31.1 G/DL
MCV RBC AUTO: 89 FL
MONOCYTES # BLD AUTO: 0.4 K/UL
MONOCYTES NFR BLD: 8.5 %
NEUTROPHILS # BLD AUTO: 3.2 K/UL
NEUTROPHILS NFR BLD: 70.9 %
NRBC BLD-RTO: 0 /100 WBC
PHOSPHATE SERPL-MCNC: 3.6 MG/DL
PLATELET # BLD AUTO: 117 K/UL
PMV BLD AUTO: 11.2 FL
POTASSIUM SERPL-SCNC: 2.9 MMOL/L
POTASSIUM SERPL-SCNC: 3.9 MMOL/L
POTASSIUM SERPL-SCNC: 4.2 MMOL/L
PROT SERPL-MCNC: 6.3 G/DL
RBC # BLD AUTO: 4.42 M/UL
SODIUM SERPL-SCNC: 137 MMOL/L
SODIUM SERPL-SCNC: 138 MMOL/L
SODIUM SERPL-SCNC: 140 MMOL/L
WBC # BLD AUTO: 4.46 K/UL

## 2018-04-09 PROCEDURE — 25000242 PHARM REV CODE 250 ALT 637 W/ HCPCS: Performed by: INTERNAL MEDICINE

## 2018-04-09 PROCEDURE — 25000003 PHARM REV CODE 250: Performed by: INTERNAL MEDICINE

## 2018-04-09 PROCEDURE — 20600001 HC STEP DOWN PRIVATE ROOM

## 2018-04-09 PROCEDURE — 63600175 PHARM REV CODE 636 W HCPCS: Performed by: INTERNAL MEDICINE

## 2018-04-09 PROCEDURE — 99232 SBSQ HOSP IP/OBS MODERATE 35: CPT | Mod: ,,, | Performed by: INTERNAL MEDICINE

## 2018-04-09 PROCEDURE — 84100 ASSAY OF PHOSPHORUS: CPT

## 2018-04-09 PROCEDURE — 94761 N-INVAS EAR/PLS OXIMETRY MLT: CPT

## 2018-04-09 PROCEDURE — 63600175 PHARM REV CODE 636 W HCPCS: Mod: JG | Performed by: INTERNAL MEDICINE

## 2018-04-09 PROCEDURE — 80048 BASIC METABOLIC PNL TOTAL CA: CPT

## 2018-04-09 PROCEDURE — 99900035 HC TECH TIME PER 15 MIN (STAT)

## 2018-04-09 PROCEDURE — 36415 COLL VENOUS BLD VENIPUNCTURE: CPT

## 2018-04-09 PROCEDURE — 80053 COMPREHEN METABOLIC PANEL: CPT

## 2018-04-09 PROCEDURE — A4216 STERILE WATER/SALINE, 10 ML: HCPCS | Performed by: INTERNAL MEDICINE

## 2018-04-09 PROCEDURE — 80048 BASIC METABOLIC PNL TOTAL CA: CPT | Mod: 91

## 2018-04-09 PROCEDURE — 85025 COMPLETE CBC W/AUTO DIFF WBC: CPT

## 2018-04-09 PROCEDURE — 94660 CPAP INITIATION&MGMT: CPT

## 2018-04-09 PROCEDURE — 83735 ASSAY OF MAGNESIUM: CPT

## 2018-04-09 RX ORDER — POTASSIUM CHLORIDE 7.45 MG/ML
10 INJECTION INTRAVENOUS
Status: DISCONTINUED | OUTPATIENT
Start: 2018-04-09 | End: 2018-04-09

## 2018-04-09 RX ADMIN — MAGNESIUM OXIDE TAB 400 MG (241.3 MG ELEMENTAL MG) 400 MG: 400 (241.3 MG) TAB at 08:04

## 2018-04-09 RX ADMIN — GABAPENTIN 100 MG: 100 CAPSULE ORAL at 02:04

## 2018-04-09 RX ADMIN — AMLODIPINE BESYLATE 5 MG: 5 TABLET ORAL at 08:04

## 2018-04-09 RX ADMIN — GABAPENTIN 100 MG: 100 CAPSULE ORAL at 09:04

## 2018-04-09 RX ADMIN — POTASSIUM CHLORIDE 60 MEQ: 1500 TABLET, EXTENDED RELEASE ORAL at 08:04

## 2018-04-09 RX ADMIN — FUROSEMIDE 20 MG/HR: 10 INJECTION, SOLUTION INTRAVENOUS at 03:04

## 2018-04-09 RX ADMIN — LAMOTRIGINE 100 MG: 100 TABLET ORAL at 09:04

## 2018-04-09 RX ADMIN — LEVOTHYROXINE SODIUM 75 MCG: 75 TABLET ORAL at 08:04

## 2018-04-09 RX ADMIN — DESVENLAFAXINE SUCCINATE 100 MG: 50 TABLET, FILM COATED, EXTENDED RELEASE ORAL at 08:04

## 2018-04-09 RX ADMIN — LAMOTRIGINE 100 MG: 100 TABLET ORAL at 08:04

## 2018-04-09 RX ADMIN — BUSPIRONE HYDROCHLORIDE 15 MG: 5 TABLET ORAL at 02:04

## 2018-04-09 RX ADMIN — GABAPENTIN 100 MG: 100 CAPSULE ORAL at 08:04

## 2018-04-09 RX ADMIN — Medication 3 ML: at 02:04

## 2018-04-09 RX ADMIN — LURASIDONE HYDROCHLORIDE 40 MG: 40 TABLET, FILM COATED ORAL at 08:04

## 2018-04-09 RX ADMIN — Medication 3 ML: at 09:04

## 2018-04-09 RX ADMIN — POTASSIUM CHLORIDE 60 MEQ: 1500 TABLET, EXTENDED RELEASE ORAL at 02:04

## 2018-04-09 RX ADMIN — POTASSIUM CHLORIDE 10 MEQ: 7.46 INJECTION, SOLUTION INTRAVENOUS at 06:04

## 2018-04-09 RX ADMIN — FLUTICASONE FUROATE AND VILANTEROL TRIFENATATE 1 PUFF: 100; 25 POWDER RESPIRATORY (INHALATION) at 08:04

## 2018-04-09 RX ADMIN — PRAVASTATIN SODIUM 40 MG: 40 TABLET ORAL at 08:04

## 2018-04-09 RX ADMIN — FUROSEMIDE 20 MG/HR: 10 INJECTION, SOLUTION INTRAVENOUS at 06:04

## 2018-04-09 RX ADMIN — MAGNESIUM OXIDE TAB 400 MG (241.3 MG ELEMENTAL MG) 400 MG: 400 (241.3 MG) TAB at 09:04

## 2018-04-09 RX ADMIN — TIOTROPIUM BROMIDE 18 MCG: 18 CAPSULE ORAL; RESPIRATORY (INHALATION) at 09:04

## 2018-04-09 RX ADMIN — TREPROSTINIL 69 NG/KG/MIN: 100 INJECTION, SOLUTION INTRAVENOUS; SUBCUTANEOUS at 02:04

## 2018-04-09 RX ADMIN — RIOCIGUAT 1 MG: 0.5 TABLET, FILM COATED ORAL at 09:04

## 2018-04-09 RX ADMIN — BUSPIRONE HYDROCHLORIDE 15 MG: 5 TABLET ORAL at 08:04

## 2018-04-09 RX ADMIN — SPIRONOLACTONE 25 MG: 25 TABLET, FILM COATED ORAL at 08:04

## 2018-04-09 RX ADMIN — BUSPIRONE HYDROCHLORIDE 15 MG: 5 TABLET ORAL at 09:04

## 2018-04-09 RX ADMIN — SPIRONOLACTONE 25 MG: 25 TABLET, FILM COATED ORAL at 09:04

## 2018-04-09 RX ADMIN — PANTOPRAZOLE SODIUM 40 MG: 40 TABLET, DELAYED RELEASE ORAL at 08:04

## 2018-04-09 RX ADMIN — HYDROCODONE BITARTRATE AND ACETAMINOPHEN 1 TABLET: 10; 325 TABLET ORAL at 09:04

## 2018-04-09 RX ADMIN — RIOCIGUAT 1 MG: 0.5 TABLET, FILM COATED ORAL at 04:04

## 2018-04-09 RX ADMIN — POTASSIUM CHLORIDE 60 MEQ: 1500 TABLET, EXTENDED RELEASE ORAL at 09:04

## 2018-04-09 RX ADMIN — ENOXAPARIN SODIUM 40 MG: 100 INJECTION SUBCUTANEOUS at 05:04

## 2018-04-09 NOTE — NURSING
Dr. Lynne to visit, aware of am K level 2.9 with KCl rider #1 in progress ( at slower rate of 50 cc/hr) with patiwnt c/o discomfort during infusion. OK to cancel rider #2, give scheduled K dur as ordered and recheck BMP at noon.

## 2018-04-09 NOTE — ASSESSMENT & PLAN NOTE
- Continue IV remodulin with uptitration every 6 hours with goal of 75  - Remodulin at 68 this AM.   - Continue amlodipine  - Back on home dose of oxygen (4L ATC)

## 2018-04-09 NOTE — PROGRESS NOTES
- Potassium 2.9 this morning - due to diuresis. This has been a daily issue for pt while on Lasix drip. Pt already receiving 60 mEq of oral potassium replacement TID.  - Notified Dr. Gill who gave orders to give 10 mEq of KCl IVPB x 2 doses (a total of 20 mEq by IV - pt only has peripheral access).  - Will hang first bag once pharmacy verifies.    Results for RADHA HERACLIO (MRN [id number]) as of 4/9/2018 06:29   Ref. Range 4/9/2018 03:53   Potassium Latest Ref Range: 3.5 - 5.1 mmol/L 2.9 (L)

## 2018-04-09 NOTE — PLAN OF CARE
Problem: Patient Care Overview  Goal: Plan of Care Review  Outcome: Ongoing (interventions implemented as appropriate)  - Pt admitted 4/5/18 (transferred from [place]) with volume overload/HF exacerbation. Pt has a history of diastolic heart failure and pulmonary hypertension requiring home oxygen use.  - Remodulin infusion (as of 0400): 68 ng/kg/min; dosing weight 85 kg; concentration 9,000,000 ng/100 mL; CADD rate 92 mL/24 hr.  - Pt's Remodulin is being titrated by 1 ng/kg/min q12h as tolerated. Next increase will be at 1600 along with cassette change. Pt's goal rate is 75 ng/kg/min.  - BPs overnight 90s-110s/50s-70s. HR stable in the 70s-80s all night.  - 2D echo on 4/5/18 showed PA systolic pressure of 79 and EF of 60%.  - Furosemide infusion: 20 mg/hr; pump rate 10 mL/hr (2:1 concentration). Pt has diuresed 3.4 L overnight. Pt's potassium level 2.9 this morning - due to diuresis. Pt is getting 2 bags of IV potassium replacement this morning in addition to her preexisting PO replacement.  - Pt is wearing 5 L high-flow nasal cannula while awake and BiPAP while asleep. SpO2 92-98% overnight.  - Pt has a midline consult entered for today - IV access was lost for 2-3 hours yesterday and house supervisor had to be called to reestablish access. Pt previously had a shoulder IV that inadvertently came out.  - Pt denies pain; pt denies nausea. Pt exhibits flushing from Remodulin infusion - not an acute symptom. Pt is independent and ambulatory.

## 2018-04-09 NOTE — ASSESSMENT & PLAN NOTE
- Wet and warm on exam  - JVD remains elevated.   - Continue Lasix 20/hr with aggressive replacement of lytes.   - Net negative 11Ls since admission. -ve 3.8Ls in past 24 hours.   - BID aldactone 25mg   - TTE reviewed as above.   --strict I&O's  - Low salt diet

## 2018-04-09 NOTE — SUBJECTIVE & OBJECTIVE
Interval History: Continues to diurese well. No acute issues overnight. Eating and drinking okay. Moving bowel and bladder without issues. All questions answered. Aggressively replacing potassium.     Continuous Infusions:   furosemide (LASIX) 2 mg/mL infusion (non-titrating) 20 mg/hr (04/09/18 0610)    treprostinil (REMODULIN) infusion 68 ng/kg/min (04/09/18 0415)    veletri/remodulin cassette      veletri/remodulin tubing       Scheduled Meds:   amLODIPine  5 mg Oral Daily    busPIRone  15 mg Oral TID    desvenlafaxine succinate  100 mg Oral Daily    enoxaparin  40 mg Subcutaneous Daily    fluticasone-vilanterol  1 puff Inhalation Daily    gabapentin  100 mg Oral TID    lamoTRIgine  100 mg Oral BID    levothyroxine  75 mcg Oral Daily    lurasidone  40 mg Oral Daily    magnesium oxide  400 mg Oral BID    pantoprazole  40 mg Oral Daily    potassium chloride  60 mEq Oral TID    pravastatin  40 mg Oral Daily    sodium chloride 0.9%  3 mL Intravenous Q8H    spironolactone  25 mg Oral BID    tiotropium  1 capsule Inhalation Daily     PRN Meds:acetaminophen, hydrocodone-acetaminophen 10-325mg, loperamide, ondansetron    Review of patient's allergies indicates:   Allergen Reactions    Sulfa (sulfonamide antibiotics) Rash     Objective:     Vital Signs (Most Recent):  Temp: 98.2 °F (36.8 °C) (04/09/18 0800)  Pulse: 86 (04/09/18 1102)  Resp: (!) 22 (04/09/18 0944)  BP: 115/74 (04/09/18 0800)  SpO2: (!) 91 % (04/09/18 0952) Vital Signs (24h Range):  Temp:  [97 °F (36.1 °C)-98.7 °F (37.1 °C)] 98.2 °F (36.8 °C)  Pulse:  [69-95] 86  Resp:  [15-22] 22  SpO2:  [90 %-98 %] 91 %  BP: ()/(52-77) 115/74     Patient Vitals for the past 72 hrs (Last 3 readings):   Weight   04/09/18 0615 87.5 kg (192 lb 14.4 oz)   04/08/18 0545 88.4 kg (194 lb 14.2 oz)   04/07/18 0615 88.1 kg (194 lb 3.6 oz)     Body mass index is 35.28 kg/m².      Intake/Output Summary (Last 24 hours) at 04/09/18 1116  Last data filed at  04/09/18 0950   Gross per 24 hour   Intake          1176.17 ml   Output             5450 ml   Net         -4273.83 ml     Physical Exam   Constitutional: She is oriented to person, place, and time.   Morbidly obese   Neck: Supple. JVD up to jaw.    Cardiovascular: RRR. + p2 split   Pulmonary/Chest: Scant Bibasilar rales. Otherwise clear with good air movment.   Abdominal: Soft. NT/ND. Normal BS   Musculoskeletal: Trace LE edema (non pitting)   Neurological: AAOx3. No focal deficits.   Skin: Skin is warm and dry.   Nursing note and vitals reviewed.    Significant Labs:  CBC:    Recent Labs  Lab 04/07/18  0404 04/08/18  0354 04/09/18  0353   WBC 5.03 4.74 4.46   RBC 4.55 4.51 4.42   HGB 12.7 12.7 12.2   HCT 40.8 39.9 39.2   * 122* 117*   MCV 90 89 89   MCH 27.9 28.2 27.6   MCHC 31.1* 31.8* 31.1*     BNP:    Recent Labs  Lab 04/05/18  0433   *     CMP:    Recent Labs  Lab 04/07/18  0404  04/08/18  0354 04/08/18  1554 04/09/18  0353   GLU 82  < > 85 88 80   CALCIUM 9.2  < > 9.2 9.2 9.2   ALBUMIN 3.9  --  3.9  --  3.9   PROT 6.4  --  6.4  --  6.3     < > 139 136 140   K 2.8*  < > 3.0* 3.8 2.9*   CO2 32*  < > 30* 32* 34*   CL 97  < > 97 95 97   BUN 20  < > 21* 21* 19   CREATININE 1.2  < > 1.1 1.3 1.2   ALKPHOS 188*  --  189*  --  181*   ALT 13  --  14  --  16   AST 18  --  19  --  23   BILITOT 0.8  --  0.7  --  0.7   < > = values in this interval not displayed.     I have reviewed all pertinent labs within the past 24 hours.    Estimated Creatinine Clearance: 53.8 mL/min (based on SCr of 1.2 mg/dL).    Diagnostic Results:   TTE (4/5/18):       1 - Normal left ventricular systolic function (EF 60-65%).     2 - No wall motion abnormalities.     3 - Normal left ventricular diastolic function.     4 - Right atrial enlargement.     5 - Right ventricular enlargement with severely depressed systolic function.     6 - Moderate tricuspid regurgitation.     7 - Increased central venous pressure.     8 -  Pulmonary hypertension. The estimated PA systolic pressure is 79 mmHg.

## 2018-04-09 NOTE — PROGRESS NOTES
[facility]  Heart Transplant  Progress Note    Patient Name: Sue Smith  MRN: [id number]  Admission Date: 4/5/2018  Hospital Length of Stay: 4 days  Attending Physician: Madi Santana MD  Primary Care Provider: Paddy Guerrier DO  Principal Problem:Acute on chronic diastolic heart failure    Subjective:     Interval History: Continues to diurese well. No acute issues overnight. Eating and drinking okay. Moving bowel and bladder without issues. All questions answered. Aggressively replacing potassium.     Continuous Infusions:   furosemide (LASIX) 2 mg/mL infusion (non-titrating) 20 mg/hr (04/09/18 0610)    treprostinil (REMODULIN) infusion 68 ng/kg/min (04/09/18 0415)    veletri/remodulin cassette      veletri/remodulin tubing       Scheduled Meds:   amLODIPine  5 mg Oral Daily    busPIRone  15 mg Oral TID    desvenlafaxine succinate  100 mg Oral Daily    enoxaparin  40 mg Subcutaneous Daily    fluticasone-vilanterol  1 puff Inhalation Daily    gabapentin  100 mg Oral TID    lamoTRIgine  100 mg Oral BID    levothyroxine  75 mcg Oral Daily    lurasidone  40 mg Oral Daily    magnesium oxide  400 mg Oral BID    pantoprazole  40 mg Oral Daily    potassium chloride  60 mEq Oral TID    pravastatin  40 mg Oral Daily    sodium chloride 0.9%  3 mL Intravenous Q8H    spironolactone  25 mg Oral BID    tiotropium  1 capsule Inhalation Daily     PRN Meds:acetaminophen, hydrocodone-acetaminophen 10-325mg, loperamide, ondansetron    Review of patient's allergies indicates:   Allergen Reactions    Sulfa (sulfonamide antibiotics) Rash     Objective:     Vital Signs (Most Recent):  Temp: 98.2 °F (36.8 °C) (04/09/18 0800)  Pulse: 86 (04/09/18 1102)  Resp: (!) 22 (04/09/18 0944)  BP: 115/74 (04/09/18 0800)  SpO2: (!) 91 % (04/09/18 0952) Vital Signs (24h Range):  Temp:  [97 °F (36.1 °C)-98.7 °F (37.1 °C)] 98.2 °F (36.8 °C)  Pulse:  [69-95] 86  Resp:  [15-22] 22  SpO2:  [90 %-98 %] 91 %  BP:  ()/(52-77) 115/74     Patient Vitals for the past 72 hrs (Last 3 readings):   Weight   04/09/18 0615 87.5 kg (192 lb 14.4 oz)   04/08/18 0545 88.4 kg (194 lb 14.2 oz)   04/07/18 0615 88.1 kg (194 lb 3.6 oz)     Body mass index is 35.28 kg/m².      Intake/Output Summary (Last 24 hours) at 04/09/18 1116  Last data filed at 04/09/18 0950   Gross per 24 hour   Intake          1176.17 ml   Output             5450 ml   Net         -4273.83 ml     Physical Exam   Constitutional: She is oriented to person, place, and time.   Morbidly obese   Neck: Supple. JVD up to jaw.    Cardiovascular: RRR. + p2 split   Pulmonary/Chest: Scant Bibasilar rales. Otherwise clear with good air movment.   Abdominal: Soft. NT/ND. Normal BS   Musculoskeletal: Trace LE edema (non pitting)   Neurological: AAOx3. No focal deficits.   Skin: Skin is warm and dry.   Nursing note and vitals reviewed.    Significant Labs:  CBC:    Recent Labs  Lab 04/07/18  0404 04/08/18  0354 04/09/18  0353   WBC 5.03 4.74 4.46   RBC 4.55 4.51 4.42   HGB 12.7 12.7 12.2   HCT 40.8 39.9 39.2   * 122* 117*   MCV 90 89 89   MCH 27.9 28.2 27.6   MCHC 31.1* 31.8* 31.1*     BNP:    Recent Labs  Lab 04/05/18  0433   *     CMP:    Recent Labs  Lab 04/07/18  0404  04/08/18  0354 04/08/18  1554 04/09/18  0353   GLU 82  < > 85 88 80   CALCIUM 9.2  < > 9.2 9.2 9.2   ALBUMIN 3.9  --  3.9  --  3.9   PROT 6.4  --  6.4  --  6.3     < > 139 136 140   K 2.8*  < > 3.0* 3.8 2.9*   CO2 32*  < > 30* 32* 34*   CL 97  < > 97 95 97   BUN 20  < > 21* 21* 19   CREATININE 1.2  < > 1.1 1.3 1.2   ALKPHOS 188*  --  189*  --  181*   ALT 13  --  14  --  16   AST 18  --  19  --  23   BILITOT 0.8  --  0.7  --  0.7   < > = values in this interval not displayed.     I have reviewed all pertinent labs within the past 24 hours.    Estimated Creatinine Clearance: 53.8 mL/min (based on SCr of 1.2 mg/dL).    Diagnostic Results:   TTE (4/5/18):       1 - Normal left ventricular systolic  function (EF 60-65%).     2 - No wall motion abnormalities.     3 - Normal left ventricular diastolic function.     4 - Right atrial enlargement.     5 - Right ventricular enlargement with severely depressed systolic function.     6 - Moderate tricuspid regurgitation.     7 - Increased central venous pressure.     8 - Pulmonary hypertension. The estimated PA systolic pressure is 79 mmHg.    Assessment and Plan:     Ms. Smith is a 57 y.o. Female with a past medical history of PHTN WHO Group 1 on IV Remodulin, chronic hypoxic respiratory failure, chronic RV failure on home O2 and BiPAP who presented to [facility] with SOB. She has been having SOB for roughly 1 week, which was progressively worsening. She stated that she sleeps in a recliner currently until she gets a new BiPAP machine. She does wake up short of breath, but is not clear about LE edema. She does state that her weight has been progressively increasing though. She states that she has no symptoms from the Veletri currently.    * Acute on chronic diastolic heart failure    - Wet and warm on exam  - JVD remains elevated.   - Continue Lasix 20/hr with aggressive replacement of lytes.   - Net negative 11Ls since admission. -ve 3.8Ls in past 24 hours.   - BID aldactone 25mg   - TTE reviewed as above.   --strict I&O's  - Low salt diet        Pulmonary hypertension, group 1    - Continue IV remodulin with uptitration every 6 hours with goal of 75  - Remodulin at 68 this AM.   - Continue amlodipine  - Back on home dose of oxygen (4L ATC)        Chronic respiratory failure with hypoxia    - resumed to home dose NC oxygen supplement  - Goal O2 sats 88 or above  - BIPAP QHS          Gastroesophageal reflux disease    --continue protonix            Pedro frankel, DO  Heart Transplant  [facility]

## 2018-04-09 NOTE — PROGRESS NOTES
- Remodulin increased by 1 ng/kg/min to 68 ng/kg/min at 0415. New CADD rate 92 mL/24 hr. CADD settings checked with charge nurse.  - Will check VS q15min x 1 hour.

## 2018-04-10 LAB
ALBUMIN SERPL BCP-MCNC: 4.1 G/DL
ALP SERPL-CCNC: 180 U/L
ALT SERPL W/O P-5'-P-CCNC: 20 U/L
ANION GAP SERPL CALC-SCNC: 12 MMOL/L
ANION GAP SERPL CALC-SCNC: 9 MMOL/L
AST SERPL-CCNC: 50 U/L
BASOPHILS # BLD AUTO: 0.02 K/UL
BASOPHILS NFR BLD: 0.5 %
BILIRUB SERPL-MCNC: 0.5 MG/DL
BUN SERPL-MCNC: 18 MG/DL
BUN SERPL-MCNC: 20 MG/DL
CALCIUM SERPL-MCNC: 9.4 MG/DL
CALCIUM SERPL-MCNC: 9.7 MG/DL
CHLORIDE SERPL-SCNC: 93 MMOL/L
CHLORIDE SERPL-SCNC: 96 MMOL/L
CO2 SERPL-SCNC: 28 MMOL/L
CO2 SERPL-SCNC: 34 MMOL/L
CREAT SERPL-MCNC: 1.1 MG/DL
CREAT SERPL-MCNC: 1.4 MG/DL
DIFFERENTIAL METHOD: ABNORMAL
EOSINOPHIL # BLD AUTO: 0.2 K/UL
EOSINOPHIL NFR BLD: 4.1 %
ERYTHROCYTE [DISTWIDTH] IN BLOOD BY AUTOMATED COUNT: 18 %
EST. GFR  (AFRICAN AMERICAN): 48.5 ML/MIN/1.73 M^2
EST. GFR  (AFRICAN AMERICAN): >60 ML/MIN/1.73 M^2
EST. GFR  (NON AFRICAN AMERICAN): 42 ML/MIN/1.73 M^2
EST. GFR  (NON AFRICAN AMERICAN): 56.3 ML/MIN/1.73 M^2
GLUCOSE SERPL-MCNC: 79 MG/DL
GLUCOSE SERPL-MCNC: 83 MG/DL
HCT VFR BLD AUTO: 42.1 %
HGB BLD-MCNC: 13.2 G/DL
IMM GRANULOCYTES # BLD AUTO: 0.03 K/UL
IMM GRANULOCYTES NFR BLD AUTO: 0.7 %
LYMPHOCYTES # BLD AUTO: 0.7 K/UL
LYMPHOCYTES NFR BLD: 15 %
MAGNESIUM SERPL-MCNC: 2.6 MG/DL
MCH RBC QN AUTO: 27.3 PG
MCHC RBC AUTO-ENTMCNC: 31.4 G/DL
MCV RBC AUTO: 87 FL
MONOCYTES # BLD AUTO: 0.3 K/UL
MONOCYTES NFR BLD: 7.5 %
NEUTROPHILS # BLD AUTO: 3.2 K/UL
NEUTROPHILS NFR BLD: 72.2 %
NRBC BLD-RTO: 0 /100 WBC
PHOSPHATE SERPL-MCNC: 4 MG/DL
PLATELET # BLD AUTO: 111 K/UL
PMV BLD AUTO: 12 FL
POTASSIUM SERPL-SCNC: 3.4 MMOL/L
POTASSIUM SERPL-SCNC: 3.9 MMOL/L
PROT SERPL-MCNC: 7.2 G/DL
RBC # BLD AUTO: 4.84 M/UL
SODIUM SERPL-SCNC: 136 MMOL/L
SODIUM SERPL-SCNC: 136 MMOL/L
WBC # BLD AUTO: 4.39 K/UL

## 2018-04-10 PROCEDURE — 83735 ASSAY OF MAGNESIUM: CPT

## 2018-04-10 PROCEDURE — 25000003 PHARM REV CODE 250: Performed by: INTERNAL MEDICINE

## 2018-04-10 PROCEDURE — 25000242 PHARM REV CODE 250 ALT 637 W/ HCPCS: Performed by: INTERNAL MEDICINE

## 2018-04-10 PROCEDURE — 80048 BASIC METABOLIC PNL TOTAL CA: CPT

## 2018-04-10 PROCEDURE — 85025 COMPLETE CBC W/AUTO DIFF WBC: CPT

## 2018-04-10 PROCEDURE — 94660 CPAP INITIATION&MGMT: CPT

## 2018-04-10 PROCEDURE — 94761 N-INVAS EAR/PLS OXIMETRY MLT: CPT

## 2018-04-10 PROCEDURE — 63600175 PHARM REV CODE 636 W HCPCS: Performed by: INTERNAL MEDICINE

## 2018-04-10 PROCEDURE — 99232 SBSQ HOSP IP/OBS MODERATE 35: CPT | Mod: ,,, | Performed by: INTERNAL MEDICINE

## 2018-04-10 PROCEDURE — 80053 COMPREHEN METABOLIC PANEL: CPT

## 2018-04-10 PROCEDURE — 99900035 HC TECH TIME PER 15 MIN (STAT)

## 2018-04-10 PROCEDURE — 36415 COLL VENOUS BLD VENIPUNCTURE: CPT

## 2018-04-10 PROCEDURE — 63600175 PHARM REV CODE 636 W HCPCS: Mod: JG | Performed by: INTERNAL MEDICINE

## 2018-04-10 PROCEDURE — 20600001 HC STEP DOWN PRIVATE ROOM

## 2018-04-10 PROCEDURE — 84100 ASSAY OF PHOSPHORUS: CPT

## 2018-04-10 RX ADMIN — LEVOTHYROXINE SODIUM 75 MCG: 75 TABLET ORAL at 08:04

## 2018-04-10 RX ADMIN — BUSPIRONE HYDROCHLORIDE 15 MG: 5 TABLET ORAL at 02:04

## 2018-04-10 RX ADMIN — POTASSIUM CHLORIDE 60 MEQ: 1500 TABLET, EXTENDED RELEASE ORAL at 08:04

## 2018-04-10 RX ADMIN — FUROSEMIDE 20 MG/HR: 10 INJECTION, SOLUTION INTRAVENOUS at 12:04

## 2018-04-10 RX ADMIN — DESVENLAFAXINE SUCCINATE 100 MG: 50 TABLET, FILM COATED, EXTENDED RELEASE ORAL at 08:04

## 2018-04-10 RX ADMIN — LAMOTRIGINE 100 MG: 100 TABLET ORAL at 08:04

## 2018-04-10 RX ADMIN — RIOCIGUAT 1 MG: 0.5 TABLET, FILM COATED ORAL at 08:04

## 2018-04-10 RX ADMIN — FUROSEMIDE 20 MG/HR: 10 INJECTION, SOLUTION INTRAVENOUS at 10:04

## 2018-04-10 RX ADMIN — HYDROCODONE BITARTRATE AND ACETAMINOPHEN 1 TABLET: 10; 325 TABLET ORAL at 04:04

## 2018-04-10 RX ADMIN — GABAPENTIN 100 MG: 100 CAPSULE ORAL at 08:04

## 2018-04-10 RX ADMIN — FUROSEMIDE 20 MG/HR: 10 INJECTION, SOLUTION INTRAVENOUS at 11:04

## 2018-04-10 RX ADMIN — BUSPIRONE HYDROCHLORIDE 15 MG: 5 TABLET ORAL at 08:04

## 2018-04-10 RX ADMIN — POTASSIUM CHLORIDE 60 MEQ: 1500 TABLET, EXTENDED RELEASE ORAL at 02:04

## 2018-04-10 RX ADMIN — ENOXAPARIN SODIUM 40 MG: 100 INJECTION SUBCUTANEOUS at 04:04

## 2018-04-10 RX ADMIN — RIOCIGUAT 0.5 MG: 0.5 TABLET, FILM COATED ORAL at 02:04

## 2018-04-10 RX ADMIN — FLUTICASONE FUROATE AND VILANTEROL TRIFENATATE 1 PUFF: 100; 25 POWDER RESPIRATORY (INHALATION) at 08:04

## 2018-04-10 RX ADMIN — TREPROSTINIL 72 NG/KG/MIN: 100 INJECTION, SOLUTION INTRAVENOUS; SUBCUTANEOUS at 03:04

## 2018-04-10 RX ADMIN — MAGNESIUM OXIDE TAB 400 MG (241.3 MG ELEMENTAL MG) 400 MG: 400 (241.3 MG) TAB at 08:04

## 2018-04-10 RX ADMIN — LURASIDONE HYDROCHLORIDE 40 MG: 40 TABLET, FILM COATED ORAL at 08:04

## 2018-04-10 RX ADMIN — RIOCIGUAT 0.5 MG: 0.5 TABLET, FILM COATED ORAL at 08:04

## 2018-04-10 RX ADMIN — SPIRONOLACTONE 25 MG: 25 TABLET, FILM COATED ORAL at 08:04

## 2018-04-10 RX ADMIN — PRAVASTATIN SODIUM 40 MG: 40 TABLET ORAL at 08:04

## 2018-04-10 RX ADMIN — PANTOPRAZOLE SODIUM 40 MG: 40 TABLET, DELAYED RELEASE ORAL at 08:04

## 2018-04-10 RX ADMIN — TIOTROPIUM BROMIDE 18 MCG: 18 CAPSULE ORAL; RESPIRATORY (INHALATION) at 08:04

## 2018-04-10 RX ADMIN — GABAPENTIN 100 MG: 100 CAPSULE ORAL at 02:04

## 2018-04-10 NOTE — NURSING
Pt BP at time of titration (0000) of remodulin reading @ 82/49. Team notified. Instructed to wait 1.5 hours and recheck pressure. If okay, can titrate. Pt denies feelings of dizziness, light headed, CP, or increased SOB. Pt instructed to call nursing staff/PCT when needing to get out of bed - for fall risk purposes d/t BP. Remodulin continues to run at 70ng/kg/hr.  WCTM.

## 2018-04-10 NOTE — NURSING
Dr. De La Paz and [facility] team to visit, updated on latest VS/ output, aware of hypotension overnight and that Remodulin titrated up once to 72 ng, Amlodipine dc'd and will decrease Adempas dose, OK to hold Remodulin dose at 72 ng today.Patient updated on plan of care

## 2018-04-10 NOTE — SUBJECTIVE & OBJECTIVE
Interval History: No acute issues overnight. BP borderline low this AM (asymptomatic). Otherwise continues to diurese well. Lytes stable.      Continuous Infusions:   furosemide (LASIX) 2 mg/mL infusion (non-titrating) 20 mg/hr (04/10/18 1108)    treprostinil (REMODULIN) infusion 72 ng/kg/min (04/10/18 0317)    veletri/remodulin cassette      veletri/remodulin tubing       Scheduled Meds:   busPIRone  15 mg Oral TID    desvenlafaxine succinate  100 mg Oral Daily    enoxaparin  40 mg Subcutaneous Daily    fluticasone-vilanterol  1 puff Inhalation Daily    gabapentin  100 mg Oral TID    lamoTRIgine  100 mg Oral BID    levothyroxine  75 mcg Oral Daily    lurasidone  40 mg Oral Daily    magnesium oxide  400 mg Oral BID    pantoprazole  40 mg Oral Daily    potassium chloride  60 mEq Oral TID    pravastatin  40 mg Oral Daily    riociguat  0.5 mg Oral TID    sodium chloride 0.9%  3 mL Intravenous Q8H    spironolactone  25 mg Oral BID    tiotropium  1 capsule Inhalation Daily     PRN Meds:acetaminophen, hydrocodone-acetaminophen 10-325mg, loperamide, ondansetron    Review of patient's allergies indicates:   Allergen Reactions    Sulfa (sulfonamide antibiotics) Rash     Objective:     Vital Signs (Most Recent):  Temp: 97.8 °F (36.6 °C) (04/10/18 1129)  Pulse: 85 (04/10/18 1129)  Resp: 20 (04/10/18 1129)  BP: (!) 96/54 (04/10/18 1129)  SpO2: (!) 90 % (04/10/18 1129) Vital Signs (24h Range):  Temp:  [97 °F (36.1 °C)-98.4 °F (36.9 °C)] 97.8 °F (36.6 °C)  Pulse:  [74-91] 85  Resp:  [16-22] 20  SpO2:  [90 %-98 %] 90 %  BP: ()/(42-72) 96/54     Patient Vitals for the past 72 hrs (Last 3 readings):   Weight   04/09/18 0615 87.5 kg (192 lb 14.4 oz)   04/08/18 0545 88.4 kg (194 lb 14.2 oz)     Body mass index is 35.28 kg/m².      Intake/Output Summary (Last 24 hours) at 04/10/18 1230  Last data filed at 04/10/18 1000   Gross per 24 hour   Intake           1558.5 ml   Output             4300 ml   Net           -2741.5 ml     Physical Exam   Constitutional: NAD. Resting comfortably in bed.   Neck: Supple. JVD up to jaw.    Cardiovascular: RRR. No murmurs or gallups.   Pulmonary/Chest: Scant Bibasilar rales. Otherwise clear with good air movment.   Abdominal: Soft. NT. Mild distention with no fluid wave. Normal BS   Musculoskeletal: Trace LE edema (non pitting)   Neurological: AAOx3. No focal deficits.   Skin: Skin is warm and dry.   Nursing note and vitals reviewed.    Significant Labs:  CBC:    Recent Labs  Lab 04/08/18  0354 04/09/18  0353 04/10/18  0537   WBC 4.74 4.46 4.39   RBC 4.51 4.42 4.84   HGB 12.7 12.2 13.2   HCT 39.9 39.2 42.1   * 117* 111*   MCV 89 89 87   MCH 28.2 27.6 27.3   MCHC 31.8* 31.1* 31.4*     BNP:    Recent Labs  Lab 04/05/18  0433   *     CMP:    Recent Labs  Lab 04/08/18  0354  04/09/18  0353 04/09/18  1134 04/09/18  1530 04/10/18  0537   GLU 85  < > 80 89 85 79   CALCIUM 9.2  < > 9.2 9.5 9.3 9.4   ALBUMIN 3.9  --  3.9  --   --  4.1   PROT 6.4  --  6.3  --   --  7.2     < > 140 138 137 136   K 3.0*  < > 2.9* 4.2 3.9 3.4*   CO2 30*  < > 34* 31* 32* 28   CL 97  < > 97 98 96 96   BUN 21*  < > 19 20 22* 18   CREATININE 1.1  < > 1.2 1.3 1.2 1.1   ALKPHOS 189*  --  181*  --   --  180*   ALT 14  --  16  --   --  20   AST 19  --  23  --   --  50*   BILITOT 0.7  --  0.7  --   --  0.5   < > = values in this interval not displayed.     I have reviewed all pertinent labs within the past 24 hours.    Estimated Creatinine Clearance: 58.7 mL/min (based on SCr of 1.1 mg/dL).    Diagnostic Results:   TTE (4/5/18):       1 - Normal left ventricular systolic function (EF 60-65%).     2 - No wall motion abnormalities.     3 - Normal left ventricular diastolic function.     4 - Right atrial enlargement.     5 - Right ventricular enlargement with severely depressed systolic function.     6 - Moderate tricuspid regurgitation.     7 - Increased central venous pressure.     8 - Pulmonary hypertension.  The estimated PA systolic pressure is 79 mmHg.

## 2018-04-10 NOTE — PROGRESS NOTES
[facility]  Heart Transplant  Progress Note    Patient Name: Sue Smith  MRN: [id number]  Admission Date: 4/5/2018  Hospital Length of Stay: 5 days  Attending Physician: Shelby De La Paz MD  Primary Care Provider: Paddy Guerrier DO  Principal Problem:Acute on chronic diastolic heart failure    Subjective:     Interval History: No acute issues overnight. BP borderline low this AM (asymptomatic). Otherwise continues to diurese well. Lytes stable.      Continuous Infusions:   furosemide (LASIX) 2 mg/mL infusion (non-titrating) 20 mg/hr (04/10/18 1108)    treprostinil (REMODULIN) infusion 72 ng/kg/min (04/10/18 0317)    veletri/remodulin cassette      veletri/remodulin tubing       Scheduled Meds:   busPIRone  15 mg Oral TID    desvenlafaxine succinate  100 mg Oral Daily    enoxaparin  40 mg Subcutaneous Daily    fluticasone-vilanterol  1 puff Inhalation Daily    gabapentin  100 mg Oral TID    lamoTRIgine  100 mg Oral BID    levothyroxine  75 mcg Oral Daily    lurasidone  40 mg Oral Daily    magnesium oxide  400 mg Oral BID    pantoprazole  40 mg Oral Daily    potassium chloride  60 mEq Oral TID    pravastatin  40 mg Oral Daily    riociguat  0.5 mg Oral TID    sodium chloride 0.9%  3 mL Intravenous Q8H    spironolactone  25 mg Oral BID    tiotropium  1 capsule Inhalation Daily     PRN Meds:acetaminophen, hydrocodone-acetaminophen 10-325mg, loperamide, ondansetron    Review of patient's allergies indicates:   Allergen Reactions    Sulfa (sulfonamide antibiotics) Rash     Objective:     Vital Signs (Most Recent):  Temp: 97.8 °F (36.6 °C) (04/10/18 1129)  Pulse: 85 (04/10/18 1129)  Resp: 20 (04/10/18 1129)  BP: (!) 96/54 (04/10/18 1129)  SpO2: (!) 90 % (04/10/18 1129) Vital Signs (24h Range):  Temp:  [97 °F (36.1 °C)-98.4 °F (36.9 °C)] 97.8 °F (36.6 °C)  Pulse:  [74-91] 85  Resp:  [16-22] 20  SpO2:  [90 %-98 %] 90 %  BP: ()/(42-72) 96/54     Patient Vitals for the past 72 hrs  (Last 3 readings):   Weight   04/09/18 0615 87.5 kg (192 lb 14.4 oz)   04/08/18 0545 88.4 kg (194 lb 14.2 oz)     Body mass index is 35.28 kg/m².      Intake/Output Summary (Last 24 hours) at 04/10/18 1230  Last data filed at 04/10/18 1000   Gross per 24 hour   Intake           1558.5 ml   Output             4300 ml   Net          -2741.5 ml     Physical Exam   Constitutional: NAD. Resting comfortably in bed.   Neck: Supple. JVD up to jaw.    Cardiovascular: RRR. No murmurs or gallups.   Pulmonary/Chest: Scant Bibasilar rales. Otherwise clear with good air movment.   Abdominal: Soft. NT. Mild distention with no fluid wave. Normal BS   Musculoskeletal: Trace LE edema (non pitting)   Neurological: AAOx3. No focal deficits.   Skin: Skin is warm and dry.   Nursing note and vitals reviewed.    Significant Labs:  CBC:    Recent Labs  Lab 04/08/18  0354 04/09/18  0353 04/10/18  0537   WBC 4.74 4.46 4.39   RBC 4.51 4.42 4.84   HGB 12.7 12.2 13.2   HCT 39.9 39.2 42.1   * 117* 111*   MCV 89 89 87   MCH 28.2 27.6 27.3   MCHC 31.8* 31.1* 31.4*     BNP:    Recent Labs  Lab 04/05/18  0433   *     CMP:    Recent Labs  Lab 04/08/18  0354  04/09/18  0353 04/09/18  1134 04/09/18  1530 04/10/18  0537   GLU 85  < > 80 89 85 79   CALCIUM 9.2  < > 9.2 9.5 9.3 9.4   ALBUMIN 3.9  --  3.9  --   --  4.1   PROT 6.4  --  6.3  --   --  7.2     < > 140 138 137 136   K 3.0*  < > 2.9* 4.2 3.9 3.4*   CO2 30*  < > 34* 31* 32* 28   CL 97  < > 97 98 96 96   BUN 21*  < > 19 20 22* 18   CREATININE 1.1  < > 1.2 1.3 1.2 1.1   ALKPHOS 189*  --  181*  --   --  180*   ALT 14  --  16  --   --  20   AST 19  --  23  --   --  50*   BILITOT 0.7  --  0.7  --   --  0.5   < > = values in this interval not displayed.     I have reviewed all pertinent labs within the past 24 hours.    Estimated Creatinine Clearance: 58.7 mL/min (based on SCr of 1.1 mg/dL).    Diagnostic Results:   TTE (4/5/18):       1 - Normal left ventricular systolic function  (EF 60-65%).     2 - No wall motion abnormalities.     3 - Normal left ventricular diastolic function.     4 - Right atrial enlargement.     5 - Right ventricular enlargement with severely depressed systolic function.     6 - Moderate tricuspid regurgitation.     7 - Increased central venous pressure.     8 - Pulmonary hypertension. The estimated PA systolic pressure is 79 mmHg.    Assessment and Plan:     Ms. Smith is a 57 y.o. Female with a past medical history of PHTN WHO Group 1 on IV Remodulin, chronic hypoxic respiratory failure, chronic RV failure on home O2 and BiPAP who presented to [facility] with SOB. She has been having SOB for roughly 1 week, which was progressively worsening. She stated that she sleeps in a recliner currently until she gets a new BiPAP machine. She does wake up short of breath, but is not clear about LE edema. She does state that her weight has been progressively increasing though. She states that she has no symptoms from the Veletri currently.    * Acute on chronic diastolic heart failure    - Remains Wet and warm on exam  - JVD remains elevated (near jaw line)   - Continue Lasix 20/hr with aggressive replacement of lytes.   - Net negative 13.5Ls since admission. -ve 2.4Ls in past 24 hours.   - BID aldactone 25mg   --strict I&O's  - Low salt diet        Pulmonary hypertension, group 1    - Continue IV remodulin at 72 (will not uptitrate over next 24 hours to see BP stability)   - Adempas reduced to .5mg TID this AM    - Will hold amlodipine. (given border line BP)   - Back on home dose of oxygen (4L ATC)        Chronic respiratory failure with hypoxia    - 4Ls O2 ATC  - Goal O2 sats 88 or above  - BIPAP QHS          Gastroesophageal reflux disease    - Continue PTA protonix        Bipolar affective disorder    Continue PTA Antipsychotics / mood stabilizers             Pedro frankel, DO  Heart Transplant  [facility]

## 2018-04-10 NOTE — PLAN OF CARE
[facility]   Heart Transplant Clinic  [address] [place] [address]   [phone] after hours        HOME  HEALTH ORDERS      Admit to Home Health    Diagnosis:   Patient Active Problem List   Diagnosis    Pulmonary hypertension, group 1    Chronic respiratory failure with hypoxia    Hypothyroidism    Essential hypertension    Dyslipidemia    Bipolar affective disorder    Acute on chronic right heart failure    Hx of tracheostomy    Gastroesophageal reflux disease    Acute on chronic diastolic heart failure    Cardiomegaly    Pulmonary nodules    Chronic pulmonary heart disease    Hypervolemia    Shortness of breath    Diarrhea    Volume overload       Patient is homebound due to:  Pulmonary HTN    Diet: Heart healthy low salt diet     Acitivities: As tolerated    Nursing:   SN to complete comprehensive assessment including routine vital signs. Instruct on disease process and s/s of complications to report to MD. Review/verify medication list sent home with the patient at time of discharge  and instruct patient/caregiver as needed. Frequency may be adjusted depending on start of care date.    Notify MD if SBP > 160 or < 90; DBP > 90 or < 50; HR > 120 or < 50; Temp > 101; Weight gain >3lbs in 1 day or 5lbs in 1 week.      HOME INFUSION THERAPY:  SN to perform Infusion Therapy/Central Line Care.  Review Central Line Care & Central Line Flush with patient.    Administer (drug and dose): Lasix 80mg IV (once weekly)     **For questions or concerns, please call [phone] and ask for Pre-Heart transplant clinic, M-F 8-5. After hours, weekends, call [phone] and ask for the Heart Transplant Cardiologist on call.**    Send initial Home Health orders to HTS attending physician on call.  Send follow up questions to [phone] or fax:                     Heart Failure:      [phone]

## 2018-04-10 NOTE — ASSESSMENT & PLAN NOTE
- Continue IV remodulin at 72 (will not uptitrate over next 24 hours to see BP stability)   - Adempas reduced to .5mg TID this AM    - Will hold amlodipine. (given border line BP)   - Back on home dose of oxygen (4L ATC)

## 2018-04-10 NOTE — ASSESSMENT & PLAN NOTE
- Remains Wet and warm on exam  - JVD remains elevated (near jaw line)   - Continue Lasix 20/hr with aggressive replacement of lytes.   - Net negative 13.5Ls since admission. -ve 2.4Ls in past 24 hours.   - BID aldactone 25mg   --strict I&O's  - Low salt diet

## 2018-04-10 NOTE — NURSING
Pt remodulin titrated by Aspen PINEDO RN and Mushtaq Nicholas RN-OC. Pt VS now stable enough for titration. Pt titrated to 72ng/kg/min - 73.44mL/24hr --> 3.06mL/min.     Pt instructed to call if any s/s arise such as SOB, CP, heachache, dizziness, jaw pain, or sweating to call RN.     VS to be monitored Q15 post titration. WCTM.

## 2018-04-10 NOTE — PLAN OF CARE
Problem: Patient Care Overview  Goal: Plan of Care Review  Outcome: Ongoing (interventions implemented as appropriate)  AOx4, VSS, C/O pain to back - PRN medication administered - relief obtained. Continuous lasix gtt infusing @ 10cc/hr (20mg/hr). Remains on tele - NSR. Unable to wean from O2 as pt was not meeting goal of 88% or greater on anything less than 5L HFNC. Pt w/ BiPAP QHS. Titration of remodulin continues. Unable to titrate at 0000 d/t BP issues. Team made aware. BP stable and ready for titration @ 0315. Please refer to previous note regarding titration. Remains free from falls. Bed remains locked and lowered. Personal items and call light remain w/in reach. NAD, WCTM.

## 2018-04-11 LAB
ALBUMIN SERPL BCP-MCNC: 3.8 G/DL
ALP SERPL-CCNC: 172 U/L
ALT SERPL W/O P-5'-P-CCNC: 18 U/L
ANION GAP SERPL CALC-SCNC: 11 MMOL/L
ANION GAP SERPL CALC-SCNC: 9 MMOL/L
AST SERPL-CCNC: 28 U/L
BASOPHILS # BLD AUTO: 0.03 K/UL
BASOPHILS NFR BLD: 0.6 %
BILIRUB SERPL-MCNC: 0.6 MG/DL
BUN SERPL-MCNC: 18 MG/DL
BUN SERPL-MCNC: 18 MG/DL
CALCIUM SERPL-MCNC: 9.3 MG/DL
CALCIUM SERPL-MCNC: 9.6 MG/DL
CHLORIDE SERPL-SCNC: 95 MMOL/L
CHLORIDE SERPL-SCNC: 97 MMOL/L
CO2 SERPL-SCNC: 31 MMOL/L
CO2 SERPL-SCNC: 32 MMOL/L
CREAT SERPL-MCNC: 1.1 MG/DL
CREAT SERPL-MCNC: 1.3 MG/DL
DIFFERENTIAL METHOD: ABNORMAL
EOSINOPHIL # BLD AUTO: 0.1 K/UL
EOSINOPHIL NFR BLD: 2.9 %
ERYTHROCYTE [DISTWIDTH] IN BLOOD BY AUTOMATED COUNT: 17.7 %
EST. GFR  (AFRICAN AMERICAN): 53 ML/MIN/1.73 M^2
EST. GFR  (AFRICAN AMERICAN): >60 ML/MIN/1.73 M^2
EST. GFR  (NON AFRICAN AMERICAN): 46 ML/MIN/1.73 M^2
EST. GFR  (NON AFRICAN AMERICAN): 56.3 ML/MIN/1.73 M^2
GLUCOSE SERPL-MCNC: 104 MG/DL
GLUCOSE SERPL-MCNC: 81 MG/DL
HCT VFR BLD AUTO: 40.3 %
HGB BLD-MCNC: 12.5 G/DL
IMM GRANULOCYTES # BLD AUTO: 0.02 K/UL
IMM GRANULOCYTES NFR BLD AUTO: 0.4 %
LYMPHOCYTES # BLD AUTO: 0.7 K/UL
LYMPHOCYTES NFR BLD: 14.9 %
MAGNESIUM SERPL-MCNC: 2.3 MG/DL
MCH RBC QN AUTO: 27.7 PG
MCHC RBC AUTO-ENTMCNC: 31 G/DL
MCV RBC AUTO: 89 FL
MONOCYTES # BLD AUTO: 0.4 K/UL
MONOCYTES NFR BLD: 8.1 %
NEUTROPHILS # BLD AUTO: 3.5 K/UL
NEUTROPHILS NFR BLD: 73.1 %
NRBC BLD-RTO: 0 /100 WBC
PHOSPHATE SERPL-MCNC: 3.9 MG/DL
PLATELET # BLD AUTO: 114 K/UL
PMV BLD AUTO: 11.4 FL
POTASSIUM SERPL-SCNC: 3.2 MMOL/L
POTASSIUM SERPL-SCNC: 4 MMOL/L
PROT SERPL-MCNC: 6.3 G/DL
RBC # BLD AUTO: 4.52 M/UL
SODIUM SERPL-SCNC: 135 MMOL/L
SODIUM SERPL-SCNC: 140 MMOL/L
WBC # BLD AUTO: 4.84 K/UL

## 2018-04-11 PROCEDURE — 63600175 PHARM REV CODE 636 W HCPCS: Mod: JG | Performed by: INTERNAL MEDICINE

## 2018-04-11 PROCEDURE — 25000003 PHARM REV CODE 250: Performed by: INTERNAL MEDICINE

## 2018-04-11 PROCEDURE — 63600175 PHARM REV CODE 636 W HCPCS: Performed by: INTERNAL MEDICINE

## 2018-04-11 PROCEDURE — 99232 SBSQ HOSP IP/OBS MODERATE 35: CPT | Mod: ,,, | Performed by: INTERNAL MEDICINE

## 2018-04-11 PROCEDURE — 80053 COMPREHEN METABOLIC PANEL: CPT

## 2018-04-11 PROCEDURE — 85025 COMPLETE CBC W/AUTO DIFF WBC: CPT

## 2018-04-11 PROCEDURE — 94660 CPAP INITIATION&MGMT: CPT

## 2018-04-11 PROCEDURE — 36415 COLL VENOUS BLD VENIPUNCTURE: CPT

## 2018-04-11 PROCEDURE — 20600001 HC STEP DOWN PRIVATE ROOM

## 2018-04-11 PROCEDURE — 83735 ASSAY OF MAGNESIUM: CPT

## 2018-04-11 PROCEDURE — 25000242 PHARM REV CODE 250 ALT 637 W/ HCPCS: Performed by: INTERNAL MEDICINE

## 2018-04-11 PROCEDURE — 97161 PT EVAL LOW COMPLEX 20 MIN: CPT

## 2018-04-11 PROCEDURE — 99900035 HC TECH TIME PER 15 MIN (STAT)

## 2018-04-11 PROCEDURE — 94761 N-INVAS EAR/PLS OXIMETRY MLT: CPT

## 2018-04-11 PROCEDURE — 84100 ASSAY OF PHOSPHORUS: CPT

## 2018-04-11 PROCEDURE — 80048 BASIC METABOLIC PNL TOTAL CA: CPT

## 2018-04-11 RX ORDER — POTASSIUM CHLORIDE 20 MEQ/1
40 TABLET, EXTENDED RELEASE ORAL ONCE
Status: COMPLETED | OUTPATIENT
Start: 2018-04-11 | End: 2018-04-11

## 2018-04-11 RX ADMIN — FLUTICASONE FUROATE AND VILANTEROL TRIFENATATE 1 PUFF: 100; 25 POWDER RESPIRATORY (INHALATION) at 09:04

## 2018-04-11 RX ADMIN — BUSPIRONE HYDROCHLORIDE 15 MG: 5 TABLET ORAL at 09:04

## 2018-04-11 RX ADMIN — MAGNESIUM OXIDE TAB 400 MG (241.3 MG ELEMENTAL MG) 400 MG: 400 (241.3 MG) TAB at 09:04

## 2018-04-11 RX ADMIN — PRAVASTATIN SODIUM 40 MG: 40 TABLET ORAL at 09:04

## 2018-04-11 RX ADMIN — TREPROSTINIL 73 NG/KG/MIN: 100 INJECTION, SOLUTION INTRAVENOUS; SUBCUTANEOUS at 02:04

## 2018-04-11 RX ADMIN — GABAPENTIN 100 MG: 100 CAPSULE ORAL at 09:04

## 2018-04-11 RX ADMIN — SPIRONOLACTONE 25 MG: 25 TABLET, FILM COATED ORAL at 08:04

## 2018-04-11 RX ADMIN — RIOCIGUAT 0.5 MG: 0.5 TABLET, FILM COATED ORAL at 02:04

## 2018-04-11 RX ADMIN — BUSPIRONE HYDROCHLORIDE 15 MG: 5 TABLET ORAL at 08:04

## 2018-04-11 RX ADMIN — BUSPIRONE HYDROCHLORIDE 15 MG: 5 TABLET ORAL at 02:04

## 2018-04-11 RX ADMIN — Medication: at 02:04

## 2018-04-11 RX ADMIN — SPIRONOLACTONE 25 MG: 25 TABLET, FILM COATED ORAL at 09:04

## 2018-04-11 RX ADMIN — GABAPENTIN 100 MG: 100 CAPSULE ORAL at 02:04

## 2018-04-11 RX ADMIN — DESVENLAFAXINE SUCCINATE 100 MG: 50 TABLET, FILM COATED, EXTENDED RELEASE ORAL at 09:04

## 2018-04-11 RX ADMIN — GABAPENTIN 100 MG: 100 CAPSULE ORAL at 08:04

## 2018-04-11 RX ADMIN — POTASSIUM CHLORIDE 60 MEQ: 1500 TABLET, EXTENDED RELEASE ORAL at 02:04

## 2018-04-11 RX ADMIN — RIOCIGUAT 0.5 MG: 0.5 TABLET, FILM COATED ORAL at 09:04

## 2018-04-11 RX ADMIN — LAMOTRIGINE 100 MG: 100 TABLET ORAL at 09:04

## 2018-04-11 RX ADMIN — RIOCIGUAT 0.5 MG: 0.5 TABLET, FILM COATED ORAL at 08:04

## 2018-04-11 RX ADMIN — ENOXAPARIN SODIUM 40 MG: 100 INJECTION SUBCUTANEOUS at 06:04

## 2018-04-11 RX ADMIN — FUROSEMIDE 20 MG/HR: 10 INJECTION, SOLUTION INTRAVENOUS at 08:04

## 2018-04-11 RX ADMIN — LEVOTHYROXINE SODIUM 75 MCG: 75 TABLET ORAL at 09:04

## 2018-04-11 RX ADMIN — PANTOPRAZOLE SODIUM 40 MG: 40 TABLET, DELAYED RELEASE ORAL at 09:04

## 2018-04-11 RX ADMIN — POTASSIUM CHLORIDE 60 MEQ: 1500 TABLET, EXTENDED RELEASE ORAL at 08:04

## 2018-04-11 RX ADMIN — POTASSIUM CHLORIDE 40 MEQ: 1500 TABLET, EXTENDED RELEASE ORAL at 09:04

## 2018-04-11 RX ADMIN — MAGNESIUM OXIDE TAB 400 MG (241.3 MG ELEMENTAL MG) 400 MG: 400 (241.3 MG) TAB at 08:04

## 2018-04-11 RX ADMIN — FUROSEMIDE 20 MG/HR: 10 INJECTION, SOLUTION INTRAVENOUS at 09:04

## 2018-04-11 RX ADMIN — LAMOTRIGINE 100 MG: 100 TABLET ORAL at 08:04

## 2018-04-11 RX ADMIN — LURASIDONE HYDROCHLORIDE 40 MG: 40 TABLET, FILM COATED ORAL at 09:04

## 2018-04-11 RX ADMIN — TIOTROPIUM BROMIDE 18 MCG: 18 CAPSULE ORAL; RESPIRATORY (INHALATION) at 09:04

## 2018-04-11 RX ADMIN — POTASSIUM CHLORIDE 60 MEQ: 1500 TABLET, EXTENDED RELEASE ORAL at 09:04

## 2018-04-11 NOTE — PT/OT/SLP EVAL
"Physical Therapy Evaluation and Discharge Note    Patient Name:  Sue Smith   MRN:  [id number]    Recommendations:     Discharge Recommendations:  home   Discharge Equipment Recommendations: none   Barriers to discharge: None    Assessment:     Sue Smith is a 57 y.o. female admitted with a medical diagnosis of Acute on chronic diastolic heart failure. .  At this time, patient is functioning at their prior level of function and does not require further acute PT services.     Recent Surgery: * No surgery found *      Plan:     During this hospitalization, patient does not require further acute PT services.  Please re-consult if situation changes.     Plan of Care Reviewed with: patient    Subjective     Communicated with RN prior to session.  Patient found HOB elevated upon PT entry to room, agreeable to evaluation.      Chief Complaint: Back pain  Patient comments/goals: "i've been getting up and going to the bathroom by myself."  Pain/Comfort:  · Pain Rating 1: 5/10  · Location - Orientation 1: generalized  · Location 1: back  · Pain Addressed 1: Distraction  · Pain Rating Post-Intervention 1: 5/10    Patients cultural, spiritual, Cheondoism conflicts given the current situation: none    Living Environment:  Pt lives alone in 1st floor apartment.  PTA pt not working or driving.    Prior to admission, patients level of function was independent.  Patient has the following equipment: grab bar, shower chair, oxygen.  DME owned (not currently used): none.  Upon discharge, patient will have assistance from self.    Objective:     Patient found with: peripheral IV, oxygen, telemetry     General Precautions: Standard,     Orthopedic Precautions:N/A   Braces: N/A     Exams:  · Cognitive Exam:  Patient is oriented to Person, Place, Time and Situation and follows 100% of all commands   · Gross Motor Coordination:  WFL  · Sensation:    · -       Intact  · RLE ROM: WFL  · RLE Strength: WFL  · LLE ROM: WFL  · LLE Strength: " WFL    Functional Mobility:  · Bed Mobility:     · Rolling Right: independence  · Scooting: independence  · Supine to Sit: independence  · Transfers:     · Sit to Stand:  independence with no AD  · Gait: 342ft no AD (I) - assistance c IV pole and O2 tank management  · Balance: dynamic standing (I)    AM-PAC 6 CLICK MOBILITY  Total Score:24       Therapeutic Activities and Exercises:  Educated on PT role/POC; safety c mobility; benefits of OOB activities; amb in ford c assistance as needed 3x/day; d/c recs - v/u    Patient left up in chair with all lines intact, call button in reach and RN notified.    GOALS:    Physical Therapy Goals     Not on file          Multidisciplinary Problems (Resolved)        Problem: Physical Therapy Goal    Goal Priority Disciplines Outcome Goal Variances Interventions   Physical Therapy Goal   (Resolved)     PT/OT, PT Outcome(s) achieved     Description:  Pt does not require additional acute PT services at this time d/t independent c functional mobility. Pt amb 342 ft c no AD (I) - assistance c IV pole/O2 tank.    Pt educated on asking medical staff for PT consult if changes in functional status occurs. - v/u                          History:     Past Medical History:   Diagnosis Date    Bipolar disorder     CHF (congestive heart failure)     Dyslipidemia     GERD (gastroesophageal reflux disease)     Hx of tracheostomy     Decanulated / surgically removed in 2013? Does not currently have tracheostomy    Hypertension     Hypothyroidism     Oxygen dependent     3L around the clock     Pulmonary HTN     Pulmonary hypertension, group 1 10/4/2017    Pulmonary nodules 10/10/2017    Respiratory failure, chronic        Past Surgical History:   Procedure Laterality Date    BACK SURGERY      PERICARDIAL WINDOW  2013    [place] LEFT HEART CATH,PERCUTANEOUS      Cardiac Cath, Left Heart    TUBAL LIGATION         Clinical Decision Making:     History  Co-morbidities and personal factors  that may impact the plan of care Examination  Body Structures and Functions, activity limitations and participation restrictions that may impact the plan of care Clinical Presentation   Decision Making/ Complexity Score   Co-morbidities:   [] Time since onset of injury / illness / exacerbation  [] Status of current condition  []Patient's cognitive status and safety concerns    [] Multiple Medical Problems (see med hx)  Personal Factors:   [] Patient's age  [] Prior Level of function   [] Patient's home situation (environment and family support)  [] Patient's level of motivation  [] Expected progression of patient      HISTORY:(criteria)    [x] 68519 - no personal factors/history    [] 84801 - has 1-2 personal factor/comorbidity     [] 83580 - has >3 personal factor/comorbidity     Body Regions:  [] Objective examination findings  [] Head     []  Neck  [] Trunk   [] Upper Extremity  [] Lower Extremity    Body Systems:  [] For communication ability, affect, cognition, language, and learning style: the assessment of the ability to make needs known, consciousness, orientation (person, place, and time), expected emotional /behavioral responses, and learning preferences (eg, learning barriers, education  needs)  [] For the neuromuscular system: a general assessment of gross coordinated movement (eg, balance, gait, locomotion, transfers, and transitions) and motor function  (motor control and motor learning)  [] For the musculoskeletal system: the assessment of gross symmetry, gross range of motion, gross strength, height, and weight  [] For the integumentary system: the assessment of pliability(texture), presence of scar formation, skin color, and skin integrity  [] For cardiovascular/pulmonary system: the assessment of heart rate, respiratory rate, blood pressure, and edema     Activity limitations:    [] Patient's cognitive status and saf ety concerns          [] Status of current condition      [] Weight bearing  restriction  [] Cardiopulmunary Restriction    Participation Restrictions:   [] Goals and goal agreement with the patient     [] Rehab potential (prognosis) and probable outcome      Examination of Body System: (criteria)    [x] 70612 - addressing 1-2 elements    [] 13253 - addressing a total of 3 or more elements     [] 53140 -  Addressing a total of 4 or more elements         Clinical Presentation: (criteria)  Stable - 28801     On examination of body system using standardized tests and measures patient presents with 1-2 elements from any of the following: body structures and functions, activity limitations, and/or participation restrictions.  Leading to a clinical presentation that is considered stable and/or uncomplicated                              Clinical Decision Making  (Eval Complexity):  Low- 67227     Time Tracking:     PT Received On: 04/11/18  PT Start Time: 1010     PT Stop Time: 1023  PT Total Time (min): 13 min     Billable Minutes: Evaluation 13 min      Regis Manjarrez, PT  04/11/2018

## 2018-04-11 NOTE — ASSESSMENT & PLAN NOTE
- Remains Wet and warm on exam  - JVD to mid neck (little better than yesterday)   - Continue Lasix 20/hr with aggressive replacement of lytes.   - Net negative 1.8Ls in past 24 hours.   - BID aldactone 25mg   - strict I&O's and daily weights.   - Low salt diet

## 2018-04-11 NOTE — ASSESSMENT & PLAN NOTE
- Continue IV remodulin with plan to finish uptitration to 75 (72 this am) now that BP remains stable.   - Adempas 5mg TID  - Continue to hold amlodipine for now  - Back on home dose of oxygen (4L ATC)

## 2018-04-11 NOTE — PLAN OF CARE
Problem: Physical Therapy Goal  Goal: Physical Therapy Goal  Pt does not require additional acute PT services at this time d/t independent c functional mobility. Pt amb 342 ft c no AD (I) - assistance c IV pole/O2 tank.    Pt educated on asking medical staff for PT consult if changes in functional status occurs. - v/u        Outcome: Outcome(s) achieved Date Met: 04/11/18  Eval and D/C from acute PT services    Regis Manjarrez DPT  4/11/2018

## 2018-04-11 NOTE — PLAN OF CARE
Problem: Patient Care Overview  Goal: Plan of Care Review  Outcome: Ongoing (interventions implemented as appropriate)  Pt AAOx4. Pt up and walking around floor.  Pt tolerated well.  Pt sitting up in bedside chair for majority of day.  Titrated pt remodulin and pt tolerated well.  Pt vital signs remained stable.  Pt voiced no complaints or concerns throughout shift.  Pt remained free from falls and injuries.  Will continue to monitor.

## 2018-04-11 NOTE — PROGRESS NOTES
[facility]  Heart Transplant  Progress Note    Patient Name: Sue Smith  MRN: [id number]  Admission Date: 4/5/2018  Hospital Length of Stay: 6 days  Attending Physician: Shelby De La Paz MD  Primary Care Provider: Paddy Guerrier DO  Principal Problem:Acute on chronic diastolic heart failure    Subjective:     Interval History: No acute issues overnight. Eating and drinking okay. Moving bowel and bladder without issues. All questions answered. BP better this Am. Still making lots of urine on lasix gtt.     Continuous Infusions:   furosemide (LASIX) 2 mg/mL infusion (non-titrating) 20 mg/hr (04/11/18 0959)    treprostinil (REMODULIN) infusion 72 ng/kg/min (04/10/18 8066)    veletri/remodulin cassette      veletri/remodulin tubing       Scheduled Meds:   busPIRone  15 mg Oral TID    desvenlafaxine succinate  100 mg Oral Daily    enoxaparin  40 mg Subcutaneous Daily    fluticasone-vilanterol  1 puff Inhalation Daily    gabapentin  100 mg Oral TID    lamoTRIgine  100 mg Oral BID    levothyroxine  75 mcg Oral Daily    lurasidone  40 mg Oral Daily    magnesium oxide  400 mg Oral BID    pantoprazole  40 mg Oral Daily    potassium chloride  60 mEq Oral TID    pravastatin  40 mg Oral Daily    riociguat  0.5 mg Oral TID    sodium chloride 0.9%  3 mL Intravenous Q8H    spironolactone  25 mg Oral BID    tiotropium  1 capsule Inhalation Daily     PRN Meds:acetaminophen, hydrocodone-acetaminophen 10-325mg, loperamide, ondansetron    Review of patient's allergies indicates:   Allergen Reactions    Sulfa (sulfonamide antibiotics) Rash     Objective:     Vital Signs (Most Recent):  Temp: 98.2 °F (36.8 °C) (04/11/18 1142)  Pulse: 87 (04/11/18 1142)  Resp: 16 (04/11/18 1142)  BP: (!) 111/58 (04/11/18 1142)  SpO2: (!) 93 % (04/11/18 1142) Vital Signs (24h Range):  Temp:  [97.5 °F (36.4 °C)-98.8 °F (37.1 °C)] 98.2 °F (36.8 °C)  Pulse:  [76-90] 87  Resp:  [16-20] 16  SpO2:  [90 %-96 %] 93 %  BP:  ()/(55-73) 111/58     Patient Vitals for the past 72 hrs (Last 3 readings):   Weight   04/11/18 0445 88 kg (194 lb 0.1 oz)   04/09/18 0615 87.5 kg (192 lb 14.4 oz)     Body mass index is 35.48 kg/m².      Intake/Output Summary (Last 24 hours) at 04/11/18 1146  Last data filed at 04/11/18 0929   Gross per 24 hour   Intake           1967.5 ml   Output             2700 ml   Net           -732.5 ml     Physical Exam   Constitutional: NAD. Resting comfortably in bed.   Neck: Supple. JVD up to jaw.    Cardiovascular: RRR. No murmurs or gallups.   Pulmonary/Chest: Scant Bibasilar rales. Otherwise clear with good air movment.   Abdominal: Soft. NT. Mild distention with no fluid wave. Normal BS   Musculoskeletal: Trace LE edema (non pitting)   Neurological: AAOx3. No focal deficits.   Skin: Skin is warm and dry.   Nursing note and vitals reviewed.    Significant Labs:  CBC:    Recent Labs  Lab 04/09/18  0353 04/10/18  0537 04/11/18  0425   WBC 4.46 4.39 4.84   RBC 4.42 4.84 4.52   HGB 12.2 13.2 12.5   HCT 39.2 42.1 40.3   * 111* 114*   MCV 89 87 89   MCH 27.6 27.3 27.7   MCHC 31.1* 31.4* 31.0*     BNP:    Recent Labs  Lab 04/05/18  0433   *     CMP:    Recent Labs  Lab 04/09/18  0353  04/10/18  0537 04/10/18  1551 04/11/18  0425   GLU 80  < > 79 83 81   CALCIUM 9.2  < > 9.4 9.7 9.3   ALBUMIN 3.9  --  4.1  --  3.8   PROT 6.3  --  7.2  --  6.3     < > 136 136 140   K 2.9*  < > 3.4* 3.9 3.2*   CO2 34*  < > 28 34* 32*   CL 97  < > 96 93* 97   BUN 19  < > 18 20 18   CREATININE 1.2  < > 1.1 1.4 1.1   ALKPHOS 181*  --  180*  --  172*   ALT 16  --  20  --  18   AST 23  --  50*  --  28   BILITOT 0.7  --  0.5  --  0.6   < > = values in this interval not displayed.     I have reviewed all pertinent labs within the past 24 hours.    Estimated Creatinine Clearance: 58.9 mL/min (based on SCr of 1.1 mg/dL).    Diagnostic Results:   TTE (4/5/18):       1 - Normal left ventricular systolic function (EF 60-65%).      2 - No wall motion abnormalities.     3 - Normal left ventricular diastolic function.     4 - Right atrial enlargement.     5 - Right ventricular enlargement with severely depressed systolic function.     6 - Moderate tricuspid regurgitation.     7 - Increased central venous pressure.     8 - Pulmonary hypertension. The estimated PA systolic pressure is 79 mmHg.    Assessment and Plan:     * Acute on chronic diastolic heart failure    - Remains Wet and warm on exam  - JVD to mid neck (little better than yesterday)   - Continue Lasix 20/hr with aggressive replacement of lytes.   - Net negative 1.8Ls in past 24 hours.   - BID aldactone 25mg   - strict I&O's and daily weights.   - Low salt diet        Pulmonary hypertension, group 1    - Continue IV remodulin with plan to finish uptitration to 75 (72 this am) now that BP remains stable.   - Adempas 5mg TID  - Continue to hold amlodipine for now  - Back on home dose of oxygen (4L ATC)        Chronic respiratory failure with hypoxia    - 4Ls O2 ATC  - Goal O2 sats 88 or above  - BIPAP QHS  - Continue inhaled therapies for COPD           Gastroesophageal reflux disease    - Continue PTA protonix        Bipolar affective disorder    Continue PTA Antipsychotics / mood stabilizers             Pedro frankel, DO  Heart Transplant  [facility]

## 2018-04-11 NOTE — SUBJECTIVE & OBJECTIVE
Interval History: No acute issues overnight. Eating and drinking okay. Moving bowel and bladder without issues. All questions answered. BP better this Am. Still making lots of urine on lasix gtt.     Continuous Infusions:   furosemide (LASIX) 2 mg/mL infusion (non-titrating) 20 mg/hr (04/11/18 0929)    treprostinil (REMODULIN) infusion 72 ng/kg/min (04/10/18 1506)    veletri/remodulin cassette      veletri/remodulin tubing       Scheduled Meds:   busPIRone  15 mg Oral TID    desvenlafaxine succinate  100 mg Oral Daily    enoxaparin  40 mg Subcutaneous Daily    fluticasone-vilanterol  1 puff Inhalation Daily    gabapentin  100 mg Oral TID    lamoTRIgine  100 mg Oral BID    levothyroxine  75 mcg Oral Daily    lurasidone  40 mg Oral Daily    magnesium oxide  400 mg Oral BID    pantoprazole  40 mg Oral Daily    potassium chloride  60 mEq Oral TID    pravastatin  40 mg Oral Daily    riociguat  0.5 mg Oral TID    sodium chloride 0.9%  3 mL Intravenous Q8H    spironolactone  25 mg Oral BID    tiotropium  1 capsule Inhalation Daily     PRN Meds:acetaminophen, hydrocodone-acetaminophen 10-325mg, loperamide, ondansetron    Review of patient's allergies indicates:   Allergen Reactions    Sulfa (sulfonamide antibiotics) Rash     Objective:     Vital Signs (Most Recent):  Temp: 98.2 °F (36.8 °C) (04/11/18 1142)  Pulse: 87 (04/11/18 1142)  Resp: 16 (04/11/18 1142)  BP: (!) 111/58 (04/11/18 1142)  SpO2: (!) 93 % (04/11/18 1142) Vital Signs (24h Range):  Temp:  [97.5 °F (36.4 °C)-98.8 °F (37.1 °C)] 98.2 °F (36.8 °C)  Pulse:  [76-90] 87  Resp:  [16-20] 16  SpO2:  [90 %-96 %] 93 %  BP: ()/(55-73) 111/58     Patient Vitals for the past 72 hrs (Last 3 readings):   Weight   04/11/18 0445 88 kg (194 lb 0.1 oz)   04/09/18 0615 87.5 kg (192 lb 14.4 oz)     Body mass index is 35.48 kg/m².      Intake/Output Summary (Last 24 hours) at 04/11/18 1146  Last data filed at 04/11/18 0929   Gross per 24 hour   Intake            1967.5 ml   Output             2700 ml   Net           -732.5 ml     Physical Exam   Constitutional: NAD. Resting comfortably in bed.   Neck: Supple. JVD up to jaw.    Cardiovascular: RRR. No murmurs or gallups.   Pulmonary/Chest: Scant Bibasilar rales. Otherwise clear with good air movment.   Abdominal: Soft. NT. Mild distention with no fluid wave. Normal BS   Musculoskeletal: Trace LE edema (non pitting)   Neurological: AAOx3. No focal deficits.   Skin: Skin is warm and dry.   Nursing note and vitals reviewed.    Significant Labs:  CBC:    Recent Labs  Lab 04/09/18  0353 04/10/18  0537 04/11/18  0425   WBC 4.46 4.39 4.84   RBC 4.42 4.84 4.52   HGB 12.2 13.2 12.5   HCT 39.2 42.1 40.3   * 111* 114*   MCV 89 87 89   MCH 27.6 27.3 27.7   MCHC 31.1* 31.4* 31.0*     BNP:    Recent Labs  Lab 04/05/18  0433   *     CMP:    Recent Labs  Lab 04/09/18  0353  04/10/18  0537 04/10/18  1551 04/11/18  0425   GLU 80  < > 79 83 81   CALCIUM 9.2  < > 9.4 9.7 9.3   ALBUMIN 3.9  --  4.1  --  3.8   PROT 6.3  --  7.2  --  6.3     < > 136 136 140   K 2.9*  < > 3.4* 3.9 3.2*   CO2 34*  < > 28 34* 32*   CL 97  < > 96 93* 97   BUN 19  < > 18 20 18   CREATININE 1.2  < > 1.1 1.4 1.1   ALKPHOS 181*  --  180*  --  172*   ALT 16  --  20  --  18   AST 23  --  50*  --  28   BILITOT 0.7  --  0.5  --  0.6   < > = values in this interval not displayed.     I have reviewed all pertinent labs within the past 24 hours.    Estimated Creatinine Clearance: 58.9 mL/min (based on SCr of 1.1 mg/dL).    Diagnostic Results:   TTE (4/5/18):       1 - Normal left ventricular systolic function (EF 60-65%).     2 - No wall motion abnormalities.     3 - Normal left ventricular diastolic function.     4 - Right atrial enlargement.     5 - Right ventricular enlargement with severely depressed systolic function.     6 - Moderate tricuspid regurgitation.     7 - Increased central venous pressure.     8 - Pulmonary hypertension. The estimated PA  systolic pressure is 79 mmHg.

## 2018-04-11 NOTE — PLAN OF CARE
Problem: Patient Care Overview  Goal: Plan of Care Review  Outcome: Ongoing (interventions implemented as appropriate)  -AAOx4  -Continuous lasix gtt @ 20mg/hr = 10mL/hr. See flow sheet for UOP.  -Remodulin @ 72ng/kg/min = 73mL/24hrs. Titrate on hold for 24hr period 2/2 low BP yesterday. Adempas also decreased from 1mg to 0.5mg.  -4L NC with O2 sats > 88%. Wearing CPAP while asleep.  -Tele SR   -No complaints over night.   -See flow sheet for assessment details.    Problem: Fall Risk (Adult)  Goal: Identify Related Risk Factors and Signs and Symptoms  Related risk factors and signs and symptoms are identified upon initiation of Human Response Clinical Practice Guideline (CPG)   Outcome: Ongoing (interventions implemented as appropriate)  -Pt ambulates around room independently.   -Wears non-slip socks when oob.  -Pt free from falls/injuries thus far this shift.   -Bed in low, locked position. Bed rails up x2. Call light within reach.

## 2018-04-12 LAB
ALBUMIN SERPL BCP-MCNC: 3.9 G/DL
ALP SERPL-CCNC: 182 U/L
ALT SERPL W/O P-5'-P-CCNC: 22 U/L
ANION GAP SERPL CALC-SCNC: 10 MMOL/L
ANION GAP SERPL CALC-SCNC: 10 MMOL/L
AST SERPL-CCNC: 31 U/L
BASOPHILS # BLD AUTO: 0.03 K/UL
BASOPHILS NFR BLD: 0.7 %
BILIRUB SERPL-MCNC: 0.6 MG/DL
BUN SERPL-MCNC: 15 MG/DL
BUN SERPL-MCNC: 17 MG/DL
CALCIUM SERPL-MCNC: 9.4 MG/DL
CALCIUM SERPL-MCNC: 9.7 MG/DL
CHLORIDE SERPL-SCNC: 96 MMOL/L
CHLORIDE SERPL-SCNC: 98 MMOL/L
CO2 SERPL-SCNC: 31 MMOL/L
CO2 SERPL-SCNC: 32 MMOL/L
CREAT SERPL-MCNC: 1.1 MG/DL
CREAT SERPL-MCNC: 1.2 MG/DL
DIFFERENTIAL METHOD: ABNORMAL
EOSINOPHIL # BLD AUTO: 0.2 K/UL
EOSINOPHIL NFR BLD: 3.7 %
ERYTHROCYTE [DISTWIDTH] IN BLOOD BY AUTOMATED COUNT: 17.3 %
EST. GFR  (AFRICAN AMERICAN): 58.4 ML/MIN/1.73 M^2
EST. GFR  (AFRICAN AMERICAN): >60 ML/MIN/1.73 M^2
EST. GFR  (NON AFRICAN AMERICAN): 50.6 ML/MIN/1.73 M^2
EST. GFR  (NON AFRICAN AMERICAN): 56.3 ML/MIN/1.73 M^2
GLUCOSE SERPL-MCNC: 82 MG/DL
GLUCOSE SERPL-MCNC: 85 MG/DL
HCT VFR BLD AUTO: 39.3 %
HGB BLD-MCNC: 12.3 G/DL
IMM GRANULOCYTES # BLD AUTO: 0.03 K/UL
IMM GRANULOCYTES NFR BLD AUTO: 0.7 %
LYMPHOCYTES # BLD AUTO: 0.8 K/UL
LYMPHOCYTES NFR BLD: 17.3 %
MAGNESIUM SERPL-MCNC: 2.4 MG/DL
MCH RBC QN AUTO: 27.4 PG
MCHC RBC AUTO-ENTMCNC: 31.3 G/DL
MCV RBC AUTO: 88 FL
MONOCYTES # BLD AUTO: 0.4 K/UL
MONOCYTES NFR BLD: 8.1 %
NEUTROPHILS # BLD AUTO: 3 K/UL
NEUTROPHILS NFR BLD: 69.5 %
NRBC BLD-RTO: 0 /100 WBC
PHOSPHATE SERPL-MCNC: 3.6 MG/DL
PLATELET # BLD AUTO: 118 K/UL
PMV BLD AUTO: 11.7 FL
POTASSIUM SERPL-SCNC: 3 MMOL/L
POTASSIUM SERPL-SCNC: 3.6 MMOL/L
PROT SERPL-MCNC: 6.3 G/DL
RBC # BLD AUTO: 4.49 M/UL
SODIUM SERPL-SCNC: 138 MMOL/L
SODIUM SERPL-SCNC: 139 MMOL/L
WBC # BLD AUTO: 4.33 K/UL

## 2018-04-12 PROCEDURE — 94761 N-INVAS EAR/PLS OXIMETRY MLT: CPT

## 2018-04-12 PROCEDURE — 20600001 HC STEP DOWN PRIVATE ROOM

## 2018-04-12 PROCEDURE — 63600175 PHARM REV CODE 636 W HCPCS: Performed by: INTERNAL MEDICINE

## 2018-04-12 PROCEDURE — 36415 COLL VENOUS BLD VENIPUNCTURE: CPT

## 2018-04-12 PROCEDURE — 99232 SBSQ HOSP IP/OBS MODERATE 35: CPT | Mod: ,,, | Performed by: INTERNAL MEDICINE

## 2018-04-12 PROCEDURE — 25000003 PHARM REV CODE 250: Performed by: INTERNAL MEDICINE

## 2018-04-12 PROCEDURE — 94660 CPAP INITIATION&MGMT: CPT

## 2018-04-12 PROCEDURE — 80053 COMPREHEN METABOLIC PANEL: CPT

## 2018-04-12 PROCEDURE — 27000221 HC OXYGEN, UP TO 24 HOURS

## 2018-04-12 PROCEDURE — 84100 ASSAY OF PHOSPHORUS: CPT

## 2018-04-12 PROCEDURE — 80048 BASIC METABOLIC PNL TOTAL CA: CPT

## 2018-04-12 PROCEDURE — 63600175 PHARM REV CODE 636 W HCPCS: Mod: JG | Performed by: INTERNAL MEDICINE

## 2018-04-12 PROCEDURE — 97165 OT EVAL LOW COMPLEX 30 MIN: CPT

## 2018-04-12 PROCEDURE — 25000242 PHARM REV CODE 250 ALT 637 W/ HCPCS: Performed by: INTERNAL MEDICINE

## 2018-04-12 PROCEDURE — 85025 COMPLETE CBC W/AUTO DIFF WBC: CPT

## 2018-04-12 PROCEDURE — 83735 ASSAY OF MAGNESIUM: CPT

## 2018-04-12 RX ORDER — POTASSIUM CHLORIDE 20 MEQ/1
40 TABLET, EXTENDED RELEASE ORAL ONCE
Status: COMPLETED | OUTPATIENT
Start: 2018-04-12 | End: 2018-04-12

## 2018-04-12 RX ADMIN — GABAPENTIN 100 MG: 100 CAPSULE ORAL at 08:04

## 2018-04-12 RX ADMIN — SPIRONOLACTONE 25 MG: 25 TABLET, FILM COATED ORAL at 08:04

## 2018-04-12 RX ADMIN — LURASIDONE HYDROCHLORIDE 40 MG: 40 TABLET, FILM COATED ORAL at 08:04

## 2018-04-12 RX ADMIN — TIOTROPIUM BROMIDE 18 MCG: 18 CAPSULE ORAL; RESPIRATORY (INHALATION) at 08:04

## 2018-04-12 RX ADMIN — BUSPIRONE HYDROCHLORIDE 15 MG: 5 TABLET ORAL at 08:04

## 2018-04-12 RX ADMIN — POTASSIUM CHLORIDE 40 MEQ: 1500 TABLET, EXTENDED RELEASE ORAL at 10:04

## 2018-04-12 RX ADMIN — DESVENLAFAXINE SUCCINATE 100 MG: 50 TABLET, FILM COATED, EXTENDED RELEASE ORAL at 08:04

## 2018-04-12 RX ADMIN — RIOCIGUAT 0.5 MG: 0.5 TABLET, FILM COATED ORAL at 08:04

## 2018-04-12 RX ADMIN — PANTOPRAZOLE SODIUM 40 MG: 40 TABLET, DELAYED RELEASE ORAL at 08:04

## 2018-04-12 RX ADMIN — TREPROSTINIL 75 NG/KG/MIN: 100 INJECTION, SOLUTION INTRAVENOUS; SUBCUTANEOUS at 03:04

## 2018-04-12 RX ADMIN — POTASSIUM CHLORIDE 60 MEQ: 1500 TABLET, EXTENDED RELEASE ORAL at 08:04

## 2018-04-12 RX ADMIN — MAGNESIUM OXIDE TAB 400 MG (241.3 MG ELEMENTAL MG) 400 MG: 400 (241.3 MG) TAB at 08:04

## 2018-04-12 RX ADMIN — RIOCIGUAT 0.5 MG: 0.5 TABLET, FILM COATED ORAL at 03:04

## 2018-04-12 RX ADMIN — ENOXAPARIN SODIUM 40 MG: 100 INJECTION SUBCUTANEOUS at 05:04

## 2018-04-12 RX ADMIN — GABAPENTIN 100 MG: 100 CAPSULE ORAL at 03:04

## 2018-04-12 RX ADMIN — FLUTICASONE FUROATE AND VILANTEROL TRIFENATATE 1 PUFF: 100; 25 POWDER RESPIRATORY (INHALATION) at 08:04

## 2018-04-12 RX ADMIN — POTASSIUM CHLORIDE 60 MEQ: 1500 TABLET, EXTENDED RELEASE ORAL at 07:04

## 2018-04-12 RX ADMIN — FUROSEMIDE 20 MG/HR: 10 INJECTION, SOLUTION INTRAVENOUS at 03:04

## 2018-04-12 RX ADMIN — POTASSIUM CHLORIDE 60 MEQ: 1500 TABLET, EXTENDED RELEASE ORAL at 03:04

## 2018-04-12 RX ADMIN — BUSPIRONE HYDROCHLORIDE 15 MG: 5 TABLET ORAL at 11:04

## 2018-04-12 RX ADMIN — PRAVASTATIN SODIUM 40 MG: 40 TABLET ORAL at 08:04

## 2018-04-12 RX ADMIN — LAMOTRIGINE 100 MG: 100 TABLET ORAL at 08:04

## 2018-04-12 RX ADMIN — FUROSEMIDE 20 MG/HR: 10 INJECTION, SOLUTION INTRAVENOUS at 06:04

## 2018-04-12 RX ADMIN — LEVOTHYROXINE SODIUM 75 MCG: 75 TABLET ORAL at 07:04

## 2018-04-12 NOTE — ASSESSMENT & PLAN NOTE
- Remains Wet and warm on exam  - JVD remains at mid neck  - Continue Lasix 20/hr with aggressive replacement of lytes.   - Net negative 3Ls in past 24 hours.   - BID aldactone 25mg   - strict I&O's and daily weights.   - Low salt diet

## 2018-04-12 NOTE — PLAN OF CARE
Problem: Patient Care Overview  Goal: Plan of Care Review  Outcome: Ongoing (interventions implemented as appropriate)  - Reached goal rate of Remodulin at 5 am.  75 ng/kg/min, 120,000 concentration  - Cassette changed at 1530, change daily  - Lasix gtt infusing at 20 mg/hr (concentrated dose)  - Good UOP, see flowsheet  - 4 LPM NC, keep sats >88%  - Monitor chemistries on labs  - Bed & chair locked, call light placed in reach, nonslip socks in use, calls for assistance appropriately

## 2018-04-12 NOTE — PROGRESS NOTES
[facility]  Heart Transplant  Progress Note    Patient Name: Sue Smith  MRN: [id number]  Admission Date: 4/5/2018  Hospital Length of Stay: 7 days  Attending Physician: Shelby De La Paz MD  Primary Care Provider: Paddy Guerrier DO  Principal Problem:Acute on chronic diastolic heart failure    Subjective:     Interval History: Continue to diurese with no active concerns. Discussed DNR/DNI and end of life goals of care.     Continuous Infusions:   furosemide (LASIX) 2 mg/mL infusion (non-titrating) 20 mg/hr (04/12/18 0614)    treprostinil (REMODULIN) infusion 75 ng/kg/min (04/12/18 0315)    veletri/remodulin cassette      veletri/remodulin tubing       Scheduled Meds:   busPIRone  15 mg Oral TID    desvenlafaxine succinate  100 mg Oral Daily    enoxaparin  40 mg Subcutaneous Daily    fluticasone-vilanterol  1 puff Inhalation Daily    gabapentin  100 mg Oral TID    lamoTRIgine  100 mg Oral BID    levothyroxine  75 mcg Oral Daily    lurasidone  40 mg Oral Daily    magnesium oxide  400 mg Oral BID    pantoprazole  40 mg Oral Daily    potassium chloride  60 mEq Oral TID    pravastatin  40 mg Oral Daily    riociguat  0.5 mg Oral TID    sodium chloride 0.9%  3 mL Intravenous Q8H    spironolactone  25 mg Oral BID    tiotropium  1 capsule Inhalation Daily     PRN Meds:acetaminophen, hydrocodone-acetaminophen 10-325mg, loperamide, ondansetron    Review of patient's allergies indicates:   Allergen Reactions    Sulfa (sulfonamide antibiotics) Rash     Objective:     Vital Signs (Most Recent):  Temp: 98.4 °F (36.9 °C) (04/12/18 1134)  Pulse: 80 (04/12/18 1134)  Resp: 16 (04/12/18 1134)  BP: (!) 103/58 (04/12/18 1134)  SpO2: (!) 92 % (04/12/18 1134) Vital Signs (24h Range):  Temp:  [97.4 °F (36.3 °C)-98.5 °F (36.9 °C)] 98.4 °F (36.9 °C)  Pulse:  [73-95] 80  Resp:  [16-20] 16  SpO2:  [88 %-98 %] 92 %  BP: ()/(54-62) 103/58     Patient Vitals for the past 72 hrs (Last 3 readings):    Weight   04/11/18 0445 88 kg (194 lb 0.1 oz)     Body mass index is 35.48 kg/m².      Intake/Output Summary (Last 24 hours) at 04/12/18 1217  Last data filed at 04/12/18 0956   Gross per 24 hour   Intake           951.83 ml   Output             4600 ml   Net         -3648.17 ml     Physical Exam   Constitutional: NAD. Resting comfortably in bed.   Neck: Supple. JVD up to jaw.    Cardiovascular: RRR. No murmurs or gallups.   Pulmonary/Chest: Scant Bibasilar rales. Otherwise clear with good air movment.   Abdominal: Soft. NT. Mild distention with no fluid wave. Normal BS   Musculoskeletal: Trace LE edema (non pitting)   Neurological: AAOx3. No focal deficits.   Skin: Skin is warm and dry.   Nursing note and vitals reviewed.    Significant Labs:  CBC:    Recent Labs  Lab 04/10/18  0537 04/11/18  0425 04/12/18  0400   WBC 4.39 4.84 4.33   RBC 4.84 4.52 4.49   HGB 13.2 12.5 12.3   HCT 42.1 40.3 39.3   * 114* 118*   MCV 87 89 88   MCH 27.3 27.7 27.4   MCHC 31.4* 31.0* 31.3*     BNP:  No results for input(s): BNP in the last 168 hours.    Invalid input(s): BNPTRIAGELBLO  CMP:    Recent Labs  Lab 04/10/18  0537  04/11/18  0425 04/11/18  1528 04/12/18  0400   GLU 79  < > 81 104 82   CALCIUM 9.4  < > 9.3 9.6 9.4   ALBUMIN 4.1  --  3.8  --  3.9   PROT 7.2  --  6.3  --  6.3     < > 140 135* 139   K 3.4*  < > 3.2* 4.0 3.0*   CO2 28  < > 32* 31* 31*   CL 96  < > 97 95 98   BUN 18  < > 18 18 15   CREATININE 1.1  < > 1.1 1.3 1.1   ALKPHOS 180*  --  172*  --  182*   ALT 20  --  18  --  22   AST 50*  --  28  --  31   BILITOT 0.5  --  0.6  --  0.6   < > = values in this interval not displayed.     I have reviewed all pertinent labs within the past 24 hours.    Estimated Creatinine Clearance: 58.9 mL/min (based on SCr of 1.1 mg/dL).    Diagnostic Results:   TTE (4/5/18):       1 - Normal left ventricular systolic function (EF 60-65%).     2 - No wall motion abnormalities.     3 - Normal left ventricular diastolic function.      4 - Right atrial enlargement.     5 - Right ventricular enlargement with severely depressed systolic function.     6 - Moderate tricuspid regurgitation.     7 - Increased central venous pressure.     8 - Pulmonary hypertension. The estimated PA systolic pressure is 79 mmHg.    Assessment and Plan:     Ms. Smith is a 57 y.o. Female with a past medical history of PHTN WHO Group 1 on IV Remodulin, chronic hypoxic respiratory failure, chronic RV failure on home O2 and BiPAP who presented to [facility] with SOB. She has been having SOB for roughly 1 week, which was progressively worsening. She stated that she sleeps in a recliner currently until she gets a new BiPAP machine. She does wake up short of breath, but is not clear about LE edema. She does state that her weight has been progressively increasing though. She states that she has no symptoms from the Veletri currently.    * Acute on chronic diastolic heart failure    - Remains Wet and warm on exam  - JVD remains at mid neck  - Continue Lasix 20/hr with aggressive replacement of lytes.   - Net negative 3Ls in past 24 hours.   - BID aldactone 25mg   - strict I&O's and daily weights.   - Low salt diet        Pulmonary hypertension, group 1    - Continue IV remodulin with plan to finish uptitration to 75 (72 this am) now that BP remains stable.   - Adempas .5mg TID  - Back on home dose of oxygen (4L ATC)        Chronic respiratory failure with hypoxia    - 4Ls O2 ATC  - Goal O2 sats 88 or above  - BIPAP QHS  - Continue inhaled therapies for COPD           Gastroesophageal reflux disease    - Continue PTA protonix        Bipolar affective disorder    Continue PTA Antipsychotics / mood stabilizers             Pedro frankel, DO  Heart Transplant  [facility]

## 2018-04-12 NOTE — PROGRESS NOTES
UPDATE    SW to pt's room for update on 4/11.  Pt presents as sitting up in bedside chair, aao x4, calm, cooperative, and asking and answering questions appropriately.  Pt reports feeling well today and says she has been feeling better during admission.  Pt reports coping adequately at this time, and denies any needs or concerns to SW.  Pt reports plan from MDs is that pt will likely remain admitted into next week.    SW spoke with pt's  company, [employer] (ph: [phone]), today re: coverage for pt's HH visits, due to report from [facility] nurse that HH was having difficult getting visits covered.  Pt's HH nurse reports that [employer] has not denied any HH visits for pt.  Nurse reports that PHN authorizes a certain number of visits over a certain number of weeks, and that HH sometimes has to request authorization for additional visits if pt requires visits for IV Lasix at home.  Nurse reports that if they ever do receive a denial from N, they will notify  nurse coord.  SW providing ongoing psychosocial and counseling support, education, resources, assistance, and discharge planning as indicated.  SW following and remains available.

## 2018-04-12 NOTE — PT/OT/SLP EVAL
Occupational Therapy   Evaluation/discharge    Name: Sue Smith  MRN: [id number]  Admitting Diagnosis:  Acute on chronic diastolic heart failure      Recommendations:     Discharge Recommendations: home  Discharge Equipment Recommendations:  none  Barriers to discharge:  None    History:     Occupational Profile:  Living Environment: lives alone in downstairs apt  Previous level of function: independent  Roles and Routines: none stated  Equipment Owned:  oxygen, grab bar, shower chair  Assistance upon Discharge: no    Past Medical History:   Diagnosis Date    Bipolar disorder     CHF (congestive heart failure)     Dyslipidemia     GERD (gastroesophageal reflux disease)     Hx of tracheostomy     Decanulated / surgically removed in 2013? Does not currently have tracheostomy    Hypertension     Hypothyroidism     Oxygen dependent     3L around the clock     Pulmonary HTN     Pulmonary hypertension, group 1 10/4/2017    Pulmonary nodules 10/10/2017    Respiratory failure, chronic        Past Surgical History:   Procedure Laterality Date    BACK SURGERY      PERICARDIAL WINDOW  2013    [place] LEFT HEART CATH,PERCUTANEOUS      Cardiac Cath, Left Heart    TUBAL LIGATION         Subjective     Chief Complaint: no c/o  Patient/Family stated goals: go home  Communicated with: nsg prior to session.  Pain/Comfort:  · Pain Rating 1: 0/10    Patients cultural, spiritual, Orthodoxy conflicts given the current situation: none    Objective:     Patient found with: telemetry, peripheral IV, oxygen    General Precautions: Standard, fall   Orthopedic Precautions:    Braces:       Occupational Performance:    Bed Mobility:    · oob in chair on arrival    Functional Mobility/Transfers:  · Independent with transfers standing from chair  · Functional Mobility: independent ambulating to BR and back to chair    Activities of Daily Living:  · Independent with basic ADL's    Cognitive/Visual Perceptual:  intact    Physical  "Exam:  wfl BUE's and BLE's    Patient left up in chair with all lines intact and call button in reach    [facility] 6 Click:  [facility] Total Score: 24    Treatment & Education:  Pt educated on importance of OOB in chair as much as tolerated, ambulation as tolerated.   Education:    Assessment:     Sue Smith is a 57 y.o. female with a medical diagnosis of Acute on chronic diastolic heart failure.  She presents independent without acute OT needs.             Clinical Decision Makin.  OT Low:  "Pt evaluation falls under low complexity for evaluation coding due to performance deficits noted in 1-3 areas as stated above and 0 co-morbities affecting current functional status. Data obtained from problem focused assessments. No modifications or assistance was required for completion of evaluation. Only brief occupational profile and history review completed."     Plan:   Pt without acute OT goals.    This Plan of care has been discussed with the patient who was involved in its development and understands and is in agreement with discharge from OT services.    GOALS: no OT goals.   Occupational Therapy Goals     Not on file          Multidisciplinary Problems (Resolved)        Problem: Occupational Therapy Goal    Goal Priority Disciplines Outcome Interventions   Occupational Therapy Goal   (Resolved)     OT, PT/OT Outcome(s) achieved                    Time Tracking:     OT Date of Treatment: 18  OT Start Time: 838  OT Stop Time: 848  OT Total Time (min): 10 min    Billable Minutes:Evaluation 10 min    Ellie Yates OT  2018    "

## 2018-04-12 NOTE — SUBJECTIVE & OBJECTIVE
Interval History: Continue to diurese with no active concerns. Discussed DNR/DNI and end of life goals of care.     Continuous Infusions:   furosemide (LASIX) 2 mg/mL infusion (non-titrating) 20 mg/hr (04/12/18 0614)    treprostinil (REMODULIN) infusion 75 ng/kg/min (04/12/18 0315)    veletri/remodulin cassette      veletri/remodulin tubing       Scheduled Meds:   busPIRone  15 mg Oral TID    desvenlafaxine succinate  100 mg Oral Daily    enoxaparin  40 mg Subcutaneous Daily    fluticasone-vilanterol  1 puff Inhalation Daily    gabapentin  100 mg Oral TID    lamoTRIgine  100 mg Oral BID    levothyroxine  75 mcg Oral Daily    lurasidone  40 mg Oral Daily    magnesium oxide  400 mg Oral BID    pantoprazole  40 mg Oral Daily    potassium chloride  60 mEq Oral TID    pravastatin  40 mg Oral Daily    riociguat  0.5 mg Oral TID    sodium chloride 0.9%  3 mL Intravenous Q8H    spironolactone  25 mg Oral BID    tiotropium  1 capsule Inhalation Daily     PRN Meds:acetaminophen, hydrocodone-acetaminophen 10-325mg, loperamide, ondansetron    Review of patient's allergies indicates:   Allergen Reactions    Sulfa (sulfonamide antibiotics) Rash     Objective:     Vital Signs (Most Recent):  Temp: 98.4 °F (36.9 °C) (04/12/18 1134)  Pulse: 80 (04/12/18 1134)  Resp: 16 (04/12/18 1134)  BP: (!) 103/58 (04/12/18 1134)  SpO2: (!) 92 % (04/12/18 1134) Vital Signs (24h Range):  Temp:  [97.4 °F (36.3 °C)-98.5 °F (36.9 °C)] 98.4 °F (36.9 °C)  Pulse:  [73-95] 80  Resp:  [16-20] 16  SpO2:  [88 %-98 %] 92 %  BP: ()/(54-62) 103/58     Patient Vitals for the past 72 hrs (Last 3 readings):   Weight   04/11/18 0445 88 kg (194 lb 0.1 oz)     Body mass index is 35.48 kg/m².      Intake/Output Summary (Last 24 hours) at 04/12/18 1217  Last data filed at 04/12/18 0956   Gross per 24 hour   Intake           951.83 ml   Output             4600 ml   Net         -3648.17 ml     Physical Exam   Constitutional: NAD. Resting  comfortably in bed.   Neck: Supple. JVD up to jaw.    Cardiovascular: RRR. No murmurs or gallups.   Pulmonary/Chest: Scant Bibasilar rales. Otherwise clear with good air movment.   Abdominal: Soft. NT. Mild distention with no fluid wave. Normal BS   Musculoskeletal: Trace LE edema (non pitting)   Neurological: AAOx3. No focal deficits.   Skin: Skin is warm and dry.   Nursing note and vitals reviewed.    Significant Labs:  CBC:    Recent Labs  Lab 04/10/18  0537 04/11/18  0425 04/12/18  0400   WBC 4.39 4.84 4.33   RBC 4.84 4.52 4.49   HGB 13.2 12.5 12.3   HCT 42.1 40.3 39.3   * 114* 118*   MCV 87 89 88   MCH 27.3 27.7 27.4   MCHC 31.4* 31.0* 31.3*     BNP:  No results for input(s): BNP in the last 168 hours.    Invalid input(s): BNPTRIAGELBLO  CMP:    Recent Labs  Lab 04/10/18  0537  04/11/18  0425 04/11/18  1528 04/12/18  0400   GLU 79  < > 81 104 82   CALCIUM 9.4  < > 9.3 9.6 9.4   ALBUMIN 4.1  --  3.8  --  3.9   PROT 7.2  --  6.3  --  6.3     < > 140 135* 139   K 3.4*  < > 3.2* 4.0 3.0*   CO2 28  < > 32* 31* 31*   CL 96  < > 97 95 98   BUN 18  < > 18 18 15   CREATININE 1.1  < > 1.1 1.3 1.1   ALKPHOS 180*  --  172*  --  182*   ALT 20  --  18  --  22   AST 50*  --  28  --  31   BILITOT 0.5  --  0.6  --  0.6   < > = values in this interval not displayed.     I have reviewed all pertinent labs within the past 24 hours.    Estimated Creatinine Clearance: 58.9 mL/min (based on SCr of 1.1 mg/dL).    Diagnostic Results:   TTE (4/5/18):       1 - Normal left ventricular systolic function (EF 60-65%).     2 - No wall motion abnormalities.     3 - Normal left ventricular diastolic function.     4 - Right atrial enlargement.     5 - Right ventricular enlargement with severely depressed systolic function.     6 - Moderate tricuspid regurgitation.     7 - Increased central venous pressure.     8 - Pulmonary hypertension. The estimated PA systolic pressure is 79 mmHg.

## 2018-04-12 NOTE — PLAN OF CARE
Problem: Occupational Therapy Goal  Goal: Occupational Therapy Goal  Outcome: Outcome(s) achieved Date Met: 04/12/18  Eval and discharge. No acute OT needs.  Ellie Yates, OTR

## 2018-04-12 NOTE — NURSING
Noted on AM chart assessment that K+ on labs is 3.0.  Administered AM dose of Potassium Chloride 60 mEq early, will notify provider this AM so additional dose can be ordered if indicated.  Lasix gtt continues at 20 mg/hr.  UOP for last 24 hours was 4675.

## 2018-04-12 NOTE — ASSESSMENT & PLAN NOTE
- Continue IV remodulin with plan to finish uptitration to 75 (72 this am) now that BP remains stable.   - Adempas .5mg TID  - Back on home dose of oxygen (4L ATC)

## 2018-04-13 LAB
ALBUMIN SERPL BCP-MCNC: 3.9 G/DL
ALP SERPL-CCNC: 184 U/L
ALT SERPL W/O P-5'-P-CCNC: 24 U/L
ANION GAP SERPL CALC-SCNC: 10 MMOL/L
ANION GAP SERPL CALC-SCNC: 12 MMOL/L
ANISOCYTOSIS BLD QL SMEAR: SLIGHT
AST SERPL-CCNC: 33 U/L
BASOPHILS # BLD AUTO: 0.03 K/UL
BASOPHILS NFR BLD: 0.6 %
BILIRUB SERPL-MCNC: 0.6 MG/DL
BUN SERPL-MCNC: 14 MG/DL
BUN SERPL-MCNC: 16 MG/DL
CALCIUM SERPL-MCNC: 10 MG/DL
CALCIUM SERPL-MCNC: 9.5 MG/DL
CHLORIDE SERPL-SCNC: 96 MMOL/L
CHLORIDE SERPL-SCNC: 99 MMOL/L
CO2 SERPL-SCNC: 28 MMOL/L
CO2 SERPL-SCNC: 32 MMOL/L
CREAT SERPL-MCNC: 1 MG/DL
CREAT SERPL-MCNC: 1.3 MG/DL
DIFFERENTIAL METHOD: ABNORMAL
EOSINOPHIL # BLD AUTO: 0.1 K/UL
EOSINOPHIL NFR BLD: 2.8 %
ERYTHROCYTE [DISTWIDTH] IN BLOOD BY AUTOMATED COUNT: 17.6 %
EST. GFR  (AFRICAN AMERICAN): 53 ML/MIN/1.73 M^2
EST. GFR  (AFRICAN AMERICAN): >60 ML/MIN/1.73 M^2
EST. GFR  (NON AFRICAN AMERICAN): 46 ML/MIN/1.73 M^2
EST. GFR  (NON AFRICAN AMERICAN): >60 ML/MIN/1.73 M^2
GIANT PLATELETS BLD QL SMEAR: PRESENT
GLUCOSE SERPL-MCNC: 81 MG/DL
GLUCOSE SERPL-MCNC: 91 MG/DL
HCT VFR BLD AUTO: 40.4 %
HGB BLD-MCNC: 12.6 G/DL
HYPOCHROMIA BLD QL SMEAR: ABNORMAL
IMM GRANULOCYTES # BLD AUTO: 0.05 K/UL
IMM GRANULOCYTES NFR BLD AUTO: 1.1 %
LYMPHOCYTES # BLD AUTO: 0.7 K/UL
LYMPHOCYTES NFR BLD: 14.5 %
MAGNESIUM SERPL-MCNC: 2.3 MG/DL
MCH RBC QN AUTO: 27.3 PG
MCHC RBC AUTO-ENTMCNC: 31.2 G/DL
MCV RBC AUTO: 88 FL
MONOCYTES # BLD AUTO: 0.3 K/UL
MONOCYTES NFR BLD: 7.4 %
NEUTROPHILS # BLD AUTO: 3.4 K/UL
NEUTROPHILS NFR BLD: 73.6 %
NRBC BLD-RTO: 0 /100 WBC
OVALOCYTES BLD QL SMEAR: ABNORMAL
PHOSPHATE SERPL-MCNC: 3.8 MG/DL
PLATELET # BLD AUTO: 104 K/UL
PLATELET BLD QL SMEAR: ABNORMAL
PMV BLD AUTO: 11.3 FL
POIKILOCYTOSIS BLD QL SMEAR: SLIGHT
POLYCHROMASIA BLD QL SMEAR: ABNORMAL
POTASSIUM SERPL-SCNC: 3.4 MMOL/L
POTASSIUM SERPL-SCNC: 4.5 MMOL/L
PROT SERPL-MCNC: 6.6 G/DL
RBC # BLD AUTO: 4.61 M/UL
SODIUM SERPL-SCNC: 138 MMOL/L
SODIUM SERPL-SCNC: 139 MMOL/L
WBC # BLD AUTO: 4.62 K/UL

## 2018-04-13 PROCEDURE — 25000003 PHARM REV CODE 250: Performed by: INTERNAL MEDICINE

## 2018-04-13 PROCEDURE — 20600001 HC STEP DOWN PRIVATE ROOM

## 2018-04-13 PROCEDURE — 83735 ASSAY OF MAGNESIUM: CPT

## 2018-04-13 PROCEDURE — 99900035 HC TECH TIME PER 15 MIN (STAT)

## 2018-04-13 PROCEDURE — 63600175 PHARM REV CODE 636 W HCPCS: Mod: JG | Performed by: INTERNAL MEDICINE

## 2018-04-13 PROCEDURE — 25000242 PHARM REV CODE 250 ALT 637 W/ HCPCS: Performed by: INTERNAL MEDICINE

## 2018-04-13 PROCEDURE — 99232 SBSQ HOSP IP/OBS MODERATE 35: CPT | Mod: ,,, | Performed by: INTERNAL MEDICINE

## 2018-04-13 PROCEDURE — 84100 ASSAY OF PHOSPHORUS: CPT

## 2018-04-13 PROCEDURE — 63600175 PHARM REV CODE 636 W HCPCS: Performed by: INTERNAL MEDICINE

## 2018-04-13 PROCEDURE — 80053 COMPREHEN METABOLIC PANEL: CPT

## 2018-04-13 PROCEDURE — 94660 CPAP INITIATION&MGMT: CPT

## 2018-04-13 PROCEDURE — 85025 COMPLETE CBC W/AUTO DIFF WBC: CPT

## 2018-04-13 PROCEDURE — 80048 BASIC METABOLIC PNL TOTAL CA: CPT

## 2018-04-13 PROCEDURE — 36415 COLL VENOUS BLD VENIPUNCTURE: CPT

## 2018-04-13 RX ORDER — POTASSIUM CHLORIDE 20 MEQ/1
40 TABLET, EXTENDED RELEASE ORAL ONCE
Status: COMPLETED | OUTPATIENT
Start: 2018-04-13 | End: 2018-04-13

## 2018-04-13 RX ADMIN — MAGNESIUM OXIDE TAB 400 MG (241.3 MG ELEMENTAL MG) 400 MG: 400 (241.3 MG) TAB at 08:04

## 2018-04-13 RX ADMIN — GABAPENTIN 100 MG: 100 CAPSULE ORAL at 08:04

## 2018-04-13 RX ADMIN — BUSPIRONE HYDROCHLORIDE 15 MG: 5 TABLET ORAL at 08:04

## 2018-04-13 RX ADMIN — RIOCIGUAT 0.5 MG: 0.5 TABLET, FILM COATED ORAL at 08:04

## 2018-04-13 RX ADMIN — TREPROSTINIL 75 NG/KG/MIN: 100 INJECTION, SOLUTION INTRAVENOUS; SUBCUTANEOUS at 05:04

## 2018-04-13 RX ADMIN — FLUTICASONE FUROATE AND VILANTEROL TRIFENATATE 1 PUFF: 100; 25 POWDER RESPIRATORY (INHALATION) at 08:04

## 2018-04-13 RX ADMIN — PANTOPRAZOLE SODIUM 40 MG: 40 TABLET, DELAYED RELEASE ORAL at 08:04

## 2018-04-13 RX ADMIN — BUSPIRONE HYDROCHLORIDE 15 MG: 5 TABLET ORAL at 02:04

## 2018-04-13 RX ADMIN — LURASIDONE HYDROCHLORIDE 40 MG: 40 TABLET, FILM COATED ORAL at 08:04

## 2018-04-13 RX ADMIN — LAMOTRIGINE 100 MG: 100 TABLET ORAL at 08:04

## 2018-04-13 RX ADMIN — RIOCIGUAT 0.5 MG: 0.5 TABLET, FILM COATED ORAL at 02:04

## 2018-04-13 RX ADMIN — SPIRONOLACTONE 25 MG: 25 TABLET, FILM COATED ORAL at 08:04

## 2018-04-13 RX ADMIN — ENOXAPARIN SODIUM 40 MG: 100 INJECTION SUBCUTANEOUS at 05:04

## 2018-04-13 RX ADMIN — LEVOTHYROXINE SODIUM 75 MCG: 75 TABLET ORAL at 08:04

## 2018-04-13 RX ADMIN — POTASSIUM CHLORIDE 60 MEQ: 1500 TABLET, EXTENDED RELEASE ORAL at 08:04

## 2018-04-13 RX ADMIN — FUROSEMIDE 20 MG/HR: 10 INJECTION, SOLUTION INTRAVENOUS at 01:04

## 2018-04-13 RX ADMIN — GABAPENTIN 100 MG: 100 CAPSULE ORAL at 02:04

## 2018-04-13 RX ADMIN — DESVENLAFAXINE SUCCINATE 100 MG: 50 TABLET, FILM COATED, EXTENDED RELEASE ORAL at 08:04

## 2018-04-13 RX ADMIN — PRAVASTATIN SODIUM 40 MG: 40 TABLET ORAL at 08:04

## 2018-04-13 RX ADMIN — POTASSIUM CHLORIDE 60 MEQ: 1500 TABLET, EXTENDED RELEASE ORAL at 02:04

## 2018-04-13 RX ADMIN — FUROSEMIDE 20 MG/HR: 10 INJECTION, SOLUTION INTRAVENOUS at 11:04

## 2018-04-13 RX ADMIN — POTASSIUM CHLORIDE 40 MEQ: 1500 TABLET, EXTENDED RELEASE ORAL at 11:04

## 2018-04-13 RX ADMIN — TIOTROPIUM BROMIDE 18 MCG: 18 CAPSULE ORAL; RESPIRATORY (INHALATION) at 08:04

## 2018-04-13 NOTE — ASSESSMENT & PLAN NOTE
- Continue IV remodulin at 75  - Adempas .5mg TID  - Back on home dose of oxygen (4L ATC)  - Long discussion about DNR/DNI  - [place] POLST to be filled out before discharge

## 2018-04-13 NOTE — ASSESSMENT & PLAN NOTE
- Remains Wet and warm on exam  - JVD remains elevated (but better each day)  - Continue Lasix 20/hr  - Aggressive replacement of lytes (K>4 and Mg >2)  - Net negative 2.5Ls in past 24 hours.   - Continue aldactone 25mg BID  - strict I&O's and daily weights.   - Low salt diet

## 2018-04-13 NOTE — PLAN OF CARE
Problem: Patient Care Overview  Goal: Plan of Care Review  Outcome: Ongoing (interventions implemented as appropriate)  * AAO x 4  * Cardiac diet patient tolerating well. See chart for % eaten.   * Edema noted to the lower extremities.  * Bed at lowest position and locked, call light within reach, non-slip socks on, and side rails up x 2.  Encouraged patient to call for assistance when needed patient stated understadning. This RN visualized patient ambulating in room gait and balance WNL.  * Right forearm 22 G (04/12/18) patent, dressing clean dry and intact no signs or symptoms of infection noted.  * Remodulin 75 ng/kg/min DW=85 kg with a concentration 12,000,000 mg/100 cc infusing to a 77 cc/24 hrs.   * Right forearm PIV 22 G (04/12/18) patent, dressing clean dry and intact no signs or symptoms of infection noted.  * Lasix 20 mg/hr infusing at 10 cc/hr with a concentration of 2 mg/1 cc verified correct by this RN and DANILO Escobar RN.  * Oxygen 4 liters via nasal cannula.  Oxygen saturation 88-93 % respirations even and unlabored no distress noted.   * Patient has no complaints of pain at this time.  * Skin assessment preformed no breakdown noted.  * Standard precautions maintained.  * Continuous telemetry monitoring continued SR 80-90.  * Patient able to turn self no assistance needed.  * Patient voiding clear yellow urine.  See flow sheet for intake and output totals.

## 2018-04-13 NOTE — PLAN OF CARE
Problem: Patient Care Overview  Goal: Plan of Care Review  Outcome: Ongoing (interventions implemented as appropriate)  - Continue at goal rate of Remodulin, 75 ng/kg/min, 120,000 concentration  - Plan cassette change at 1530, change daily  - Lasix gtt infusing at 20 mg/hr (concentrated dose)  - Good UOP, see flowsheet  - 4 LPM NC, keep sats >88%  - Monitor chemistries on labs  - Bed & chair locked, call light placed in reach, nonslip socks in use, calls for assistance appropriately

## 2018-04-13 NOTE — PROGRESS NOTES
[facility]  Heart Transplant  Progress Note    Patient Name: Sue Smith  MRN: [id number]  Admission Date: 4/5/2018  Hospital Length of Stay: 8 days  Attending Physician: Shelby De La Paz MD  Primary Care Provider: Paddy Guerrier DO  Principal Problem:Acute on chronic diastolic heart failure    Subjective:     Interval History: No acute issues overnight. Continues to make excellent amount of urine.     Continuous Infusions:   furosemide (LASIX) 2 mg/mL infusion (non-titrating) 20 mg/hr (04/13/18 0120)    treprostinil (REMODULIN) infusion 75 ng/kg/min (04/12/18 1549)    veletri/remodulin cassette      veletri/remodulin tubing       Scheduled Meds:   busPIRone  15 mg Oral TID    desvenlafaxine succinate  100 mg Oral Daily    enoxaparin  40 mg Subcutaneous Daily    fluticasone-vilanterol  1 puff Inhalation Daily    gabapentin  100 mg Oral TID    lamoTRIgine  100 mg Oral BID    levothyroxine  75 mcg Oral Daily    lurasidone  40 mg Oral Daily    magnesium oxide  400 mg Oral BID    pantoprazole  40 mg Oral Daily    potassium chloride  60 mEq Oral TID    pravastatin  40 mg Oral Daily    riociguat  0.5 mg Oral TID    sodium chloride 0.9%  3 mL Intravenous Q8H    spironolactone  25 mg Oral BID    tiotropium  1 capsule Inhalation Daily     PRN Meds:acetaminophen, hydrocodone-acetaminophen 10-325mg, loperamide, ondansetron    Review of patient's allergies indicates:   Allergen Reactions    Sulfa (sulfonamide antibiotics) Rash     Objective:     Vital Signs (Most Recent):  Temp: 98.5 °F (36.9 °C) (04/13/18 1139)  Pulse: 90 (04/13/18 1215)  Resp: 18 (04/13/18 1139)  BP: 115/64 (04/13/18 1139)  SpO2: (!) 92 % (04/13/18 1139) Vital Signs (24h Range):  Temp:  [97.4 °F (36.3 °C)-98.5 °F (36.9 °C)] 98.5 °F (36.9 °C)  Pulse:  [80-91] 90  Resp:  [18-20] 18  SpO2:  [88 %-93 %] 92 %  BP: (102-125)/(55-77) 115/64     Patient Vitals for the past 72 hrs (Last 3 readings):   Weight   04/13/18 0631 87 kg  (191 lb 12.8 oz)   04/13/18 0600 89.1 kg (196 lb 6.9 oz)   04/11/18 0445 88 kg (194 lb 0.1 oz)     Body mass index is 35.08 kg/m².      Intake/Output Summary (Last 24 hours) at 04/13/18 1231  Last data filed at 04/13/18 0738   Gross per 24 hour   Intake          1280.66 ml   Output             3975 ml   Net         -2694.34 ml     Physical Exam   Constitutional: NAD. Resting comfortably in bed.   Neck: Supple. JVD up to jaw.    Cardiovascular: RRR. No murmurs or gallups.   Pulmonary/Chest: Scant Bibasilar rales. Otherwise clear with good air movment.   Abdominal: Soft. NT. Mild distention with no fluid wave. Normal BS   Musculoskeletal: Trace LE edema (non pitting)   Neurological: AAOx3. No focal deficits.   Skin: Skin is warm and dry.    Nursing note and vitals reviewed.    Significant Labs:  CBC:    Recent Labs  Lab 04/11/18  0425 04/12/18  0400 04/13/18  0625   WBC 4.84 4.33 4.62   RBC 4.52 4.49 4.61   HGB 12.5 12.3 12.6   HCT 40.3 39.3 40.4   * 118* 104*   MCV 89 88 88   MCH 27.7 27.4 27.3   MCHC 31.0* 31.3* 31.2*     BNP:  No results for input(s): BNP in the last 168 hours.    Invalid input(s): BNPTRIAGELBLO  CMP:    Recent Labs  Lab 04/11/18  0425  04/12/18  0400 04/12/18  1553 04/13/18  0625   GLU 81  < > 82 85 81   CALCIUM 9.3  < > 9.4 9.7 9.5   ALBUMIN 3.8  --  3.9  --  3.9   PROT 6.3  --  6.3  --  6.6     < > 139 138 139   K 3.2*  < > 3.0* 3.6 3.4*   CO2 32*  < > 31* 32* 28   CL 97  < > 98 96 99   BUN 18  < > 15 17 14   CREATININE 1.1  < > 1.1 1.2 1.0   ALKPHOS 172*  --  182*  --  184*   ALT 18  --  22  --  24   AST 28  --  31  --  33   BILITOT 0.6  --  0.6  --  0.6   < > = values in this interval not displayed.     I have reviewed all pertinent labs within the past 24 hours.    Estimated Creatinine Clearance: 64.4 mL/min (based on SCr of 1 mg/dL).    Diagnostic Results:   TTE (4/5/18):       1 - Normal left ventricular systolic function (EF 60-65%).     2 - No wall motion abnormalities.     3  - Normal left ventricular diastolic function.     4 - Right atrial enlargement.     5 - Right ventricular enlargement with severely depressed systolic function.     6 - Moderate tricuspid regurgitation.     7 - Increased central venous pressure.     8 - Pulmonary hypertension. The estimated PA systolic pressure is 79 mmHg.    Assessment and Plan:     Ms. Smith is a 57 y.o. Female with a past medical history of PHTN WHO Group 1 on IV Remodulin, chronic hypoxic respiratory failure, chronic RV failure on home O2 and BiPAP who presented to [facility] with SOB. She has been having SOB for roughly 1 week, which was progressively worsening. She stated that she sleeps in a recliner currently until she gets a new BiPAP machine. She does wake up short of breath, but is not clear about LE edema. She does state that her weight has been progressively increasing though. She states that she has no symptoms from the Veletri currently.    * Acute on chronic diastolic heart failure    - Remains Wet and warm on exam  - JVD remains elevated (but better each day)  - Continue Lasix 20/hr  - Aggressive replacement of lytes (K>4 and Mg >2)  - Net negative 2.5Ls in past 24 hours.   - Continue aldactone 25mg BID  - strict I&O's and daily weights.   - Low salt diet        Pulmonary hypertension, group 1    - Continue IV remodulin at 75  - Adempas .5mg TID  - Back on home dose of oxygen (4L ATC)  - Long discussion about DNR/DNI  - LA POLST to be filled out before discharge         Chronic respiratory failure with hypoxia    - 4Ls O2 ATC  - Goal O2 sats 88 or above  - BIPAP QHS  - Continue inhaled therapies for COPD           Gastroesophageal reflux disease    - Continue PTA protonix        Bipolar affective disorder    Continue PTA Antipsychotics / mood stabilizers             Pedro frankel, DO  Heart Transplant  [facility]

## 2018-04-13 NOTE — SUBJECTIVE & OBJECTIVE
Interval History: No acute issues overnight. Continues to make excellent amount of urine.     Continuous Infusions:   furosemide (LASIX) 2 mg/mL infusion (non-titrating) 20 mg/hr (04/13/18 0120)    treprostinil (REMODULIN) infusion 75 ng/kg/min (04/12/18 1549)    veletri/remodulin cassette      veletri/remodulin tubing       Scheduled Meds:   busPIRone  15 mg Oral TID    desvenlafaxine succinate  100 mg Oral Daily    enoxaparin  40 mg Subcutaneous Daily    fluticasone-vilanterol  1 puff Inhalation Daily    gabapentin  100 mg Oral TID    lamoTRIgine  100 mg Oral BID    levothyroxine  75 mcg Oral Daily    lurasidone  40 mg Oral Daily    magnesium oxide  400 mg Oral BID    pantoprazole  40 mg Oral Daily    potassium chloride  60 mEq Oral TID    pravastatin  40 mg Oral Daily    riociguat  0.5 mg Oral TID    sodium chloride 0.9%  3 mL Intravenous Q8H    spironolactone  25 mg Oral BID    tiotropium  1 capsule Inhalation Daily     PRN Meds:acetaminophen, hydrocodone-acetaminophen 10-325mg, loperamide, ondansetron    Review of patient's allergies indicates:   Allergen Reactions    Sulfa (sulfonamide antibiotics) Rash     Objective:     Vital Signs (Most Recent):  Temp: 98.5 °F (36.9 °C) (04/13/18 1139)  Pulse: 90 (04/13/18 1215)  Resp: 18 (04/13/18 1139)  BP: 115/64 (04/13/18 1139)  SpO2: (!) 92 % (04/13/18 1139) Vital Signs (24h Range):  Temp:  [97.4 °F (36.3 °C)-98.5 °F (36.9 °C)] 98.5 °F (36.9 °C)  Pulse:  [80-91] 90  Resp:  [18-20] 18  SpO2:  [88 %-93 %] 92 %  BP: (102-125)/(55-77) 115/64     Patient Vitals for the past 72 hrs (Last 3 readings):   Weight   04/13/18 0631 87 kg (191 lb 12.8 oz)   04/13/18 0600 89.1 kg (196 lb 6.9 oz)   04/11/18 0445 88 kg (194 lb 0.1 oz)     Body mass index is 35.08 kg/m².      Intake/Output Summary (Last 24 hours) at 04/13/18 1231  Last data filed at 04/13/18 0738   Gross per 24 hour   Intake          1280.66 ml   Output             3975 ml   Net         -2694.34 ml      Physical Exam   Constitutional: NAD. Resting comfortably in bed.   Neck: Supple. JVD up to jaw.    Cardiovascular: RRR. No murmurs or gallups.   Pulmonary/Chest: Scant Bibasilar rales. Otherwise clear with good air movment.   Abdominal: Soft. NT. Mild distention with no fluid wave. Normal BS   Musculoskeletal: Trace LE edema (non pitting)   Neurological: AAOx3. No focal deficits.   Skin: Skin is warm and dry.    Nursing note and vitals reviewed.    Significant Labs:  CBC:    Recent Labs  Lab 04/11/18  0425 04/12/18  0400 04/13/18  0625   WBC 4.84 4.33 4.62   RBC 4.52 4.49 4.61   HGB 12.5 12.3 12.6   HCT 40.3 39.3 40.4   * 118* 104*   MCV 89 88 88   MCH 27.7 27.4 27.3   MCHC 31.0* 31.3* 31.2*     BNP:  No results for input(s): BNP in the last 168 hours.    Invalid input(s): BNPTRIAGELBLO  CMP:    Recent Labs  Lab 04/11/18  0425  04/12/18  0400 04/12/18  1553 04/13/18  0625   GLU 81  < > 82 85 81   CALCIUM 9.3  < > 9.4 9.7 9.5   ALBUMIN 3.8  --  3.9  --  3.9   PROT 6.3  --  6.3  --  6.6     < > 139 138 139   K 3.2*  < > 3.0* 3.6 3.4*   CO2 32*  < > 31* 32* 28   CL 97  < > 98 96 99   BUN 18  < > 15 17 14   CREATININE 1.1  < > 1.1 1.2 1.0   ALKPHOS 172*  --  182*  --  184*   ALT 18  --  22  --  24   AST 28  --  31  --  33   BILITOT 0.6  --  0.6  --  0.6   < > = values in this interval not displayed.     I have reviewed all pertinent labs within the past 24 hours.    Estimated Creatinine Clearance: 64.4 mL/min (based on SCr of 1 mg/dL).    Diagnostic Results:   TTE (4/5/18):       1 - Normal left ventricular systolic function (EF 60-65%).     2 - No wall motion abnormalities.     3 - Normal left ventricular diastolic function.     4 - Right atrial enlargement.     5 - Right ventricular enlargement with severely depressed systolic function.     6 - Moderate tricuspid regurgitation.     7 - Increased central venous pressure.     8 - Pulmonary hypertension. The estimated PA systolic pressure is 79 mmHg.

## 2018-04-14 LAB
ALBUMIN SERPL BCP-MCNC: 4.1 G/DL
ALP SERPL-CCNC: 192 U/L
ALT SERPL W/O P-5'-P-CCNC: 27 U/L
ANION GAP SERPL CALC-SCNC: 12 MMOL/L
ANION GAP SERPL CALC-SCNC: 9 MMOL/L
AST SERPL-CCNC: 34 U/L
BASOPHILS # BLD AUTO: 0.03 K/UL
BASOPHILS NFR BLD: 0.6 %
BILIRUB SERPL-MCNC: 0.6 MG/DL
BUN SERPL-MCNC: 16 MG/DL
BUN SERPL-MCNC: 18 MG/DL
CALCIUM SERPL-MCNC: 9.7 MG/DL
CALCIUM SERPL-MCNC: 9.8 MG/DL
CHLORIDE SERPL-SCNC: 96 MMOL/L
CHLORIDE SERPL-SCNC: 98 MMOL/L
CO2 SERPL-SCNC: 30 MMOL/L
CO2 SERPL-SCNC: 31 MMOL/L
CREAT SERPL-MCNC: 1.2 MG/DL
CREAT SERPL-MCNC: 1.2 MG/DL
DIFFERENTIAL METHOD: ABNORMAL
EOSINOPHIL # BLD AUTO: 0.1 K/UL
EOSINOPHIL NFR BLD: 2.6 %
ERYTHROCYTE [DISTWIDTH] IN BLOOD BY AUTOMATED COUNT: 17.5 %
EST. GFR  (AFRICAN AMERICAN): 58.4 ML/MIN/1.73 M^2
EST. GFR  (AFRICAN AMERICAN): 58.4 ML/MIN/1.73 M^2
EST. GFR  (NON AFRICAN AMERICAN): 50.6 ML/MIN/1.73 M^2
EST. GFR  (NON AFRICAN AMERICAN): 50.6 ML/MIN/1.73 M^2
GLUCOSE SERPL-MCNC: 69 MG/DL
GLUCOSE SERPL-MCNC: 85 MG/DL
HCT VFR BLD AUTO: 41.7 %
HGB BLD-MCNC: 12.7 G/DL
IMM GRANULOCYTES # BLD AUTO: 0.04 K/UL
IMM GRANULOCYTES NFR BLD AUTO: 0.8 %
LYMPHOCYTES # BLD AUTO: 1 K/UL
LYMPHOCYTES NFR BLD: 18.1 %
MAGNESIUM SERPL-MCNC: 2.5 MG/DL
MCH RBC QN AUTO: 27.1 PG
MCHC RBC AUTO-ENTMCNC: 30.5 G/DL
MCV RBC AUTO: 89 FL
MONOCYTES # BLD AUTO: 0.4 K/UL
MONOCYTES NFR BLD: 6.8 %
NEUTROPHILS # BLD AUTO: 3.8 K/UL
NEUTROPHILS NFR BLD: 71.1 %
NRBC BLD-RTO: 0 /100 WBC
PHOSPHATE SERPL-MCNC: 4 MG/DL
PLATELET # BLD AUTO: 145 K/UL
PMV BLD AUTO: 11.7 FL
POTASSIUM SERPL-SCNC: 3.7 MMOL/L
POTASSIUM SERPL-SCNC: 4 MMOL/L
PROT SERPL-MCNC: 6.8 G/DL
RBC # BLD AUTO: 4.69 M/UL
SODIUM SERPL-SCNC: 137 MMOL/L
SODIUM SERPL-SCNC: 139 MMOL/L
WBC # BLD AUTO: 5.3 K/UL

## 2018-04-14 PROCEDURE — 85025 COMPLETE CBC W/AUTO DIFF WBC: CPT

## 2018-04-14 PROCEDURE — 83735 ASSAY OF MAGNESIUM: CPT

## 2018-04-14 PROCEDURE — 36415 COLL VENOUS BLD VENIPUNCTURE: CPT

## 2018-04-14 PROCEDURE — 25000003 PHARM REV CODE 250: Performed by: INTERNAL MEDICINE

## 2018-04-14 PROCEDURE — 20600001 HC STEP DOWN PRIVATE ROOM

## 2018-04-14 PROCEDURE — 25000242 PHARM REV CODE 250 ALT 637 W/ HCPCS: Performed by: INTERNAL MEDICINE

## 2018-04-14 PROCEDURE — 63600175 PHARM REV CODE 636 W HCPCS: Performed by: INTERNAL MEDICINE

## 2018-04-14 PROCEDURE — 63600175 PHARM REV CODE 636 W HCPCS: Mod: JG | Performed by: INTERNAL MEDICINE

## 2018-04-14 PROCEDURE — 99232 SBSQ HOSP IP/OBS MODERATE 35: CPT | Mod: ,,, | Performed by: INTERNAL MEDICINE

## 2018-04-14 PROCEDURE — 94660 CPAP INITIATION&MGMT: CPT

## 2018-04-14 PROCEDURE — 99900035 HC TECH TIME PER 15 MIN (STAT)

## 2018-04-14 PROCEDURE — 25000003 PHARM REV CODE 250: Performed by: STUDENT IN AN ORGANIZED HEALTH CARE EDUCATION/TRAINING PROGRAM

## 2018-04-14 PROCEDURE — 27000221 HC OXYGEN, UP TO 24 HOURS

## 2018-04-14 PROCEDURE — 94761 N-INVAS EAR/PLS OXIMETRY MLT: CPT

## 2018-04-14 PROCEDURE — A4216 STERILE WATER/SALINE, 10 ML: HCPCS | Performed by: INTERNAL MEDICINE

## 2018-04-14 PROCEDURE — 80053 COMPREHEN METABOLIC PANEL: CPT

## 2018-04-14 PROCEDURE — 80048 BASIC METABOLIC PNL TOTAL CA: CPT

## 2018-04-14 PROCEDURE — 84100 ASSAY OF PHOSPHORUS: CPT

## 2018-04-14 RX ORDER — CETIRIZINE HYDROCHLORIDE 10 MG/1
10 TABLET ORAL ONCE
Status: COMPLETED | OUTPATIENT
Start: 2018-04-14 | End: 2018-04-14

## 2018-04-14 RX ADMIN — FLUTICASONE FUROATE AND VILANTEROL TRIFENATATE 1 PUFF: 100; 25 POWDER RESPIRATORY (INHALATION) at 08:04

## 2018-04-14 RX ADMIN — FUROSEMIDE 20 MG/HR: 10 INJECTION, SOLUTION INTRAVENOUS at 10:04

## 2018-04-14 RX ADMIN — POTASSIUM CHLORIDE 60 MEQ: 1500 TABLET, EXTENDED RELEASE ORAL at 08:04

## 2018-04-14 RX ADMIN — PANTOPRAZOLE SODIUM 40 MG: 40 TABLET, DELAYED RELEASE ORAL at 08:04

## 2018-04-14 RX ADMIN — SPIRONOLACTONE 25 MG: 25 TABLET, FILM COATED ORAL at 08:04

## 2018-04-14 RX ADMIN — ENOXAPARIN SODIUM 40 MG: 100 INJECTION SUBCUTANEOUS at 04:04

## 2018-04-14 RX ADMIN — CETIRIZINE HYDROCHLORIDE 10 MG: 10 TABLET, FILM COATED ORAL at 08:04

## 2018-04-14 RX ADMIN — PRAVASTATIN SODIUM 40 MG: 40 TABLET ORAL at 08:04

## 2018-04-14 RX ADMIN — DESVENLAFAXINE SUCCINATE 100 MG: 50 TABLET, FILM COATED, EXTENDED RELEASE ORAL at 08:04

## 2018-04-14 RX ADMIN — GABAPENTIN 100 MG: 100 CAPSULE ORAL at 02:04

## 2018-04-14 RX ADMIN — RIOCIGUAT 0.5 MG: 0.5 TABLET, FILM COATED ORAL at 08:04

## 2018-04-14 RX ADMIN — FUROSEMIDE 20 MG/HR: 10 INJECTION, SOLUTION INTRAVENOUS at 11:04

## 2018-04-14 RX ADMIN — LAMOTRIGINE 100 MG: 100 TABLET ORAL at 08:04

## 2018-04-14 RX ADMIN — BUSPIRONE HYDROCHLORIDE 15 MG: 5 TABLET ORAL at 02:04

## 2018-04-14 RX ADMIN — MAGNESIUM OXIDE TAB 400 MG (241.3 MG ELEMENTAL MG) 400 MG: 400 (241.3 MG) TAB at 08:04

## 2018-04-14 RX ADMIN — BUSPIRONE HYDROCHLORIDE 15 MG: 5 TABLET ORAL at 08:04

## 2018-04-14 RX ADMIN — TIOTROPIUM BROMIDE 18 MCG: 18 CAPSULE ORAL; RESPIRATORY (INHALATION) at 10:04

## 2018-04-14 RX ADMIN — RIOCIGUAT 0.5 MG: 0.5 TABLET, FILM COATED ORAL at 02:04

## 2018-04-14 RX ADMIN — LURASIDONE HYDROCHLORIDE 40 MG: 40 TABLET, FILM COATED ORAL at 08:04

## 2018-04-14 RX ADMIN — TREPROSTINIL 75 NG/KG/MIN: 100 INJECTION, SOLUTION INTRAVENOUS; SUBCUTANEOUS at 02:04

## 2018-04-14 RX ADMIN — Medication 3 ML: at 02:04

## 2018-04-14 RX ADMIN — GABAPENTIN 100 MG: 100 CAPSULE ORAL at 08:04

## 2018-04-14 RX ADMIN — POTASSIUM CHLORIDE 60 MEQ: 1500 TABLET, EXTENDED RELEASE ORAL at 02:04

## 2018-04-14 RX ADMIN — LEVOTHYROXINE SODIUM 75 MCG: 75 TABLET ORAL at 08:04

## 2018-04-14 NOTE — ASSESSMENT & PLAN NOTE
- Remains Wet and warm on exam  - JVD to mid neck   - Continue Lasix 20/hr  - Aggressive replacement of lytes (K>4 and Mg >2)  - I/Os do not correlate to weights or exam very well. She is net negative 24Ls since admission but only down 1lbs(?) which is impossible if numbers are correct.   - Either way her breathing is much better since admission and she is slightly less volume overloaded than my exam on Monday.   - Continue aldactone 25mg BID  - strict I&O's and daily weights.   - Low salt diet

## 2018-04-14 NOTE — PLAN OF CARE
Problem: Patient Care Overview  Goal: Plan of Care Review  Outcome: Ongoing (interventions implemented as appropriate)  * AAO x 4  * Cardiac diet patient tolerating well. See chart for % eaten.   * Edema noted to the lower extremities.  * Bed at lowest position and locked, call light within reach, non-slip socks on, and side rails up x 2.  Encouraged patient to call for assistance when needed patient stated understadning. This RN visualized patient ambulating in room gait and balance WNL.  * Remodulin 75 ng/kg/min DW=85 kg with a concentration 12,000,000 mg/100 cc infusing to a 77 cc/24 hrs.   * Right forearm PIV 22 G (04/12/18) patent, dressing clean dry and intact no signs or symptoms of infection noted.  * Lasix 20 mg/hr infusing at 10 cc/hr with a concentration of 2 mg/1 cc verified correct by this RN and DANILO Escobar RN.  * Oxygen 4 liters via nasal cannula.  Oxygen saturation 88-93 % respirations even and unlabored no distress noted.   * Patient has no complaints of pain at this time.  * Skin assessment preformed no breakdown noted.  * Standard precautions maintained.  * Continuous telemetry monitoring continued SR 80-90.  * Patient able to turn self no assistance needed.  * Patient voiding clear yellow urine.  See flow sheet for intake and output totals

## 2018-04-14 NOTE — SUBJECTIVE & OBJECTIVE
Interval History: No acute issues overnight. Eating and drinking okay. Moving bowel and bladder without issues. All questions answered.     Continuous Infusions:   furosemide (LASIX) 2 mg/mL infusion (non-titrating) 20 mg/hr (04/13/18 2350)    treprostinil (REMODULIN) infusion 75 ng/kg/min (04/13/18 1723)    veletri/remodulin cassette      veletri/remodulin tubing       Scheduled Meds:   busPIRone  15 mg Oral TID    desvenlafaxine succinate  100 mg Oral Daily    enoxaparin  40 mg Subcutaneous Daily    fluticasone-vilanterol  1 puff Inhalation Daily    gabapentin  100 mg Oral TID    lamoTRIgine  100 mg Oral BID    levothyroxine  75 mcg Oral Daily    lurasidone  40 mg Oral Daily    magnesium oxide  400 mg Oral BID    pantoprazole  40 mg Oral Daily    potassium chloride  60 mEq Oral TID    pravastatin  40 mg Oral Daily    riociguat  0.5 mg Oral TID    sodium chloride 0.9%  3 mL Intravenous Q8H    spironolactone  25 mg Oral BID    tiotropium  1 capsule Inhalation Daily     PRN Meds:acetaminophen, hydrocodone-acetaminophen 10-325mg, loperamide, ondansetron    Review of patient's allergies indicates:   Allergen Reactions    Sulfa (sulfonamide antibiotics) Rash     Objective:     Vital Signs (Most Recent):  Temp: 97.5 °F (36.4 °C) (04/14/18 0736)  Pulse: 80 (04/14/18 0843)  Resp: 16 (04/14/18 0843)  BP: 110/65 (04/14/18 0736)  SpO2: (!) 91 % (04/14/18 0843) Vital Signs (24h Range):  Temp:  [97.5 °F (36.4 °C)-98.8 °F (37.1 °C)] 97.5 °F (36.4 °C)  Pulse:  [72-95] 80  Resp:  [16-20] 16  SpO2:  [89 %-96 %] 91 %  BP: ()/(54-65) 110/65     Patient Vitals for the past 72 hrs (Last 3 readings):   Weight   04/14/18 0600 87.9 kg (193 lb 12.6 oz)   04/13/18 1802 91.4 kg (201 lb 8 oz)   04/13/18 0631 87 kg (191 lb 12.8 oz)     Body mass index is 35.44 kg/m².      Intake/Output Summary (Last 24 hours) at 04/14/18 1013  Last data filed at 04/14/18 0900   Gross per 24 hour   Intake             1620 ml   Output              3200 ml   Net            -1580 ml     Physical Exam   Constitutional: NAD. Resting comfortably in bed.   Neck: Supple. JVD up to jaw.    Cardiovascular: RRR. No murmurs or gallups.   Pulmonary/Chest: Scant Bibasilar rales. Otherwise clear with good air movment.   Abdominal: Soft. NT. Mild distention with no fluid wave. Normal BS   Musculoskeletal: Trace LE edema (non pitting)   Neurological: AAOx3. No focal deficits.   Skin: Skin is warm and dry.    Nursing note and vitals reviewed.    Significant Labs:  CBC:    Recent Labs  Lab 04/12/18  0400 04/13/18  0625 04/14/18  0400   WBC 4.33 4.62 5.30   RBC 4.49 4.61 4.69   HGB 12.3 12.6 12.7   HCT 39.3 40.4 41.7   * 104* 145*   MCV 88 88 89   MCH 27.4 27.3 27.1   MCHC 31.3* 31.2* 30.5*     BNP:  No results for input(s): BNP in the last 168 hours.    Invalid input(s): BNPTRIAGELBLO  CMP:    Recent Labs  Lab 04/12/18  0400  04/13/18  0625 04/13/18  1614 04/14/18  0400   GLU 82  < > 81 91 69*   CALCIUM 9.4  < > 9.5 10.0 9.7   ALBUMIN 3.9  --  3.9  --  4.1   PROT 6.3  --  6.6  --  6.8     < > 139 138 139   K 3.0*  < > 3.4* 4.5 3.7   CO2 31*  < > 28 32* 31*   CL 98  < > 99 96 96   BUN 15  < > 14 16 16   CREATININE 1.1  < > 1.0 1.3 1.2   ALKPHOS 182*  --  184*  --  192*   ALT 22  --  24  --  27   AST 31  --  33  --  34   BILITOT 0.6  --  0.6  --  0.6   < > = values in this interval not displayed.     I have reviewed all pertinent labs within the past 24 hours.    Estimated Creatinine Clearance: 53.9 mL/min (based on SCr of 1.2 mg/dL).    Diagnostic Results:   TTE (4/5/18):       1 - Normal left ventricular systolic function (EF 60-65%).     2 - No wall motion abnormalities.     3 - Normal left ventricular diastolic function.     4 - Right atrial enlargement.     5 - Right ventricular enlargement with severely depressed systolic function.     6 - Moderate tricuspid regurgitation.     7 - Increased central venous pressure.     8 - Pulmonary hypertension.  The estimated PA systolic pressure is 79 mmHg.

## 2018-04-14 NOTE — PROGRESS NOTES
[facility]  Heart Transplant  Progress Note    Patient Name: Sue Smith  MRN: [id number]  Admission Date: 4/5/2018  Hospital Length of Stay: 9 days  Attending Physician: Shelby De La Paz MD  Primary Care Provider: Paddy Guerrier DO  Principal Problem:Acute on chronic diastolic heart failure    Subjective:     Interval History: No acute issues overnight. Eating and drinking okay. Moving bowel and bladder without issues. All questions answered.     Continuous Infusions:   furosemide (LASIX) 2 mg/mL infusion (non-titrating) 20 mg/hr (04/13/18 6940)    treprostinil (REMODULIN) infusion 75 ng/kg/min (04/13/18 1723)    veletri/remodulin cassette      veletri/remodulin tubing       Scheduled Meds:   busPIRone  15 mg Oral TID    desvenlafaxine succinate  100 mg Oral Daily    enoxaparin  40 mg Subcutaneous Daily    fluticasone-vilanterol  1 puff Inhalation Daily    gabapentin  100 mg Oral TID    lamoTRIgine  100 mg Oral BID    levothyroxine  75 mcg Oral Daily    lurasidone  40 mg Oral Daily    magnesium oxide  400 mg Oral BID    pantoprazole  40 mg Oral Daily    potassium chloride  60 mEq Oral TID    pravastatin  40 mg Oral Daily    riociguat  0.5 mg Oral TID    sodium chloride 0.9%  3 mL Intravenous Q8H    spironolactone  25 mg Oral BID    tiotropium  1 capsule Inhalation Daily     PRN Meds:acetaminophen, hydrocodone-acetaminophen 10-325mg, loperamide, ondansetron    Review of patient's allergies indicates:   Allergen Reactions    Sulfa (sulfonamide antibiotics) Rash     Objective:     Vital Signs (Most Recent):  Temp: 97.5 °F (36.4 °C) (04/14/18 0736)  Pulse: 80 (04/14/18 0843)  Resp: 16 (04/14/18 0843)  BP: 110/65 (04/14/18 0736)  SpO2: (!) 91 % (04/14/18 0843) Vital Signs (24h Range):  Temp:  [97.5 °F (36.4 °C)-98.8 °F (37.1 °C)] 97.5 °F (36.4 °C)  Pulse:  [72-95] 80  Resp:  [16-20] 16  SpO2:  [89 %-96 %] 91 %  BP: ()/(54-65) 110/65     Patient Vitals for the past 72 hrs (Last  3 readings):   Weight   04/14/18 0600 87.9 kg (193 lb 12.6 oz)   04/13/18 1802 91.4 kg (201 lb 8 oz)   04/13/18 0631 87 kg (191 lb 12.8 oz)     Body mass index is 35.44 kg/m².      Intake/Output Summary (Last 24 hours) at 04/14/18 1013  Last data filed at 04/14/18 0900   Gross per 24 hour   Intake             1620 ml   Output             3200 ml   Net            -1580 ml     Physical Exam   Constitutional: NAD. Resting comfortably in bed.   Neck: Supple. JVD up to jaw.    Cardiovascular: RRR. No murmurs or gallups.   Pulmonary/Chest: Scant Bibasilar rales. Otherwise clear with good air movment.   Abdominal: Soft. NT. Mild distention with no fluid wave. Normal BS   Musculoskeletal: Trace LE edema (non pitting)   Neurological: AAOx3. No focal deficits.   Skin: Skin is warm and dry.    Nursing note and vitals reviewed.    Significant Labs:  CBC:    Recent Labs  Lab 04/12/18  0400 04/13/18  0625 04/14/18  0400   WBC 4.33 4.62 5.30   RBC 4.49 4.61 4.69   HGB 12.3 12.6 12.7   HCT 39.3 40.4 41.7   * 104* 145*   MCV 88 88 89   MCH 27.4 27.3 27.1   MCHC 31.3* 31.2* 30.5*     BNP:  No results for input(s): BNP in the last 168 hours.    Invalid input(s): BNPTRIAGELBLO  CMP:    Recent Labs  Lab 04/12/18  0400  04/13/18  0625 04/13/18  1614 04/14/18  0400   GLU 82  < > 81 91 69*   CALCIUM 9.4  < > 9.5 10.0 9.7   ALBUMIN 3.9  --  3.9  --  4.1   PROT 6.3  --  6.6  --  6.8     < > 139 138 139   K 3.0*  < > 3.4* 4.5 3.7   CO2 31*  < > 28 32* 31*   CL 98  < > 99 96 96   BUN 15  < > 14 16 16   CREATININE 1.1  < > 1.0 1.3 1.2   ALKPHOS 182*  --  184*  --  192*   ALT 22  --  24  --  27   AST 31  --  33  --  34   BILITOT 0.6  --  0.6  --  0.6   < > = values in this interval not displayed.     I have reviewed all pertinent labs within the past 24 hours.    Estimated Creatinine Clearance: 53.9 mL/min (based on SCr of 1.2 mg/dL).    Diagnostic Results:   TTE (4/5/18):       1 - Normal left ventricular systolic function (EF  60-65%).     2 - No wall motion abnormalities.     3 - Normal left ventricular diastolic function.     4 - Right atrial enlargement.     5 - Right ventricular enlargement with severely depressed systolic function.     6 - Moderate tricuspid regurgitation.     7 - Increased central venous pressure.     8 - Pulmonary hypertension. The estimated PA systolic pressure is 79 mmHg.    Assessment and Plan:     Ms. Smith is a 57 y.o. Female with a past medical history of PHTN WHO Group 1 on IV Remodulin, chronic hypoxic respiratory failure, chronic RV failure on home O2 and BiPAP who presented to [facility] with SOB. She has been having SOB for roughly 1 week, which was progressively worsening. She stated that she sleeps in a recliner currently until she gets a new BiPAP machine. She does wake up short of breath, but is not clear about LE edema. She does state that her weight has been progressively increasing though. She states that she has no symptoms from the Veletri currently.    * Acute on chronic diastolic heart failure    - Remains Wet and warm on exam  - JVD to mid neck   - Continue Lasix 20/hr  - Aggressive replacement of lytes (K>4 and Mg >2)  - I/Os do not correlate to weights or exam very well. She is net negative 24Ls since admission but only down 1lbs(?) which is impossible if numbers are correct.   - Either way her breathing is much better since admission and she is slightly less volume overloaded than my exam on Monday.   - Continue aldactone 25mg BID  - strict I&O's and daily weights.   - Low salt diet        Pulmonary hypertension, group 1    - Continue IV remodulin at 75  - Adempas .5mg TID  - Back on home dose of oxygen (4L ATC)  - Long discussion about DNR/DNI  - LA POLST to be filled out before discharge         Chronic respiratory failure with hypoxia    - 4Ls O2 ATC  - Goal O2 sats 88 or above  - BIPAP QHS  - Continue inhaled therapies for COPD           Gastroesophageal reflux disease    -  Continue PTA protonix        Bipolar affective disorder    Continue PTA Antipsychotics / mood stabilizers             Pedro frankel, DO  Heart Transplant  [facility]

## 2018-04-14 NOTE — PLAN OF CARE
Problem: Patient Care Overview  Goal: Plan of Care Review  Outcome: Ongoing (interventions implemented as appropriate)  Pt AAO x 4. Pt independent. Pt on lasix drip at 20 mg/hr running at 10 mL/hr. Pt has had 1050 mL of   urine output thus far this shift. Pt has remodulin running to right chest well perkins. Remodulin at 75 ng/kg/min DW=85 kg with a concentration 12,000,000 mg/100 cc infusing to a 77 cc/24 hrs. Cassette changed at 1400 today. Pt on 4 L high flow nasal cannula. SpO2 > 88%.   Pt free of injury and falls. Pt wearing nonskid socks/shoes when out of bed, bed in lowest position, side rails up x 2, call light within reach.

## 2018-04-15 LAB
ALBUMIN SERPL BCP-MCNC: 4 G/DL
ALP SERPL-CCNC: 179 U/L
ALT SERPL W/O P-5'-P-CCNC: 23 U/L
ANION GAP SERPL CALC-SCNC: 10 MMOL/L
ANION GAP SERPL CALC-SCNC: 9 MMOL/L
AST SERPL-CCNC: 27 U/L
BASOPHILS # BLD AUTO: 0.02 K/UL
BASOPHILS NFR BLD: 0.5 %
BILIRUB SERPL-MCNC: 0.6 MG/DL
BUN SERPL-MCNC: 16 MG/DL
BUN SERPL-MCNC: 19 MG/DL
CALCIUM SERPL-MCNC: 9.5 MG/DL
CALCIUM SERPL-MCNC: 9.7 MG/DL
CHLORIDE SERPL-SCNC: 95 MMOL/L
CHLORIDE SERPL-SCNC: 98 MMOL/L
CO2 SERPL-SCNC: 33 MMOL/L
CO2 SERPL-SCNC: 33 MMOL/L
CREAT SERPL-MCNC: 1.1 MG/DL
CREAT SERPL-MCNC: 1.2 MG/DL
DIFFERENTIAL METHOD: ABNORMAL
EOSINOPHIL # BLD AUTO: 0.1 K/UL
EOSINOPHIL NFR BLD: 2.6 %
ERYTHROCYTE [DISTWIDTH] IN BLOOD BY AUTOMATED COUNT: 17.3 %
EST. GFR  (AFRICAN AMERICAN): 58.4 ML/MIN/1.73 M^2
EST. GFR  (AFRICAN AMERICAN): >60 ML/MIN/1.73 M^2
EST. GFR  (NON AFRICAN AMERICAN): 50.6 ML/MIN/1.73 M^2
EST. GFR  (NON AFRICAN AMERICAN): 56.3 ML/MIN/1.73 M^2
GLUCOSE SERPL-MCNC: 81 MG/DL
GLUCOSE SERPL-MCNC: 88 MG/DL
HCT VFR BLD AUTO: 40.1 %
HGB BLD-MCNC: 12.6 G/DL
IMM GRANULOCYTES # BLD AUTO: 0.02 K/UL
IMM GRANULOCYTES NFR BLD AUTO: 0.5 %
LYMPHOCYTES # BLD AUTO: 0.7 K/UL
LYMPHOCYTES NFR BLD: 18.7 %
MAGNESIUM SERPL-MCNC: 2.2 MG/DL
MCH RBC QN AUTO: 26.9 PG
MCHC RBC AUTO-ENTMCNC: 31.4 G/DL
MCV RBC AUTO: 86 FL
MONOCYTES # BLD AUTO: 0.3 K/UL
MONOCYTES NFR BLD: 6.6 %
NEUTROPHILS # BLD AUTO: 2.8 K/UL
NEUTROPHILS NFR BLD: 71.1 %
NRBC BLD-RTO: 0 /100 WBC
PHOSPHATE SERPL-MCNC: 4.1 MG/DL
PLATELET # BLD AUTO: 119 K/UL
PMV BLD AUTO: 11.6 FL
POTASSIUM SERPL-SCNC: 2.8 MMOL/L
POTASSIUM SERPL-SCNC: 3.6 MMOL/L
PROT SERPL-MCNC: 6.6 G/DL
RBC # BLD AUTO: 4.68 M/UL
SODIUM SERPL-SCNC: 138 MMOL/L
SODIUM SERPL-SCNC: 140 MMOL/L
WBC # BLD AUTO: 3.91 K/UL

## 2018-04-15 PROCEDURE — 25000003 PHARM REV CODE 250: Performed by: INTERNAL MEDICINE

## 2018-04-15 PROCEDURE — 94761 N-INVAS EAR/PLS OXIMETRY MLT: CPT

## 2018-04-15 PROCEDURE — 85025 COMPLETE CBC W/AUTO DIFF WBC: CPT

## 2018-04-15 PROCEDURE — 84100 ASSAY OF PHOSPHORUS: CPT

## 2018-04-15 PROCEDURE — 36415 COLL VENOUS BLD VENIPUNCTURE: CPT

## 2018-04-15 PROCEDURE — 63600175 PHARM REV CODE 636 W HCPCS: Performed by: INTERNAL MEDICINE

## 2018-04-15 PROCEDURE — 83735 ASSAY OF MAGNESIUM: CPT

## 2018-04-15 PROCEDURE — 99900035 HC TECH TIME PER 15 MIN (STAT)

## 2018-04-15 PROCEDURE — 80048 BASIC METABOLIC PNL TOTAL CA: CPT

## 2018-04-15 PROCEDURE — 27000221 HC OXYGEN, UP TO 24 HOURS

## 2018-04-15 PROCEDURE — A4216 STERILE WATER/SALINE, 10 ML: HCPCS | Performed by: INTERNAL MEDICINE

## 2018-04-15 PROCEDURE — 94660 CPAP INITIATION&MGMT: CPT

## 2018-04-15 PROCEDURE — 25000242 PHARM REV CODE 250 ALT 637 W/ HCPCS: Performed by: INTERNAL MEDICINE

## 2018-04-15 PROCEDURE — 20600001 HC STEP DOWN PRIVATE ROOM

## 2018-04-15 PROCEDURE — 99232 SBSQ HOSP IP/OBS MODERATE 35: CPT | Mod: ,,, | Performed by: INTERNAL MEDICINE

## 2018-04-15 PROCEDURE — 63600175 PHARM REV CODE 636 W HCPCS: Mod: JG | Performed by: INTERNAL MEDICINE

## 2018-04-15 PROCEDURE — 80053 COMPREHEN METABOLIC PANEL: CPT

## 2018-04-15 RX ADMIN — SPIRONOLACTONE 25 MG: 25 TABLET, FILM COATED ORAL at 08:04

## 2018-04-15 RX ADMIN — FUROSEMIDE 20 MG/HR: 10 INJECTION, SOLUTION INTRAVENOUS at 09:04

## 2018-04-15 RX ADMIN — LEVOTHYROXINE SODIUM 75 MCG: 75 TABLET ORAL at 08:04

## 2018-04-15 RX ADMIN — POTASSIUM CHLORIDE 60 MEQ: 1500 TABLET, EXTENDED RELEASE ORAL at 03:04

## 2018-04-15 RX ADMIN — MAGNESIUM OXIDE TAB 400 MG (241.3 MG ELEMENTAL MG) 400 MG: 400 (241.3 MG) TAB at 08:04

## 2018-04-15 RX ADMIN — Medication 3 ML: at 08:04

## 2018-04-15 RX ADMIN — ENOXAPARIN SODIUM 40 MG: 100 INJECTION SUBCUTANEOUS at 04:04

## 2018-04-15 RX ADMIN — POTASSIUM CHLORIDE 60 MEQ: 1500 TABLET, EXTENDED RELEASE ORAL at 08:04

## 2018-04-15 RX ADMIN — FUROSEMIDE 20 MG/HR: 10 INJECTION, SOLUTION INTRAVENOUS at 03:04

## 2018-04-15 RX ADMIN — GABAPENTIN 100 MG: 100 CAPSULE ORAL at 08:04

## 2018-04-15 RX ADMIN — LURASIDONE HYDROCHLORIDE 40 MG: 40 TABLET, FILM COATED ORAL at 08:04

## 2018-04-15 RX ADMIN — LAMOTRIGINE 100 MG: 100 TABLET ORAL at 08:04

## 2018-04-15 RX ADMIN — DESVENLAFAXINE SUCCINATE 100 MG: 50 TABLET, FILM COATED, EXTENDED RELEASE ORAL at 08:04

## 2018-04-15 RX ADMIN — PRAVASTATIN SODIUM 40 MG: 40 TABLET ORAL at 08:04

## 2018-04-15 RX ADMIN — RIOCIGUAT 0.5 MG: 0.5 TABLET, FILM COATED ORAL at 03:04

## 2018-04-15 RX ADMIN — PANTOPRAZOLE SODIUM 40 MG: 40 TABLET, DELAYED RELEASE ORAL at 08:04

## 2018-04-15 RX ADMIN — TREPROSTINIL 60 NG/KG/MIN: 100 INJECTION, SOLUTION INTRAVENOUS; SUBCUTANEOUS at 03:04

## 2018-04-15 RX ADMIN — BUSPIRONE HYDROCHLORIDE 15 MG: 5 TABLET ORAL at 03:04

## 2018-04-15 RX ADMIN — GABAPENTIN 100 MG: 100 CAPSULE ORAL at 03:04

## 2018-04-15 RX ADMIN — BUSPIRONE HYDROCHLORIDE 15 MG: 5 TABLET ORAL at 08:04

## 2018-04-15 RX ADMIN — RIOCIGUAT 0.5 MG: 0.5 TABLET, FILM COATED ORAL at 08:04

## 2018-04-15 RX ADMIN — TIOTROPIUM BROMIDE 18 MCG: 18 CAPSULE ORAL; RESPIRATORY (INHALATION) at 08:04

## 2018-04-15 RX ADMIN — Medication 3 ML: at 03:04

## 2018-04-15 RX ADMIN — FLUTICASONE FUROATE AND VILANTEROL TRIFENATATE 1 PUFF: 100; 25 POWDER RESPIRATORY (INHALATION) at 08:04

## 2018-04-15 NOTE — PLAN OF CARE
Problem: Patient Care Overview  Goal: Plan of Care Review  Outcome: Ongoing (interventions implemented as appropriate)  * AAO x 4  * Cardiac diet patient tolerating well. See chart for % eaten.   * Edema  Resolved to the lower extremities.  * Bed at lowest position and locked, call light within reach, non-slip socks on, and side rails up x 2.  Encouraged patient to call for assistance when needed patient stated understadning. This RN visualized patient ambulating in room gait and balance WNL.  * Remodulin 75 ng/kg/min DW=85 kg with a concentration 12,000,000 mg/100 cc infusing to a 77 cc/24 hrs.   * Right forearm PIV 22 G (04/12/18) patent, dressing clean dry and intact no signs or symptoms of infection noted.  * Lasix 20 mg/hr infusing at 10 cc/hr with a concentration of 2 mg/1 cc verified correct by this RN and SHAHIDA Nicholas RN.  * Oxygen 4 liters via nasal cannula.  Oxygen saturation 88-93 % respirations even and unlabored no distress noted.   * Patient has no complaints of pain at this time.  * Skin assessment preformed no breakdown noted.  * Standard precautions maintained.  * Continuous telemetry monitoring continued SR 80-90.  * Patient able to turn self no assistance needed.  * Patient voiding clear yellow urine.  See flow sheet for intake and output totals

## 2018-04-15 NOTE — PLAN OF CARE
Problem: Patient Care Overview  Goal: Plan of Care Review  Outcome: Ongoing (interventions implemented as appropriate)  Pt AAO x 4. Pt independent. Pt on lasix drip at 20 mg/hr running at 10 mL/hr. Pt has had 1500 mL of   urine output thus far this shift. Pt has remodulin running to right chest well perkins. Remodulin at 75 ng/kg/min DW=85 kg with a concentration 12,000,000 mg/100 cc infusing to a 77 cc/24 hrs. Cassette changed at 1525 today. Pt on 3 L high flow nasal cannula. SpO2 > 88%.   Pt free of injury and falls. Pt wearing nonskid socks/shoes when out of bed, bed in lowest position, side rails up x 2, call light within reach.

## 2018-04-15 NOTE — ASSESSMENT & PLAN NOTE
- Continue IV remodulin at 75 ng   - Adempas .5mg TID  - Back on home dose of oxygen (4L ATC)  - Repeat RHC tomorrow.

## 2018-04-15 NOTE — SUBJECTIVE & OBJECTIVE
Interval History: No acute overnight events. Planning for RHC before discharge given massive discrepancy in UOP and weights during admission.     Continuous Infusions:   furosemide (LASIX) 2 mg/mL infusion (non-titrating) 20 mg/hr (04/14/18 2324)    treprostinil (REMODULIN) infusion 75 ng/kg/min (04/14/18 1422)    veletri/remodulin cassette      veletri/remodulin tubing       Scheduled Meds:   busPIRone  15 mg Oral TID    desvenlafaxine succinate  100 mg Oral Daily    enoxaparin  40 mg Subcutaneous Daily    fluticasone-vilanterol  1 puff Inhalation Daily    gabapentin  100 mg Oral TID    lamoTRIgine  100 mg Oral BID    levothyroxine  75 mcg Oral Daily    lurasidone  40 mg Oral Daily    magnesium oxide  400 mg Oral BID    pantoprazole  40 mg Oral Daily    potassium chloride  60 mEq Oral TID    pravastatin  40 mg Oral Daily    riociguat  0.5 mg Oral TID    sodium chloride 0.9%  3 mL Intravenous Q8H    spironolactone  25 mg Oral BID    tiotropium  1 capsule Inhalation Daily     PRN Meds:acetaminophen, hydrocodone-acetaminophen 10-325mg, loperamide, ondansetron    Review of patient's allergies indicates:   Allergen Reactions    Sulfa (sulfonamide antibiotics) Rash     Objective:     Vital Signs (Most Recent):  Temp: 99.1 °F (37.3 °C) (04/15/18 0821)  Pulse: 85 (04/15/18 0831)  Resp: 16 (04/15/18 0831)  BP: 117/62 (04/15/18 0821)  SpO2: (!) 88 % (04/15/18 0821) Vital Signs (24h Range):  Temp:  [97.6 °F (36.4 °C)-99.1 °F (37.3 °C)] 99.1 °F (37.3 °C)  Pulse:  [72-97] 85  Resp:  [16-20] 16  SpO2:  [87 %-96 %] 88 %  BP: (104-125)/(55-66) 117/62     Patient Vitals for the past 72 hrs (Last 3 readings):   Weight   04/15/18 0600 87.1 kg (192 lb 0.3 oz)   04/14/18 0600 87.9 kg (193 lb 12.6 oz)   04/13/18 1802 91.4 kg (201 lb 8 oz)     Body mass index is 35.12 kg/m².      Intake/Output Summary (Last 24 hours) at 04/15/18 0847  Last data filed at 04/15/18 0800   Gross per 24 hour   Intake             1130 ml    Output             5800 ml   Net            -4670 ml     Physical Exam   Constitutional: NAD. Resting comfortably in bed.   Neck: Supple. JVD to mid neck   Cardiovascular: RRR. No murmurs or gallups.   Pulmonary/Chest: CTA B/L.   Abdominal: Soft. NT. Obese. Normal BS   Musculoskeletal: Trace LE edema (non pitting)   Neurological: AAOx3. No focal deficits.   Skin: Skin is warm and dry.    Nursing note and vitals reviewed.    Significant Labs:  CBC:    Recent Labs  Lab 04/13/18  0625 04/14/18  0400 04/15/18  0638   WBC 4.62 5.30 3.91   RBC 4.61 4.69 4.68   HGB 12.6 12.7 12.6   HCT 40.4 41.7 40.1   * 145* 119*   MCV 88 89 86   MCH 27.3 27.1 26.9*   MCHC 31.2* 30.5* 31.4*     BNP:  No results for input(s): BNP in the last 168 hours.    Invalid input(s): BNPTRIAGELBLO  CMP:    Recent Labs  Lab 04/13/18  0625  04/14/18  0400 04/14/18  1611 04/15/18  0638   GLU 81  < > 69* 85 88   CALCIUM 9.5  < > 9.7 9.8 9.5   ALBUMIN 3.9  --  4.1  --  4.0   PROT 6.6  --  6.8  --  6.6     < > 139 137 140   K 3.4*  < > 3.7 4.0 2.8*   CO2 28  < > 31* 30* 33*   CL 99  < > 96 98 98   BUN 14  < > 16 18 16   CREATININE 1.0  < > 1.2 1.2 1.1   ALKPHOS 184*  --  192*  --  179*   ALT 24  --  27  --  23   AST 33  --  34  --  27   BILITOT 0.6  --  0.6  --  0.6   < > = values in this interval not displayed.     I have reviewed all pertinent labs within the past 24 hours.    Estimated Creatinine Clearance: 58.5 mL/min (based on SCr of 1.1 mg/dL).    Diagnostic Results:   TTE (4/5/18):       1 - Normal left ventricular systolic function (EF 60-65%).     2 - No wall motion abnormalities.     3 - Normal left ventricular diastolic function.     4 - Right atrial enlargement.     5 - Right ventricular enlargement with severely depressed systolic function.     6 - Moderate tricuspid regurgitation.     7 - Increased central venous pressure.     8 - Pulmonary hypertension. The estimated PA systolic pressure is 79 mmHg.

## 2018-04-15 NOTE — PROGRESS NOTES
[facility]  Heart Transplant  Progress Note    Patient Name: Sue Smith  MRN: [id number]  Admission Date: 4/5/2018  Hospital Length of Stay: 10 days  Attending Physician: Shelby De La Paz MD  Primary Care Provider: Paddy Guerrier DO  Principal Problem:Acute on chronic diastolic heart failure    Subjective:     Interval History: No acute overnight events. Planning for RHC before discharge given massive discrepancy in UOP and weights during admission.     Continuous Infusions:   furosemide (LASIX) 2 mg/mL infusion (non-titrating) 20 mg/hr (04/14/18 5904)    treprostinil (REMODULIN) infusion 75 ng/kg/min (04/14/18 1422)    veletri/remodulin cassette      veletri/remodulin tubing       Scheduled Meds:   busPIRone  15 mg Oral TID    desvenlafaxine succinate  100 mg Oral Daily    enoxaparin  40 mg Subcutaneous Daily    fluticasone-vilanterol  1 puff Inhalation Daily    gabapentin  100 mg Oral TID    lamoTRIgine  100 mg Oral BID    levothyroxine  75 mcg Oral Daily    lurasidone  40 mg Oral Daily    magnesium oxide  400 mg Oral BID    pantoprazole  40 mg Oral Daily    potassium chloride  60 mEq Oral TID    pravastatin  40 mg Oral Daily    riociguat  0.5 mg Oral TID    sodium chloride 0.9%  3 mL Intravenous Q8H    spironolactone  25 mg Oral BID    tiotropium  1 capsule Inhalation Daily     PRN Meds:acetaminophen, hydrocodone-acetaminophen 10-325mg, loperamide, ondansetron    Review of patient's allergies indicates:   Allergen Reactions    Sulfa (sulfonamide antibiotics) Rash     Objective:     Vital Signs (Most Recent):  Temp: 99.1 °F (37.3 °C) (04/15/18 0821)  Pulse: 85 (04/15/18 0831)  Resp: 16 (04/15/18 0831)  BP: 117/62 (04/15/18 0821)  SpO2: (!) 88 % (04/15/18 0821) Vital Signs (24h Range):  Temp:  [97.6 °F (36.4 °C)-99.1 °F (37.3 °C)] 99.1 °F (37.3 °C)  Pulse:  [72-97] 85  Resp:  [16-20] 16  SpO2:  [87 %-96 %] 88 %  BP: (104-125)/(55-66) 117/62     Patient Vitals for the past 72  hrs (Last 3 readings):   Weight   04/15/18 0600 87.1 kg (192 lb 0.3 oz)   04/14/18 0600 87.9 kg (193 lb 12.6 oz)   04/13/18 1802 91.4 kg (201 lb 8 oz)     Body mass index is 35.12 kg/m².      Intake/Output Summary (Last 24 hours) at 04/15/18 0847  Last data filed at 04/15/18 0800   Gross per 24 hour   Intake             1130 ml   Output             5800 ml   Net            -4670 ml     Physical Exam   Constitutional: NAD. Resting comfortably in bed.   Neck: Supple. JVD to mid neck   Cardiovascular: RRR. No murmurs or gallups.   Pulmonary/Chest: CTA B/L.   Abdominal: Soft. NT. Obese. Normal BS   Musculoskeletal: Trace LE edema (non pitting)   Neurological: AAOx3. No focal deficits.   Skin: Skin is warm and dry.    Nursing note and vitals reviewed.    Significant Labs:  CBC:    Recent Labs  Lab 04/13/18  0625 04/14/18  0400 04/15/18  0638   WBC 4.62 5.30 3.91   RBC 4.61 4.69 4.68   HGB 12.6 12.7 12.6   HCT 40.4 41.7 40.1   * 145* 119*   MCV 88 89 86   MCH 27.3 27.1 26.9*   MCHC 31.2* 30.5* 31.4*     BNP:  No results for input(s): BNP in the last 168 hours.    Invalid input(s): BNPTRIAGELBLO  CMP:    Recent Labs  Lab 04/13/18  0625  04/14/18  0400 04/14/18  1611 04/15/18  0638   GLU 81  < > 69* 85 88   CALCIUM 9.5  < > 9.7 9.8 9.5   ALBUMIN 3.9  --  4.1  --  4.0   PROT 6.6  --  6.8  --  6.6     < > 139 137 140   K 3.4*  < > 3.7 4.0 2.8*   CO2 28  < > 31* 30* 33*   CL 99  < > 96 98 98   BUN 14  < > 16 18 16   CREATININE 1.0  < > 1.2 1.2 1.1   ALKPHOS 184*  --  192*  --  179*   ALT 24  --  27  --  23   AST 33  --  34  --  27   BILITOT 0.6  --  0.6  --  0.6   < > = values in this interval not displayed.     I have reviewed all pertinent labs within the past 24 hours.    Estimated Creatinine Clearance: 58.5 mL/min (based on SCr of 1.1 mg/dL).    Diagnostic Results:   TTE (4/5/18):       1 - Normal left ventricular systolic function (EF 60-65%).     2 - No wall motion abnormalities.     3 - Normal left  ventricular diastolic function.     4 - Right atrial enlargement.     5 - Right ventricular enlargement with severely depressed systolic function.     6 - Moderate tricuspid regurgitation.     7 - Increased central venous pressure.     8 - Pulmonary hypertension. The estimated PA systolic pressure is 79 mmHg.    Assessment and Plan:     Ms. Smith is a 57 y.o. Female with a past medical history of PHTN WHO Group 1 on IV Remodulin, chronic hypoxic respiratory failure, chronic RV failure on home O2 and BiPAP who presented to [facility] with SOB. She has been having SOB for roughly 1 week, which was progressively worsening. She stated that she sleeps in a recliner currently until she gets a new BiPAP machine. She does wake up short of breath, but is not clear about LE edema. She does state that her weight has been progressively increasing though. She states that she has no symptoms from the Veletri currently.    * Acute on chronic diastolic heart failure    - Remains Wet and warm on exam  - Continue Lasix 20/hr  - Aggressive replacement of lytes (K>4 and Mg >2)  - Will plan for RHC tomorrow.   - Continue aldactone 25mg BID  - strict I&O's and daily weights.   - Low salt diet  - DNR / DNI signed this admission        Pulmonary hypertension, group 1    - Continue IV remodulin at 75 ng   - Adempas .5mg TID  - Back on home dose of oxygen (4L ATC)  - Repeat RHC tomorrow.         Chronic respiratory failure with hypoxia    - 4Ls O2 ATC  - Goal O2 sats 88 or above  - BIPAP QHS  - Continue inhaled therapies for COPD           Gastroesophageal reflux disease    - Continue PTA protonix        Bipolar affective disorder    Continue PTA Antipsychotics / mood stabilizers             Pedro frankel, DO  Heart Transplant  [facility]

## 2018-04-15 NOTE — ASSESSMENT & PLAN NOTE
- Remains Wet and warm on exam  - Continue Lasix 20/hr  - Aggressive replacement of lytes (K>4 and Mg >2)  - Will plan for RHC tomorrow.   - Continue aldactone 25mg BID  - strict I&O's and daily weights.   - Low salt diet  - DNR / DNI signed this admission

## 2018-04-16 DIAGNOSIS — J96.11 CHRONIC RESPIRATORY FAILURE WITH HYPOXIA: ICD-10-CM

## 2018-04-16 DIAGNOSIS — I27.20 PULMONARY HYPERTENSION: Primary | ICD-10-CM

## 2018-04-16 LAB
ALBUMIN SERPL BCP-MCNC: 4.1 G/DL
ALP SERPL-CCNC: 180 U/L
ALT SERPL W/O P-5'-P-CCNC: 22 U/L
ANION GAP SERPL CALC-SCNC: 10 MMOL/L
ANION GAP SERPL CALC-SCNC: 11 MMOL/L
ANION GAP SERPL CALC-SCNC: 11 MMOL/L
AST SERPL-CCNC: 24 U/L
BASOPHILS # BLD AUTO: 0.03 K/UL
BASOPHILS NFR BLD: 0.7 %
BILIRUB SERPL-MCNC: 0.7 MG/DL
BUN SERPL-MCNC: 17 MG/DL
BUN SERPL-MCNC: 17 MG/DL
BUN SERPL-MCNC: 18 MG/DL
CALCIUM SERPL-MCNC: 10.1 MG/DL
CALCIUM SERPL-MCNC: 9.4 MG/DL
CALCIUM SERPL-MCNC: 9.7 MG/DL
CHLORIDE SERPL-SCNC: 95 MMOL/L
CHLORIDE SERPL-SCNC: 95 MMOL/L
CHLORIDE SERPL-SCNC: 97 MMOL/L
CO2 SERPL-SCNC: 30 MMOL/L
CO2 SERPL-SCNC: 33 MMOL/L
CO2 SERPL-SCNC: 33 MMOL/L
CREAT SERPL-MCNC: 1.1 MG/DL
CREAT SERPL-MCNC: 1.1 MG/DL
CREAT SERPL-MCNC: 1.3 MG/DL
DIFFERENTIAL METHOD: ABNORMAL
EOSINOPHIL # BLD AUTO: 0.1 K/UL
EOSINOPHIL NFR BLD: 2.8 %
ERYTHROCYTE [DISTWIDTH] IN BLOOD BY AUTOMATED COUNT: 17.1 %
EST. GFR  (AFRICAN AMERICAN): 53 ML/MIN/1.73 M^2
EST. GFR  (AFRICAN AMERICAN): >60 ML/MIN/1.73 M^2
EST. GFR  (AFRICAN AMERICAN): >60 ML/MIN/1.73 M^2
EST. GFR  (NON AFRICAN AMERICAN): 46 ML/MIN/1.73 M^2
EST. GFR  (NON AFRICAN AMERICAN): 56.3 ML/MIN/1.73 M^2
EST. GFR  (NON AFRICAN AMERICAN): 56.3 ML/MIN/1.73 M^2
GLUCOSE SERPL-MCNC: 79 MG/DL
GLUCOSE SERPL-MCNC: 90 MG/DL
GLUCOSE SERPL-MCNC: 94 MG/DL
HCT VFR BLD AUTO: 39.4 %
HGB BLD-MCNC: 12.3 G/DL
IMM GRANULOCYTES # BLD AUTO: 0.03 K/UL
IMM GRANULOCYTES NFR BLD AUTO: 0.7 %
INR PPP: 1.1
LYMPHOCYTES # BLD AUTO: 0.7 K/UL
LYMPHOCYTES NFR BLD: 14.7 %
MAGNESIUM SERPL-MCNC: 2.2 MG/DL
MCH RBC QN AUTO: 27.5 PG
MCHC RBC AUTO-ENTMCNC: 31.2 G/DL
MCV RBC AUTO: 88 FL
MONOCYTES # BLD AUTO: 0.3 K/UL
MONOCYTES NFR BLD: 5.5 %
NEUTROPHILS # BLD AUTO: 3.5 K/UL
NEUTROPHILS NFR BLD: 75.6 %
NRBC BLD-RTO: 0 /100 WBC
PHOSPHATE SERPL-MCNC: 4 MG/DL
PLATELET # BLD AUTO: 123 K/UL
PMV BLD AUTO: 11 FL
POTASSIUM SERPL-SCNC: 3 MMOL/L
POTASSIUM SERPL-SCNC: 3.7 MMOL/L
POTASSIUM SERPL-SCNC: 3.9 MMOL/L
PROT SERPL-MCNC: 6.6 G/DL
PROTHROMBIN TIME: 11.4 SEC
RBC # BLD AUTO: 4.48 M/UL
SODIUM SERPL-SCNC: 136 MMOL/L
SODIUM SERPL-SCNC: 138 MMOL/L
SODIUM SERPL-SCNC: 141 MMOL/L
WBC # BLD AUTO: 4.57 K/UL

## 2018-04-16 PROCEDURE — B2141ZZ FLUOROSCOPY OF RIGHT HEART USING LOW OSMOLAR CONTRAST: ICD-10-PCS | Performed by: INTERNAL MEDICINE

## 2018-04-16 PROCEDURE — 20600001 HC STEP DOWN PRIVATE ROOM: Mod: NTX

## 2018-04-16 PROCEDURE — 85610 PROTHROMBIN TIME: CPT | Mod: NTX

## 2018-04-16 PROCEDURE — 25000003 PHARM REV CODE 250: Performed by: INTERNAL MEDICINE

## 2018-04-16 PROCEDURE — 25000003 PHARM REV CODE 250

## 2018-04-16 PROCEDURE — 99232 SBSQ HOSP IP/OBS MODERATE 35: CPT | Mod: NTX,,, | Performed by: INTERNAL MEDICINE

## 2018-04-16 PROCEDURE — 94660 CPAP INITIATION&MGMT: CPT

## 2018-04-16 PROCEDURE — 02HP32Z INSERTION OF MONITORING DEVICE INTO PULMONARY TRUNK, PERCUTANEOUS APPROACH: ICD-10-PCS | Performed by: INTERNAL MEDICINE

## 2018-04-16 PROCEDURE — 4A1239Z MONITORING OF CARDIAC OUTPUT, PERCUTANEOUS APPROACH: ICD-10-PCS | Performed by: INTERNAL MEDICINE

## 2018-04-16 PROCEDURE — 36415 COLL VENOUS BLD VENIPUNCTURE: CPT | Mod: NTX

## 2018-04-16 PROCEDURE — 97802 MEDICAL NUTRITION INDIV IN: CPT | Performed by: DIETITIAN, REGISTERED

## 2018-04-16 PROCEDURE — 99222 1ST HOSP IP/OBS MODERATE 55: CPT | Mod: GV,GC,NTX, | Performed by: SURGERY

## 2018-04-16 PROCEDURE — 85025 COMPLETE CBC W/AUTO DIFF WBC: CPT

## 2018-04-16 PROCEDURE — 94760 N-INVAS EAR/PLS OXIMETRY 1: CPT

## 2018-04-16 PROCEDURE — 25000003 PHARM REV CODE 250: Mod: NTX | Performed by: INTERNAL MEDICINE

## 2018-04-16 PROCEDURE — 63600175 PHARM REV CODE 636 W HCPCS: Performed by: INTERNAL MEDICINE

## 2018-04-16 PROCEDURE — 80048 BASIC METABOLIC PNL TOTAL CA: CPT | Mod: 91,NTX

## 2018-04-16 PROCEDURE — 93451 RIGHT HEART CATH: CPT | Mod: 26,GV,NTX, | Performed by: INTERNAL MEDICINE

## 2018-04-16 PROCEDURE — 83735 ASSAY OF MAGNESIUM: CPT

## 2018-04-16 PROCEDURE — 25000242 PHARM REV CODE 250 ALT 637 W/ HCPCS: Performed by: INTERNAL MEDICINE

## 2018-04-16 PROCEDURE — 4A133B3 MONITORING OF ARTERIAL PRESSURE, PULMONARY, PERCUTANEOUS APPROACH: ICD-10-PCS | Performed by: INTERNAL MEDICINE

## 2018-04-16 PROCEDURE — 80048 BASIC METABOLIC PNL TOTAL CA: CPT | Mod: NTX

## 2018-04-16 PROCEDURE — 99900035 HC TECH TIME PER 15 MIN (STAT)

## 2018-04-16 PROCEDURE — 4A023N6 MEASUREMENT OF CARDIAC SAMPLING AND PRESSURE, RIGHT HEART, PERCUTANEOUS APPROACH: ICD-10-PCS | Performed by: INTERNAL MEDICINE

## 2018-04-16 PROCEDURE — 80053 COMPREHEN METABOLIC PANEL: CPT

## 2018-04-16 PROCEDURE — C1894 INTRO/SHEATH, NON-LASER: HCPCS | Mod: NTX

## 2018-04-16 PROCEDURE — 84100 ASSAY OF PHOSPHORUS: CPT

## 2018-04-16 RX ORDER — POTASSIUM CHLORIDE 20 MEQ/1
40 TABLET, EXTENDED RELEASE ORAL ONCE
Status: COMPLETED | OUTPATIENT
Start: 2018-04-16 | End: 2018-04-16

## 2018-04-16 RX ORDER — SPIRONOLACTONE 25 MG/1
25 TABLET ORAL ONCE
Status: COMPLETED | OUTPATIENT
Start: 2018-04-16 | End: 2018-04-16

## 2018-04-16 RX ORDER — SPIRONOLACTONE 25 MG/1
50 TABLET ORAL 2 TIMES DAILY
Status: DISCONTINUED | OUTPATIENT
Start: 2018-04-17 | End: 2018-04-20

## 2018-04-16 RX ADMIN — SPIRONOLACTONE 25 MG: 25 TABLET, FILM COATED ORAL at 10:04

## 2018-04-16 RX ADMIN — RIOCIGUAT 0.5 MG: 0.5 TABLET, FILM COATED ORAL at 08:04

## 2018-04-16 RX ADMIN — BUSPIRONE HYDROCHLORIDE 15 MG: 5 TABLET ORAL at 05:04

## 2018-04-16 RX ADMIN — GABAPENTIN 100 MG: 100 CAPSULE ORAL at 08:04

## 2018-04-16 RX ADMIN — LEVOTHYROXINE SODIUM 75 MCG: 75 TABLET ORAL at 08:04

## 2018-04-16 RX ADMIN — LAMOTRIGINE 100 MG: 100 TABLET ORAL at 08:04

## 2018-04-16 RX ADMIN — FUROSEMIDE 20 MG/HR: 10 INJECTION, SOLUTION INTRAVENOUS at 10:04

## 2018-04-16 RX ADMIN — RIOCIGUAT 0.5 MG: 0.5 TABLET, FILM COATED ORAL at 05:04

## 2018-04-16 RX ADMIN — DESVENLAFAXINE SUCCINATE 100 MG: 50 TABLET, FILM COATED, EXTENDED RELEASE ORAL at 08:04

## 2018-04-16 RX ADMIN — MAGNESIUM OXIDE TAB 400 MG (241.3 MG ELEMENTAL MG) 400 MG: 400 (241.3 MG) TAB at 08:04

## 2018-04-16 RX ADMIN — LURASIDONE HYDROCHLORIDE 40 MG: 40 TABLET, FILM COATED ORAL at 08:04

## 2018-04-16 RX ADMIN — Medication: at 02:04

## 2018-04-16 RX ADMIN — POTASSIUM CHLORIDE 60 MEQ: 1500 TABLET, EXTENDED RELEASE ORAL at 08:04

## 2018-04-16 RX ADMIN — GABAPENTIN 100 MG: 100 CAPSULE ORAL at 05:04

## 2018-04-16 RX ADMIN — SPIRONOLACTONE 25 MG: 25 TABLET, FILM COATED ORAL at 08:04

## 2018-04-16 RX ADMIN — POTASSIUM CHLORIDE 60 MEQ: 1500 TABLET, EXTENDED RELEASE ORAL at 05:04

## 2018-04-16 RX ADMIN — FUROSEMIDE 20 MG/HR: 10 INJECTION, SOLUTION INTRAVENOUS at 08:04

## 2018-04-16 RX ADMIN — POTASSIUM CHLORIDE 40 MEQ: 1500 TABLET, EXTENDED RELEASE ORAL at 09:04

## 2018-04-16 RX ADMIN — BUSPIRONE HYDROCHLORIDE 15 MG: 5 TABLET ORAL at 09:04

## 2018-04-16 RX ADMIN — BUSPIRONE HYDROCHLORIDE 15 MG: 5 TABLET ORAL at 08:04

## 2018-04-16 RX ADMIN — FUROSEMIDE 20 MG/HR: 10 INJECTION, SOLUTION INTRAVENOUS at 01:04

## 2018-04-16 RX ADMIN — TIOTROPIUM BROMIDE 18 MCG: 18 CAPSULE ORAL; RESPIRATORY (INHALATION) at 08:04

## 2018-04-16 RX ADMIN — FLUTICASONE FUROATE AND VILANTEROL TRIFENATATE 1 PUFF: 100; 25 POWDER RESPIRATORY (INHALATION) at 08:04

## 2018-04-16 RX ADMIN — PANTOPRAZOLE SODIUM 40 MG: 40 TABLET, DELAYED RELEASE ORAL at 08:04

## 2018-04-16 RX ADMIN — PRAVASTATIN SODIUM 40 MG: 40 TABLET ORAL at 08:04

## 2018-04-16 NOTE — PLAN OF CARE
Problem: Patient Care Overview  Goal: Plan of Care Review  Outcome: Ongoing (interventions implemented as appropriate)  Patient aao x4. She is up independent. Call bell within reach. Pt continues on lasix gtt at 20mg/hr and remodulin at 75ng/kg/min, cadd rate 77 ml/24 hr. She had rhc done today. Had issues with neck bleeding post rhc. vasc was consulted. Pt came back with dressing to neck cdi. Plan for bedrest x 3 hours and pt/inr to be checked. Will continue to monitor.

## 2018-04-16 NOTE — INTERVAL H&P NOTE
Patient seen and examined. No interval change since last H&P. Here for an echo guided RV biopsy.  Access via the right IJ  7 Fr venous sheath  Risks and benefits of the procedure were explained to the patient. All questions were answered.  Consents were signed and in the chart.    Maty Bosch MD

## 2018-04-16 NOTE — NURSING
Pt returned from [facility] at this time. Bedrest till 8pm. Will continue to monitor.

## 2018-04-16 NOTE — PROGRESS NOTES
" [facility]  Adult Nutrition  Progress Note    SUMMARY       Recommendations    Recommendation/Intervention: Continue cardiac diet, fluid restriction per MD. TAMEZ following.     Goals: Consume/tolerate > 75% EEN/EPN  Nutrition Goal Status: new  Communication of RD Recs: reviewed with RN    Reason for Assessment    Reason for Assessment: length of stay  Diagnosis: cardiac disease  Relevant Medical History: chronix hypoxic respiratory failure, home bipap, bipolar, CHF, GERD  Interdisciplinary Rounds: did not attend  General Information Comments: worsening SOB, pt just made DNR on this admission, eating 100% of meals with no GI complaints, understands low sodium diet, no questions  Nutrition Discharge Planning: Adequate PO nutrition, low Na diet    Nutrition Risk Screen    Nutrition Risk Screen: no indicators present    Nutrition/Diet History    Patient Reported Diet/Restrictions/Preferences: low salt  Do you have any cultural, spiritual, Anabaptist conflicts, given your current situation?: none  Factors Affecting Nutritional Intake: None identified at this time    Anthropometrics    Temp: 98.5 °F (36.9 °C)  Height Method: Stated  Height: 5' 2.01" (157.5 cm)  Height (inches): 62.01 in  Weight Method: Standard Scale  Weight: 86.1 kg (189 lb 13.1 oz)  Weight (lb): 189.82 lb  Ideal Body Weight (IBW), Female: 110.05 lb  % Ideal Body Weight, Female (lb): 182.38 lb  BMI (Calculated): 36.8  BMI Grade: 35 - 39.9 - obesity - grade II       Lab/Procedures/Meds    Pertinent Labs Reviewed: reviewed  Pertinent Labs Comments: K 3.0, Alk Phos 180  Pertinent Medications Reviewed: reviewed  Pertinent Medications Comments: pantoprazole, statin, lasix    Physical Findings/Assessment    Overall Physical Appearance: on oxygen therapy, overweight  Oral/Mouth Cavity: WDL  Skin: intact    Estimated/Assessed Needs    Weight Used For Calorie Calculations: 86.1 kg (189 lb 13.1 oz)  Energy Calorie Requirements (kcal): 1755 (1.25x " PAL)  Energy Need Method: [facility]  Protein Requirements: 86g (1.0 g/kg)  Weight Used For Protein Calculations: 86.1 kg (189 lb 13.1 oz)     Fluid Need Method: RDA Method  RDA Method (mL): 1755         Nutrition Prescription Ordered    Current Diet Order: Cardiac  Nutrition Order Comments: 1500mL FR    Evaluation of Received Nutrient/Fluid Intake    I/O: -31.5L since admit  Energy Calories Required: meeting needs  Protein Required: meeting needs  Fluid Required: meeting needs  Tolerance: tolerating  % Intake of Estimated Energy Needs: 75 - 100 %  % Meal Intake: 75 - 100 %    Nutrition Risk    Level of Risk/Frequency of Follow-up:  (1x/week)     Assessment and Plan    Chronic respiratory failure with hypoxia    Contributing Nutrition Diagnosis  Increased nutrient needs    Related to (etiology):   Physiological needs 2' CHF    Signs and Symptoms (as evidenced by):   EEN/EPN    Interventions/Recommendations (treatment strategy):  See recs    Nutrition Diagnosis Status:   New               Monitor and Evaluation    Food and Nutrient Intake: energy intake, food and beverage intake  Food and Nutrient Adminstration: diet order  Anthropometric Measurements: weight, weight change, body mass index  Biochemical Data, Medical Tests and Procedures: electrolyte and renal panel, lipid profile, gastrointestinal profile, glucose/endocrine profile, inflammatory profile  Nutrition-Focused Physical Findings: overall appearance     Nutrition Follow-Up    RD Follow-up?: Yes

## 2018-04-16 NOTE — ASSESSMENT & PLAN NOTE
Contributing Nutrition Diagnosis  Increased nutrient needs    Related to (etiology):   Physiological needs 2' CHF    Signs and Symptoms (as evidenced by):   EEN/EPN    Interventions/Recommendations (treatment strategy):  See recs    Nutrition Diagnosis Status:   New

## 2018-04-16 NOTE — PLAN OF CARE
Problem: Patient Care Overview  Goal: Plan of Care Review  Outcome: Ongoing (interventions implemented as appropriate)  * AAO x 4  * Cardiac diet patient tolerating well. See chart for % eaten.   * Edema  Resolved to the lower extremities.  * Bed at lowest position and locked, call light within reach, non-slip socks on, and side rails up x 2.  Encouraged patient to call for assistance when needed patient stated understadning. This RN visualized patient ambulating in room gait and balance WNL.  * Remodulin 75 ng/kg/min DW=85 kg with a concentration 12,000,000 mg/100 cc infusing to a 77 cc/24 hrs.   * Right forearm PIV 22 G (04/12/18) slightly swollen and red PIV removed catheter intact gauze applied and secured with coban, no signs or symptoms of infection noted.  * Right FA PIV 22 G (4/15/18) inserted by this RN blood return noted dressing applied and secured with coban.   * Lasix 20 mg/hr infusing at 10 cc/hr with a concentration of 2 mg/1 cc verified correct by this RN and SHAHIDA Nicholas RN.  * Oxygen 4 liters via nasal cannula.  Oxygen saturation 88-93 % respirations even and unlabored no distress noted. CPAP at night.   * Patient has no complaints of pain at this time.  * Skin assessment preformed no breakdown noted.  * Standard precautions maintained.  * Continuous telemetry monitoring continued SR 80-90.  * Patient able to turn self no assistance needed.  * Patient voiding clear yellow urine.  See flow sheet for intake and output totals

## 2018-04-16 NOTE — ASSESSMENT & PLAN NOTE
- 4Ls O2 ATC  - Goal O2 sats 88 or above  - BIPAP QHS  - Continue inhaled therapies for COPD   - Prior PFTs as above.

## 2018-04-16 NOTE — ASSESSMENT & PLAN NOTE
- Remains Wet and warm on exam  - Continue Lasix 20/hr  - Aggressive replacement of lytes (K>4 and Mg >2)  - RHC today to review volume status  - Continue aldactone 25mg BID  - strict I&O's and daily weights.   - Low salt diet  - DNR / DNI signed this admission.

## 2018-04-16 NOTE — BRIEF OP NOTE
[facility]  Brief Operative Note  Cardiology    SUMMARY     Surgery Date: 4/16/2018     Surgeon(s) and Role:     * Maty Bosch MD - Primary    Assisting Surgeon: None    Pre-op Diagnosis:  Pulmonary hypertension [I27.20]    Post-op Diagnosis: Pulmonary HTN    Procedure Performed: RHC    Description of the findings of the procedure: RHC performed via the right IJ.  Significantly elevated left and right side filling pressures (RA= 19, PCWP= 25 mm of Hg). Severe pulmonary HTN  (PA=80/30 mm of Hg, PA mean=46 mm of Hg ). Low cardiac index and output  (CI=1.8 L/min/m2, CO=3.4 L/min) on Remodulin. Post-procedure patient started bleeding at the access site. Pressure was applied for 30 min and despite use of D-stat patient still oozing. Vascular team was consulted. Will continue to hold pressure for now and monitor closely.     Estimated Blood Loss: < 10 cc    Plan:   - Monitor for bleeding at access site  - Consult vascular team  - Continue aggressive IV diuresis and inotrope therapy as per primary team    Maty Bosch MD

## 2018-04-16 NOTE — PROGRESS NOTES
[facility]  Heart Transplant  Progress Note    Patient Name: Sue Smith  MRN: [id number]  Admission Date: 4/5/2018  Hospital Length of Stay: 11 days  Attending Physician: Shelby De La Paz MD  Primary Care Provider: Paddy Guerrier DO  Principal Problem:Acute on chronic diastolic heart failure    Subjective:     Interval History:     No acute overnight event. sited on chair this morning and mentioning that she is ambulating in the ford away with less assistance.Her Remodulin has been up-titrated to 75 ng. Adempas was changed from 1mg TID to 0.5mg TID due to hypotension.  Planned for RHC to better assess volume today due to poor bediside weight monitoring during stay.  PFTs in the past had shown  Normal spirometry. Although the vital capacity remains in the normal range, lung volume determination suggests mild restriction is present. Normal diffusion capacity. Normal MVV. Arterial blood gases are consistent with a mild mixed metabolic and respiratory alkalosis. Arterial oxygen tension is adequate on supplemental oxygen.     Continuous Infusions:   furosemide (LASIX) 2 mg/mL infusion (non-titrating) 20 mg/hr (04/16/18 1047)    treprostinil (REMODULIN) infusion 60 ng/kg/min (04/15/18 1525)    veletri/remodulin cassette      veletri/remodulin tubing       Scheduled Meds:   busPIRone  15 mg Oral TID    desvenlafaxine succinate  100 mg Oral Daily    enoxaparin  40 mg Subcutaneous Daily    fluticasone-vilanterol  1 puff Inhalation Daily    gabapentin  100 mg Oral TID    lamoTRIgine  100 mg Oral BID    levothyroxine  75 mcg Oral Daily    lurasidone  40 mg Oral Daily    magnesium oxide  400 mg Oral BID    pantoprazole  40 mg Oral Daily    potassium chloride  60 mEq Oral TID    pravastatin  40 mg Oral Daily    riociguat  0.5 mg Oral TID    sodium chloride 0.9%  3 mL Intravenous Q8H    spironolactone  25 mg Oral BID    tiotropium  1 capsule Inhalation Daily     PRN Meds:acetaminophen,  hydrocodone-acetaminophen 10-325mg, loperamide, ondansetron    Review of patient's allergies indicates:   Allergen Reactions    Sulfa (sulfonamide antibiotics) Rash     Objective:     Vital Signs (Most Recent):  Temp: 98.5 °F (36.9 °C) (04/16/18 1148)  Pulse: 92 (04/16/18 1148)  Resp: 19 (04/16/18 1148)  BP: 112/60 (04/16/18 1148)  SpO2: (!) 91 % (04/16/18 1148) Vital Signs (24h Range):  Temp:  [97.5 °F (36.4 °C)-99.3 °F (37.4 °C)] 98.5 °F (36.9 °C)  Pulse:  [77-95] 92  Resp:  [16-20] 19  SpO2:  [89 %-95 %] 91 %  BP: (101-117)/(55-70) 112/60     Patient Vitals for the past 72 hrs (Last 3 readings):   Weight   04/16/18 0500 86.1 kg (189 lb 13.1 oz)   04/15/18 0600 87.1 kg (192 lb 0.3 oz)   04/14/18 0600 87.9 kg (193 lb 12.6 oz)     Body mass index is 34.71 kg/m².      Intake/Output Summary (Last 24 hours) at 04/16/18 1236  Last data filed at 04/16/18 1000   Gross per 24 hour   Intake             1090 ml   Output             5400 ml   Net            -4310 ml       Hemodynamic Parameters:       Telemetry:     Physical Exam    Constitutional: She is oriented to person, place, and time.   Morbidly obese   Neck: JVD at the clavicle. No HJR present  Cardiovascular: Normal rate, regular rhythm and normal heart sounds.  Exam reveals no gallop and no friction rub.   Pulmonary/Chest: Effort normal and breath sounds normal.   Abdominal: Soft. Bowel sounds are normal. Less distended compared to admission  Musculoskeletal: She exhibits no edema or tenderness.   Neurological: She is alert and oriented to person, place, and time.   Skin: Skin is warm and dry.   Psychiatric: pleasant and calm today  Nursing note and vitals reviewed.    Significant Labs:  CBC:    Recent Labs  Lab 04/14/18  0400 04/15/18  0638 04/16/18  0439   WBC 5.30 3.91 4.57   RBC 4.69 4.68 4.48   HGB 12.7 12.6 12.3   HCT 41.7 40.1 39.4   * 119* 123*   MCV 89 86 88   MCH 27.1 26.9* 27.5   MCHC 30.5* 31.4* 31.2*     BNP:  No results for input(s): BNP in the  last 168 hours.    Invalid input(s): BNPTRIAGELBLO  CMP:    Recent Labs  Lab 04/14/18  0400  04/15/18  0638 04/15/18  1450 04/16/18  0439   GLU 69*  < > 88 81 79   CALCIUM 9.7  < > 9.5 9.7 9.4   ALBUMIN 4.1  --  4.0  --  4.1   PROT 6.8  --  6.6  --  6.6     < > 140 138 136   K 3.7  < > 2.8* 3.6 3.0*   CO2 31*  < > 33* 33* 30*   CL 96  < > 98 95 95   BUN 16  < > 16 19 17   CREATININE 1.2  < > 1.1 1.2 1.1   ALKPHOS 192*  --  179*  --  180*   ALT 27  --  23  --  22   AST 34  --  27  --  24   BILITOT 0.6  --  0.6  --  0.7   < > = values in this interval not displayed.   Coagulation:   No results for input(s): PT, INR, APTT in the last 168 hours.  LDH:  No results for input(s): LDH in the last 72 hours.  Microbiology:  Microbiology Results (last 7 days)     ** No results found for the last 168 hours. **          I have reviewed all pertinent labs within the past 24 hours.    Estimated Creatinine Clearance: 58.1 mL/min (based on SCr of 1.1 mg/dL).    Diagnostic Results:  CT: No results found in the last 24 hours.    Assessment and Plan:     Ms. Smith is a 57 y.o. Female with a past medical history of PHTN WHO Group 1 on IV Remodulin, chronic hypoxic respiratory failure, chronic RV failure on home O2 and BiPAP who presented to [facility] with SOB. She has been having SOB for roughly 1 week, which was progressively worsening. She stated that she sleeps in a recliner currently until she gets a new BiPAP machine. She does wake up short of breath, but is not clear about LE edema. She does state that her weight has been progressively increasing though. She states that she has no symptoms from the Veletri currently.    * Acute on chronic diastolic heart failure    - Remains Wet and warm on exam  - Continue Lasix 20/hr  - Aggressive replacement of lytes (K>4 and Mg >2)  - RHC today to review volume status  - Continue aldactone 25mg BID  - strict I&O's and daily weights.   - Low salt diet  - DNR / DNI signed this  admission.        Gastroesophageal reflux disease    - Continue PTA protonix        Bipolar affective disorder    Continue PTA Antipsychotics / mood stabilizers. No acute ongoing issues         Chronic respiratory failure with hypoxia    - 4Ls O2 ATC  - Goal O2 sats 88 or above  - BIPAP QHS  - Continue inhaled therapies for COPD   - Prior PFTs as above.          Pulmonary hypertension, group 1    - Continue IV remodulin at 75 ng   - Adempas .5mg TID  - Back on home dose of oxygen (4L ATC)  - Repeat RHC today  - Lung transplant consulted to review and advise on possible tx           Discussed plan of care with Dr. Judith Peñaloza MD  Heart Transplant  [facility]

## 2018-04-16 NOTE — INTERVAL H&P NOTE
Patient seen and examined. No interval change since last H&P. Here for a RHC to assess her hemodynamics.   Access via the right IJ  7 Fr venous sheath  Risks and benefits of the procedure were explained to the patient. All questions were answered.  Consents were signed and in the chart.    Maty Bosch MD

## 2018-04-16 NOTE — ASSESSMENT & PLAN NOTE
- Continue IV remodulin at 75 ng   - Adempas .5mg TID  - Back on home dose of oxygen (4L ATC)  - Repeat RHC today  - Lung transplant consulted to review and advise on possible tx

## 2018-04-16 NOTE — SUBJECTIVE & OBJECTIVE
Interval History:     No acute overnight event. sited on chair this morning and mentioning that she is ambulating [place] the [place] with less assistance.Her Remodulin has been up-titrated to 75 ng. Adempas was changed from 1mg TID to 0.5mg TID due to hypotension.  Planned for RHC to better assess volume today due to poor bediside weight monitoring during stay.  PFTs in the past had shown  Normal spirometry. Although the vital capacity remains in the normal range, lung volume determination suggests mild restriction is present. Normal diffusion capacity. Normal MVV. Arterial blood gases are consistent with a mild mixed metabolic and respiratory alkalosis. Arterial oxygen tension is adequate on supplemental oxygen.     Continuous Infusions:   furosemide (LASIX) 2 mg/mL infusion (non-titrating) 20 mg/hr (04/16/18 1047)    treprostinil (REMODULIN) infusion 60 ng/kg/min (04/15/18 1525)    veletri/remodulin cassette      veletri/remodulin tubing       Scheduled Meds:   busPIRone  15 mg Oral TID    desvenlafaxine succinate  100 mg Oral Daily    enoxaparin  40 mg Subcutaneous Daily    fluticasone-vilanterol  1 puff Inhalation Daily    gabapentin  100 mg Oral TID    lamoTRIgine  100 mg Oral BID    levothyroxine  75 mcg Oral Daily    lurasidone  40 mg Oral Daily    magnesium oxide  400 mg Oral BID    pantoprazole  40 mg Oral Daily    potassium chloride  60 mEq Oral TID    pravastatin  40 mg Oral Daily    riociguat  0.5 mg Oral TID    sodium chloride 0.9%  3 mL Intravenous Q8H    spironolactone  25 mg Oral BID    tiotropium  1 capsule Inhalation Daily     PRN Meds:acetaminophen, hydrocodone-acetaminophen 10-325mg, loperamide, ondansetron    Review of patient's allergies indicates:   Allergen Reactions    Sulfa (sulfonamide antibiotics) Rash     Objective:     Vital Signs (Most Recent):  Temp: 98.5 °F (36.9 °C) (04/16/18 1148)  Pulse: 92 (04/16/18 1148)  Resp: 19 (04/16/18 1148)  BP: 112/60 (04/16/18  1148)  SpO2: (!) 91 % (04/16/18 1148) Vital Signs (24h Range):  Temp:  [97.5 °F (36.4 °C)-99.3 °F (37.4 °C)] 98.5 °F (36.9 °C)  Pulse:  [77-95] 92  Resp:  [16-20] 19  SpO2:  [89 %-95 %] 91 %  BP: (101-117)/(55-70) 112/60     Patient Vitals for the past 72 hrs (Last 3 readings):   Weight   04/16/18 0500 86.1 kg (189 lb 13.1 oz)   04/15/18 0600 87.1 kg (192 lb 0.3 oz)   04/14/18 0600 87.9 kg (193 lb 12.6 oz)     Body mass index is 34.71 kg/m².      Intake/Output Summary (Last 24 hours) at 04/16/18 1236  Last data filed at 04/16/18 1000   Gross per 24 hour   Intake             1090 ml   Output             5400 ml   Net            -4310 ml       Hemodynamic Parameters:       Telemetry:     Physical Exam    Constitutional: She is oriented to person, place, and time.   Morbidly obese   Neck: JVD at the clavicle. No HJR present  Cardiovascular: Normal rate, regular rhythm and normal heart sounds.  Exam reveals no gallop and no friction rub.   Pulmonary/Chest: Effort normal and breath sounds normal.   Abdominal: Soft. Bowel sounds are normal. Less distended compared to admission  Musculoskeletal: She exhibits no edema or tenderness.   Neurological: She is alert and oriented to person, place, and time.   Skin: Skin is warm and dry.   Psychiatric: pleasant and calm today  Nursing note and vitals reviewed.    Significant Labs:  CBC:    Recent Labs  Lab 04/14/18  0400 04/15/18  0638 04/16/18  0439   WBC 5.30 3.91 4.57   RBC 4.69 4.68 4.48   HGB 12.7 12.6 12.3   HCT 41.7 40.1 39.4   * 119* 123*   MCV 89 86 88   MCH 27.1 26.9* 27.5   MCHC 30.5* 31.4* 31.2*     BNP:  No results for input(s): BNP in the last 168 hours.    Invalid input(s): BNPTRIAGELBLO  CMP:    Recent Labs  Lab 04/14/18  0400  04/15/18  0638 04/15/18  1450 04/16/18  0439   GLU 69*  < > 88 81 79   CALCIUM 9.7  < > 9.5 9.7 9.4   ALBUMIN 4.1  --  4.0  --  4.1   PROT 6.8  --  6.6  --  6.6     < > 140 138 136   K 3.7  < > 2.8* 3.6 3.0*   CO2 31*  < > 33*  33* 30*   CL 96  < > 98 95 95   BUN 16  < > 16 19 17   CREATININE 1.2  < > 1.1 1.2 1.1   ALKPHOS 192*  --  179*  --  180*   ALT 27  --  23  --  22   AST 34  --  27  --  24   BILITOT 0.6  --  0.6  --  0.7   < > = values in this interval not displayed.   Coagulation:   No results for input(s): PT, INR, APTT in the last 168 hours.  LDH:  No results for input(s): LDH in the last 72 hours.  Microbiology:  Microbiology Results (last 7 days)     ** No results found for the last 168 hours. **          I have reviewed all pertinent labs within the past 24 hours.    Estimated Creatinine Clearance: 58.1 mL/min (based on SCr of 1.1 mg/dL).    Diagnostic Results:  CT: No results found in the last 24 hours.

## 2018-04-17 PROBLEM — L76.82 BLEEDING AT INSERTION SITE: Status: ACTIVE | Noted: 2018-04-17

## 2018-04-17 PROBLEM — Z76.82 LUNG TRANSPLANT CANDIDATE: Status: ACTIVE | Noted: 2018-04-17

## 2018-04-17 LAB
ALBUMIN SERPL BCP-MCNC: 4.2 G/DL
ALP SERPL-CCNC: 174 U/L
ALT SERPL W/O P-5'-P-CCNC: 19 U/L
ANION GAP SERPL CALC-SCNC: 11 MMOL/L
ANION GAP SERPL CALC-SCNC: 15 MMOL/L
AST SERPL-CCNC: 24 U/L
BASOPHILS # BLD AUTO: 0.03 K/UL
BASOPHILS NFR BLD: 0.6 %
BILIRUB SERPL-MCNC: 0.7 MG/DL
BUN SERPL-MCNC: 16 MG/DL
BUN SERPL-MCNC: 18 MG/DL
CALCIUM SERPL-MCNC: 10.3 MG/DL
CALCIUM SERPL-MCNC: 9.6 MG/DL
CHLORIDE SERPL-SCNC: 95 MMOL/L
CHLORIDE SERPL-SCNC: 95 MMOL/L
CO2 SERPL-SCNC: 30 MMOL/L
CO2 SERPL-SCNC: 32 MMOL/L
CREAT SERPL-MCNC: 1.2 MG/DL
CREAT SERPL-MCNC: 1.6 MG/DL
DIFFERENTIAL METHOD: ABNORMAL
EOSINOPHIL # BLD AUTO: 0.2 K/UL
EOSINOPHIL NFR BLD: 3.2 %
ERYTHROCYTE [DISTWIDTH] IN BLOOD BY AUTOMATED COUNT: 17.2 %
EST. GFR  (AFRICAN AMERICAN): 41.2 ML/MIN/1.73 M^2
EST. GFR  (AFRICAN AMERICAN): 58.4 ML/MIN/1.73 M^2
EST. GFR  (NON AFRICAN AMERICAN): 35.8 ML/MIN/1.73 M^2
EST. GFR  (NON AFRICAN AMERICAN): 50.6 ML/MIN/1.73 M^2
GLUCOSE SERPL-MCNC: 101 MG/DL
GLUCOSE SERPL-MCNC: 81 MG/DL
HCT VFR BLD AUTO: 40 %
HGB BLD-MCNC: 12.5 G/DL
IMM GRANULOCYTES # BLD AUTO: 0.05 K/UL
IMM GRANULOCYTES NFR BLD AUTO: 1 %
LYMPHOCYTES # BLD AUTO: 0.7 K/UL
LYMPHOCYTES NFR BLD: 13.7 %
MAGNESIUM SERPL-MCNC: 2.3 MG/DL
MCH RBC QN AUTO: 27.2 PG
MCHC RBC AUTO-ENTMCNC: 31.3 G/DL
MCV RBC AUTO: 87 FL
MONOCYTES # BLD AUTO: 0.3 K/UL
MONOCYTES NFR BLD: 6.8 %
NEUTROPHILS # BLD AUTO: 3.8 K/UL
NEUTROPHILS NFR BLD: 74.7 %
NRBC BLD-RTO: 0 /100 WBC
PHOSPHATE SERPL-MCNC: 4.1 MG/DL
PLATELET # BLD AUTO: 147 K/UL
PMV BLD AUTO: 11.5 FL
POTASSIUM SERPL-SCNC: 3 MMOL/L
POTASSIUM SERPL-SCNC: 4.1 MMOL/L
PROT SERPL-MCNC: 7 G/DL
RBC # BLD AUTO: 4.59 M/UL
SODIUM SERPL-SCNC: 138 MMOL/L
SODIUM SERPL-SCNC: 140 MMOL/L
WBC # BLD AUTO: 5.03 K/UL

## 2018-04-17 PROCEDURE — 85025 COMPLETE CBC W/AUTO DIFF WBC: CPT | Mod: NTX

## 2018-04-17 PROCEDURE — 99223 1ST HOSP IP/OBS HIGH 75: CPT | Mod: NTX,,, | Performed by: INTERNAL MEDICINE

## 2018-04-17 PROCEDURE — 36415 COLL VENOUS BLD VENIPUNCTURE: CPT | Mod: NTX

## 2018-04-17 PROCEDURE — 25000003 PHARM REV CODE 250: Mod: NTX | Performed by: INTERNAL MEDICINE

## 2018-04-17 PROCEDURE — 25000242 PHARM REV CODE 250 ALT 637 W/ HCPCS: Mod: NTX | Performed by: INTERNAL MEDICINE

## 2018-04-17 PROCEDURE — 99232 SBSQ HOSP IP/OBS MODERATE 35: CPT | Mod: NTX,,, | Performed by: INTERNAL MEDICINE

## 2018-04-17 PROCEDURE — 99900035 HC TECH TIME PER 15 MIN (STAT): Mod: NTX

## 2018-04-17 PROCEDURE — 94660 CPAP INITIATION&MGMT: CPT | Mod: NTX

## 2018-04-17 PROCEDURE — 63600175 PHARM REV CODE 636 W HCPCS: Mod: NTX | Performed by: INTERNAL MEDICINE

## 2018-04-17 PROCEDURE — 83735 ASSAY OF MAGNESIUM: CPT | Mod: NTX

## 2018-04-17 PROCEDURE — 20600001 HC STEP DOWN PRIVATE ROOM: Mod: NTX

## 2018-04-17 PROCEDURE — 80053 COMPREHEN METABOLIC PANEL: CPT | Mod: NTX

## 2018-04-17 PROCEDURE — A4216 STERILE WATER/SALINE, 10 ML: HCPCS | Mod: NTX | Performed by: INTERNAL MEDICINE

## 2018-04-17 PROCEDURE — 99233 SBSQ HOSP IP/OBS HIGH 50: CPT | Mod: GV,GC,NTX, | Performed by: SURGERY

## 2018-04-17 PROCEDURE — 80048 BASIC METABOLIC PNL TOTAL CA: CPT | Mod: NTX

## 2018-04-17 PROCEDURE — 84100 ASSAY OF PHOSPHORUS: CPT | Mod: NTX

## 2018-04-17 PROCEDURE — 63600175 PHARM REV CODE 636 W HCPCS: Mod: JG,NTX | Performed by: INTERNAL MEDICINE

## 2018-04-17 RX ORDER — METOLAZONE 5 MG/1
5 TABLET ORAL ONCE
Status: COMPLETED | OUTPATIENT
Start: 2018-04-17 | End: 2018-04-17

## 2018-04-17 RX ADMIN — GABAPENTIN 100 MG: 100 CAPSULE ORAL at 09:04

## 2018-04-17 RX ADMIN — ENOXAPARIN SODIUM 40 MG: 100 INJECTION SUBCUTANEOUS at 04:04

## 2018-04-17 RX ADMIN — BUSPIRONE HYDROCHLORIDE 15 MG: 5 TABLET ORAL at 08:04

## 2018-04-17 RX ADMIN — PANTOPRAZOLE SODIUM 40 MG: 40 TABLET, DELAYED RELEASE ORAL at 08:04

## 2018-04-17 RX ADMIN — PRAVASTATIN SODIUM 40 MG: 40 TABLET ORAL at 08:04

## 2018-04-17 RX ADMIN — TIOTROPIUM BROMIDE 18 MCG: 18 CAPSULE ORAL; RESPIRATORY (INHALATION) at 08:04

## 2018-04-17 RX ADMIN — SPIRONOLACTONE 50 MG: 25 TABLET, FILM COATED ORAL at 09:04

## 2018-04-17 RX ADMIN — FUROSEMIDE 40 MG/HR: 10 INJECTION, SOLUTION INTRAVENOUS at 05:04

## 2018-04-17 RX ADMIN — RIOCIGUAT 0.5 MG: 0.5 TABLET, FILM COATED ORAL at 08:04

## 2018-04-17 RX ADMIN — BUSPIRONE HYDROCHLORIDE 15 MG: 5 TABLET ORAL at 09:04

## 2018-04-17 RX ADMIN — POTASSIUM CHLORIDE 60 MEQ: 1500 TABLET, EXTENDED RELEASE ORAL at 02:04

## 2018-04-17 RX ADMIN — MAGNESIUM OXIDE TAB 400 MG (241.3 MG ELEMENTAL MG) 400 MG: 400 (241.3 MG) TAB at 09:04

## 2018-04-17 RX ADMIN — LURASIDONE HYDROCHLORIDE 40 MG: 40 TABLET, FILM COATED ORAL at 08:04

## 2018-04-17 RX ADMIN — POTASSIUM CHLORIDE 60 MEQ: 1500 TABLET, EXTENDED RELEASE ORAL at 08:04

## 2018-04-17 RX ADMIN — FUROSEMIDE 40 MG/HR: 10 INJECTION, SOLUTION INTRAVENOUS at 11:04

## 2018-04-17 RX ADMIN — GABAPENTIN 100 MG: 100 CAPSULE ORAL at 08:04

## 2018-04-17 RX ADMIN — DESVENLAFAXINE SUCCINATE 100 MG: 50 TABLET, FILM COATED, EXTENDED RELEASE ORAL at 08:04

## 2018-04-17 RX ADMIN — BUSPIRONE HYDROCHLORIDE 15 MG: 5 TABLET ORAL at 02:04

## 2018-04-17 RX ADMIN — LAMOTRIGINE 100 MG: 100 TABLET ORAL at 08:04

## 2018-04-17 RX ADMIN — FLUTICASONE FUROATE AND VILANTEROL TRIFENATATE 1 PUFF: 100; 25 POWDER RESPIRATORY (INHALATION) at 08:04

## 2018-04-17 RX ADMIN — FUROSEMIDE 40 MG/HR: 10 INJECTION, SOLUTION INTRAVENOUS at 01:04

## 2018-04-17 RX ADMIN — METOLAZONE 5 MG: 5 TABLET ORAL at 04:04

## 2018-04-17 RX ADMIN — LAMOTRIGINE 100 MG: 100 TABLET ORAL at 09:04

## 2018-04-17 RX ADMIN — Medication 3 ML: at 10:04

## 2018-04-17 RX ADMIN — TREPROSTINIL 75 NG/KG/MIN: 100 INJECTION, SOLUTION INTRAVENOUS; SUBCUTANEOUS at 05:04

## 2018-04-17 RX ADMIN — GABAPENTIN 100 MG: 100 CAPSULE ORAL at 02:04

## 2018-04-17 RX ADMIN — RIOCIGUAT 0.5 MG: 0.5 TABLET, FILM COATED ORAL at 09:04

## 2018-04-17 RX ADMIN — POTASSIUM CHLORIDE 60 MEQ: 1500 TABLET, EXTENDED RELEASE ORAL at 09:04

## 2018-04-17 RX ADMIN — LEVOTHYROXINE SODIUM 75 MCG: 75 TABLET ORAL at 08:04

## 2018-04-17 RX ADMIN — MAGNESIUM OXIDE TAB 400 MG (241.3 MG ELEMENTAL MG) 400 MG: 400 (241.3 MG) TAB at 08:04

## 2018-04-17 RX ADMIN — RIOCIGUAT 0.5 MG: 0.5 TABLET, FILM COATED ORAL at 02:04

## 2018-04-17 RX ADMIN — SPIRONOLACTONE 50 MG: 25 TABLET, FILM COATED ORAL at 08:04

## 2018-04-17 NOTE — SUBJECTIVE & OBJECTIVE
Past Medical History:   Diagnosis Date    Bipolar disorder     CHF (congestive heart failure)     Dyslipidemia     GERD (gastroesophageal reflux disease)     Hx of tracheostomy     Decanulated / surgically removed in 2013? Does not currently have tracheostomy    Hypertension     Hypothyroidism     Oxygen dependent     3L around the clock     Pulmonary HTN     Pulmonary hypertension, group 1 10/4/2017    Pulmonary nodules 10/10/2017    Respiratory failure, chronic        Past Surgical History:   Procedure Laterality Date    BACK SURGERY      PERICARDIAL WINDOW  2013    NC LEFT HEART CATH,PERCUTANEOUS      Cardiac Cath, Left Heart    TUBAL LIGATION         Review of patient's allergies indicates:   Allergen Reactions    Sulfa (sulfonamide antibiotics) Rash       PTA Medications   Medication Sig    amlodipine (NORVASC) 5 MG tablet Take 5 mg by mouth once daily.    busPIRone (BUSPAR) 15 MG tablet Take 15 mg by mouth 3 (three) times daily.    desvenlafaxine succinate (PRISTIQ) 100 MG Tb24 Take 100 mg by mouth once daily.    fluticasone-vilanterol (BREO) 100-25 mcg/dose diskus inhaler Inhale 1 puff into the lungs once daily. Controller    gabapentin (NEURONTIN) 100 MG capsule Take 1 capsule (100 mg total) by mouth 3 (three) times daily.    hydrocodone-acetaminophen 10-325mg (NORCO)  mg Tab Take 1 tablet by mouth every 8 (eight) hours as needed for Pain.    lamotrigine (LAMICTAL) 100 MG tablet Take 100 mg by mouth 2 (two) times daily.    levothyroxine (SYNTHROID) 75 MCG tablet Take 75 mcg by mouth once daily.    lurasidone (LATUDA) 60 mg Tab tablet Take 40 mg by mouth once daily.     pantoprazole (PROTONIX) 40 MG tablet Take 40 mg by mouth once daily.    potassium chloride SA (K-DUR,KLOR-CON) 20 MEQ tablet Take 4 tablets (80 mEq total) by mouth 2 (two) times daily.    pravastatin (PRAVACHOL) 40 MG tablet Take 40 mg by mouth once daily.    torsemide (DEMADEX) 100 MG Tab Take 1 tablet (100  mg total) by mouth 2 (two) times daily.    umeclidinium 62.5 mcg/actuation DsDv Inhale 62.5 mcg into the lungs once daily. Controller    albuterol (PROVENTIL) 2.5 mg /3 mL (0.083 %) nebulizer solution Take 2.5 mg by nebulization every 6 (six) hours as needed for Wheezing. Rescue    ALPRAZolam (XANAX) 1 MG tablet Take 0.5 tablets (0.5 mg total) by mouth 3 (three) times daily as needed for Anxiety.    budesonide-formoterol 80-4.5 mcg (SYMBICORT) 80-4.5 mcg/actuation HFAA Inhale 2 puffs into the lungs 2 (two) times daily. Controller    cyclobenzaprine (FLEXERIL) 10 MG tablet TAKE 1 TABLET BY MOUTH THREE TIMES DAILY MAY CAUSE DROWSINESS    furosemide (LASIX) 10 mg/mL injection Inject 8 mLs (80 mg total) into the vein twice a week. As needed for weight gain of > 5 lbs or SOB    magnesium oxide (MAG-OX) 400 mg tablet Take 1 tablet (400 mg total) by mouth 2 (two) times daily.    metOLazone (ZAROXOLYN) 2.5 MG tablet Take 1 tablet (2.5 mg total) by mouth once daily.    ondansetron (ZOFRAN-ODT) 8 MG TbDL Take 1 tablet (8 mg total) by mouth every 8 (eight) hours as needed.    sodium chloride 0.9% 0.9 % SolP 100 mL with treprostinil 1 mg/mL Soln 6,000,000 ng Inject 2,380 ng/min into the vein continuous.     Family History     Problem Relation (Age of Onset)    Cancer Mother, Sister    Hypertension Mother, Father        Social History Main Topics    Smoking status: Former Smoker     Types: Cigarettes     Start date: 1/1/1979     Quit date: 1/5/1997    Smokeless tobacco: Never Used    Alcohol use No    Drug use: No    Sexual activity: No     Review of Systems   Constitutional: Negative for activity change, appetite change, chills and fever.   HENT: Negative for congestion, postnasal drip and rhinorrhea.    Eyes: Negative.    Respiratory: Negative for cough, chest tightness, shortness of breath and wheezing.    Cardiovascular: Positive for leg swelling. Negative for chest pain and palpitations.   Gastrointestinal:  Negative for diarrhea, nausea and vomiting.   Endocrine: Negative.    Genitourinary: Negative.    Musculoskeletal: Negative for arthralgias and myalgias.   Skin: Negative for color change and pallor.   Allergic/Immunologic: Negative.    Neurological: Positive for light-headedness. Negative for dizziness and headaches.   Hematological: Negative for adenopathy. Does not bruise/bleed easily.   Psychiatric/Behavioral: Negative for agitation, behavioral problems and confusion.     Objective:     Vital Signs (Most Recent):  Temp: 98.3 °F (36.8 °C) (04/17/18 1101)  Pulse: 88 (04/17/18 1200)  Resp: 18 (04/17/18 1101)  BP: (!) 105/55 (04/17/18 1101)  SpO2: (!) 91 % (04/17/18 1101) Vital Signs (24h Range):  Temp:  [96.9 °F (36.1 °C)-99.2 °F (37.3 °C)] 98.3 °F (36.8 °C)  Pulse:  [73-89] 88  Resp:  [16-20] 18  SpO2:  [90 %-96 %] 91 %  BP: (105-120)/(55-71) 105/55     Weight: 85.4 kg (188 lb 4.4 oz)  Body mass index is 34.43 kg/m².      Intake/Output Summary (Last 24 hours) at 04/17/18 1354  Last data filed at 04/17/18 1115   Gross per 24 hour   Intake           1151.5 ml   Output             2400 ml   Net          -1248.5 ml       Physical Exam   Constitutional: She is oriented to person, place, and time. She appears well-developed and well-nourished.   HENT:   Head: Normocephalic and atraumatic.   Eyes: EOM are normal. Pupils are equal, round, and reactive to light.   Neck: Normal range of motion. Neck supple. JVD present.   Cardiovascular: Normal rate, regular rhythm and normal heart sounds.    Pulmonary/Chest: Effort normal and breath sounds normal. No respiratory distress. She has no wheezes. She has no rales. She exhibits no tenderness.   Abdominal: Soft. Bowel sounds are normal. She exhibits no distension. There is no tenderness.   Musculoskeletal: She exhibits edema (BLE).   Neurological: She is alert and oriented to person, place, and time.   Skin: Skin is warm and dry. Capillary refill takes less than 2 seconds.    Psychiatric: She has a normal mood and affect. Her behavior is normal.   Nursing note and vitals reviewed.      Significant Labs:  CBC:    Recent Labs  Lab 04/17/18  0403   WBC 5.03   RBC 4.59   HGB 12.5   HCT 40.0   *   MCV 87   MCH 27.2   MCHC 31.3*     BMP:    Recent Labs  Lab 04/17/18  0403      K 3.0*   CL 95   CO2 32*   BUN 16   CREATININE 1.2   CALCIUM 9.6        I have reviewed all pertinent labs within the past 24 hours.    Diagnostic Results:  Labs: Reviewed  Echo: Reviewed

## 2018-04-17 NOTE — SUBJECTIVE & OBJECTIVE
Interval History:    No acute overnight event but decreased diuresis last night despite being on lasix 40mg gtt. Net negative 0.7L for the last 24 hrs.  Had RHC cath yesterday showing CI of 1.8 and CO of 3.4. Remodulin at 75ng and Adempas at 0.5 mg TID. Requiring aggressive potassium replacement.    Continuous Infusions:   furosemide (LASIX) 2 mg/mL infusion (non-titrating) 40 mg/hr (04/16/18 7536)    treprostinil (REMODULIN) infusion 75 ng/kg/min (04/16/18 6080)    veletri/remodulin cassette      veletri/remodulin tubing       Scheduled Meds:   busPIRone  15 mg Oral TID    desvenlafaxine succinate  100 mg Oral Daily    enoxaparin  40 mg Subcutaneous Daily    fluticasone-vilanterol  1 puff Inhalation Daily    gabapentin  100 mg Oral TID    lamoTRIgine  100 mg Oral BID    levothyroxine  75 mcg Oral Daily    lurasidone  40 mg Oral Daily    magnesium oxide  400 mg Oral BID    pantoprazole  40 mg Oral Daily    potassium chloride  60 mEq Oral TID    pravastatin  40 mg Oral Daily    riociguat  0.5 mg Oral TID    sodium chloride 0.9%  3 mL Intravenous Q8H    spironolactone  50 mg Oral BID    tiotropium  1 capsule Inhalation Daily     PRN Meds:acetaminophen, hydrocodone-acetaminophen 10-325mg, loperamide, ondansetron    Review of patient's allergies indicates:   Allergen Reactions    Sulfa (sulfonamide antibiotics) Rash     Objective:     Vital Signs (Most Recent):  Temp: 98.3 °F (36.8 °C) (04/17/18 1101)  Pulse: 88 (04/17/18 1200)  Resp: 18 (04/17/18 1101)  BP: (!) 105/55 (04/17/18 1101)  SpO2: (!) 91 % (04/17/18 1101) Vital Signs (24h Range):  Temp:  [96.9 °F (36.1 °C)-99.2 °F (37.3 °C)] 98.3 °F (36.8 °C)  Pulse:  [73-89] 88  Resp:  [16-20] 18  SpO2:  [90 %-96 %] 91 %  BP: (105-120)/(55-71) 105/55     Patient Vitals for the past 72 hrs (Last 3 readings):   Weight   04/17/18 1115 85.4 kg (188 lb 4.4 oz)   04/17/18 0532 85.2 kg (187 lb 13.3 oz)   04/16/18 0500 86.1 kg (189 lb 13.1 oz)     Body mass  index is 34.43 kg/m².      Intake/Output Summary (Last 24 hours) at 04/17/18 1410  Last data filed at 04/17/18 1115   Gross per 24 hour   Intake           1151.5 ml   Output             2400 ml   Net          -1248.5 ml       Hemodynamic Parameters:       Telemetry:     Physical Exam     Neck: JVD at the clavicle. No HJR present  Cardiovascular: Normal rate, regular rhythm and normal heart sounds.  Exam reveals no gallop and no friction rub.   Pulmonary/Chest: Effort normal and breath sounds normal.   Abdominal: Soft. Bowel sounds are normal. Less distended compared to admission  Musculoskeletal: She exhibits no edema or tenderness.   Neurological: She is alert and oriented to person, place, and time.   Skin: Skin is warm and dry.   Psychiatric: pleasant and calm today  Nursing note and vitals reviewed.       Significant Labs:  CBC:    Recent Labs  Lab 04/15/18  0638 04/16/18  0439 04/17/18  0403   WBC 3.91 4.57 5.03   RBC 4.68 4.48 4.59   HGB 12.6 12.3 12.5   HCT 40.1 39.4 40.0   * 123* 147*   MCV 86 88 87   MCH 26.9* 27.5 27.2   MCHC 31.4* 31.2* 31.3*     BNP:  No results for input(s): BNP in the last 168 hours.    Invalid input(s): BNPTRIAGELBLO  CMP:    Recent Labs  Lab 04/15/18  0638  04/16/18  0439 04/16/18  1250 04/16/18  1755 04/17/18  0403   GLU 88  < > 79 90 94 81   CALCIUM 9.5  < > 9.4 9.7 10.1 9.6   ALBUMIN 4.0  --  4.1  --   --  4.2   PROT 6.6  --  6.6  --   --  7.0     < > 136 138 141 138   K 2.8*  < > 3.0* 3.9 3.7 3.0*   CO2 33*  < > 30* 33* 33* 32*   CL 98  < > 95 95 97 95   BUN 16  < > 17 17 18 16   CREATININE 1.1  < > 1.1 1.3 1.1 1.2   ALKPHOS 179*  --  180*  --   --  174*   ALT 23  --  22  --   --  19   AST 27  --  24  --   --  24   BILITOT 0.6  --  0.7  --   --  0.7   < > = values in this interval not displayed.   Coagulation:     Recent Labs  Lab 04/16/18  1755   INR 1.1     LDH:  No results for input(s): LDH in the last 72 hours.  Microbiology:  Microbiology Results (last 7 days)      ** No results found for the last 168 hours. **          I have reviewed all pertinent labs within the past 24 hours.    Estimated Creatinine Clearance: 53.1 mL/min (based on SCr of 1.2 mg/dL).    Diagnostic Results:  I have reviewed and interpreted all pertinent imaging results/findings within the past 24 hours.

## 2018-04-17 NOTE — ASSESSMENT & PLAN NOTE
56 y/o female with HTN, CHF (EF 60% 4/4/18) with RV enlargement and severely depressed systolic function, pulmonary HTN (PASP 80s) on home Remodulin infusion via R IJ single-lumen Gomez catheter, respiratory insufficiency on 3 L NC home oxygen admitted for volume overload and now with prolonged venous bleeding from R IJ access site following RHC yesterday (4/16/18)     - Hemostatic overnight  - Neck exam unchanged - expect this small hematoma to mature and then resolve with time  - Please call with any questions or concerns

## 2018-04-17 NOTE — PROGRESS NOTES
[facility]  Vascular Surgery  Progress Note    Patient Name: Sue Smith  MRN: [id number]  Admission Date: 4/5/2018  Primary Care Provider: Paddy Guerrier DO    Subjective:     Interval History:   No acute events overnight. Neck hemostatic overnight. Bed rest x 3 hours and then up ad adrien without issue. Dressing dry.      Post-Op Info:  Procedure(s) (LRB):  Right heart cath (Right)   1 Day Post-Op       Medications:  Continuous Infusions:   furosemide (LASIX) 2 mg/mL infusion (non-titrating) 40 mg/hr (04/16/18 9124)    treprostinil (REMODULIN) infusion 75 ng/kg/min (04/16/18 7757)    veletri/remodulin cassette      veletri/remodulin tubing       Scheduled Meds:   busPIRone  15 mg Oral TID    desvenlafaxine succinate  100 mg Oral Daily    enoxaparin  40 mg Subcutaneous Daily    fluticasone-vilanterol  1 puff Inhalation Daily    gabapentin  100 mg Oral TID    lamoTRIgine  100 mg Oral BID    levothyroxine  75 mcg Oral Daily    lurasidone  40 mg Oral Daily    magnesium oxide  400 mg Oral BID    pantoprazole  40 mg Oral Daily    potassium chloride  60 mEq Oral TID    pravastatin  40 mg Oral Daily    riociguat  0.5 mg Oral TID    sodium chloride 0.9%  3 mL Intravenous Q8H    spironolactone  50 mg Oral BID    tiotropium  1 capsule Inhalation Daily     PRN Meds:acetaminophen, hydrocodone-acetaminophen 10-325mg, loperamide, ondansetron     Objective:     Vital Signs (Most Recent):  Temp: 96.9 °F (36.1 °C) (04/17/18 0331)  Pulse: 76 (04/17/18 0331)  Resp: 20 (04/17/18 0331)  BP: 120/71 (04/17/18 0331)  SpO2: 96 % (04/17/18 0331) Vital Signs (24h Range):  Temp:  [96.9 °F (36.1 °C)-99.3 °F (37.4 °C)] 96.9 °F (36.1 °C)  Pulse:  [76-93] 76  Resp:  [16-20] 20  SpO2:  [89 %-96 %] 96 %  BP: (101-120)/(56-71) 120/71          Physical Exam  Constitutional: She is oriented to person, place, and time. She appears well-developed and well-nourished. No distress.   HENT:   Head: Normocephalic and  atraumatic.   Eyes: EOM are normal.   Neck: Normal range of motion.   R neck hemostatic on examination  Mild subcutaneous hematoma from post-procedure bleeding but neck soft  Cardiovascular: Normal rate, regular rhythm and intact distal pulses.    Pulmonary/Chest: Effort normal. No respiratory distress. She has no wheezes.   Abdominal: Soft. She exhibits no distension. There is no tenderness.   Obese, soft, ND, NT, prior upper midline incision from pericardial window well healed   Musculoskeletal: She exhibits edema. She exhibits no deformity.   Neurological: She is alert and oriented to person, place, and time.   Skin: Skin is warm and dry. No rash noted. She is not diaphoretic. No erythema.   Psychiatric: She has a normal mood and affect. Her behavior is normal.   Nursing note and vitals reviewed.    Significant Labs:  CBC:   Recent Labs  Lab 04/17/18  0403   WBC 5.03   RBC 4.59   HGB 12.5   HCT 40.0   *   MCV 87   MCH 27.2   MCHC 31.3*     CMP:   Recent Labs  Lab 04/17/18  0403   GLU 81   CALCIUM 9.6   ALBUMIN 4.2   PROT 7.0      K 3.0*   CO2 32*   CL 95   BUN 16   CREATININE 1.2   ALKPHOS 174*   ALT 19   AST 24   BILITOT 0.7       Significant Diagnostics:  None    Assessment/Plan:     Bleeding at insertion site    58 y/o female with HTN, CHF (EF 60% 4/4/18) with RV enlargement and severely depressed systolic function, pulmonary HTN (PASP 80s) on home Remodulin infusion via R IJ single-lumen Gomez catheter, respiratory insufficiency on 3 L NC home oxygen admitted for volume overload and now with prolonged venous bleeding from R IJ access site following RHC yesterday (4/16/18)     - Hemostatic overnight  - Neck exam unchanged - expect this small hematoma to mature and then resolve with time  - Please call with any questions or concerns        Larry Rivera MD  Surgery Resident, PGY-III  Pager: [phone]  4/17/2018 7:32 AM

## 2018-04-17 NOTE — ASSESSMENT & PLAN NOTE
- Continue IV remodulin at 75 ng   - Adempas .5mg TID  - Back on home dose of oxygen (4L ATC)  - Lung transplant reviewed. Advise on 30lb weight loss and Hutchinson as OP

## 2018-04-17 NOTE — CONSULTS
"[facility]  Lung Transplant  Consult Note    Patient Name: Sue Smith  MRN: [id number]  Admission Date: 4/5/2018  Hospital Length of Stay: 12 days  Attending Physician: Jacky Wong Jr.,*  Primary Care Provider: Paddy Guerrier DO     Consults  Subjective:     History of Present Illness:  Ms. Smith is a 58 YO female with a PMH of PHTN WHO Group 1 on IV Remodulin, chronic hypoxic respiratory failure, chronic RV failure on home O2 and BiPAP.  Patient was diagnosed with PHTN WHO Group 1 in October 2017 and started on Remodulin. Patient was admitted for acute on chronic diastolic heart failure for further diuresis and monitoring. During admission, patient was also started Adempas. Right heart cath performed 4/16/18 revealed severe pulmonary HTN  (PA=80/30 mm of Hg, PA mean=46 mm of Hg ); low cardiac index and output  (CI=1.8 L/min/m2, CO=3.4 L/min) on Remodulin, and significantly elevated left and right side filling pressures (RA= 19, PCWP= 25 mm of Hg).    Patient currently receiving lovenox anticoagulation and diuresis with furosemide and spironolactone. Patient states that she uses 4.5L oxygen at home during the day and requires BiPap throughout the night; however, new BiPap machine has not been delivered to her home yet so she was using a recliner to sleep prior to admission. Patient currently 96% on 3L NC. Patient reports feeling well today. Patient admits to lower extremity edema. Patient currently denies SOB, cough, wheezing. Patient reports a "few day" history of tracheostomy in 2013 for cardiac surgery. Patient was questioned about being intubated for the cardiac surgery but patient denies prior history of intubations.     Past Medical History:   Diagnosis Date    Bipolar disorder     CHF (congestive heart failure)     Dyslipidemia     GERD (gastroesophageal reflux disease)     Hx of tracheostomy     Decanulated / surgically removed in 2013? Does not currently have tracheostomy    " Hypertension     Hypothyroidism     Oxygen dependent     3L around the clock     Pulmonary HTN     Pulmonary hypertension, group 1 10/4/2017    Pulmonary nodules 10/10/2017    Respiratory failure, chronic        Past Surgical History:   Procedure Laterality Date    BACK SURGERY      PERICARDIAL WINDOW  2013    SC LEFT HEART CATH,PERCUTANEOUS      Cardiac Cath, Left Heart    TUBAL LIGATION         Review of patient's allergies indicates:   Allergen Reactions    Sulfa (sulfonamide antibiotics) Rash       PTA Medications   Medication Sig    amlodipine (NORVASC) 5 MG tablet Take 5 mg by mouth once daily.    busPIRone (BUSPAR) 15 MG tablet Take 15 mg by mouth 3 (three) times daily.    desvenlafaxine succinate (PRISTIQ) 100 MG Tb24 Take 100 mg by mouth once daily.    fluticasone-vilanterol (BREO) 100-25 mcg/dose diskus inhaler Inhale 1 puff into the lungs once daily. Controller    gabapentin (NEURONTIN) 100 MG capsule Take 1 capsule (100 mg total) by mouth 3 (three) times daily.    hydrocodone-acetaminophen 10-325mg (NORCO)  mg Tab Take 1 tablet by mouth every 8 (eight) hours as needed for Pain.    lamotrigine (LAMICTAL) 100 MG tablet Take 100 mg by mouth 2 (two) times daily.    levothyroxine (SYNTHROID) 75 MCG tablet Take 75 mcg by mouth once daily.    lurasidone (LATUDA) 60 mg Tab tablet Take 40 mg by mouth once daily.     pantoprazole (PROTONIX) 40 MG tablet Take 40 mg by mouth once daily.    potassium chloride SA (K-DUR,KLOR-CON) 20 MEQ tablet Take 4 tablets (80 mEq total) by mouth 2 (two) times daily.    pravastatin (PRAVACHOL) 40 MG tablet Take 40 mg by mouth once daily.    torsemide (DEMADEX) 100 MG Tab Take 1 tablet (100 mg total) by mouth 2 (two) times daily.    umeclidinium 62.5 mcg/actuation DsDv Inhale 62.5 mcg into the lungs once daily. Controller    albuterol (PROVENTIL) 2.5 mg /3 mL (0.083 %) nebulizer solution Take 2.5 mg by nebulization every 6 (six) hours as needed for  Wheezing. Rescue    ALPRAZolam (XANAX) 1 MG tablet Take 0.5 tablets (0.5 mg total) by mouth 3 (three) times daily as needed for Anxiety.    budesonide-formoterol 80-4.5 mcg (SYMBICORT) 80-4.5 mcg/actuation HFAA Inhale 2 puffs into the lungs 2 (two) times daily. Controller    cyclobenzaprine (FLEXERIL) 10 MG tablet TAKE 1 TABLET BY MOUTH THREE TIMES DAILY MAY CAUSE DROWSINESS    furosemide (LASIX) 10 mg/mL injection Inject 8 mLs (80 mg total) into the vein twice a week. As needed for weight gain of > 5 lbs or SOB    magnesium oxide (MAG-OX) 400 mg tablet Take 1 tablet (400 mg total) by mouth 2 (two) times daily.    metOLazone (ZAROXOLYN) 2.5 MG tablet Take 1 tablet (2.5 mg total) by mouth once daily.    ondansetron (ZOFRAN-ODT) 8 MG TbDL Take 1 tablet (8 mg total) by mouth every 8 (eight) hours as needed.    sodium chloride 0.9% 0.9 % SolP 100 mL with treprostinil 1 mg/mL Soln 6,000,000 ng Inject 2,380 ng/min into the vein continuous.     Family History     Problem Relation (Age of Onset)    Cancer Mother, Sister    Hypertension Mother, Father        Social History Main Topics    Smoking status: Former Smoker     Types: Cigarettes     Start date: 1/1/1979     Quit date: 1/5/1997    Smokeless tobacco: Never Used    Alcohol use No    Drug use: No    Sexual activity: No     Review of Systems   Constitutional: Negative for activity change, appetite change, chills and fever.   HENT: Negative for congestion, postnasal drip and rhinorrhea.    Eyes: Negative.    Respiratory: Negative for cough, chest tightness, shortness of breath and wheezing.    Cardiovascular: Positive for leg swelling. Negative for chest pain and palpitations.   Gastrointestinal: Negative for diarrhea, nausea and vomiting.   Endocrine: Negative.    Genitourinary: Negative.    Musculoskeletal: Negative for arthralgias and myalgias.   Skin: Negative for color change and pallor.   Allergic/Immunologic: Negative.    Neurological: Positive for  light-headedness. Negative for dizziness and headaches.   Hematological: Negative for adenopathy. Does not bruise/bleed easily.   Psychiatric/Behavioral: Negative for agitation, behavioral problems and confusion.     Objective:     Vital Signs (Most Recent):  Temp: 98.3 °F (36.8 °C) (04/17/18 1101)  Pulse: 88 (04/17/18 1200)  Resp: 18 (04/17/18 1101)  BP: (!) 105/55 (04/17/18 1101)  SpO2: (!) 91 % (04/17/18 1101) Vital Signs (24h Range):  Temp:  [96.9 °F (36.1 °C)-99.2 °F (37.3 °C)] 98.3 °F (36.8 °C)  Pulse:  [73-89] 88  Resp:  [16-20] 18  SpO2:  [90 %-96 %] 91 %  BP: (105-120)/(55-71) 105/55     Weight: 85.4 kg (188 lb 4.4 oz)  Body mass index is 34.43 kg/m².      Intake/Output Summary (Last 24 hours) at 04/17/18 1354  Last data filed at 04/17/18 1115   Gross per 24 hour   Intake           1151.5 ml   Output             2400 ml   Net          -1248.5 ml       Physical Exam   Constitutional: She is oriented to person, place, and time. She appears well-developed and well-nourished.   HENT:   Head: Normocephalic and atraumatic.   Eyes: EOM are normal. Pupils are equal, round, and reactive to light.   Neck: Normal range of motion. Neck supple. JVD present.   Cardiovascular: Normal rate, regular rhythm and normal heart sounds.    Pulmonary/Chest: Effort normal and breath sounds normal. No respiratory distress. She has no wheezes. She has no rales. She exhibits no tenderness.   Abdominal: Soft. Bowel sounds are normal. She exhibits no distension. There is no tenderness.   Musculoskeletal: She exhibits edema (BLE).   Neurological: She is alert and oriented to person, place, and time.   Skin: Skin is warm and dry. Capillary refill takes less than 2 seconds.   Psychiatric: She has a normal mood and affect. Her behavior is normal.   Nursing note and vitals reviewed.      Significant Labs:  CBC:    Recent Labs  Lab 04/17/18  0403   WBC 5.03   RBC 4.59   HGB 12.5   HCT 40.0   *   MCV 87   MCH 27.2   MCHC 31.3*      BMP:    Recent Labs  Lab 04/17/18  0403      K 3.0*   CL 95   CO2 32*   BUN 16   CREATININE 1.2   CALCIUM 9.6        I have reviewed all pertinent labs within the past 24 hours.    Diagnostic Results:  Labs: Reviewed  Echo: Reviewed    Assessment/Plan:     * Acute on chronic diastolic heart failure    Continue care per primary team.         Lung transplant candidate    Will defer for lung transplant candidacy at this time. Patient's BMI currently of great concern.  Will like for heart failure management and diuresis to be optimized prior to further evaluation for lung transplant as well.        Chronic respiratory failure with hypoxia    Likely multifactorial due to severe PHTN and diastolic heart failure - Recommend FiO2 at 35% on bipap machine overnight to better correlate to patient's 4.5L oxygen needs during the day. Will defer lung transplant candidacy at this time.        Pulmonary hypertension, group 1    Continue Adempas and Remodulin per primary team.                 Thank you for your consult. I will follow-up with patient. Please contact us if you have any additional questions.    Isela Warren PA-C  Lung Transplant  [facility]

## 2018-04-17 NOTE — PROGRESS NOTES
[facility]  Heart Transplant  Progress Note    Patient Name: Sue Smith  MRN: [id number]  Admission Date: 4/5/2018  Hospital Length of Stay: 12 days  Attending Physician: Jacky Wong Jr.,*  Primary Care Provider: Paddy Guerrier DO  Principal Problem:Acute on chronic diastolic heart failure    Subjective:     Interval History:    No acute overnight event but decreased diuresis last night despite being on lasix 40mg gtt. Net negative 0.7L for the last 24 hrs.  Had RHC cath yesterday showing CI of 1.8 and CO of 3.4. Remodulin at 75ng and Adempas at 0.5 mg TID. Requiring aggressive potassium replacement.    Continuous Infusions:   furosemide (LASIX) 2 mg/mL infusion (non-titrating) 40 mg/hr (04/16/18 3985)    treprostinil (REMODULIN) infusion 75 ng/kg/min (04/16/18 5677)    veletri/remodulin cassette      veletri/remodulin tubing       Scheduled Meds:   busPIRone  15 mg Oral TID    desvenlafaxine succinate  100 mg Oral Daily    enoxaparin  40 mg Subcutaneous Daily    fluticasone-vilanterol  1 puff Inhalation Daily    gabapentin  100 mg Oral TID    lamoTRIgine  100 mg Oral BID    levothyroxine  75 mcg Oral Daily    lurasidone  40 mg Oral Daily    magnesium oxide  400 mg Oral BID    pantoprazole  40 mg Oral Daily    potassium chloride  60 mEq Oral TID    pravastatin  40 mg Oral Daily    riociguat  0.5 mg Oral TID    sodium chloride 0.9%  3 mL Intravenous Q8H    spironolactone  50 mg Oral BID    tiotropium  1 capsule Inhalation Daily     PRN Meds:acetaminophen, hydrocodone-acetaminophen 10-325mg, loperamide, ondansetron    Review of patient's allergies indicates:   Allergen Reactions    Sulfa (sulfonamide antibiotics) Rash     Objective:     Vital Signs (Most Recent):  Temp: 98.3 °F (36.8 °C) (04/17/18 1101)  Pulse: 88 (04/17/18 1200)  Resp: 18 (04/17/18 1101)  BP: (!) 105/55 (04/17/18 1101)  SpO2: (!) 91 % (04/17/18 1101) Vital Signs (24h Range):  Temp:  [96.9 °F (36.1  °C)-99.2 °F (37.3 °C)] 98.3 °F (36.8 °C)  Pulse:  [73-89] 88  Resp:  [16-20] 18  SpO2:  [90 %-96 %] 91 %  BP: (105-120)/(55-71) 105/55     Patient Vitals for the past 72 hrs (Last 3 readings):   Weight   04/17/18 1115 85.4 kg (188 lb 4.4 oz)   04/17/18 0532 85.2 kg (187 lb 13.3 oz)   04/16/18 0500 86.1 kg (189 lb 13.1 oz)     Body mass index is 34.43 kg/m².      Intake/Output Summary (Last 24 hours) at 04/17/18 1410  Last data filed at 04/17/18 1115   Gross per 24 hour   Intake           1151.5 ml   Output             2400 ml   Net          -1248.5 ml       Hemodynamic Parameters:       Telemetry:     Physical Exam     Neck: JVD at the clavicle. No HJR present  Cardiovascular: Normal rate, regular rhythm and normal heart sounds.  Exam reveals no gallop and no friction rub.   Pulmonary/Chest: Effort normal and breath sounds normal.   Abdominal: Soft. Bowel sounds are normal. Less distended compared to admission  Musculoskeletal: She exhibits no edema or tenderness.   Neurological: She is alert and oriented to person, place, and time.   Skin: Skin is warm and dry.   Psychiatric: pleasant and calm today  Nursing note and vitals reviewed.       Significant Labs:  CBC:    Recent Labs  Lab 04/15/18  0638 04/16/18  0439 04/17/18  0403   WBC 3.91 4.57 5.03   RBC 4.68 4.48 4.59   HGB 12.6 12.3 12.5   HCT 40.1 39.4 40.0   * 123* 147*   MCV 86 88 87   MCH 26.9* 27.5 27.2   MCHC 31.4* 31.2* 31.3*     BNP:  No results for input(s): BNP in the last 168 hours.    Invalid input(s): BNPTRIAGELBLO  CMP:    Recent Labs  Lab 04/15/18  0638  04/16/18  0439 04/16/18  1250 04/16/18  1755 04/17/18  0403   GLU 88  < > 79 90 94 81   CALCIUM 9.5  < > 9.4 9.7 10.1 9.6   ALBUMIN 4.0  --  4.1  --   --  4.2   PROT 6.6  --  6.6  --   --  7.0     < > 136 138 141 138   K 2.8*  < > 3.0* 3.9 3.7 3.0*   CO2 33*  < > 30* 33* 33* 32*   CL 98  < > 95 95 97 95   BUN 16  < > 17 17 18 16   CREATININE 1.1  < > 1.1 1.3 1.1 1.2   ALKPHOS 179*  --   180*  --   --  174*   ALT 23  --  22  --   --  19   AST 27  --  24  --   --  24   BILITOT 0.6  --  0.7  --   --  0.7   < > = values in this interval not displayed.   Coagulation:     Recent Labs  Lab 04/16/18  1755   INR 1.1     LDH:  No results for input(s): LDH in the last 72 hours.  Microbiology:  Microbiology Results (last 7 days)     ** No results found for the last 168 hours. **          I have reviewed all pertinent labs within the past 24 hours.    Estimated Creatinine Clearance: 53.1 mL/min (based on SCr of 1.2 mg/dL).    Diagnostic Results:  I have reviewed and interpreted all pertinent imaging results/findings within the past 24 hours.    Assessment and Plan:     Ms. Smith is a 57 y.o. Female with a past medical history of PHTN WHO Group 1 on IV Remodulin, chronic hypoxic respiratory failure, chronic RV failure on home O2 and BiPAP who presented to [facility] with SOB. She has been having SOB for roughly 1 week, which was progressively worsening. She stated that she sleeps in a recliner currently until she gets a new BiPAP machine. She does wake up short of breath, but is not clear about LE edema. She does state that her weight has been progressively increasing though. She states that she has no symptoms from the Veletri currently.    * Acute on chronic diastolic heart failure    - Remains Wet and warm on exam  - RHC on 4/17 show CO 3.4 and CI on 1.8  - Continue Lasix 40mg/hr Add Metolazone once electrolytes are corrected  - Aggressive replacement of lytes (K>4 and Mg >2)  - Continue aldactone 50 mg BID  - strict I&O's and daily weights.   - Low salt diet  - DNR / DNI signed this admission.        Gastroesophageal reflux disease    - Continue PTA protonix        Bipolar affective disorder    Continue PTA Antipsychotics / mood stabilizers. No acute ongoing issues         Chronic respiratory failure with hypoxia    - 4Ls O2 ATC  - Goal O2 sats 88 or above  - BIPAP QHS  - Continue inhaled therapies  for COPD   - Prior PFTs as above.          Pulmonary hypertension, group 1    - Continue IV remodulin at 75 ng   - Adempas .5mg TID  - Back on home dose of oxygen (4L ATC)  - Lung transplant reviewed. Advise on 30lb weight loss and Hutchinson as OP          Discussed with Dr. Judith Peñaloza MD  Heart Transplant  [facility]

## 2018-04-17 NOTE — PLAN OF CARE
Problem: Patient Care Overview  Goal: Plan of Care Review  Outcome: Ongoing (interventions implemented as appropriate)  Pt AAO. VSS, see flowsheet for futher assessment data.    O2 levels stable at >90% on 3L via N/C. Pt on remodulin at 75ng/kg/min(DW 85kg, Cadd rate: 77ml/24hr(Goal rate)).     RHC completed during AM shift; see chart for details, marked volume overload noted per MD notes.    I/Os monitored.  Lasix gtt increased to 40mg/hr. UO 1.2L + 1UM urine output w/ 1BM.    Pt up ad adrien. No new skin breakdown noted.     Fall precautions in place; pt remains free of injury. Daily labs monitored. No acute events overnight.

## 2018-04-17 NOTE — ASSESSMENT & PLAN NOTE
Will defer for lung transplant candidacy at this time. Patient's BMI currently of great concern.  Will like for heart failure management and diuresis to be optimized prior to further evaluation for lung transplant as well.   The procedure was performed independently

## 2018-04-17 NOTE — SUBJECTIVE & OBJECTIVE
Medications:  Continuous Infusions:   furosemide (LASIX) 2 mg/mL infusion (non-titrating) 40 mg/hr (04/16/18 3995)    treprostinil (REMODULIN) infusion 75 ng/kg/min (04/16/18 1737)    veletri/remodulin cassette      veletri/remodulin tubing       Scheduled Meds:   busPIRone  15 mg Oral TID    desvenlafaxine succinate  100 mg Oral Daily    enoxaparin  40 mg Subcutaneous Daily    fluticasone-vilanterol  1 puff Inhalation Daily    gabapentin  100 mg Oral TID    lamoTRIgine  100 mg Oral BID    levothyroxine  75 mcg Oral Daily    lurasidone  40 mg Oral Daily    magnesium oxide  400 mg Oral BID    pantoprazole  40 mg Oral Daily    potassium chloride  60 mEq Oral TID    pravastatin  40 mg Oral Daily    riociguat  0.5 mg Oral TID    sodium chloride 0.9%  3 mL Intravenous Q8H    spironolactone  50 mg Oral BID    tiotropium  1 capsule Inhalation Daily     PRN Meds:acetaminophen, hydrocodone-acetaminophen 10-325mg, loperamide, ondansetron     Objective:     Vital Signs (Most Recent):  Temp: 96.9 °F (36.1 °C) (04/17/18 0331)  Pulse: 76 (04/17/18 0331)  Resp: 20 (04/17/18 0331)  BP: 120/71 (04/17/18 0331)  SpO2: 96 % (04/17/18 0331) Vital Signs (24h Range):  Temp:  [96.9 °F (36.1 °C)-99.3 °F (37.4 °C)] 96.9 °F (36.1 °C)  Pulse:  [76-93] 76  Resp:  [16-20] 20  SpO2:  [89 %-96 %] 96 %  BP: (101-120)/(56-71) 120/71          Physical Exam  Constitutional: She is oriented to person, place, and time. She appears well-developed and well-nourished. No distress.   HENT:   Head: Normocephalic and atraumatic.   Eyes: EOM are normal.   Neck: Normal range of motion.   R neck hemostatic on examination  Mild subcutaneous hematoma from post-procedure bleeding but neck soft  Cardiovascular: Normal rate, regular rhythm and intact distal pulses.    Pulmonary/Chest: Effort normal. No respiratory distress. She has no wheezes.   Abdominal: Soft. She exhibits no distension. There is no tenderness.   Obese, soft, ND, NT, prior  upper midline incision from pericardial window well healed   Musculoskeletal: She exhibits edema. She exhibits no deformity.   Neurological: She is alert and oriented to person, place, and time.   Skin: Skin is warm and dry. No rash noted. She is not diaphoretic. No erythema.   Psychiatric: She has a normal mood and affect. Her behavior is normal.   Nursing note and vitals reviewed.    Significant Labs:  CBC:   Recent Labs  Lab 04/17/18  0403   WBC 5.03   RBC 4.59   HGB 12.5   HCT 40.0   *   MCV 87   MCH 27.2   MCHC 31.3*     CMP:   Recent Labs  Lab 04/17/18  0403   GLU 81   CALCIUM 9.6   ALBUMIN 4.2   PROT 7.0      K 3.0*   CO2 32*   CL 95   BUN 16   CREATININE 1.2   ALKPHOS 174*   ALT 19   AST 24   BILITOT 0.7       Significant Diagnostics:  None

## 2018-04-17 NOTE — PLAN OF CARE
Problem: Patient Care Overview  Goal: Plan of Care Review  Pt free from fall or injury so far this shift. Instructed to call if assistance needed, verbalized understanding.   Cardiac monitoring in progress, currently SR.  Sat low mid 90's on 3 L NC, pt denies feeling SOB. O2 weaned down to 2L , sats 90-94%.   Remodulin continued at 85 ng, rate @ 77 cc/ 24 hrs. Cassette due to be changed today at 1730.   Lasix gtt continued at 40 mg/hr. Pt diuresing well.   k 3.0, scheduled potassium given. Repeat k 4.1.  Denies pain or discomfort.   Afebrile. Hand hygiene reinforced. Daily labs monitored.

## 2018-04-17 NOTE — ASSESSMENT & PLAN NOTE
- Remains Wet and warm on exam  - RHC on 4/17 show CO 3.4 and CI on 1.8  - Continue Lasix 40mg/hr Add Metolazone once electrolytes are corrected  - Aggressive replacement of lytes (K>4 and Mg >2)  - Continue aldactone 50 mg BID  - strict I&O's and daily weights.   - Low salt diet  - DNR / DNI signed this admission.

## 2018-04-17 NOTE — HPI
"Ms. Smith is a 58 YO female with a PMH of PHTN WHO Group 1 on IV Remodulin, chronic hypoxic respiratory failure, chronic RV failure on home O2 and BiPAP.  Patient was diagnosed with PHTN WHO Group 1 in October 2017 and started on Remodulin. Patient was admitted for acute on chronic diastolic heart failure for further diuresis and monitoring. During admission, patient was also started Adempas. Right heart cath performed 4/16/18 revealed severe pulmonary HTN  (PA=80/30 mm of Hg, PA mean=46 mm of Hg ); low cardiac index and output  (CI=1.8 L/min/m2, CO=3.4 L/min) on Remodulin, and significantly elevated left and right side filling pressures (RA= 19, PCWP= 25 mm of Hg).    Patient currently receiving lovenox anticoagulation and diuresis with furosemide and spironolactone. Patient states that she uses 3L oxygen at home during the day and requires BiPap throughout the night; however, new BiPap machine has not been delivered to her home yet so she was using a recliner to sleep prior to admission. Patient currently 96% on 3L NC. Patient reports feeling well today. Patient admits to lower extremity edema. Patient currently denies SOB, cough, wheezing. Patient reports a "few day" history of tracheostomy in 2013 for cardiac surgery. Patient was questioned about being intubated for the cardiac surgery but patient denies prior history of intubations.   "

## 2018-04-17 NOTE — ASSESSMENT & PLAN NOTE
Likely multifactorial due to severe PHTN and diastolic heart failure - Recommend FiO2 at 35% on bipap machine overnight to better correlate to patient's 3L oxygen needs during the day. Will defer lung transplant candidacy at this time.

## 2018-04-18 LAB
ALBUMIN SERPL BCP-MCNC: 4.4 G/DL
ALP SERPL-CCNC: 182 U/L
ALT SERPL W/O P-5'-P-CCNC: 17 U/L
ANION GAP SERPL CALC-SCNC: 16 MMOL/L
AST SERPL-CCNC: 20 U/L
BASOPHILS # BLD AUTO: 0.04 K/UL
BASOPHILS NFR BLD: 0.9 %
BILIRUB SERPL-MCNC: 0.8 MG/DL
BUN SERPL-MCNC: 20 MG/DL
CALCIUM SERPL-MCNC: 10.6 MG/DL
CHLORIDE SERPL-SCNC: 92 MMOL/L
CO2 SERPL-SCNC: 32 MMOL/L
CREAT SERPL-MCNC: 1.4 MG/DL
DIFFERENTIAL METHOD: ABNORMAL
EOSINOPHIL # BLD AUTO: 0.2 K/UL
EOSINOPHIL NFR BLD: 3.4 %
ERYTHROCYTE [DISTWIDTH] IN BLOOD BY AUTOMATED COUNT: 17.1 %
EST. GFR  (AFRICAN AMERICAN): 48.5 ML/MIN/1.73 M^2
EST. GFR  (NON AFRICAN AMERICAN): 42 ML/MIN/1.73 M^2
GLUCOSE SERPL-MCNC: 87 MG/DL
HCT VFR BLD AUTO: 40.5 %
HGB BLD-MCNC: 12.9 G/DL
IMM GRANULOCYTES # BLD AUTO: 0.04 K/UL
IMM GRANULOCYTES NFR BLD AUTO: 0.9 %
LYMPHOCYTES # BLD AUTO: 0.8 K/UL
LYMPHOCYTES NFR BLD: 16.2 %
MAGNESIUM SERPL-MCNC: 2.4 MG/DL
MCH RBC QN AUTO: 27.3 PG
MCHC RBC AUTO-ENTMCNC: 31.9 G/DL
MCV RBC AUTO: 86 FL
MONOCYTES # BLD AUTO: 0.4 K/UL
MONOCYTES NFR BLD: 8.1 %
NEUTROPHILS # BLD AUTO: 3.3 K/UL
NEUTROPHILS NFR BLD: 70.5 %
NRBC BLD-RTO: 0 /100 WBC
PHOSPHATE SERPL-MCNC: 4.7 MG/DL
PLATELET # BLD AUTO: 154 K/UL
PMV BLD AUTO: 11.4 FL
POTASSIUM SERPL-SCNC: 2.5 MMOL/L
POTASSIUM SERPL-SCNC: 3.7 MMOL/L
PROT SERPL-MCNC: 7.2 G/DL
RBC # BLD AUTO: 4.73 M/UL
SODIUM SERPL-SCNC: 140 MMOL/L
WBC # BLD AUTO: 4.68 K/UL

## 2018-04-18 PROCEDURE — 80053 COMPREHEN METABOLIC PANEL: CPT | Mod: NTX

## 2018-04-18 PROCEDURE — 84132 ASSAY OF SERUM POTASSIUM: CPT | Mod: NTX

## 2018-04-18 PROCEDURE — 99900035 HC TECH TIME PER 15 MIN (STAT): Mod: NTX

## 2018-04-18 PROCEDURE — 25000003 PHARM REV CODE 250: Mod: NTX | Performed by: STUDENT IN AN ORGANIZED HEALTH CARE EDUCATION/TRAINING PROGRAM

## 2018-04-18 PROCEDURE — 63600175 PHARM REV CODE 636 W HCPCS: Mod: NTX | Performed by: INTERNAL MEDICINE

## 2018-04-18 PROCEDURE — 25000003 PHARM REV CODE 250: Mod: NTX | Performed by: INTERNAL MEDICINE

## 2018-04-18 PROCEDURE — 63600175 PHARM REV CODE 636 W HCPCS: Mod: JG,NTX | Performed by: INTERNAL MEDICINE

## 2018-04-18 PROCEDURE — 25000242 PHARM REV CODE 250 ALT 637 W/ HCPCS: Mod: NTX | Performed by: INTERNAL MEDICINE

## 2018-04-18 PROCEDURE — 20600001 HC STEP DOWN PRIVATE ROOM: Mod: NTX

## 2018-04-18 PROCEDURE — 99232 SBSQ HOSP IP/OBS MODERATE 35: CPT | Mod: NTX,,, | Performed by: INTERNAL MEDICINE

## 2018-04-18 PROCEDURE — 85025 COMPLETE CBC W/AUTO DIFF WBC: CPT | Mod: NTX

## 2018-04-18 PROCEDURE — 83735 ASSAY OF MAGNESIUM: CPT | Mod: NTX

## 2018-04-18 PROCEDURE — 94660 CPAP INITIATION&MGMT: CPT | Mod: NTX

## 2018-04-18 PROCEDURE — 94761 N-INVAS EAR/PLS OXIMETRY MLT: CPT | Mod: NTX

## 2018-04-18 PROCEDURE — 36415 COLL VENOUS BLD VENIPUNCTURE: CPT | Mod: NTX

## 2018-04-18 PROCEDURE — 84100 ASSAY OF PHOSPHORUS: CPT | Mod: NTX

## 2018-04-18 RX ORDER — POTASSIUM CHLORIDE 20 MEQ/1
40 TABLET, EXTENDED RELEASE ORAL DAILY
Status: DISCONTINUED | OUTPATIENT
Start: 2018-04-19 | End: 2018-04-19

## 2018-04-18 RX ORDER — POTASSIUM CHLORIDE 20 MEQ/1
60 TABLET, EXTENDED RELEASE ORAL ONCE
Status: COMPLETED | OUTPATIENT
Start: 2018-04-18 | End: 2018-04-18

## 2018-04-18 RX ORDER — POTASSIUM CHLORIDE 20 MEQ/1
60 TABLET, EXTENDED RELEASE ORAL 3 TIMES DAILY
Status: DISCONTINUED | OUTPATIENT
Start: 2018-04-18 | End: 2018-04-19

## 2018-04-18 RX ORDER — POTASSIUM CHLORIDE 20 MEQ/1
40 TABLET, EXTENDED RELEASE ORAL ONCE
Status: COMPLETED | OUTPATIENT
Start: 2018-04-18 | End: 2018-04-18

## 2018-04-18 RX ADMIN — ENOXAPARIN SODIUM 40 MG: 100 INJECTION SUBCUTANEOUS at 05:04

## 2018-04-18 RX ADMIN — MAGNESIUM OXIDE TAB 400 MG (241.3 MG ELEMENTAL MG) 400 MG: 400 (241.3 MG) TAB at 08:04

## 2018-04-18 RX ADMIN — RIOCIGUAT 0.5 MG: 0.5 TABLET, FILM COATED ORAL at 02:04

## 2018-04-18 RX ADMIN — BUSPIRONE HYDROCHLORIDE 15 MG: 5 TABLET ORAL at 08:04

## 2018-04-18 RX ADMIN — POTASSIUM CHLORIDE 60 MEQ: 1500 TABLET, EXTENDED RELEASE ORAL at 08:04

## 2018-04-18 RX ADMIN — POTASSIUM CHLORIDE 40 MEQ: 1500 TABLET, EXTENDED RELEASE ORAL at 08:04

## 2018-04-18 RX ADMIN — GABAPENTIN 100 MG: 100 CAPSULE ORAL at 02:04

## 2018-04-18 RX ADMIN — POTASSIUM CHLORIDE 60 MEQ: 1500 TABLET, EXTENDED RELEASE ORAL at 05:04

## 2018-04-18 RX ADMIN — BUSPIRONE HYDROCHLORIDE 15 MG: 5 TABLET ORAL at 02:04

## 2018-04-18 RX ADMIN — HYDROCODONE BITARTRATE AND ACETAMINOPHEN 1 TABLET: 10; 325 TABLET ORAL at 03:04

## 2018-04-18 RX ADMIN — LAMOTRIGINE 100 MG: 100 TABLET ORAL at 08:04

## 2018-04-18 RX ADMIN — POTASSIUM CHLORIDE 60 MEQ: 1500 TABLET, EXTENDED RELEASE ORAL at 02:04

## 2018-04-18 RX ADMIN — SPIRONOLACTONE 50 MG: 25 TABLET, FILM COATED ORAL at 08:04

## 2018-04-18 RX ADMIN — TREPROSTINIL 75 NG/KG/MIN: 100 INJECTION, SOLUTION INTRAVENOUS; SUBCUTANEOUS at 05:04

## 2018-04-18 RX ADMIN — RIOCIGUAT 0.5 MG: 0.5 TABLET, FILM COATED ORAL at 08:04

## 2018-04-18 RX ADMIN — TIOTROPIUM BROMIDE 18 MCG: 18 CAPSULE ORAL; RESPIRATORY (INHALATION) at 08:04

## 2018-04-18 RX ADMIN — GABAPENTIN 100 MG: 100 CAPSULE ORAL at 08:04

## 2018-04-18 RX ADMIN — FLUTICASONE FUROATE AND VILANTEROL TRIFENATATE 1 PUFF: 100; 25 POWDER RESPIRATORY (INHALATION) at 08:04

## 2018-04-18 RX ADMIN — FUROSEMIDE 40 MG/HR: 10 INJECTION, SOLUTION INTRAVENOUS at 02:04

## 2018-04-18 RX ADMIN — FUROSEMIDE 40 MG/HR: 10 INJECTION, SOLUTION INTRAVENOUS at 08:04

## 2018-04-18 RX ADMIN — LEVOTHYROXINE SODIUM 75 MCG: 75 TABLET ORAL at 08:04

## 2018-04-18 RX ADMIN — PANTOPRAZOLE SODIUM 40 MG: 40 TABLET, DELAYED RELEASE ORAL at 08:04

## 2018-04-18 RX ADMIN — DESVENLAFAXINE SUCCINATE 100 MG: 50 TABLET, FILM COATED, EXTENDED RELEASE ORAL at 08:04

## 2018-04-18 RX ADMIN — LURASIDONE HYDROCHLORIDE 40 MG: 40 TABLET, FILM COATED ORAL at 08:04

## 2018-04-18 RX ADMIN — PRAVASTATIN SODIUM 40 MG: 40 TABLET ORAL at 08:04

## 2018-04-18 NOTE — PROGRESS NOTES
[facility]  Heart Transplant  Progress Note    Patient Name: Sue Smith  MRN: [id number]  Admission Date: 4/5/2018  Hospital Length of Stay: 13 days  Attending Physician: Jacky Wong Jr.,*  Primary Care Provider: Paddy Guerrier DO  Principal Problem:Acute on chronic diastolic heart failure    Subjective:     Interval History:    No acute overnight issues. Want to be full code since she is pursuing Lung transplant. Net negative 6L for the last 24 hours after a challenge with metolazone x1. Renal function improving from 1.6 to 1.4 while LFTs are constantly high.    Continuous Infusions:   furosemide (LASIX) 2 mg/mL infusion (non-titrating) 40 mg/hr (04/18/18 0850)    treprostinil (REMODULIN) infusion 75 ng/kg/min (04/17/18 1733)    veletri/remodulin cassette      veletri/remodulin tubing       Scheduled Meds:   busPIRone  15 mg Oral TID    desvenlafaxine succinate  100 mg Oral Daily    enoxaparin  40 mg Subcutaneous Daily    fluticasone-vilanterol  1 puff Inhalation Daily    gabapentin  100 mg Oral TID    lamoTRIgine  100 mg Oral BID    levothyroxine  75 mcg Oral Daily    lurasidone  40 mg Oral Daily    magnesium oxide  400 mg Oral BID    pantoprazole  40 mg Oral Daily    [START ON 4/19/2018] potassium chloride  40 mEq Oral Daily    potassium chloride  60 mEq Oral TID    pravastatin  40 mg Oral Daily    riociguat  0.5 mg Oral TID    sodium chloride 0.9%  3 mL Intravenous Q8H    spironolactone  50 mg Oral BID    tiotropium  1 capsule Inhalation Daily     PRN Meds:acetaminophen, hydrocodone-acetaminophen 10-325mg, loperamide, ondansetron    Review of patient's allergies indicates:   Allergen Reactions    Sulfa (sulfonamide antibiotics) Rash     Objective:     Vital Signs (Most Recent):  Temp: 98 °F (36.7 °C) (04/18/18 1127)  Pulse: 76 (04/18/18 1127)  Resp: 16 (04/18/18 1127)  BP: (!) 115/59 (04/18/18 1127)  SpO2: 97 % (04/18/18 1127) Vital Signs (24h Range):  Temp:  [97.1  °F (36.2 °C)-99 °F (37.2 °C)] 98 °F (36.7 °C)  Pulse:  [72-99] 76  Resp:  [16-20] 16  SpO2:  [91 %-97 %] 97 %  BP: (109-117)/(57-62) 115/59     Patient Vitals for the past 72 hrs (Last 3 readings):   Weight   04/18/18 0553 82.2 kg (181 lb 3.5 oz)   04/17/18 1115 85.4 kg (188 lb 4.4 oz)   04/17/18 0532 85.2 kg (187 lb 13.3 oz)     Body mass index is 33.14 kg/m².      Intake/Output Summary (Last 24 hours) at 04/18/18 1252  Last data filed at 04/18/18 1100   Gross per 24 hour   Intake          2049.33 ml   Output             7700 ml   Net         -5650.67 ml       Hemodynamic Parameters:       Telemetry:     Physical Exam     Neck: JVD at the clavicle. No HJR present  Cardiovascular: Normal rate, regular rhythm and normal heart sounds.  Exam reveals no gallop and no friction rub.   Pulmonary/Chest: Effort normal and breath sounds normal.   Abdominal: Soft. Bowel sounds are normal. Less distended compared to admission  Musculoskeletal: She exhibits no edema or tenderness.   Neurological: She is alert and oriented to person, place, and time.   Skin: Skin is warm and dry.   Psychiatric: pleasant and calm today  Nursing note and vitals reviewed.    Significant Labs:  CBC:    Recent Labs  Lab 04/16/18  0439 04/17/18  0403 04/18/18  0416   WBC 4.57 5.03 4.68   RBC 4.48 4.59 4.73   HGB 12.3 12.5 12.9   HCT 39.4 40.0 40.5   * 147* 154   MCV 88 87 86   MCH 27.5 27.2 27.3   MCHC 31.2* 31.3* 31.9*     BNP:  No results for input(s): BNP in the last 168 hours.    Invalid input(s): BNPTRIAGELBLO  CMP:    Recent Labs  Lab 04/16/18  0439  04/17/18  0403 04/17/18  1512 04/18/18  0417 04/18/18  0902   GLU 79  < > 81 101 87  --    CALCIUM 9.4  < > 9.6 10.3 10.6*  --    ALBUMIN 4.1  --  4.2  --  4.4  --    PROT 6.6  --  7.0  --  7.2  --      < > 138 140 140  --    K 3.0*  < > 3.0* 4.1 2.5* 3.7   CO2 30*  < > 32* 30* 32*  --    CL 95  < > 95 95 92*  --    BUN 17  < > 16 18 20  --    CREATININE 1.1  < > 1.2 1.6* 1.4  --     ALKPHOS 180*  --  174*  --  182*  --    ALT 22  --  19  --  17  --    AST 24  --  24  --  20  --    BILITOT 0.7  --  0.7  --  0.8  --    < > = values in this interval not displayed.   Coagulation:     Recent Labs  Lab 04/16/18  1755   INR 1.1     LDH:  No results for input(s): LDH in the last 72 hours.  Microbiology:  Microbiology Results (last 7 days)     ** No results found for the last 168 hours. **          I have reviewed all pertinent labs within the past 24 hours.    Estimated Creatinine Clearance: 44.6 mL/min (based on SCr of 1.4 mg/dL).    Diagnostic Results:        Assessment and Plan:     Ms. Smith is a 57 y.o. Female with a past medical history of PHTN WHO Group 1 on IV Remodulin, chronic hypoxic respiratory failure, chronic RV failure on home O2 and BiPAP who presented to [facility] with SOB. She has been having SOB for roughly 1 week, which was progressively worsening. She stated that she sleeps in a recliner currently until she gets a new BiPAP machine. She does wake up short of breath, but is not clear about LE edema. She does state that her weight has been progressively increasing though. She states that she has no symptoms from the Veletri currently.    * Acute on chronic diastolic heart failure    - Remains Wet and warm on exam  - RHC on 4/17 show CO 3.4 and CI on 1.8  - Continue Lasix 40mg/hr.   - Aggressive replacement of lytes (K>4 and Mg >2)  - Continue aldactone 50 mg BID  - strict I&O's and daily weights.   - Low salt diet  - patient status currently full code from DNR        Gastroesophageal reflux disease    - Continue PTA protonix        Bipolar affective disorder    Continue PTA Antipsychotics / mood stabilizers. No acute ongoing issues         Chronic respiratory failure with hypoxia    - 4Ls O2 ATC  - Goal O2 sats 88 or above  - BIPAP QHS  - Continue inhaled therapies for COPD   - Prior PFTs as above.          Pulmonary hypertension, group 1    - Continue IV remodulin at 75 ng    - Adempas 0.5mg TID  - Back on baseline home oxygen at 4LN  - Lung transplant reviewed. Advise on 30lb weight loss and Hutchinson as Op. Change Code status to full code          Discussed plan of care with Dr. Judith Peñaloza MD  Heart Transplant  [facility]

## 2018-04-18 NOTE — PLAN OF CARE
Problem: Patient Care Overview  Goal: Plan of Care Review  Pt free from fall or injury so far this shift. Instructed to call if assistance needed, verbalized understanding.   Cardiac monitoring in progress, currently SR.  Remodulin infusing at 75 ng, rate @ 77 cc/ 24 hrs. Will change cassette at 1730.  Sats low to high 90's on 2 L NC. Pt denies feeling SOB.  Lasix gtt and aldactone continued. Pt diuresing well.   k 2.5 this AM, additional PO k replacement given, repeat ^ 3.7.  Pt reports pain to neck and lower back, PRN Norco given.   Afebrile. Hand hygiene reinforced. Daily labs monitored.

## 2018-04-18 NOTE — ASSESSMENT & PLAN NOTE
- Continue IV remodulin at 75 ng   - Adempas 0.5mg TID  - Back on baseline home oxygen at 4LN  - Lung transplant reviewed. Advise on 30lb weight loss and Hutchinson as Op. Change Code status to full code

## 2018-04-18 NOTE — ASSESSMENT & PLAN NOTE
- Remains Wet and warm on exam  - RHC on 4/17 show CO 3.4 and CI on 1.8  - Continue Lasix 40mg/hr.   - Aggressive replacement of lytes (K>4 and Mg >2)  - Continue aldactone 50 mg BID  - strict I&O's and daily weights.   - Low salt diet  - patient status currently full code from DNR

## 2018-04-18 NOTE — PLAN OF CARE
Problem: Patient Care Overview  Goal: Plan of Care Review  Outcome: Ongoing (interventions implemented as appropriate)  Pt AAOx4. Pt free from falls. Pt wears non slip socks when ambulating. Pt bed low and locked position. Pt afebrile. Pt IV site without redness or edema. Remodulin infusing to RCW. Pt given measuring cup and pen and paper to maintain accurate Intake and output. Standing Weights performed. Lasix gtt infusing to RFA . Pt has denied any pain or discomfort this shift.

## 2018-04-18 NOTE — SUBJECTIVE & OBJECTIVE
Interval History:    No acute overnight issues. Want to be full code since she is pursuing Lung transplant. Net negative 6L for the last 24 hours after a challenge with metolazone x1. Renal function improving from 1.6 to 1.4 while LFTs are constantly high.    Continuous Infusions:   furosemide (LASIX) 2 mg/mL infusion (non-titrating) 40 mg/hr (04/18/18 0850)    treprostinil (REMODULIN) infusion 75 ng/kg/min (04/17/18 1733)    veletri/remodulin cassette      veletri/remodulin tubing       Scheduled Meds:   busPIRone  15 mg Oral TID    desvenlafaxine succinate  100 mg Oral Daily    enoxaparin  40 mg Subcutaneous Daily    fluticasone-vilanterol  1 puff Inhalation Daily    gabapentin  100 mg Oral TID    lamoTRIgine  100 mg Oral BID    levothyroxine  75 mcg Oral Daily    lurasidone  40 mg Oral Daily    magnesium oxide  400 mg Oral BID    pantoprazole  40 mg Oral Daily    [START ON 4/19/2018] potassium chloride  40 mEq Oral Daily    potassium chloride  60 mEq Oral TID    pravastatin  40 mg Oral Daily    riociguat  0.5 mg Oral TID    sodium chloride 0.9%  3 mL Intravenous Q8H    spironolactone  50 mg Oral BID    tiotropium  1 capsule Inhalation Daily     PRN Meds:acetaminophen, hydrocodone-acetaminophen 10-325mg, loperamide, ondansetron    Review of patient's allergies indicates:   Allergen Reactions    Sulfa (sulfonamide antibiotics) Rash     Objective:     Vital Signs (Most Recent):  Temp: 98 °F (36.7 °C) (04/18/18 1127)  Pulse: 76 (04/18/18 1127)  Resp: 16 (04/18/18 1127)  BP: (!) 115/59 (04/18/18 1127)  SpO2: 97 % (04/18/18 1127) Vital Signs (24h Range):  Temp:  [97.1 °F (36.2 °C)-99 °F (37.2 °C)] 98 °F (36.7 °C)  Pulse:  [72-99] 76  Resp:  [16-20] 16  SpO2:  [91 %-97 %] 97 %  BP: (109-117)/(57-62) 115/59     Patient Vitals for the past 72 hrs (Last 3 readings):   Weight   04/18/18 0553 82.2 kg (181 lb 3.5 oz)   04/17/18 1115 85.4 kg (188 lb 4.4 oz)   04/17/18 0532 85.2 kg (187 lb 13.3 oz)      Body mass index is 33.14 kg/m².      Intake/Output Summary (Last 24 hours) at 04/18/18 1252  Last data filed at 04/18/18 1100   Gross per 24 hour   Intake          2049.33 ml   Output             7700 ml   Net         -5650.67 ml       Hemodynamic Parameters:       Telemetry:     Physical Exam     Neck: JVD at the clavicle. No HJR present  Cardiovascular: Normal rate, regular rhythm and normal heart sounds.  Exam reveals no gallop and no friction rub.   Pulmonary/Chest: Effort normal and breath sounds normal.   Abdominal: Soft. Bowel sounds are normal. Less distended compared to admission  Musculoskeletal: She exhibits no edema or tenderness.   Neurological: She is alert and oriented to person, place, and time.   Skin: Skin is warm and dry.   Psychiatric: pleasant and calm today  Nursing note and vitals reviewed.    Significant Labs:  CBC:    Recent Labs  Lab 04/16/18  0439 04/17/18  0403 04/18/18  0416   WBC 4.57 5.03 4.68   RBC 4.48 4.59 4.73   HGB 12.3 12.5 12.9   HCT 39.4 40.0 40.5   * 147* 154   MCV 88 87 86   MCH 27.5 27.2 27.3   MCHC 31.2* 31.3* 31.9*     BNP:  No results for input(s): BNP in the last 168 hours.    Invalid input(s): BNPTRIAGELBLO  CMP:    Recent Labs  Lab 04/16/18  0439  04/17/18  0403 04/17/18  1512 04/18/18  0417 04/18/18  0902   GLU 79  < > 81 101 87  --    CALCIUM 9.4  < > 9.6 10.3 10.6*  --    ALBUMIN 4.1  --  4.2  --  4.4  --    PROT 6.6  --  7.0  --  7.2  --      < > 138 140 140  --    K 3.0*  < > 3.0* 4.1 2.5* 3.7   CO2 30*  < > 32* 30* 32*  --    CL 95  < > 95 95 92*  --    BUN 17  < > 16 18 20  --    CREATININE 1.1  < > 1.2 1.6* 1.4  --    ALKPHOS 180*  --  174*  --  182*  --    ALT 22  --  19  --  17  --    AST 24  --  24  --  20  --    BILITOT 0.7  --  0.7  --  0.8  --    < > = values in this interval not displayed.   Coagulation:     Recent Labs  Lab 04/16/18  1755   INR 1.1     LDH:  No results for input(s): LDH in the last 72 hours.  Microbiology:  Microbiology  Results (last 7 days)     ** No results found for the last 168 hours. **          I have reviewed all pertinent labs within the past 24 hours.    Estimated Creatinine Clearance: 44.6 mL/min (based on SCr of 1.4 mg/dL).    Diagnostic Results:

## 2018-04-19 ENCOUNTER — TELEPHONE (OUTPATIENT)
Dept: TRANSPLANT | Facility: CLINIC | Age: 58
End: 2018-04-19

## 2018-04-19 LAB
ALBUMIN SERPL BCP-MCNC: 4.4 G/DL
ALP SERPL-CCNC: 177 U/L
ALT SERPL W/O P-5'-P-CCNC: 17 U/L
ANION GAP SERPL CALC-SCNC: 17 MMOL/L
AST SERPL-CCNC: 21 U/L
BASOPHILS # BLD AUTO: 0.03 K/UL
BASOPHILS NFR BLD: 0.6 %
BILIRUB SERPL-MCNC: 1 MG/DL
BUN SERPL-MCNC: 30 MG/DL
CALCIUM SERPL-MCNC: 10.5 MG/DL
CHLORIDE SERPL-SCNC: 90 MMOL/L
CO2 SERPL-SCNC: 31 MMOL/L
CREAT SERPL-MCNC: 1.5 MG/DL
DIFFERENTIAL METHOD: ABNORMAL
EOSINOPHIL # BLD AUTO: 0.2 K/UL
EOSINOPHIL NFR BLD: 3.6 %
ERYTHROCYTE [DISTWIDTH] IN BLOOD BY AUTOMATED COUNT: 16.8 %
EST. GFR  (AFRICAN AMERICAN): 44.6 ML/MIN/1.73 M^2
EST. GFR  (NON AFRICAN AMERICAN): 38.7 ML/MIN/1.73 M^2
GLUCOSE SERPL-MCNC: 76 MG/DL
HCT VFR BLD AUTO: 41.7 %
HGB BLD-MCNC: 13.4 G/DL
IMM GRANULOCYTES # BLD AUTO: 0.03 K/UL
IMM GRANULOCYTES NFR BLD AUTO: 0.6 %
LYMPHOCYTES # BLD AUTO: 0.9 K/UL
LYMPHOCYTES NFR BLD: 18.3 %
MAGNESIUM SERPL-MCNC: 2.4 MG/DL
MCH RBC QN AUTO: 27.4 PG
MCHC RBC AUTO-ENTMCNC: 32.1 G/DL
MCV RBC AUTO: 85 FL
MONOCYTES # BLD AUTO: 0.4 K/UL
MONOCYTES NFR BLD: 8 %
NEUTROPHILS # BLD AUTO: 3.4 K/UL
NEUTROPHILS NFR BLD: 68.9 %
NRBC BLD-RTO: 0 /100 WBC
PHOSPHATE SERPL-MCNC: 5.2 MG/DL
PLATELET # BLD AUTO: 159 K/UL
PMV BLD AUTO: 11.6 FL
POTASSIUM SERPL-SCNC: 2.5 MMOL/L
POTASSIUM SERPL-SCNC: 3.9 MMOL/L
PROT SERPL-MCNC: 7.3 G/DL
RBC # BLD AUTO: 4.89 M/UL
SODIUM SERPL-SCNC: 138 MMOL/L
WBC # BLD AUTO: 4.98 K/UL

## 2018-04-19 PROCEDURE — 94761 N-INVAS EAR/PLS OXIMETRY MLT: CPT | Mod: NTX

## 2018-04-19 PROCEDURE — 25000003 PHARM REV CODE 250: Mod: NTX | Performed by: INTERNAL MEDICINE

## 2018-04-19 PROCEDURE — 63600175 PHARM REV CODE 636 W HCPCS: Mod: NTX | Performed by: INTERNAL MEDICINE

## 2018-04-19 PROCEDURE — 20600001 HC STEP DOWN PRIVATE ROOM: Mod: NTX

## 2018-04-19 PROCEDURE — 84132 ASSAY OF SERUM POTASSIUM: CPT | Mod: NTX

## 2018-04-19 PROCEDURE — 36415 COLL VENOUS BLD VENIPUNCTURE: CPT | Mod: NTX

## 2018-04-19 PROCEDURE — 25000003 PHARM REV CODE 250: Mod: NTX | Performed by: STUDENT IN AN ORGANIZED HEALTH CARE EDUCATION/TRAINING PROGRAM

## 2018-04-19 PROCEDURE — 85025 COMPLETE CBC W/AUTO DIFF WBC: CPT | Mod: NTX

## 2018-04-19 PROCEDURE — 84100 ASSAY OF PHOSPHORUS: CPT | Mod: NTX

## 2018-04-19 PROCEDURE — 80053 COMPREHEN METABOLIC PANEL: CPT | Mod: NTX

## 2018-04-19 PROCEDURE — 99900035 HC TECH TIME PER 15 MIN (STAT): Mod: NTX

## 2018-04-19 PROCEDURE — 25000242 PHARM REV CODE 250 ALT 637 W/ HCPCS: Mod: NTX | Performed by: INTERNAL MEDICINE

## 2018-04-19 PROCEDURE — 83735 ASSAY OF MAGNESIUM: CPT | Mod: NTX

## 2018-04-19 PROCEDURE — 99232 SBSQ HOSP IP/OBS MODERATE 35: CPT | Mod: NTX,,, | Performed by: INTERNAL MEDICINE

## 2018-04-19 PROCEDURE — 94660 CPAP INITIATION&MGMT: CPT | Mod: NTX

## 2018-04-19 PROCEDURE — 63600175 PHARM REV CODE 636 W HCPCS: Mod: JG,NTX | Performed by: INTERNAL MEDICINE

## 2018-04-19 RX ORDER — POTASSIUM CHLORIDE 20 MEQ/1
60 TABLET, EXTENDED RELEASE ORAL
Status: DISCONTINUED | OUTPATIENT
Start: 2018-04-19 | End: 2018-04-20

## 2018-04-19 RX ADMIN — GABAPENTIN 100 MG: 100 CAPSULE ORAL at 02:04

## 2018-04-19 RX ADMIN — SPIRONOLACTONE 50 MG: 25 TABLET, FILM COATED ORAL at 08:04

## 2018-04-19 RX ADMIN — FUROSEMIDE 40 MG/HR: 10 INJECTION, SOLUTION INTRAVENOUS at 05:04

## 2018-04-19 RX ADMIN — GABAPENTIN 100 MG: 100 CAPSULE ORAL at 08:04

## 2018-04-19 RX ADMIN — LAMOTRIGINE 100 MG: 100 TABLET ORAL at 08:04

## 2018-04-19 RX ADMIN — PANTOPRAZOLE SODIUM 40 MG: 40 TABLET, DELAYED RELEASE ORAL at 08:04

## 2018-04-19 RX ADMIN — BUSPIRONE HYDROCHLORIDE 15 MG: 5 TABLET ORAL at 08:04

## 2018-04-19 RX ADMIN — FUROSEMIDE 40 MG/HR: 10 INJECTION, SOLUTION INTRAVENOUS at 06:04

## 2018-04-19 RX ADMIN — POTASSIUM CHLORIDE 60 MEQ: 1500 TABLET, EXTENDED RELEASE ORAL at 08:04

## 2018-04-19 RX ADMIN — RIOCIGUAT 0.5 MG: 0.5 TABLET, FILM COATED ORAL at 08:04

## 2018-04-19 RX ADMIN — PRAVASTATIN SODIUM 40 MG: 40 TABLET ORAL at 08:04

## 2018-04-19 RX ADMIN — POTASSIUM CHLORIDE 40 MEQ: 1500 TABLET, EXTENDED RELEASE ORAL at 08:04

## 2018-04-19 RX ADMIN — TIOTROPIUM BROMIDE 18 MCG: 18 CAPSULE ORAL; RESPIRATORY (INHALATION) at 09:04

## 2018-04-19 RX ADMIN — LURASIDONE HYDROCHLORIDE 40 MG: 40 TABLET, FILM COATED ORAL at 08:04

## 2018-04-19 RX ADMIN — FLUTICASONE FUROATE AND VILANTEROL TRIFENATATE 1 PUFF: 100; 25 POWDER RESPIRATORY (INHALATION) at 08:04

## 2018-04-19 RX ADMIN — MAGNESIUM OXIDE TAB 400 MG (241.3 MG ELEMENTAL MG) 400 MG: 400 (241.3 MG) TAB at 08:04

## 2018-04-19 RX ADMIN — POTASSIUM CHLORIDE 60 MEQ: 1500 TABLET, EXTENDED RELEASE ORAL at 05:04

## 2018-04-19 RX ADMIN — DESVENLAFAXINE SUCCINATE 100 MG: 50 TABLET, FILM COATED, EXTENDED RELEASE ORAL at 08:04

## 2018-04-19 RX ADMIN — RIOCIGUAT 0.5 MG: 0.5 TABLET, FILM COATED ORAL at 02:04

## 2018-04-19 RX ADMIN — LEVOTHYROXINE SODIUM 75 MCG: 75 TABLET ORAL at 08:04

## 2018-04-19 RX ADMIN — FUROSEMIDE 40 MG/HR: 10 INJECTION, SOLUTION INTRAVENOUS at 08:04

## 2018-04-19 RX ADMIN — TREPROSTINIL 75 NG/KG/MIN: 100 INJECTION, SOLUTION INTRAVENOUS; SUBCUTANEOUS at 05:04

## 2018-04-19 RX ADMIN — POTASSIUM CHLORIDE 60 MEQ: 1500 TABLET, EXTENDED RELEASE ORAL at 06:04

## 2018-04-19 RX ADMIN — POTASSIUM CHLORIDE 60 MEQ: 1500 TABLET, EXTENDED RELEASE ORAL at 02:04

## 2018-04-19 RX ADMIN — FUROSEMIDE 40 MG/HR: 10 INJECTION, SOLUTION INTRAVENOUS at 01:04

## 2018-04-19 RX ADMIN — ENOXAPARIN SODIUM 40 MG: 100 INJECTION SUBCUTANEOUS at 05:04

## 2018-04-19 RX ADMIN — BUSPIRONE HYDROCHLORIDE 15 MG: 5 TABLET ORAL at 02:04

## 2018-04-19 NOTE — SUBJECTIVE & OBJECTIVE
Interval History:  No acute overnight event. Diuresing well but still having hypokalemia issues. Lung transplant starting addison during this stay. Total negative 2.8 liters in the last 24 hours with weight loss of about 10lbs in the last three days.    Continuous Infusions:   furosemide (LASIX) 2 mg/mL infusion (non-titrating) 40 mg/hr (04/19/18 0609)    treprostinil (REMODULIN) infusion 75 ng/kg/min (04/18/18 1735)    veletri/remodulin cassette      veletri/remodulin tubing       Scheduled Meds:   busPIRone  15 mg Oral TID    desvenlafaxine succinate  100 mg Oral Daily    enoxaparin  40 mg Subcutaneous Daily    fluticasone-vilanterol  1 puff Inhalation Daily    gabapentin  100 mg Oral TID    lamoTRIgine  100 mg Oral BID    levothyroxine  75 mcg Oral Daily    lurasidone  40 mg Oral Daily    magnesium oxide  400 mg Oral BID    pantoprazole  40 mg Oral Daily    potassium chloride  60 mEq Oral Q4H While awake    pravastatin  40 mg Oral Daily    riociguat  0.5 mg Oral TID    sodium chloride 0.9%  3 mL Intravenous Q8H    spironolactone  50 mg Oral BID    tiotropium  1 capsule Inhalation Daily    tuberculin  5 Units Intradermal Once     PRN Meds:acetaminophen, hydrocodone-acetaminophen 10-325mg, loperamide, ondansetron    Review of patient's allergies indicates:   Allergen Reactions    Sulfa (sulfonamide antibiotics) Rash     Objective:     Vital Signs (Most Recent):  Temp: 98 °F (36.7 °C) (04/19/18 1126)  Pulse: 87 (04/19/18 1126)  Resp: 18 (04/19/18 1126)  BP: 117/65 (04/19/18 1126)  SpO2: (!) 94 % (04/19/18 1126) Vital Signs (24h Range):  Temp:  [97.4 °F (36.3 °C)-98.6 °F (37 °C)] 98 °F (36.7 °C)  Pulse:  [77-93] 87  Resp:  [13-24] 18  SpO2:  [81 %-94 %] 94 %  BP: ()/(52-67) 117/65     Patient Vitals for the past 72 hrs (Last 3 readings):   Weight   04/19/18 0500 81.3 kg (179 lb 3.7 oz)   04/18/18 0553 82.2 kg (181 lb 3.5 oz)   04/17/18 1115 85.4 kg (188 lb 4.4 oz)     Body mass index is 32.77  kg/m².      Intake/Output Summary (Last 24 hours) at 04/19/18 1404  Last data filed at 04/19/18 0800   Gross per 24 hour   Intake             1213 ml   Output             4200 ml   Net            -2987 ml       Hemodynamic Parameters:       Telemetry:    Physical Exam    Neck: JVD at the clavicle. No HJR present  Cardiovascular: Normal rate, regular rhythm and normal heart sounds.  Exam reveals no gallop and no friction rub.   Pulmonary/Chest: Effort normal and breath sounds normal.   Abdominal: Soft. Bowel sounds are normal. Less distended compared to admission  Musculoskeletal: She exhibits no edema or tenderness.   Neurological: She is alert and oriented to person, place, and time.   Skin: Skin is warm and dry.   Psychiatric: pleasant and calm today  Nursing note and vitals reviewed.  Significant Labs:  CBC:    Recent Labs  Lab 04/17/18  0403 04/18/18  0416 04/19/18  0418   WBC 5.03 4.68 4.98   RBC 4.59 4.73 4.89   HGB 12.5 12.9 13.4   HCT 40.0 40.5 41.7   * 154 159   MCV 87 86 85   MCH 27.2 27.3 27.4   MCHC 31.3* 31.9* 32.1     BNP:  No results for input(s): BNP in the last 168 hours.    Invalid input(s): BNPTRIAGELBLO  CMP:    Recent Labs  Lab 04/17/18  0403 04/17/18  1512 04/18/18  0417 04/18/18  0902 04/19/18  0418 04/19/18  1013   GLU 81 101 87  --  76  --    CALCIUM 9.6 10.3 10.6*  --  10.5  --    ALBUMIN 4.2  --  4.4  --  4.4  --    PROT 7.0  --  7.2  --  7.3  --     140 140  --  138  --    K 3.0* 4.1 2.5* 3.7 2.5* 3.9   CO2 32* 30* 32*  --  31*  --    CL 95 95 92*  --  90*  --    BUN 16 18 20  --  30*  --    CREATININE 1.2 1.6* 1.4  --  1.5*  --    ALKPHOS 174*  --  182*  --  177*  --    ALT 19  --  17  --  17  --    AST 24  --  20  --  21  --    BILITOT 0.7  --  0.8  --  1.0  --       Coagulation:     Recent Labs  Lab 04/16/18  1755   INR 1.1     LDH:  No results for input(s): LDH in the last 72 hours.  Microbiology:  Microbiology Results (last 7 days)     ** No results found for the last  168 hours. **          I have reviewed all pertinent labs within the past 24 hours.    Estimated Creatinine Clearance: 41.4 mL/min (A) (based on SCr of 1.5 mg/dL (H)).    Diagnostic Results:

## 2018-04-19 NOTE — SUBJECTIVE & OBJECTIVE
Subjective:     Interval History: No acute events overnight. Patient continues to undergo aggressive diuresis per primary team.  Patient continues to be active and walks the hallways several times throughout the day. Patient is currently 90% on 2L NC. Patient is using 35% FiO2 on Bipap overnight while sleeping. Patient states that she is feeling well and currently denies cough, SOB, chest pain. Patient motivated to continue with lung transplant evaluation. Patient's standing weight 81.3 kg today.     Continuous Infusions:   furosemide (LASIX) 2 mg/mL infusion (non-titrating) 40 mg/hr (04/19/18 0609)    treprostinil (REMODULIN) infusion 75 ng/kg/min (04/18/18 4705)    veletri/remodulin cassette      veletri/remodulin tubing       Scheduled Meds:   busPIRone  15 mg Oral TID    desvenlafaxine succinate  100 mg Oral Daily    enoxaparin  40 mg Subcutaneous Daily    fluticasone-vilanterol  1 puff Inhalation Daily    gabapentin  100 mg Oral TID    lamoTRIgine  100 mg Oral BID    levothyroxine  75 mcg Oral Daily    lurasidone  40 mg Oral Daily    magnesium oxide  400 mg Oral BID    pantoprazole  40 mg Oral Daily    potassium chloride  60 mEq Oral Q4H While awake    pravastatin  40 mg Oral Daily    riociguat  0.5 mg Oral TID    sodium chloride 0.9%  3 mL Intravenous Q8H    spironolactone  50 mg Oral BID    tiotropium  1 capsule Inhalation Daily     PRN Meds:acetaminophen, hydrocodone-acetaminophen 10-325mg, loperamide, ondansetron    Review of patient's allergies indicates:   Allergen Reactions    Sulfa (sulfonamide antibiotics) Rash       Review of Systems   Constitutional: Negative for activity change, appetite change, chills and fever.   HENT: Negative for congestion, postnasal drip and rhinorrhea.    Eyes: Negative.    Respiratory: Negative for cough, chest tightness, shortness of breath and wheezing.    Cardiovascular: Positive for leg swelling. Negative for chest pain and palpitations.    Gastrointestinal: Negative for diarrhea, nausea and vomiting.   Endocrine: Negative.    Genitourinary: Negative.    Musculoskeletal: Negative for arthralgias and myalgias.   Skin: Negative for color change and pallor.   Allergic/Immunologic: Negative.    Neurological: Positive for light-headedness. Negative for dizziness and headaches.   Hematological: Negative for adenopathy. Does not bruise/bleed easily.   Psychiatric/Behavioral: Negative for agitation, behavioral problems and confusion.     Objective:   Physical Exam   Constitutional: She is oriented to person, place, and time. She appears well-developed and well-nourished.   HENT:   Head: Normocephalic and atraumatic.   Eyes: EOM are normal. Pupils are equal, round, and reactive to light.   Neck: Normal range of motion. Neck supple. JVD present.   Cardiovascular: Normal rate, regular rhythm and normal heart sounds.    Pulmonary/Chest: Effort normal and breath sounds normal. No respiratory distress. She has no wheezes. She has no rales. She exhibits no tenderness.   Abdominal: Soft. Bowel sounds are normal. She exhibits no distension. There is no tenderness.   Musculoskeletal: She exhibits edema (BLE).   Neurological: She is alert and oriented to person, place, and time.   Skin: Skin is warm and dry. Capillary refill takes less than 2 seconds.   Psychiatric: She has a normal mood and affect. Her behavior is normal.   Nursing note and vitals reviewed.        Vital Signs (Most Recent):  Temp: 98.1 °F (36.7 °C) (04/19/18 0745)  Pulse: 88 (04/19/18 1055)  Resp: 18 (04/19/18 0958)  BP: (!) 106/58 (04/19/18 0745)  SpO2: (!) 90 % (04/19/18 0750) Vital Signs (24h Range):  Temp:  [97.4 °F (36.3 °C)-98.6 °F (37 °C)] 98.1 °F (36.7 °C)  Pulse:  [76-93] 88  Resp:  [13-24] 18  SpO2:  [81 %-97 %] 90 %  BP: ()/(52-67) 106/58     Weight: 81.3 kg (179 lb 3.7 oz)  Body mass index is 32.77 kg/m².      Intake/Output Summary (Last 24 hours) at 04/19/18 1122  Last data filed at  04/19/18 0800   Gross per 24 hour   Intake             1363 ml   Output             4700 ml   Net            -3337 ml       Significant Labs:  CBC:    Recent Labs  Lab 04/19/18 0418   WBC 4.98   RBC 4.89   HGB 13.4   HCT 41.7      MCV 85   MCH 27.4   MCHC 32.1     BMP:    Recent Labs  Lab 04/19/18 0418 04/19/18  1013     --    K 2.5* 3.9   CL 90*  --    CO2 31*  --    BUN 30*  --    CREATININE 1.5*  --    CALCIUM 10.5  --       Tacrolimus Levels:  No results for input(s): TACROLIMUS in the last 72 hours.  Microbiology:  Microbiology Results (last 7 days)     ** No results found for the last 168 hours. **          I have reviewed all pertinent labs within the past 24 hours.    Diagnostic Results:  Labs: Reviewed

## 2018-04-19 NOTE — ASSESSMENT & PLAN NOTE
- Remains Wet and warm on exam  - RHC on 4/17 show CO 3.4 and CI on 1.8  - Continue Lasix 40mg/hr. Will track renal function and adjust as needed  - Net negative 2.8 L for the last 24hrs and  Total wt loss of about 50lbs since admission  - Aggressive replacement of lytes (K>4 and Mg >2)  - Continue aldactone 50 mg BID  - strict I&O's and daily weights.   - Low salt diet  - patient status currently full code from DNR

## 2018-04-19 NOTE — TELEPHONE ENCOUNTER
Received order from Dr. Truong to obtain financial clearance for an inpatient lung transplant evaluation.  Message sent to  Ginette Beaulieu.

## 2018-04-19 NOTE — ASSESSMENT & PLAN NOTE
- WHO group 1  - Continue IV remodulin at 75 ng   - Adempas 0.5mg TID  - Back on baseline home oxygen at 3-4LNC  - Lung transplant addison started by service  - full code

## 2018-04-19 NOTE — PLAN OF CARE
Problem: Patient Care Overview  Goal: Plan of Care Review  Pt free from fall or injury so far this shift. Instructed to call if assistance needed, verbalized understanding.   Cardiac monitoring in progress, currently SR.  Remodulin infusing at 75 ng, rate @ 77 cc/ 24 hrs. Will change cassette at 1730.  Sats low to mid 90's on 2 L NC. Pt denies feeling SOB.  Lasix gtt and aldactone continued. Pt diuresing well.   k 2.5 this AM, scheduled PO k replacement given, repeat ^ 3.9.   Denies pain or discomfort.   Afebrile. Hand hygiene reinforced. Daily labs monitored.

## 2018-04-19 NOTE — ASSESSMENT & PLAN NOTE
Likely multifactorial due to severe PHTN and diastolic heart failure - Continue 2L NC during day and 35% FiO2 on Bipap at night. Will consider decreasing FiO2 on bipap machine overnight depending on O2 saturation.

## 2018-04-19 NOTE — ASSESSMENT & PLAN NOTE
Continue Adempas and Remodulin per primary team. Patient to begin pre-lung transplant evaluation while inpatient.

## 2018-04-19 NOTE — ASSESSMENT & PLAN NOTE
Will begin inpatient pre-lung transplant evaluation for patient including all pertinent labs, imaging, and consults.   Will continue to monitor patient's BMI as patient continues to be diuresed. Will follow up with patient in ambulatory clinic following discharge to continue evaluation.

## 2018-04-19 NOTE — PLAN OF CARE
Problem: Patient Care Overview  Goal: Plan of Care Review  Outcome: Ongoing (interventions implemented as appropriate)  AAOx3, afebrile, no c/o pain. Lasix gtt infusing per order. remodulin infusing at ordered rate. Pt in lowest postion, side rails up x2, non skid footwear in place, call light within reach, pt verbalized understanding to call the nurse when needed. Hand hygiene practiced per protocol. Will continue to monitor.

## 2018-04-19 NOTE — PROGRESS NOTES
I have seen the patient, reviewed the hospitalist's assessment and plan. I have personally interviewed and examined the patient at bedside and: agree with the findings.   She revoked DNR yesterday and today offered transplant evaluation but wants to wait--see their note  Will get her as dry as possible prior to discharge  [facility]  Heart Transplant  Progress Note    Patient Name: Sue Smith  MRN: [id number]  Admission Date: 4/5/2018  Hospital Length of Stay: 14 days  Attending Physician: Jacky Wong Jr.,*  Primary Care Provider: Paddy Guerrier DO  Principal Problem:Acute on chronic diastolic heart failure    Subjective:     Interval History:  No acute overnight event. Diuresing well but still having hypokalemia issues. Lung transplant starting addison during this stay. Total negative 2.8 liters in the last 24 hours with weight loss of about 10lbs in the last three days.    Continuous Infusions:   furosemide (LASIX) 2 mg/mL infusion (non-titrating) 40 mg/hr (04/19/18 0609)    treprostinil (REMODULIN) infusion 75 ng/kg/min (04/18/18 1245)    veletri/remodulin cassette      veletri/remodulin tubing       Scheduled Meds:   busPIRone  15 mg Oral TID    desvenlafaxine succinate  100 mg Oral Daily    enoxaparin  40 mg Subcutaneous Daily    fluticasone-vilanterol  1 puff Inhalation Daily    gabapentin  100 mg Oral TID    lamoTRIgine  100 mg Oral BID    levothyroxine  75 mcg Oral Daily    lurasidone  40 mg Oral Daily    magnesium oxide  400 mg Oral BID    pantoprazole  40 mg Oral Daily    potassium chloride  60 mEq Oral Q4H While awake    pravastatin  40 mg Oral Daily    riociguat  0.5 mg Oral TID    sodium chloride 0.9%  3 mL Intravenous Q8H    spironolactone  50 mg Oral BID    tiotropium  1 capsule Inhalation Daily    tuberculin  5 Units Intradermal Once     PRN Meds:acetaminophen, hydrocodone-acetaminophen 10-325mg, loperamide, ondansetron    Review of patient's  allergies indicates:   Allergen Reactions    Sulfa (sulfonamide antibiotics) Rash     Objective:     Vital Signs (Most Recent):  Temp: 98 °F (36.7 °C) (04/19/18 1126)  Pulse: 87 (04/19/18 1126)  Resp: 18 (04/19/18 1126)  BP: 117/65 (04/19/18 1126)  SpO2: (!) 94 % (04/19/18 1126) Vital Signs (24h Range):  Temp:  [97.4 °F (36.3 °C)-98.6 °F (37 °C)] 98 °F (36.7 °C)  Pulse:  [77-93] 87  Resp:  [13-24] 18  SpO2:  [81 %-94 %] 94 %  BP: ()/(52-67) 117/65     Patient Vitals for the past 72 hrs (Last 3 readings):   Weight   04/19/18 0500 81.3 kg (179 lb 3.7 oz)   04/18/18 0553 82.2 kg (181 lb 3.5 oz)   04/17/18 1115 85.4 kg (188 lb 4.4 oz)     Body mass index is 32.77 kg/m².      Intake/Output Summary (Last 24 hours) at 04/19/18 1404  Last data filed at 04/19/18 0800   Gross per 24 hour   Intake             1213 ml   Output             4200 ml   Net            -2987 ml       Hemodynamic Parameters:       Telemetry:    Physical Exam    Neck: JVD at the clavicle. No HJR present  Cardiovascular: Normal rate, regular rhythm and normal heart sounds.  Exam reveals no gallop and no friction rub.   Pulmonary/Chest: Effort normal and breath sounds normal.   Abdominal: Soft. Bowel sounds are normal. Less distended compared to admission  Musculoskeletal: She exhibits no edema or tenderness.   Neurological: She is alert and oriented to person, place, and time.   Skin: Skin is warm and dry.   Psychiatric: pleasant and calm today  Nursing note and vitals reviewed.  Significant Labs:  CBC:    Recent Labs  Lab 04/17/18  0403 04/18/18  0416 04/19/18  0418   WBC 5.03 4.68 4.98   RBC 4.59 4.73 4.89   HGB 12.5 12.9 13.4   HCT 40.0 40.5 41.7   * 154 159   MCV 87 86 85   MCH 27.2 27.3 27.4   MCHC 31.3* 31.9* 32.1     BNP:  No results for input(s): BNP in the last 168 hours.    Invalid input(s): BNPTRIAGELBLO  CMP:    Recent Labs  Lab 04/17/18  0403 04/17/18  1512 04/18/18  0417 04/18/18  0902 04/19/18  0418 04/19/18  1013   GLU 81  101 87  --  76  --    CALCIUM 9.6 10.3 10.6*  --  10.5  --    ALBUMIN 4.2  --  4.4  --  4.4  --    PROT 7.0  --  7.2  --  7.3  --     140 140  --  138  --    K 3.0* 4.1 2.5* 3.7 2.5* 3.9   CO2 32* 30* 32*  --  31*  --    CL 95 95 92*  --  90*  --    BUN 16 18 20  --  30*  --    CREATININE 1.2 1.6* 1.4  --  1.5*  --    ALKPHOS 174*  --  182*  --  177*  --    ALT 19  --  17  --  17  --    AST 24  --  20  --  21  --    BILITOT 0.7  --  0.8  --  1.0  --       Coagulation:     Recent Labs  Lab 04/16/18  1755   INR 1.1     LDH:  No results for input(s): LDH in the last 72 hours.  Microbiology:  Microbiology Results (last 7 days)     ** No results found for the last 168 hours. **          I have reviewed all pertinent labs within the past 24 hours.    Estimated Creatinine Clearance: 41.4 mL/min (A) (based on SCr of 1.5 mg/dL (H)).    Diagnostic Results:        Assessment and Plan:     Ms. Smith is a 57 y.o. Female with a past medical history of PHTN WHO Group 1 on IV Remodulin, chronic hypoxic respiratory failure, chronic RV failure on home O2 and BiPAP who presented to [facility] with SOB. She has been having SOB for roughly 1 week, which was progressively worsening. She stated that she sleeps in a recliner currently until she gets a new BiPAP machine. She does wake up short of breath, but is not clear about LE edema. She does state that her weight has been progressively increasing though. She states that she has no symptoms from the Veletri currently.    * Acute on chronic diastolic heart failure    - Remains Wet and warm on exam  - RHC on 4/17 show CO 3.4 and CI on 1.8  - Continue Lasix 40mg/hr. Will track renal function and adjust as needed  - Net negative 2.8 L for the last 24hrs and  Total wt loss of about 50lbs since admission  - Aggressive replacement of lytes (K>4 and Mg >2)  - Continue aldactone 50 mg BID  - strict I&O's and daily weights.   - Low salt diet  - patient status currently full code from  DNR        Gastroesophageal reflux disease    - Continue PTA protonix        Bipolar affective disorder    Continue PTA Antipsychotics / mood stabilizers. No acute ongoing issues         Chronic respiratory failure with hypoxia    - 4Ls O2 ATC  - Goal O2 sats 88 or above  - BIPAP QHS  - Continue inhaled therapies for COPD   - Prior PFTs as above.          Pulmonary hypertension, group 1    - Continue IV remodulin at 75 ng   - Adempas 0.5mg TID  - Back on baseline home oxygen at 3-4LNC  - Lung transplant addison started by service  - full code            Discussed plan of care with Dr. Judith Peñaloza MD  Heart Transplant  [facility]

## 2018-04-19 NOTE — PROGRESS NOTES
[facility]  Lung Transplant  Progress Note - Floor    Patient Name: Sue Smith  MRN: [id number]  Admission Date: 4/5/2018  Hospital Length of Stay: 14 days  Post-Operative Day:   Attending Physician: Jacky Wong Jr.,*  Primary Care Provider: Paddy Guerrier DO     Subjective:     Interval History: No acute events overnight. Patient continues to undergo aggressive diuresis per primary team.  Patient continues to be active and walks the hallways several times throughout the day. Patient is currently 90% on 2L NC. Patient is using 35% FiO2 on Bipap overnight while sleeping. Patient states that she is feeling well and currently denies cough, SOB, chest pain. Patient motivated to continue with lung transplant evaluation. Patient's standing weight 81.3 kg today.     Continuous Infusions:   furosemide (LASIX) 2 mg/mL infusion (non-titrating) 40 mg/hr (04/19/18 0609)    treprostinil (REMODULIN) infusion 75 ng/kg/min (04/18/18 5307)    veletri/remodulin cassette      veletri/remodulin tubing       Scheduled Meds:   busPIRone  15 mg Oral TID    desvenlafaxine succinate  100 mg Oral Daily    enoxaparin  40 mg Subcutaneous Daily    fluticasone-vilanterol  1 puff Inhalation Daily    gabapentin  100 mg Oral TID    lamoTRIgine  100 mg Oral BID    levothyroxine  75 mcg Oral Daily    lurasidone  40 mg Oral Daily    magnesium oxide  400 mg Oral BID    pantoprazole  40 mg Oral Daily    potassium chloride  60 mEq Oral Q4H While awake    pravastatin  40 mg Oral Daily    riociguat  0.5 mg Oral TID    sodium chloride 0.9%  3 mL Intravenous Q8H    spironolactone  50 mg Oral BID    tiotropium  1 capsule Inhalation Daily     PRN Meds:acetaminophen, hydrocodone-acetaminophen 10-325mg, loperamide, ondansetron    Review of patient's allergies indicates:   Allergen Reactions    Sulfa (sulfonamide antibiotics) Rash       Review of Systems   Constitutional: Negative for activity change, appetite  change, chills and fever.   HENT: Negative for congestion, postnasal drip and rhinorrhea.    Eyes: Negative.    Respiratory: Negative for cough, chest tightness, shortness of breath and wheezing.    Cardiovascular: Positive for leg swelling. Negative for chest pain and palpitations.   Gastrointestinal: Negative for diarrhea, nausea and vomiting.   Endocrine: Negative.    Genitourinary: Negative.    Musculoskeletal: Negative for arthralgias and myalgias.   Skin: Negative for color change and pallor.   Allergic/Immunologic: Negative.    Neurological: Positive for light-headedness. Negative for dizziness and headaches.   Hematological: Negative for adenopathy. Does not bruise/bleed easily.   Psychiatric/Behavioral: Negative for agitation, behavioral problems and confusion.     Objective:   Physical Exam   Constitutional: She is oriented to person, place, and time. She appears well-developed and well-nourished.   HENT:   Head: Normocephalic and atraumatic.   Eyes: EOM are normal. Pupils are equal, round, and reactive to light.   Neck: Normal range of motion. Neck supple. JVD present.   Cardiovascular: Normal rate, regular rhythm and normal heart sounds.    Pulmonary/Chest: Effort normal and breath sounds normal. No respiratory distress. She has no wheezes. She has no rales. She exhibits no tenderness.   Abdominal: Soft. Bowel sounds are normal. She exhibits no distension. There is no tenderness.   Musculoskeletal: She exhibits edema (BLE).   Neurological: She is alert and oriented to person, place, and time.   Skin: Skin is warm and dry. Capillary refill takes less than 2 seconds.   Psychiatric: She has a normal mood and affect. Her behavior is normal.   Nursing note and vitals reviewed.        Vital Signs (Most Recent):  Temp: 98.1 °F (36.7 °C) (04/19/18 0745)  Pulse: 88 (04/19/18 1055)  Resp: 18 (04/19/18 0958)  BP: (!) 106/58 (04/19/18 0745)  SpO2: (!) 90 % (04/19/18 0750) Vital Signs (24h Range):  Temp:  [97.4 °F  (36.3 °C)-98.6 °F (37 °C)] 98.1 °F (36.7 °C)  Pulse:  [76-93] 88  Resp:  [13-24] 18  SpO2:  [81 %-97 %] 90 %  BP: ()/(52-67) 106/58     Weight: 81.3 kg (179 lb 3.7 oz)  Body mass index is 32.77 kg/m².      Intake/Output Summary (Last 24 hours) at 04/19/18 1122  Last data filed at 04/19/18 0800   Gross per 24 hour   Intake             1363 ml   Output             4700 ml   Net            -3337 ml       Significant Labs:  CBC:    Recent Labs  Lab 04/19/18 0418   WBC 4.98   RBC 4.89   HGB 13.4   HCT 41.7      MCV 85   MCH 27.4   MCHC 32.1     BMP:    Recent Labs  Lab 04/19/18 0418 04/19/18  1013     --    K 2.5* 3.9   CL 90*  --    CO2 31*  --    BUN 30*  --    CREATININE 1.5*  --    CALCIUM 10.5  --       Tacrolimus Levels:  No results for input(s): TACROLIMUS in the last 72 hours.  Microbiology:  Microbiology Results (last 7 days)     ** No results found for the last 168 hours. **          I have reviewed all pertinent labs within the past 24 hours.    Diagnostic Results:  Labs: Reviewed         Assessment/Plan:     * Acute on chronic diastolic heart failure    Continue care per primary team.         Lung transplant candidate    Will begin inpatient pre-lung transplant evaluation for patient including all pertinent labs, imaging, and consults.   Will continue to monitor patient's BMI as patient continues to be diuresed. Will follow up with patient in ambulatory clinic following discharge to continue evaluation.         Chronic respiratory failure with hypoxia    Likely multifactorial due to severe PHTN and diastolic heart failure - Continue 2L NC during day and 35% FiO2 on Bipap at night. Will consider decreasing FiO2 on bipap machine overnight depending on O2 saturation.         Pulmonary hypertension, group 1    Continue Adempas and Remodulin per primary team. Patient to begin pre-lung transplant evaluation while inpatient.                 Isela Warren PA-C  Lung Transplant  [facility]

## 2018-04-19 NOTE — PROGRESS NOTES
"UPDATE    SW to pt's room for update on 4/18.  Pt presents as sitting up in bedside chair, aao x4, calm, cooperative, and asking and answering questions appropriately.  Pt reports feeling well today.  Pt reports she anticipates remaining hospitalized into next week, as doctors are still trying to pull fluid off.  Pt asking SW about "emergency button," like LifeAlert, to have at home.  SW will f/u and provide pt with information.  Pt reports coping adequately at this time, and denies any other needs or concerns to SW.  SW providing ongoing psychosocial and counseling support, education, resources, assistance, and discharge planning as indicated.  SW following and remains available.  "

## 2018-04-20 PROBLEM — I36.9 NONRHEUMATIC TRICUSPID VALVE DISORDER: Status: ACTIVE | Noted: 2018-04-20

## 2018-04-20 LAB
ALBUMIN SERPL BCP-MCNC: 4.5 G/DL
ALP SERPL-CCNC: 179 U/L
ALT SERPL W/O P-5'-P-CCNC: 16 U/L
ANION GAP SERPL CALC-SCNC: 14 MMOL/L
AST SERPL-CCNC: 22 U/L
BASOPHILS # BLD AUTO: 0.03 K/UL
BASOPHILS NFR BLD: 0.6 %
BILIRUB SERPL-MCNC: 0.9 MG/DL
BUN SERPL-MCNC: 32 MG/DL
CALCIUM SERPL-MCNC: 10.3 MG/DL
CHLORIDE SERPL-SCNC: 95 MMOL/L
CO2 SERPL-SCNC: 32 MMOL/L
CREAT SERPL-MCNC: 1.5 MG/DL
DIFFERENTIAL METHOD: ABNORMAL
EOSINOPHIL # BLD AUTO: 0.3 K/UL
EOSINOPHIL NFR BLD: 5.1 %
ERYTHROCYTE [DISTWIDTH] IN BLOOD BY AUTOMATED COUNT: 16.9 %
EST. GFR  (AFRICAN AMERICAN): 44.6 ML/MIN/1.73 M^2
EST. GFR  (NON AFRICAN AMERICAN): 38.7 ML/MIN/1.73 M^2
GLUCOSE SERPL-MCNC: 75 MG/DL
HCT VFR BLD AUTO: 45.2 %
HGB BLD-MCNC: 14.6 G/DL
IMM GRANULOCYTES # BLD AUTO: 0.03 K/UL
IMM GRANULOCYTES NFR BLD AUTO: 0.6 %
LYMPHOCYTES # BLD AUTO: 0.9 K/UL
LYMPHOCYTES NFR BLD: 18.1 %
MAGNESIUM SERPL-MCNC: 2.5 MG/DL
MCH RBC QN AUTO: 27.1 PG
MCHC RBC AUTO-ENTMCNC: 32.3 G/DL
MCV RBC AUTO: 84 FL
MONOCYTES # BLD AUTO: 0.4 K/UL
MONOCYTES NFR BLD: 7.9 %
NEUTROPHILS # BLD AUTO: 3.3 K/UL
NEUTROPHILS NFR BLD: 67.7 %
NRBC BLD-RTO: 0 /100 WBC
PHOSPHATE SERPL-MCNC: 4.6 MG/DL
PLATELET # BLD AUTO: 162 K/UL
PMV BLD AUTO: 10.8 FL
POTASSIUM SERPL-SCNC: 3.1 MMOL/L
POTASSIUM SERPL-SCNC: 4.2 MMOL/L
PROT SERPL-MCNC: 7.5 G/DL
RBC # BLD AUTO: 5.38 M/UL
SODIUM SERPL-SCNC: 141 MMOL/L
WBC # BLD AUTO: 4.91 K/UL

## 2018-04-20 PROCEDURE — 27000221 HC OXYGEN, UP TO 24 HOURS: Mod: NTX

## 2018-04-20 PROCEDURE — 94660 CPAP INITIATION&MGMT: CPT | Mod: NTX

## 2018-04-20 PROCEDURE — 63600175 PHARM REV CODE 636 W HCPCS: Mod: NTX | Performed by: INTERNAL MEDICINE

## 2018-04-20 PROCEDURE — 99900035 HC TECH TIME PER 15 MIN (STAT): Mod: NTX

## 2018-04-20 PROCEDURE — 20600001 HC STEP DOWN PRIVATE ROOM: Mod: NTX

## 2018-04-20 PROCEDURE — 25000003 PHARM REV CODE 250: Mod: NTX | Performed by: INTERNAL MEDICINE

## 2018-04-20 PROCEDURE — 63600175 PHARM REV CODE 636 W HCPCS: Mod: JG,NTX | Performed by: INTERNAL MEDICINE

## 2018-04-20 PROCEDURE — 84100 ASSAY OF PHOSPHORUS: CPT | Mod: NTX

## 2018-04-20 PROCEDURE — 84132 ASSAY OF SERUM POTASSIUM: CPT | Mod: NTX

## 2018-04-20 PROCEDURE — 27100171 HC OXYGEN HIGH FLOW UP TO 24 HOURS: Mod: NTX

## 2018-04-20 PROCEDURE — 27100092 HC HIGH FLOW DELIVERY CANNULA: Mod: NTX

## 2018-04-20 PROCEDURE — 25000242 PHARM REV CODE 250 ALT 637 W/ HCPCS: Mod: NTX | Performed by: INTERNAL MEDICINE

## 2018-04-20 PROCEDURE — 80053 COMPREHEN METABOLIC PANEL: CPT | Mod: NTX

## 2018-04-20 PROCEDURE — 85025 COMPLETE CBC W/AUTO DIFF WBC: CPT | Mod: NTX

## 2018-04-20 PROCEDURE — 36415 COLL VENOUS BLD VENIPUNCTURE: CPT | Mod: NTX

## 2018-04-20 PROCEDURE — 99232 SBSQ HOSP IP/OBS MODERATE 35: CPT | Mod: NTX,,, | Performed by: INTERNAL MEDICINE

## 2018-04-20 PROCEDURE — 94761 N-INVAS EAR/PLS OXIMETRY MLT: CPT | Mod: NTX

## 2018-04-20 PROCEDURE — 83735 ASSAY OF MAGNESIUM: CPT | Mod: NTX

## 2018-04-20 RX ORDER — BUMETANIDE 1 MG/1
2 TABLET ORAL 2 TIMES DAILY
Status: DISCONTINUED | OUTPATIENT
Start: 2018-04-20 | End: 2018-04-22 | Stop reason: HOSPADM

## 2018-04-20 RX ORDER — SPIRONOLACTONE 25 MG/1
100 TABLET ORAL 2 TIMES DAILY
Status: DISCONTINUED | OUTPATIENT
Start: 2018-04-20 | End: 2018-04-22 | Stop reason: HOSPADM

## 2018-04-20 RX ADMIN — SPIRONOLACTONE 50 MG: 25 TABLET, FILM COATED ORAL at 08:04

## 2018-04-20 RX ADMIN — BUMETANIDE 2 MG: 1 TABLET ORAL at 08:04

## 2018-04-20 RX ADMIN — MAGNESIUM OXIDE TAB 400 MG (241.3 MG ELEMENTAL MG) 400 MG: 400 (241.3 MG) TAB at 08:04

## 2018-04-20 RX ADMIN — LURASIDONE HYDROCHLORIDE 40 MG: 40 TABLET, FILM COATED ORAL at 08:04

## 2018-04-20 RX ADMIN — DESVENLAFAXINE SUCCINATE 100 MG: 50 TABLET, FILM COATED, EXTENDED RELEASE ORAL at 08:04

## 2018-04-20 RX ADMIN — LAMOTRIGINE 100 MG: 100 TABLET ORAL at 08:04

## 2018-04-20 RX ADMIN — POTASSIUM CHLORIDE 60 MEQ: 1500 TABLET, EXTENDED RELEASE ORAL at 04:04

## 2018-04-20 RX ADMIN — FLUTICASONE FUROATE AND VILANTEROL TRIFENATATE 1 PUFF: 100; 25 POWDER RESPIRATORY (INHALATION) at 08:04

## 2018-04-20 RX ADMIN — GABAPENTIN 100 MG: 100 CAPSULE ORAL at 08:04

## 2018-04-20 RX ADMIN — GABAPENTIN 100 MG: 100 CAPSULE ORAL at 03:04

## 2018-04-20 RX ADMIN — PRAVASTATIN SODIUM 40 MG: 40 TABLET ORAL at 08:04

## 2018-04-20 RX ADMIN — BUMETANIDE 2 MG: 1 TABLET ORAL at 12:04

## 2018-04-20 RX ADMIN — PANTOPRAZOLE SODIUM 40 MG: 40 TABLET, DELAYED RELEASE ORAL at 08:04

## 2018-04-20 RX ADMIN — BUSPIRONE HYDROCHLORIDE 15 MG: 5 TABLET ORAL at 03:04

## 2018-04-20 RX ADMIN — TREPROSTINIL 75 NG/KG/MIN: 100 INJECTION, SOLUTION INTRAVENOUS; SUBCUTANEOUS at 05:04

## 2018-04-20 RX ADMIN — BUSPIRONE HYDROCHLORIDE 15 MG: 5 TABLET ORAL at 08:04

## 2018-04-20 RX ADMIN — FUROSEMIDE 40 MG/HR: 10 INJECTION, SOLUTION INTRAVENOUS at 03:04

## 2018-04-20 RX ADMIN — RIOCIGUAT 0.5 MG: 0.5 TABLET, FILM COATED ORAL at 03:04

## 2018-04-20 RX ADMIN — RIOCIGUAT 0.5 MG: 0.5 TABLET, FILM COATED ORAL at 08:04

## 2018-04-20 RX ADMIN — SPIRONOLACTONE 100 MG: 25 TABLET, FILM COATED ORAL at 08:04

## 2018-04-20 RX ADMIN — LEVOTHYROXINE SODIUM 75 MCG: 75 TABLET ORAL at 04:04

## 2018-04-20 RX ADMIN — POTASSIUM CHLORIDE 60 MEQ: 1500 TABLET, EXTENDED RELEASE ORAL at 08:04

## 2018-04-20 RX ADMIN — HYDROCODONE BITARTRATE AND ACETAMINOPHEN 1 TABLET: 10; 325 TABLET ORAL at 03:04

## 2018-04-20 RX ADMIN — ENOXAPARIN SODIUM 40 MG: 100 INJECTION SUBCUTANEOUS at 04:04

## 2018-04-20 RX ADMIN — TIOTROPIUM BROMIDE 18 MCG: 18 CAPSULE ORAL; RESPIRATORY (INHALATION) at 08:04

## 2018-04-20 NOTE — PROGRESS NOTES
"[facility]  Heart Transplant  Progress Note    Patient Name: Sue Smith  MRN: [id number]  Admission Date: 4/5/2018  Hospital Length of Stay: 15 days  Attending Physician: Jacky Wong Jr.,*  Primary Care Provider: Paddy Guerrier DO  Principal Problem:Acute on chronic diastolic heart failure    Subjective:     Interval History:     Doing well and able to ambulate in hallway. Feel the "best" for the the last three months. Net negative 2.5L overnight. Total wt loss is about 24 lbs since admission.  Decided to hold off lung transplant evaluation due to social situation including requirement to Move from her home to [place] for 2-3 months and also reliable social support.    Continuous Infusions:   treprostinil (REMODULIN) infusion 75 ng/kg/min (04/19/18 8325)    veletri/remodulin cassette      veletri/remodulin tubing       Scheduled Meds:   bumetanide  2 mg Oral BID    busPIRone  15 mg Oral TID    desvenlafaxine succinate  100 mg Oral Daily    enoxaparin  40 mg Subcutaneous Daily    fluticasone-vilanterol  1 puff Inhalation Daily    gabapentin  100 mg Oral TID    lamoTRIgine  100 mg Oral BID    levothyroxine  75 mcg Oral Daily    lurasidone  40 mg Oral Daily    magnesium oxide  400 mg Oral BID    pravastatin  40 mg Oral Daily    riociguat  0.5 mg Oral TID    sodium chloride 0.9%  3 mL Intravenous Q8H    spironolactone  100 mg Oral BID    tiotropium  1 capsule Inhalation Daily     PRN Meds:acetaminophen, hydrocodone-acetaminophen 10-325mg, loperamide, ondansetron    Review of patient's allergies indicates:   Allergen Reactions    Sulfa (sulfonamide antibiotics) Rash     Objective:     Vital Signs (Most Recent):  Temp: 97.5 °F (36.4 °C) (04/20/18 1142)  Pulse: 84 (04/20/18 1142)  Resp: 18 (04/20/18 1142)  BP: 104/61 (04/20/18 1142)  SpO2: 95 % (04/20/18 1142) Vital Signs (24h Range):  Temp:  [97.2 °F (36.2 °C)-99 °F (37.2 °C)] 97.5 °F (36.4 °C)  Pulse:  [69-98] 84  Resp:  " [16-22] 18  SpO2:  [90 %-95 %] 95 %  BP: (100-118)/(50-74) 104/61     Patient Vitals for the past 72 hrs (Last 3 readings):   Weight   04/20/18 0400 81.5 kg (179 lb 10.8 oz)   04/19/18 0500 81.3 kg (179 lb 3.7 oz)   04/18/18 0553 82.2 kg (181 lb 3.5 oz)     Body mass index is 32.85 kg/m².      Intake/Output Summary (Last 24 hours) at 04/20/18 1451  Last data filed at 04/20/18 0500   Gross per 24 hour   Intake             1057 ml   Output             3200 ml   Net            -2143 ml       Hemodynamic Parameters:       Telemetry:     Physical Exam     Neck: JVD at the clavicle. No HJR present  Cardiovascular: Normal rate, regular rhythm and normal heart sounds.  Exam reveals no gallop and no friction rub.   Pulmonary/Chest: Effort normal and breath sounds normal.   Abdominal: Soft. Bowel sounds are normal. Less distended compared to admission  Musculoskeletal: She exhibits no edema or tenderness.   Neurological: She is alert and oriented to person, place, and time.   Skin: Skin is warm and dry.   Psychiatric: pleasant and calm today  Nursing note and vitals reviewed.    Significant Labs:  CBC:    Recent Labs  Lab 04/18/18  0416 04/19/18 0418 04/20/18 0457   WBC 4.68 4.98 4.91   RBC 4.73 4.89 5.38   HGB 12.9 13.4 14.6   HCT 40.5 41.7 45.2    159 162   MCV 86 85 84   MCH 27.3 27.4 27.1   MCHC 31.9* 32.1 32.3     BNP:  No results for input(s): BNP in the last 168 hours.    Invalid input(s): BNPTRIAGELBLO  CMP:    Recent Labs  Lab 04/18/18  0417  04/19/18  0418 04/19/18  1013 04/20/18  0457   GLU 87  --  76  --  75   CALCIUM 10.6*  --  10.5  --  10.3   ALBUMIN 4.4  --  4.4  --  4.5   PROT 7.2  --  7.3  --  7.5     --  138  --  141   K 2.5*  < > 2.5* 3.9 3.1*   CO2 32*  --  31*  --  32*   CL 92*  --  90*  --  95   BUN 20  --  30*  --  32*   CREATININE 1.4  --  1.5*  --  1.5*   ALKPHOS 182*  --  177*  --  179*   ALT 17  --  17  --  16   AST 20  --  21  --  22   BILITOT 0.8  --  1.0  --  0.9   < > = values in  this interval not displayed.   Coagulation:     Recent Labs  Lab 04/16/18  1755   INR 1.1     LDH:  No results for input(s): LDH in the last 72 hours.  Microbiology:  Microbiology Results (last 7 days)     ** No results found for the last 168 hours. **          I have reviewed all pertinent labs within the past 24 hours.    Estimated Creatinine Clearance: 41.5 mL/min (A) (based on SCr of 1.5 mg/dL (H)).    Diagnostic Results:        Assessment and Plan:     Ms. Smith is a 57 y.o. Female with a past medical history of PHTN WHO Group 1 on IV Remodulin, chronic hypoxic respiratory failure, chronic RV failure on home O2 and BiPAP who presented to [facility] with SOB. She has been having SOB for roughly 1 week, which was progressively worsening. She stated that she sleeps in a recliner currently until she gets a new BiPAP machine. She does wake up short of breath, but is not clear about LE edema. She does state that her weight has been progressively increasing though. She states that she has no symptoms from the Veletri currently.    * Acute on chronic diastolic heart failure    - Remains Wet and warm on exam  - RHC on 4/17 show CO 3.4 and CI on 1.8  - Discontinue lasix gtt  - Bumex 2mg BID to be started this evening  - will hold potassium for now since patient got 120meq this morning  - Net negative 2.5 L for the last 24hrs and    - Aggressive replacement of lytes (K>4 and Mg >2). Will check K level at 4PM today for appropriate K replacement  - Increased aldactone to 100mg BID  - strict I&O's and daily weights.   - Low salt diet  - patient status currently full code from DNR        Gastroesophageal reflux disease    - Continue PTA protonix        Bipolar affective disorder    Continue PTA Antipsychotics / mood stabilizers. No acute ongoing issues         Chronic respiratory failure with hypoxia    - 3-4L  O2 ATC  - Goal O2 sats 88 or above  - BIPAP QHS  - Continue inhaled therapies for COPD   - Prior PFTs as  above.          Pulmonary hypertension, group 1    - WHO group 1  - Continue IV remodulin at 75 ng   - Adempas 0.5mg TID  - Back on baseline home oxygen at 3-4LNC  - deferring Lung transplant addison for now  - full code          Discussed plan of care with Dr. Judith Peñaloza MD  Heart Transplant  [facility]

## 2018-04-20 NOTE — SUBJECTIVE & OBJECTIVE
Subjective:     Interval History: No acute events overnight. Patient continues to undergo aggressive diuresis per primary team.  Patient expressed to lung transplant team that she does not have the social or family support for lung transplant and that she is unwilling to stay in [place] following transplantation. Per Dr. Truong and team, lung transplant will sign off on patient at this time.     Continuous Infusions:   furosemide (LASIX) 2 mg/mL infusion (non-titrating) 40 mg/hr (04/20/18 0304)    treprostinil (REMODULIN) infusion 75 ng/kg/min (04/19/18 2484)    veletri/remodulin cassette      veletri/remodulin tubing       Scheduled Meds:   busPIRone  15 mg Oral TID    desvenlafaxine succinate  100 mg Oral Daily    enoxaparin  40 mg Subcutaneous Daily    fluticasone-vilanterol  1 puff Inhalation Daily    gabapentin  100 mg Oral TID    lamoTRIgine  100 mg Oral BID    levothyroxine  75 mcg Oral Daily    lurasidone  40 mg Oral Daily    magnesium oxide  400 mg Oral BID    pantoprazole  40 mg Oral Daily    potassium chloride  60 mEq Oral Q4H While awake    pravastatin  40 mg Oral Daily    riociguat  0.5 mg Oral TID    sodium chloride 0.9%  3 mL Intravenous Q8H    spironolactone  50 mg Oral BID    tiotropium  1 capsule Inhalation Daily     PRN Meds:acetaminophen, hydrocodone-acetaminophen 10-325mg, loperamide, ondansetron    Review of patient's allergies indicates:   Allergen Reactions    Sulfa (sulfonamide antibiotics) Rash       Review of Systems   Constitutional: Negative for activity change, appetite change, chills and fever.   HENT: Negative for congestion, postnasal drip and rhinorrhea.    Eyes: Negative.    Respiratory: Negative for cough, chest tightness, shortness of breath and wheezing.    Cardiovascular: Negative for chest pain, palpitations and leg swelling.   Gastrointestinal: Negative for diarrhea, nausea and vomiting.   Endocrine: Negative.    Genitourinary: Negative.     Musculoskeletal: Negative for arthralgias and myalgias.   Skin: Negative for color change and pallor.   Allergic/Immunologic: Negative.    Neurological: Negative for dizziness, light-headedness and headaches.   Hematological: Negative for adenopathy. Does not bruise/bleed easily.   Psychiatric/Behavioral: Negative for agitation, behavioral problems and confusion.     Objective:   Physical Exam   Constitutional: She is oriented to person, place, and time. She appears well-developed and well-nourished.   HENT:   Head: Normocephalic and atraumatic.   Eyes: EOM are normal. Pupils are equal, round, and reactive to light.   Neck: Normal range of motion. Neck supple. JVD present.   Cardiovascular: Normal rate, regular rhythm and normal heart sounds.    Pulmonary/Chest: Effort normal and breath sounds normal. No respiratory distress. She has no wheezes. She has no rales. She exhibits no tenderness.   Abdominal: Soft. Bowel sounds are normal. She exhibits no distension. There is no tenderness.   Musculoskeletal: She exhibits edema (BLE).   Neurological: She is alert and oriented to person, place, and time.   Skin: Skin is warm and dry. Capillary refill takes less than 2 seconds.   Psychiatric: She has a normal mood and affect. Her behavior is normal.   Nursing note and vitals reviewed.        Vital Signs (Most Recent):  Temp: 98.4 °F (36.9 °C) (04/20/18 0841)  Pulse: 89 (04/20/18 0841)  Resp: 20 (04/20/18 0841)  BP: (!) 100/50 (04/20/18 0841)  SpO2: (!) 90 % (04/20/18 0841) Vital Signs (24h Range):  Temp:  [97.2 °F (36.2 °C)-99 °F (37.2 °C)] 98.4 °F (36.9 °C)  Pulse:  [69-98] 89  Resp:  [16-22] 20  SpO2:  [90 %-94 %] 90 %  BP: (100-118)/(50-74) 100/50     Weight: 81.5 kg (179 lb 10.8 oz)  Body mass index is 32.85 kg/m².      Intake/Output Summary (Last 24 hours) at 04/20/18 1027  Last data filed at 04/20/18 0500   Gross per 24 hour   Intake             1617 ml   Output             3450 ml   Net            -1833 ml        Significant Labs:  CBC:    Recent Labs  Lab 04/20/18  0457   WBC 4.91   RBC 5.38   HGB 14.6   HCT 45.2      MCV 84   MCH 27.1   MCHC 32.3     BMP:    Recent Labs  Lab 04/20/18  0457      K 3.1*   CL 95   CO2 32*   BUN 32*   CREATININE 1.5*   CALCIUM 10.3      Tacrolimus Levels:  No results for input(s): TACROLIMUS in the last 72 hours.  Microbiology:  Microbiology Results (last 7 days)     ** No results found for the last 168 hours. **          I have reviewed all pertinent labs within the past 24 hours.    Diagnostic Results:  Labs: Reviewed

## 2018-04-20 NOTE — ASSESSMENT & PLAN NOTE
Likely multifactorial due to severe PHTN and diastolic heart failure - Continue 2L NC during day and 35% FiO2 on Bipap at night. Will consider decreasing FiO2 on bipap machine overnight depending on O2 saturation.     Lung transplant team to sign off on patient.

## 2018-04-20 NOTE — PLAN OF CARE
Problem: Patient Care Overview  Goal: Plan of Care Review  Outcome: Ongoing (interventions implemented as appropriate)  Patient AAO today ambulates independently in the halls.  Sats low 90s on 2LNC. UO good today, lasix drip dcd, currently on bumex.  K replaced this morning, will recheck at 1500.  Tolerating remodulin well, no side effects except flushing noted.  Will change cassette at 1700.

## 2018-04-20 NOTE — PROGRESS NOTES
DISCHARGE    Per discussion during multidisciplinary rounds today, HTS team reports pt may be ready for d/c home on Sunday 4/22.  SW spoke with pt via room phone.  Pt verbalizes understanding and agreement with possible Sunday d/c.  Pt states she will have a ride home from family and that family will bring portable O2 tanks to hospital at time of d/c.  Pt requesting to resume services with [facility] at d/c.  Pt seen by RICHAR Guillermo earlier today and reports coping adequately.  Pt denies any needs or concerns to SW at this time.    RICHAR faxed resume HH orders and hospital records to [facility] (ph: [phone], f; [phone]) and [facility] (ph: [phone], f: [phone]).  RICHAR spoke with Gala at [facility] and informed her of possible weekend d/c.  Gala states their on-call nurse will call nurses station on Sunday for update on pt.  RICHAR providing ongoing psychosocial and counseling support, education, resources, assistance, and discharge planning as indicated.  RICHAR remains available.

## 2018-04-20 NOTE — ASSESSMENT & PLAN NOTE
- 3-4L  O2 ATC  - Goal O2 sats 88 or above  - BIPAP QHS  - Continue inhaled therapies for COPD   - Prior PFTs as above.

## 2018-04-20 NOTE — ASSESSMENT & PLAN NOTE
- WHO group 1  - Continue IV remodulin at 75 ng   - Adempas 0.5mg TID  - Back on baseline home oxygen at 3-4LNC  - deferring Lung transplant azael for now  - full code

## 2018-04-20 NOTE — SUBJECTIVE & OBJECTIVE
"Interval History:     Doing well and able to ambulate in hallway. Feel the "best" for the the last three months. Net negative 2.5L overnight. Total wt loss is about 24 lbs since admission.  Decided to hold off lung transplant evaluation due to social situation including requirement to Move from her home to [place] for 2-3 months and also reliable social support.    Continuous Infusions:   treprostinil (REMODULIN) infusion 75 ng/kg/min (04/19/18 6775)    veletri/remodulin cassette      veletri/remodulin tubing       Scheduled Meds:   bumetanide  2 mg Oral BID    busPIRone  15 mg Oral TID    desvenlafaxine succinate  100 mg Oral Daily    enoxaparin  40 mg Subcutaneous Daily    fluticasone-vilanterol  1 puff Inhalation Daily    gabapentin  100 mg Oral TID    lamoTRIgine  100 mg Oral BID    levothyroxine  75 mcg Oral Daily    lurasidone  40 mg Oral Daily    magnesium oxide  400 mg Oral BID    pravastatin  40 mg Oral Daily    riociguat  0.5 mg Oral TID    sodium chloride 0.9%  3 mL Intravenous Q8H    spironolactone  100 mg Oral BID    tiotropium  1 capsule Inhalation Daily     PRN Meds:acetaminophen, hydrocodone-acetaminophen 10-325mg, loperamide, ondansetron    Review of patient's allergies indicates:   Allergen Reactions    Sulfa (sulfonamide antibiotics) Rash     Objective:     Vital Signs (Most Recent):  Temp: 97.5 °F (36.4 °C) (04/20/18 1142)  Pulse: 84 (04/20/18 1142)  Resp: 18 (04/20/18 1142)  BP: 104/61 (04/20/18 1142)  SpO2: 95 % (04/20/18 1142) Vital Signs (24h Range):  Temp:  [97.2 °F (36.2 °C)-99 °F (37.2 °C)] 97.5 °F (36.4 °C)  Pulse:  [69-98] 84  Resp:  [16-22] 18  SpO2:  [90 %-95 %] 95 %  BP: (100-118)/(50-74) 104/61     Patient Vitals for the past 72 hrs (Last 3 readings):   Weight   04/20/18 0400 81.5 kg (179 lb 10.8 oz)   04/19/18 0500 81.3 kg (179 lb 3.7 oz)   04/18/18 0553 82.2 kg (181 lb 3.5 oz)     Body mass index is 32.85 kg/m².      Intake/Output Summary (Last 24 hours) at " 04/20/18 1451  Last data filed at 04/20/18 0500   Gross per 24 hour   Intake             1057 ml   Output             3200 ml   Net            -2143 ml       Hemodynamic Parameters:       Telemetry:     Physical Exam     Neck: JVD at the clavicle. No HJR present  Cardiovascular: Normal rate, regular rhythm and normal heart sounds.  Exam reveals no gallop and no friction rub.   Pulmonary/Chest: Effort normal and breath sounds normal.   Abdominal: Soft. Bowel sounds are normal. Less distended compared to admission  Musculoskeletal: She exhibits no edema or tenderness.   Neurological: She is alert and oriented to person, place, and time.   Skin: Skin is warm and dry.   Psychiatric: pleasant and calm today  Nursing note and vitals reviewed.    Significant Labs:  CBC:    Recent Labs  Lab 04/18/18  0416 04/19/18  0418 04/20/18  0457   WBC 4.68 4.98 4.91   RBC 4.73 4.89 5.38   HGB 12.9 13.4 14.6   HCT 40.5 41.7 45.2    159 162   MCV 86 85 84   MCH 27.3 27.4 27.1   MCHC 31.9* 32.1 32.3     BNP:  No results for input(s): BNP in the last 168 hours.    Invalid input(s): BNPTRIAGELBLO  CMP:    Recent Labs  Lab 04/18/18  0417  04/19/18  0418 04/19/18  1013 04/20/18  0457   GLU 87  --  76  --  75   CALCIUM 10.6*  --  10.5  --  10.3   ALBUMIN 4.4  --  4.4  --  4.5   PROT 7.2  --  7.3  --  7.5     --  138  --  141   K 2.5*  < > 2.5* 3.9 3.1*   CO2 32*  --  31*  --  32*   CL 92*  --  90*  --  95   BUN 20  --  30*  --  32*   CREATININE 1.4  --  1.5*  --  1.5*   ALKPHOS 182*  --  177*  --  179*   ALT 17  --  17  --  16   AST 20  --  21  --  22   BILITOT 0.8  --  1.0  --  0.9   < > = values in this interval not displayed.   Coagulation:     Recent Labs  Lab 04/16/18  1755   INR 1.1     LDH:  No results for input(s): LDH in the last 72 hours.  Microbiology:  Microbiology Results (last 7 days)     ** No results found for the last 168 hours. **          I have reviewed all pertinent labs within the past 24 hours.    Estimated  Creatinine Clearance: 41.5 mL/min (A) (based on SCr of 1.5 mg/dL (H)).    Diagnostic Results:

## 2018-04-20 NOTE — PLAN OF CARE
Problem: Patient Care Overview  Goal: Plan of Care Review  Pt aaox4 vswnl and no c/o pain. Bed in low position and callbell within reach. Pt ambulates independently. Standard precautions maintained. remodulin infusing at 75ng/kg/min per cadd pump. Pt changes 530pm qd. Lasix gtt at 40mg infusing. Good uop. 2liter o2 n/c with sats 90-94%.

## 2018-04-20 NOTE — ASSESSMENT & PLAN NOTE
Patient is currently unwilling to stay in [place] for allotted time required following lung transplantation. Patient also states that she will not have family or social support surrounding the lung transplantation process. Patient does not wish to proceed with lung transplant evaluation at this time.  No further labs or imaging required. Lung transplant will sign off on patient per Dr. Truong and team.

## 2018-04-20 NOTE — ASSESSMENT & PLAN NOTE
- Remains Wet and warm on exam  - RHC on 4/17 show CO 3.4 and CI on 1.8  - Bumex 2mg BID to start this evening  - Net negative 2.5 L for the last 24hrs and    - Aggressive replacement of lytes (K>4 and Mg >2). Will check K level at 4PM today for appropriate K replacement  - Increased aldactone to 100mg BID  - strict I&O's and daily weights.   - Low salt diet  - patient status currently full code from DNR

## 2018-04-20 NOTE — PROGRESS NOTES
Pt requested information on life alert type buttons that she can have and wear at home. SW researched and provided pt with the phone numbers for Life Alert and Alert 1, also educated pt to check with her local pharmacies. Pt verbalized understanding and stated she will start making calls today.    Pt is tentatively scheduled for d/c on Monday, pt in agreement with dc plan. No concerns voiced.    SW provided support, education and resources. SW continuing to follow for d/c needs.

## 2018-04-21 LAB
ALBUMIN SERPL BCP-MCNC: 4.2 G/DL
ALP SERPL-CCNC: 156 U/L
ALT SERPL W/O P-5'-P-CCNC: 14 U/L
ANION GAP SERPL CALC-SCNC: 13 MMOL/L
AST SERPL-CCNC: 22 U/L
BASOPHILS # BLD AUTO: 0.04 K/UL
BASOPHILS NFR BLD: 0.8 %
BILIRUB SERPL-MCNC: 0.8 MG/DL
BUN SERPL-MCNC: 33 MG/DL
CALCIUM SERPL-MCNC: 10.4 MG/DL
CHLORIDE SERPL-SCNC: 94 MMOL/L
CO2 SERPL-SCNC: 30 MMOL/L
CREAT SERPL-MCNC: 1.5 MG/DL
DIFFERENTIAL METHOD: ABNORMAL
EOSINOPHIL # BLD AUTO: 0.3 K/UL
EOSINOPHIL NFR BLD: 5.4 %
ERYTHROCYTE [DISTWIDTH] IN BLOOD BY AUTOMATED COUNT: 16.8 %
EST. GFR  (AFRICAN AMERICAN): 44.6 ML/MIN/1.73 M^2
EST. GFR  (NON AFRICAN AMERICAN): 38.7 ML/MIN/1.73 M^2
GLUCOSE SERPL-MCNC: 80 MG/DL
HCT VFR BLD AUTO: 41.1 %
HGB BLD-MCNC: 13.2 G/DL
IMM GRANULOCYTES # BLD AUTO: 0.03 K/UL
IMM GRANULOCYTES NFR BLD AUTO: 0.6 %
LYMPHOCYTES # BLD AUTO: 0.9 K/UL
LYMPHOCYTES NFR BLD: 17.6 %
MAGNESIUM SERPL-MCNC: 2.3 MG/DL
MCH RBC QN AUTO: 27.3 PG
MCHC RBC AUTO-ENTMCNC: 32.1 G/DL
MCV RBC AUTO: 85 FL
MONOCYTES # BLD AUTO: 0.5 K/UL
MONOCYTES NFR BLD: 8.7 %
NEUTROPHILS # BLD AUTO: 3.5 K/UL
NEUTROPHILS NFR BLD: 66.9 %
NRBC BLD-RTO: 0 /100 WBC
PHOSPHATE SERPL-MCNC: 4.4 MG/DL
PLATELET # BLD AUTO: 158 K/UL
PMV BLD AUTO: 11.7 FL
POTASSIUM SERPL-SCNC: 3.2 MMOL/L
PROT SERPL-MCNC: 6.8 G/DL
RBC # BLD AUTO: 4.83 M/UL
SODIUM SERPL-SCNC: 137 MMOL/L
WBC # BLD AUTO: 5.16 K/UL

## 2018-04-21 PROCEDURE — 63600175 PHARM REV CODE 636 W HCPCS: Mod: NTX | Performed by: INTERNAL MEDICINE

## 2018-04-21 PROCEDURE — 83735 ASSAY OF MAGNESIUM: CPT | Mod: NTX

## 2018-04-21 PROCEDURE — 25000003 PHARM REV CODE 250: Mod: NTX | Performed by: INTERNAL MEDICINE

## 2018-04-21 PROCEDURE — 84100 ASSAY OF PHOSPHORUS: CPT | Mod: NTX

## 2018-04-21 PROCEDURE — 94660 CPAP INITIATION&MGMT: CPT | Mod: NTX

## 2018-04-21 PROCEDURE — A4216 STERILE WATER/SALINE, 10 ML: HCPCS | Mod: NTX | Performed by: INTERNAL MEDICINE

## 2018-04-21 PROCEDURE — 80053 COMPREHEN METABOLIC PANEL: CPT | Mod: NTX

## 2018-04-21 PROCEDURE — 20600001 HC STEP DOWN PRIVATE ROOM: Mod: NTX

## 2018-04-21 PROCEDURE — 25000242 PHARM REV CODE 250 ALT 637 W/ HCPCS: Mod: NTX | Performed by: INTERNAL MEDICINE

## 2018-04-21 PROCEDURE — 63600175 PHARM REV CODE 636 W HCPCS: Mod: JG,NTX | Performed by: INTERNAL MEDICINE

## 2018-04-21 PROCEDURE — 94761 N-INVAS EAR/PLS OXIMETRY MLT: CPT | Mod: NTX

## 2018-04-21 PROCEDURE — 85025 COMPLETE CBC W/AUTO DIFF WBC: CPT | Mod: NTX

## 2018-04-21 PROCEDURE — 99232 SBSQ HOSP IP/OBS MODERATE 35: CPT | Mod: NTX,,, | Performed by: INTERNAL MEDICINE

## 2018-04-21 PROCEDURE — 36415 COLL VENOUS BLD VENIPUNCTURE: CPT | Mod: NTX

## 2018-04-21 RX ORDER — POTASSIUM CHLORIDE 20 MEQ/1
40 TABLET, EXTENDED RELEASE ORAL 2 TIMES DAILY
Status: DISCONTINUED | OUTPATIENT
Start: 2018-04-21 | End: 2018-04-22 | Stop reason: HOSPADM

## 2018-04-21 RX ADMIN — GABAPENTIN 100 MG: 100 CAPSULE ORAL at 09:04

## 2018-04-21 RX ADMIN — GABAPENTIN 100 MG: 100 CAPSULE ORAL at 08:04

## 2018-04-21 RX ADMIN — ENOXAPARIN SODIUM 40 MG: 100 INJECTION SUBCUTANEOUS at 04:04

## 2018-04-21 RX ADMIN — HYDROCODONE BITARTRATE AND ACETAMINOPHEN 1 TABLET: 10; 325 TABLET ORAL at 02:04

## 2018-04-21 RX ADMIN — Medication 3 ML: at 09:04

## 2018-04-21 RX ADMIN — LAMOTRIGINE 100 MG: 100 TABLET ORAL at 09:04

## 2018-04-21 RX ADMIN — BUMETANIDE 2 MG: 1 TABLET ORAL at 08:04

## 2018-04-21 RX ADMIN — RIOCIGUAT 0.5 MG: 0.5 TABLET, FILM COATED ORAL at 09:04

## 2018-04-21 RX ADMIN — TREPROSTINIL 75 NG/KG/MIN: 100 INJECTION, SOLUTION INTRAVENOUS; SUBCUTANEOUS at 04:04

## 2018-04-21 RX ADMIN — PRAVASTATIN SODIUM 40 MG: 40 TABLET ORAL at 08:04

## 2018-04-21 RX ADMIN — RIOCIGUAT 0.5 MG: 0.5 TABLET, FILM COATED ORAL at 02:04

## 2018-04-21 RX ADMIN — BUSPIRONE HYDROCHLORIDE 15 MG: 5 TABLET ORAL at 09:04

## 2018-04-21 RX ADMIN — MAGNESIUM OXIDE TAB 400 MG (241.3 MG ELEMENTAL MG) 400 MG: 400 (241.3 MG) TAB at 09:04

## 2018-04-21 RX ADMIN — BUSPIRONE HYDROCHLORIDE 15 MG: 5 TABLET ORAL at 10:04

## 2018-04-21 RX ADMIN — LURASIDONE HYDROCHLORIDE 40 MG: 40 TABLET, FILM COATED ORAL at 08:04

## 2018-04-21 RX ADMIN — TIOTROPIUM BROMIDE 18 MCG: 18 CAPSULE ORAL; RESPIRATORY (INHALATION) at 08:04

## 2018-04-21 RX ADMIN — GABAPENTIN 100 MG: 100 CAPSULE ORAL at 02:04

## 2018-04-21 RX ADMIN — DESVENLAFAXINE SUCCINATE 100 MG: 50 TABLET, FILM COATED, EXTENDED RELEASE ORAL at 08:04

## 2018-04-21 RX ADMIN — SPIRONOLACTONE 100 MG: 25 TABLET, FILM COATED ORAL at 08:04

## 2018-04-21 RX ADMIN — BUSPIRONE HYDROCHLORIDE 15 MG: 5 TABLET ORAL at 02:04

## 2018-04-21 RX ADMIN — SPIRONOLACTONE 100 MG: 25 TABLET, FILM COATED ORAL at 09:04

## 2018-04-21 RX ADMIN — POTASSIUM CHLORIDE 40 MEQ: 1500 TABLET, EXTENDED RELEASE ORAL at 08:04

## 2018-04-21 RX ADMIN — LAMOTRIGINE 100 MG: 100 TABLET ORAL at 08:04

## 2018-04-21 RX ADMIN — FLUTICASONE FUROATE AND VILANTEROL TRIFENATATE 1 PUFF: 100; 25 POWDER RESPIRATORY (INHALATION) at 08:04

## 2018-04-21 RX ADMIN — POTASSIUM CHLORIDE 40 MEQ: 1500 TABLET, EXTENDED RELEASE ORAL at 09:04

## 2018-04-21 RX ADMIN — RIOCIGUAT 0.5 MG: 0.5 TABLET, FILM COATED ORAL at 08:04

## 2018-04-21 RX ADMIN — LEVOTHYROXINE SODIUM 75 MCG: 75 TABLET ORAL at 08:04

## 2018-04-21 RX ADMIN — MAGNESIUM OXIDE TAB 400 MG (241.3 MG ELEMENTAL MG) 400 MG: 400 (241.3 MG) TAB at 08:04

## 2018-04-21 RX ADMIN — BUMETANIDE 2 MG: 1 TABLET ORAL at 09:04

## 2018-04-21 NOTE — PLAN OF CARE
Problem: Patient Care Overview  Goal: Plan of Care Review  Outcome: Ongoing (interventions implemented as appropriate)  Pt AAO x4 throughout shift. Pt up in chair throughout shift and ambulating in halls with portable O2 tank. Pt potassium replaced with oral replacement. Pt to be discharged tomorrow. Remodulin infusing at 75 ng/kg/min - pt's goal rate. Pt tolerating well. Pt free from falls and injury throughout shift.

## 2018-04-21 NOTE — PROGRESS NOTES
[facility]  Heart Transplant  Progress Note    Patient Name: Sue Smith  MRN: [id number]  Admission Date: 4/5/2018  Hospital Length of Stay: 16 days  Attending Physician: Jacky Wong Jr.,*  Primary Care Provider: Paddy Guerrier DO  Principal Problem:Acute on chronic diastolic heart failure    Subjective:     Interval History:     No acute overnight events. Requiring 2LNC with activities which is below her home level of 3-4. Potassium wasting has improved with discontinuation of lasix drip. Net negative 3L for the last 24hrs. Gained about 1.7lbs in the same time priod. Educated about excessive water intake.    Continuous Infusions:   treprostinil (REMODULIN) infusion 75 ng/kg/min (04/20/18 1701)    veletri/remodulin cassette      veletri/remodulin tubing       Scheduled Meds:   bumetanide  2 mg Oral BID    busPIRone  15 mg Oral TID    desvenlafaxine succinate  100 mg Oral Daily    enoxaparin  40 mg Subcutaneous Daily    fluticasone-vilanterol  1 puff Inhalation Daily    gabapentin  100 mg Oral TID    lamoTRIgine  100 mg Oral BID    levothyroxine  75 mcg Oral Daily    lurasidone  40 mg Oral Daily    magnesium oxide  400 mg Oral BID    potassium chloride  40 mEq Oral BID    pravastatin  40 mg Oral Daily    riociguat  0.5 mg Oral TID    sodium chloride 0.9%  3 mL Intravenous Q8H    spironolactone  100 mg Oral BID    tiotropium  1 capsule Inhalation Daily     PRN Meds:acetaminophen, hydrocodone-acetaminophen 10-325mg, loperamide, ondansetron    Review of patient's allergies indicates:   Allergen Reactions    Sulfa (sulfonamide antibiotics) Rash     Objective:     Vital Signs (Most Recent):  Temp: 98.7 °F (37.1 °C) (04/21/18 0848)  Pulse: 88 (04/21/18 0853)  Resp: 18 (04/21/18 0853)  BP: (!) 92/55 (04/21/18 0848)  SpO2: (!) 88 % (04/21/18 0853) Vital Signs (24h Range):  Temp:  [96.2 °F (35.7 °C)-98.7 °F (37.1 °C)] 98.7 °F (37.1 °C)  Pulse:  [71-95] 88  Resp:  [16-20] 18  SpO2:   [88 %-95 %] 88 %  BP: ()/(55-62) 92/55     Patient Vitals for the past 72 hrs (Last 3 readings):   Weight   04/21/18 0540 82.3 kg (181 lb 7 oz)   04/20/18 0400 81.5 kg (179 lb 10.8 oz)   04/19/18 0500 81.3 kg (179 lb 3.7 oz)     Body mass index is 33.18 kg/m².      Intake/Output Summary (Last 24 hours) at 04/21/18 0918  Last data filed at 04/21/18 0540   Gross per 24 hour   Intake              590 ml   Output             3501 ml   Net            -2911 ml       Hemodynamic Parameters:       Telemetry:     Physical Exam     Neck: JVD a quarter above the clavicle. No HJR present  Cardiovascular: Normal rate, regular rhythm and normal heart sounds.  Exam reveals no gallop and no friction rub.   Pulmonary/Chest: Effort normal and breath sounds normal.   Abdominal: Soft. Bowel sounds are normal. Less distended compared to admission  Musculoskeletal: She exhibits no edema or tenderness.   Neurological: She is alert and oriented to person, place, and time.   Skin: Skin is warm and dry.   Psychiatric: pleasant and calm today  Nursing note and vitals reviewed.    Significant Labs:  CBC:    Recent Labs  Lab 04/19/18 0418 04/20/18 0457 04/21/18 0449   WBC 4.98 4.91 5.16   RBC 4.89 5.38 4.83   HGB 13.4 14.6 13.2   HCT 41.7 45.2 41.1    162 158   MCV 85 84 85   MCH 27.4 27.1 27.3   MCHC 32.1 32.3 32.1     BNP:  No results for input(s): BNP in the last 168 hours.    Invalid input(s): BNPTRIAGELBLO  CMP:    Recent Labs  Lab 04/19/18  0418  04/20/18 0457 04/20/18  1459 04/21/18 0449   GLU 76  --  75  --  80   CALCIUM 10.5  --  10.3  --  10.4   ALBUMIN 4.4  --  4.5  --  4.2   PROT 7.3  --  7.5  --  6.8     --  141  --  137   K 2.5*  < > 3.1* 4.2 3.2*   CO2 31*  --  32*  --  30*   CL 90*  --  95  --  94*   BUN 30*  --  32*  --  33*   CREATININE 1.5*  --  1.5*  --  1.5*   ALKPHOS 177*  --  179*  --  156*   ALT 17  --  16  --  14   AST 21  --  22  --  22   BILITOT 1.0  --  0.9  --  0.8   < > = values in this  interval not displayed.   Coagulation:     Recent Labs  Lab 04/16/18  1755   INR 1.1     LDH:  No results for input(s): LDH in the last 72 hours.  Microbiology:  Microbiology Results (last 7 days)     ** No results found for the last 168 hours. **          I have reviewed all pertinent labs within the past 24 hours.    Estimated Creatinine Clearance: 41.7 mL/min (A) (based on SCr of 1.5 mg/dL (H)).    Diagnostic Results:      Assessment and Plan:     Ms. Smith is a 57 y.o. Female with a past medical history of PHTN WHO Group 1 on IV Remodulin, chronic hypoxic respiratory failure, chronic RV failure on home O2 and BiPAP who [facility] to [facility] with SOB. She has been having SOB for roughly 1 week, which was progressively worsening. She stated that she sleeps in a recliner currently until she gets a new BiPAP machine. She does wake up short of breath, but is not clear about LE edema. She does state that her weight has been progressively increasing though. She states that she has no symptoms from the Veletri currently.    * Acute on chronic diastolic heart failure    - Remains Wet and warm on exam  - RHC on 4/17 show CO 3.4 and CI on 1.8  -continue Bumex 2mg BID and aldactone 100mg BID  - restart Potassium 60meq BID  - Net negative 3 L for the last 24hrs and    - Aggressive replacement of lytes (K>4 and Mg >2). Will check K level at 4PM today for appropriate K replacement  - Mild weight increase (1.7lb) for the last 24hrs  - strict I&O's and daily weights.   - Low salt diet  - patient status currently full code from DNR        Lung transplant candidate    - decided to hold of [place] due to social issues until further notice        Gastroesophageal reflux disease    - Continue PTA protonix        Bipolar affective disorder    Continue PTA Antipsychotics / mood stabilizers. No acute ongoing issues         Chronic respiratory failure with hypoxia    - 3-4L  O2 ATC  - Goal O2 sats 88 or above  - BIPAP QHS  - Continue  inhaled therapies for COPD   - Prior PFTs as above.          Pulmonary hypertension, group 1    - WHO group 1  - Continue IV remodulin at 75 ng   - Adempas 0.5mg TID  - Back on baseline home oxygen at 2-3LNC  - deferring Lung transplant addison for now  - full code          Discussed plan of care with Dr. Judith Peñaloza MD  Heart Transplant  [facility]

## 2018-04-21 NOTE — ASSESSMENT & PLAN NOTE
- WHO group 1  - Continue IV remodulin at 75 ng   - Adempas 0.5mg TID  - Back on baseline home oxygen at 2-3LNC  - deferring Lung transplant azael for now  - full code

## 2018-04-21 NOTE — ASSESSMENT & PLAN NOTE
- Remains Wet and warm on exam  - RHC on 4/17 show CO 3.4 and CI on 1.8  -continue Bumex 2mg BID and aldactone 100mg BID  - restart Potassium 60meq BID  - Net negative 3 L for the last 24hrs and    - Aggressive replacement of lytes (K>4 and Mg >2). Will check K level at 4PM today for appropriate K replacement  - Mild weight increase (1.7lb) for the last 24hrs  - strict I&O's and daily weights.   - Low salt diet  - patient status currently full code from DNR

## 2018-04-21 NOTE — SUBJECTIVE & OBJECTIVE
Interval History:     No acute overnight events. Requiring 2LNC with activities which is below her home level of 3-4. Potassium wasting has improved with discontinuation of lasix drip. Net negative 3L for the last 24hrs. Gained about 1.7lbs in the same time priod. Educated about excessive water intake.    Continuous Infusions:   treprostinil (REMODULIN) infusion 75 ng/kg/min (04/20/18 1701)    veletri/remodulin cassette      veletri/remodulin tubing       Scheduled Meds:   bumetanide  2 mg Oral BID    busPIRone  15 mg Oral TID    desvenlafaxine succinate  100 mg Oral Daily    enoxaparin  40 mg Subcutaneous Daily    fluticasone-vilanterol  1 puff Inhalation Daily    gabapentin  100 mg Oral TID    lamoTRIgine  100 mg Oral BID    levothyroxine  75 mcg Oral Daily    lurasidone  40 mg Oral Daily    magnesium oxide  400 mg Oral BID    potassium chloride  40 mEq Oral BID    pravastatin  40 mg Oral Daily    riociguat  0.5 mg Oral TID    sodium chloride 0.9%  3 mL Intravenous Q8H    spironolactone  100 mg Oral BID    tiotropium  1 capsule Inhalation Daily     PRN Meds:acetaminophen, hydrocodone-acetaminophen 10-325mg, loperamide, ondansetron    Review of patient's allergies indicates:   Allergen Reactions    Sulfa (sulfonamide antibiotics) Rash     Objective:     Vital Signs (Most Recent):  Temp: 98.7 °F (37.1 °C) (04/21/18 0848)  Pulse: 88 (04/21/18 0853)  Resp: 18 (04/21/18 0853)  BP: (!) 92/55 (04/21/18 0848)  SpO2: (!) 88 % (04/21/18 0853) Vital Signs (24h Range):  Temp:  [96.2 °F (35.7 °C)-98.7 °F (37.1 °C)] 98.7 °F (37.1 °C)  Pulse:  [71-95] 88  Resp:  [16-20] 18  SpO2:  [88 %-95 %] 88 %  BP: ()/(55-62) 92/55     Patient Vitals for the past 72 hrs (Last 3 readings):   Weight   04/21/18 0540 82.3 kg (181 lb 7 oz)   04/20/18 0400 81.5 kg (179 lb 10.8 oz)   04/19/18 0500 81.3 kg (179 lb 3.7 oz)     Body mass index is 33.18 kg/m².      Intake/Output Summary (Last 24 hours) at 04/21/18 0918  Last  data filed at 04/21/18 0540   Gross per 24 hour   Intake              590 ml   Output             3501 ml   Net            -2911 ml       Hemodynamic Parameters:       Telemetry:     Physical Exam     Neck: JVD a quarter above the clavicle. No HJR present  Cardiovascular: Normal rate, regular rhythm and normal heart sounds.  Exam reveals no gallop and no friction rub.   Pulmonary/Chest: Effort normal and breath sounds normal.   Abdominal: Soft. Bowel sounds are normal. Less distended compared to admission  Musculoskeletal: She exhibits no edema or tenderness.   Neurological: She is alert and oriented to person, place, and time.   Skin: Skin is warm and dry.   Psychiatric: pleasant and calm today  Nursing note and vitals reviewed.    Significant Labs:  CBC:    Recent Labs  Lab 04/19/18  0418 04/20/18  0457 04/21/18  0449   WBC 4.98 4.91 5.16   RBC 4.89 5.38 4.83   HGB 13.4 14.6 13.2   HCT 41.7 45.2 41.1    162 158   MCV 85 84 85   MCH 27.4 27.1 27.3   MCHC 32.1 32.3 32.1     BNP:  No results for input(s): BNP in the last 168 hours.    Invalid input(s): BNPTRIAGELBLO  CMP:    Recent Labs  Lab 04/19/18  0418  04/20/18 0457 04/20/18  1459 04/21/18  0449   GLU 76  --  75  --  80   CALCIUM 10.5  --  10.3  --  10.4   ALBUMIN 4.4  --  4.5  --  4.2   PROT 7.3  --  7.5  --  6.8     --  141  --  137   K 2.5*  < > 3.1* 4.2 3.2*   CO2 31*  --  32*  --  30*   CL 90*  --  95  --  94*   BUN 30*  --  32*  --  33*   CREATININE 1.5*  --  1.5*  --  1.5*   ALKPHOS 177*  --  179*  --  156*   ALT 17  --  16  --  14   AST 21  --  22  --  22   BILITOT 1.0  --  0.9  --  0.8   < > = values in this interval not displayed.   Coagulation:     Recent Labs  Lab 04/16/18  1755   INR 1.1     LDH:  No results for input(s): LDH in the last 72 hours.  Microbiology:  Microbiology Results (last 7 days)     ** No results found for the last 168 hours. **          I have reviewed all pertinent labs within the past 24 hours.    Estimated  Creatinine Clearance: 41.7 mL/min (A) (based on SCr of 1.5 mg/dL (H)).    Diagnostic Results:

## 2018-04-22 VITALS
RESPIRATION RATE: 17 BRPM | WEIGHT: 184.06 LBS | DIASTOLIC BLOOD PRESSURE: 77 MMHG | OXYGEN SATURATION: 92 % | HEART RATE: 93 BPM | SYSTOLIC BLOOD PRESSURE: 117 MMHG | HEIGHT: 62 IN | BODY MASS INDEX: 33.87 KG/M2 | TEMPERATURE: 98 F

## 2018-04-22 LAB
ALBUMIN SERPL BCP-MCNC: 4.1 G/DL
ALP SERPL-CCNC: 145 U/L
ALT SERPL W/O P-5'-P-CCNC: 15 U/L
ANION GAP SERPL CALC-SCNC: 11 MMOL/L
AST SERPL-CCNC: 21 U/L
BASOPHILS # BLD AUTO: 0.05 K/UL
BASOPHILS NFR BLD: 1 %
BILIRUB SERPL-MCNC: 0.6 MG/DL
BUN SERPL-MCNC: 30 MG/DL
CALCIUM SERPL-MCNC: 9.8 MG/DL
CHLORIDE SERPL-SCNC: 96 MMOL/L
CO2 SERPL-SCNC: 31 MMOL/L
CREAT SERPL-MCNC: 1.3 MG/DL
DIFFERENTIAL METHOD: ABNORMAL
EOSINOPHIL # BLD AUTO: 0.3 K/UL
EOSINOPHIL NFR BLD: 5.3 %
ERYTHROCYTE [DISTWIDTH] IN BLOOD BY AUTOMATED COUNT: 16.8 %
EST. GFR  (AFRICAN AMERICAN): 53 ML/MIN/1.73 M^2
EST. GFR  (NON AFRICAN AMERICAN): 46 ML/MIN/1.73 M^2
GLUCOSE SERPL-MCNC: 83 MG/DL
HCT VFR BLD AUTO: 38.7 %
HGB BLD-MCNC: 12.5 G/DL
IMM GRANULOCYTES # BLD AUTO: 0.04 K/UL
IMM GRANULOCYTES NFR BLD AUTO: 0.8 %
LYMPHOCYTES # BLD AUTO: 0.8 K/UL
LYMPHOCYTES NFR BLD: 16.4 %
MAGNESIUM SERPL-MCNC: 2.3 MG/DL
MCH RBC QN AUTO: 27.6 PG
MCHC RBC AUTO-ENTMCNC: 32.3 G/DL
MCV RBC AUTO: 85 FL
MONOCYTES # BLD AUTO: 0.4 K/UL
MONOCYTES NFR BLD: 8.1 %
NEUTROPHILS # BLD AUTO: 3.4 K/UL
NEUTROPHILS NFR BLD: 68.4 %
NRBC BLD-RTO: 0 /100 WBC
PHOSPHATE SERPL-MCNC: 3.9 MG/DL
PLATELET # BLD AUTO: 162 K/UL
PMV BLD AUTO: 11.5 FL
POTASSIUM SERPL-SCNC: 3.6 MMOL/L
PROT SERPL-MCNC: 6.7 G/DL
RBC # BLD AUTO: 4.53 M/UL
SODIUM SERPL-SCNC: 138 MMOL/L
WBC # BLD AUTO: 4.94 K/UL

## 2018-04-22 PROCEDURE — 83735 ASSAY OF MAGNESIUM: CPT | Mod: NTX

## 2018-04-22 PROCEDURE — 25000003 PHARM REV CODE 250: Mod: NTX | Performed by: INTERNAL MEDICINE

## 2018-04-22 PROCEDURE — 80053 COMPREHEN METABOLIC PANEL: CPT | Mod: NTX

## 2018-04-22 PROCEDURE — 36415 COLL VENOUS BLD VENIPUNCTURE: CPT | Mod: NTX

## 2018-04-22 PROCEDURE — 99239 HOSP IP/OBS DSCHRG MGMT >30: CPT | Mod: NTX,,, | Performed by: INTERNAL MEDICINE

## 2018-04-22 PROCEDURE — 25000242 PHARM REV CODE 250 ALT 637 W/ HCPCS: Mod: NTX | Performed by: INTERNAL MEDICINE

## 2018-04-22 PROCEDURE — 85025 COMPLETE CBC W/AUTO DIFF WBC: CPT | Mod: NTX

## 2018-04-22 PROCEDURE — 94761 N-INVAS EAR/PLS OXIMETRY MLT: CPT | Mod: NTX

## 2018-04-22 PROCEDURE — 84100 ASSAY OF PHOSPHORUS: CPT | Mod: NTX

## 2018-04-22 RX ORDER — SPIRONOLACTONE 100 MG/1
100 TABLET, FILM COATED ORAL DAILY
Qty: 60 TABLET | Refills: 1 | OUTPATIENT
Start: 2018-04-22 | End: 2019-04-22

## 2018-04-22 RX ORDER — SPIRONOLACTONE 100 MG/1
100 TABLET, FILM COATED ORAL DAILY
Qty: 60 TABLET | Refills: 1 | OUTPATIENT
Start: 2018-04-22 | End: 2018-04-22

## 2018-04-22 RX ORDER — BUMETANIDE 2 MG/1
2 TABLET ORAL 2 TIMES DAILY
Qty: 60 TABLET | Refills: 11 | Status: SHIPPED | OUTPATIENT
Start: 2018-04-22 | End: 2018-04-22

## 2018-04-22 RX ORDER — SPIRONOLACTONE 100 MG/1
100 TABLET, FILM COATED ORAL DAILY
Qty: 60 TABLET | Refills: 1 | Status: SHIPPED | OUTPATIENT
Start: 2018-04-22 | End: 2018-04-22

## 2018-04-22 RX ORDER — POTASSIUM CHLORIDE 20 MEQ/1
20 TABLET, EXTENDED RELEASE ORAL 2 TIMES DAILY
Qty: 180 TABLET | Refills: 6 | Status: SHIPPED | OUTPATIENT
Start: 2018-04-22

## 2018-04-22 RX ORDER — POTASSIUM CHLORIDE 20 MEQ/1
60 TABLET, EXTENDED RELEASE ORAL 2 TIMES DAILY
Qty: 180 TABLET | Refills: 6 | Status: CANCELLED | OUTPATIENT
Start: 2018-04-22

## 2018-04-22 RX ORDER — BUMETANIDE 2 MG/1
2 TABLET ORAL 2 TIMES DAILY
Qty: 60 TABLET | Refills: 11 | OUTPATIENT
Start: 2018-04-22 | End: 2019-04-22

## 2018-04-22 RX ORDER — BUMETANIDE 2 MG/1
2 TABLET ORAL 2 TIMES DAILY
Qty: 60 TABLET | Refills: 1 | OUTPATIENT
Start: 2018-04-22 | End: 2018-04-22 | Stop reason: HOSPADM

## 2018-04-22 RX ORDER — BUMETANIDE 2 MG/1
2 TABLET ORAL 2 TIMES DAILY
Qty: 60 TABLET | Refills: 1 | Status: SHIPPED | OUTPATIENT
Start: 2018-04-22 | End: 2018-04-22

## 2018-04-22 RX ADMIN — TIOTROPIUM BROMIDE 18 MCG: 18 CAPSULE ORAL; RESPIRATORY (INHALATION) at 08:04

## 2018-04-22 RX ADMIN — FLUTICASONE FUROATE AND VILANTEROL TRIFENATATE 1 PUFF: 100; 25 POWDER RESPIRATORY (INHALATION) at 08:04

## 2018-04-22 RX ADMIN — LAMOTRIGINE 100 MG: 100 TABLET ORAL at 08:04

## 2018-04-22 RX ADMIN — GABAPENTIN 100 MG: 100 CAPSULE ORAL at 08:04

## 2018-04-22 RX ADMIN — POTASSIUM CHLORIDE 40 MEQ: 1500 TABLET, EXTENDED RELEASE ORAL at 08:04

## 2018-04-22 RX ADMIN — HYDROCODONE BITARTRATE AND ACETAMINOPHEN 1 TABLET: 10; 325 TABLET ORAL at 09:04

## 2018-04-22 RX ADMIN — BUSPIRONE HYDROCHLORIDE 15 MG: 5 TABLET ORAL at 10:04

## 2018-04-22 RX ADMIN — DESVENLAFAXINE SUCCINATE 100 MG: 50 TABLET, FILM COATED, EXTENDED RELEASE ORAL at 08:04

## 2018-04-22 RX ADMIN — LURASIDONE HYDROCHLORIDE 40 MG: 40 TABLET, FILM COATED ORAL at 08:04

## 2018-04-22 RX ADMIN — MAGNESIUM OXIDE TAB 400 MG (241.3 MG ELEMENTAL MG) 400 MG: 400 (241.3 MG) TAB at 08:04

## 2018-04-22 RX ADMIN — PRAVASTATIN SODIUM 40 MG: 40 TABLET ORAL at 08:04

## 2018-04-22 RX ADMIN — SPIRONOLACTONE 100 MG: 25 TABLET, FILM COATED ORAL at 08:04

## 2018-04-22 RX ADMIN — LEVOTHYROXINE SODIUM 75 MCG: 75 TABLET ORAL at 08:04

## 2018-04-22 RX ADMIN — RIOCIGUAT 0.5 MG: 0.5 TABLET, FILM COATED ORAL at 08:04

## 2018-04-22 RX ADMIN — BUMETANIDE 2 MG: 1 TABLET ORAL at 08:04

## 2018-04-22 NOTE — PROGRESS NOTES
Pt mixed new cassettee at this time. Pt had pump set with 47ml/24hours, corrected cadd pump to dosing sheet recommendation for 46ml/24hrs, Showed pt her error on dosing sheet, she acknowledged understanding. Aspirated and primed line with new concentration at this time. Pt still awaiting family members arrival. Pt is also aware to  a prescription of aldactone and bumex from [facility] on [address] prior to travelling home . Patient has a copy of updated dosing sheet.

## 2018-04-22 NOTE — ASSESSMENT & PLAN NOTE
- RHC on 4/17 show CO 3.4 and CI on 1.8  -continue Bumex 2mg BID and aldactone 100mg BID at home. Also continue continue to take potassium 20mg BID  - Discontinue Torsemide and metolazone   - strict I&O's and daily weights.   - Low salt diet  - patient status currently full code from DNR

## 2018-04-22 NOTE — DISCHARGE SUMMARY
[facility]  Heart Transplant  Discharge Summary      Patient Name: Sue Smith  MRN: [id number]  Admission Date: 4/5/2018  Hospital Length of Stay: 17 days  Discharge Date and Time: 04/22/2018 12:57 PM  Attending Physician: Jacky Wong Jr.,*   Discharging Provider: Cristian Peñaloza MD  Primary Care Provider: Paddy Guerrier DO     HPI: Ms. Smith is a 57 y.o. Female with a past medical history of PHTN WHO Group 1 on IV Remodulin, chronic hypoxic respiratory failure, chronic RV failure on home O2 and BiPAP who presented to [facility] with SOB. She has been having SOB for roughly 1 week, which was progressively worsening. She stated that she sleeps in a recliner currently until she gets a new BiPAP machine. She does wake up short of breath, but is not clear about LE edema. She does state that her weight has been progressively increasing though. She states that she has no symptoms from the Veletri currently.    Procedure(s) (LRB):  Right heart cath (Right)     Hospital Course: 4/5/2018  - lasix gtt and titrate remodulin up to 75ng  4/6/2018  - lasix at 20gtt and remodulin being up titrated. Goal is 75ng  4/7  - No change in rx. Replace potassium. Added aldactone  4/8  - continuing to diurese well.   4/16  - RHC today. Lung transplant to evaluate for possible tx  4/17  - Still diuresing. Lung transplant visit and considering addison as Op. Recommend wt loss  4/18  - hypokalemia this morning. Net negative 6L  4/19  - diuresing and replacing potassium  4/20  - Lasix stopped and Bumex BID to be started. Aldactone increased to 100mg BID  - anticipate going home in 1-2 days  -4/21  - doing well. Bumex 2mg Bid ongoing with continued good diuresis.    4/22  - patient underwent profuse diuresis with high dose lasix (40gtt) and metolazone pulse. She lost about 41 Liters of fluid with significant weight loss ( error in wt. recording as in patient). Altha between treatment she had a RHC that showed  Significantly elevated left and right side filling pressures as well as severe pulmonary HTN. CO/CI were  3.4/1.8. We continued diuresing and replacing her potassium aggressively until her oxygen requirement fell to 2l. At home she previously required 3-4 Liters and was on 8liters high flow NC on admission. Patient had signed DNR- forms initially on this admission due to critical status of her condition but she resumed Full code on wanting to pursue lung transplant. She had in patient consult with the service but on review, she opted to put off lung transplant evaluation due to requirement to move to [facility] for about 2-3 months during the process and also have reliable care takers at hand. She will think about this in future and wants to remain full code. We had advised patient to stay back one more day for better review of her response to oral diuretics but she insisted on leaving today 4/22/2018.  Her Remodulin dose has been titrated up to 75ng from previous 60ng during this visit. She is also on adempas 0.5mg TID. An attempt to increase adempas to 1mg TID was not successful due to hypotension. We started her on aldactone and increased dose to 100mg BID before d/c. We also started her on Bumex 2mg BID and also decreased her Potassium replacement to 20mg BID.  Bumex and Aldactone supply was obtained from a local pharmacy before patient left hospital. We discussed that she will stop taking Torsemide and Metolazone at home until further review by home health nurse or during clinic fu. That she will take weight daily and discuss with Home health nurse on need for diuretic pulse or call the clinic for Bumex dose adjustment incase of weight increase. She will get a repeat (BMP) in two days closer to her home due to potential development of Hyperkalemia with the started new dose of Aldactone. Patient will fu with PH clinic as scheduled. She has oxygen and remodulin supply arranged for D/C.    Consults          Status Ordering Provider     Inpatient consult to Lung Transplant  Once     Provider:  (Not yet assigned)    Completed SCHUYLER GENAO     Inpatient consult to Midline team  Once     Provider:  (Not yet assigned)    Completed ANGELIA TAYLOR     Inpatient consult to Vascular Surgery  Once     Provider:  (Not yet assigned)    Completed SCHUYLER GENAO          Significant Diagnostic Studies: Labs:   CMP   Recent Labs  Lab 04/20/18  1459 04/21/18  0449 04/22/18  0416   NA  --  137 138   K 4.2 3.2* 3.6   CL  --  94* 96   CO2  --  30* 31*   GLU  --  80 83   BUN  --  33* 30*   CREATININE  --  1.5* 1.3   CALCIUM  --  10.4 9.8   PROT  --  6.8 6.7   ALBUMIN  --  4.2 4.1   BILITOT  --  0.8 0.6   ALKPHOS  --  156* 145*   AST  --  22 21   ALT  --  14 15   ANIONGAP  --  13 11   ESTGFRAFRICA  --  44.6* 53.0*   EGFRNONAA  --  38.7* 46.0*       Pending Diagnostic Studies:     None        Final Active Diagnoses:    Diagnosis Date Noted POA    PRINCIPAL PROBLEM:  Acute on chronic diastolic heart failure [I50.33] 10/06/2017 Yes    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] 04/20/2018 Yes    Bleeding at insertion site [L76.82] 04/17/2018 No    Lung transplant candidate [Z76.82] 04/17/2018 Not Applicable    Volume overload [E87.70] 04/05/2018 Yes    Chronic respiratory failure with hypoxia [J96.11] 10/05/2017 Yes    Gastroesophageal reflux disease [K21.9]  Yes    Bipolar affective disorder [F31.9]  Yes    Pulmonary hypertension, group 1 [I27.20] 10/04/2017 Yes      Problems Resolved During this Admission:    Diagnosis Date Noted Date Resolved POA      Discharged Condition: good    Disposition: Home or Self Care    Follow Up:    Patient Instructions:     Basic metabolic panel   Standing Status: Future  Standing Exp. Date: 06/21/19     BASIC METABOLIC PANEL   Standing Status: Future  Standing Exp. Date: 06/21/19     Diet Cardiac       Medications:  Reconciled Home Medications:      Medication List      START taking  these medications         * bumetanide 2 MG tablet  Commonly known as:  BUMEX  Take 1 tablet (2 mg total) by mouth 2 (two) times daily.     riociguat 0.5 mg Tab tablet  Commonly known as:  ADEMPAS  Take 1 tablet (0.5 mg total) by mouth 3 (three) times daily.     spironolactone 100 MG tablet  Commonly known as:  ALDACTONE  Take 1 tablet (100 mg total) by mouth once daily.        * This list has 2 medication(s) that are the same as other medications prescribed for you. Read the directions carefully, and ask your doctor or other care provider to review them with you.            CHANGE how you take these medications    potassium chloride SA 20 MEQ tablet  Commonly known as:  K-DUR,KLOR-CON  Take 1 tablet (20 mEq total) by mouth 2 (two) times daily.  What changed:  how much to take        CONTINUE taking these medications    albuterol 2.5 mg /3 mL (0.083 %) nebulizer solution  Commonly known as:  PROVENTIL  Take 2.5 mg by nebulization every 6 (six) hours as needed for Wheezing. Rescue     ALPRAZolam 1 MG tablet  Commonly known as:  XANAX  Take 0.5 tablets (0.5 mg total) by mouth 3 (three) times daily as needed for Anxiety.     amLODIPine 5 MG tablet  Commonly known as:  NORVASC  Take 5 mg by mouth once daily.     budesonide-formoterol 80-4.5 mcg 80-4.5 mcg/actuation Hfaa  Commonly known as:  SYMBICORT  Inhale 2 puffs into the lungs 2 (two) times daily. Controller     busPIRone 15 MG tablet  Commonly known as:  BUSPAR  Take 15 mg by mouth 3 (three) times daily.     cyclobenzaprine 10 MG tablet  Commonly known as:  FLEXERIL  TAKE 1 TABLET BY MOUTH THREE TIMES DAILY MAY CAUSE DROWSINESS     desvenlafaxine succinate 100 MG Tb24  Commonly known as:  PRISTIQ  Take 100 mg by mouth once daily.     fluticasone-vilanterol 100-25 mcg/dose diskus inhaler  Commonly known as:  BREO  Inhale 1 puff into the lungs once daily. Controller     furosemide 10 mg/mL injection  Commonly known as:  LASIX  Inject 8 mLs (80 mg total) into the vein  twice a week. As needed for weight gain of > 5 lbs or SOB     gabapentin 100 MG capsule  Commonly known as:  NEURONTIN  Take 1 capsule (100 mg total) by mouth 3 (three) times daily.     hydrocodone-acetaminophen 10-325mg  mg Tab  Commonly known as:  NORCO  Take 1 tablet by mouth every 8 (eight) hours as needed for Pain.     lamoTRIgine 100 MG tablet  Commonly known as:  LAMICTAL  Take 100 mg by mouth 2 (two) times daily.     LATUDA 60 mg Tab tablet  Generic drug:  lurasidone  Take 40 mg by mouth once daily.     levothyroxine 75 MCG tablet  Commonly known as:  SYNTHROID  Take 75 mcg by mouth once daily.     magnesium oxide 400 mg tablet  Commonly known as:  MAG-OX  Take 1 tablet (400 mg total) by mouth 2 (two) times daily.     ondansetron 8 MG Tbdl  Commonly known as:  ZOFRAN-ODT  Take 1 tablet (8 mg total) by mouth every 8 (eight) hours as needed.     pantoprazole 40 MG tablet  Commonly known as:  PROTONIX  Take 40 mg by mouth once daily.     pravastatin 40 MG tablet  Commonly known as:  PRAVACHOL  Take 40 mg by mouth once daily.     sodium chloride 0.9% 0.9 % SolP 100 mL with treprostinil 1 mg/mL Soln 6,000,000 ng  Inject 2,380 ng/min into the vein continuous.     umeclidinium 62.5 mcg/actuation Dsdv  Inhale 62.5 mcg into the lungs once daily. Controller        STOP taking these medications    metOLazone 2.5 MG tablet  Commonly known as:  ZAROXOLYN     torsemide 100 MG Tab  Commonly known as:  DEMADEX          Discussed d/c plan with Dr. Judith Peñaloza MD  Heart Transplant  [facility]

## 2018-04-22 NOTE — PROGRESS NOTES
Received current dosing sheet from Alethea DUNN via [facility]. Pt has updated dosing sheet of 75ng/kg/min with DW of 85kg and concentration 0.2mg/ml. Patient will mix new cassette with new concentration prior to discharge.

## 2018-04-23 NOTE — PHYSICIAN QUERY
PT Name: Sue Smith  MR #: [id number]  Physician Query Form - Renal Clarification     CDS/: Angela Saunders RN, CCDS              Contact information: [email or web address]    This form is a permanent document in the medical record.     QueryDate: April 23, 2018    By submitting this query, we are merely seeking further clarification of documentation. Please utilize your independent clinical judgment when addressing the question(s) below.    The Medical record contains the following:   Indicator Supporting Clinical Findings Location in Medical Record    Kidney (Renal) Insufficiency      Kidney (Renal) Failure / Injury      Nephrotoxic Agents     X BUN/Creatinine GFR Cr 1.3->1.4->1.2->1.6->1.5  gfr 46->42->50.6->35.8->38.7 4/5, 4/10, 4/15, 4/17, 4/21 lab    Urine: Casts         Eosinophils      Dehydration      Nausea/Vomiting      Dialysis/CRRT      Treatment:     X Other:  Renal function improving from 1.6 to 1.4   Continue Lasix 40mg/hr. Will track renal function and adjust as needed 4/18 prog note    4/19 prog note       Provider, please specify the diagnosis or diagnoses associated with above clinical findings.    [ ] Acute on Chronic Renal Insufficiency (please specify stage*): _____________  [ ] Chronic Renal Insufficiency (please specify stage*): _____________  [ xxxxxxx] Chronic Kidney Disease (CKD) (please specify stage* below):      *National Kidney Foundation Definitions:   Stage Description      eGFR (mL/min)   [ ] I  Slight kidney damage with normal or increased filtration  90+   [ ] II  Mildly reduced kidney function     60-89   [ ] III  Moderately reduced kidney function    30-59          [ ] Other (please specify): _______________________________  [ ] Clinically Undetermined    Please document in your progress notes daily for the duration of treatment, until resolved, and include in your discharge summary.

## 2018-04-24 ENCOUNTER — TELEPHONE (OUTPATIENT)
Dept: TRANSPLANT | Facility: CLINIC | Age: 58
End: 2018-04-24

## 2018-04-24 DIAGNOSIS — I27.20 PULMONARY HYPERTENSION: Primary | ICD-10-CM

## 2018-04-24 LAB
EXT BUN: 31
EXT CALCIUM: 9.5
EXT CHLORIDE: 100
EXT CREATININE: 1.71 MG/DL
EXT GLUCOSE: 139
EXT POTASSIUM: 3.73
EXT SODIUM: 137 MMOL/L

## 2018-04-24 NOTE — PROGRESS NOTES
Patient discharged from the hospital yesterday.  Received orders from Dr. Truong to schedule patient for spirometry, 6 minute walk test and clinic visit with him on the same day she is scheduled for follow up with her pulmonary hypertension provider (5/17/18).  Patient already scheduled for a 6 minute walk test that day per Dr. Cullen.  Will schedule spirometry and pre-lung transplant clinic appointments accordingly.   Suhas Salgado will contact patient regarding these appointments.

## 2018-04-24 NOTE — TELEPHONE ENCOUNTER
Spoke with HH. Patient weight is stable - 189 yesterday, 187 today. Upon nurse visit today, patient in no acute distress.   Lab results routed to Dr. Wong.

## 2018-04-25 ENCOUNTER — TELEPHONE (OUTPATIENT)
Dept: TRANSPLANT | Facility: CLINIC | Age: 58
End: 2018-04-25

## 2018-04-25 ENCOUNTER — DOCUMENTATION ONLY (OUTPATIENT)
Dept: TRANSPLANT | Facility: CLINIC | Age: 58
End: 2018-04-25

## 2018-05-01 ENCOUNTER — TELEPHONE (OUTPATIENT)
Dept: TRANSPLANT | Facility: CLINIC | Age: 58
End: 2018-05-01

## 2018-05-03 ENCOUNTER — TELEPHONE (OUTPATIENT)
Dept: TRANSPLANT | Facility: CLINIC | Age: 58
End: 2018-05-03

## 2018-05-03 RX ORDER — METOLAZONE 2.5 MG/1
2.5 TABLET ORAL DAILY
Status: ON HOLD | COMMUNITY
End: 2018-06-27

## 2018-05-03 NOTE — TELEPHONE ENCOUNTER
Received call from HH RN reporting some weight gain (4# over 1 1/2 weeks), slight edema to BLE, lungs clear and no increased SOB.  Per MD, advised patient to take one Metolazone 2.5 mg tablet a half hour prior to taking Bumex in the morning. Clarified with patient that she is to take it this time only. Patient has Metolazone on hand and verbalized understanding. HH RN is also aware of the plan. Patient will contact this office or HH if symptoms worsen.

## 2018-05-08 DIAGNOSIS — I27.0 PRIMARY PULMONARY HYPERTENSION: Primary | ICD-10-CM

## 2018-05-14 ENCOUNTER — TELEPHONE (OUTPATIENT)
Dept: TRANSPLANT | Facility: CLINIC | Age: 58
End: 2018-05-14

## 2018-05-14 NOTE — TELEPHONE ENCOUNTER
----- Message from Rosanne Torres sent at 5/14/2018 10:29 AM CDT -----  Contact: pt  Patient Requesting Sooner Appointment.     Reason: (I.e. Caller declined first available, appointment listed below. Caller will not accept being placed on the waitlist and is requesting a message be sent to doctor, etc.)    Name of Caller:  When is the first available appointment?  Symptoms:  Communication Preference (MyChart response to Pt. (or) Call Back # and timeframe): [phone]  Additional Information: pt would like to resched appt for 6/22 at early afternoon

## 2018-05-14 NOTE — TELEPHONE ENCOUNTER
Pt would like to reschedule her appts for 5/17/2018 to 6/22/2018. I rescheduled her appt and coordinated them with her appts with Dr. Cullen on 6/22/2018. Pt expressed understanding. No further concerns. Ended call.

## 2018-05-15 ENCOUNTER — TELEPHONE (OUTPATIENT)
Dept: TRANSPLANT | Facility: CLINIC | Age: 58
End: 2018-05-15
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
## 2018-05-16 ENCOUNTER — TELEPHONE (OUTPATIENT)
Dept: TRANSPLANT | Facility: CLINIC | Age: 58
End: 2018-05-16

## 2018-05-16 LAB
ALP ISOS SERPL LEV INH-CCNC: 207 U/L
EXT ALBUMIN: 4.1
EXT ALT: 18
EXT AST: 14
EXT BUN: 26
EXT CALCIUM: 8.4
EXT CHLORIDE: 103
EXT CREATININE: 1.46 MG/DL
EXT POTASSIUM: 3.75
EXT PROTEIN TOTAL: 6.9
EXT SODIUM: 139 MMOL/L
NT-PRO-BNP (RIVER PARISHES): 4955

## 2018-05-16 NOTE — TELEPHONE ENCOUNTER
Lab results received and reviewed by Dr. Cullen.  Spoke with Gualberto Sarah. Instructed patient to take Spironolactone 100 mg, one time, daily instead of twice daily. Also, will take Metolazone 2.5 mg once tomorrow morning, 1/2 hour prior to taking morning dose of Bumex. Patient will monitor weight and symptoms and contact us if no improvement or worsening. Patient verbalized understanding and endorsed writing the instructions down.  PH Coordinator contacted  and relayed all instructions.

## 2018-05-17 ENCOUNTER — TELEPHONE (OUTPATIENT)
Dept: TRANSPLANT | Facility: CLINIC | Age: 58
End: 2018-05-17

## 2018-05-17 NOTE — TELEPHONE ENCOUNTER
----- Message from Ellie Lane sent at 5/17/2018 10:24 AM CDT -----  Contact: Arley Cassidy call Arley at [facility] [phone].  Pt's weight is better but she is congested and wants to know if she will need to be prescribed anything for it.    Thank you

## 2018-05-23 ENCOUNTER — TELEPHONE (OUTPATIENT)
Dept: TRANSPLANT | Facility: CLINIC | Age: 58
End: 2018-05-23

## 2018-05-24 ENCOUNTER — TELEPHONE (OUTPATIENT)
Dept: TRANSPLANT | Facility: CLINIC | Age: 58
End: 2018-05-24

## 2018-05-24 LAB
EXT ALT: 22
EXT AST: 16
EXT BUN: 29
EXT CALCIUM: 8.5
EXT CHLORIDE: 101
EXT CREATININE: 1.48 MG/DL
EXT GLUCOSE: 92
EXT POTASSIUM: 3.72
EXT PROTEIN TOTAL: 6.9
EXT SODIUM: 138 MMOL/L
NT-PRO-BNP (RIVER PARISHES): 5693 PG/ML

## 2018-05-29 ENCOUNTER — TELEPHONE (OUTPATIENT)
Dept: TRANSPLANT | Facility: CLINIC | Age: 58
End: 2018-05-29

## 2018-05-29 NOTE — TELEPHONE ENCOUNTER
Arley,  RN called while with Mrs. Smith. She reports that patient has had a 3# weight gain, her abdomen is swollen and she is short of breath. Patient does not want to go to the ER and would rather have IV Lasix.  Asked HH RN to draw labs and notified MD. Advised Mrs. Smith to go to the ER if her symptoms worsen. MD will relay instructions after labs have been resulted.

## 2018-05-30 ENCOUNTER — TELEPHONE (OUTPATIENT)
Dept: TRANSPLANT | Facility: CLINIC | Age: 58
End: 2018-05-30

## 2018-05-30 ENCOUNTER — HISTORICAL (OUTPATIENT)
Dept: ADMINISTRATIVE | Facility: HOSPITAL | Age: 58
End: 2018-05-30

## 2018-05-30 LAB
EXT BUN: 25
EXT CALCIUM: 8.9
EXT CHLORIDE: 103
EXT CREATININE: 2.01 MG/DL
EXT POTASSIUM: 3.28
EXT SODIUM: 140 MMOL/L
NT-PRO-BNP (RIVER PARISHES): 8369 PG/ML

## 2018-05-30 NOTE — TELEPHONE ENCOUNTER
Received lab results. Per Dr. Cullen restarted  IVP Lasix 80mg, prn for weight gain >3lbs in 24 hours and =>5lbs in one week.  RN will contact PH Coordinator prior to administering lasix.   Order given for 80 mg Lasix IVP now, plus an extra 40 mEq Potassium tonight. Labs need to be redrawn Monday. Patient instructed to go to the ER if symptoms do not improve or worsen.

## 2018-05-30 NOTE — TELEPHONE ENCOUNTER
Dr. Witt called from [facility]. Mrs. Smith presented to the ER having been sent by her PCP to follow-up on a non-healing leg wound. Patient had fallen about a week and half ago and cut her leg. Dr. Witt noted that patient was also very short of breath and patient told him that she was supposed to get IV Lasix. Confirmed with Dr. Witt that HH was supposed to administer IV lasix 80 mg but patient was not at home (in ER). Dr. Witt will give patient IV lasix in the ED and antibiotics for her leg. He noted that patient does not have a fever or low WBC count. He will discharge home both patient knows to return if symptoms worsen. Dr. Witt aware of need to transfer patient to [facility] if admission needed. [facility] HH aware of all.  Dr. Cullen notified.

## 2018-05-31 ENCOUNTER — TELEPHONE (OUTPATIENT)
Dept: TRANSPLANT | Facility: CLINIC | Age: 58
End: 2018-05-31

## 2018-05-31 NOTE — TELEPHONE ENCOUNTER
" RN called to report that Mrs. Smith was very SOB, with distended abdomen. Also reports that patient passed out last night and that patient's leg wound possible needs debridement. Patient was given RX yesterday by ER Doc for Clindamycin and Levaquin but has not had them filled. RN encouraged patient to go to the ER but she refuses. Patient states that she "knows they want me to go." Patient did not want HH RN to call our office. RN concerned that patient lives alone, but patient can't move in with her oldest daughter because she can't keep her dog. She does not want to move in with her youngest because she has a small child.    PH Coordinator called patient and advised her to go to the ER. Patient states that she can't go today because she needs to pay rent and she does not get paid until tomorrow. Patient states that she will go tomorrow. Again, strongly urged patient to go to ED. Will call tomorrow morning to F/U.  Notified MD.  "

## 2018-06-01 ENCOUNTER — HISTORICAL (OUTPATIENT)
Dept: ADMINISTRATIVE | Facility: HOSPITAL | Age: 58
End: 2018-06-01

## 2018-06-02 ENCOUNTER — HOSPITAL ENCOUNTER (INPATIENT)
Facility: HOSPITAL | Age: 58
LOS: 8 days | Discharge: HOME HEALTH | DRG: 291 | End: 2018-06-10
Attending: INTERNAL MEDICINE | Admitting: INTERNAL MEDICINE
Payer: MEDICARE

## 2018-06-02 DIAGNOSIS — I27.20 PULMONARY HYPERTENSION: Primary | ICD-10-CM

## 2018-06-02 DIAGNOSIS — I27.20 PULMONARY HYPERTENSION: ICD-10-CM

## 2018-06-02 PROBLEM — L03.119 LOWER EXTREMITY CELLULITIS: Status: ACTIVE | Noted: 2018-06-02

## 2018-06-02 LAB
ALBUMIN SERPL BCP-MCNC: 3.8 G/DL
ALBUMIN SERPL BCP-MCNC: 3.8 G/DL
ALP SERPL-CCNC: 189 U/L
ALP SERPL-CCNC: 192 U/L
ALT SERPL W/O P-5'-P-CCNC: 11 U/L
ALT SERPL W/O P-5'-P-CCNC: 11 U/L
ANION GAP SERPL CALC-SCNC: 10 MMOL/L
ANION GAP SERPL CALC-SCNC: 9 MMOL/L
AST SERPL-CCNC: 13 U/L
AST SERPL-CCNC: 14 U/L
BASOPHILS # BLD AUTO: 0.02 K/UL
BASOPHILS # BLD AUTO: 0.03 K/UL
BASOPHILS NFR BLD: 0.4 %
BASOPHILS NFR BLD: 0.6 %
BILIRUB SERPL-MCNC: 0.8 MG/DL
BILIRUB SERPL-MCNC: 0.8 MG/DL
BNP SERPL-MCNC: 655 PG/ML
BUN SERPL-MCNC: 31 MG/DL
BUN SERPL-MCNC: 31 MG/DL
CALCIUM SERPL-MCNC: 9.3 MG/DL
CALCIUM SERPL-MCNC: 9.4 MG/DL
CHLORIDE SERPL-SCNC: 103 MMOL/L
CHLORIDE SERPL-SCNC: 103 MMOL/L
CK SERPL-CCNC: 92 U/L
CO2 SERPL-SCNC: 24 MMOL/L
CO2 SERPL-SCNC: 25 MMOL/L
CREAT SERPL-MCNC: 2.1 MG/DL
CREAT SERPL-MCNC: 2.1 MG/DL
CRP SERPL-MCNC: 16.5 MG/L
DIFFERENTIAL METHOD: ABNORMAL
DIFFERENTIAL METHOD: ABNORMAL
EOSINOPHIL # BLD AUTO: 0.1 K/UL
EOSINOPHIL # BLD AUTO: 0.1 K/UL
EOSINOPHIL NFR BLD: 2 %
EOSINOPHIL NFR BLD: 2 %
ERYTHROCYTE [DISTWIDTH] IN BLOOD BY AUTOMATED COUNT: 17.8 %
ERYTHROCYTE [DISTWIDTH] IN BLOOD BY AUTOMATED COUNT: 17.9 %
ERYTHROCYTE [SEDIMENTATION RATE] IN BLOOD BY WESTERGREN METHOD: 5 MM/HR
EST. GFR  (AFRICAN AMERICAN): 29.7 ML/MIN/1.73 M^2
EST. GFR  (AFRICAN AMERICAN): 29.7 ML/MIN/1.73 M^2
EST. GFR  (NON AFRICAN AMERICAN): 25.7 ML/MIN/1.73 M^2
EST. GFR  (NON AFRICAN AMERICAN): 25.7 ML/MIN/1.73 M^2
GLUCOSE SERPL-MCNC: 90 MG/DL
GLUCOSE SERPL-MCNC: 91 MG/DL
HCT VFR BLD AUTO: 36.5 %
HCT VFR BLD AUTO: 36.8 %
HGB BLD-MCNC: 11.4 G/DL
HGB BLD-MCNC: 11.6 G/DL
IMM GRANULOCYTES # BLD AUTO: 0.02 K/UL
IMM GRANULOCYTES # BLD AUTO: 0.03 K/UL
IMM GRANULOCYTES NFR BLD AUTO: 0.4 %
IMM GRANULOCYTES NFR BLD AUTO: 0.6 %
INR PPP: 1.1
INR PPP: 1.2
LYMPHOCYTES # BLD AUTO: 0.5 K/UL
LYMPHOCYTES # BLD AUTO: 0.6 K/UL
LYMPHOCYTES NFR BLD: 10 %
LYMPHOCYTES NFR BLD: 10.7 %
MAGNESIUM SERPL-MCNC: 2.3 MG/DL
MAGNESIUM SERPL-MCNC: 2.3 MG/DL
MCH RBC QN AUTO: 26.3 PG
MCH RBC QN AUTO: 26.3 PG
MCHC RBC AUTO-ENTMCNC: 31.2 G/DL
MCHC RBC AUTO-ENTMCNC: 31.5 G/DL
MCV RBC AUTO: 83 FL
MCV RBC AUTO: 84 FL
MONOCYTES # BLD AUTO: 0.4 K/UL
MONOCYTES # BLD AUTO: 0.4 K/UL
MONOCYTES NFR BLD: 7.2 %
MONOCYTES NFR BLD: 7.4 %
NEUTROPHILS # BLD AUTO: 4.3 K/UL
NEUTROPHILS # BLD AUTO: 4.3 K/UL
NEUTROPHILS NFR BLD: 79.1 %
NEUTROPHILS NFR BLD: 79.6 %
NRBC BLD-RTO: 0 /100 WBC
NRBC BLD-RTO: 0 /100 WBC
PHOSPHATE SERPL-MCNC: 4 MG/DL
PLATELET # BLD AUTO: 132 K/UL
PLATELET # BLD AUTO: 132 K/UL
PMV BLD AUTO: 10 FL
PMV BLD AUTO: 10.6 FL
POTASSIUM SERPL-SCNC: 3.6 MMOL/L
POTASSIUM SERPL-SCNC: 3.6 MMOL/L
PROT SERPL-MCNC: 6.3 G/DL
PROT SERPL-MCNC: 6.3 G/DL
PROTHROMBIN TIME: 11.8 SEC
PROTHROMBIN TIME: 12 SEC
RBC # BLD AUTO: 4.33 M/UL
RBC # BLD AUTO: 4.41 M/UL
SODIUM SERPL-SCNC: 137 MMOL/L
SODIUM SERPL-SCNC: 137 MMOL/L
WBC # BLD AUTO: 5.4 K/UL
WBC # BLD AUTO: 5.43 K/UL

## 2018-06-02 PROCEDURE — 36415 COLL VENOUS BLD VENIPUNCTURE: CPT | Mod: NTX

## 2018-06-02 PROCEDURE — 85025 COMPLETE CBC W/AUTO DIFF WBC: CPT | Mod: 91,NTX

## 2018-06-02 PROCEDURE — 99223 1ST HOSP IP/OBS HIGH 75: CPT | Mod: AI,NTX,, | Performed by: INTERNAL MEDICINE

## 2018-06-02 PROCEDURE — 82550 ASSAY OF CK (CPK): CPT | Mod: NTX

## 2018-06-02 PROCEDURE — 83880 ASSAY OF NATRIURETIC PEPTIDE: CPT | Mod: NTX

## 2018-06-02 PROCEDURE — 85610 PROTHROMBIN TIME: CPT | Mod: 91,NTX

## 2018-06-02 PROCEDURE — 84100 ASSAY OF PHOSPHORUS: CPT | Mod: NTX

## 2018-06-02 PROCEDURE — A4216 STERILE WATER/SALINE, 10 ML: HCPCS | Mod: NTX | Performed by: STUDENT IN AN ORGANIZED HEALTH CARE EDUCATION/TRAINING PROGRAM

## 2018-06-02 PROCEDURE — 85651 RBC SED RATE NONAUTOMATED: CPT | Mod: NTX

## 2018-06-02 PROCEDURE — 25000242 PHARM REV CODE 250 ALT 637 W/ HCPCS: Mod: NTX | Performed by: STUDENT IN AN ORGANIZED HEALTH CARE EDUCATION/TRAINING PROGRAM

## 2018-06-02 PROCEDURE — S0077 INJECTION, CLINDAMYCIN PHOSP: HCPCS | Mod: NTX | Performed by: STUDENT IN AN ORGANIZED HEALTH CARE EDUCATION/TRAINING PROGRAM

## 2018-06-02 PROCEDURE — 25000003 PHARM REV CODE 250: Mod: NTX | Performed by: STUDENT IN AN ORGANIZED HEALTH CARE EDUCATION/TRAINING PROGRAM

## 2018-06-02 PROCEDURE — 86140 C-REACTIVE PROTEIN: CPT | Mod: NTX

## 2018-06-02 PROCEDURE — 80053 COMPREHEN METABOLIC PANEL: CPT | Mod: NTX

## 2018-06-02 PROCEDURE — 63600175 PHARM REV CODE 636 W HCPCS: Mod: NTX | Performed by: STUDENT IN AN ORGANIZED HEALTH CARE EDUCATION/TRAINING PROGRAM

## 2018-06-02 PROCEDURE — 83735 ASSAY OF MAGNESIUM: CPT | Mod: 91,NTX

## 2018-06-02 PROCEDURE — 20600001 HC STEP DOWN PRIVATE ROOM: Mod: NTX

## 2018-06-02 RX ORDER — SODIUM,POTASSIUM PHOSPHATES 280-250MG
2 POWDER IN PACKET (EA) ORAL
Status: DISCONTINUED | OUTPATIENT
Start: 2018-06-02 | End: 2018-06-10 | Stop reason: HOSPADM

## 2018-06-02 RX ORDER — CLINDAMYCIN PHOSPHATE 600 MG/50ML
600 INJECTION, SOLUTION INTRAVENOUS
Status: DISCONTINUED | OUTPATIENT
Start: 2018-06-02 | End: 2018-06-06

## 2018-06-02 RX ORDER — LURASIDONE HYDROCHLORIDE 40 MG/1
40 TABLET, FILM COATED ORAL DAILY
Status: DISCONTINUED | OUTPATIENT
Start: 2018-06-02 | End: 2018-06-10 | Stop reason: HOSPADM

## 2018-06-02 RX ORDER — SODIUM CHLORIDE 0.9 % (FLUSH) 0.9 %
3 SYRINGE (ML) INJECTION EVERY 8 HOURS
Status: DISCONTINUED | OUTPATIENT
Start: 2018-06-02 | End: 2018-06-10 | Stop reason: HOSPADM

## 2018-06-02 RX ORDER — POTASSIUM CHLORIDE 20 MEQ/15ML
40 SOLUTION ORAL
Status: DISCONTINUED | OUTPATIENT
Start: 2018-06-02 | End: 2018-06-10 | Stop reason: HOSPADM

## 2018-06-02 RX ORDER — DESVENLAFAXINE SUCCINATE 50 MG/1
100 TABLET, EXTENDED RELEASE ORAL DAILY
Status: DISCONTINUED | OUTPATIENT
Start: 2018-06-02 | End: 2018-06-10 | Stop reason: HOSPADM

## 2018-06-02 RX ORDER — CYCLOBENZAPRINE HCL 5 MG
10 TABLET ORAL 3 TIMES DAILY PRN
Status: DISCONTINUED | OUTPATIENT
Start: 2018-06-02 | End: 2018-06-10 | Stop reason: HOSPADM

## 2018-06-02 RX ORDER — PRAVASTATIN SODIUM 20 MG/1
40 TABLET ORAL DAILY
Status: DISCONTINUED | OUTPATIENT
Start: 2018-06-02 | End: 2018-06-10 | Stop reason: HOSPADM

## 2018-06-02 RX ORDER — LANOLIN ALCOHOL/MO/W.PET/CERES
800 CREAM (GRAM) TOPICAL
Status: DISCONTINUED | OUTPATIENT
Start: 2018-06-02 | End: 2018-06-10 | Stop reason: HOSPADM

## 2018-06-02 RX ORDER — LAMOTRIGINE 100 MG/1
100 TABLET ORAL 2 TIMES DAILY
Status: DISCONTINUED | OUTPATIENT
Start: 2018-06-02 | End: 2018-06-10 | Stop reason: HOSPADM

## 2018-06-02 RX ORDER — HYDROCODONE BITARTRATE AND ACETAMINOPHEN 10; 325 MG/1; MG/1
1 TABLET ORAL EVERY 8 HOURS PRN
Status: DISCONTINUED | OUTPATIENT
Start: 2018-06-02 | End: 2018-06-10 | Stop reason: HOSPADM

## 2018-06-02 RX ORDER — PANTOPRAZOLE SODIUM 40 MG/1
40 TABLET, DELAYED RELEASE ORAL DAILY
Status: DISCONTINUED | OUTPATIENT
Start: 2018-06-02 | End: 2018-06-10 | Stop reason: HOSPADM

## 2018-06-02 RX ORDER — CLINDAMYCIN PHOSPHATE 150 MG/ML
600 INJECTION, SOLUTION INTRAVENOUS
Status: DISCONTINUED | OUTPATIENT
Start: 2018-06-02 | End: 2018-06-02 | Stop reason: ALTCHOICE

## 2018-06-02 RX ORDER — ALPRAZOLAM 0.5 MG/1
0.5 TABLET ORAL 3 TIMES DAILY PRN
Status: DISCONTINUED | OUTPATIENT
Start: 2018-06-02 | End: 2018-06-10 | Stop reason: HOSPADM

## 2018-06-02 RX ORDER — LANOLIN ALCOHOL/MO/W.PET/CERES
400 CREAM (GRAM) TOPICAL 2 TIMES DAILY
Status: DISCONTINUED | OUTPATIENT
Start: 2018-06-02 | End: 2018-06-10 | Stop reason: HOSPADM

## 2018-06-02 RX ORDER — FLUTICASONE FUROATE AND VILANTEROL 100; 25 UG/1; UG/1
1 POWDER RESPIRATORY (INHALATION) DAILY
Status: DISCONTINUED | OUTPATIENT
Start: 2018-06-02 | End: 2018-06-10 | Stop reason: HOSPADM

## 2018-06-02 RX ORDER — LOPERAMIDE HYDROCHLORIDE 2 MG/1
2 CAPSULE ORAL EVERY 4 HOURS PRN
Status: DISCONTINUED | OUTPATIENT
Start: 2018-06-02 | End: 2018-06-10 | Stop reason: HOSPADM

## 2018-06-02 RX ORDER — POTASSIUM CHLORIDE 20 MEQ/1
20 TABLET, EXTENDED RELEASE ORAL 2 TIMES DAILY
Status: DISCONTINUED | OUTPATIENT
Start: 2018-06-02 | End: 2018-06-05

## 2018-06-02 RX ORDER — POTASSIUM CHLORIDE 20 MEQ/15ML
60 SOLUTION ORAL
Status: DISCONTINUED | OUTPATIENT
Start: 2018-06-02 | End: 2018-06-10 | Stop reason: HOSPADM

## 2018-06-02 RX ORDER — FUROSEMIDE 10 MG/ML
80 INJECTION INTRAMUSCULAR; INTRAVENOUS ONCE
Status: COMPLETED | OUTPATIENT
Start: 2018-06-02 | End: 2018-06-02

## 2018-06-02 RX ORDER — ACETAMINOPHEN 325 MG/1
650 TABLET ORAL EVERY 4 HOURS PRN
Status: DISCONTINUED | OUTPATIENT
Start: 2018-06-02 | End: 2018-06-10 | Stop reason: HOSPADM

## 2018-06-02 RX ORDER — GABAPENTIN 100 MG/1
100 CAPSULE ORAL 3 TIMES DAILY
Status: DISCONTINUED | OUTPATIENT
Start: 2018-06-02 | End: 2018-06-10 | Stop reason: HOSPADM

## 2018-06-02 RX ORDER — ONDANSETRON 8 MG/1
8 TABLET, ORALLY DISINTEGRATING ORAL EVERY 8 HOURS PRN
Status: DISCONTINUED | OUTPATIENT
Start: 2018-06-02 | End: 2018-06-10 | Stop reason: HOSPADM

## 2018-06-02 RX ORDER — AMLODIPINE BESYLATE 5 MG/1
5 TABLET ORAL DAILY
Status: DISCONTINUED | OUTPATIENT
Start: 2018-06-02 | End: 2018-06-10 | Stop reason: HOSPADM

## 2018-06-02 RX ORDER — CLINDAMYCIN HYDROCHLORIDE 150 MG/1
300 CAPSULE ORAL EVERY 6 HOURS
Status: DISCONTINUED | OUTPATIENT
Start: 2018-06-02 | End: 2018-06-02

## 2018-06-02 RX ORDER — ENOXAPARIN SODIUM 100 MG/ML
40 INJECTION SUBCUTANEOUS EVERY 24 HOURS
Status: DISCONTINUED | OUTPATIENT
Start: 2018-06-02 | End: 2018-06-10 | Stop reason: HOSPADM

## 2018-06-02 RX ORDER — BUSPIRONE HYDROCHLORIDE 5 MG/1
15 TABLET ORAL 3 TIMES DAILY
Status: DISCONTINUED | OUTPATIENT
Start: 2018-06-02 | End: 2018-06-10 | Stop reason: HOSPADM

## 2018-06-02 RX ORDER — SPIRONOLACTONE 50 MG/1
100 TABLET, FILM COATED ORAL DAILY
Status: DISCONTINUED | OUTPATIENT
Start: 2018-06-02 | End: 2018-06-10 | Stop reason: HOSPADM

## 2018-06-02 RX ADMIN — CLINDAMYCIN IN 5 PERCENT DEXTROSE 600 MG: 12 INJECTION, SOLUTION INTRAVENOUS at 11:06

## 2018-06-02 RX ADMIN — POTASSIUM CHLORIDE 20 MEQ: 1500 TABLET, EXTENDED RELEASE ORAL at 09:06

## 2018-06-02 RX ADMIN — GABAPENTIN 100 MG: 100 CAPSULE ORAL at 08:06

## 2018-06-02 RX ADMIN — ENOXAPARIN SODIUM 40 MG: 100 INJECTION SUBCUTANEOUS at 05:06

## 2018-06-02 RX ADMIN — DESVENLAFAXINE SUCCINATE 100 MG: 50 TABLET, FILM COATED, EXTENDED RELEASE ORAL at 08:06

## 2018-06-02 RX ADMIN — LAMOTRIGINE 100 MG: 100 TABLET ORAL at 08:06

## 2018-06-02 RX ADMIN — Medication 3 ML: at 10:06

## 2018-06-02 RX ADMIN — AMLODIPINE BESYLATE 5 MG: 5 TABLET ORAL at 08:06

## 2018-06-02 RX ADMIN — SPIRONOLACTONE 100 MG: 50 TABLET ORAL at 08:06

## 2018-06-02 RX ADMIN — FUROSEMIDE 10 MG/HR: 10 INJECTION, SOLUTION INTRAMUSCULAR; INTRAVENOUS at 04:06

## 2018-06-02 RX ADMIN — LAMOTRIGINE 100 MG: 100 TABLET ORAL at 09:06

## 2018-06-02 RX ADMIN — CLINDAMYCIN IN 5 PERCENT DEXTROSE 600 MG: 12 INJECTION, SOLUTION INTRAVENOUS at 08:06

## 2018-06-02 RX ADMIN — PRAVASTATIN SODIUM 40 MG: 20 TABLET ORAL at 08:06

## 2018-06-02 RX ADMIN — FLUTICASONE FUROATE AND VILANTEROL TRIFENATATE 1 PUFF: 100; 25 POWDER RESPIRATORY (INHALATION) at 08:06

## 2018-06-02 RX ADMIN — RIOCIGUAT 1 MG: 1 TABLET, FILM COATED ORAL at 09:06

## 2018-06-02 RX ADMIN — BUSPIRONE HYDROCHLORIDE 15 MG: 5 TABLET ORAL at 03:06

## 2018-06-02 RX ADMIN — PANTOPRAZOLE SODIUM 40 MG: 40 TABLET, DELAYED RELEASE ORAL at 08:06

## 2018-06-02 RX ADMIN — Medication 400 MG: at 09:06

## 2018-06-02 RX ADMIN — RIOCIGUAT 1 MG: 1 TABLET, FILM COATED ORAL at 03:06

## 2018-06-02 RX ADMIN — LURASIDONE HYDROCHLORIDE 40 MG: 40 TABLET, FILM COATED ORAL at 08:06

## 2018-06-02 RX ADMIN — VANCOMYCIN HYDROCHLORIDE 1250 MG: 1 INJECTION, POWDER, LYOPHILIZED, FOR SOLUTION INTRAVENOUS at 10:06

## 2018-06-02 RX ADMIN — RIOCIGUAT 1 MG: 1 TABLET, FILM COATED ORAL at 11:06

## 2018-06-02 RX ADMIN — GABAPENTIN 100 MG: 100 CAPSULE ORAL at 03:06

## 2018-06-02 RX ADMIN — CLINDAMYCIN HYDROCHLORIDE 300 MG: 150 CAPSULE ORAL at 05:06

## 2018-06-02 RX ADMIN — FUROSEMIDE 10 MG/HR: 10 INJECTION, SOLUTION INTRAMUSCULAR; INTRAVENOUS at 06:06

## 2018-06-02 RX ADMIN — FUROSEMIDE 80 MG: 10 INJECTION, SOLUTION INTRAMUSCULAR; INTRAVENOUS at 04:06

## 2018-06-02 RX ADMIN — Medication 3 ML: at 04:06

## 2018-06-02 RX ADMIN — BUSPIRONE HYDROCHLORIDE 15 MG: 5 TABLET ORAL at 08:06

## 2018-06-02 RX ADMIN — Medication 400 MG: at 08:06

## 2018-06-02 RX ADMIN — BUSPIRONE HYDROCHLORIDE 15 MG: 5 TABLET ORAL at 10:06

## 2018-06-02 RX ADMIN — LEVOTHYROXINE SODIUM 75 MCG: 25 TABLET ORAL at 05:06

## 2018-06-02 RX ADMIN — GABAPENTIN 100 MG: 100 CAPSULE ORAL at 09:06

## 2018-06-02 NOTE — SUBJECTIVE & OBJECTIVE
Past Medical History:   Diagnosis Date    Bipolar disorder     CHF (congestive heart failure)     Dyslipidemia     GERD (gastroesophageal reflux disease)     Hx of tracheostomy     Decanulated / surgically removed in 2013? Does not currently have tracheostomy    Hypertension     Hypothyroidism     Oxygen dependent     3L around the clock     Pulmonary HTN     Pulmonary hypertension, group 1 10/4/2017    Pulmonary nodules 10/10/2017    Respiratory failure, chronic        Past Surgical History:   Procedure Laterality Date    BACK SURGERY      PERICARDIAL WINDOW  2013    [place] LEFT HEART CATH,PERCUTANEOUS      Cardiac Cath, Left Heart    TUBAL LIGATION         Review of patient's allergies indicates:   Allergen Reactions    Sulfa (sulfonamide antibiotics) Rash       Current Facility-Administered Medications   Medication    acetaminophen tablet 650 mg    ALPRAZolam tablet 0.5 mg    amLODIPine tablet 5 mg    busPIRone tablet 15 mg    clindamycin capsule 300 mg    cyclobenzaprine tablet 10 mg    desvenlafaxine succinate 24 hr tablet 100 mg    enoxaparin injection 40 mg    fluticasone-vilanterol 100-25 mcg/dose diskus inhaler 1 puff    furosemide (LASIX) 2 mg/mL in sodium chloride 0.9% 100 mL infusion (conc: 2 mg/mL)    furosemide injection 80 mg    gabapentin capsule 100 mg    HYDROcodone-acetaminophen  mg per tablet 1 tablet    lamoTRIgine tablet 100 mg    levothyroxine tablet 75 mcg    loperamide capsule 2 mg    lurasidone tablet 40 mg    magnesium oxide tablet 400 mg    ondansetron disintegrating tablet 8 mg    pantoprazole EC tablet 40 mg    potassium chloride SA CR tablet 20 mEq    pravastatin tablet 40 mg    riociguat (ADEMPAS) tablet 1 mg    sodium chloride 0.9% flush 3 mL    spironolactone tablet 100 mg    VELETRI/REMODULIN CASSETTE    VELETRI/REMODULIN TUBING     Family History     Problem Relation (Age of Onset)    Cancer Mother, Sister    Hypertension Mother,  Father        Social History Main Topics    Smoking status: Former Smoker     Types: Cigarettes     Start date: 1/1/1979     Quit date: 1/5/1997    Smokeless tobacco: Never Used    Alcohol use No    Drug use: No    Sexual activity: No     Review of Systems   All other systems reviewed and are negative.    Objective:     Vital Signs (Most Recent):  Temp: 98.4 °F (36.9 °C) (06/02/18 0241)  Pulse: 79 (06/02/18 0241)  Resp: 20 (06/02/18 0241)  BP: (!) 94/51 (06/02/18 0241)  SpO2: (!) 93 % (06/02/18 0241) Vital Signs (24h Range):  Temp:  [98.4 °F (36.9 °C)-98.6 °F (37 °C)] 98.4 °F (36.9 °C)  Pulse:  [70-85] 79  Resp:  [17-20] 20  SpO2:  [93 %-95 %] 93 %  BP: ()/(51-83) 94/51     Patient Vitals for the past 72 hrs (Last 3 readings):   Weight   06/02/18 0243 90.5 kg (199 lb 6.9 oz)     Body mass index is 36.48 kg/m².    No intake or output data in the 24 hours ending 06/02/18 0424    Physical Exam   Constitutional: She is oriented to person, place, and time. She appears well-nourished. No distress.   HENT:   Head: Atraumatic.   Eyes: EOM are normal. No scleral icterus.   Neck: Neck supple. JVD (above mandible) present.   Cardiovascular: Normal rate and regular rhythm.  Exam reveals no gallop and no friction rub.    No murmur heard.  Prominent S2   Pulmonary/Chest: Effort normal and breath sounds normal. No respiratory distress. She has no wheezes. She has no rales.   Abdominal: Soft. Bowel sounds are normal. She exhibits distension. There is no tenderness. There is no guarding.   Musculoskeletal: She exhibits edema (bilateral 2+ LE).   Neurological: She is alert and oriented to person, place, and time.   Skin: Skin is warm and dry. Capillary refill takes less than 2 seconds. There is erythema (bilateral anterior LE).   Psychiatric: She has a normal mood and affect.       Significant Labs:  CBC:  No results for input(s): WBC, RBC, HGB, HCT, PLT, MCV, MCH, MCHC in the last 168 hours.  BNP:    Recent Labs  Lab  05/29/18   BNP 8,369     CMP:  No results for input(s): GLU, CALCIUM, ALBUMIN, PROT, NA, K, CO2, CL, BUN, CREATININE, ALKPHOS, ALT, AST, BILITOT in the last 168 hours.   Coagulation:   No results for input(s): PT, INR, APTT in the last 168 hours.  LDH:  No results for input(s): LDH in the last 72 hours.  Microbiology:  Microbiology Results (last 7 days)     ** No results found for the last 168 hours. **          I have reviewed all pertinent labs within the past 24 hours.

## 2018-06-02 NOTE — H&P
[facility]  Heart Transplant  H&P    Patient Name: Sue Smith  MRN: [id number]  Admission Date: 6/2/2018  Attending Physician: Madi Santana MD  Primary Care Provider: Paddy Guerrier DO  Principal Problem:<principal problem not specified>    Subjective:     History of Present Illness:  Ms. Smith is a 58 yo woman with hx of PHTN WHO Group 1 on IV remodulin, chronic hypoxic respiratory, chronic RV failure on home O2 transferred from [facility] ED with progressive shortness of breath over the last 3-4 days. She presented there on 5/30 to the ED having been sent by her PCP for evaluation of a non-healing traumatic wound on her R shin. She was noted to have increased WOB and increased abdominal swelling and given 1 dose of IV lasix. She was also started on clindamycin. She had recently been discharged from admission with ADHF 4/5-4/22 during which she underwent IV diuresis and uptitration of her home remodulin from 60 to 75 ng. She reports weight and breathing were stable until several days ago. On 5/29, telephone note reports 3 lb wt gain and her HH IV lasix was planned to restart on 5/30 which she ended up getting while at ED for above wound. Her symptoms continued to worsen with increased work of breathing, abdominal girth, and orthopnea and she re-presented tonight.     Past Medical History:   Diagnosis Date    Bipolar disorder     CHF (congestive heart failure)     Dyslipidemia     GERD (gastroesophageal reflux disease)     Hx of tracheostomy     Decanulated / surgically removed in 2013? Does not currently have tracheostomy    Hypertension     Hypothyroidism     Oxygen dependent     3L around the clock     Pulmonary HTN     Pulmonary hypertension, group 1 10/4/2017    Pulmonary nodules 10/10/2017    Respiratory failure, chronic        Past Surgical History:   Procedure Laterality Date    BACK SURGERY      PERICARDIAL WINDOW  2013    [place] LEFT HEART CATH,PERCUTANEOUS       Cardiac Cath, Left Heart    TUBAL LIGATION         Review of patient's allergies indicates:   Allergen Reactions    Sulfa (sulfonamide antibiotics) Rash       Current Facility-Administered Medications   Medication    acetaminophen tablet 650 mg    ALPRAZolam tablet 0.5 mg    amLODIPine tablet 5 mg    busPIRone tablet 15 mg    clindamycin capsule 300 mg    cyclobenzaprine tablet 10 mg    desvenlafaxine succinate 24 hr tablet 100 mg    enoxaparin injection 40 mg    fluticasone-vilanterol 100-25 mcg/dose diskus inhaler 1 puff    furosemide (LASIX) 2 mg/mL in sodium chloride 0.9% 100 mL infusion (conc: 2 mg/mL)    furosemide injection 80 mg    gabapentin capsule 100 mg    HYDROcodone-acetaminophen  mg per tablet 1 tablet    lamoTRIgine tablet 100 mg    levothyroxine tablet 75 mcg    loperamide capsule 2 mg    lurasidone tablet 40 mg    magnesium oxide tablet 400 mg    ondansetron disintegrating tablet 8 mg    pantoprazole EC tablet 40 mg    potassium chloride SA CR tablet 20 mEq    pravastatin tablet 40 mg    riociguat (ADEMPAS) tablet 1 mg    sodium chloride 0.9% flush 3 mL    spironolactone tablet 100 mg    VELETRI/REMODULIN CASSETTE    VELETRI/REMODULIN TUBING     Family History     Problem Relation (Age of Onset)    Cancer Mother, Sister    Hypertension Mother, Father        Social History Main Topics    Smoking status: Former Smoker     Types: Cigarettes     Start date: 1/1/1979     Quit date: 1/5/1997    Smokeless tobacco: Never Used    Alcohol use No    Drug use: No    Sexual activity: No     Review of Systems   All other systems reviewed and are negative.    Objective:     Vital Signs (Most Recent):  Temp: 98.4 °F (36.9 °C) (06/02/18 0241)  Pulse: 79 (06/02/18 0241)  Resp: 20 (06/02/18 0241)  BP: (!) 94/51 (06/02/18 0241)  SpO2: (!) 93 % (06/02/18 0241) Vital Signs (24h Range):  Temp:  [98.4 °F (36.9 °C)-98.6 °F (37 °C)] 98.4 °F (36.9 °C)  Pulse:  [70-85] 79  Resp:   [17-20] 20  SpO2:  [93 %-95 %] 93 %  BP: ()/(51-83) 94/51     Patient Vitals for the past 72 hrs (Last 3 readings):   Weight   06/02/18 0243 90.5 kg (199 lb 6.9 oz)     Body mass index is 36.48 kg/m².    No intake or output data in the 24 hours ending 06/02/18 0424    Physical Exam   Constitutional: She is oriented to person, place, and time. She appears well-nourished. No distress.   HENT:   Head: Atraumatic.   Eyes: EOM are normal. No scleral icterus.   Neck: Neck supple. JVD (above mandible) present.   Cardiovascular: Normal rate and regular rhythm.  Exam reveals no gallop and no friction rub.    No murmur heard.  Prominent S2   Pulmonary/Chest: Effort normal and breath sounds normal. No respiratory distress. She has no wheezes. She has no rales.   Abdominal: Soft. Bowel sounds are normal. She exhibits distension. There is no tenderness. There is no guarding.   Musculoskeletal: She exhibits edema (bilateral 2+ LE).   Neurological: She is alert and oriented to person, place, and time.   Skin: Skin is warm and dry. Capillary refill takes less than 2 seconds. There is erythema (bilateral anterior LE).   Psychiatric: She has a normal mood and affect.       Significant Labs:  CBC:  No results for input(s): WBC, RBC, HGB, HCT, PLT, MCV, MCH, MCHC in the last 168 hours.  BNP:    Recent Labs  Lab 05/29/18   BNP 8,369     CMP:  No results for input(s): GLU, CALCIUM, ALBUMIN, PROT, NA, K, CO2, CL, BUN, CREATININE, ALKPHOS, ALT, AST, BILITOT in the last 168 hours.   Coagulation:   No results for input(s): PT, INR, APTT in the last 168 hours.  LDH:  No results for input(s): LDH in the last 72 hours.  Microbiology:  Microbiology Results (last 7 days)     ** No results found for the last 168 hours. **          I have reviewed all pertinent labs within the past 24 hours.    Assessment/Plan:     Acute on chronic diastolic heart failure    -Decompensated, progressive weight gain and increased work of breathing  -JVD  significantly elevated  -Start diuresis with lasix 80 IV followed by 10 mg/hr  -Continue spironolactone 100 daily  -Hold home bumex 2 bid, metolazone 2.5 prn  -Strict I&Os        Lower extremity cellulitis    -RLE with cellulitis precipitated by trauma at home  -S/p I&D with stitches in place, early necrosis  -Continue clindamycin, switch to IV  -Add IV vancomycin q24h given renal function  -Wound care consult, may need debridement  -Will consult general surgery for assessment        Pulmonary hypertension, group 1    -Continue remodulin at 75 ng/kg/min  -Continue adempas 1 mg tid  -O2 NC        Essential hypertension    -Continue amlodipine 5        Chronic respiratory failure with hypoxia    -Stable on NC  -goal SpO2>88  -Bipap QHS  -Continue inhaled therapies          Gastroesophageal reflux disease    Continue PPI        Hypothyroidism    Continue synthroid        Bipolar affective disorder    Continue home buspirone, desvenlafaxine, lamictal, latuda            Omkar Wilson MD  Heart Transplant  [facility]

## 2018-06-02 NOTE — HPI
Ms. Smith is a 58 yo woman with hx of PHTN WHO Group 1 on IV remodulin, chronic hypoxic respiratory, chronic RV failure on home O2 transferred from [facility] ED with progressive shortness of breath over the last 3-4 days. She presented there on 5/30 to the ED having been sent by her PCP for evaluation of a non-healing traumatic wound on her R shin. She was noted to have increased WOB and increased abdominal swelling and given 1 dose of IV lasix. She was also started on clindamycin. She had recently been discharged from admission with ADHF 4/5-4/22 during which she underwent IV diuresis and uptitration of her home remodulin from 60 to 75 ng. She reports weight and breathing were stable until several days ago. On 5/29, telephone note reports 3 lb wt gain and her HH IV lasix was planned to restart on 5/30 which she ended up getting while at ED for above wound. Her symptoms continued to worsen with increased work of breathing, abdominal girth, and orthopnea and she re-presented tonight.

## 2018-06-02 NOTE — PLAN OF CARE
Problem: Patient Care Overview  Goal: Plan of Care Review  Outcome: Ongoing (interventions implemented as appropriate)  Patient admitted overnight with FVO.  Currently on lasix drip, UO greater than 1500 thus far today.  [address], on 4.5LNC sats low 90s.  Home remodulin pump at 46cc/24hr, cassette was changed last PM, due to be changed tomorrow pm. Wound care consult placed for wound to right leg, sutures noted, reddened.  Patient placed on broad spectrum antibx today.

## 2018-06-02 NOTE — ASSESSMENT & PLAN NOTE
-RLE with cellulitis precipitated by trauma at home  -S/p I&D with stitches in place  -Continue clindamycin  -Wound care consult, may need debridement

## 2018-06-02 NOTE — ASSESSMENT & PLAN NOTE
-Decompensated, progressive weight gain and increased work of breathing  -JVD significantly elevated  -Start diuresis with lasix 80 IV followed by 10 mg/hr  -Continue spironolactone 100 daily  -Hold home bumex 2 bid, metolazone 2.5 prn  -Strict I&Os

## 2018-06-03 LAB
ALBUMIN SERPL BCP-MCNC: 4 G/DL
ALP SERPL-CCNC: 197 U/L
ALT SERPL W/O P-5'-P-CCNC: 9 U/L
ANION GAP SERPL CALC-SCNC: 13 MMOL/L
AST SERPL-CCNC: 15 U/L
BASOPHILS # BLD AUTO: 0.03 K/UL
BASOPHILS NFR BLD: 0.7 %
BILIRUB SERPL-MCNC: 0.7 MG/DL
BUN SERPL-MCNC: 27 MG/DL
CALCIUM SERPL-MCNC: 9.3 MG/DL
CHLORIDE SERPL-SCNC: 100 MMOL/L
CO2 SERPL-SCNC: 26 MMOL/L
CREAT SERPL-MCNC: 1.7 MG/DL
DIFFERENTIAL METHOD: ABNORMAL
EOSINOPHIL # BLD AUTO: 0.1 K/UL
EOSINOPHIL NFR BLD: 3.3 %
ERYTHROCYTE [DISTWIDTH] IN BLOOD BY AUTOMATED COUNT: 17.4 %
EST. GFR  (AFRICAN AMERICAN): 38.3 ML/MIN/1.73 M^2
EST. GFR  (NON AFRICAN AMERICAN): 33.2 ML/MIN/1.73 M^2
GLUCOSE SERPL-MCNC: 75 MG/DL
HCT VFR BLD AUTO: 36.4 %
HGB BLD-MCNC: 11.2 G/DL
IMM GRANULOCYTES # BLD AUTO: 0.02 K/UL
IMM GRANULOCYTES NFR BLD AUTO: 0.5 %
INR PPP: 1.2
LYMPHOCYTES # BLD AUTO: 0.6 K/UL
LYMPHOCYTES NFR BLD: 13.3 %
MAGNESIUM SERPL-MCNC: 2.1 MG/DL
MCH RBC QN AUTO: 26 PG
MCHC RBC AUTO-ENTMCNC: 30.8 G/DL
MCV RBC AUTO: 85 FL
MONOCYTES # BLD AUTO: 0.4 K/UL
MONOCYTES NFR BLD: 9.3 %
NEUTROPHILS # BLD AUTO: 3.1 K/UL
NEUTROPHILS NFR BLD: 72.9 %
NRBC BLD-RTO: 0 /100 WBC
PLATELET # BLD AUTO: 138 K/UL
PMV BLD AUTO: 10.5 FL
POTASSIUM SERPL-SCNC: 3.4 MMOL/L
PROT SERPL-MCNC: 6.7 G/DL
PROTHROMBIN TIME: 12.1 SEC
RBC # BLD AUTO: 4.31 M/UL
SODIUM SERPL-SCNC: 139 MMOL/L
WBC # BLD AUTO: 4.28 K/UL

## 2018-06-03 PROCEDURE — 85025 COMPLETE CBC W/AUTO DIFF WBC: CPT | Mod: NTX

## 2018-06-03 PROCEDURE — 80053 COMPREHEN METABOLIC PANEL: CPT | Mod: NTX

## 2018-06-03 PROCEDURE — 83735 ASSAY OF MAGNESIUM: CPT | Mod: NTX

## 2018-06-03 PROCEDURE — 99233 SBSQ HOSP IP/OBS HIGH 50: CPT | Mod: NTX,,, | Performed by: INTERNAL MEDICINE

## 2018-06-03 PROCEDURE — 36415 COLL VENOUS BLD VENIPUNCTURE: CPT | Mod: NTX

## 2018-06-03 PROCEDURE — 25000003 PHARM REV CODE 250: Mod: NTX | Performed by: INTERNAL MEDICINE

## 2018-06-03 PROCEDURE — S0077 INJECTION, CLINDAMYCIN PHOSP: HCPCS | Mod: NTX | Performed by: STUDENT IN AN ORGANIZED HEALTH CARE EDUCATION/TRAINING PROGRAM

## 2018-06-03 PROCEDURE — 99900035 HC TECH TIME PER 15 MIN (STAT): Mod: NTX

## 2018-06-03 PROCEDURE — 63600175 PHARM REV CODE 636 W HCPCS: Mod: JG,NTX | Performed by: INTERNAL MEDICINE

## 2018-06-03 PROCEDURE — 27000190 HC CPAP FULL FACE MASK W/VALVE: Mod: NTX

## 2018-06-03 PROCEDURE — 94660 CPAP INITIATION&MGMT: CPT | Mod: NTX

## 2018-06-03 PROCEDURE — 27000221 HC OXYGEN, UP TO 24 HOURS: Mod: NTX

## 2018-06-03 PROCEDURE — 85610 PROTHROMBIN TIME: CPT | Mod: NTX

## 2018-06-03 PROCEDURE — 25000003 PHARM REV CODE 250: Mod: NTX | Performed by: STUDENT IN AN ORGANIZED HEALTH CARE EDUCATION/TRAINING PROGRAM

## 2018-06-03 PROCEDURE — 63600175 PHARM REV CODE 636 W HCPCS: Mod: NTX | Performed by: STUDENT IN AN ORGANIZED HEALTH CARE EDUCATION/TRAINING PROGRAM

## 2018-06-03 PROCEDURE — 94761 N-INVAS EAR/PLS OXIMETRY MLT: CPT | Mod: NTX

## 2018-06-03 PROCEDURE — 20600001 HC STEP DOWN PRIVATE ROOM: Mod: NTX

## 2018-06-03 PROCEDURE — A4216 STERILE WATER/SALINE, 10 ML: HCPCS | Mod: NTX | Performed by: STUDENT IN AN ORGANIZED HEALTH CARE EDUCATION/TRAINING PROGRAM

## 2018-06-03 PROCEDURE — 99223 1ST HOSP IP/OBS HIGH 75: CPT | Mod: NTX,,, | Performed by: SURGERY

## 2018-06-03 RX ADMIN — ENOXAPARIN SODIUM 40 MG: 100 INJECTION SUBCUTANEOUS at 04:06

## 2018-06-03 RX ADMIN — PRAVASTATIN SODIUM 40 MG: 20 TABLET ORAL at 09:06

## 2018-06-03 RX ADMIN — CLINDAMYCIN IN 5 PERCENT DEXTROSE 600 MG: 12 INJECTION, SOLUTION INTRAVENOUS at 04:06

## 2018-06-03 RX ADMIN — CLINDAMYCIN IN 5 PERCENT DEXTROSE 600 MG: 12 INJECTION, SOLUTION INTRAVENOUS at 12:06

## 2018-06-03 RX ADMIN — PANTOPRAZOLE SODIUM 40 MG: 40 TABLET, DELAYED RELEASE ORAL at 09:06

## 2018-06-03 RX ADMIN — Medication 400 MG: at 09:06

## 2018-06-03 RX ADMIN — ACETAMINOPHEN 650 MG: 325 TABLET ORAL at 04:06

## 2018-06-03 RX ADMIN — LAMOTRIGINE 100 MG: 100 TABLET ORAL at 09:06

## 2018-06-03 RX ADMIN — DESVENLAFAXINE SUCCINATE 100 MG: 50 TABLET, FILM COATED, EXTENDED RELEASE ORAL at 09:06

## 2018-06-03 RX ADMIN — BUSPIRONE HYDROCHLORIDE 15 MG: 5 TABLET ORAL at 09:06

## 2018-06-03 RX ADMIN — GABAPENTIN 100 MG: 100 CAPSULE ORAL at 02:06

## 2018-06-03 RX ADMIN — ACETAMINOPHEN 650 MG: 325 TABLET ORAL at 11:06

## 2018-06-03 RX ADMIN — FUROSEMIDE 10 MG/HR: 10 INJECTION, SOLUTION INTRAMUSCULAR; INTRAVENOUS at 12:06

## 2018-06-03 RX ADMIN — SPIRONOLACTONE 100 MG: 50 TABLET ORAL at 09:06

## 2018-06-03 RX ADMIN — CLINDAMYCIN IN 5 PERCENT DEXTROSE 600 MG: 12 INJECTION, SOLUTION INTRAVENOUS at 08:06

## 2018-06-03 RX ADMIN — Medication 3 ML: at 10:06

## 2018-06-03 RX ADMIN — POTASSIUM CHLORIDE 20 MEQ: 1500 TABLET, EXTENDED RELEASE ORAL at 09:06

## 2018-06-03 RX ADMIN — GABAPENTIN 100 MG: 100 CAPSULE ORAL at 09:06

## 2018-06-03 RX ADMIN — BUSPIRONE HYDROCHLORIDE 15 MG: 5 TABLET ORAL at 02:06

## 2018-06-03 RX ADMIN — VANCOMYCIN HYDROCHLORIDE 1250 MG: 1 INJECTION, POWDER, LYOPHILIZED, FOR SOLUTION INTRAVENOUS at 09:06

## 2018-06-03 RX ADMIN — RIOCIGUAT 1 MG: 1 TABLET, FILM COATED ORAL at 09:06

## 2018-06-03 RX ADMIN — RIOCIGUAT 1 MG: 1 TABLET, FILM COATED ORAL at 02:06

## 2018-06-03 RX ADMIN — AMLODIPINE BESYLATE 5 MG: 5 TABLET ORAL at 09:06

## 2018-06-03 RX ADMIN — TREPROSTINIL 75 NG/KG/MIN: 200 INJECTION, SOLUTION INTRAVENOUS; SUBCUTANEOUS at 12:06

## 2018-06-03 RX ADMIN — LURASIDONE HYDROCHLORIDE 40 MG: 40 TABLET, FILM COATED ORAL at 09:06

## 2018-06-03 RX ADMIN — Medication 3 ML: at 06:06

## 2018-06-03 RX ADMIN — LEVOTHYROXINE SODIUM 75 MCG: 25 TABLET ORAL at 05:06

## 2018-06-03 RX ADMIN — FLUTICASONE FUROATE AND VILANTEROL TRIFENATATE 1 PUFF: 100; 25 POWDER RESPIRATORY (INHALATION) at 09:06

## 2018-06-03 NOTE — SIGNIFICANT EVENT
Entered room at this time to perform am vitals and assessment.  During this, I heard cadd pump alarming.  Looked at pump to find that pump was stopped.  Rate shown (not infusing) was 46cc/24 hrs.  Pt stated she changed the pump Friday Friday June 1st @ 1pm.  See Brice Wilkins charge RN note for amount left in pump and amt infused and informing MD.  Per SAAVGE Wilkins and I's calculations seems pump has been off for 18hrs.  SOS to be filed.

## 2018-06-03 NOTE — CONSULTS
[facility]  General Surgery  Consult Note    Patient Name: Sue Smith  MRN: [id number]  Code Status: Full Code  Admission Date: 6/2/2018  Hospital Length of Stay: 1 days  Attending Physician: Madi Santana MD  Primary Care Provider: Paddy Guerrier DO    Patient information was obtained from patient, past medical records and ER records.     Inpatient consult to General Surgery  Consult performed by: BHARATHI RICKS  Consult ordered by: LUCIEN VELARDE        Subjective:     Principal Problem: <principal problem not specified>    History of Present Illness: Ms. Smith is a 58 yo F with PMH of  Severe Pulmonary HTN on IV remodulin, chronic hypoxic respiratory, chronic RV failure on home O2 transferred from [facility] ED with ADHF exacerbation who presents with cellulitis of her right shin after a repaired laceration, general surgery called to evaluate wound.     Patient states she had been having SOB for the past 3-4 days, fell on 6/30 while going up so concrete stairs and caused a large laceration on her left shin. She had it repaired in the ED there with nylon sutures and was sent home on Clindamycin. She has been stable from a pulmonary standpoint, but his leg wound has had increased swelling and erythema with serous drainage as well necrotic skin.     Has been afebrile with WBC of 5.4.          No current facility-administered medications on file prior to encounter.      Current Outpatient Prescriptions on File Prior to Encounter   Medication Sig    albuterol (PROVENTIL) 2.5 mg /3 mL (0.083 %) nebulizer solution Take 2.5 mg by nebulization every 6 (six) hours as needed for Wheezing. Rescue    ALPRAZolam (XANAX) 1 MG tablet Take 0.5 tablets (0.5 mg total) by mouth 3 (three) times daily as needed for Anxiety.    amlodipine (NORVASC) 5 MG tablet Take 5 mg by mouth once daily.    budesonide-formoterol 80-4.5 mcg (SYMBICORT) 80-4.5 mcg/actuation HFAA Inhale 2 puffs into the  lungs 2 (two) times daily. Controller    bumetanide (BUMEX) 2 MG tablet Take 1 tablet (2 mg total) by mouth 2 (two) times daily.    busPIRone (BUSPAR) 15 MG tablet Take 15 mg by mouth 3 (three) times daily.    cyclobenzaprine (FLEXERIL) 10 MG tablet TAKE 1 TABLET BY MOUTH THREE TIMES DAILY MAY CAUSE DROWSINESS    desvenlafaxine succinate (PRISTIQ) 100 MG Tb24 Take 100 mg by mouth once daily.    fluticasone-vilanterol (BREO) 100-25 mcg/dose diskus inhaler Inhale 1 puff into the lungs once daily. Controller    furosemide (LASIX) 10 mg/mL injection Inject 8 mLs (80 mg total) into the vein twice a week. As needed for weight gain of > 5 lbs or SOB    gabapentin (NEURONTIN) 100 MG capsule Take 1 capsule (100 mg total) by mouth 3 (three) times daily.    hydrocodone-acetaminophen 10-325mg (NORCO)  mg Tab Take 1 tablet by mouth every 8 (eight) hours as needed for Pain.    lamotrigine (LAMICTAL) 100 MG tablet Take 100 mg by mouth 2 (two) times daily.    levothyroxine (SYNTHROID) 75 MCG tablet Take 75 mcg by mouth once daily.    lurasidone (LATUDA) 60 mg Tab tablet Take 40 mg by mouth once daily.     magnesium oxide (MAG-OX) 400 mg tablet Take 1 tablet (400 mg total) by mouth 2 (two) times daily.    metOLazone (ZAROXOLYN) 2.5 MG tablet Take 2.5 mg by mouth once daily. Only take per MD order.    ondansetron (ZOFRAN-ODT) 8 MG TbDL Take 1 tablet (8 mg total) by mouth every 8 (eight) hours as needed.    pantoprazole (PROTONIX) 40 MG tablet Take 40 mg by mouth once daily.    potassium chloride SA (K-DUR,KLOR-CON) 20 MEQ tablet Take 1 tablet (20 mEq total) by mouth 2 (two) times daily.    pravastatin (PRAVACHOL) 40 MG tablet Take 40 mg by mouth once daily.    riociguat (ADEMPAS) 1 mg Tab Take 1 tablet (1 mg total) by mouth 3 (three) times daily.    sodium chloride 0.9% 0.9 % SolP 100 mL with treprostinil 1 mg/mL Soln 6,000,000 ng Inject 2,380 ng/min into the vein continuous.    spironolactone (ALDACTONE)  100 MG tablet Take 1 tablet (100 mg total) by mouth once daily.    umeclidinium 62.5 mcg/actuation DsDv Inhale 62.5 mcg into the lungs once daily. Controller       Review of patient's allergies indicates:   Allergen Reactions    Sulfa (sulfonamide antibiotics) Rash       Past Medical History:   Diagnosis Date    Bipolar disorder     CHF (congestive heart failure)     Dyslipidemia     GERD (gastroesophageal reflux disease)     Hx of tracheostomy     Decanulated / surgically removed in 2013? Does not currently have tracheostomy    Hypertension     Hypothyroidism     Oxygen dependent     3L around the clock     Pulmonary HTN     Pulmonary hypertension, group 1 10/4/2017    Pulmonary nodules 10/10/2017    Respiratory failure, chronic      Past Surgical History:   Procedure Laterality Date    BACK SURGERY      PERICARDIAL WINDOW  2013    [place] LEFT HEART CATH,PERCUTANEOUS      Cardiac Cath, Left Heart    TUBAL LIGATION       Family History     Problem Relation (Age of Onset)    Cancer Mother, Sister    Hypertension Mother, Father        Social History Main Topics    Smoking status: Former Smoker     Types: Cigarettes     Start date: 1/1/1979     Quit date: 1/5/1997    Smokeless tobacco: Never Used    Alcohol use No    Drug use: No    Sexual activity: No     Review of Systems   Constitutional: Negative.  Negative for activity change, appetite change and fatigue.   Eyes: Negative.    Respiratory: Positive for cough and shortness of breath. Negative for apnea and choking.    Cardiovascular: Positive for chest pain. Negative for palpitations and leg swelling.   Gastrointestinal: Negative.    Genitourinary: Negative.    Skin: Negative.    Neurological: Negative.    Hematological: Negative.           Objective:     Vital Signs (Most Recent):  Temp: 97.7 °F (36.5 °C) (06/03/18 0030)  Pulse: 80 (06/03/18 0030)  Resp: 17 (06/03/18 0030)  BP: 115/74 (06/03/18 0030)  SpO2: (!) 94 % (06/03/18 0030) Vital Signs  (24h Range):  Temp:  [97.7 °F (36.5 °C)-98.6 °F (37 °C)] 97.7 °F (36.5 °C)  Pulse:  [74-81] 80  Resp:  [17-20] 17  SpO2:  [90 %-94 %] 94 %  BP: ()/(51-74) 115/74     Weight: 90.5 kg (199 lb 6.9 oz)  Body mass index is 36.48 kg/m².    Physical Exam     Right lower leg        No areas of fluctuance palpated, only some serous drainage, necrotic skin at the superior flap, likely from ischemia. No crepitus or subcutaneous emphysema.       Significant Labs:  CBC:   Recent Labs  Lab 06/02/18  0458   WBC 5.40   RBC 4.33   HGB 11.4*   HCT 36.5*   *   MCV 84   MCH 26.3*   MCHC 31.2*     CMP:   Recent Labs  Lab 06/02/18  0457   GLU 91  90   CALCIUM 9.4  9.3   ALBUMIN 3.8  3.8   PROT 6.3  6.3     137   K 3.6  3.6   CO2 24  25     103   BUN 31*  31*   CREATININE 2.1*  2.1*   ALKPHOS 192*  189*   ALT 11  11   AST 13  14   BILITOT 0.8  0.8       Significant Diagnostics:    XR Right Fibula    Linear metallic density concerning for a radiopaque foreign body as described above.    Assessment/Plan:     Lower extremity cellulitis    Ms. Smith is a 58 yo F with PMH of  Severe Pulmonary HTN on IV remodulin, chronic hypoxic respiratory, chronic RV failure on home O2 transferred from [facility] ED with ADHF exacerbation who presents with cellulitis of her right shin after a repaired laceration, general surgery called to evaluate wound.     - Cellulitis of right shin repaired laceration, no signs of compartment syndrome of necrotizing fascitis at this time, no fluctuance or purulence drainage noted.   - Distribution of skin necrosis likely due to ischemia, only found at most distal edges of superior flap which usually are the areas with more compromise blood flow.   - We agree with IV antibiotics due to cellulitis.   - Foreign body seen in XR does not correlate with wound, if asymptomatic leave it in place.   - Please keep leg elevated with two pillows aid with decreased swelling and improved  healing.   - We will continue to follow if increase swelling, redness, purulence please call.            VTE Risk Mitigation         Ordered     enoxaparin injection 40 mg  Daily      06/02/18 0247     IP VTE HIGH RISK PATIENT  Once      06/02/18 0247          Emani Das MD  General Surgery PGY IV  Beeper: [phone]       I have personally performed a detailed history and physical examination on this patient. My findings are summarized in the resident's note included in the record.  Skin will slough  Will monitor wound and manage appropriately

## 2018-06-03 NOTE — SUBJECTIVE & OBJECTIVE
No current facility-administered medications on file prior to encounter.      Current Outpatient Prescriptions on File Prior to Encounter   Medication Sig    albuterol (PROVENTIL) 2.5 mg /3 mL (0.083 %) nebulizer solution Take 2.5 mg by nebulization every 6 (six) hours as needed for Wheezing. Rescue    ALPRAZolam (XANAX) 1 MG tablet Take 0.5 tablets (0.5 mg total) by mouth 3 (three) times daily as needed for Anxiety.    amlodipine (NORVASC) 5 MG tablet Take 5 mg by mouth once daily.    budesonide-formoterol 80-4.5 mcg (SYMBICORT) 80-4.5 mcg/actuation HFAA Inhale 2 puffs into the lungs 2 (two) times daily. Controller    bumetanide (BUMEX) 2 MG tablet Take 1 tablet (2 mg total) by mouth 2 (two) times daily.    busPIRone (BUSPAR) 15 MG tablet Take 15 mg by mouth 3 (three) times daily.    cyclobenzaprine (FLEXERIL) 10 MG tablet TAKE 1 TABLET BY MOUTH THREE TIMES DAILY MAY CAUSE DROWSINESS    desvenlafaxine succinate (PRISTIQ) 100 MG Tb24 Take 100 mg by mouth once daily.    fluticasone-vilanterol (BREO) 100-25 mcg/dose diskus inhaler Inhale 1 puff into the lungs once daily. Controller    furosemide (LASIX) 10 mg/mL injection Inject 8 mLs (80 mg total) into the vein twice a week. As needed for weight gain of > 5 lbs or SOB    gabapentin (NEURONTIN) 100 MG capsule Take 1 capsule (100 mg total) by mouth 3 (three) times daily.    hydrocodone-acetaminophen 10-325mg (NORCO)  mg Tab Take 1 tablet by mouth every 8 (eight) hours as needed for Pain.    lamotrigine (LAMICTAL) 100 MG tablet Take 100 mg by mouth 2 (two) times daily.    levothyroxine (SYNTHROID) 75 MCG tablet Take 75 mcg by mouth once daily.    lurasidone (LATUDA) 60 mg Tab tablet Take 40 mg by mouth once daily.     magnesium oxide (MAG-OX) 400 mg tablet Take 1 tablet (400 mg total) by mouth 2 (two) times daily.    metOLazone (ZAROXOLYN) 2.5 MG tablet Take 2.5 mg by mouth once daily. Only take per MD order.    ondansetron (ZOFRAN-ODT) 8 MG TbDL  Take 1 tablet (8 mg total) by mouth every 8 (eight) hours as needed.    pantoprazole (PROTONIX) 40 MG tablet Take 40 mg by mouth once daily.    potassium chloride SA (K-DUR,KLOR-CON) 20 MEQ tablet Take 1 tablet (20 mEq total) by mouth 2 (two) times daily.    pravastatin (PRAVACHOL) 40 MG tablet Take 40 mg by mouth once daily.    riociguat (ADEMPAS) 1 mg Tab Take 1 tablet (1 mg total) by mouth 3 (three) times daily.    sodium chloride 0.9% 0.9 % SolP 100 mL with treprostinil 1 mg/mL Soln 6,000,000 ng Inject 2,380 ng/min into the vein continuous.    spironolactone (ALDACTONE) 100 MG tablet Take 1 tablet (100 mg total) by mouth once daily.    umeclidinium 62.5 mcg/actuation DsDv Inhale 62.5 mcg into the lungs once daily. Controller       Review of patient's allergies indicates:   Allergen Reactions    Sulfa (sulfonamide antibiotics) Rash       Past Medical History:   Diagnosis Date    Bipolar disorder     CHF (congestive heart failure)     Dyslipidemia     GERD (gastroesophageal reflux disease)     Hx of tracheostomy     Decanulated / surgically removed in 2013? Does not currently have tracheostomy    Hypertension     Hypothyroidism     Oxygen dependent     3L around the clock     Pulmonary HTN     Pulmonary hypertension, group 1 10/4/2017    Pulmonary nodules 10/10/2017    Respiratory failure, chronic      Past Surgical History:   Procedure Laterality Date    BACK SURGERY      PERICARDIAL WINDOW  2013    AK LEFT HEART CATH,PERCUTANEOUS      Cardiac Cath, Left Heart    TUBAL LIGATION       Family History     Problem Relation (Age of Onset)    Cancer Mother, Sister    Hypertension Mother, Father        Social History Main Topics    Smoking status: Former Smoker     Types: Cigarettes     Start date: 1/1/1979     Quit date: 1/5/1997    Smokeless tobacco: Never Used    Alcohol use No    Drug use: No    Sexual activity: No     Review of Systems   Constitutional: Negative.  Negative for activity  change, appetite change and fatigue.   Eyes: Negative.    Respiratory: Positive for cough and shortness of breath. Negative for apnea and choking.    Cardiovascular: Positive for chest pain. Negative for palpitations and leg swelling.   Gastrointestinal: Negative.    Genitourinary: Negative.    Skin: Negative.    Neurological: Negative.    Hematological: Negative.           Objective:     Vital Signs (Most Recent):  Temp: 97.7 °F (36.5 °C) (06/03/18 0030)  Pulse: 80 (06/03/18 0030)  Resp: 17 (06/03/18 0030)  BP: 115/74 (06/03/18 0030)  SpO2: (!) 94 % (06/03/18 0030) Vital Signs (24h Range):  Temp:  [97.7 °F (36.5 °C)-98.6 °F (37 °C)] 97.7 °F (36.5 °C)  Pulse:  [74-81] 80  Resp:  [17-20] 17  SpO2:  [90 %-94 %] 94 %  BP: ()/(51-74) 115/74     Weight: 90.5 kg (199 lb 6.9 oz)  Body mass index is 36.48 kg/m².    Physical Exam     Right lower leg        No areas of fluctuance palpated, only some serous drainage, necrotic skin at the superior flap, likely from ischemia. No crepitus or subcutaneous emphysema.       Significant Labs:  CBC:   Recent Labs  Lab 06/02/18 0458   WBC 5.40   RBC 4.33   HGB 11.4*   HCT 36.5*   *   MCV 84   MCH 26.3*   MCHC 31.2*     CMP:   Recent Labs  Lab 06/02/18  0457   GLU 91  90   CALCIUM 9.4  9.3   ALBUMIN 3.8  3.8   PROT 6.3  6.3     137   K 3.6  3.6   CO2 24  25     103   BUN 31*  31*   CREATININE 2.1*  2.1*   ALKPHOS 192*  189*   ALT 11  11   AST 13  14   BILITOT 0.8  0.8       Significant Diagnostics:    XR Right Fibula    Linear metallic density concerning for a radiopaque foreign body as described above.

## 2018-06-03 NOTE — ASSESSMENT & PLAN NOTE
Ms. Smith is a 58 yo F with PMH of  Severe Pulmonary HTN on IV remodulin, chronic hypoxic respiratory, chronic RV failure on home O2 transferred from [facility] ED with ADHF exacerbation who presents with cellulitis of her right shin after a repaired laceration, general surgery called to evaluate wound.     - Nylon sutures adjacent to devitalized flap removed x 2, very thin flap noted on devitalized part of closure and therefore necrosis of this tissue is not surprising.  No purulence noted.    - Recommend continued antibiotic treatment for cellulitis  - No need for surgical procedure at this time, please call with questions

## 2018-06-03 NOTE — PLAN OF CARE
Problem: Patient Care Overview  Goal: Plan of Care Review  Outcome: Ongoing (interventions implemented as appropriate)  Pt is AAOx3.Pt is ambulatory and independent.Pt able to perform all ADL's without assistance. Pt is in good spirits.  NAD noted.Telly monitoring on going. Pt turns independently, pt is aware of bony area and pressure reduction positions Standard precautions maintained.  Pt denies pian and/or discomfort at this time.Pt remains injury and fall free, non skid footwear donned, call light within reach, personal items within reach, bed in low/locked position, pt able to voice needs all needs voiced have been met at this time.

## 2018-06-03 NOTE — SUBJECTIVE & OBJECTIVE
Interval History: Feeling much improved today. Net negative 2.1L. No complaints, abd swelling feels slightly better, work of breathing improved. Remodulin home pump off early this am, restarted and confirmed dosing with pharmacy.      Review of Systems   All other systems reviewed and are negative.    Objective:     Vital Signs (Most Recent):  Temp: 98.4 °F (36.9 °C) (06/03/18 0729)  Pulse: 80 (06/03/18 0907)  Resp: 18 (06/03/18 0907)  BP: 127/62 (06/03/18 0729)  SpO2: (!) 93 % (06/03/18 0857) Vital Signs (24h Range):  Temp:  [97.2 °F (36.2 °C)-98.6 °F (37 °C)] 98.4 °F (36.9 °C)  Pulse:  [65-93] 80  Resp:  [17-20] 18  SpO2:  [91 %-94 %] 93 %  BP: (104-127)/(57-74) 127/62     Patient Vitals for the past 72 hrs (Last 3 readings):   Weight   06/03/18 0600 95.4 kg (210 lb 5.1 oz)   06/02/18 0243 90.5 kg (199 lb 6.9 oz)     Body mass index is 38.47 kg/m².      Intake/Output Summary (Last 24 hours) at 06/03/18 1107  Last data filed at 06/03/18 0909   Gross per 24 hour   Intake          1723.67 ml   Output             2500 ml   Net          -776.33 ml       Physical Exam   Constitutional: She is oriented to person, place, and time. She appears well-nourished. No distress.   HENT:   Head: Atraumatic.   Eyes: EOM are normal. No scleral icterus.   Neck: Neck supple. JVD (to mandible) present.   Cardiovascular: Normal rate and regular rhythm.  Exam reveals no gallop and no friction rub.    No murmur heard.  Prominent S2   Pulmonary/Chest: Effort normal and breath sounds normal. No respiratory distress. She has no wheezes. She has no rales.   Abdominal: Soft. Bowel sounds are normal. She exhibits distension. There is no tenderness. There is no guarding.   Musculoskeletal: She exhibits edema (bilateral 2+ LE).   Neurological: She is alert and oriented to person, place, and time.   Skin: Skin is warm and dry. Capillary refill takes less than 2 seconds. There is erythema (bilateral anterior LE).   Erythema of bilateral anterior  shins significantly improved. R anterior shin incision with sutures in place, no significant drainage   Psychiatric: She has a normal mood and affect.       Significant Labs:  CBC:    Recent Labs  Lab 06/02/18 0457 06/02/18 0458 06/03/18  0344   WBC 5.43 5.40 4.28   RBC 4.41 4.33 4.31   HGB 11.6* 11.4* 11.2*   HCT 36.8* 36.5* 36.4*   * 132* 138*   MCV 83 84 85   MCH 26.3* 26.3* 26.0*   MCHC 31.5* 31.2* 30.8*     BNP:    Recent Labs  Lab 05/29/18 06/02/18 0457   BNP 8,369 655*     CMP:    Recent Labs  Lab 06/02/18 0457 06/03/18 0344   GLU 91  90 75   CALCIUM 9.4  9.3 9.3   ALBUMIN 3.8  3.8 4.0   PROT 6.3  6.3 6.7     137 139   K 3.6  3.6 3.4*   CO2 24  25 26     103 100   BUN 31*  31* 27*   CREATININE 2.1*  2.1* 1.7*   ALKPHOS 192*  189* 197*   ALT 11  11 9*   AST 13  14 15   BILITOT 0.8  0.8 0.7      Coagulation:     Recent Labs  Lab 06/02/18 0457 06/03/18  0344   INR 1.1  1.2 1.2     LDH:  No results for input(s): LDH in the last 72 hours.  Microbiology:  Microbiology Results (last 7 days)     ** No results found for the last 168 hours. **          I have reviewed all pertinent labs within the past 24 hours.

## 2018-06-03 NOTE — ASSESSMENT & PLAN NOTE
-RLE with cellulitis precipitated by trauma at home  -S/p I&D with stitches in place  -Continue clindamycin IV and vancomycin IV  -erythema appears significantly improved today  -Surgery consulted, appreciate recs

## 2018-06-03 NOTE — SUBJECTIVE & OBJECTIVE
Interval History: Remodulin pump noted to be off this morning by nursing.  No major events overnight.  Denies pain in area of cellulitis.     Medications:  Continuous Infusions:   furosemide (LASIX) 2 mg/mL infusion (non-titrating) 10 mg/hr (06/02/18 1824)    veletri/remodulin cassette Stopped (06/02/18 0400)    veletri/remodulin tubing Stopped (06/02/18 0400)     Scheduled Meds:   amLODIPine  5 mg Oral Daily    busPIRone  15 mg Oral TID    clindamycin 600 MG/50 ML D5W  600 mg Intravenous Q8H    desvenlafaxine succinate  100 mg Oral Daily    enoxaparin  40 mg Subcutaneous Daily    fluticasone-vilanterol  1 puff Inhalation Daily    gabapentin  100 mg Oral TID    lamoTRIgine  100 mg Oral BID    levothyroxine  75 mcg Oral Daily    lurasidone  40 mg Oral Daily    magnesium oxide  400 mg Oral BID    pantoprazole  40 mg Oral Daily    potassium chloride SA  20 mEq Oral BID    pravastatin  40 mg Oral Daily    riociguat  1 mg Oral TID    sodium chloride 0.9%  3 mL Intravenous Q8H    spironolactone  100 mg Oral Daily    vancomycin (VANCOCIN) IVPB  15 mg/kg Intravenous Q24H     PRN Meds:acetaminophen, ALPRAZolam, cyclobenzaprine, HYDROcodone-acetaminophen, loperamide, magnesium oxide, magnesium oxide, ondansetron, potassium chloride 10%, potassium chloride 10%, potassium chloride 10%, potassium, sodium phosphates, potassium, sodium phosphates, potassium, sodium phosphates     Review of patient's allergies indicates:   Allergen Reactions    Sulfa (sulfonamide antibiotics) Rash     Objective:     Vital Signs (Most Recent):  Temp: 98.4 °F (36.9 °C) (06/03/18 0729)  Pulse: 72 (06/03/18 0729)  Resp: 20 (06/03/18 0729)  BP: 127/62 (06/03/18 0729)  SpO2: (!) 91 % (06/03/18 0729) Vital Signs (24h Range):  Temp:  [97.2 °F (36.2 °C)-98.6 °F (37 °C)] 98.4 °F (36.9 °C)  Pulse:  [65-81] 72  Resp:  [17-20] 20  SpO2:  [90 %-94 %] 91 %  BP: (102-127)/(55-74) 127/62     Weight: 95.4 kg (210 lb 5.1 oz)  Body mass index is  38.47 kg/m².    Intake/Output - Last 3 Shifts       06/01 0700 - 06/02 0659 06/02 0700 - 06/03 0659 06/03 0700 - 06/04 0659    P.O.  850     I.V. (mL/kg)  52.9 (0.6)     IV Piggyback  400     Total Intake(mL/kg)  1302.9 (13.7)     Urine (mL/kg/hr)  3500 (1.5)     Total Output   3500      Net   -2197.1             Urine Occurrence 1 x 4 x     Stool Occurrence 1 x 1 x           Physical Exam   Constitutional: She is oriented to person, place, and time.   Pulmonary/Chest: Effort normal. No respiratory distress.   Neurological: She is alert and oriented to person, place, and time.   Skin:        Nursing note and vitals reviewed.      Significant Labs:  Recent Labs      06/03/18   0344   WBC  4.28   HGB  11.2*   HCT  36.4*   PLT  138*     Recent Labs      06/03/18   0344   NA  139   K  3.4*   CL  100   CO2  26   BUN  27*   CREATININE  1.7*   PROT  6.7   ALBUMIN  4.0   BILITOT  0.7   AST  15   ALKPHOS  197*   ALT  9*           Significant Diagnostics:  No relevant imaging

## 2018-06-03 NOTE — ASSESSMENT & PLAN NOTE
Ms. Smith is a 58 yo F with PMH of  Severe Pulmonary HTN on IV remodulin, chronic hypoxic respiratory, chronic RV failure on home O2 transferred from [facility] ED with ADHF exacerbation who presents with cellulitis of her right shin after a repaired laceration, general surgery called to evaluate wound.     - Cellulitis of right shin repaired laceration, no signs of compartment syndrome of necrotizing fascitis at this time, no fluctuance or purulence drainage noted.   - Distribution of skin necrosis likely due to ischemia, only found at most distal edges of superior flap which usually are the areas with more blood flow compromise.   - We agree with IV antibiotics due to cellulitis.   - Foreign body seen in XR does not correlate with wound, if asymptomatic leave it in place.   - Please keep leg elevated with two pillows aid with decreased swelling and improved healing.   - We will continue to follow if increase swelling, redness, purulence please call.

## 2018-06-03 NOTE — HPI
Ms. Smith is a 58 yo F with PMH of  Severe Pulmonary HTN on IV remodulin, chronic hypoxic respiratory, chronic RV failure on home O2 transferred from [facility] ED with ADHF exacerbation who presents with cellulitis of her right shin after a repaired laceration, general surgery called to evaluate wound.     Patient states she had been having SOB for the past 3-4 days, fell on 6/30 while going up so concrete stairs and caused a large laceration on her left shin. She had it repaired in the ED there with nylon sutures and was sent home on Clindamycin. She has been stable from a pulmonary standpoint, but his leg wound has had increased swelling and erythema with serous drainage as well necrotic skin.     Has been afebrile with WBC of 5.4.

## 2018-06-03 NOTE — PROGRESS NOTES
"Spoke with Dr. Wilson . MD aware remodulin was off for several hours. Ok'd to resume remodulin at current rate of 75ng/kg/min.     At shift change this am found cadd pump reading:  VTBI 51.6 ml  GIVEN 48.4 ml  Rate 46ml/24hrs  Reads "STOPPED"  Label on cassettee :  Mixed 6/1/18 @1335  Concentration 0.2mg/ml  "

## 2018-06-03 NOTE — PROGRESS NOTES
[facility]  General Surgery  Progress Note    Subjective:     History of Present Illness:  Ms. Smith is a 56 yo F with PMH of  Severe Pulmonary HTN on IV remodulin, chronic hypoxic respiratory, chronic RV failure on home O2 transferred from [facility] ED with ADHF exacerbation who presents with cellulitis of her right shin after a repaired laceration, general surgery called to evaluate wound.     Patient states she had been having SOB for the past 3-4 days, fell on 6/30 while going up so concrete stairs and caused a large laceration on her left shin. She had it repaired in the ED there with nylon sutures and was sent home on Clindamycin. She has been stable from a pulmonary standpoint, but his leg wound has had increased swelling and erythema with serous drainage as well necrotic skin.     Has been afebrile with WBC of 5.4.          Post-Op Info:  * No surgery found *         Interval History: Remodulin pump noted to be off this morning by nursing.  No major events overnight.  Denies pain in area of cellulitis.     Medications:  Continuous Infusions:   furosemide (LASIX) 2 mg/mL infusion (non-titrating) 10 mg/hr (06/02/18 1824)    veletri/remodulin cassette Stopped (06/02/18 0400)    veletri/remodulin tubing Stopped (06/02/18 0400)     Scheduled Meds:   amLODIPine  5 mg Oral Daily    busPIRone  15 mg Oral TID    clindamycin 600 MG/50 ML D5W  600 mg Intravenous Q8H    desvenlafaxine succinate  100 mg Oral Daily    enoxaparin  40 mg Subcutaneous Daily    fluticasone-vilanterol  1 puff Inhalation Daily    gabapentin  100 mg Oral TID    lamoTRIgine  100 mg Oral BID    levothyroxine  75 mcg Oral Daily    lurasidone  40 mg Oral Daily    magnesium oxide  400 mg Oral BID    pantoprazole  40 mg Oral Daily    potassium chloride SA  20 mEq Oral BID    pravastatin  40 mg Oral Daily    riociguat  1 mg Oral TID    sodium chloride 0.9%  3 mL Intravenous Q8H    spironolactone  100 mg  Oral Daily    vancomycin (VANCOCIN) IVPB  15 mg/kg Intravenous Q24H     PRN Meds:acetaminophen, ALPRAZolam, cyclobenzaprine, HYDROcodone-acetaminophen, loperamide, magnesium oxide, magnesium oxide, ondansetron, potassium chloride 10%, potassium chloride 10%, potassium chloride 10%, potassium, sodium phosphates, potassium, sodium phosphates, potassium, sodium phosphates     Review of patient's allergies indicates:   Allergen Reactions    Sulfa (sulfonamide antibiotics) Rash     Objective:     Vital Signs (Most Recent):  Temp: 98.4 °F (36.9 °C) (06/03/18 0729)  Pulse: 72 (06/03/18 0729)  Resp: 20 (06/03/18 0729)  BP: 127/62 (06/03/18 0729)  SpO2: (!) 91 % (06/03/18 0729) Vital Signs (24h Range):  Temp:  [97.2 °F (36.2 °C)-98.6 °F (37 °C)] 98.4 °F (36.9 °C)  Pulse:  [65-81] 72  Resp:  [17-20] 20  SpO2:  [90 %-94 %] 91 %  BP: (102-127)/(55-74) 127/62     Weight: 95.4 kg (210 lb 5.1 oz)  Body mass index is 38.47 kg/m².    Intake/Output - Last 3 Shifts       06/01 0700 - 06/02 0659 06/02 0700 - 06/03 0659 06/03 0700 - 06/04 0659    P.O.  850     I.V. (mL/kg)  52.9 (0.6)     IV Piggyback  400     Total Intake(mL/kg)  1302.9 (13.7)     Urine (mL/kg/hr)  3500 (1.5)     Total Output   3500      Net   -2197.1             Urine Occurrence 1 x 4 x     Stool Occurrence 1 x 1 x           Physical Exam   Constitutional: She is oriented to person, place, and time.   Pulmonary/Chest: Effort normal. No respiratory distress.   Neurological: She is alert and oriented to person, place, and time.   Skin:        Nursing note and vitals reviewed.      Significant Labs:  Recent Labs      06/03/18   0344   WBC  4.28   HGB  11.2*   HCT  36.4*   PLT  138*     Recent Labs      06/03/18   0344   NA  139   K  3.4*   CL  100   CO2  26   BUN  27*   CREATININE  1.7*   PROT  6.7   ALBUMIN  4.0   BILITOT  0.7   AST  15   ALKPHOS  197*   ALT  9*           Significant Diagnostics:  No relevant imaging    Assessment/Plan:     Lower extremity cellulitis     Ms. Smith is a 58 yo F with PMH of  Severe Pulmonary HTN on IV remodulin, chronic hypoxic respiratory, chronic RV failure on home O2 transferred from [facility] ED with ADHF exacerbation who presents with cellulitis of her right shin after a repaired laceration, general surgery called to evaluate wound.     - Nylon sutures adjacent to devitalized flap removed x 2, very thin flap noted on devitalized part of closure and therefore necrosis of this tissue is not surprising.  No purulence noted.    - Recommend continued antibiotic treatment for cellulitis  - No need for surgical procedure at this time, please call with questions            Jonathan Schoen, MD  General Surgery  [facility]

## 2018-06-03 NOTE — ASSESSMENT & PLAN NOTE
-Decompensated, progressive weight gain and increased work of breathing  -JVD significantly elevated, improved today  -Start diuresis with lasix 80 IV followed by 10 mg/hr, continue  -Continue spironolactone 100 daily  -Hold home bumex 2 bid, metolazone 2.5 prn  -Strict I&Os

## 2018-06-03 NOTE — PROGRESS NOTES
[facility]  Heart Transplant  Progress Note    Patient Name: Sue Smith  MRN: [id number]  Admission Date: 6/2/2018  Hospital Length of Stay: 1 days  Attending Physician: Madi Santana MD  Primary Care Provider: Paddy Guerrier DO  Principal Problem:<principal problem not specified>    Subjective:     Interval History: Feeling much improved today. Net negative 2.1L. No complaints, abd swelling feels slightly better, work of breathing improved. Remodulin home pump off early this am, restarted and confirmed dosing with pharmacy.      Review of Systems   All other systems reviewed and are negative.    Objective:     Vital Signs (Most Recent):  Temp: 98.4 °F (36.9 °C) (06/03/18 0729)  Pulse: 80 (06/03/18 0907)  Resp: 18 (06/03/18 0907)  BP: 127/62 (06/03/18 0729)  SpO2: (!) 93 % (06/03/18 0857) Vital Signs (24h Range):  Temp:  [97.2 °F (36.2 °C)-98.6 °F (37 °C)] 98.4 °F (36.9 °C)  Pulse:  [65-93] 80  Resp:  [17-20] 18  SpO2:  [91 %-94 %] 93 %  BP: (104-127)/(57-74) 127/62     Patient Vitals for the past 72 hrs (Last 3 readings):   Weight   06/03/18 0600 95.4 kg (210 lb 5.1 oz)   06/02/18 0243 90.5 kg (199 lb 6.9 oz)     Body mass index is 38.47 kg/m².      Intake/Output Summary (Last 24 hours) at 06/03/18 1107  Last data filed at 06/03/18 0909   Gross per 24 hour   Intake          1723.67 ml   Output             2500 ml   Net          -776.33 ml       Physical Exam   Constitutional: She is oriented to person, place, and time. She appears well-nourished. No distress.   HENT:   Head: Atraumatic.   Eyes: EOM are normal. No scleral icterus.   Neck: Neck supple. JVD (to mandible) present.   Cardiovascular: Normal rate and regular rhythm.  Exam reveals no gallop and no friction rub.    No murmur heard.  Prominent S2   Pulmonary/Chest: Effort normal and breath sounds normal. No respiratory distress. She has no wheezes. She has no rales.   Abdominal: Soft. Bowel sounds are normal. She exhibits distension.  There is no tenderness. There is no guarding.   Musculoskeletal: She exhibits edema (bilateral 2+ LE).   Neurological: She is alert and oriented to person, place, and time.   Skin: Skin is warm and dry. Capillary refill takes less than 2 seconds. There is erythema (bilateral anterior LE).   Erythema of bilateral anterior shins significantly improved. R anterior shin incision with sutures in place, no significant drainage   Psychiatric: She has a normal mood and affect.       Significant Labs:  CBC:    Recent Labs  Lab 06/02/18 0457 06/02/18 0458 06/03/18 0344   WBC 5.43 5.40 4.28   RBC 4.41 4.33 4.31   HGB 11.6* 11.4* 11.2*   HCT 36.8* 36.5* 36.4*   * 132* 138*   MCV 83 84 85   MCH 26.3* 26.3* 26.0*   MCHC 31.5* 31.2* 30.8*     BNP:    Recent Labs  Lab 05/29/18 06/02/18 0457   BNP 8,369 655*     CMP:    Recent Labs  Lab 06/02/18 0457 06/03/18 0344   GLU 91  90 75   CALCIUM 9.4  9.3 9.3   ALBUMIN 3.8  3.8 4.0   PROT 6.3  6.3 6.7     137 139   K 3.6  3.6 3.4*   CO2 24  25 26     103 100   BUN 31*  31* 27*   CREATININE 2.1*  2.1* 1.7*   ALKPHOS 192*  189* 197*   ALT 11  11 9*   AST 13  14 15   BILITOT 0.8  0.8 0.7      Coagulation:     Recent Labs  Lab 06/02/18 0457 06/03/18  0344   INR 1.1  1.2 1.2     LDH:  No results for input(s): LDH in the last 72 hours.  Microbiology:  Microbiology Results (last 7 days)     ** No results found for the last 168 hours. **          I have reviewed all pertinent labs within the past 24 hours.    Assessment and Plan:     Ms. Smith is a 56 yo woman with hx of PHTN WHO Group 1 on IV remodulin, chronic hypoxic respiratory, chronic RV failure on home O2 transferred from [facility] ED with ADHF and LE cellulitis. Diuresing well on lasix drip at 10 mg/hr. LE cellulitis improved with antibiotics.    Acute on chronic diastolic heart failure    -Decompensated, progressive weight gain and increased work of breathing  -JVD significantly elevated,  improved today  -Start diuresis with lasix 80 IV followed by 10 mg/hr, continue  -Continue spironolactone 100 daily  -Hold home bumex 2 bid, metolazone 2.5 prn  -Strict I&Os        Lower extremity cellulitis    -RLE with cellulitis precipitated by trauma at home  -S/p I&D with stitches in place  -Continue clindamycin IV and vancomycin IV  -erythema appears significantly improved today  -Surgery consulted, appreciate recs        Pulmonary hypertension, group 1    -Continue remodulin at 75 ng/kg/min (5 mg/mL vial, 0.2 mg/mL concentration, dosing weight 85kg, 46mL per 24 hr; confirmed on home dosing sheet and by pharmacy)  -Continue adempas 1 mg tid  -O2 NC        Essential hypertension    -Continue amlodipine 5        Chronic respiratory failure with hypoxia    -Stable on NC  -goal SpO2>88  -Bipap QHS  -Continue inhaled therapies          Gastroesophageal reflux disease    Continue PPI        Hypothyroidism    Continue synthroid        Bipolar affective disorder    Continue home buspirone, desvenlafaxine, lamictal, latuda            Omkar Wilson MD  Heart Transplant  [facility]

## 2018-06-03 NOTE — ASSESSMENT & PLAN NOTE
-Continue remodulin at 75 ng/kg/min (5 mg/mL vial, 0.2 mg/mL concentration, dosing weight 85kg, 46mL per 24 hr; confirmed on home dosing sheet and by pharmacy)  -Continue adempas 1 mg tid  -O2 NC

## 2018-06-04 PROBLEM — S81.811A LACERATION OF RIGHT LEG EXCLUDING THIGH: Status: ACTIVE | Noted: 2018-06-04

## 2018-06-04 LAB
ALBUMIN SERPL BCP-MCNC: 3.9 G/DL
ALP SERPL-CCNC: 200 U/L
ALT SERPL W/O P-5'-P-CCNC: 10 U/L
ANION GAP SERPL CALC-SCNC: 15 MMOL/L
AST SERPL-CCNC: 15 U/L
BASOPHILS # BLD AUTO: 0.01 K/UL
BASOPHILS NFR BLD: 0.2 %
BILIRUB SERPL-MCNC: 0.6 MG/DL
BUN SERPL-MCNC: 24 MG/DL
CALCIUM SERPL-MCNC: 9.1 MG/DL
CHLORIDE SERPL-SCNC: 101 MMOL/L
CO2 SERPL-SCNC: 22 MMOL/L
CREAT SERPL-MCNC: 1.6 MG/DL
DIFFERENTIAL METHOD: ABNORMAL
EOSINOPHIL # BLD AUTO: 0.1 K/UL
EOSINOPHIL NFR BLD: 2.1 %
ERYTHROCYTE [DISTWIDTH] IN BLOOD BY AUTOMATED COUNT: 17.9 %
EST. GFR  (AFRICAN AMERICAN): 41.2 ML/MIN/1.73 M^2
EST. GFR  (NON AFRICAN AMERICAN): 35.8 ML/MIN/1.73 M^2
GLUCOSE SERPL-MCNC: 90 MG/DL
HCT VFR BLD AUTO: 36.9 %
HGB BLD-MCNC: 11.7 G/DL
IMM GRANULOCYTES # BLD AUTO: 0.03 K/UL
IMM GRANULOCYTES NFR BLD AUTO: 0.6 %
INR PPP: 1.1
LYMPHOCYTES # BLD AUTO: 0.4 K/UL
LYMPHOCYTES NFR BLD: 8 %
MAGNESIUM SERPL-MCNC: 2.2 MG/DL
MCH RBC QN AUTO: 26.5 PG
MCHC RBC AUTO-ENTMCNC: 31.7 G/DL
MCV RBC AUTO: 84 FL
MONOCYTES # BLD AUTO: 0.4 K/UL
MONOCYTES NFR BLD: 7.8 %
NEUTROPHILS # BLD AUTO: 4.2 K/UL
NEUTROPHILS NFR BLD: 81.3 %
NRBC BLD-RTO: 0 /100 WBC
PLATELET # BLD AUTO: 142 K/UL
PMV BLD AUTO: 10.6 FL
POTASSIUM SERPL-SCNC: 3.5 MMOL/L
PROT SERPL-MCNC: 6.6 G/DL
PROTHROMBIN TIME: 11.6 SEC
RBC # BLD AUTO: 4.41 M/UL
SODIUM SERPL-SCNC: 138 MMOL/L
WBC # BLD AUTO: 5.13 K/UL

## 2018-06-04 PROCEDURE — 80053 COMPREHEN METABOLIC PANEL: CPT | Mod: NTX

## 2018-06-04 PROCEDURE — 85025 COMPLETE CBC W/AUTO DIFF WBC: CPT | Mod: NTX

## 2018-06-04 PROCEDURE — 25000003 PHARM REV CODE 250: Mod: NTX | Performed by: STUDENT IN AN ORGANIZED HEALTH CARE EDUCATION/TRAINING PROGRAM

## 2018-06-04 PROCEDURE — 36415 COLL VENOUS BLD VENIPUNCTURE: CPT | Mod: NTX

## 2018-06-04 PROCEDURE — 20600001 HC STEP DOWN PRIVATE ROOM: Mod: NTX

## 2018-06-04 PROCEDURE — S0077 INJECTION, CLINDAMYCIN PHOSP: HCPCS | Mod: NTX | Performed by: STUDENT IN AN ORGANIZED HEALTH CARE EDUCATION/TRAINING PROGRAM

## 2018-06-04 PROCEDURE — 63600175 PHARM REV CODE 636 W HCPCS: Mod: JG,NTX | Performed by: INTERNAL MEDICINE

## 2018-06-04 PROCEDURE — 85610 PROTHROMBIN TIME: CPT | Mod: NTX

## 2018-06-04 PROCEDURE — 99233 SBSQ HOSP IP/OBS HIGH 50: CPT | Mod: NTX,,, | Performed by: INTERNAL MEDICINE

## 2018-06-04 PROCEDURE — 63600175 PHARM REV CODE 636 W HCPCS: Mod: NTX | Performed by: STUDENT IN AN ORGANIZED HEALTH CARE EDUCATION/TRAINING PROGRAM

## 2018-06-04 PROCEDURE — 83735 ASSAY OF MAGNESIUM: CPT | Mod: NTX

## 2018-06-04 PROCEDURE — 25000003 PHARM REV CODE 250: Mod: NTX | Performed by: INTERNAL MEDICINE

## 2018-06-04 PROCEDURE — 94761 N-INVAS EAR/PLS OXIMETRY MLT: CPT | Mod: NTX

## 2018-06-04 PROCEDURE — A4216 STERILE WATER/SALINE, 10 ML: HCPCS | Mod: NTX | Performed by: STUDENT IN AN ORGANIZED HEALTH CARE EDUCATION/TRAINING PROGRAM

## 2018-06-04 RX ORDER — POTASSIUM CHLORIDE 750 MG/1
10 CAPSULE, EXTENDED RELEASE ORAL ONCE
Status: DISCONTINUED | OUTPATIENT
Start: 2018-06-04 | End: 2018-06-04

## 2018-06-04 RX ORDER — POTASSIUM CHLORIDE 20 MEQ/1
40 TABLET, EXTENDED RELEASE ORAL ONCE
Status: COMPLETED | OUTPATIENT
Start: 2018-06-04 | End: 2018-06-04

## 2018-06-04 RX ADMIN — CLINDAMYCIN IN 5 PERCENT DEXTROSE 600 MG: 12 INJECTION, SOLUTION INTRAVENOUS at 11:06

## 2018-06-04 RX ADMIN — CLINDAMYCIN IN 5 PERCENT DEXTROSE 600 MG: 12 INJECTION, SOLUTION INTRAVENOUS at 04:06

## 2018-06-04 RX ADMIN — GABAPENTIN 100 MG: 100 CAPSULE ORAL at 02:06

## 2018-06-04 RX ADMIN — FLUTICASONE FUROATE AND VILANTEROL TRIFENATATE 1 PUFF: 100; 25 POWDER RESPIRATORY (INHALATION) at 09:06

## 2018-06-04 RX ADMIN — GABAPENTIN 100 MG: 100 CAPSULE ORAL at 09:06

## 2018-06-04 RX ADMIN — POTASSIUM CHLORIDE 20 MEQ: 1500 TABLET, EXTENDED RELEASE ORAL at 09:06

## 2018-06-04 RX ADMIN — PANTOPRAZOLE SODIUM 40 MG: 40 TABLET, DELAYED RELEASE ORAL at 09:06

## 2018-06-04 RX ADMIN — RIOCIGUAT 1 MG: 1 TABLET, FILM COATED ORAL at 09:06

## 2018-06-04 RX ADMIN — Medication 400 MG: at 09:06

## 2018-06-04 RX ADMIN — RIOCIGUAT 1 MG: 1 TABLET, FILM COATED ORAL at 02:06

## 2018-06-04 RX ADMIN — VANCOMYCIN HYDROCHLORIDE 1250 MG: 1 INJECTION, POWDER, LYOPHILIZED, FOR SOLUTION INTRAVENOUS at 09:06

## 2018-06-04 RX ADMIN — SPIRONOLACTONE 100 MG: 50 TABLET ORAL at 11:06

## 2018-06-04 RX ADMIN — ENOXAPARIN SODIUM 40 MG: 100 INJECTION SUBCUTANEOUS at 03:06

## 2018-06-04 RX ADMIN — POTASSIUM CHLORIDE 40 MEQ: 1500 TABLET, EXTENDED RELEASE ORAL at 03:06

## 2018-06-04 RX ADMIN — Medication 3 ML: at 06:06

## 2018-06-04 RX ADMIN — POTASSIUM CHLORIDE 40 MEQ: 20 SOLUTION ORAL at 02:06

## 2018-06-04 RX ADMIN — BUSPIRONE HYDROCHLORIDE 15 MG: 5 TABLET ORAL at 11:06

## 2018-06-04 RX ADMIN — TREPROSTINIL 75 NG/KG/MIN: 200 INJECTION, SOLUTION INTRAVENOUS; SUBCUTANEOUS at 12:06

## 2018-06-04 RX ADMIN — FUROSEMIDE 10 MG/HR: 10 INJECTION, SOLUTION INTRAMUSCULAR; INTRAVENOUS at 11:06

## 2018-06-04 RX ADMIN — AMLODIPINE BESYLATE 5 MG: 5 TABLET ORAL at 09:06

## 2018-06-04 RX ADMIN — LEVOTHYROXINE SODIUM 75 MCG: 25 TABLET ORAL at 06:06

## 2018-06-04 RX ADMIN — LAMOTRIGINE 100 MG: 100 TABLET ORAL at 09:06

## 2018-06-04 RX ADMIN — LURASIDONE HYDROCHLORIDE 40 MG: 40 TABLET, FILM COATED ORAL at 09:06

## 2018-06-04 RX ADMIN — PRAVASTATIN SODIUM 40 MG: 20 TABLET ORAL at 09:06

## 2018-06-04 RX ADMIN — BUSPIRONE HYDROCHLORIDE 15 MG: 5 TABLET ORAL at 09:06

## 2018-06-04 RX ADMIN — DESVENLAFAXINE SUCCINATE 100 MG: 50 TABLET, FILM COATED, EXTENDED RELEASE ORAL at 09:06

## 2018-06-04 RX ADMIN — Medication: at 12:06

## 2018-06-04 RX ADMIN — CLINDAMYCIN IN 5 PERCENT DEXTROSE 600 MG: 12 INJECTION, SOLUTION INTRAVENOUS at 09:06

## 2018-06-04 RX ADMIN — BUSPIRONE HYDROCHLORIDE 15 MG: 5 TABLET ORAL at 03:06

## 2018-06-04 RX ADMIN — Medication 3 ML: at 09:06

## 2018-06-04 NOTE — PROGRESS NOTES
Seen for laceration to right lower leg     06/04/18 1500       Wound 06/02/18 0241 Laceration posterior Calf   Date First Assessed/Time First Assessed: 06/02/18 0241   Pre-existing: Yes  Primary Wound Type: Laceration  Side: Right  Orientation: posterior  Location: Calf   Wound Image    Wound WDL ex   Dressing Appearance Dried drainage;Intact   Drainage Amount Scant   Drainage Characteristics/Odor Sanguineous;No odor   Appearance Purple;Sutures intact   Periwound Area Blistered;Moist;Swelling;Edematous   Wound Edges Open   Wound Length (cm) 9 cm  (13cm laceration with most sutures in place)   Wound Width (cm) 3 cm   Wound Depth (cm) 0.1 cm   Wound Volume (cm^3) 2.7 cm^3   Care Cleansed with:;Sterile normal saline   Dressing Applied;Non-adherent;Foam     Will place adaptic or non adherent layer over blistered skin and apply Aquacel foam dressing to the site  Feel we may loose more of the purple discolored skin as it sloughs  Bruno Meek RN CWON  [id number]

## 2018-06-04 NOTE — ASSESSMENT & PLAN NOTE
-Decompensated, progressive weight gain and increased work of breathing  -JVD significantly elevated, improved today  -C/w 10 mg/hr, continue  -Continue spironolactone 100 daily  -Hold home bumex 2 bid, metolazone 2.5 prn  -Strict I&Os

## 2018-06-04 NOTE — SUBJECTIVE & OBJECTIVE
Interval History:   Feeling much improved today. Net negative is 1.5 L since admit. 6 undoc UO in 24 hrs. No complaints, abd swelling feels slightly better, work of breathing improved. Surgical eval of shin recommend no surgical tx needed.      Review of Systems   All other systems reviewed and are negative.    Objective:     Vital Signs (Most Recent):  Temp: 98.2 °F (36.8 °C) (06/04/18 1203)  Pulse: 74 (06/04/18 1203)  Resp: (!) 22 (06/04/18 1203)  BP: (!) 98/55 (06/04/18 1203)  SpO2: (!) 94 % (06/04/18 1203) Vital Signs (24h Range):  Temp:  [96.6 °F (35.9 °C)-98.6 °F (37 °C)] 98.2 °F (36.8 °C)  Pulse:  [71-91] 74  Resp:  [11-22] 22  SpO2:  [90 %-94 %] 94 %  BP: ()/(51-64) 98/55     Patient Vitals for the past 72 hrs (Last 3 readings):   Weight   06/04/18 0615 94.4 kg (208 lb 1.8 oz)   06/03/18 0600 95.4 kg (210 lb 5.1 oz)   06/02/18 0243 90.5 kg (199 lb 6.9 oz)     Body mass index is 38.06 kg/m².      Intake/Output Summary (Last 24 hours) at 06/04/18 1258  Last data filed at 06/04/18 1137   Gross per 24 hour   Intake          1342.34 ml   Output              950 ml   Net           392.34 ml       Physical Exam   Constitutional: She is oriented to person, place, and time. She appears well-nourished. No distress.   HENT:   Head: Atraumatic.   Eyes: EOM are normal. No scleral icterus.   Neck: Neck supple. JVD (mid neck in the setting position) present.   Cardiovascular: Normal rate and regular rhythm.  Exam reveals no gallop and no friction rub.    No murmur heard.  Prominent S2   Pulmonary/Chest: Effort normal and breath sounds normal. No respiratory distress. She has no wheezes. She has no rales.   Abdominal: Soft. Bowel sounds are normal. She exhibits distension. There is no tenderness. There is no guarding.   Musculoskeletal: She exhibits edema (bilateral 2+ LE).   Neurological: She is alert and oriented to person, place, and time.   Skin: Skin is warm and dry. Capillary refill takes less than 2 seconds.  There is erythema (bilateral anterior LE).   Erythema of bilateral anterior shins significantly improved. R anterior shin incision with sutures in place, no significant drainage   Psychiatric: She has a normal mood and affect.       Significant Labs:  CBC:    Recent Labs  Lab 06/02/18 0458 06/03/18 0344 06/04/18  0520   WBC 5.40 4.28 5.13   RBC 4.33 4.31 4.41   HGB 11.4* 11.2* 11.7*   HCT 36.5* 36.4* 36.9*   * 138* 142*   MCV 84 85 84   MCH 26.3* 26.0* 26.5*   MCHC 31.2* 30.8* 31.7*     BNP:    Recent Labs  Lab 05/29/18 06/02/18 0457   BNP 8,369 655*     CMP:    Recent Labs  Lab 06/02/18 0457 06/03/18 0344 06/04/18  0520   GLU 91  90 75 90   CALCIUM 9.4  9.3 9.3 9.1   ALBUMIN 3.8  3.8 4.0 3.9   PROT 6.3  6.3 6.7 6.6     137 139 138   K 3.6  3.6 3.4* 3.5   CO2 24  25 26 22*     103 100 101   BUN 31*  31* 27* 24*   CREATININE 2.1*  2.1* 1.7* 1.6*   ALKPHOS 192*  189* 197* 200*   ALT 11  11 9* 10   AST 13  14 15 15   BILITOT 0.8  0.8 0.7 0.6      Coagulation:     Recent Labs  Lab 06/02/18 0457 06/03/18 0344 06/04/18  0520   INR 1.1  1.2 1.2 1.1     LDH:  No results for input(s): LDH in the last 72 hours.  Microbiology:  Microbiology Results (last 7 days)     ** No results found for the last 168 hours. **          I have reviewed all pertinent labs within the past 24 hours.

## 2018-06-04 NOTE — PROGRESS NOTES
[facility]  Heart Transplant  Progress Note    Patient Name: Sue Smith  MRN: [id number]  Admission Date: 6/2/2018  Hospital Length of Stay: 2 days  Attending Physician: Madi Santana MD  Primary Care Provider: Paddy Guerrier DO  Principal Problem:<principal problem not specified>    Subjective:     Interval History:   Feeling much improved today. Net negative is 1.5 L since admit. 6 undoc UO in 24 hrs. No complaints, abd swelling feels slightly better, work of breathing improved. Surgical eval of shin recommend no surgical tx needed.      Review of Systems   All other systems reviewed and are negative.    Objective:     Vital Signs (Most Recent):  Temp: 98.2 °F (36.8 °C) (06/04/18 1203)  Pulse: 74 (06/04/18 1203)  Resp: (!) 22 (06/04/18 1203)  BP: (!) 98/55 (06/04/18 1203)  SpO2: (!) 94 % (06/04/18 1203) Vital Signs (24h Range):  Temp:  [96.6 °F (35.9 °C)-98.6 °F (37 °C)] 98.2 °F (36.8 °C)  Pulse:  [71-91] 74  Resp:  [11-22] 22  SpO2:  [90 %-94 %] 94 %  BP: ()/(51-64) 98/55     Patient Vitals for the past 72 hrs (Last 3 readings):   Weight   06/04/18 0615 94.4 kg (208 lb 1.8 oz)   06/03/18 0600 95.4 kg (210 lb 5.1 oz)   06/02/18 0243 90.5 kg (199 lb 6.9 oz)     Body mass index is 38.06 kg/m².      Intake/Output Summary (Last 24 hours) at 06/04/18 1258  Last data filed at 06/04/18 1137   Gross per 24 hour   Intake          1342.34 ml   Output              950 ml   Net           392.34 ml       Physical Exam   Constitutional: She is oriented to person, place, and time. She appears well-nourished. No distress.   HENT:   Head: Atraumatic.   Eyes: EOM are normal. No scleral icterus.   Neck: Neck supple. JVD (mid neck in the setting position) present.   Cardiovascular: Normal rate and regular rhythm.  Exam reveals no gallop and no friction rub.    No murmur heard.  Prominent S2   Pulmonary/Chest: Effort normal and breath sounds normal. No respiratory distress. She has no wheezes. She has no  rales.   Abdominal: Soft. Bowel sounds are normal. She exhibits distension. There is no tenderness. There is no guarding.   Musculoskeletal: She exhibits edema (bilateral 2+ LE).   Neurological: She is alert and oriented to person, place, and time.   Skin: Skin is warm and dry. Capillary refill takes less than 2 seconds. There is erythema (bilateral anterior LE).   Erythema of bilateral anterior shins significantly improved. R anterior shin incision with sutures in place, no significant drainage   Psychiatric: She has a normal mood and affect.       Significant Labs:  CBC:    Recent Labs  Lab 06/02/18 0458 06/03/18 0344 06/04/18  0520   WBC 5.40 4.28 5.13   RBC 4.33 4.31 4.41   HGB 11.4* 11.2* 11.7*   HCT 36.5* 36.4* 36.9*   * 138* 142*   MCV 84 85 84   MCH 26.3* 26.0* 26.5*   MCHC 31.2* 30.8* 31.7*     BNP:    Recent Labs  Lab 05/29/18 06/02/18 0457   BNP 8,369 655*     CMP:    Recent Labs  Lab 06/02/18 0457 06/03/18 0344 06/04/18  0520   GLU 91  90 75 90   CALCIUM 9.4  9.3 9.3 9.1   ALBUMIN 3.8  3.8 4.0 3.9   PROT 6.3  6.3 6.7 6.6     137 139 138   K 3.6  3.6 3.4* 3.5   CO2 24  25 26 22*     103 100 101   BUN 31*  31* 27* 24*   CREATININE 2.1*  2.1* 1.7* 1.6*   ALKPHOS 192*  189* 197* 200*   ALT 11  11 9* 10   AST 13  14 15 15   BILITOT 0.8  0.8 0.7 0.6      Coagulation:     Recent Labs  Lab 06/02/18 0457 06/03/18 0344 06/04/18  0520   INR 1.1  1.2 1.2 1.1     LDH:  No results for input(s): LDH in the last 72 hours.  Microbiology:  Microbiology Results (last 7 days)     ** No results found for the last 168 hours. **          I have reviewed all pertinent labs within the past 24 hours.    Assessment and Plan:     Ms. Smith is a 56 yo woman with hx of PHTN WHO Group 1 on IV remodulin, chronic hypoxic respiratory, chronic RV failure on home O2 transferred from [facility] ED with progressive shortness of breath over the last 3-4 days. She presented there on 5/30 to the  ED having been sent by her PCP for evaluation of a non-healing traumatic wound on her R shin. She was noted to have increased WOB and increased abdominal swelling and given 1 dose of IV lasix. She was also started on clindamycin. She had recently been discharged from admission with ADHF 4/5-4/22 during which she underwent IV diuresis and uptitration of her home remodulin from 60 to 75 ng. She reports weight and breathing were stable until several days ago. On 5/29, telephone note reports 3 lb wt gain and her HH IV lasix was planned to restart on 5/30 which she ended up getting while at ED for above wound. Her symptoms continued to worsen with increased work of breathing, abdominal girth, and orthopnea and she re-presented tonight.     Lower extremity cellulitis    -RLE with cellulitis precipitated by trauma at home  -S/p I&D with stitches in place  -Continue clindamycin IV and vancomycin IV, plan to switch to po within 48 hrs.  -erythema appears significantly improved today  -Surgery consulted, appreciate recs        Acute on chronic diastolic heart failure    -Decompensated, progressive weight gain and increased work of breathing  -JVD significantly elevated, improved today  -C/w 10 mg/hr, continue  -Continue spironolactone 100 daily  -Hold home bumex 2 bid, metolazone 2.5 prn  -Strict I&Os        Gastroesophageal reflux disease    Continue PPI        Bipolar affective disorder    Continue home buspirone, desvenlafaxine, lamictal, latuda        Essential hypertension    -Continue amlodipine 5        Hypothyroidism    Continue synthroid        Chronic respiratory failure with hypoxia    -Stable on NC  -goal SpO2>88  -Bipap QHS  -Continue inhaled therapies          Pulmonary hypertension, group 1    -Continue remodulin at 75 ng/kg/min (5 mg/mL vial, 0.2 mg/mL concentration, dosing weight 85kg, 46mL per 24 hr; confirmed on home dosing sheet and by pharmacy)  -Continue adempas 1 mg tid  -O2 NC            Maria T GIRALDO  MD Trang  Heart Transplant  [facility]

## 2018-06-04 NOTE — PLAN OF CARE
Problem: Patient Care Overview  Goal: Plan of Care Review  Outcome: Ongoing (interventions implemented as appropriate)  PT AAO, Admitted fro SOB and FVO - on lasix gtt at 10mg/hr - diuresing but having bowel movments with urine occurrences so difficult to quantify.  Wound care seeing for wound to right shin - new wound care ordered for every Monday and Wednesday - pt receiving IV vanc and clindamycin for cellultis to shin- area marked with line - seems to be improving per patient.  No complaints of pain.  Able to ambulate to restroom per self. On home oxygen - requiring 5L NC.   No discharge plan at this time - continue to dirurese and treat cellulitis . Telemetry maintained - SR 80's  Remodulin infusing at 75ng DW: 85ng - 76cc/24hrs to right SC maddie

## 2018-06-04 NOTE — ASSESSMENT & PLAN NOTE
-RLE with cellulitis precipitated by trauma at home  -S/p I&D with stitches in place  -Continue clindamycin IV and vancomycin IV, plan to switch to po within 48 hrs.  -erythema appears significantly improved today  -Surgery consulted, appreciate recs

## 2018-06-04 NOTE — PROGRESS NOTES
"Admit Note     Met with patient to assess needs. Patient is a 57 y.o. single female, admitted for volume overload and infection on her leg, per pt report.    Patient admitted from outside facility on 6/2/2018 .  At this time, patient presents as alert and oriented x 4, good eye contact, calm, communicative, cooperative and asking and answering questions appropriately.        Household/Family Systems     Patient resides alone at:     [address]  [address]  [place] [address].      Support system includes daughter, father, and friend. Pt reports this is "assisted living", however does not receive assistance from facility. Pt does not have dependents that are need of being cared for.     Pt has two adult daughters daughters, Prabha and Radha.      Patients primary caregiver is self.     Pt's cell: [phone]  Father's contact: [phone], NERY Duffy.     Confirmed patient does have access to reliable transportation.    Cognitive Status/Learning     Patient reports reading ability as 12th grade and states patient does not have difficulty with reading, writing, seeing, hearing and memory.  Patient reports patient learns best by hands on.   Needed: No.   Highest education level: High School (9-12) or GED    Vocation/Disability     Working for Income: No  If no, reason not working: Disability  Patient is disabled due to bipolar disorder since 25-30 years. Prior to disablity, patient was employed as a .      Adherence     Patient reports a high level of adherence to patients health care regimen.  Adherence counseling and education provided. Patient verbalizes understanding.    Substance Use    Patient reports the following substance usage.    Tobacco: none, patient denies any use. Pt smokes from [id number], 1-2 ppd.   Alcohol: none, patient denies any use.  Illicit Drugs/Non-prescribed Medications: none, patient denies any use.  Patient states clear understanding of the potential impact of substance " use.  Substance abstinence/cessation counseling, education and resources provided and reviewed.     Services Utilizing/ADLS    Infusion Service: Prior to admission, patient utilizing? no  Home Health: Prior to admission, patient utilizing? yes Nursing specialties [phone], fax [phone].  DME: Prior to admission, yes. Pt reports having a shower chair and O2 with portable concentrator from [employer].   Pulmonary/Cardiac Rehab: Prior to admission, no  Dialysis:  Prior to admission, no  Transplant Specialty Pharmacy:  Prior to admission, no.    Prior to admission, patient reports patient was independent with ADLS and was not driving. Pt's friend transports patient.  Patient reports patient is not able to care for self at this time due to compromised medical condition (as documented in medical record) and physical weakness. Patient indicates a willingness to care for self once medically cleared to do so.    Insurance/Medications    Insured by   Payor/Plan Subscr  Sex Relation Sub. Ins. ID Effective Group Num   1. [employer]* HERACLIO GARCIA 1960 Female  [id number] [address] [id number]                                   [address]      Primary Insurance (for UNOS reporting): Public Insurance - Medicare FFS (Fee For Service)  Secondary Insurance (for UNOS reporting): None    Patient reports patient is able to obtain and afford medications at this time and at time of discharge.    Living Will/Healthcare Power of     Patient states patient does not have a LW and/or HCPA.   provided education regarding LW and HCPA and the completion of forms.    Coping/Mental Health    Patient is coping adequately with the aid of  family members and friends. Patient denies mental health difficulties at this time. Pt reports bipolar symptoms well controlled at this time. Pt takes Pristiq, Xanax and Buspar.    Discharge Planning    At time of discharge, patient plans to return to patient's home under the care  of self.  Patients  family will transport patient.  Per rounds today, expected discharge date has not been medically determined at this time. Patient verbalizes understanding and are involved in treatment planning and discharge process.    Additional Concerns    Patient is being followed for needs, education, resources, information, emotional support, supportive counseling, and for supportive and skilled discharge plan of care.  remains available. Patient denies additional needs and/or concerns at this time. Patient verbalizes understanding and agreement with information reviewed, social work availability, and how to access available resources as needed.

## 2018-06-04 NOTE — PLAN OF CARE
Problem: Patient Care Overview  Goal: Plan of Care Review  Outcome: Ongoing (interventions implemented as appropriate)  Pt is AAOx3.Pt is ambulatory and independent.Pt able to perform all ADL's without assistance. NAD noted.Pt denies pian and/or discomfort at this time.Standard precautions maintained.  Telly monitoring on going. Pt remains injury and fall free, non skid footwear donned, call light within reach, personal items within reach, bed in low/locked position, pt able to voice needs all needs voiced have been met at this time.

## 2018-06-05 PROBLEM — L03.119 LOWER EXTREMITY CELLULITIS: Status: ACTIVE | Noted: 2018-06-05

## 2018-06-05 LAB
ALBUMIN SERPL BCP-MCNC: 3.9 G/DL
ALP SERPL-CCNC: 200 U/L
ALT SERPL W/O P-5'-P-CCNC: 8 U/L
ANION GAP SERPL CALC-SCNC: 10 MMOL/L
ANION GAP SERPL CALC-SCNC: 8 MMOL/L
AST SERPL-CCNC: 15 U/L
BASOPHILS # BLD AUTO: 0.02 K/UL
BASOPHILS NFR BLD: 0.4 %
BILIRUB SERPL-MCNC: 0.6 MG/DL
BUN SERPL-MCNC: 20 MG/DL
BUN SERPL-MCNC: 20 MG/DL
CALCIUM SERPL-MCNC: 9.3 MG/DL
CALCIUM SERPL-MCNC: 9.4 MG/DL
CHLORIDE SERPL-SCNC: 101 MMOL/L
CHLORIDE SERPL-SCNC: 102 MMOL/L
CO2 SERPL-SCNC: 25 MMOL/L
CO2 SERPL-SCNC: 28 MMOL/L
CREAT SERPL-MCNC: 1.4 MG/DL
CREAT SERPL-MCNC: 1.6 MG/DL
DIFFERENTIAL METHOD: ABNORMAL
EOSINOPHIL # BLD AUTO: 0.1 K/UL
EOSINOPHIL NFR BLD: 2.4 %
ERYTHROCYTE [DISTWIDTH] IN BLOOD BY AUTOMATED COUNT: 18 %
EST. GFR  (AFRICAN AMERICAN): 41.2 ML/MIN/1.73 M^2
EST. GFR  (AFRICAN AMERICAN): 48.5 ML/MIN/1.73 M^2
EST. GFR  (NON AFRICAN AMERICAN): 35.8 ML/MIN/1.73 M^2
EST. GFR  (NON AFRICAN AMERICAN): 42 ML/MIN/1.73 M^2
GLUCOSE SERPL-MCNC: 184 MG/DL
GLUCOSE SERPL-MCNC: 89 MG/DL
HCT VFR BLD AUTO: 37.9 %
HGB BLD-MCNC: 11.8 G/DL
IMM GRANULOCYTES # BLD AUTO: 0.03 K/UL
IMM GRANULOCYTES NFR BLD AUTO: 0.6 %
INR PPP: 1.1
LYMPHOCYTES # BLD AUTO: 0.5 K/UL
LYMPHOCYTES NFR BLD: 9.9 %
MAGNESIUM SERPL-MCNC: 2.4 MG/DL
MCH RBC QN AUTO: 26.4 PG
MCHC RBC AUTO-ENTMCNC: 31.1 G/DL
MCV RBC AUTO: 85 FL
MONOCYTES # BLD AUTO: 0.4 K/UL
MONOCYTES NFR BLD: 8.5 %
NEUTROPHILS # BLD AUTO: 3.9 K/UL
NEUTROPHILS NFR BLD: 78.2 %
NRBC BLD-RTO: 0 /100 WBC
PLATELET # BLD AUTO: 125 K/UL
PMV BLD AUTO: 10.4 FL
POTASSIUM SERPL-SCNC: 3.7 MMOL/L
POTASSIUM SERPL-SCNC: 3.9 MMOL/L
PROT SERPL-MCNC: 6.6 G/DL
PROTHROMBIN TIME: 11.7 SEC
RBC # BLD AUTO: 4.47 M/UL
SODIUM SERPL-SCNC: 137 MMOL/L
SODIUM SERPL-SCNC: 137 MMOL/L
VANCOMYCIN TROUGH SERPL-MCNC: 11.2 UG/ML
WBC # BLD AUTO: 5.04 K/UL

## 2018-06-05 PROCEDURE — 25000003 PHARM REV CODE 250: Mod: NTX | Performed by: PHYSICIAN ASSISTANT

## 2018-06-05 PROCEDURE — 94660 CPAP INITIATION&MGMT: CPT | Mod: NTX

## 2018-06-05 PROCEDURE — 99900035 HC TECH TIME PER 15 MIN (STAT): Mod: NTX

## 2018-06-05 PROCEDURE — 63600175 PHARM REV CODE 636 W HCPCS: Mod: NTX | Performed by: PHYSICIAN ASSISTANT

## 2018-06-05 PROCEDURE — 80202 ASSAY OF VANCOMYCIN: CPT | Mod: NTX

## 2018-06-05 PROCEDURE — 63600175 PHARM REV CODE 636 W HCPCS: Mod: JG,NTX | Performed by: INTERNAL MEDICINE

## 2018-06-05 PROCEDURE — 80048 BASIC METABOLIC PNL TOTAL CA: CPT | Mod: NTX

## 2018-06-05 PROCEDURE — A4216 STERILE WATER/SALINE, 10 ML: HCPCS | Mod: NTX | Performed by: STUDENT IN AN ORGANIZED HEALTH CARE EDUCATION/TRAINING PROGRAM

## 2018-06-05 PROCEDURE — 63600175 PHARM REV CODE 636 W HCPCS: Mod: NTX | Performed by: STUDENT IN AN ORGANIZED HEALTH CARE EDUCATION/TRAINING PROGRAM

## 2018-06-05 PROCEDURE — 25000003 PHARM REV CODE 250: Mod: NTX | Performed by: INTERNAL MEDICINE

## 2018-06-05 PROCEDURE — 20600001 HC STEP DOWN PRIVATE ROOM: Mod: NTX

## 2018-06-05 PROCEDURE — 80053 COMPREHEN METABOLIC PANEL: CPT | Mod: NTX

## 2018-06-05 PROCEDURE — 85025 COMPLETE CBC W/AUTO DIFF WBC: CPT | Mod: NTX

## 2018-06-05 PROCEDURE — 36415 COLL VENOUS BLD VENIPUNCTURE: CPT | Mod: NTX

## 2018-06-05 PROCEDURE — 99233 SBSQ HOSP IP/OBS HIGH 50: CPT | Mod: NTX,,, | Performed by: INTERNAL MEDICINE

## 2018-06-05 PROCEDURE — 25000003 PHARM REV CODE 250: Mod: NTX | Performed by: STUDENT IN AN ORGANIZED HEALTH CARE EDUCATION/TRAINING PROGRAM

## 2018-06-05 PROCEDURE — S0077 INJECTION, CLINDAMYCIN PHOSP: HCPCS | Mod: NTX | Performed by: STUDENT IN AN ORGANIZED HEALTH CARE EDUCATION/TRAINING PROGRAM

## 2018-06-05 PROCEDURE — 27000221 HC OXYGEN, UP TO 24 HOURS: Mod: NTX

## 2018-06-05 PROCEDURE — 85610 PROTHROMBIN TIME: CPT | Mod: NTX

## 2018-06-05 PROCEDURE — 94761 N-INVAS EAR/PLS OXIMETRY MLT: CPT | Mod: NTX

## 2018-06-05 PROCEDURE — 83735 ASSAY OF MAGNESIUM: CPT | Mod: NTX

## 2018-06-05 RX ORDER — POTASSIUM CHLORIDE 20 MEQ/1
40 TABLET, EXTENDED RELEASE ORAL 2 TIMES DAILY
Status: DISCONTINUED | OUTPATIENT
Start: 2018-06-05 | End: 2018-06-05

## 2018-06-05 RX ORDER — FUROSEMIDE 10 MG/ML
80 INJECTION INTRAMUSCULAR; INTRAVENOUS ONCE
Status: COMPLETED | OUTPATIENT
Start: 2018-06-05 | End: 2018-06-05

## 2018-06-05 RX ORDER — POTASSIUM CHLORIDE 20 MEQ/1
40 TABLET, EXTENDED RELEASE ORAL 3 TIMES DAILY
Status: DISCONTINUED | OUTPATIENT
Start: 2018-06-05 | End: 2018-06-10 | Stop reason: HOSPADM

## 2018-06-05 RX ADMIN — AMLODIPINE BESYLATE 5 MG: 5 TABLET ORAL at 08:06

## 2018-06-05 RX ADMIN — PANTOPRAZOLE SODIUM 40 MG: 40 TABLET, DELAYED RELEASE ORAL at 11:06

## 2018-06-05 RX ADMIN — POTASSIUM CHLORIDE 20 MEQ: 1500 TABLET, EXTENDED RELEASE ORAL at 08:06

## 2018-06-05 RX ADMIN — RIOCIGUAT 1 MG: 1 TABLET, FILM COATED ORAL at 03:06

## 2018-06-05 RX ADMIN — CLINDAMYCIN IN 5 PERCENT DEXTROSE 600 MG: 12 INJECTION, SOLUTION INTRAVENOUS at 12:06

## 2018-06-05 RX ADMIN — TREPROSTINIL 75 NG/KG/MIN: 200 INJECTION, SOLUTION INTRAVENOUS; SUBCUTANEOUS at 11:06

## 2018-06-05 RX ADMIN — VANCOMYCIN HYDROCHLORIDE 1250 MG: 1 INJECTION, POWDER, LYOPHILIZED, FOR SOLUTION INTRAVENOUS at 10:06

## 2018-06-05 RX ADMIN — BUSPIRONE HYDROCHLORIDE 15 MG: 5 TABLET ORAL at 08:06

## 2018-06-05 RX ADMIN — Medication 400 MG: at 08:06

## 2018-06-05 RX ADMIN — POTASSIUM CHLORIDE 40 MEQ: 1500 TABLET, EXTENDED RELEASE ORAL at 05:06

## 2018-06-05 RX ADMIN — RIOCIGUAT 1 MG: 1 TABLET, FILM COATED ORAL at 08:06

## 2018-06-05 RX ADMIN — Medication 3 ML: at 12:06

## 2018-06-05 RX ADMIN — BUSPIRONE HYDROCHLORIDE 15 MG: 5 TABLET ORAL at 03:06

## 2018-06-05 RX ADMIN — DESVENLAFAXINE SUCCINATE 100 MG: 50 TABLET, FILM COATED, EXTENDED RELEASE ORAL at 08:06

## 2018-06-05 RX ADMIN — ENOXAPARIN SODIUM 40 MG: 100 INJECTION SUBCUTANEOUS at 05:06

## 2018-06-05 RX ADMIN — GABAPENTIN 100 MG: 100 CAPSULE ORAL at 08:06

## 2018-06-05 RX ADMIN — LAMOTRIGINE 100 MG: 100 TABLET ORAL at 08:06

## 2018-06-05 RX ADMIN — LURASIDONE HYDROCHLORIDE 40 MG: 40 TABLET, FILM COATED ORAL at 08:06

## 2018-06-05 RX ADMIN — GABAPENTIN 100 MG: 100 CAPSULE ORAL at 03:06

## 2018-06-05 RX ADMIN — FLUTICASONE FUROATE AND VILANTEROL TRIFENATATE 1 PUFF: 100; 25 POWDER RESPIRATORY (INHALATION) at 08:06

## 2018-06-05 RX ADMIN — LEVOTHYROXINE SODIUM 75 MCG: 25 TABLET ORAL at 05:06

## 2018-06-05 RX ADMIN — SPIRONOLACTONE 100 MG: 50 TABLET ORAL at 08:06

## 2018-06-05 RX ADMIN — POTASSIUM CHLORIDE 40 MEQ: 1500 TABLET, EXTENDED RELEASE ORAL at 08:06

## 2018-06-05 RX ADMIN — FUROSEMIDE 20 MG/HR: 10 INJECTION, SOLUTION INTRAMUSCULAR; INTRAVENOUS at 07:06

## 2018-06-05 RX ADMIN — CLINDAMYCIN IN 5 PERCENT DEXTROSE 600 MG: 12 INJECTION, SOLUTION INTRAVENOUS at 08:06

## 2018-06-05 RX ADMIN — Medication 3 ML: at 05:06

## 2018-06-05 RX ADMIN — FUROSEMIDE 80 MG: 10 INJECTION, SOLUTION INTRAMUSCULAR; INTRAVENOUS at 11:06

## 2018-06-05 RX ADMIN — PRAVASTATIN SODIUM 40 MG: 20 TABLET ORAL at 08:06

## 2018-06-05 RX ADMIN — Medication: at 11:06

## 2018-06-05 RX ADMIN — CLINDAMYCIN IN 5 PERCENT DEXTROSE 600 MG: 12 INJECTION, SOLUTION INTRAVENOUS at 05:06

## 2018-06-05 RX ADMIN — FUROSEMIDE 10 MG/HR: 10 INJECTION, SOLUTION INTRAMUSCULAR; INTRAVENOUS at 05:06

## 2018-06-05 NOTE — ASSESSMENT & PLAN NOTE
-RLE with cellulitis precipitated by trauma at home  -S/p I&D with stitches in place  -Continue clindamycin IV and vancomycin IV, plan to switch to po soon   -Surgery consulted, appreciate recs

## 2018-06-05 NOTE — SUBJECTIVE & OBJECTIVE
Interval History:   UOP not accurate over last 24 hours. Pt is not able to catch urine when she has BMs      Review of Systems   All other systems reviewed and are negative.    Objective:     Vital Signs (Most Recent):  Temp: 97.9 °F (36.6 °C) (06/05/18 1228)  Pulse: 87 (06/05/18 1228)  Resp: 16 (06/05/18 1228)  BP: (!) 119/56 (06/05/18 1228)  SpO2: (!) 92 % (06/05/18 1228) Vital Signs (24h Range):  Temp:  [97.9 °F (36.6 °C)-98.5 °F (36.9 °C)] 97.9 °F (36.6 °C)  Pulse:  [72-91] 87  Resp:  [16-21] 16  SpO2:  [88 %-95 %] 92 %  BP: (104-119)/(53-63) 119/56     Patient Vitals for the past 72 hrs (Last 3 readings):   Weight   06/05/18 0500 94 kg (207 lb 3.7 oz)   06/04/18 0615 94.4 kg (208 lb 1.8 oz)   06/03/18 0600 95.4 kg (210 lb 5.1 oz)     Body mass index is 37.9 kg/m².      Intake/Output Summary (Last 24 hours) at 06/05/18 1307  Last data filed at 06/05/18 1134   Gross per 24 hour   Intake           996.92 ml   Output              300 ml   Net           696.92 ml       Physical Exam   Constitutional: She is oriented to person, place, and time. She appears well-nourished. No distress.   HENT:   Head: Atraumatic.   Eyes: EOM are normal. No scleral icterus.   Neck: Neck supple. Hepatojugular reflux and JVD present.   Cardiovascular: Normal rate and regular rhythm.  Exam reveals no gallop and no friction rub.    No murmur heard.  Pulmonary/Chest: Effort normal and breath sounds normal. No respiratory distress. She has no wheezes. She has no rales.   Abdominal: Soft. Bowel sounds are normal. She exhibits distension. There is no tenderness. There is no guarding.   Musculoskeletal: She exhibits edema (bilateral 1+ LE).   Neurological: She is alert and oriented to person, place, and time.   Skin: Skin is warm and dry. Capillary refill takes less than 2 seconds. There is erythema (bilateral anterior LE).   Erythema of bilateral anterior shins significantly improved. R anterior shin incision with sutures in place, no  significant drainage   Psychiatric: She has a normal mood and affect.       Significant Labs:  CBC:    Recent Labs  Lab 06/03/18  0344 06/04/18  0520 06/05/18  0653   WBC 4.28 5.13 5.04   RBC 4.31 4.41 4.47   HGB 11.2* 11.7* 11.8*   HCT 36.4* 36.9* 37.9   * 142* 125*   MCV 85 84 85   MCH 26.0* 26.5* 26.4*   MCHC 30.8* 31.7* 31.1*     BNP:    Recent Labs  Lab 06/02/18  0457   *     CMP:    Recent Labs  Lab 06/03/18  0344 06/04/18  0520 06/05/18  0653   GLU 75 90 89   CALCIUM 9.3 9.1 9.3   ALBUMIN 4.0 3.9 3.9   PROT 6.7 6.6 6.6    138 137   K 3.4* 3.5 3.9   CO2 26 22* 28    101 101   BUN 27* 24* 20   CREATININE 1.7* 1.6* 1.4   ALKPHOS 197* 200* 200*   ALT 9* 10 8*   AST 15 15 15   BILITOT 0.7 0.6 0.6      Coagulation:     Recent Labs  Lab 06/03/18  0344 06/04/18  0520 06/05/18  0653   INR 1.2 1.1 1.1     LDH:  No results for input(s): LDH in the last 72 hours.  Microbiology:  Microbiology Results (last 7 days)     ** No results found for the last 168 hours. **          I have reviewed all pertinent labs within the past 24 hours.

## 2018-06-05 NOTE — PROGRESS NOTES
[facility]  Heart Transplant  Progress Note    Patient Name: Sue Smith  MRN: [id number]  Admission Date: 6/2/2018  Hospital Length of Stay: 3 days  Attending Physician: Maty Bosch MD  Primary Care Provider: Paddy Guerrier DO  Principal Problem:<principal problem not specified>    Subjective:     Interval History:   UOP not accurate over last 24 hours. Pt is not able to catch urine when she has BMs      Review of Systems   All other systems reviewed and are negative.    Objective:     Vital Signs (Most Recent):  Temp: 97.9 °F (36.6 °C) (06/05/18 1228)  Pulse: 87 (06/05/18 1228)  Resp: 16 (06/05/18 1228)  BP: (!) 119/56 (06/05/18 1228)  SpO2: (!) 92 % (06/05/18 1228) Vital Signs (24h Range):  Temp:  [97.9 °F (36.6 °C)-98.5 °F (36.9 °C)] 97.9 °F (36.6 °C)  Pulse:  [72-91] 87  Resp:  [16-21] 16  SpO2:  [88 %-95 %] 92 %  BP: (104-119)/(53-63) 119/56     Patient Vitals for the past 72 hrs (Last 3 readings):   Weight   06/05/18 0500 94 kg (207 lb 3.7 oz)   06/04/18 0615 94.4 kg (208 lb 1.8 oz)   06/03/18 0600 95.4 kg (210 lb 5.1 oz)     Body mass index is 37.9 kg/m².      Intake/Output Summary (Last 24 hours) at 06/05/18 1307  Last data filed at 06/05/18 1134   Gross per 24 hour   Intake           996.92 ml   Output              300 ml   Net           696.92 ml       Physical Exam   Constitutional: She is oriented to person, place, and time. She appears well-nourished. No distress.   HENT:   Head: Atraumatic.   Eyes: EOM are normal. No scleral icterus.   Neck: Neck supple. Hepatojugular reflux and JVD present.   Cardiovascular: Normal rate and regular rhythm.  Exam reveals no gallop and no friction rub.    No murmur heard.  Pulmonary/Chest: Effort normal and breath sounds normal. No respiratory distress. She has no wheezes. She has no rales.   Abdominal: Soft. Bowel sounds are normal. She exhibits distension. There is no tenderness. There is no guarding.   Musculoskeletal: She exhibits edema  (bilateral 1+ LE).   Neurological: She is alert and oriented to person, place, and time.   Skin: Skin is warm and dry. Capillary refill takes less than 2 seconds. There is erythema (bilateral anterior LE).   Erythema of bilateral anterior shins significantly improved. R anterior shin incision with sutures in place, no significant drainage   Psychiatric: She has a normal mood and affect.       Significant Labs:  CBC:    Recent Labs  Lab 06/03/18 0344 06/04/18  0520 06/05/18  0653   WBC 4.28 5.13 5.04   RBC 4.31 4.41 4.47   HGB 11.2* 11.7* 11.8*   HCT 36.4* 36.9* 37.9   * 142* 125*   MCV 85 84 85   MCH 26.0* 26.5* 26.4*   MCHC 30.8* 31.7* 31.1*     BNP:    Recent Labs  Lab 06/02/18  0457   *     CMP:    Recent Labs  Lab 06/03/18 0344 06/04/18 0520 06/05/18  0653   GLU 75 90 89   CALCIUM 9.3 9.1 9.3   ALBUMIN 4.0 3.9 3.9   PROT 6.7 6.6 6.6    138 137   K 3.4* 3.5 3.9   CO2 26 22* 28    101 101   BUN 27* 24* 20   CREATININE 1.7* 1.6* 1.4   ALKPHOS 197* 200* 200*   ALT 9* 10 8*   AST 15 15 15   BILITOT 0.7 0.6 0.6      Coagulation:     Recent Labs  Lab 06/03/18 0344 06/04/18 0520 06/05/18  0653   INR 1.2 1.1 1.1     LDH:  No results for input(s): LDH in the last 72 hours.  Microbiology:  Microbiology Results (last 7 days)     ** No results found for the last 168 hours. **          I have reviewed all pertinent labs within the past 24 hours.    Assessment and Plan:     Ms. Smith is a 58 yo woman with hx of PHTN WHO Group 1 on IV remodulin, chronic hypoxic respiratory, chronic RV failure on home O2 transferred from [facility] with progressive shortness of breath over the last 3-4 days. She presented there on 5/30 to the ED having been sent by her PCP for evaluation of a non-healing traumatic wound on her R shin. She was noted to have increased WOB and increased abdominal swelling and given 1 dose of IV lasix. She was also started on clindamycin. She had recently been discharged from  admission with ADHF 4/5-4/22 during which she underwent IV diuresis and uptitration of her home remodulin from 60 to 75 ng. She reports weight and breathing were stable until several days ago. On 5/29, telephone note reports 3 lb wt gain and her HH IV lasix was planned to restart on 5/30 which she ended up getting while at ED for above wound. Her symptoms continued to worsen with increased work of breathing, abdominal girth, and orthopnea and she re-presented tonight.     Acute on chronic diastolic heart failure    -Acute on chronic Diastolic and R HF  -Increase Lasix to 20 mg/hr today  -Continue spironolactone 100 daily  -Strict I&Os        Pulmonary hypertension, group 1    -Continue remodulin at 75 ng/kg/min (5 mg/mL vial, 0.2 mg/mL concentration, dosing weight 85kg, 46mL per 24 hr; confirmed on home dosing sheet and by pharmacy)  -Continue adempas 1 mg tid  -O2 NC        Lower extremity cellulitis    -RLE with cellulitis precipitated by trauma at home  -S/p I&D with stitches in place  -Continue clindamycin IV and vancomycin IV, plan to switch to po soon   -Surgery consulted, appreciate recs        Chronic respiratory failure with hypoxia    -Stable on NC  -goal SpO2>88  -Bipap QHS  -Continue inhaled therapies          Hypothyroidism    Continue synthroid        Bipolar affective disorder    Continue home buspirone, desvenlafaxine, lamictal, latuda        Gastroesophageal reflux disease    Continue PPI        Essential hypertension    -Continue amlodipine 5            Deanne Tello PA-C  Heart Transplant  [facility]

## 2018-06-05 NOTE — ASSESSMENT & PLAN NOTE
-Acute on chronic Diastolic and R HF  -Increase Lasix to 20 mg/hr today  -Continue spironolactone 100 daily  -Strict I&Os

## 2018-06-05 NOTE — PLAN OF CARE
AAO x 4. VSS, afebrile, SpO2>92% on 5L NC. Telemetry monitoring-SR. IV remodulin @ 75 ng/kg/min DW=85kg or 77 mL/24 hr. Cassette change time at 1230 PM. Lasix gtt @ 10 mg/hr or 5 mL/hr continuous. Measured CYS=837 mL so far this shift. RLE with cellulitis-area marked, aquacel dressing changed @MonThurs in place to I&D site. IV vanc and clindamycin continued. Pt denies pain at this time. Fall precautions maintained and pt repositions self. See flowsheet for assessment findings. Will continue to monitor.

## 2018-06-06 LAB
ALBUMIN SERPL BCP-MCNC: 4 G/DL
ALP SERPL-CCNC: 196 U/L
ALT SERPL W/O P-5'-P-CCNC: 11 U/L
ANION GAP SERPL CALC-SCNC: 11 MMOL/L
AST SERPL-CCNC: 16 U/L
BASOPHILS # BLD AUTO: 0.01 K/UL
BASOPHILS NFR BLD: 0.2 %
BILIRUB SERPL-MCNC: 0.6 MG/DL
BUN SERPL-MCNC: 18 MG/DL
CALCIUM SERPL-MCNC: 9.6 MG/DL
CHLORIDE SERPL-SCNC: 101 MMOL/L
CO2 SERPL-SCNC: 27 MMOL/L
CREAT SERPL-MCNC: 1.5 MG/DL
DIFFERENTIAL METHOD: ABNORMAL
EOSINOPHIL # BLD AUTO: 0.1 K/UL
EOSINOPHIL NFR BLD: 2.4 %
ERYTHROCYTE [DISTWIDTH] IN BLOOD BY AUTOMATED COUNT: 18 %
EST. GFR  (AFRICAN AMERICAN): 44.6 ML/MIN/1.73 M^2
EST. GFR  (NON AFRICAN AMERICAN): 38.7 ML/MIN/1.73 M^2
GLUCOSE SERPL-MCNC: 87 MG/DL
HCT VFR BLD AUTO: 36.4 %
HGB BLD-MCNC: 11.3 G/DL
IMM GRANULOCYTES # BLD AUTO: 0.02 K/UL
IMM GRANULOCYTES NFR BLD AUTO: 0.4 %
INR PPP: 1.1
LYMPHOCYTES # BLD AUTO: 0.5 K/UL
LYMPHOCYTES NFR BLD: 10.1 %
MAGNESIUM SERPL-MCNC: 2.3 MG/DL
MCH RBC QN AUTO: 26.3 PG
MCHC RBC AUTO-ENTMCNC: 31 G/DL
MCV RBC AUTO: 85 FL
MONOCYTES # BLD AUTO: 0.4 K/UL
MONOCYTES NFR BLD: 7.7 %
NEUTROPHILS # BLD AUTO: 3.9 K/UL
NEUTROPHILS NFR BLD: 79.2 %
NRBC BLD-RTO: 0 /100 WBC
PLATELET # BLD AUTO: 118 K/UL
PMV BLD AUTO: 10.5 FL
POTASSIUM SERPL-SCNC: 4 MMOL/L
PROT SERPL-MCNC: 6.7 G/DL
PROTHROMBIN TIME: 11.3 SEC
RBC # BLD AUTO: 4.3 M/UL
SODIUM SERPL-SCNC: 139 MMOL/L
WBC # BLD AUTO: 4.96 K/UL

## 2018-06-06 PROCEDURE — S0077 INJECTION, CLINDAMYCIN PHOSP: HCPCS | Mod: NTX | Performed by: STUDENT IN AN ORGANIZED HEALTH CARE EDUCATION/TRAINING PROGRAM

## 2018-06-06 PROCEDURE — 25000003 PHARM REV CODE 250: Mod: NTX | Performed by: INTERNAL MEDICINE

## 2018-06-06 PROCEDURE — 25000003 PHARM REV CODE 250: Mod: NTX | Performed by: PHYSICIAN ASSISTANT

## 2018-06-06 PROCEDURE — 99900035 HC TECH TIME PER 15 MIN (STAT): Mod: NTX

## 2018-06-06 PROCEDURE — 99233 SBSQ HOSP IP/OBS HIGH 50: CPT | Mod: NTX,,, | Performed by: INTERNAL MEDICINE

## 2018-06-06 PROCEDURE — 25000003 PHARM REV CODE 250: Mod: NTX | Performed by: STUDENT IN AN ORGANIZED HEALTH CARE EDUCATION/TRAINING PROGRAM

## 2018-06-06 PROCEDURE — 36415 COLL VENOUS BLD VENIPUNCTURE: CPT | Mod: NTX

## 2018-06-06 PROCEDURE — 87040 BLOOD CULTURE FOR BACTERIA: CPT | Mod: NTX

## 2018-06-06 PROCEDURE — A4216 STERILE WATER/SALINE, 10 ML: HCPCS | Mod: NTX | Performed by: STUDENT IN AN ORGANIZED HEALTH CARE EDUCATION/TRAINING PROGRAM

## 2018-06-06 PROCEDURE — 63600175 PHARM REV CODE 636 W HCPCS: Mod: NTX | Performed by: STUDENT IN AN ORGANIZED HEALTH CARE EDUCATION/TRAINING PROGRAM

## 2018-06-06 PROCEDURE — 63600175 PHARM REV CODE 636 W HCPCS: Mod: JG,NTX | Performed by: INTERNAL MEDICINE

## 2018-06-06 PROCEDURE — 85610 PROTHROMBIN TIME: CPT | Mod: NTX

## 2018-06-06 PROCEDURE — 83735 ASSAY OF MAGNESIUM: CPT | Mod: NTX

## 2018-06-06 PROCEDURE — 63600175 PHARM REV CODE 636 W HCPCS: Mod: NTX | Performed by: PHYSICIAN ASSISTANT

## 2018-06-06 PROCEDURE — 80053 COMPREHEN METABOLIC PANEL: CPT | Mod: NTX

## 2018-06-06 PROCEDURE — 99222 1ST HOSP IP/OBS MODERATE 55: CPT | Mod: NTX,,, | Performed by: INTERNAL MEDICINE

## 2018-06-06 PROCEDURE — 20600001 HC STEP DOWN PRIVATE ROOM: Mod: NTX

## 2018-06-06 PROCEDURE — 85025 COMPLETE CBC W/AUTO DIFF WBC: CPT | Mod: NTX

## 2018-06-06 RX ORDER — DOXYCYCLINE HYCLATE 100 MG
100 TABLET ORAL EVERY 12 HOURS
Status: DISCONTINUED | OUTPATIENT
Start: 2018-06-06 | End: 2018-06-10 | Stop reason: HOSPADM

## 2018-06-06 RX ADMIN — RIOCIGUAT 1 MG: 1 TABLET, FILM COATED ORAL at 09:06

## 2018-06-06 RX ADMIN — CLINDAMYCIN IN 5 PERCENT DEXTROSE 600 MG: 12 INJECTION, SOLUTION INTRAVENOUS at 05:06

## 2018-06-06 RX ADMIN — GABAPENTIN 100 MG: 100 CAPSULE ORAL at 09:06

## 2018-06-06 RX ADMIN — FUROSEMIDE 20 MG/HR: 10 INJECTION, SOLUTION INTRAMUSCULAR; INTRAVENOUS at 10:06

## 2018-06-06 RX ADMIN — ENOXAPARIN SODIUM 40 MG: 100 INJECTION SUBCUTANEOUS at 04:06

## 2018-06-06 RX ADMIN — FLUTICASONE FUROATE AND VILANTEROL TRIFENATATE 1 PUFF: 100; 25 POWDER RESPIRATORY (INHALATION) at 09:06

## 2018-06-06 RX ADMIN — Medication 400 MG: at 09:06

## 2018-06-06 RX ADMIN — LURASIDONE HYDROCHLORIDE 40 MG: 40 TABLET, FILM COATED ORAL at 09:06

## 2018-06-06 RX ADMIN — DESVENLAFAXINE SUCCINATE 100 MG: 50 TABLET, FILM COATED, EXTENDED RELEASE ORAL at 09:06

## 2018-06-06 RX ADMIN — FUROSEMIDE 20 MG/HR: 10 INJECTION, SOLUTION INTRAMUSCULAR; INTRAVENOUS at 05:06

## 2018-06-06 RX ADMIN — LAMOTRIGINE 100 MG: 100 TABLET ORAL at 09:06

## 2018-06-06 RX ADMIN — PANTOPRAZOLE SODIUM 40 MG: 40 TABLET, DELAYED RELEASE ORAL at 09:06

## 2018-06-06 RX ADMIN — RIOCIGUAT 1 MG: 1 TABLET, FILM COATED ORAL at 04:06

## 2018-06-06 RX ADMIN — TREPROSTINIL 75 NG/KG/MIN: 200 INJECTION, SOLUTION INTRAVENOUS; SUBCUTANEOUS at 12:06

## 2018-06-06 RX ADMIN — POTASSIUM CHLORIDE 40 MEQ: 1500 TABLET, EXTENDED RELEASE ORAL at 02:06

## 2018-06-06 RX ADMIN — GABAPENTIN 100 MG: 100 CAPSULE ORAL at 02:06

## 2018-06-06 RX ADMIN — FUROSEMIDE 20 MG/HR: 10 INJECTION, SOLUTION INTRAMUSCULAR; INTRAVENOUS at 02:06

## 2018-06-06 RX ADMIN — VANCOMYCIN HYDROCHLORIDE 1250 MG: 1 INJECTION, POWDER, LYOPHILIZED, FOR SOLUTION INTRAVENOUS at 09:06

## 2018-06-06 RX ADMIN — LEVOTHYROXINE SODIUM 75 MCG: 25 TABLET ORAL at 05:06

## 2018-06-06 RX ADMIN — CLINDAMYCIN IN 5 PERCENT DEXTROSE 600 MG: 12 INJECTION, SOLUTION INTRAVENOUS at 11:06

## 2018-06-06 RX ADMIN — DOXYCYCLINE HYCLATE 100 MG: 100 TABLET, COATED ORAL at 09:06

## 2018-06-06 RX ADMIN — PRAVASTATIN SODIUM 40 MG: 20 TABLET ORAL at 09:06

## 2018-06-06 RX ADMIN — Medication 3 ML: at 05:06

## 2018-06-06 RX ADMIN — POTASSIUM CHLORIDE 40 MEQ: 1500 TABLET, EXTENDED RELEASE ORAL at 09:06

## 2018-06-06 RX ADMIN — BUSPIRONE HYDROCHLORIDE 15 MG: 5 TABLET ORAL at 02:06

## 2018-06-06 RX ADMIN — BUSPIRONE HYDROCHLORIDE 15 MG: 5 TABLET ORAL at 09:06

## 2018-06-06 RX ADMIN — SPIRONOLACTONE 100 MG: 50 TABLET ORAL at 09:06

## 2018-06-06 RX ADMIN — AMLODIPINE BESYLATE 5 MG: 5 TABLET ORAL at 09:06

## 2018-06-06 NOTE — HPI
57 y.o. woman with severe PAH and chronic RVF, admitted with acute on chronic RV failure as well as RLE cellulitis/abscess. She cut her leg a few days PTA and had the lac closed. She was put her on clindamycin but she reports taking Keflex instead? Started on IV clindamycin and had vancomycin added. The patient reports no major changes - legs the same degree of redness. She has been seen by Wound Care and Gen Surg. Denies fever or chills. No history of MRSA. I am consulted for abx recommendations.

## 2018-06-06 NOTE — ASSESSMENT & PLAN NOTE
-Acute on chronic Diastolic and R HF  -On lasix 20 mg/hr today  -Continue spironolactone 100 daily  -Strict I&Os

## 2018-06-06 NOTE — PLAN OF CARE
Problem: Patient Care Overview  Goal: Plan of Care Review  Outcome: Ongoing (interventions implemented as appropriate)  POC reviewed w/ pt this am to include IV remodulin, lasix gtt, IV antibiotics, wound care, and safety.  Pt remains on IV remodulin at 75 ng/kg/min, cassette changed at 1300 today.  Lasix gtt remains at 20 mg/hr, pt diuresing well but has had several unmeasured urine with bowel movements today.  IV antibiotics d/c'd today, switched to PO antibiotics per ID.  Pt remains on O2 5L NC, no SOB noted throughout the day.  Blood cultures x 2 done today.  Attempted to start a new PIV to exchange  IVs but unsuccessful.

## 2018-06-06 NOTE — PROGRESS NOTES
[facility]  Heart Transplant  Progress Note    Patient Name: Sue Smith  MRN: [id number]  Admission Date: 6/2/2018  Hospital Length of Stay: 4 days  Attending Physician: Maty Bosch MD  Primary Care Provider: Paddy Guerrier DO  Principal Problem:<principal problem not specified>    Subjective:     Interval History:   More undocumented UOP's in the last 24 hrs. Pt is not able to catch urine when she has BMs  Noted improvement on lasix 20 mg/hr.   Consulted ID for anti-biotic treatment      Review of Systems   All other systems reviewed and are negative.    Objective:     Vital Signs (Most Recent):  Temp: 97 °F (36.1 °C) (06/06/18 0733)  Pulse: 77 (06/06/18 1100)  Resp: 20 (06/06/18 0923)  BP: 115/62 (06/06/18 0733)  SpO2: 95 % (06/06/18 0733) Vital Signs (24h Range):  Temp:  [97 °F (36.1 °C)-99.1 °F (37.3 °C)] 97 °F (36.1 °C)  Pulse:  [73-93] 77  Resp:  [16-21] 20  SpO2:  [90 %-95 %] 95 %  BP: ()/(52-62) 115/62     Patient Vitals for the past 72 hrs (Last 3 readings):   Weight   06/06/18 0415 91.9 kg (202 lb 9.6 oz)   06/05/18 0500 94 kg (207 lb 3.7 oz)   06/04/18 0615 94.4 kg (208 lb 1.8 oz)     Body mass index is 37.06 kg/m².      Intake/Output Summary (Last 24 hours) at 06/06/18 1103  Last data filed at 06/06/18 1057   Gross per 24 hour   Intake          1849.67 ml   Output             1100 ml   Net           749.67 ml       Physical Exam   Constitutional: She is oriented to person, place, and time. She appears well-nourished. No distress.   HENT:   Head: Atraumatic.   Eyes: EOM are normal. No scleral icterus.   Neck: Neck supple. Hepatojugular reflux and JVD present.   Cardiovascular: Normal rate and regular rhythm.  Exam reveals no gallop and no friction rub.    No murmur heard.  Pulmonary/Chest: Effort normal and breath sounds normal. No respiratory distress. She has no wheezes. She has no rales.   Abdominal: Soft. Bowel sounds are normal. She exhibits distension. There is no  tenderness. There is no guarding.   Musculoskeletal: She exhibits edema (bilateral 1+ LE).   Neurological: She is alert and oriented to person, place, and time.   Skin: Skin is warm and dry. Capillary refill takes less than 2 seconds. There is erythema (bilateral anterior LE).   Erythema of bilateral anterior shins significantly improved. R anterior shin incision with sutures in place, no significant drainage   Psychiatric: She has a normal mood and affect.       Significant Labs:  CBC:    Recent Labs  Lab 06/04/18  0520 06/05/18  0653 06/06/18  0500   WBC 5.13 5.04 4.96   RBC 4.41 4.47 4.30   HGB 11.7* 11.8* 11.3*   HCT 36.9* 37.9 36.4*   * 125* 118*   MCV 84 85 85   MCH 26.5* 26.4* 26.3*   MCHC 31.7* 31.1* 31.0*     BNP:    Recent Labs  Lab 06/02/18  0457   *     CMP:    Recent Labs  Lab 06/04/18  0520 06/05/18  0653 06/05/18  1524 06/06/18  0500   GLU 90 89 184* 87   CALCIUM 9.1 9.3 9.4 9.6   ALBUMIN 3.9 3.9  --  4.0   PROT 6.6 6.6  --  6.7    137 137 139   K 3.5 3.9 3.7 4.0   CO2 22* 28 25 27    101 102 101   BUN 24* 20 20 18   CREATININE 1.6* 1.4 1.6* 1.5*   ALKPHOS 200* 200*  --  196*   ALT 10 8*  --  11   AST 15 15  --  16   BILITOT 0.6 0.6  --  0.6      Coagulation:     Recent Labs  Lab 06/04/18  0520 06/05/18  0653 06/06/18  0500   INR 1.1 1.1 1.1     LDH:  No results for input(s): LDH in the last 72 hours.  Microbiology:  Microbiology Results (last 7 days)     Procedure Component Value Units Date/Time    Blood culture [[id number]]     Order Status:  Sent Specimen:  Blood     Blood culture [[id number]]     Order Status:  Sent Specimen:  Blood           I have reviewed all pertinent labs within the past 24 hours.    Assessment and Plan:     Ms. Smith is a 56 yo woman with hx of PHTN WHO Group 1 on IV remodulin, chronic hypoxic respiratory, chronic RV failure on home O2 transferred from [facility] ED with progressive shortness of breath over the last 3-4 days. She presented  there on 5/30 to the ED having been sent by her PCP for evaluation of a non-healing traumatic wound on her R shin. She was noted to have increased WOB and increased abdominal swelling and given 1 dose of IV lasix. She was also started on clindamycin. She had recently been discharged from admission with ADHF 4/5-4/22 during which she underwent IV diuresis and uptitration of her home remodulin from 60 to 75 ng. She reports weight and breathing were stable until several days ago. On 5/29, telephone note reports 3 lb wt gain and her HH IV lasix was planned to restart on 5/30 which she ended up getting while at ED for above wound. Her symptoms continued to worsen with increased work of breathing, abdominal girth, and orthopnea and she re-presented tonight.     Lower extremity cellulitis    -RLE with cellulitis precipitated by trauma at home  -S/p I&D with stitches in place  -Continue clindamycin IV and vancomycin IV, Consulted ID today.  -Surgery consulted, appreciate recs        Acute on chronic diastolic heart failure    -Acute on chronic Diastolic and R HF  -On lasix 20 mg/hr today  -Continue spironolactone 100 daily  -Strict I&Os        Gastroesophageal reflux disease    Continue PPI        Bipolar affective disorder    Continue home buspirone, desvenlafaxine, lamictal, latuda        Essential hypertension    -Continue amlodipine 5        Hypothyroidism    Continue synthroid        Chronic respiratory failure with hypoxia    -Stable on NC  -goal SpO2>88  -Bipap QHS  -Continue inhaled therapies          Pulmonary hypertension, group 1    -Continue remodulin at 75 ng/kg/min (5 mg/mL vial, 0.2 mg/mL concentration, dosing weight 85kg, 46mL per 24 hr; confirmed on home dosing sheet and by pharmacy)  -Continue adempas 1 mg tid  -O2 NC            Maria T Costello MD  Heart Transplant  [facility]

## 2018-06-06 NOTE — CONSULTS
[facility]  Infectious Disease  Consult Note    Patient Name: Sue Smith  MRN: [id number]  Admission Date: 6/2/2018  Hospital Length of Stay: 4 days  Attending Physician: Maty Bosch MD  Primary Care Provider: Paddy Guerrier DO     Isolation Status: No active isolations    Patient information was obtained from patient, past medical records and ER records.      Inpatient consult to Infectious Diseases  Consult performed by: DL HUERTA  Consult ordered by: CHEYANNE CHENG        Assessment/Plan:     Lower extremity cellulitis    - bilateral cellulitis is fairly unusual  - more likely venous stasis dermatitis rather than infection (poor response to clinda/vanc argue against infection as well)  - will change to po doxycycline x 7 days  - she would benefit from better edema control in the longrun            Thank you for your consult. I will sign off. Please contact us if you have any additional questions.    Dl Huerta MD  Infectious Disease  [facility]    Subjective:     Principal Problem: <principal problem not specified>    HPI: 57 y.o.  woman with severe PAH and chronic RVF, admitted with acute on chronic RV failure as well as RLE cellulitis/abscess. She cut her leg a few days PTA and had the lac closed. She was put her on clindamycin but she reports taking Keflex instead? Started on IV clindamycin and had vancomycin added. The patient reports no major changes - legs the same degree of redness. She has been seen by Wound Care and Gen Surg. Denies fever or chills. No history of MRSA. I am consulted for abx recommendations.    Past Medical History:   Diagnosis Date    Bipolar disorder     CHF (congestive heart failure)     Dyslipidemia     GERD (gastroesophageal reflux disease)     Hx of tracheostomy     Decanulated / surgically removed in 2013? Does not currently have tracheostomy    Hypertension     Hypothyroidism     Oxygen dependent     3L  around the clock     Pulmonary HTN     Pulmonary hypertension, group 1 10/4/2017    Pulmonary nodules 10/10/2017    Respiratory failure, chronic        Past Surgical History:   Procedure Laterality Date    BACK SURGERY      PERICARDIAL WINDOW  2013    [place] LEFT HEART CATH,PERCUTANEOUS      Cardiac Cath, Left Heart    TUBAL LIGATION         Review of patient's allergies indicates:   Allergen Reactions    Sulfa (sulfonamide antibiotics) Rash       Medications:  Prescriptions Prior to Admission   Medication Sig    albuterol (PROVENTIL) 2.5 mg /3 mL (0.083 %) nebulizer solution Take 2.5 mg by nebulization every 6 (six) hours as needed for Wheezing. Rescue    ALPRAZolam (XANAX) 1 MG tablet Take 0.5 tablets (0.5 mg total) by mouth 3 (three) times daily as needed for Anxiety.    amlodipine (NORVASC) 5 MG tablet Take 5 mg by mouth once daily.    budesonide-formoterol 80-4.5 mcg (SYMBICORT) 80-4.5 mcg/actuation HFAA Inhale 2 puffs into the lungs 2 (two) times daily. Controller    bumetanide (BUMEX) 2 MG tablet Take 1 tablet (2 mg total) by mouth 2 (two) times daily.    busPIRone (BUSPAR) 15 MG tablet Take 15 mg by mouth 3 (three) times daily.    cyclobenzaprine (FLEXERIL) 10 MG tablet TAKE 1 TABLET BY MOUTH THREE TIMES DAILY MAY CAUSE DROWSINESS    desvenlafaxine succinate (PRISTIQ) 100 MG Tb24 Take 100 mg by mouth once daily.    fluticasone-vilanterol (BREO) 100-25 mcg/dose diskus inhaler Inhale 1 puff into the lungs once daily. Controller    furosemide (LASIX) 10 mg/mL injection Inject 8 mLs (80 mg total) into the vein twice a week. As needed for weight gain of > 5 lbs or SOB    gabapentin (NEURONTIN) 100 MG capsule Take 1 capsule (100 mg total) by mouth 3 (three) times daily.    hydrocodone-acetaminophen 10-325mg (NORCO)  mg Tab Take 1 tablet by mouth every 8 (eight) hours as needed for Pain.    lamotrigine (LAMICTAL) 100 MG tablet Take 100 mg by mouth 2 (two) times daily.    levothyroxine  (SYNTHROID) 75 MCG tablet Take 75 mcg by mouth once daily.    lurasidone (LATUDA) 60 mg Tab tablet Take 40 mg by mouth once daily.     magnesium oxide (MAG-OX) 400 mg tablet Take 1 tablet (400 mg total) by mouth 2 (two) times daily.    metOLazone (ZAROXOLYN) 2.5 MG tablet Take 2.5 mg by mouth once daily. Only take per MD order.    ondansetron (ZOFRAN-ODT) 8 MG TbDL Take 1 tablet (8 mg total) by mouth every 8 (eight) hours as needed.    pantoprazole (PROTONIX) 40 MG tablet Take 40 mg by mouth once daily.    potassium chloride SA (K-DUR,KLOR-CON) 20 MEQ tablet Take 1 tablet (20 mEq total) by mouth 2 (two) times daily.    pravastatin (PRAVACHOL) 40 MG tablet Take 40 mg by mouth once daily.    riociguat (ADEMPAS) 1 mg Tab Take 1 tablet (1 mg total) by mouth 3 (three) times daily.    sodium chloride 0.9% 0.9 % SolP 100 mL with treprostinil 1 mg/mL Soln 6,000,000 ng Inject 2,380 ng/min into the vein continuous.    spironolactone (ALDACTONE) 100 MG tablet Take 1 tablet (100 mg total) by mouth once daily.    umeclidinium 62.5 mcg/actuation DsDv Inhale 62.5 mcg into the lungs once daily. Controller     Antibiotics     Start     Stop Route Frequency Ordered    06/02/18 1200  clindamycin 600 MG/50 ML D5W 600 mg/50 mL IVPB 600 mg      -- IV Every 8 hours (non-standard times) 06/02/18 0949    06/02/18 0930  vancomycin (VANCOCIN) 1,250 mg in sodium chloride 0.9% 250 mL IVPB  (Vancomycin IVPB with levels panel)      -- IV Every 24 hours (non-standard times) 06/02/18 0806        Antifungals     None        Antivirals     None           There is no immunization history for the selected administration types on file for this patient.    Family History     Problem Relation (Age of Onset)    Cancer Mother, Sister    Hypertension Mother, Father        Social History     Social History    Marital status:      Spouse name: N/A    Number of children: N/A    Years of education: N/A     Social History Main Topics     Smoking status: Former Smoker     Types: Cigarettes     Start date: 1/1/1979     Quit date: 1/5/1997    Smokeless tobacco: Never Used    Alcohol use No    Drug use: No    Sexual activity: No     Other Topics Concern    None     Social History Narrative    None     Review of Systems   Constitutional: Negative for chills and fever.   Respiratory: Positive for shortness of breath.    Cardiovascular: Positive for leg swelling.   Skin: Positive for color change, rash and wound.   All other systems reviewed and are negative.    Objective:     Vital Signs (Most Recent):  Temp: 98 °F (36.7 °C) (06/06/18 1150)  Pulse: 74 (06/06/18 1150)  Resp: 20 (06/06/18 1150)  BP: (!) 98/56 (06/06/18 1150)  SpO2: (!) 91 % (06/06/18 1150) Vital Signs (24h Range):  Temp:  [97 °F (36.1 °C)-99.1 °F (37.3 °C)] 98 °F (36.7 °C)  Pulse:  [73-93] 74  Resp:  [16-21] 20  SpO2:  [90 %-95 %] 91 %  BP: ()/(52-62) 98/56     Weight: 91.9 kg (202 lb 9.6 oz)  Body mass index is 37.06 kg/m².    Estimated Creatinine Clearance: 44.2 mL/min (A) (based on SCr of 1.5 mg/dL (H)).    Physical Exam   Constitutional: She is oriented to person, place, and time. She appears well-developed and well-nourished. No distress.   HENT:   Head: Normocephalic and atraumatic.   Right Ear: External ear normal.   Left Ear: External ear normal.   Eyes: EOM are normal. Pupils are equal, round, and reactive to light.   Neck: Normal range of motion. Neck supple.   Cardiovascular: Normal rate and regular rhythm.    Murmur heard.  Pulmonary/Chest: Effort normal and breath sounds normal.   Musculoskeletal: Normal range of motion. She exhibits edema, tenderness and deformity.   Bilateral leg erythema, R>L.    Neurological: She is alert and oriented to person, place, and time. No cranial nerve deficit.   Skin: Skin is warm and dry. She is not diaphoretic. There is erythema.   Psychiatric: She has a normal mood and affect. Her behavior is normal. Thought content normal.    Nursing note and vitals reviewed.      Significant Labs:   Blood Culture: No results for input(s): LABBLOO in the last 4320 hours.  CBC:   Recent Labs  Lab 06/05/18  0653 06/06/18  0500   WBC 5.04 4.96   HGB 11.8* 11.3*   HCT 37.9 36.4*   * 118*     Wound Culture: No results for input(s): LABAERO in the last 4320 hours.    Significant Imaging: I have reviewed all pertinent imaging results/findings within the past 24 hours.

## 2018-06-06 NOTE — ASSESSMENT & PLAN NOTE
-RLE with cellulitis precipitated by trauma at home  -S/p I&D with stitches in place  -Continue clindamycin IV and vancomycin IV, Consulted ID today.  -Surgery consulted, appreciate recs

## 2018-06-06 NOTE — SUBJECTIVE & OBJECTIVE
Past Medical History:   Diagnosis Date    Bipolar disorder     CHF (congestive heart failure)     Dyslipidemia     GERD (gastroesophageal reflux disease)     Hx of tracheostomy     Decanulated / surgically removed in 2013? Does not currently have tracheostomy    Hypertension     Hypothyroidism     Oxygen dependent     3L around the clock     Pulmonary HTN     Pulmonary hypertension, group 1 10/4/2017    Pulmonary nodules 10/10/2017    Respiratory failure, chronic        Past Surgical History:   Procedure Laterality Date    BACK SURGERY      PERICARDIAL WINDOW  2013    [place] LEFT HEART CATH,PERCUTANEOUS      Cardiac Cath, Left Heart    TUBAL LIGATION         Review of patient's allergies indicates:   Allergen Reactions    Sulfa (sulfonamide antibiotics) Rash       Medications:  Prescriptions Prior to Admission   Medication Sig    albuterol (PROVENTIL) 2.5 mg /3 mL (0.083 %) nebulizer solution Take 2.5 mg by nebulization every 6 (six) hours as needed for Wheezing. Rescue    ALPRAZolam (XANAX) 1 MG tablet Take 0.5 tablets (0.5 mg total) by mouth 3 (three) times daily as needed for Anxiety.    amlodipine (NORVASC) 5 MG tablet Take 5 mg by mouth once daily.    budesonide-formoterol 80-4.5 mcg (SYMBICORT) 80-4.5 mcg/actuation HFAA Inhale 2 puffs into the lungs 2 (two) times daily. Controller    bumetanide (BUMEX) 2 MG tablet Take 1 tablet (2 mg total) by mouth 2 (two) times daily.    busPIRone (BUSPAR) 15 MG tablet Take 15 mg by mouth 3 (three) times daily.    cyclobenzaprine (FLEXERIL) 10 MG tablet TAKE 1 TABLET BY MOUTH THREE TIMES DAILY MAY CAUSE DROWSINESS    desvenlafaxine succinate (PRISTIQ) 100 MG Tb24 Take 100 mg by mouth once daily.    fluticasone-vilanterol (BREO) 100-25 mcg/dose diskus inhaler Inhale 1 puff into the lungs once daily. Controller    furosemide (LASIX) 10 mg/mL injection Inject 8 mLs (80 mg total) into the vein twice a week. As needed for weight gain of > 5 lbs or SOB     gabapentin (NEURONTIN) 100 MG capsule Take 1 capsule (100 mg total) by mouth 3 (three) times daily.    hydrocodone-acetaminophen 10-325mg (NORCO)  mg Tab Take 1 tablet by mouth every 8 (eight) hours as needed for Pain.    lamotrigine (LAMICTAL) 100 MG tablet Take 100 mg by mouth 2 (two) times daily.    levothyroxine (SYNTHROID) 75 MCG tablet Take 75 mcg by mouth once daily.    lurasidone (LATUDA) 60 mg Tab tablet Take 40 mg by mouth once daily.     magnesium oxide (MAG-OX) 400 mg tablet Take 1 tablet (400 mg total) by mouth 2 (two) times daily.    metOLazone (ZAROXOLYN) 2.5 MG tablet Take 2.5 mg by mouth once daily. Only take per MD order.    ondansetron (ZOFRAN-ODT) 8 MG TbDL Take 1 tablet (8 mg total) by mouth every 8 (eight) hours as needed.    pantoprazole (PROTONIX) 40 MG tablet Take 40 mg by mouth once daily.    potassium chloride SA (K-DUR,KLOR-CON) 20 MEQ tablet Take 1 tablet (20 mEq total) by mouth 2 (two) times daily.    pravastatin (PRAVACHOL) 40 MG tablet Take 40 mg by mouth once daily.    riociguat (ADEMPAS) 1 mg Tab Take 1 tablet (1 mg total) by mouth 3 (three) times daily.    sodium chloride 0.9% 0.9 % SolP 100 mL with treprostinil 1 mg/mL Soln 6,000,000 ng Inject 2,380 ng/min into the vein continuous.    spironolactone (ALDACTONE) 100 MG tablet Take 1 tablet (100 mg total) by mouth once daily.    umeclidinium 62.5 mcg/actuation DsDv Inhale 62.5 mcg into the lungs once daily. Controller     Antibiotics     Start     Stop Route Frequency Ordered    06/02/18 1200  clindamycin 600 MG/50 ML D5W 600 mg/50 mL IVPB 600 mg      -- IV Every 8 hours (non-standard times) 06/02/18 0949    06/02/18 0930  vancomycin (VANCOCIN) 1,250 mg in sodium chloride 0.9% 250 mL IVPB  (Vancomycin IVPB with levels panel)      -- IV Every 24 hours (non-standard times) 06/02/18 0806        Antifungals     None        Antivirals     None           There is no immunization history for the selected administration  types on file for this patient.    Family History     Problem Relation (Age of Onset)    Cancer Mother, Sister    Hypertension Mother, Father        Social History     Social History    Marital status:      Spouse name: N/A    Number of children: N/A    Years of education: N/A     Social History Main Topics    Smoking status: Former Smoker     Types: Cigarettes     Start date: 1/1/1979     Quit date: 1/5/1997    Smokeless tobacco: Never Used    Alcohol use No    Drug use: No    Sexual activity: No     Other Topics Concern    None     Social History Narrative    None     Review of Systems   Constitutional: Negative for chills and fever.   Respiratory: Positive for shortness of breath.    Cardiovascular: Positive for leg swelling.   Skin: Positive for color change, rash and wound.   All other systems reviewed and are negative.    Objective:     Vital Signs (Most Recent):  Temp: 98 °F (36.7 °C) (06/06/18 1150)  Pulse: 74 (06/06/18 1150)  Resp: 20 (06/06/18 1150)  BP: (!) 98/56 (06/06/18 1150)  SpO2: (!) 91 % (06/06/18 1150) Vital Signs (24h Range):  Temp:  [97 °F (36.1 °C)-99.1 °F (37.3 °C)] 98 °F (36.7 °C)  Pulse:  [73-93] 74  Resp:  [16-21] 20  SpO2:  [90 %-95 %] 91 %  BP: ()/(52-62) 98/56     Weight: 91.9 kg (202 lb 9.6 oz)  Body mass index is 37.06 kg/m².    Estimated Creatinine Clearance: 44.2 mL/min (A) (based on SCr of 1.5 mg/dL (H)).    Physical Exam   Constitutional: She is oriented to person, place, and time. She appears well-developed and well-nourished. No distress.   HENT:   Head: Normocephalic and atraumatic.   Right Ear: External ear normal.   Left Ear: External ear normal.   Eyes: EOM are normal. Pupils are equal, round, and reactive to light.   Neck: Normal range of motion. Neck supple.   Cardiovascular: Normal rate and regular rhythm.    Murmur heard.  Pulmonary/Chest: Effort normal and breath sounds normal.   Musculoskeletal: Normal range of motion. She exhibits edema,  tenderness and deformity.   Bilateral leg erythema, R>L.    Neurological: She is alert and oriented to person, place, and time. No cranial nerve deficit.   Skin: Skin is warm and dry. She is not diaphoretic. There is erythema.   Psychiatric: She has a normal mood and affect. Her behavior is normal. Thought content normal.   Nursing note and vitals reviewed.      Significant Labs:   Blood Culture: No results for input(s): LABBLOO in the last 4320 hours.  CBC:   Recent Labs  Lab 06/05/18  0653 06/06/18  0500   WBC 5.04 4.96   HGB 11.8* 11.3*   HCT 37.9 36.4*   * 118*     Wound Culture: No results for input(s): LABAERO in the last 4320 hours.    Significant Imaging: I have reviewed all pertinent imaging results/findings within the past 24 hours.

## 2018-06-06 NOTE — ASSESSMENT & PLAN NOTE
- bilateral cellulitis is fairly unusual  - more likely venous stasis dermatitis rather than infection  - will change to po doxycycline x 7 days  - she would benefit from better edema control in the longrun

## 2018-06-06 NOTE — PHYSICIAN QUERY
PT Name: Sue Smith  MR #: [id number]  Physician Query Form - Renal Clarification     CDS/: Angela Saunders RN, CCDS              Contact information: [email or web address]    This form is a permanent document in the medical record.     QueryDate: June 6, 2018    By submitting this query, we are merely seeking further clarification of documentation. Please utilize your independent clinical judgment when addressing the question(s) below.    The Medical record contains the following:   Indicator Supporting Clinical Findings Location in Medical Record    Kidney (Renal) Insufficiency      Kidney (Renal) Failure / Injury      Nephrotoxic Agents     X BUN/Creatinine GFR Cr 2.1->1.7->1.6->1.4->1.6->1.5  gfr 25.7->33.2->35.8->42.0->  35.8-->38.7 6/2-6/6 lab    Urine: Casts         Eosinophils      Dehydration      Nausea/Vomiting      Dialysis/CRRT      Treatment:     X Other:  Add IV vancomycin q24h given renal function 6/2 h/p       Provider, please specify the diagnosis or diagnoses associated with above clinical findings.          [ x] Acute on Chronic Renal Insufficiency (please specify stage*): _____________  [ ] Chronic Renal Insufficiency (please specify stage*): _____________  [ ] Chronic Kidney Disease (CKD) (please specify stage* below):      *[employer] Definitions:   Stage Description      eGFR (mL/min)      [ ] II  Mildly reduced kidney function     60-89   [ ] III  Moderately reduced kidney function    30-59          [ ] Other (please specify): _______________________________  [ ] Clinically Undetermined    Please document in your progress notes daily for the duration of treatment, until resolved, and include in your discharge summary.

## 2018-06-06 NOTE — SUBJECTIVE & OBJECTIVE
Interval History:   More undocumented UOP's in the last 24 hrs. Pt is not able to catch urine when she has BMs  Noted improvement on lasix 20 mg/hr.   Consulted ID for anti-biotic treatment      Review of Systems   All other systems reviewed and are negative.    Objective:     Vital Signs (Most Recent):  Temp: 97 °F (36.1 °C) (06/06/18 0733)  Pulse: 77 (06/06/18 1100)  Resp: 20 (06/06/18 0923)  BP: 115/62 (06/06/18 0733)  SpO2: 95 % (06/06/18 0733) Vital Signs (24h Range):  Temp:  [97 °F (36.1 °C)-99.1 °F (37.3 °C)] 97 °F (36.1 °C)  Pulse:  [73-93] 77  Resp:  [16-21] 20  SpO2:  [90 %-95 %] 95 %  BP: ()/(52-62) 115/62     Patient Vitals for the past 72 hrs (Last 3 readings):   Weight   06/06/18 0415 91.9 kg (202 lb 9.6 oz)   06/05/18 0500 94 kg (207 lb 3.7 oz)   06/04/18 0615 94.4 kg (208 lb 1.8 oz)     Body mass index is 37.06 kg/m².      Intake/Output Summary (Last 24 hours) at 06/06/18 1103  Last data filed at 06/06/18 1057   Gross per 24 hour   Intake          1849.67 ml   Output             1100 ml   Net           749.67 ml       Physical Exam   Constitutional: She is oriented to person, place, and time. She appears well-nourished. No distress.   HENT:   Head: Atraumatic.   Eyes: EOM are normal. No scleral icterus.   Neck: Neck supple. Hepatojugular reflux and JVD present.   Cardiovascular: Normal rate and regular rhythm.  Exam reveals no gallop and no friction rub.    No murmur heard.  Pulmonary/Chest: Effort normal and breath sounds normal. No respiratory distress. She has no wheezes. She has no rales.   Abdominal: Soft. Bowel sounds are normal. She exhibits distension. There is no tenderness. There is no guarding.   Musculoskeletal: She exhibits edema (bilateral 1+ LE).   Neurological: She is alert and oriented to person, place, and time.   Skin: Skin is warm and dry. Capillary refill takes less than 2 seconds. There is erythema (bilateral anterior LE).   Erythema of bilateral anterior shins  significantly improved. R anterior shin incision with sutures in place, no significant drainage   Psychiatric: She has a normal mood and affect.       Significant Labs:  CBC:    Recent Labs  Lab 06/04/18  0520 06/05/18  0653 06/06/18  0500   WBC 5.13 5.04 4.96   RBC 4.41 4.47 4.30   HGB 11.7* 11.8* 11.3*   HCT 36.9* 37.9 36.4*   * 125* 118*   MCV 84 85 85   MCH 26.5* 26.4* 26.3*   MCHC 31.7* 31.1* 31.0*     BNP:    Recent Labs  Lab 06/02/18  0457   *     CMP:    Recent Labs  Lab 06/04/18  0520 06/05/18  0653 06/05/18  1524 06/06/18  0500   GLU 90 89 184* 87   CALCIUM 9.1 9.3 9.4 9.6   ALBUMIN 3.9 3.9  --  4.0   PROT 6.6 6.6  --  6.7    137 137 139   K 3.5 3.9 3.7 4.0   CO2 22* 28 25 27    101 102 101   BUN 24* 20 20 18   CREATININE 1.6* 1.4 1.6* 1.5*   ALKPHOS 200* 200*  --  196*   ALT 10 8*  --  11   AST 15 15  --  16   BILITOT 0.6 0.6  --  0.6      Coagulation:     Recent Labs  Lab 06/04/18  0520 06/05/18  0653 06/06/18  0500   INR 1.1 1.1 1.1     LDH:  No results for input(s): LDH in the last 72 hours.  Microbiology:  Microbiology Results (last 7 days)     Procedure Component Value Units Date/Time    Blood culture [[id number]]     Order Status:  Sent Specimen:  Blood     Blood culture [[id number]]     Order Status:  Sent Specimen:  Blood           I have reviewed all pertinent labs within the past 24 hours.

## 2018-06-06 NOTE — PLAN OF CARE
Problem: Patient Care Overview  Goal: Plan of Care Review  Outcome: Ongoing (interventions implemented as appropriate)  Patient acknowledges understanding with pain scale. Appropriate pain medications can be dispensed. Non slip footwear in place. Bed lowered. Rails up x 2. Call bell in easy reach. Pt is afebrile. Hand washing per nursing guidelines. Patient is independent. Pt encouraged to turn frequently. Unable to measure output due to also having bms at same time. Increased lasix to 20mg/hr. Rechecked bmp at 1500, potassium decreased, kdur given . cellulitis improving in ble.

## 2018-06-07 LAB
ALBUMIN SERPL BCP-MCNC: 3.8 G/DL
ALP SERPL-CCNC: 184 U/L
ALT SERPL W/O P-5'-P-CCNC: 11 U/L
ANION GAP SERPL CALC-SCNC: 10 MMOL/L
AST SERPL-CCNC: 16 U/L
BASOPHILS # BLD AUTO: 0.02 K/UL
BASOPHILS NFR BLD: 0.4 %
BILIRUB SERPL-MCNC: 0.7 MG/DL
BUN SERPL-MCNC: 18 MG/DL
CALCIUM SERPL-MCNC: 9.2 MG/DL
CHLORIDE SERPL-SCNC: 99 MMOL/L
CO2 SERPL-SCNC: 27 MMOL/L
CREAT SERPL-MCNC: 1.4 MG/DL
DIFFERENTIAL METHOD: ABNORMAL
EOSINOPHIL # BLD AUTO: 0.1 K/UL
EOSINOPHIL NFR BLD: 2.6 %
ERYTHROCYTE [DISTWIDTH] IN BLOOD BY AUTOMATED COUNT: 18.1 %
EST. GFR  (AFRICAN AMERICAN): 48.5 ML/MIN/1.73 M^2
EST. GFR  (NON AFRICAN AMERICAN): 42 ML/MIN/1.73 M^2
GLUCOSE SERPL-MCNC: 83 MG/DL
HCT VFR BLD AUTO: 35.9 %
HGB BLD-MCNC: 11 G/DL
IMM GRANULOCYTES # BLD AUTO: 0.02 K/UL
IMM GRANULOCYTES NFR BLD AUTO: 0.4 %
INR PPP: 1.1
LYMPHOCYTES # BLD AUTO: 0.6 K/UL
LYMPHOCYTES NFR BLD: 11.5 %
MAGNESIUM SERPL-MCNC: 2.2 MG/DL
MCH RBC QN AUTO: 26 PG
MCHC RBC AUTO-ENTMCNC: 30.6 G/DL
MCV RBC AUTO: 85 FL
MONOCYTES # BLD AUTO: 0.4 K/UL
MONOCYTES NFR BLD: 8 %
NEUTROPHILS # BLD AUTO: 3.9 K/UL
NEUTROPHILS NFR BLD: 77.1 %
NRBC BLD-RTO: 0 /100 WBC
PLATELET # BLD AUTO: 110 K/UL
PMV BLD AUTO: 9.7 FL
POTASSIUM SERPL-SCNC: 3.6 MMOL/L
PROT SERPL-MCNC: 6.4 G/DL
PROTHROMBIN TIME: 11.5 SEC
RBC # BLD AUTO: 4.23 M/UL
SODIUM SERPL-SCNC: 136 MMOL/L
WBC # BLD AUTO: 5.03 K/UL

## 2018-06-07 PROCEDURE — 94761 N-INVAS EAR/PLS OXIMETRY MLT: CPT | Mod: NTX

## 2018-06-07 PROCEDURE — 85610 PROTHROMBIN TIME: CPT | Mod: NTX

## 2018-06-07 PROCEDURE — 25000003 PHARM REV CODE 250: Mod: NTX | Performed by: INTERNAL MEDICINE

## 2018-06-07 PROCEDURE — 36415 COLL VENOUS BLD VENIPUNCTURE: CPT | Mod: NTX

## 2018-06-07 PROCEDURE — 63600175 PHARM REV CODE 636 W HCPCS: Mod: NTX | Performed by: PHYSICIAN ASSISTANT

## 2018-06-07 PROCEDURE — 25000003 PHARM REV CODE 250: Mod: NTX | Performed by: PHYSICIAN ASSISTANT

## 2018-06-07 PROCEDURE — A4216 STERILE WATER/SALINE, 10 ML: HCPCS | Mod: NTX | Performed by: STUDENT IN AN ORGANIZED HEALTH CARE EDUCATION/TRAINING PROGRAM

## 2018-06-07 PROCEDURE — 80053 COMPREHEN METABOLIC PANEL: CPT | Mod: NTX

## 2018-06-07 PROCEDURE — 85025 COMPLETE CBC W/AUTO DIFF WBC: CPT | Mod: NTX

## 2018-06-07 PROCEDURE — 99900035 HC TECH TIME PER 15 MIN (STAT): Mod: NTX

## 2018-06-07 PROCEDURE — 94660 CPAP INITIATION&MGMT: CPT | Mod: NTX

## 2018-06-07 PROCEDURE — 63600175 PHARM REV CODE 636 W HCPCS: Mod: JG,NTX | Performed by: INTERNAL MEDICINE

## 2018-06-07 PROCEDURE — 63600175 PHARM REV CODE 636 W HCPCS: Mod: NTX | Performed by: STUDENT IN AN ORGANIZED HEALTH CARE EDUCATION/TRAINING PROGRAM

## 2018-06-07 PROCEDURE — 83735 ASSAY OF MAGNESIUM: CPT | Mod: NTX

## 2018-06-07 PROCEDURE — 20600001 HC STEP DOWN PRIVATE ROOM: Mod: NTX

## 2018-06-07 PROCEDURE — 99233 SBSQ HOSP IP/OBS HIGH 50: CPT | Mod: NTX,,, | Performed by: INTERNAL MEDICINE

## 2018-06-07 PROCEDURE — 25000003 PHARM REV CODE 250: Mod: NTX | Performed by: STUDENT IN AN ORGANIZED HEALTH CARE EDUCATION/TRAINING PROGRAM

## 2018-06-07 RX ADMIN — LAMOTRIGINE 100 MG: 100 TABLET ORAL at 09:06

## 2018-06-07 RX ADMIN — POTASSIUM CHLORIDE 40 MEQ: 1500 TABLET, EXTENDED RELEASE ORAL at 09:06

## 2018-06-07 RX ADMIN — RIOCIGUAT 1 MG: 1 TABLET, FILM COATED ORAL at 09:06

## 2018-06-07 RX ADMIN — FLUTICASONE FUROATE AND VILANTEROL TRIFENATATE 1 PUFF: 100; 25 POWDER RESPIRATORY (INHALATION) at 09:06

## 2018-06-07 RX ADMIN — LURASIDONE HYDROCHLORIDE 40 MG: 40 TABLET, FILM COATED ORAL at 09:06

## 2018-06-07 RX ADMIN — BUSPIRONE HYDROCHLORIDE 15 MG: 5 TABLET ORAL at 09:06

## 2018-06-07 RX ADMIN — SPIRONOLACTONE 100 MG: 50 TABLET ORAL at 09:06

## 2018-06-07 RX ADMIN — Medication 400 MG: at 09:06

## 2018-06-07 RX ADMIN — DOXYCYCLINE HYCLATE 100 MG: 100 TABLET, COATED ORAL at 09:06

## 2018-06-07 RX ADMIN — FUROSEMIDE 20 MG/HR: 10 INJECTION, SOLUTION INTRAMUSCULAR; INTRAVENOUS at 06:06

## 2018-06-07 RX ADMIN — GABAPENTIN 100 MG: 100 CAPSULE ORAL at 09:06

## 2018-06-07 RX ADMIN — DESVENLAFAXINE SUCCINATE 100 MG: 50 TABLET, FILM COATED, EXTENDED RELEASE ORAL at 09:06

## 2018-06-07 RX ADMIN — HYDROCODONE BITARTRATE AND ACETAMINOPHEN 1 TABLET: 10; 325 TABLET ORAL at 07:06

## 2018-06-07 RX ADMIN — Medication 3 ML: at 06:06

## 2018-06-07 RX ADMIN — GABAPENTIN 100 MG: 100 CAPSULE ORAL at 03:06

## 2018-06-07 RX ADMIN — Medication 3 ML: at 10:06

## 2018-06-07 RX ADMIN — TREPROSTINIL 75 NG/KG/MIN: 200 INJECTION, SOLUTION INTRAVENOUS; SUBCUTANEOUS at 03:06

## 2018-06-07 RX ADMIN — PANTOPRAZOLE SODIUM 40 MG: 40 TABLET, DELAYED RELEASE ORAL at 09:06

## 2018-06-07 RX ADMIN — ALPRAZOLAM 0.5 MG: 0.5 TABLET ORAL at 09:06

## 2018-06-07 RX ADMIN — AMLODIPINE BESYLATE 5 MG: 5 TABLET ORAL at 09:06

## 2018-06-07 RX ADMIN — RIOCIGUAT 1 MG: 1 TABLET, FILM COATED ORAL at 03:06

## 2018-06-07 RX ADMIN — HYDROCODONE BITARTRATE AND ACETAMINOPHEN 1 TABLET: 10; 325 TABLET ORAL at 08:06

## 2018-06-07 RX ADMIN — PRAVASTATIN SODIUM 40 MG: 20 TABLET ORAL at 09:06

## 2018-06-07 RX ADMIN — LEVOTHYROXINE SODIUM 75 MCG: 25 TABLET ORAL at 04:06

## 2018-06-07 RX ADMIN — BUSPIRONE HYDROCHLORIDE 15 MG: 5 TABLET ORAL at 03:06

## 2018-06-07 RX ADMIN — ENOXAPARIN SODIUM 40 MG: 100 INJECTION SUBCUTANEOUS at 06:06

## 2018-06-07 RX ADMIN — POTASSIUM CHLORIDE 40 MEQ: 1500 TABLET, EXTENDED RELEASE ORAL at 03:06

## 2018-06-07 NOTE — SUBJECTIVE & OBJECTIVE
Interval History:   More undocumented UOP's in the last 24 hrs. Pt is not able to catch urine when she has BMs  Noted improvement on lasix 20 mg/hr. Will continue for another 24 hrs and move to IV  Pushes possibly tomorrow.   On PO doxy for 7 days. Likely venous stasis.      Review of Systems   All other systems reviewed and are negative.    Objective:     Vital Signs (Most Recent):  Temp: 98 °F (36.7 °C) (06/07/18 0715)  Pulse: 86 (06/07/18 1038)  Resp: (!) 22 (06/07/18 0906)  BP: (!) 110/55 (06/07/18 0715)  SpO2: (!) 94 % (06/07/18 0715) Vital Signs (24h Range):  Temp:  [97.6 °F (36.4 °C)-98.5 °F (36.9 °C)] 98 °F (36.7 °C)  Pulse:  [74-95] 86  Resp:  [16-24] 22  SpO2:  [91 %-94 %] 94 %  BP: ()/(50-58) 110/55     Patient Vitals for the past 72 hrs (Last 3 readings):   Weight   06/07/18 0400 93.2 kg (205 lb 7.5 oz)   06/06/18 0415 91.9 kg (202 lb 9.6 oz)   06/05/18 0500 94 kg (207 lb 3.7 oz)     Body mass index is 37.58 kg/m².      Intake/Output Summary (Last 24 hours) at 06/07/18 1132  Last data filed at 06/07/18 0600   Gross per 24 hour   Intake           1297.5 ml   Output                0 ml   Net           1297.5 ml       Physical Exam   Constitutional: She is oriented to person, place, and time. She appears well-nourished. No distress.   HENT:   Head: Atraumatic.   Eyes: EOM are normal. No scleral icterus.   Neck: Neck supple. Hepatojugular reflux and JVD present.   Cardiovascular: Normal rate and regular rhythm.  Exam reveals no gallop and no friction rub.    No murmur heard.  Pulmonary/Chest: Effort normal and breath sounds normal. No respiratory distress. She has no wheezes. She has no rales.   Abdominal: Soft. Bowel sounds are normal. She exhibits distension. There is no tenderness. There is no guarding.   Musculoskeletal: She exhibits edema (bilateral 1+ LE).   Neurological: She is alert and oriented to person, place, and time.   Skin: Skin is warm and dry. Capillary refill takes less than 2  seconds. There is erythema (bilateral anterior LE).   Erythema of bilateral anterior shins significantly improved. R anterior shin incision with sutures in place, no significant drainage   Psychiatric: She has a normal mood and affect.       Significant Labs:  CBC:    Recent Labs  Lab 06/05/18  0653 06/06/18  0500 06/07/18  0422   WBC 5.04 4.96 5.03   RBC 4.47 4.30 4.23   HGB 11.8* 11.3* 11.0*   HCT 37.9 36.4* 35.9*   * 118* 110*   MCV 85 85 85   MCH 26.4* 26.3* 26.0*   MCHC 31.1* 31.0* 30.6*     BNP:    Recent Labs  Lab 06/02/18  0457   *     CMP:    Recent Labs  Lab 06/05/18  0653 06/05/18  1524 06/06/18  0500 06/07/18  0422   GLU 89 184* 87 83   CALCIUM 9.3 9.4 9.6 9.2   ALBUMIN 3.9  --  4.0 3.8   PROT 6.6  --  6.7 6.4    137 139 136   K 3.9 3.7 4.0 3.6   CO2 28 25 27 27    102 101 99   BUN 20 20 18 18   CREATININE 1.4 1.6* 1.5* 1.4   ALKPHOS 200*  --  196* 184*   ALT 8*  --  11 11   AST 15  --  16 16   BILITOT 0.6  --  0.6 0.7      Coagulation:     Recent Labs  Lab 06/05/18  0653 06/06/18  0500 06/07/18  0422   INR 1.1 1.1 1.1     LDH:  No results for input(s): LDH in the last 72 hours.  Microbiology:  Microbiology Results (last 7 days)     Procedure Component Value Units Date/Time    Blood culture [[id number]] Collected:  06/06/18 1230    Order Status:  Completed Specimen:  Blood Updated:  06/06/18 1915     Blood Culture, Routine No Growth to date    Blood culture [[id number]] Collected:  06/06/18 1230    Order Status:  Completed Specimen:  Blood Updated:  06/06/18 1915     Blood Culture, Routine No Growth to date          I have reviewed all pertinent labs within the past 24 hours.

## 2018-06-07 NOTE — PLAN OF CARE
Problem: Patient Care Overview  Goal: Plan of Care Review  Outcome: Ongoing (interventions implemented as appropriate)  Pt has call bell in reach, non slip socks on, and bedrails up x2. Pt encouraged to wash hands. Pt on lasix gtt with strict I&Os.

## 2018-06-07 NOTE — PROGRESS NOTES
"UPDATE    SW to pt's room this morning for update.  Pt presents as sitting up in bedside chair, aao x4, calm, cooperative, and asking and answering questions appropriately.  Pt reports plan from doctors is for possible d/c to home on [place] 6/10.  Pt reports Sunday d/c will work well for her for transportation reasons.  Pt reports her dgtr will transport her home and dgtr works during the week, so it is easier for dgtr to provide transportation on the weekend.  Pt reports she has portable O2 with her for ride home.  Pt requesting to resume HH with [facility] at d/c; SW will f/u with HTS team for resume HH orders.    Pt reports coping adequately at this time.  Pt states "it is hard" at home at times, as she is having increasing difficulty with household chores due to progression of her illness.  Pt states she does not have much support from family to assist with this.  SW advised pt that her local [facility] may have programming that can assist with this.  SW will f/u to provide pt with contact info for her local [facility]  SW providing active listening and general support to pt today.  Pt denies any needs or concerns to SW at this time.  SW providing ongoing psychosocial and counseling support, education, resources, assistance, and discharge planning as indicated.  SW following and remains available.  "

## 2018-06-07 NOTE — ASSESSMENT & PLAN NOTE
-RLE with cellulitis precipitated by trauma at home  -S/p I&D with stitches in place (will discuss with general surgery about removing stiches)  -discontinued IV abx. On po doxy for 7 days currently.  -Surgery consulted, appreciate recs

## 2018-06-07 NOTE — PROGRESS NOTES
[facility]  Heart Transplant  Progress Note    Patient Name: Sue Smith  MRN: [id number]  Admission Date: 6/2/2018  Hospital Length of Stay: 5 days  Attending Physician: Maty Bosch MD  Primary Care Provider: Paddy Guerrier DO  Principal Problem:<principal problem not specified>    Subjective:     Interval History:   More undocumented UOP's in the last 24 hrs. Pt is not able to catch urine when she has BMs  Noted improvement on lasix 20 mg/hr. Will continue for another 24 hrs and move to IV  Pushes possibly tomorrow.   On PO doxy for 7 days. Likely venous stasis.      Review of Systems   All other systems reviewed and are negative.    Objective:     Vital Signs (Most Recent):  Temp: 98 °F (36.7 °C) (06/07/18 0715)  Pulse: 86 (06/07/18 1038)  Resp: (!) 22 (06/07/18 0906)  BP: (!) 110/55 (06/07/18 0715)  SpO2: (!) 94 % (06/07/18 0715) Vital Signs (24h Range):  Temp:  [97.6 °F (36.4 °C)-98.5 °F (36.9 °C)] 98 °F (36.7 °C)  Pulse:  [74-95] 86  Resp:  [16-24] 22  SpO2:  [91 %-94 %] 94 %  BP: ()/(50-58) 110/55     Patient Vitals for the past 72 hrs (Last 3 readings):   Weight   06/07/18 0400 93.2 kg (205 lb 7.5 oz)   06/06/18 0415 91.9 kg (202 lb 9.6 oz)   06/05/18 0500 94 kg (207 lb 3.7 oz)     Body mass index is 37.58 kg/m².      Intake/Output Summary (Last 24 hours) at 06/07/18 1132  Last data filed at 06/07/18 0600   Gross per 24 hour   Intake           1297.5 ml   Output                0 ml   Net           1297.5 ml       Physical Exam   Constitutional: She is oriented to person, place, and time. She appears well-nourished. No distress.   HENT:   Head: Atraumatic.   Eyes: EOM are normal. No scleral icterus.   Neck: Neck supple. Hepatojugular reflux and JVD present.   Cardiovascular: Normal rate and regular rhythm.  Exam reveals no gallop and no friction rub.    No murmur heard.  Pulmonary/Chest: Effort normal and breath sounds normal. No respiratory distress. She has no wheezes. She has no  rales.   Abdominal: Soft. Bowel sounds are normal. She exhibits distension. There is no tenderness. There is no guarding.   Musculoskeletal: She exhibits edema (bilateral 1+ LE).   Neurological: She is alert and oriented to person, place, and time.   Skin: Skin is warm and dry. Capillary refill takes less than 2 seconds. There is erythema (bilateral anterior LE).   Erythema of bilateral anterior shins significantly improved. R anterior shin incision with sutures in place, no significant drainage   Psychiatric: She has a normal mood and affect.       Significant Labs:  CBC:    Recent Labs  Lab 06/05/18  0653 06/06/18  0500 06/07/18  0422   WBC 5.04 4.96 5.03   RBC 4.47 4.30 4.23   HGB 11.8* 11.3* 11.0*   HCT 37.9 36.4* 35.9*   * 118* 110*   MCV 85 85 85   MCH 26.4* 26.3* 26.0*   MCHC 31.1* 31.0* 30.6*     BNP:    Recent Labs  Lab 06/02/18  0457   *     CMP:    Recent Labs  Lab 06/05/18  0653 06/05/18  1524 06/06/18  0500 06/07/18  0422   GLU 89 184* 87 83   CALCIUM 9.3 9.4 9.6 9.2   ALBUMIN 3.9  --  4.0 3.8   PROT 6.6  --  6.7 6.4    137 139 136   K 3.9 3.7 4.0 3.6   CO2 28 25 27 27    102 101 99   BUN 20 20 18 18   CREATININE 1.4 1.6* 1.5* 1.4   ALKPHOS 200*  --  196* 184*   ALT 8*  --  11 11   AST 15  --  16 16   BILITOT 0.6  --  0.6 0.7      Coagulation:     Recent Labs  Lab 06/05/18  0653 06/06/18  0500 06/07/18  0422   INR 1.1 1.1 1.1     LDH:  No results for input(s): LDH in the last 72 hours.  Microbiology:  Microbiology Results (last 7 days)     Procedure Component Value Units Date/Time    Blood culture [[id number]] Collected:  06/06/18 1230    Order Status:  Completed Specimen:  Blood Updated:  06/06/18 1915     Blood Culture, Routine No Growth to date    Blood culture [[id number]] Collected:  06/06/18 1230    Order Status:  Completed Specimen:  Blood Updated:  06/06/18 1915     Blood Culture, Routine No Growth to date          I have reviewed all pertinent labs within the past 24  hours.    Assessment and Plan:     Ms. Smith is a 56 yo woman with hx of PHTN WHO Group 1 on IV remodulin, chronic hypoxic respiratory, chronic RV failure on home O2 transferred from [facility] ED with progressive shortness of breath over the last 3-4 days. She presented there on 5/30 to the ED having been sent by her PCP for evaluation of a non-healing traumatic wound on her R shin. She was noted to have increased WOB and increased abdominal swelling and given 1 dose of IV lasix. She was also started on clindamycin. She had recently been discharged from admission with ADHF 4/5-4/22 during which she underwent IV diuresis and uptitration of her home remodulin from 60 to 75 ng. She reports weight and breathing were stable until several days ago. On 5/29, telephone note reports 3 lb wt gain and her HH IV lasix was planned to restart on 5/30 which she ended up getting while at ED for above wound. Her symptoms continued to worsen with increased work of breathing, abdominal girth, and orthopnea and she re-presented tonight.     Lower extremity cellulitis    -RLE with cellulitis precipitated by trauma at home  -S/p I&D with stitches in place (will discuss with general surgery about removing stiches)  -discontinued IV abx. On po doxy for 7 days currently.  -Surgery consulted, appreciate recs        Acute on chronic diastolic heart failure    -Acute on chronic Diastolic and R HF  -On lasix 20 mg/hr today  -Continue spironolactone 100 daily  -Strict I&Os        Gastroesophageal reflux disease    Continue PPI        Bipolar affective disorder    Continue home buspirone, desvenlafaxine, lamictal, latuda        Essential hypertension    -Continue amlodipine 5        Hypothyroidism    Continue synthroid        Chronic respiratory failure with hypoxia    -Stable on NC  -goal SpO2>88  -Bipap QHS  -Continue inhaled therapies          Pulmonary hypertension, group 1    -Continue remodulin at 75 ng/kg/min (5 mg/mL vial, 0.2  mg/mL concentration, dosing weight 85kg, 46mL per 24 hr; confirmed on home dosing sheet and by pharmacy)  -Continue adempas 1 mg tid  -O2 JASON Costello MD  Heart Transplant  [facility]

## 2018-06-08 LAB
ALBUMIN SERPL BCP-MCNC: 3.7 G/DL
ALP SERPL-CCNC: 187 U/L
ALT SERPL W/O P-5'-P-CCNC: 11 U/L
ANION GAP SERPL CALC-SCNC: 10 MMOL/L
AST SERPL-CCNC: 16 U/L
BASOPHILS # BLD AUTO: 0.01 K/UL
BASOPHILS NFR BLD: 0.2 %
BILIRUB SERPL-MCNC: 0.7 MG/DL
BUN SERPL-MCNC: 18 MG/DL
CALCIUM SERPL-MCNC: 9.2 MG/DL
CHLORIDE SERPL-SCNC: 101 MMOL/L
CO2 SERPL-SCNC: 27 MMOL/L
CREAT SERPL-MCNC: 1.5 MG/DL
DIFFERENTIAL METHOD: ABNORMAL
EOSINOPHIL # BLD AUTO: 0.2 K/UL
EOSINOPHIL NFR BLD: 3.8 %
ERYTHROCYTE [DISTWIDTH] IN BLOOD BY AUTOMATED COUNT: 18 %
EST. GFR  (AFRICAN AMERICAN): 44.6 ML/MIN/1.73 M^2
EST. GFR  (NON AFRICAN AMERICAN): 38.7 ML/MIN/1.73 M^2
GLUCOSE SERPL-MCNC: 80 MG/DL
HCT VFR BLD AUTO: 36.6 %
HGB BLD-MCNC: 11.5 G/DL
IMM GRANULOCYTES # BLD AUTO: 0.02 K/UL
IMM GRANULOCYTES NFR BLD AUTO: 0.5 %
INR PPP: 1.1
LYMPHOCYTES # BLD AUTO: 0.6 K/UL
LYMPHOCYTES NFR BLD: 13.9 %
MAGNESIUM SERPL-MCNC: 2.1 MG/DL
MCH RBC QN AUTO: 26.3 PG
MCHC RBC AUTO-ENTMCNC: 31.4 G/DL
MCV RBC AUTO: 84 FL
MONOCYTES # BLD AUTO: 0.3 K/UL
MONOCYTES NFR BLD: 7.3 %
NEUTROPHILS # BLD AUTO: 3.2 K/UL
NEUTROPHILS NFR BLD: 74.3 %
NRBC BLD-RTO: 0 /100 WBC
PLATELET # BLD AUTO: 107 K/UL
PMV BLD AUTO: 11.1 FL
POTASSIUM SERPL-SCNC: 3.7 MMOL/L
PROT SERPL-MCNC: 6.2 G/DL
PROTHROMBIN TIME: 11.3 SEC
RBC # BLD AUTO: 4.37 M/UL
SODIUM SERPL-SCNC: 138 MMOL/L
WBC # BLD AUTO: 4.24 K/UL

## 2018-06-08 PROCEDURE — 36415 COLL VENOUS BLD VENIPUNCTURE: CPT | Mod: NTX

## 2018-06-08 PROCEDURE — 25000003 PHARM REV CODE 250: Mod: NTX | Performed by: PHYSICIAN ASSISTANT

## 2018-06-08 PROCEDURE — 20600001 HC STEP DOWN PRIVATE ROOM: Mod: NTX

## 2018-06-08 PROCEDURE — 94761 N-INVAS EAR/PLS OXIMETRY MLT: CPT | Mod: NTX

## 2018-06-08 PROCEDURE — 63600175 PHARM REV CODE 636 W HCPCS: Mod: NTX | Performed by: STUDENT IN AN ORGANIZED HEALTH CARE EDUCATION/TRAINING PROGRAM

## 2018-06-08 PROCEDURE — 99900035 HC TECH TIME PER 15 MIN (STAT): Mod: NTX

## 2018-06-08 PROCEDURE — 25000003 PHARM REV CODE 250: Mod: NTX | Performed by: STUDENT IN AN ORGANIZED HEALTH CARE EDUCATION/TRAINING PROGRAM

## 2018-06-08 PROCEDURE — 83735 ASSAY OF MAGNESIUM: CPT | Mod: NTX

## 2018-06-08 PROCEDURE — 85610 PROTHROMBIN TIME: CPT | Mod: NTX

## 2018-06-08 PROCEDURE — 25000003 PHARM REV CODE 250: Mod: NTX | Performed by: INTERNAL MEDICINE

## 2018-06-08 PROCEDURE — 85025 COMPLETE CBC W/AUTO DIFF WBC: CPT | Mod: NTX

## 2018-06-08 PROCEDURE — 27000221 HC OXYGEN, UP TO 24 HOURS: Mod: NTX

## 2018-06-08 PROCEDURE — A4216 STERILE WATER/SALINE, 10 ML: HCPCS | Mod: NTX | Performed by: STUDENT IN AN ORGANIZED HEALTH CARE EDUCATION/TRAINING PROGRAM

## 2018-06-08 PROCEDURE — 80053 COMPREHEN METABOLIC PANEL: CPT | Mod: NTX

## 2018-06-08 PROCEDURE — 99233 SBSQ HOSP IP/OBS HIGH 50: CPT | Mod: NTX,,, | Performed by: INTERNAL MEDICINE

## 2018-06-08 PROCEDURE — 63600175 PHARM REV CODE 636 W HCPCS: Mod: JG,NTX | Performed by: INTERNAL MEDICINE

## 2018-06-08 PROCEDURE — 63600175 PHARM REV CODE 636 W HCPCS: Mod: NTX | Performed by: PHYSICIAN ASSISTANT

## 2018-06-08 PROCEDURE — 94660 CPAP INITIATION&MGMT: CPT | Mod: NTX

## 2018-06-08 RX ORDER — FUROSEMIDE 10 MG/ML
80 INJECTION INTRAMUSCULAR; INTRAVENOUS 3 TIMES DAILY
Status: DISCONTINUED | OUTPATIENT
Start: 2018-06-08 | End: 2018-06-09

## 2018-06-08 RX ADMIN — POTASSIUM CHLORIDE 40 MEQ: 1500 TABLET, EXTENDED RELEASE ORAL at 09:06

## 2018-06-08 RX ADMIN — SPIRONOLACTONE 100 MG: 50 TABLET ORAL at 09:06

## 2018-06-08 RX ADMIN — BUSPIRONE HYDROCHLORIDE 15 MG: 5 TABLET ORAL at 03:06

## 2018-06-08 RX ADMIN — DESVENLAFAXINE SUCCINATE 100 MG: 50 TABLET, FILM COATED, EXTENDED RELEASE ORAL at 09:06

## 2018-06-08 RX ADMIN — AMLODIPINE BESYLATE 5 MG: 5 TABLET ORAL at 09:06

## 2018-06-08 RX ADMIN — ENOXAPARIN SODIUM 40 MG: 100 INJECTION SUBCUTANEOUS at 05:06

## 2018-06-08 RX ADMIN — LURASIDONE HYDROCHLORIDE 40 MG: 40 TABLET, FILM COATED ORAL at 09:06

## 2018-06-08 RX ADMIN — LAMOTRIGINE 100 MG: 100 TABLET ORAL at 09:06

## 2018-06-08 RX ADMIN — LEVOTHYROXINE SODIUM 75 MCG: 25 TABLET ORAL at 05:06

## 2018-06-08 RX ADMIN — TREPROSTINIL 75 NG/KG/MIN: 200 INJECTION, SOLUTION INTRAVENOUS; SUBCUTANEOUS at 04:06

## 2018-06-08 RX ADMIN — HYDROCODONE BITARTRATE AND ACETAMINOPHEN 1 TABLET: 10; 325 TABLET ORAL at 07:06

## 2018-06-08 RX ADMIN — BUSPIRONE HYDROCHLORIDE 15 MG: 5 TABLET ORAL at 09:06

## 2018-06-08 RX ADMIN — Medication 400 MG: at 09:06

## 2018-06-08 RX ADMIN — PRAVASTATIN SODIUM 40 MG: 20 TABLET ORAL at 09:06

## 2018-06-08 RX ADMIN — GABAPENTIN 100 MG: 100 CAPSULE ORAL at 09:06

## 2018-06-08 RX ADMIN — RIOCIGUAT 1 MG: 1 TABLET, FILM COATED ORAL at 09:06

## 2018-06-08 RX ADMIN — RIOCIGUAT 1 MG: 1 TABLET, FILM COATED ORAL at 03:06

## 2018-06-08 RX ADMIN — Medication 3 ML: at 02:06

## 2018-06-08 RX ADMIN — FUROSEMIDE 20 MG/HR: 10 INJECTION, SOLUTION INTRAMUSCULAR; INTRAVENOUS at 04:06

## 2018-06-08 RX ADMIN — POTASSIUM CHLORIDE 40 MEQ: 1500 TABLET, EXTENDED RELEASE ORAL at 03:06

## 2018-06-08 RX ADMIN — Medication: at 02:06

## 2018-06-08 RX ADMIN — GABAPENTIN 100 MG: 100 CAPSULE ORAL at 03:06

## 2018-06-08 RX ADMIN — Medication 3 ML: at 10:06

## 2018-06-08 RX ADMIN — ALPRAZOLAM 0.5 MG: 0.5 TABLET ORAL at 07:06

## 2018-06-08 RX ADMIN — ALPRAZOLAM 0.5 MG: 0.5 TABLET ORAL at 11:06

## 2018-06-08 RX ADMIN — FLUTICASONE FUROATE AND VILANTEROL TRIFENATATE 1 PUFF: 100; 25 POWDER RESPIRATORY (INHALATION) at 09:06

## 2018-06-08 RX ADMIN — FUROSEMIDE 80 MG: 10 INJECTION, SOLUTION INTRAMUSCULAR; INTRAVENOUS at 03:06

## 2018-06-08 RX ADMIN — PANTOPRAZOLE SODIUM 40 MG: 40 TABLET, DELAYED RELEASE ORAL at 09:06

## 2018-06-08 RX ADMIN — FUROSEMIDE 80 MG: 10 INJECTION, SOLUTION INTRAMUSCULAR; INTRAVENOUS at 09:06

## 2018-06-08 RX ADMIN — DOXYCYCLINE HYCLATE 100 MG: 100 TABLET, COATED ORAL at 09:06

## 2018-06-08 NOTE — ASSESSMENT & PLAN NOTE
-RLE with cellulitis precipitated by trauma at home  -S/p I&D with stitches in place (will remove stiches if patient is present on Monday. Told patient to follow up with ED (who placed them if patient leaves sunday)  -discontinued IV abx. On po doxy for 7 days currently.   -Surgery consulted, appreciate recs

## 2018-06-08 NOTE — ASSESSMENT & PLAN NOTE
-Acute on chronic Diastolic and R HF  -On lasix 80 mg IV TID  -Continue spironolactone 100 daily  -Strict I&Os

## 2018-06-08 NOTE — PROGRESS NOTES
[facility]  Heart Transplant  Progress Note    Patient Name: Sue Smith  MRN: [id number]  Admission Date: 6/2/2018  Hospital Length of Stay: 6 days  Attending Physician: Maty Bosch MD  Primary Care Provider: Paddy Guerrier DO  Principal Problem:<principal problem not specified>    Subjective:     Interval History:   More undocumented UOP's in the last 24 hrs. Pt is not able to catch urine when she has BMs  Moved to IV pushes of lasix 80 mg TID today. May decrease over the weekend.   On PO doxy for 7 day course. Likely venous stasis.      Review of Systems   All other systems reviewed and are negative.    Objective:     Vital Signs (Most Recent):  Temp: 97.9 °F (36.6 °C) (06/08/18 1202)  Pulse: 81 (06/08/18 1202)  Resp: 18 (06/08/18 0935)  BP: (!) 125/58 (06/08/18 0425)  SpO2: (!) 94 % (06/08/18 1202) Vital Signs (24h Range):  Temp:  [96.9 °F (36.1 °C)-99 °F (37.2 °C)] 97.9 °F (36.6 °C)  Pulse:  [76-98] 81  Resp:  [16-23] 18  SpO2:  [92 %-94 %] 94 %  BP: (101-125)/(54-58) 125/58     Patient Vitals for the past 72 hrs (Last 3 readings):   Weight   06/08/18 0545 91.3 kg (201 lb 4.5 oz)   06/07/18 0400 93.2 kg (205 lb 7.5 oz)   06/06/18 0415 91.9 kg (202 lb 9.6 oz)     Body mass index is 36.81 kg/m².      Intake/Output Summary (Last 24 hours) at 06/08/18 1227  Last data filed at 06/08/18 0414   Gross per 24 hour   Intake          1572.33 ml   Output                0 ml   Net          1572.33 ml       Physical Exam   Constitutional: She is oriented to person, place, and time. She appears well-nourished. No distress.   HENT:   Head: Atraumatic.   Eyes: EOM are normal. No scleral icterus.   Neck: Neck supple. Hepatojugular reflux and JVD present.   Cardiovascular: Normal rate and regular rhythm.  Exam reveals no gallop and no friction rub.    No murmur heard.  Pulmonary/Chest: Effort normal and breath sounds normal. No respiratory distress. She has no wheezes. She has no rales.   Abdominal: Soft.  Bowel sounds are normal. She exhibits distension. There is no tenderness. There is no guarding.   Musculoskeletal: She exhibits edema (bilateral 1+ LE).   Neurological: She is alert and oriented to person, place, and time.   Skin: Skin is warm and dry. Capillary refill takes less than 2 seconds. There is erythema (bilateral anterior LE).   Erythema of bilateral anterior shins significantly improved. R anterior shin incision with sutures in place, no significant drainage   Psychiatric: She has a normal mood and affect.       Significant Labs:  CBC:    Recent Labs  Lab 06/06/18  0500 06/07/18  0422 06/08/18  0413   WBC 4.96 5.03 4.24   RBC 4.30 4.23 4.37   HGB 11.3* 11.0* 11.5*   HCT 36.4* 35.9* 36.6*   * 110* 107*   MCV 85 85 84   MCH 26.3* 26.0* 26.3*   MCHC 31.0* 30.6* 31.4*     BNP:    Recent Labs  Lab 06/02/18  0457   *     CMP:    Recent Labs  Lab 06/06/18  0500 06/07/18  0422 06/08/18  0413   GLU 87 83 80   CALCIUM 9.6 9.2 9.2   ALBUMIN 4.0 3.8 3.7   PROT 6.7 6.4 6.2    136 138   K 4.0 3.6 3.7   CO2 27 27 27    99 101   BUN 18 18 18   CREATININE 1.5* 1.4 1.5*   ALKPHOS 196* 184* 187*   ALT 11 11 11   AST 16 16 16   BILITOT 0.6 0.7 0.7      Coagulation:     Recent Labs  Lab 06/06/18  0500 06/07/18  0422 06/08/18  0413   INR 1.1 1.1 1.1     LDH:  No results for input(s): LDH in the last 72 hours.  Microbiology:  Microbiology Results (last 7 days)     Procedure Component Value Units Date/Time    Blood culture [[id number]] Collected:  06/06/18 1230    Order Status:  Completed Specimen:  Blood Updated:  06/07/18 1412     Blood Culture, Routine No Growth to date     Blood Culture, Routine No Growth to date    Blood culture [[id number]] Collected:  06/06/18 1230    Order Status:  Completed Specimen:  Blood Updated:  06/07/18 1412     Blood Culture, Routine No Growth to date     Blood Culture, Routine No Growth to date          I have reviewed all pertinent labs within the past 24  hours.    Assessment and Plan:     Ms. Smith is a 58 yo woman with hx of PHTN WHO Group 1 on IV remodulin, chronic hypoxic respiratory, chronic RV failure on home O2 transferred from [facility] ED with progressive shortness of breath over the last 3-4 days. She presented there on 5/30 to the ED having been sent by her PCP for evaluation of a non-healing traumatic wound on her R shin. She was noted to have increased WOB and increased abdominal swelling and given 1 dose of IV lasix. She was also started on clindamycin. She had recently been discharged from admission with ADHF 4/5-4/22 during which she underwent IV diuresis and uptitration of her home remodulin from 60 to 75 ng. She reports weight and breathing were stable until several days ago. On 5/29, telephone note reports 3 lb wt gain and her HH IV lasix was planned to restart on 5/30 which she ended up getting while at ED for above wound. Her symptoms continued to worsen with increased work of breathing, abdominal girth, and orthopnea and she re-presented tonight.     Lower extremity cellulitis    -RLE with cellulitis precipitated by trauma at home  -S/p I&D with stitches in place (will remove stiches if patient is present on Monday. Told patient to follow up with ED (who placed them if patient leaves sunday)  -discontinued IV abx. On po doxy for 7 days currently.   -Surgery consulted, appreciate recs        Acute on chronic diastolic heart failure    -Acute on chronic Diastolic and R HF  -On lasix 80 mg IV TID  -Continue spironolactone 100 daily  -Strict I&Os        Gastroesophageal reflux disease    Continue PPI        Bipolar affective disorder    Continue home buspirone, desvenlafaxine, lamictal, latuda        Essential hypertension    -Continue amlodipine 5        Hypothyroidism    Continue synthroid        Chronic respiratory failure with hypoxia    -Stable on NC  -goal SpO2>88  -Bipap QHS  -Continue inhaled therapies          Pulmonary  hypertension, group 1    -Continue remodulin at 75 ng/kg/min (5 mg/mL vial, 0.2 mg/mL concentration, dosing weight 85kg, 46mL per 24 hr; confirmed on home dosing sheet and by pharmacy)  -Continue adempas 1 mg tid  -O2 NC            Maria T Costello MD  Heart Transplant  [facility]

## 2018-06-08 NOTE — SUBJECTIVE & OBJECTIVE
Interval History:   More undocumented UOP's in the last 24 hrs. Pt is not able to catch urine when she has BMs  Moved to IV pushes of lasix 80 mg TID today. May decrease over the weekend.   On PO doxy for 7 day course. Likely venous stasis.      Review of Systems   All other systems reviewed and are negative.    Objective:     Vital Signs (Most Recent):  Temp: 97.9 °F (36.6 °C) (06/08/18 1202)  Pulse: 81 (06/08/18 1202)  Resp: 18 (06/08/18 0935)  BP: (!) 125/58 (06/08/18 0425)  SpO2: (!) 94 % (06/08/18 1202) Vital Signs (24h Range):  Temp:  [96.9 °F (36.1 °C)-99 °F (37.2 °C)] 97.9 °F (36.6 °C)  Pulse:  [76-98] 81  Resp:  [16-23] 18  SpO2:  [92 %-94 %] 94 %  BP: (101-125)/(54-58) 125/58     Patient Vitals for the past 72 hrs (Last 3 readings):   Weight   06/08/18 0545 91.3 kg (201 lb 4.5 oz)   06/07/18 0400 93.2 kg (205 lb 7.5 oz)   06/06/18 0415 91.9 kg (202 lb 9.6 oz)     Body mass index is 36.81 kg/m².      Intake/Output Summary (Last 24 hours) at 06/08/18 1227  Last data filed at 06/08/18 0414   Gross per 24 hour   Intake          1572.33 ml   Output                0 ml   Net          1572.33 ml       Physical Exam   Constitutional: She is oriented to person, place, and time. She appears well-nourished. No distress.   HENT:   Head: Atraumatic.   Eyes: EOM are normal. No scleral icterus.   Neck: Neck supple. Hepatojugular reflux and JVD present.   Cardiovascular: Normal rate and regular rhythm.  Exam reveals no gallop and no friction rub.    No murmur heard.  Pulmonary/Chest: Effort normal and breath sounds normal. No respiratory distress. She has no wheezes. She has no rales.   Abdominal: Soft. Bowel sounds are normal. She exhibits distension. There is no tenderness. There is no guarding.   Musculoskeletal: She exhibits edema (bilateral 1+ LE).   Neurological: She is alert and oriented to person, place, and time.   Skin: Skin is warm and dry. Capillary refill takes less than 2 seconds. There is erythema  (bilateral anterior LE).   Erythema of bilateral anterior shins significantly improved. R anterior shin incision with sutures in place, no significant drainage   Psychiatric: She has a normal mood and affect.       Significant Labs:  CBC:    Recent Labs  Lab 06/06/18  0500 06/07/18  0422 06/08/18  0413   WBC 4.96 5.03 4.24   RBC 4.30 4.23 4.37   HGB 11.3* 11.0* 11.5*   HCT 36.4* 35.9* 36.6*   * 110* 107*   MCV 85 85 84   MCH 26.3* 26.0* 26.3*   MCHC 31.0* 30.6* 31.4*     BNP:    Recent Labs  Lab 06/02/18  0457   *     CMP:    Recent Labs  Lab 06/06/18  0500 06/07/18 0422 06/08/18 0413   GLU 87 83 80   CALCIUM 9.6 9.2 9.2   ALBUMIN 4.0 3.8 3.7   PROT 6.7 6.4 6.2    136 138   K 4.0 3.6 3.7   CO2 27 27 27    99 101   BUN 18 18 18   CREATININE 1.5* 1.4 1.5*   ALKPHOS 196* 184* 187*   ALT 11 11 11   AST 16 16 16   BILITOT 0.6 0.7 0.7      Coagulation:     Recent Labs  Lab 06/06/18  0500 06/07/18 0422 06/08/18 0413   INR 1.1 1.1 1.1     LDH:  No results for input(s): LDH in the last 72 hours.  Microbiology:  Microbiology Results (last 7 days)     Procedure Component Value Units Date/Time    Blood culture [[id number]] Collected:  06/06/18 1230    Order Status:  Completed Specimen:  Blood Updated:  06/07/18 1412     Blood Culture, Routine No Growth to date     Blood Culture, Routine No Growth to date    Blood culture [[id number]] Collected:  06/06/18 1230    Order Status:  Completed Specimen:  Blood Updated:  06/07/18 1412     Blood Culture, Routine No Growth to date     Blood Culture, Routine No Growth to date          I have reviewed all pertinent labs within the past 24 hours.

## 2018-06-08 NOTE — PLAN OF CARE
Problem: Patient Care Overview  Goal: Plan of Care Review  Outcome: Ongoing (interventions implemented as appropriate)  Pt is AAOx4 in bed wearing non-skid footwear, bed in low/locked position and with call bell within reach. Pt reminded to use call bell to call for assistance, pt verbalizes understanding. Pt is afebrile at this time. Proper hand hygiene performed before and after pt care activities. IV Lasix gtt infusing at 20mL/hr. Remodulin infusing at 77cc/24hrs. Patient received PRN Xanax at bedtime. Denies any pain or discomfort at this time.

## 2018-06-09 LAB
ALBUMIN SERPL BCP-MCNC: 3.9 G/DL
ALP SERPL-CCNC: 183 U/L
ALT SERPL W/O P-5'-P-CCNC: 12 U/L
ANION GAP SERPL CALC-SCNC: 12 MMOL/L
AST SERPL-CCNC: 16 U/L
BASOPHILS # BLD AUTO: 0.02 K/UL
BASOPHILS NFR BLD: 0.4 %
BILIRUB SERPL-MCNC: 0.6 MG/DL
BUN SERPL-MCNC: 18 MG/DL
CALCIUM SERPL-MCNC: 9.4 MG/DL
CHLORIDE SERPL-SCNC: 99 MMOL/L
CO2 SERPL-SCNC: 26 MMOL/L
CREAT SERPL-MCNC: 1.6 MG/DL
DIFFERENTIAL METHOD: ABNORMAL
EOSINOPHIL # BLD AUTO: 0.2 K/UL
EOSINOPHIL NFR BLD: 3.7 %
ERYTHROCYTE [DISTWIDTH] IN BLOOD BY AUTOMATED COUNT: 18 %
EST. GFR  (AFRICAN AMERICAN): 41.2 ML/MIN/1.73 M^2
EST. GFR  (NON AFRICAN AMERICAN): 35.8 ML/MIN/1.73 M^2
GLUCOSE SERPL-MCNC: 84 MG/DL
HCT VFR BLD AUTO: 37.4 %
HGB BLD-MCNC: 11.7 G/DL
IMM GRANULOCYTES # BLD AUTO: 0.03 K/UL
IMM GRANULOCYTES NFR BLD AUTO: 0.6 %
INR PPP: 1.1
LYMPHOCYTES # BLD AUTO: 0.6 K/UL
LYMPHOCYTES NFR BLD: 12.8 %
MAGNESIUM SERPL-MCNC: 2.4 MG/DL
MCH RBC QN AUTO: 26.4 PG
MCHC RBC AUTO-ENTMCNC: 31.3 G/DL
MCV RBC AUTO: 84 FL
MONOCYTES # BLD AUTO: 0.4 K/UL
MONOCYTES NFR BLD: 7.5 %
NEUTROPHILS # BLD AUTO: 3.7 K/UL
NEUTROPHILS NFR BLD: 75 %
NRBC BLD-RTO: 0 /100 WBC
PLATELET # BLD AUTO: 111 K/UL
PMV BLD AUTO: 10.1 FL
POTASSIUM SERPL-SCNC: 4.2 MMOL/L
PROT SERPL-MCNC: 6.4 G/DL
PROTHROMBIN TIME: 11.7 SEC
RBC # BLD AUTO: 4.43 M/UL
SODIUM SERPL-SCNC: 137 MMOL/L
WBC # BLD AUTO: 4.93 K/UL

## 2018-06-09 PROCEDURE — 94660 CPAP INITIATION&MGMT: CPT | Mod: NTX

## 2018-06-09 PROCEDURE — 94761 N-INVAS EAR/PLS OXIMETRY MLT: CPT | Mod: NTX

## 2018-06-09 PROCEDURE — A4216 STERILE WATER/SALINE, 10 ML: HCPCS | Mod: NTX | Performed by: STUDENT IN AN ORGANIZED HEALTH CARE EDUCATION/TRAINING PROGRAM

## 2018-06-09 PROCEDURE — 85610 PROTHROMBIN TIME: CPT | Mod: NTX

## 2018-06-09 PROCEDURE — 63600175 PHARM REV CODE 636 W HCPCS: Mod: NTX | Performed by: STUDENT IN AN ORGANIZED HEALTH CARE EDUCATION/TRAINING PROGRAM

## 2018-06-09 PROCEDURE — 83735 ASSAY OF MAGNESIUM: CPT | Mod: NTX

## 2018-06-09 PROCEDURE — 25000003 PHARM REV CODE 250: Mod: NTX | Performed by: INTERNAL MEDICINE

## 2018-06-09 PROCEDURE — 99900035 HC TECH TIME PER 15 MIN (STAT): Mod: NTX

## 2018-06-09 PROCEDURE — 25000003 PHARM REV CODE 250: Mod: NTX | Performed by: STUDENT IN AN ORGANIZED HEALTH CARE EDUCATION/TRAINING PROGRAM

## 2018-06-09 PROCEDURE — 80053 COMPREHEN METABOLIC PANEL: CPT | Mod: NTX

## 2018-06-09 PROCEDURE — 25000003 PHARM REV CODE 250: Mod: NTX | Performed by: PHYSICIAN ASSISTANT

## 2018-06-09 PROCEDURE — 63600175 PHARM REV CODE 636 W HCPCS: Mod: JG,NTX | Performed by: INTERNAL MEDICINE

## 2018-06-09 PROCEDURE — 27000221 HC OXYGEN, UP TO 24 HOURS: Mod: NTX

## 2018-06-09 PROCEDURE — 99233 SBSQ HOSP IP/OBS HIGH 50: CPT | Mod: NTX,,, | Performed by: INTERNAL MEDICINE

## 2018-06-09 PROCEDURE — 85025 COMPLETE CBC W/AUTO DIFF WBC: CPT | Mod: NTX

## 2018-06-09 PROCEDURE — 36415 COLL VENOUS BLD VENIPUNCTURE: CPT | Mod: NTX

## 2018-06-09 PROCEDURE — 20600001 HC STEP DOWN PRIVATE ROOM: Mod: NTX

## 2018-06-09 RX ORDER — FUROSEMIDE 40 MG/1
80 TABLET ORAL EVERY 8 HOURS
Status: DISCONTINUED | OUTPATIENT
Start: 2018-06-09 | End: 2018-06-10 | Stop reason: HOSPADM

## 2018-06-09 RX ADMIN — ALPRAZOLAM 0.5 MG: 0.5 TABLET ORAL at 09:06

## 2018-06-09 RX ADMIN — BUSPIRONE HYDROCHLORIDE 15 MG: 5 TABLET ORAL at 03:06

## 2018-06-09 RX ADMIN — RIOCIGUAT 1 MG: 1 TABLET, FILM COATED ORAL at 09:06

## 2018-06-09 RX ADMIN — GABAPENTIN 100 MG: 100 CAPSULE ORAL at 09:06

## 2018-06-09 RX ADMIN — RIOCIGUAT 1 MG: 1 TABLET, FILM COATED ORAL at 03:06

## 2018-06-09 RX ADMIN — BUSPIRONE HYDROCHLORIDE 15 MG: 5 TABLET ORAL at 09:06

## 2018-06-09 RX ADMIN — POTASSIUM CHLORIDE 40 MEQ: 1500 TABLET, EXTENDED RELEASE ORAL at 03:06

## 2018-06-09 RX ADMIN — ENOXAPARIN SODIUM 40 MG: 100 INJECTION SUBCUTANEOUS at 04:06

## 2018-06-09 RX ADMIN — POTASSIUM CHLORIDE 40 MEQ: 1500 TABLET, EXTENDED RELEASE ORAL at 09:06

## 2018-06-09 RX ADMIN — LAMOTRIGINE 100 MG: 100 TABLET ORAL at 09:06

## 2018-06-09 RX ADMIN — Medication 400 MG: at 09:06

## 2018-06-09 RX ADMIN — FUROSEMIDE 80 MG: 40 TABLET ORAL at 09:06

## 2018-06-09 RX ADMIN — FUROSEMIDE 80 MG: 10 INJECTION, SOLUTION INTRAMUSCULAR; INTRAVENOUS at 09:06

## 2018-06-09 RX ADMIN — DOXYCYCLINE HYCLATE 100 MG: 100 TABLET, COATED ORAL at 09:06

## 2018-06-09 RX ADMIN — GABAPENTIN 100 MG: 100 CAPSULE ORAL at 03:06

## 2018-06-09 RX ADMIN — LURASIDONE HYDROCHLORIDE 40 MG: 40 TABLET, FILM COATED ORAL at 09:06

## 2018-06-09 RX ADMIN — SPIRONOLACTONE 100 MG: 50 TABLET ORAL at 09:06

## 2018-06-09 RX ADMIN — FLUTICASONE FUROATE AND VILANTEROL TRIFENATATE 1 PUFF: 100; 25 POWDER RESPIRATORY (INHALATION) at 09:06

## 2018-06-09 RX ADMIN — DESVENLAFAXINE SUCCINATE 100 MG: 50 TABLET, FILM COATED, EXTENDED RELEASE ORAL at 09:06

## 2018-06-09 RX ADMIN — PANTOPRAZOLE SODIUM 40 MG: 40 TABLET, DELAYED RELEASE ORAL at 09:06

## 2018-06-09 RX ADMIN — AMLODIPINE BESYLATE 5 MG: 5 TABLET ORAL at 09:06

## 2018-06-09 RX ADMIN — Medication 3 ML: at 03:06

## 2018-06-09 RX ADMIN — LEVOTHYROXINE SODIUM 75 MCG: 25 TABLET ORAL at 06:06

## 2018-06-09 RX ADMIN — TREPROSTINIL 75 NG/KG/MIN: 200 INJECTION, SOLUTION INTRAVENOUS; SUBCUTANEOUS at 04:06

## 2018-06-09 RX ADMIN — PRAVASTATIN SODIUM 40 MG: 20 TABLET ORAL at 09:06

## 2018-06-09 RX ADMIN — FUROSEMIDE 80 MG: 40 TABLET ORAL at 03:06

## 2018-06-09 NOTE — ASSESSMENT & PLAN NOTE
-Acute on chronic Diastolic and R HF  -On lasix 80 mg PO TID  -Continue spironolactone 100 daily  -Strict I&Os

## 2018-06-09 NOTE — PLAN OF CARE
Problem: Patient Care Overview  Goal: Plan of Care Review  Outcome: Ongoing (interventions implemented as appropriate)  POC reviewed w/ pt this am to include IV remodulin, lasix transition, wound care, and safety.  Pt remains on IV remodulin at 75 ng/kg/min, cassette changed at 1600 today.  Lasix switched from IVP to PO, pt diuresing well but has had several unmeasured urine with bowel movements throughout the day.  Pt remains on O2 4L NC, no SOB noted.  CPAP at night - tolerating well.  Pt c/o distended abdomen, worse with activity.  Pt denies any N/V, having several BMs per day.  Pt likely to d/c tomorrow per primary team.

## 2018-06-09 NOTE — SUBJECTIVE & OBJECTIVE
Interval History:   More undocumented UOP's in the last 24 hrs. Pt is not able to catch urine when she has BMs  Moved to PO lasix 80 mg TID.  On PO doxy for 7 day course. On day 4 today. Likely venous stasis.      Review of Systems   All other systems reviewed and are negative.    Objective:     Vital Signs (Most Recent):  Temp: 98.6 °F (37 °C) (06/09/18 0720)  Pulse: 72 (06/09/18 1100)  Resp: (!) 22 (06/09/18 0908)  BP: (!) 103/58 (06/09/18 0720)  SpO2: (!) 92 % (06/09/18 0720) Vital Signs (24h Range):  Temp:  [97.5 °F (36.4 °C)-98.6 °F (37 °C)] 98.6 °F (37 °C)  Pulse:  [72-97] 72  Resp:  [17-22] 22  SpO2:  [90 %-94 %] 92 %  BP: ()/(46-71) 103/58     Patient Vitals for the past 72 hrs (Last 3 readings):   Weight   06/08/18 0545 91.3 kg (201 lb 4.5 oz)   06/07/18 0400 93.2 kg (205 lb 7.5 oz)     Body mass index is 36.81 kg/m².      Intake/Output Summary (Last 24 hours) at 06/09/18 1150  Last data filed at 06/09/18 0600   Gross per 24 hour   Intake             2080 ml   Output                0 ml   Net             2080 ml       Physical Exam   Constitutional: She is oriented to person, place, and time. She appears well-nourished. No distress.   HENT:   Head: Atraumatic.   Eyes: EOM are normal. No scleral icterus.   Neck: Neck supple. Hepatojugular reflux and JVD present.   Cardiovascular: Normal rate and regular rhythm.  Exam reveals no gallop and no friction rub.    No murmur heard.  Pulmonary/Chest: Effort normal and breath sounds normal. No respiratory distress. She has no wheezes. She has no rales.   Abdominal: Soft. Bowel sounds are normal. She exhibits distension. There is no tenderness. There is no guarding.   Musculoskeletal: She exhibits edema (bilateral 1+ LE).   Neurological: She is alert and oriented to person, place, and time.   Skin: Skin is warm and dry. Capillary refill takes less than 2 seconds. There is erythema (bilateral anterior LE).   Erythema of bilateral anterior shins significantly  improved. R anterior shin incision with sutures in place, no significant drainage   Psychiatric: She has a normal mood and affect.       Significant Labs:  CBC:    Recent Labs  Lab 06/07/18 0422 06/08/18 0413 06/09/18  0550   WBC 5.03 4.24 4.93   RBC 4.23 4.37 4.43   HGB 11.0* 11.5* 11.7*   HCT 35.9* 36.6* 37.4   * 107* 111*   MCV 85 84 84   MCH 26.0* 26.3* 26.4*   MCHC 30.6* 31.4* 31.3*     BNP:  No results for input(s): BNP in the last 168 hours.    Invalid input(s): BNPTRIAGELBLO  CMP:    Recent Labs  Lab 06/07/18 0422 06/08/18 0413 06/09/18  0550   GLU 83 80 84   CALCIUM 9.2 9.2 9.4   ALBUMIN 3.8 3.7 3.9   PROT 6.4 6.2 6.4    138 137   K 3.6 3.7 4.2   CO2 27 27 26   CL 99 101 99   BUN 18 18 18   CREATININE 1.4 1.5* 1.6*   ALKPHOS 184* 187* 183*   ALT 11 11 12   AST 16 16 16   BILITOT 0.7 0.7 0.6      Coagulation:     Recent Labs  Lab 06/07/18 0422 06/08/18 0413 06/09/18  0550   INR 1.1 1.1 1.1     LDH:  No results for input(s): LDH in the last 72 hours.  Microbiology:  Microbiology Results (last 7 days)     Procedure Component Value Units Date/Time    Blood culture [[id number]] Collected:  06/06/18 1230    Order Status:  Completed Specimen:  Blood Updated:  06/08/18 1412     Blood Culture, Routine No Growth to date     Blood Culture, Routine No Growth to date     Blood Culture, Routine No Growth to date    Blood culture [[id number]] Collected:  06/06/18 1230    Order Status:  Completed Specimen:  Blood Updated:  06/08/18 1412     Blood Culture, Routine No Growth to date     Blood Culture, Routine No Growth to date     Blood Culture, Routine No Growth to date          I have reviewed all pertinent labs within the past 24 hours.

## 2018-06-09 NOTE — PLAN OF CARE
Problem: Patient Care Overview  Goal: Plan of Care Review  Outcome: Ongoing (interventions implemented as appropriate)  Pt is AAOx3.Pt is ambulatory and independent.Pt able to perform all ADL's without assistance. Standard precautions maintained.  Telly monitoring on going. Pt turns independently, pt is aware of bony area and pressure reduction positions Pt remains injury and fall free, non skid footwear donned, call light within reach, personal items within reach, bed in low/locked position, pt able to voice needs all needs voiced have been met at this time.

## 2018-06-09 NOTE — PROGRESS NOTES
[facility]  Heart Transplant  Progress Note    Patient Name: Sue Smith  MRN: [id number]  Admission Date: 6/2/2018  Hospital Length of Stay: 7 days  Attending Physician: Maty Bosch MD  Primary Care Provider: Paddy Guerrier DO  Principal Problem:<principal problem not specified>    Subjective:     Interval History:   More undocumented UOP's in the last 24 hrs. Pt is not able to catch urine when she has BMs  Moved to PO lasix 80 mg TID.  On PO doxy for 7 day course. On day 4 today. Likely venous stasis.      Review of Systems   All other systems reviewed and are negative.    Objective:     Vital Signs (Most Recent):  Temp: 98.6 °F (37 °C) (06/09/18 0720)  Pulse: 72 (06/09/18 1100)  Resp: (!) 22 (06/09/18 0908)  BP: (!) 103/58 (06/09/18 0720)  SpO2: (!) 92 % (06/09/18 0720) Vital Signs (24h Range):  Temp:  [97.5 °F (36.4 °C)-98.6 °F (37 °C)] 98.6 °F (37 °C)  Pulse:  [72-97] 72  Resp:  [17-22] 22  SpO2:  [90 %-94 %] 92 %  BP: ()/(46-71) 103/58     Patient Vitals for the past 72 hrs (Last 3 readings):   Weight   06/08/18 0545 91.3 kg (201 lb 4.5 oz)   06/07/18 0400 93.2 kg (205 lb 7.5 oz)     Body mass index is 36.81 kg/m².      Intake/Output Summary (Last 24 hours) at 06/09/18 1150  Last data filed at 06/09/18 0600   Gross per 24 hour   Intake             2080 ml   Output                0 ml   Net             2080 ml       Physical Exam   Constitutional: She is oriented to person, place, and time. She appears well-nourished. No distress.   HENT:   Head: Atraumatic.   Eyes: EOM are normal. No scleral icterus.   Neck: Neck supple. Hepatojugular reflux and JVD present.   Cardiovascular: Normal rate and regular rhythm.  Exam reveals no gallop and no friction rub.    No murmur heard.  Pulmonary/Chest: Effort normal and breath sounds normal. No respiratory distress. She has no wheezes. She has no rales.   Abdominal: Soft. Bowel sounds are normal. She exhibits distension. There is no tenderness.  There is no guarding.   Musculoskeletal: She exhibits edema (bilateral 1+ LE).   Neurological: She is alert and oriented to person, place, and time.   Skin: Skin is warm and dry. Capillary refill takes less than 2 seconds. There is erythema (bilateral anterior LE).   Erythema of bilateral anterior shins significantly improved. R anterior shin incision with sutures in place, no significant drainage   Psychiatric: She has a normal mood and affect.       Significant Labs:  CBC:    Recent Labs  Lab 06/07/18 0422 06/08/18 0413 06/09/18  0550   WBC 5.03 4.24 4.93   RBC 4.23 4.37 4.43   HGB 11.0* 11.5* 11.7*   HCT 35.9* 36.6* 37.4   * 107* 111*   MCV 85 84 84   MCH 26.0* 26.3* 26.4*   MCHC 30.6* 31.4* 31.3*     BNP:  No results for input(s): BNP in the last 168 hours.    Invalid input(s): BNPTRIAGELBLO  CMP:    Recent Labs  Lab 06/07/18 0422 06/08/18 0413 06/09/18  0550   GLU 83 80 84   CALCIUM 9.2 9.2 9.4   ALBUMIN 3.8 3.7 3.9   PROT 6.4 6.2 6.4    138 137   K 3.6 3.7 4.2   CO2 27 27 26   CL 99 101 99   BUN 18 18 18   CREATININE 1.4 1.5* 1.6*   ALKPHOS 184* 187* 183*   ALT 11 11 12   AST 16 16 16   BILITOT 0.7 0.7 0.6      Coagulation:     Recent Labs  Lab 06/07/18 0422 06/08/18 0413 06/09/18  0550   INR 1.1 1.1 1.1     LDH:  No results for input(s): LDH in the last 72 hours.  Microbiology:  Microbiology Results (last 7 days)     Procedure Component Value Units Date/Time    Blood culture [[id number]] Collected:  06/06/18 1230    Order Status:  Completed Specimen:  Blood Updated:  06/08/18 1412     Blood Culture, Routine No Growth to date     Blood Culture, Routine No Growth to date     Blood Culture, Routine No Growth to date    Blood culture [[id number]] Collected:  06/06/18 1230    Order Status:  Completed Specimen:  Blood Updated:  06/08/18 1412     Blood Culture, Routine No Growth to date     Blood Culture, Routine No Growth to date     Blood Culture, Routine No Growth to date          I have reviewed  all pertinent labs within the past 24 hours.    Assessment and Plan:     Ms. Smith is a 56 yo woman with hx of PHTN WHO Group 1 on IV remodulin, chronic hypoxic respiratory, chronic RV failure on home O2 transferred from [facility] ED with progressive shortness of breath over the last 3-4 days. She presented there on 5/30 to the ED having been sent by her PCP for evaluation of a non-healing traumatic wound on her R shin. She was noted to have increased WOB and increased abdominal swelling and given 1 dose of IV lasix. She was also started on clindamycin. She had recently been discharged from admission with ADHF 4/5-4/22 during which she underwent IV diuresis and uptitration of her home remodulin from 60 to 75 ng. She reports weight and breathing were stable until several days ago. On 5/29, telephone note reports 3 lb wt gain and her HH IV lasix was planned to restart on 5/30 which she ended up getting while at ED for above wound. Her symptoms continued to worsen with increased work of breathing, abdominal girth, and orthopnea and she re-presented tonight.     Lower extremity cellulitis    -RLE with cellulitis precipitated by trauma at home  -S/p I&D with stitches in place (will remove stiches if patient is present on Monday. Told patient to follow up with ED (who placed them if patient leaves sunday)  -discontinued IV abx. On po doxy for 7 days currently.   -Surgery consulted, appreciate recs        Acute on chronic diastolic heart failure    -Acute on chronic Diastolic and R HF  -On lasix 80 mg PO TID  -Continue spironolactone 100 daily  -Strict I&Os        Gastroesophageal reflux disease    Continue PPI        Bipolar affective disorder    Continue home buspirone, desvenlafaxine, lamictal, latuda        Essential hypertension    -Continue amlodipine 5        Hypothyroidism    Continue synthroid        Chronic respiratory failure with hypoxia    -Stable on NC  -goal SpO2>88  -Bipap QHS  -Continue inhaled  therapies          Pulmonary hypertension, group 1    -Continue remodulin at 75 ng/kg/min (5 mg/mL vial, 0.2 mg/mL concentration, dosing weight 85kg, 46mL per 24 hr; confirmed on home dosing sheet and by pharmacy)  -Continue adempas 1 mg tid  -O2 NC            Maria T Costello MD  Heart Transplant  [facility]

## 2018-06-10 VITALS
DIASTOLIC BLOOD PRESSURE: 53 MMHG | WEIGHT: 201.25 LBS | SYSTOLIC BLOOD PRESSURE: 94 MMHG | BODY MASS INDEX: 37.04 KG/M2 | OXYGEN SATURATION: 91 % | HEART RATE: 75 BPM | RESPIRATION RATE: 20 BRPM | TEMPERATURE: 98 F | HEIGHT: 62 IN

## 2018-06-10 LAB
ALBUMIN SERPL BCP-MCNC: 3.9 G/DL
ALP SERPL-CCNC: 188 U/L
ALT SERPL W/O P-5'-P-CCNC: 12 U/L
ANION GAP SERPL CALC-SCNC: 10 MMOL/L
AST SERPL-CCNC: 17 U/L
BASOPHILS # BLD AUTO: 0.03 K/UL
BASOPHILS NFR BLD: 0.7 %
BILIRUB SERPL-MCNC: 0.7 MG/DL
BUN SERPL-MCNC: 20 MG/DL
CALCIUM SERPL-MCNC: 8.5 MG/DL
CHLORIDE SERPL-SCNC: 101 MMOL/L
CO2 SERPL-SCNC: 28 MMOL/L
CREAT SERPL-MCNC: 1.5 MG/DL
DIFFERENTIAL METHOD: ABNORMAL
EOSINOPHIL # BLD AUTO: 0.1 K/UL
EOSINOPHIL NFR BLD: 3.3 %
ERYTHROCYTE [DISTWIDTH] IN BLOOD BY AUTOMATED COUNT: 17.9 %
EST. GFR  (AFRICAN AMERICAN): 44.6 ML/MIN/1.73 M^2
EST. GFR  (NON AFRICAN AMERICAN): 38.7 ML/MIN/1.73 M^2
GLUCOSE SERPL-MCNC: 83 MG/DL
HCT VFR BLD AUTO: 36.4 %
HGB BLD-MCNC: 11.3 G/DL
IMM GRANULOCYTES # BLD AUTO: 0.02 K/UL
IMM GRANULOCYTES NFR BLD AUTO: 0.5 %
INR PPP: 1.1
LYMPHOCYTES # BLD AUTO: 0.7 K/UL
LYMPHOCYTES NFR BLD: 15.3 %
MAGNESIUM SERPL-MCNC: 2.4 MG/DL
MCH RBC QN AUTO: 26.1 PG
MCHC RBC AUTO-ENTMCNC: 31 G/DL
MCV RBC AUTO: 84 FL
MONOCYTES # BLD AUTO: 0.3 K/UL
MONOCYTES NFR BLD: 7.1 %
NEUTROPHILS # BLD AUTO: 3.1 K/UL
NEUTROPHILS NFR BLD: 73.1 %
NRBC BLD-RTO: 0 /100 WBC
PLATELET # BLD AUTO: 103 K/UL
PMV BLD AUTO: 10.8 FL
POTASSIUM SERPL-SCNC: 4 MMOL/L
PROT SERPL-MCNC: 6.5 G/DL
PROTHROMBIN TIME: 11.8 SEC
RBC # BLD AUTO: 4.33 M/UL
SODIUM SERPL-SCNC: 139 MMOL/L
WBC # BLD AUTO: 4.25 K/UL

## 2018-06-10 PROCEDURE — 83735 ASSAY OF MAGNESIUM: CPT | Mod: NTX

## 2018-06-10 PROCEDURE — 25000003 PHARM REV CODE 250: Mod: NTX | Performed by: STUDENT IN AN ORGANIZED HEALTH CARE EDUCATION/TRAINING PROGRAM

## 2018-06-10 PROCEDURE — 25000003 PHARM REV CODE 250: Mod: NTX | Performed by: INTERNAL MEDICINE

## 2018-06-10 PROCEDURE — 99900035 HC TECH TIME PER 15 MIN (STAT): Mod: NTX

## 2018-06-10 PROCEDURE — 25000003 PHARM REV CODE 250: Mod: NTX | Performed by: PHYSICIAN ASSISTANT

## 2018-06-10 PROCEDURE — 85025 COMPLETE CBC W/AUTO DIFF WBC: CPT | Mod: NTX

## 2018-06-10 PROCEDURE — 80053 COMPREHEN METABOLIC PANEL: CPT | Mod: NTX

## 2018-06-10 PROCEDURE — 99233 SBSQ HOSP IP/OBS HIGH 50: CPT | Mod: NTX,,, | Performed by: INTERNAL MEDICINE

## 2018-06-10 PROCEDURE — 85610 PROTHROMBIN TIME: CPT | Mod: NTX

## 2018-06-10 PROCEDURE — 36415 COLL VENOUS BLD VENIPUNCTURE: CPT | Mod: NTX

## 2018-06-10 RX ORDER — DOXYCYCLINE HYCLATE 100 MG
100 TABLET ORAL EVERY 12 HOURS
Qty: 4 TABLET | Refills: 0 | Status: SHIPPED | OUTPATIENT
Start: 2018-06-10 | End: 2018-06-12

## 2018-06-10 RX ADMIN — DESVENLAFAXINE SUCCINATE 100 MG: 50 TABLET, FILM COATED, EXTENDED RELEASE ORAL at 08:06

## 2018-06-10 RX ADMIN — RIOCIGUAT 1 MG: 1 TABLET, FILM COATED ORAL at 08:06

## 2018-06-10 RX ADMIN — GABAPENTIN 100 MG: 100 CAPSULE ORAL at 08:06

## 2018-06-10 RX ADMIN — BUSPIRONE HYDROCHLORIDE 15 MG: 5 TABLET ORAL at 08:06

## 2018-06-10 RX ADMIN — LEVOTHYROXINE SODIUM 75 MCG: 25 TABLET ORAL at 06:06

## 2018-06-10 RX ADMIN — AMLODIPINE BESYLATE 5 MG: 5 TABLET ORAL at 08:06

## 2018-06-10 RX ADMIN — DOXYCYCLINE HYCLATE 100 MG: 100 TABLET, COATED ORAL at 08:06

## 2018-06-10 RX ADMIN — PANTOPRAZOLE SODIUM 40 MG: 40 TABLET, DELAYED RELEASE ORAL at 08:06

## 2018-06-10 RX ADMIN — FLUTICASONE FUROATE AND VILANTEROL TRIFENATATE 1 PUFF: 100; 25 POWDER RESPIRATORY (INHALATION) at 08:06

## 2018-06-10 RX ADMIN — LURASIDONE HYDROCHLORIDE 40 MG: 40 TABLET, FILM COATED ORAL at 08:06

## 2018-06-10 RX ADMIN — FUROSEMIDE 80 MG: 40 TABLET ORAL at 06:06

## 2018-06-10 RX ADMIN — SPIRONOLACTONE 100 MG: 50 TABLET ORAL at 08:06

## 2018-06-10 RX ADMIN — Medication 400 MG: at 08:06

## 2018-06-10 RX ADMIN — ALPRAZOLAM 0.5 MG: 0.5 TABLET ORAL at 08:06

## 2018-06-10 RX ADMIN — POTASSIUM CHLORIDE 40 MEQ: 1500 TABLET, EXTENDED RELEASE ORAL at 08:06

## 2018-06-10 RX ADMIN — LAMOTRIGINE 100 MG: 100 TABLET ORAL at 08:06

## 2018-06-10 RX ADMIN — PRAVASTATIN SODIUM 40 MG: 20 TABLET ORAL at 08:06

## 2018-06-10 NOTE — SUBJECTIVE & OBJECTIVE
Interval History:   More undocumented UOP's in the last 24 hrs. Pt is not able to catch urine when she has BMs  Okay to go home today discharged back on same regiment that the patient came in on.   Two more days of PO doxy  Reordered HH  Will continue same PHTN meds. Trepostnil and adempas.   Has f/u on 22nd with Dr. Cullen.       Review of Systems   All other systems reviewed and are negative.    Objective:     Vital Signs (Most Recent):  Temp: 98.1 °F (36.7 °C) (06/10/18 1157)  Pulse: 75 (06/10/18 1157)  Resp: 20 (06/10/18 1157)  BP: (!) 94/53 (06/10/18 1157)  SpO2: (!) 91 % (06/10/18 1157) Vital Signs (24h Range):  Temp:  [97.3 °F (36.3 °C)-98.4 °F (36.9 °C)] 98.1 °F (36.7 °C)  Pulse:  [70-87] 75  Resp:  [18-20] 20  SpO2:  [90 %-93 %] 91 %  BP: ()/(53-61) 94/53     Patient Vitals for the past 72 hrs (Last 3 readings):   Weight   06/10/18 0405 91.3 kg (201 lb 4.5 oz)   06/08/18 0545 91.3 kg (201 lb 4.5 oz)     Body mass index is 36.81 kg/m².      Intake/Output Summary (Last 24 hours) at 06/10/18 1228  Last data filed at 06/10/18 0000   Gross per 24 hour   Intake             1260 ml   Output                0 ml   Net             1260 ml       Physical Exam   Constitutional: She is oriented to person, place, and time. She appears well-nourished. No distress.   HENT:   Head: Atraumatic.   Eyes: EOM are normal. No scleral icterus.   Neck: Neck supple. Hepatojugular reflux and JVD present.   Cardiovascular: Normal rate and regular rhythm.  Exam reveals no gallop and no friction rub.    No murmur heard.  Pulmonary/Chest: Effort normal and breath sounds normal. No respiratory distress. She has no wheezes. She has no rales.   Abdominal: Soft. Bowel sounds are normal. She exhibits distension. There is no tenderness. There is no guarding.   Musculoskeletal: She exhibits no edema.   Neurological: She is alert and oriented to person, place, and time.   Skin: Skin is warm and dry. Capillary refill takes less than 2  seconds. There is erythema (bilateral anterior LE).   Erythema of bilateral anterior shins significantly improved. R anterior shin incision with sutures in place, no significant drainage   Psychiatric: She has a normal mood and affect.       Significant Labs:  CBC:    Recent Labs  Lab 06/08/18 0413 06/09/18  0550 06/10/18  0527   WBC 4.24 4.93 4.25   RBC 4.37 4.43 4.33   HGB 11.5* 11.7* 11.3*   HCT 36.6* 37.4 36.4*   * 111* 103*   MCV 84 84 84   MCH 26.3* 26.4* 26.1*   MCHC 31.4* 31.3* 31.0*     BNP:  No results for input(s): BNP in the last 168 hours.    Invalid input(s): BNPTRIAGELBLO  CMP:    Recent Labs  Lab 06/08/18 0413 06/09/18  0550 06/10/18  0527   GLU 80 84 83   CALCIUM 9.2 9.4 8.5*   ALBUMIN 3.7 3.9 3.9   PROT 6.2 6.4 6.5    137 139   K 3.7 4.2 4.0   CO2 27 26 28    99 101   BUN 18 18 20   CREATININE 1.5* 1.6* 1.5*   ALKPHOS 187* 183* 188*   ALT 11 12 12   AST 16 16 17   BILITOT 0.7 0.6 0.7      Coagulation:     Recent Labs  Lab 06/08/18 0413 06/09/18  0550 06/10/18  0527   INR 1.1 1.1 1.1     LDH:  No results for input(s): LDH in the last 72 hours.  Microbiology:  Microbiology Results (last 7 days)     Procedure Component Value Units Date/Time    Blood culture [[id number]] Collected:  06/06/18 1230    Order Status:  Completed Specimen:  Blood Updated:  06/09/18 1412     Blood Culture, Routine No Growth to date     Blood Culture, Routine No Growth to date     Blood Culture, Routine No Growth to date     Blood Culture, Routine No Growth to date    Blood culture [[id number]] Collected:  06/06/18 1230    Order Status:  Completed Specimen:  Blood Updated:  06/09/18 1412     Blood Culture, Routine No Growth to date     Blood Culture, Routine No Growth to date     Blood Culture, Routine No Growth to date     Blood Culture, Routine No Growth to date          I have reviewed all pertinent labs within the past 24 hours.

## 2018-06-10 NOTE — NURSING
Pt being discharged home.  CN called [employer], ensured pt's spare CADD pump is serviced by company.  [employer] will also hand deliver extra pump to patient's house this afternoon upon discharge.  Pt's wound care performed today, dressings to RLE and LUE changed.  HH orders written per primary team.  Pt mixed remodulin cassette at bedside and switched to home concentration and pump.  Pt's PIV d/c'd, cath tip intact.  Pt's discharge instructions given and reviewed, answered all questions to pt's satisfaction.  Pt to leave floor via wheelchair accompanied by family member.

## 2018-06-10 NOTE — PROGRESS NOTES
[facility]  Heart Transplant  Progress Note    Patient Name: Sue Smith  MRN: [id number]  Admission Date: 6/2/2018  Hospital Length of Stay: 8 days  Attending Physician: Maty Bosch MD  Primary Care Provider: Paddy Guerrier DO  Principal Problem:Lower extremity cellulitis    Subjective:     Interval History:   More undocumented UOP's in the last 24 hrs. Pt is not able to catch urine when she has BMs  Okay to go home today discharged back on same regiment that the patient came in on.   Two more days of PO doxy  Reordered HH  Will continue same PHTN meds. Trepostnil and adempas.   Has f/u on 22nd with Dr. Cullen.       Review of Systems   All other systems reviewed and are negative.    Objective:     Vital Signs (Most Recent):  Temp: 98.1 °F (36.7 °C) (06/10/18 1157)  Pulse: 75 (06/10/18 1157)  Resp: 20 (06/10/18 1157)  BP: (!) 94/53 (06/10/18 1157)  SpO2: (!) 91 % (06/10/18 1157) Vital Signs (24h Range):  Temp:  [97.3 °F (36.3 °C)-98.4 °F (36.9 °C)] 98.1 °F (36.7 °C)  Pulse:  [70-87] 75  Resp:  [18-20] 20  SpO2:  [90 %-93 %] 91 %  BP: ()/(53-61) 94/53     Patient Vitals for the past 72 hrs (Last 3 readings):   Weight   06/10/18 0405 91.3 kg (201 lb 4.5 oz)   06/08/18 0545 91.3 kg (201 lb 4.5 oz)     Body mass index is 36.81 kg/m².      Intake/Output Summary (Last 24 hours) at 06/10/18 1228  Last data filed at 06/10/18 0000   Gross per 24 hour   Intake             1260 ml   Output                0 ml   Net             1260 ml       Physical Exam   Constitutional: She is oriented to person, place, and time. She appears well-nourished. No distress.   HENT:   Head: Atraumatic.   Eyes: EOM are normal. No scleral icterus.   Neck: Neck supple. Hepatojugular reflux and JVD present.   Cardiovascular: Normal rate and regular rhythm.  Exam reveals no gallop and no friction rub.    No murmur heard.  Pulmonary/Chest: Effort normal and breath sounds normal. No respiratory distress. She has no wheezes.  She has no rales.   Abdominal: Soft. Bowel sounds are normal. She exhibits distension. There is no tenderness. There is no guarding.   Musculoskeletal: She exhibits no edema.   Neurological: She is alert and oriented to person, place, and time.   Skin: Skin is warm and dry. Capillary refill takes less than 2 seconds. There is erythema (bilateral anterior LE).   Erythema of bilateral anterior shins significantly improved. R anterior shin incision with sutures in place, no significant drainage   Psychiatric: She has a normal mood and affect.       Significant Labs:  CBC:    Recent Labs  Lab 06/08/18 0413 06/09/18  0550 06/10/18  0527   WBC 4.24 4.93 4.25   RBC 4.37 4.43 4.33   HGB 11.5* 11.7* 11.3*   HCT 36.6* 37.4 36.4*   * 111* 103*   MCV 84 84 84   MCH 26.3* 26.4* 26.1*   MCHC 31.4* 31.3* 31.0*     BNP:  No results for input(s): BNP in the last 168 hours.    Invalid input(s): BNPTRIAGELBLO  CMP:    Recent Labs  Lab 06/08/18 0413 06/09/18  0550 06/10/18  0527   GLU 80 84 83   CALCIUM 9.2 9.4 8.5*   ALBUMIN 3.7 3.9 3.9   PROT 6.2 6.4 6.5    137 139   K 3.7 4.2 4.0   CO2 27 26 28    99 101   BUN 18 18 20   CREATININE 1.5* 1.6* 1.5*   ALKPHOS 187* 183* 188*   ALT 11 12 12   AST 16 16 17   BILITOT 0.7 0.6 0.7      Coagulation:     Recent Labs  Lab 06/08/18 0413 06/09/18  0550 06/10/18  0527   INR 1.1 1.1 1.1     LDH:  No results for input(s): LDH in the last 72 hours.  Microbiology:  Microbiology Results (last 7 days)     Procedure Component Value Units Date/Time    Blood culture [[id number]] Collected:  06/06/18 1230    Order Status:  Completed Specimen:  Blood Updated:  06/09/18 1412     Blood Culture, Routine No Growth to date     Blood Culture, Routine No Growth to date     Blood Culture, Routine No Growth to date     Blood Culture, Routine No Growth to date    Blood culture [[id number]] Collected:  06/06/18 1230    Order Status:  Completed Specimen:  Blood Updated:  06/09/18 1412     Blood  Culture, Routine No Growth to date     Blood Culture, Routine No Growth to date     Blood Culture, Routine No Growth to date     Blood Culture, Routine No Growth to date          I have reviewed all pertinent labs within the past 24 hours.    Assessment and Plan:     Ms. Smith is a 58 yo woman with hx of PHTN WHO Group 1 on IV remodulin, chronic hypoxic respiratory, chronic RV failure on home O2 transferred from [facility] ED with progressive shortness of breath over the last 3-4 days. She presented there on 5/30 to the ED having been sent by her PCP for evaluation of a non-healing traumatic wound on her R shin. She was noted to have increased WOB and increased abdominal swelling and given 1 dose of IV lasix. She was also started on clindamycin. She had recently been discharged from admission with ADHF 4/5-4/22 during which she underwent IV diuresis and uptitration of her home remodulin from 60 to 75 ng. She reports weight and breathing were stable until several days ago. On 5/29, telephone note reports 3 lb wt gain and her HH IV lasix was planned to restart on 5/30 which she ended up getting while at ED for above wound. Her symptoms continued to worsen with increased work of breathing, abdominal girth, and orthopnea and she re-presented tonight.     * Lower extremity cellulitis    -RLE with cellulitis precipitated by trauma at home  -S/p I&D with stitches in place (will remove stiches if patient is present on Monday. Told patient to follow up with ED (who placed them if patient leaves sunday)  -discontinued IV abx. On po doxy for 7 days currently. On day 5. Wrote for two more days on discharge.   -Surgery consulted, appreciate recs        Acute on chronic diastolic heart failure    -Acute on chronic Diastolic and R HF  -On lasix 80 mg PO TID. On discharge Placed back on her regiment of Bumex 2 mg BID with prn pushes of IV lasix 80 mg and metolazone prn.   -Continue spironolactone 100 daily           Gastroesophageal reflux disease    Continue PPI        Bipolar affective disorder    Continue home buspirone, desvenlafaxine, lamictal, latuda        Essential hypertension    -Continue amlodipine 5        Hypothyroidism    Continue synthroid        Chronic respiratory failure with hypoxia    -Stable on NC  -goal SpO2>88  -Bipap QHS  -Continue inhaled therapies          Pulmonary hypertension, group 1    -Continue remodulin at 75 ng/kg/min (5 mg/mL vial, 0.2 mg/mL concentration, dosing weight 85kg, 46mL per 24 hr; confirmed on home dosing sheet and by pharmacy)  -Continue adempas 1 mg tid  -O2 NC  - Continued the above on discharge and wrote for HH orders.            Maria T Costello MD  Heart Transplant  [facility]

## 2018-06-10 NOTE — PLAN OF CARE
[facility]   Heart Transplant Clinic  [address] [place] [address]   [phone] after hours        HOME  HEALTH ORDERS      Admit to Home Health    Diagnosis:   Patient Active Problem List   Diagnosis    Pulmonary hypertension, group 1    Chronic respiratory failure with hypoxia    Hypothyroidism    Essential hypertension    Dyslipidemia    Bipolar affective disorder    Acute on chronic right heart failure    Hx of tracheostomy    Gastroesophageal reflux disease    Acute on chronic diastolic heart failure    Cardiomegaly    Pulmonary nodules    Chronic pulmonary heart disease    Hypervolemia    Shortness of breath    Diarrhea    Volume overload    Bleeding at insertion site    Lung transplant candidate    Nonrheumatic tricuspid valve disorder    Lower extremity cellulitis    Laceration of right leg excluding thigh       Patient is homebound due to:  Pulmonary HTN    Diet: Cardiac    Acitivities: as tolerated    Nursing:   SN to complete comprehensive assessment including routine vital signs. Instruct on disease process and s/s of complications to report to MD. Review/verify medication list sent home with the patient at time of discharge  and instruct patient/caregiver as needed. Frequency may be adjusted depending on start of care date.    Notify MD if SBP > 160 or < 90; DBP > 90 or < 50; HR > 120 or < 50; Temp > 101; Weight gain >3lbs in 1 day or 5lbs in 1 week.      LABS:  SN to perform labs:   BNP, CMP, Mag weekly      HOME INFUSION THERAPY:   SN to perform Infusion Therapy/Central Line Care.  Review Central Line Care & Central Line Flush with patient.    Administer (drug and dose):  Was on home dose of treprostinil (remodulin) @ 75 ng/kg/min (using her weight of 85 kg) it is 3.1875 ml/hr which is rounded to 3.19 ml/hr. This is IV and continuous. Will need to be restarted today at home like it was previously.      For questions or  concerns, please call [phone] and ask for Pre-Heart transplant clinic, M-F 8-5. After hours, weekends, call [phone] and ask for the Heart Transplant Cardiologist on call.    Central line care:  Scrub the Hub: Prior to accessing the line, always perform a 30 second alcohol scrub  Each lumen of the central line is to be flushed at least daily with 10 mL Normal Saline and 3 mL Heparin flush (100 units/mL)  Skilled Nurse (SN) may draw blood from IV access  Blood Draw Procedure:   - Aspirate at least 5 mL of blood   - Discard   - Obtain specimen   - Change posiflow cap   - Flush with 20 mL Normal Saline followed by a                 3-5 mL Heparin flush (100 units/mL)  Central :   - Sterile dressing changes are done weekly and as needed.   - Use chlor-hexadine scrub to cleanse site, apply Biopatch to insertion site,       apply securement device dressing   - Posi-flow caps are changed weekly and after EVERY lab draw.   - If sterile gauze is under dressing to control oozing,                 dressing change must be performed every 24 hours until gauze is not needed.      CONSULTS:     Aide to provide assistance with personal care, ADLs, and vital signs      Send initial Home Health orders to HTS attending physician on call.  Send follow up questions to [phone] or fax:               Heart Failure:      [phone]

## 2018-06-10 NOTE — ASSESSMENT & PLAN NOTE
-Continue remodulin at 75 ng/kg/min (5 mg/mL vial, 0.2 mg/mL concentration, dosing weight 85kg, 46mL per 24 hr; confirmed on home dosing sheet and by pharmacy)  -Continue adempas 1 mg tid  -O2 NC  - Continued the above on discharge and wrote for HH orders.

## 2018-06-10 NOTE — DISCHARGE SUMMARY
[facility]  Heart Transplant  Discharge Summary      Patient Name: Sue Smith  MRN: [id number]  Admission Date: 6/2/2018  Hospital Length of Stay: 8 days  Discharge Date and Time: 06/10/2018 12:35 PM  Attending Physician: Maty Bosch MD   Discharging Provider: Maria T Costello MD  Primary Care Provider: Paddy Guerrier DO     HPI: Ms. Smith is a 58 yo woman with hx of PHTN WHO Group 1 on IV remodulin, chronic hypoxic respiratory, chronic RV failure on home O2 transferred from [facility] ED with progressive shortness of breath over the last 3-4 days. She presented there on 5/30 to the ED having been sent by her PCP for evaluation of a non-healing traumatic wound on her R shin. She was noted to have increased WOB and increased abdominal swelling and given 1 dose of IV lasix. She was also started on clindamycin. She had recently been discharged from admission with ADHF 4/5-4/22 during which she underwent IV diuresis and uptitration of her home remodulin from 60 to 75 ng. She reports weight and breathing were stable until several days ago. On 5/29, telephone note reports 3 lb wt gain and her HH IV lasix was planned to restart on 5/30 which she ended up getting while at ED for above wound. Her symptoms continued to worsen with increased work of breathing, abdominal girth, and orthopnea and she re-presented tonight.     * No surgery found *     Hospital Course: Patient was admitted for cellulitis treatment and diuresis.   Her cellulitis actually had more of a component of venous stasis.   ID and surgery were consulted here.  She was discharged back on same regiment that the patient came in on in terms of diuresis. Bumex 2 mg BID with prn pushes of IV lasix 80 prn or metalazone 2.5.    Two more days of PO doxy to complete a 7 day course.   Reordered HH  Will continue same PHTN meds. Trepostnil at 75 ng/kg/min and adempas 1 mg BID   Has f/u on 22nd with Dr. Cullen.        Consults         Status  Ordering Provider     Inpatient consult to General Surgery  Once     Provider:  (Not yet assigned)    Completed LUCIEN VELARDE     Inpatient consult to Infectious Diseases  Once     Provider:  (Not yet assigned)    Completed CHEYANNE CHENG          Significant Diagnostic Studies: Labs:   CMP   Recent Labs  Lab 06/09/18  0550 06/10/18  0527    139   K 4.2 4.0   CL 99 101   CO2 26 28   GLU 84 83   BUN 18 20   CREATININE 1.6* 1.5*   CALCIUM 9.4 8.5*   PROT 6.4 6.5   ALBUMIN 3.9 3.9   BILITOT 0.6 0.7   ALKPHOS 183* 188*   AST 16 17   ALT 12 12   ANIONGAP 12 10   ESTGFRAFRICA 41.2* 44.6*   EGFRNONAA 35.8* 38.7*   , CBC   Recent Labs  Lab 06/09/18  0550 06/10/18  0527   WBC 4.93 4.25   HGB 11.7* 11.3*   HCT 37.4 36.4*   * 103*   , INR   Lab Results   Component Value Date    INR 1.1 06/10/2018    INR 1.1 06/09/2018    INR 1.1 06/08/2018   , Lipid Panel No results found for: CHOL, HDL, LDLCALC, TRIG, CHOLHDL, A1C: No results for input(s): HGBA1C in the last 4320 hours. and All labs within the past 24 hours have been reviewed    Pending Diagnostic Studies:     None        Final Active Diagnoses:    Diagnosis Date Noted POA    PRINCIPAL PROBLEM:  Lower extremity cellulitis [L03.119] 06/05/2018 Yes    Laceration of right leg excluding thigh [S81.811A] 06/04/2018 Yes    Acute on chronic diastolic heart failure [I50.33] 10/06/2017 Yes    Chronic respiratory failure with hypoxia [J96.11] 10/05/2017 Yes    Hypothyroidism [E03.9] 10/05/2017 Yes    Essential hypertension [I10]  Yes    Gastroesophageal reflux disease [K21.9]  Yes    Bipolar affective disorder [F31.9]  Yes    Pulmonary hypertension, group 1 [I27.20] 10/04/2017 Yes      Problems Resolved During this Admission:    Diagnosis Date Noted Date Resolved POA      Discharged Condition: good    Disposition:     Follow Up:  Follow-up Information     Faina Cullen MD In 12 days.    Specialties:  Transplant, Cardiology  Why:  f/u for PH  Contact  information:  [address]  [facility] [address]  [phone]                 Patient Instructions:   No discharge procedures on file.  Medications:  Reconciled Home Medications:      Medication List      START taking these medications    doxycycline 100 MG tablet  Commonly known as:  VIBRA-TABS  Take 1 tablet (100 mg total) by mouth every 12 (twelve) hours.        CONTINUE taking these medications    albuterol 2.5 mg /3 mL (0.083 %) nebulizer solution  Commonly known as:  PROVENTIL  Take 2.5 mg by nebulization every 6 (six) hours as needed for Wheezing. Rescue     ALPRAZolam 1 MG tablet  Commonly known as:  XANAX  Take 0.5 tablets (0.5 mg total) by mouth 3 (three) times daily as needed for Anxiety.     amLODIPine 5 MG tablet  Commonly known as:  NORVASC  Take 5 mg by mouth once daily.     bumetanide 2 MG tablet  Commonly known as:  BUMEX  Take 1 tablet (2 mg total) by mouth 2 (two) times daily.     busPIRone 15 MG tablet  Commonly known as:  BUSPAR  Take 15 mg by mouth 3 (three) times daily.     cyclobenzaprine 10 MG tablet  Commonly known as:  FLEXERIL  TAKE 1 TABLET BY MOUTH THREE TIMES DAILY MAY CAUSE DROWSINESS     desvenlafaxine succinate 100 MG Tb24  Commonly known as:  PRISTIQ  Take 100 mg by mouth once daily.     fluticasone-vilanterol 100-25 mcg/dose diskus inhaler  Commonly known as:  BREO  Inhale 1 puff into the lungs once daily. Controller     furosemide 10 mg/mL injection  Commonly known as:  LASIX  Inject 8 mLs (80 mg total) into the vein twice a week. As needed for weight gain of > 5 lbs or SOB     gabapentin 100 MG capsule  Commonly known as:  NEURONTIN  Take 1 capsule (100 mg total) by mouth 3 (three) times daily.     HYDROcodone-acetaminophen  mg per tablet  Commonly known as:  NORCO  Take 1 tablet by mouth every 8 (eight) hours as needed for Pain.     lamoTRIgine 100 MG tablet  Commonly known as:  LAMICTAL  Take 100 mg by mouth 2 (two) times daily.     LATUDA 60 mg Tab  tablet  Generic drug:  lurasidone  Take 40 mg by mouth once daily.     levothyroxine 75 MCG tablet  Commonly known as:  SYNTHROID  Take 75 mcg by mouth once daily.     magnesium oxide 400 mg tablet  Commonly known as:  MAG-OX  Take 1 tablet (400 mg total) by mouth 2 (two) times daily.     metOLazone 2.5 MG tablet  Commonly known as:  ZAROXOLYN  Take 2.5 mg by mouth once daily. Only take per MD order.     ondansetron 8 MG Tbdl  Commonly known as:  ZOFRAN-ODT  Take 1 tablet (8 mg total) by mouth every 8 (eight) hours as needed.     pantoprazole 40 MG tablet  Commonly known as:  PROTONIX  Take 40 mg by mouth once daily.     potassium chloride SA 20 MEQ tablet  Commonly known as:  K-DUR,KLOR-CON  Take 1 tablet (20 mEq total) by mouth 2 (two) times daily.     pravastatin 40 MG tablet  Commonly known as:  PRAVACHOL  Take 40 mg by mouth once daily.     riociguat 1 mg Tab  Commonly known as:  ADEMPAS  Take 1 tablet (1 mg total) by mouth 3 (three) times daily.     sodium chloride 0.9% 0.9 % SolP 100 mL with treprostinil 1 mg/mL Soln 6,000,000 ng  Inject 2,380 ng/min into the vein continuous.     spironolactone 100 MG tablet  Commonly known as:  ALDACTONE  Take 1 tablet (100 mg total) by mouth once daily.     umeclidinium 62.5 mcg/actuation Dsdv  Inhale 62.5 mcg into the lungs once daily. Controller        STOP taking these medications    budesonide-formoterol 80-4.5 mcg 80-4.5 mcg/actuation Hfaa  Commonly known as:  SYMBICORT            Maria T Costello MD  Heart Transplant  [facility]

## 2018-06-10 NOTE — PROGRESS NOTES
Spoke with pharmacist at [facility] regarding pt's malfunctioning CADD pump.  Gave serial number.  New CADD pump ordered and is on way with  to pt's home.  will meet patient at her home with new pump -pt to mix concentration and switch to her home backup pump.

## 2018-06-10 NOTE — ASSESSMENT & PLAN NOTE
-RLE with cellulitis precipitated by trauma at home  -S/p I&D with stitches in place (will remove stiches if patient is present on Monday. Told patient to follow up with ED (who placed them if patient leaves sunday)  -discontinued IV abx. On po doxy for 7 days currently. On day 5. Wrote for two more days on discharge.   -Surgery consulted, appreciate recs

## 2018-06-10 NOTE — ASSESSMENT & PLAN NOTE
-Acute on chronic Diastolic and R HF  -On lasix 80 mg PO TID. On discharge Placed back on her regiment of Bumex 2 mg BID with prn pushes of IV lasix 80 mg and metolazone prn.   -Continue spironolactone 100 daily

## 2018-06-10 NOTE — PROGRESS NOTES
On-Call Discharge Note:    Pt will discharge to patient's home under the care of aPmela Smith, patient's daughter, phone number [phone] with Home [facility] ([phone], fax [phone]) for line care for IV meds and an Aide. RICHAR faxed orders and last progress note. Pt aware of, involved in, and coping well with this discharge plan. Pt did not have any concerns with the discharge plan at this time. RICHAR remains available at [phone].

## 2018-06-10 NOTE — PLAN OF CARE
Problem: Patient Care Overview  Goal: Plan of Care Review  Outcome: Ongoing (interventions implemented as appropriate)  Pt is AAOx3.Pt is ambulatory and independent.Pt able to perform all ADL's without assistance. Pt is in good spirits.  NAD noted.Standard precautions maintained.  Telly monitoring on going. No breakdown noted, po fluids encouraged. Pt denies pian and/or discomfort at this time.Pt remains injury and fall free, non skid footwear donned, call light within reach, personal items within reach, bed in low/locked position, pt able to voice needs all needs voiced have been met at this time.

## 2018-06-10 NOTE — HOSPITAL COURSE
Patient was admitted for cellulitis treatment and diuresis.   Her cellulitis actually had more of a component of venous stasis.   ID and surgery were consulted here.  She was discharged back on same regiment that the patient came in on in terms of diuresis. Bumex 2 mg BID with prn pushes of IV lasix 80 prn or metalazone 2.5.    Two more days of PO doxy to complete a 7 day course.   Reordered HH  Will continue same PHTN meds. Trepostnil at 75 ng/kg/min and adempas 1 mg BID   Has f/u on 22nd with Dr. Cullen.

## 2018-06-11 ENCOUNTER — DOCUMENTATION ONLY (OUTPATIENT)
Dept: TRANSPLANT | Facility: HOSPITAL | Age: 58
End: 2018-06-11

## 2018-06-11 ENCOUNTER — TELEPHONE (OUTPATIENT)
Dept: TRANSPLANT | Facility: CLINIC | Age: 58
End: 2018-06-11

## 2018-06-11 LAB
BACTERIA BLD CULT: NORMAL
BACTERIA BLD CULT: NORMAL

## 2018-06-11 NOTE — PROGRESS NOTES
[facility]   Heart Transplant/PHTN Clinic   [address] [place] [address]   [phone] after hours [phone] fax   HOME HEALTH ORDERS     Admit to Home Health   Diagnosis:  Patient Active Problem List   Diagnosis    Pulmonary hypertension, group 1    Chronic respiratory failure with hypoxia    Hypothyroidism    Essential hypertension    Dyslipidemia    Bipolar affective disorder    Acute on chronic right heart failure    Hx of tracheostomy    Gastroesophageal reflux disease    Acute on chronic diastolic heart failure    Cardiomegaly    Pulmonary nodules    Chronic pulmonary heart disease    Hypervolemia    Shortness of breath    Diarrhea    Volume overload    Bleeding at insertion site    Lung transplant candidate    Nonrheumatic tricuspid valve disorder    Lower extremity cellulitis    Laceration of right leg excluding thigh       Patient is homebound due to: Pulmonary Hypertension    Diet: Cardiac    Acitivities: Ad adrien    Nursing:   SN to complete comprehensive assessment including routine vital signs. Instruct on disease process and s/s of complications to report to MD. Review/verify medication list sent home with the patient at time of discharge and instruct patient/caregiver as needed. Frequency may be adjusted depending on start of care date.     Notify MD if SBP > 160 or < 90; DBP > 90 or < 50; HR > 120 or < 50; Temp > 101; Weight gain >3lbs in 1 day or 5lbs in 1 week.      HOME INFUSION THERAPY:     Administer (drug and dose): Lasix 80 IV, PRN weight gain and/or Shortness of breath    **For questions or concerns, please call [phone] and ask for Pulmonary Hypertension clinic, M-F 8-5. After hours, weekends, call [phone] and ask for the Heart Transplant Cardiologist on call.**     CONSULTS:     Physical Therapy to evaluate and treat. Evaluate for home safety and equipment needs; Establish/upgrade home exercise program.  Perform / instruct on therapeutic exercises, gait training, transfer training, and Range of Motion.     Occupational Therapy to evaluate and treat. Evaluate home environment for safety and equipment needs. Perform/Instruct on transfers, ADL training, ROM, and therapeutic exercises.     Send follow up questions to [phone] or [phone].

## 2018-06-11 NOTE — PROGRESS NOTES
DISCHARGE    SW to pt's room on 6/8 for anticipated weekend discharge.  Pt presents as sitting up in bedside chair, aao x4, calm, cooperative, and asking and answering questions appropriately.  Pt verbalizes understanding and agreement with plan for d/c home on Juan [place] 6/10.  Pt reports she will have a ride home from her dgtr at d/c.  Pt reports she has home O2 with her for ride home.  Pt requesting to resume services with [facility].  SW will fax orders to [facility] and [facility] once available.  Pt reports coping adequately at this time, and denies any needs or concerns to SW.  SW offered to provide pt with phone number for [facility] to inquire about homemaker services, per discussion with pt yesterday; however, pt states she has spoken with them before and declines contact information.  SW providing ongoing psychosocial and counseling support, education, resources, assistance, and discharge planning as indicated.  SW remains available.        UPDATE--6/11/18, 14:00: RICHAR faxed  orders and hospital records to [facility] (ph: [phone], f: [phone]) and [facility] (ph: [phone], f: [phone]) today.  RICHAR spoke with Yajaira at [facility], who is aware of d/c.

## 2018-06-12 ENCOUNTER — PATIENT OUTREACH (OUTPATIENT)
Dept: ADMINISTRATIVE | Facility: CLINIC | Age: 58
End: 2018-06-12

## 2018-06-12 NOTE — PATIENT INSTRUCTIONS
Discharge Instructions for Cellulitis  You have been diagnosed with cellulitis. This is an infection in the deepest layer of the skin. In some cases, the infection also affects the muscle. Cellulitis is caused by bacteria. The bacteria can enter the body through broken skin. This can happen with a cut, scratch, animal bite, or an insect bite that has been scratched. You may have been treated in the hospital with antibiotics and fluids. You will likely be given a prescription for antibiotics to take at home. This sheet will help you take care of yourself at home.  Home care  When you are home:  · Take the prescribed antibiotic medicine you are given as directed until it is gone. Take it even if you feel better. It treats the infection and stops it from returning. Not taking all the medicine can make future infections hard to treat.  · Keep the infected area clean.  · When possible, raise the infected area above the level of your heart. This helps keep swelling down.  · Talk with your healthcare provider if you are in pain. Ask what kind of over-the-counter medicine you can take for pain.  · Apply clean bandages as advised.  · Take your temperature once a day for a week.  · Wash your hands often to prevent spreading the infection.  In the future, wash your hands before and after you touch cuts, scratches, or bandages. This will help prevent infection.   When to call your healthcare provider  Call your healthcare provider immediately if you have any of the following:  · Difficulty or pain when moving the joints above or below the infected area  · Discharge or pus draining from the area  · Fever of 100.4°F (38°C) or higher, or as directed by your healthcare provider  · Pain that gets worse in or around the infected   · Redness that gets worse in or around the infected area, particularly if the area of redness expands to a wider area  · Shaking chills  · Swelling of the infected area  · Vomiting   Date Last Reviewed:  8/1/2016  © 5027-4095 [employer]. [address] [place] [address], [place] [address]. All rights reserved. This information is not intended as a substitute for professional medical care. Always follow your healthcare professional's instructions.

## 2018-06-14 ENCOUNTER — TELEPHONE (OUTPATIENT)
Dept: TRANSPLANT | Facility: CLINIC | Age: 58
End: 2018-06-14

## 2018-06-14 NOTE — TELEPHONE ENCOUNTER
"LILIAM Tubbs from [facility] visiting patient today. Notes a 5# weight gain overnight, has distended abdomen and patient says she, "does not feel right." Also has a swollen face that was worse this morning.  Per Dr. Cullen, give 80 mg IV Lasix, peripherally, now and take an extra 40 mEq potassium today. Will draw labs tomorrow. Reinforced need for patient to go to the ER if symptoms do not improve or worsen.   "

## 2018-06-15 ENCOUNTER — TELEPHONE (OUTPATIENT)
Dept: TRANSPLANT | Facility: CLINIC | Age: 58
End: 2018-06-15

## 2018-06-15 NOTE — TELEPHONE ENCOUNTER
F/U with Yajaira at [facility]. Lab results reviewed by MD. Per Dr. Cullen, no changes to medication regimen at this time. Patient should go directly to the ED if symptoms worsen. Yajaira stated that she is on call this weekend and will keep in contact with patient. They counseled patient on her diet yesterday and offered support to the patient.

## 2018-06-20 ENCOUNTER — TELEPHONE (OUTPATIENT)
Dept: TRANSPLANT | Facility: CLINIC | Age: 58
End: 2018-06-20

## 2018-06-22 ENCOUNTER — HOSPITAL ENCOUNTER (OUTPATIENT)
Dept: PULMONOLOGY | Facility: CLINIC | Age: 58
Discharge: HOME | DRG: 292 | End: 2018-06-22
Payer: MEDICARE

## 2018-06-22 ENCOUNTER — HOSPITAL ENCOUNTER (INPATIENT)
Facility: HOSPITAL | Age: 58
LOS: 5 days | Discharge: HOSPICE | DRG: 292 | End: 2018-06-27
Attending: INTERNAL MEDICINE | Admitting: INTERNAL MEDICINE
Payer: MEDICARE

## 2018-06-22 ENCOUNTER — OFFICE VISIT (OUTPATIENT)
Dept: TRANSPLANT | Facility: CLINIC | Age: 58
DRG: 292 | End: 2018-06-22
Payer: MEDICARE

## 2018-06-22 VITALS
DIASTOLIC BLOOD PRESSURE: 57 MMHG | RESPIRATION RATE: 20 BRPM | HEIGHT: 62 IN | BODY MASS INDEX: 35.34 KG/M2 | HEIGHT: 64 IN | TEMPERATURE: 97 F | OXYGEN SATURATION: 92 % | SYSTOLIC BLOOD PRESSURE: 107 MMHG | BODY MASS INDEX: 37.91 KG/M2 | WEIGHT: 206 LBS | HEART RATE: 98 BPM | WEIGHT: 207 LBS

## 2018-06-22 DIAGNOSIS — E87.70 HYPERVOLEMIA, UNSPECIFIED HYPERVOLEMIA TYPE: ICD-10-CM

## 2018-06-22 DIAGNOSIS — I27.20 PULMONARY HYPERTENSION: Primary | ICD-10-CM

## 2018-06-22 DIAGNOSIS — J96.11 CHRONIC RESPIRATORY FAILURE WITH HYPOXIA: ICD-10-CM

## 2018-06-22 DIAGNOSIS — I27.0 PRIMARY PULMONARY HYPERTENSION: ICD-10-CM

## 2018-06-22 DIAGNOSIS — I50.33 ACUTE ON CHRONIC DIASTOLIC HEART FAILURE: ICD-10-CM

## 2018-06-22 DIAGNOSIS — I50.813 ACUTE ON CHRONIC RIGHT HEART FAILURE: Primary | ICD-10-CM

## 2018-06-22 DIAGNOSIS — Z51.5 PALLIATIVE CARE ENCOUNTER: ICD-10-CM

## 2018-06-22 DIAGNOSIS — Z71.89 GOALS OF CARE, COUNSELING/DISCUSSION: ICD-10-CM

## 2018-06-22 DIAGNOSIS — I50.810 RIGHT HEART FAILURE: ICD-10-CM

## 2018-06-22 DIAGNOSIS — Z76.82 LUNG TRANSPLANT CANDIDATE: ICD-10-CM

## 2018-06-22 DIAGNOSIS — I27.9 CHRONIC PULMONARY HEART DISEASE: ICD-10-CM

## 2018-06-22 DIAGNOSIS — I27.20 PULMONARY HYPERTENSION: ICD-10-CM

## 2018-06-22 PROBLEM — N17.9 ACUTE RENAL INJURY: Status: ACTIVE | Noted: 2018-06-22

## 2018-06-22 LAB
PHOSPHATE SERPL-MCNC: 4.4 MG/DL
PRE FEV1 FVC: 71
PRE FEV1: 1.43
PRE FVC: 2.02
PREDICTED FEV1 FVC: 81
PREDICTED FEV1: 2.5
PREDICTED FVC: 3.09

## 2018-06-22 PROCEDURE — 99999 PR PBB SHADOW E&M-EST. PATIENT-LVL III: CPT | Mod: PBBFAC,TXP,, | Performed by: INTERNAL MEDICINE

## 2018-06-22 PROCEDURE — 25000242 PHARM REV CODE 250 ALT 637 W/ HCPCS: Mod: NTX | Performed by: INTERNAL MEDICINE

## 2018-06-22 PROCEDURE — 25000003 PHARM REV CODE 250: Mod: NTX | Performed by: INTERNAL MEDICINE

## 2018-06-22 PROCEDURE — 94640 AIRWAY INHALATION TREATMENT: CPT | Mod: NTX

## 2018-06-22 PROCEDURE — 36415 COLL VENOUS BLD VENIPUNCTURE: CPT | Mod: NTX

## 2018-06-22 PROCEDURE — 20600001 HC STEP DOWN PRIVATE ROOM: Mod: NTX

## 2018-06-22 PROCEDURE — 84100 ASSAY OF PHOSPHORUS: CPT | Mod: NTX

## 2018-06-22 PROCEDURE — 3078F DIAST BP <80 MM HG: CPT | Mod: CPTII,S$GLB,, | Performed by: INTERNAL MEDICINE

## 2018-06-22 PROCEDURE — 27000221 HC OXYGEN, UP TO 24 HOURS: Mod: NTX

## 2018-06-22 PROCEDURE — 99223 1ST HOSP IP/OBS HIGH 75: CPT | Mod: AI,NTX,, | Performed by: INTERNAL MEDICINE

## 2018-06-22 PROCEDURE — 94618 PULMONARY STRESS TESTING: CPT | Mod: S$GLB,,, | Performed by: INTERNAL MEDICINE

## 2018-06-22 PROCEDURE — 94761 N-INVAS EAR/PLS OXIMETRY MLT: CPT | Mod: NTX

## 2018-06-22 PROCEDURE — 99213 OFFICE O/P EST LOW 20 MIN: CPT | Mod: 25,S$GLB,, | Performed by: INTERNAL MEDICINE

## 2018-06-22 PROCEDURE — 94010 BREATHING CAPACITY TEST: CPT | Mod: S$GLB,,, | Performed by: INTERNAL MEDICINE

## 2018-06-22 PROCEDURE — A4216 STERILE WATER/SALINE, 10 ML: HCPCS | Mod: NTX | Performed by: INTERNAL MEDICINE

## 2018-06-22 PROCEDURE — 3074F SYST BP LT 130 MM HG: CPT | Mod: CPTII,S$GLB,, | Performed by: INTERNAL MEDICINE

## 2018-06-22 PROCEDURE — 3008F BODY MASS INDEX DOCD: CPT | Mod: CPTII,S$GLB,, | Performed by: INTERNAL MEDICINE

## 2018-06-22 PROCEDURE — 63600175 PHARM REV CODE 636 W HCPCS: Mod: NTX | Performed by: INTERNAL MEDICINE

## 2018-06-22 RX ORDER — ALBUTEROL SULFATE 0.83 MG/ML
2.5 SOLUTION RESPIRATORY (INHALATION) EVERY 4 HOURS
Status: DISCONTINUED | OUTPATIENT
Start: 2018-06-22 | End: 2018-06-27 | Stop reason: HOSPADM

## 2018-06-22 RX ORDER — SODIUM CHLORIDE 0.9 % (FLUSH) 0.9 %
3 SYRINGE (ML) INJECTION EVERY 8 HOURS
Status: DISCONTINUED | OUTPATIENT
Start: 2018-06-22 | End: 2018-06-27 | Stop reason: HOSPADM

## 2018-06-22 RX ORDER — DESVENLAFAXINE SUCCINATE 50 MG/1
100 TABLET, EXTENDED RELEASE ORAL DAILY
Status: DISCONTINUED | OUTPATIENT
Start: 2018-06-23 | End: 2018-06-27 | Stop reason: HOSPADM

## 2018-06-22 RX ORDER — AMLODIPINE BESYLATE 5 MG/1
5 TABLET ORAL DAILY
Status: DISCONTINUED | OUTPATIENT
Start: 2018-06-23 | End: 2018-06-27 | Stop reason: HOSPADM

## 2018-06-22 RX ORDER — SPIRONOLACTONE 50 MG/1
100 TABLET, FILM COATED ORAL DAILY
Status: DISCONTINUED | OUTPATIENT
Start: 2018-06-23 | End: 2018-06-27 | Stop reason: HOSPADM

## 2018-06-22 RX ORDER — DOXYCYCLINE 50 MG/1
100 CAPSULE ORAL 2 TIMES DAILY
Status: COMPLETED | OUTPATIENT
Start: 2018-06-22 | End: 2018-06-25

## 2018-06-22 RX ORDER — FLUTICASONE FUROATE AND VILANTEROL 100; 25 UG/1; UG/1
1 POWDER RESPIRATORY (INHALATION) DAILY
Status: DISCONTINUED | OUTPATIENT
Start: 2018-06-23 | End: 2018-06-27 | Stop reason: HOSPADM

## 2018-06-22 RX ORDER — LURASIDONE HYDROCHLORIDE 40 MG/1
80 TABLET, FILM COATED ORAL NIGHTLY
Status: DISCONTINUED | OUTPATIENT
Start: 2018-06-22 | End: 2018-06-27 | Stop reason: HOSPADM

## 2018-06-22 RX ORDER — BUSPIRONE HYDROCHLORIDE 5 MG/1
15 TABLET ORAL 3 TIMES DAILY
Status: DISCONTINUED | OUTPATIENT
Start: 2018-06-22 | End: 2018-06-27 | Stop reason: HOSPADM

## 2018-06-22 RX ORDER — ENOXAPARIN SODIUM 100 MG/ML
40 INJECTION SUBCUTANEOUS EVERY 24 HOURS
Status: DISCONTINUED | OUTPATIENT
Start: 2018-06-22 | End: 2018-06-27 | Stop reason: HOSPADM

## 2018-06-22 RX ORDER — METOLAZONE 2.5 MG/1
2.5 TABLET ORAL DAILY
Status: DISCONTINUED | OUTPATIENT
Start: 2018-06-23 | End: 2018-06-26

## 2018-06-22 RX ORDER — ONDANSETRON 8 MG/1
8 TABLET, ORALLY DISINTEGRATING ORAL EVERY 6 HOURS PRN
Status: DISCONTINUED | OUTPATIENT
Start: 2018-06-22 | End: 2018-06-27 | Stop reason: HOSPADM

## 2018-06-22 RX ORDER — POTASSIUM CHLORIDE 20 MEQ/1
20 TABLET, EXTENDED RELEASE ORAL 2 TIMES DAILY
Status: DISCONTINUED | OUTPATIENT
Start: 2018-06-22 | End: 2018-06-25

## 2018-06-22 RX ORDER — DOXYCYCLINE 100 MG/1
1 CAPSULE ORAL 2 TIMES DAILY
Refills: 0 | Status: ON HOLD | COMMUNITY
Start: 2018-06-14 | End: 2018-06-27 | Stop reason: HOSPADM

## 2018-06-22 RX ORDER — PRAVASTATIN SODIUM 20 MG/1
40 TABLET ORAL DAILY
Status: DISCONTINUED | OUTPATIENT
Start: 2018-06-23 | End: 2018-06-27 | Stop reason: HOSPADM

## 2018-06-22 RX ORDER — LURASIDONE HYDROCHLORIDE 80 MG/1
80 TABLET, FILM COATED ORAL NIGHTLY
Refills: 4 | COMMUNITY
Start: 2018-05-15

## 2018-06-22 RX ORDER — HYDROCODONE BITARTRATE AND ACETAMINOPHEN 10; 325 MG/1; MG/1
1 TABLET ORAL EVERY 8 HOURS PRN
Status: DISCONTINUED | OUTPATIENT
Start: 2018-06-22 | End: 2018-06-27 | Stop reason: HOSPADM

## 2018-06-22 RX ORDER — PANTOPRAZOLE SODIUM 40 MG/1
40 TABLET, DELAYED RELEASE ORAL DAILY
Status: DISCONTINUED | OUTPATIENT
Start: 2018-06-23 | End: 2018-06-27 | Stop reason: HOSPADM

## 2018-06-22 RX ORDER — MUPIROCIN 20 MG/G
1 OINTMENT TOPICAL 2 TIMES DAILY
Refills: 1 | COMMUNITY
Start: 2018-05-30

## 2018-06-22 RX ORDER — MUPIROCIN 20 MG/G
OINTMENT TOPICAL 2 TIMES DAILY
Status: DISCONTINUED | OUTPATIENT
Start: 2018-06-22 | End: 2018-06-27 | Stop reason: HOSPADM

## 2018-06-22 RX ORDER — ALPRAZOLAM 0.5 MG/1
0.5 TABLET ORAL 3 TIMES DAILY PRN
Status: DISCONTINUED | OUTPATIENT
Start: 2018-06-22 | End: 2018-06-27 | Stop reason: HOSPADM

## 2018-06-22 RX ORDER — FUROSEMIDE 10 MG/ML
80 INJECTION INTRAMUSCULAR; INTRAVENOUS 3 TIMES DAILY
Status: DISCONTINUED | OUTPATIENT
Start: 2018-06-22 | End: 2018-06-23

## 2018-06-22 RX ORDER — LAMOTRIGINE 100 MG/1
100 TABLET ORAL 2 TIMES DAILY
Status: DISCONTINUED | OUTPATIENT
Start: 2018-06-22 | End: 2018-06-27 | Stop reason: HOSPADM

## 2018-06-22 RX ADMIN — Medication 3 ML: at 09:06

## 2018-06-22 RX ADMIN — MUPIROCIN: 20 OINTMENT TOPICAL at 08:06

## 2018-06-22 RX ADMIN — ALBUTEROL SULFATE 2.5 MG: 2.5 SOLUTION RESPIRATORY (INHALATION) at 08:06

## 2018-06-22 RX ADMIN — ENOXAPARIN SODIUM 40 MG: 100 INJECTION SUBCUTANEOUS at 08:06

## 2018-06-22 RX ADMIN — LURASIDONE HYDROCHLORIDE 80 MG: 40 TABLET, FILM COATED ORAL at 08:06

## 2018-06-22 RX ADMIN — DOXYCYCLINE 100 MG: 50 CAPSULE ORAL at 08:06

## 2018-06-22 RX ADMIN — FUROSEMIDE 80 MG: 10 INJECTION, SOLUTION INTRAMUSCULAR; INTRAVENOUS at 09:06

## 2018-06-22 RX ADMIN — POTASSIUM CHLORIDE 20 MEQ: 1500 TABLET, EXTENDED RELEASE ORAL at 08:06

## 2018-06-22 RX ADMIN — RIOCIGUAT 1 MG: 1 TABLET, FILM COATED ORAL at 08:06

## 2018-06-22 RX ADMIN — BUSPIRONE HYDROCHLORIDE 15 MG: 5 TABLET ORAL at 08:06

## 2018-06-22 RX ADMIN — LAMOTRIGINE 100 MG: 100 TABLET ORAL at 08:06

## 2018-06-22 NOTE — SUBJECTIVE & OBJECTIVE
Past Medical History:   Diagnosis Date    Bipolar disorder     CHF (congestive heart failure)     Dyslipidemia     GERD (gastroesophageal reflux disease)     Hx of tracheostomy     Decanulated / surgically removed in 2013? Does not currently have tracheostomy    Hypertension     Hypothyroidism     Oxygen dependent     3L around the clock     Pulmonary HTN     Pulmonary hypertension, group 1 10/4/2017    Pulmonary nodules 10/10/2017    Respiratory failure, chronic        Past Surgical History:   Procedure Laterality Date    BACK SURGERY      PERICARDIAL WINDOW  2013    [place] LEFT HEART CATH,PERCUTANEOUS      Cardiac Cath, Left Heart    TUBAL LIGATION         Review of patient's allergies indicates:   Allergen Reactions    Sulfa (sulfonamide antibiotics) Rash       Current Facility-Administered Medications   Medication    doxycycline capsule 100 mg    enoxaparin injection 40 mg    furosemide injection 80 mg    sodium chloride 0.9% flush 3 mL    treprostinil (REMODULIN) 6,000,000 ng in sodium chloride 0.9% 100 mL infusion     Current Outpatient Prescriptions   Medication Sig    albuterol (PROVENTIL) 2.5 mg /3 mL (0.083 %) nebulizer solution Take 2.5 mg by nebulization every 6 (six) hours as needed for Wheezing. Rescue    ALPRAZolam (XANAX) 1 MG tablet Take 0.5 tablets (0.5 mg total) by mouth 3 (three) times daily as needed for Anxiety.    amlodipine (NORVASC) 5 MG tablet Take 5 mg by mouth once daily.    bumetanide (BUMEX) 2 MG tablet Take 1 tablet (2 mg total) by mouth 2 (two) times daily.    busPIRone (BUSPAR) 15 MG tablet Take 15 mg by mouth 3 (three) times daily.    desvenlafaxine succinate (PRISTIQ) 100 MG Tb24 Take 100 mg by mouth once daily.    doxycycline (MONODOX) 100 MG capsule Take 1 capsule by mouth 2 (two) times daily.    fluticasone-vilanterol (BREO) 100-25 mcg/dose diskus inhaler Inhale 1 puff into the lungs once daily. Controller    furosemide (LASIX) 10 mg/mL injection  Inject 8 mLs (80 mg total) into the vein twice a week. As needed for weight gain of > 5 lbs or SOB    gabapentin (NEURONTIN) 100 MG capsule Take 1 capsule (100 mg total) by mouth 3 (three) times daily.    hydrocodone-acetaminophen 10-325mg (NORCO)  mg Tab Take 1 tablet by mouth every 8 (eight) hours as needed for Pain.    lamotrigine (LAMICTAL) 100 MG tablet Take 100 mg by mouth 2 (two) times daily.    LATUDA 80 mg Tab tablet Take 80 mg by mouth every evening.    levothyroxine (SYNTHROID) 75 MCG tablet Take 75 mcg by mouth once daily.    metOLazone (ZAROXOLYN) 2.5 MG tablet Take 2.5 mg by mouth once daily. Only take per MD order.    mupirocin (BACTROBAN) 2 % ointment Apply 1 application topically 2 (two) times daily. Apply to affected area    ondansetron (ZOFRAN-ODT) 8 MG TbDL Take 1 tablet (8 mg total) by mouth every 8 (eight) hours as needed.    pantoprazole (PROTONIX) 40 MG tablet Take 40 mg by mouth once daily.    potassium chloride SA (K-DUR,KLOR-CON) 20 MEQ tablet Take 1 tablet (20 mEq total) by mouth 2 (two) times daily.    pravastatin (PRAVACHOL) 40 MG tablet Take 40 mg by mouth once daily.    riociguat (ADEMPAS) 1 mg Tab Take 1 tablet (1 mg total) by mouth 3 (three) times daily.    sodium chloride 0.9% 0.9 % SolP 100 mL with treprostinil 1 mg/mL Soln 6,000,000 ng Inject 2,380 ng/min into the vein continuous.    spironolactone (ALDACTONE) 100 MG tablet Take 1 tablet (100 mg total) by mouth once daily.    umeclidinium 62.5 mcg/actuation DsDv Inhale 62.5 mcg into the lungs once daily. Controller     Family History     Problem Relation (Age of Onset)    Cancer Mother, Sister    Hypertension Mother, Father        Social History Main Topics    Smoking status: Former Smoker     Types: Cigarettes     Start date: 1/1/1979     Quit date: 1/5/1997    Smokeless tobacco: Never Used    Alcohol use No    Drug use: No    Sexual activity: No     Review of Systems   Constitutional: Positive for  fatigue and unexpected weight change. Negative for fever.   HENT: Negative for trouble swallowing and voice change.    Respiratory: Positive for shortness of breath. Negative for apnea, cough and wheezing.    Cardiovascular: Positive for leg swelling. Negative for chest pain and palpitations.   Gastrointestinal: Positive for abdominal distention. Negative for abdominal pain, constipation, nausea and vomiting.   Genitourinary: Negative for difficulty urinating.   Musculoskeletal: Negative for joint swelling.   Skin: Positive for rash.   Neurological: Negative for dizziness and weakness.   Psychiatric/Behavioral: Negative for agitation.     Objective:     Vital Signs (Most Recent):    Vital Signs (24h Range):  Temp:  [97.4 °F (36.3 °C)] 97.4 °F (36.3 °C)  Pulse:  [98] 98  Resp:  [20] 20  SpO2:  [92 %] 92 %  BP: (107)/(57) 107/57     Patient Vitals for the past 72 hrs (Last 3 readings):   Weight   06/22/18 1606 93.4 kg (205 lb 14.6 oz)     Body mass index is 37.66 kg/m².    No intake or output data in the 24 hours ending 06/22/18 1633    Physical Exam   Constitutional: She is oriented to person, place, and time.   Morbidly obese   HENT:   Head: Normocephalic.   Neck: No JVD present.   Cardiovascular: Normal rate, regular rhythm and normal heart sounds.    + P2   Pulmonary/Chest: Effort normal and breath sounds normal. She has no rales.   Abdominal: Soft. Bowel sounds are normal. She exhibits distension. She exhibits no mass. There is no tenderness.   Musculoskeletal: She exhibits edema. She exhibits no tenderness.   Neurological: She is alert and oriented to person, place, and time.   Skin: Skin is warm and dry. Rash (R-arm skin break. Volar aspect) noted.   Psychiatric: She has a normal mood and affect.   Nursing note and vitals reviewed.      Significant Labs:  CBC:    Recent Labs  Lab 06/22/18  1155   WBC 5.28   RBC 4.60   HGB 11.7*   HCT 38.2   *   MCV 83   MCH 25.4*   MCHC 30.6*     BNP:    Recent Labs  Lab  06/22/18  1155   *     CMP:    Recent Labs  Lab 06/22/18  1155   GLU 90   CALCIUM 9.8   ALBUMIN 3.8   PROT 6.6      K 3.8   CO2 25      BUN 19   CREATININE 1.8*   ALKPHOS 210*   ALT 10   AST 12   BILITOT 0.7      Coagulation:   No results for input(s): PT, INR, APTT in the last 168 hours.  LDH:  No results for input(s): LDH in the last 72 hours.  Microbiology:  Microbiology Results (last 7 days)     ** No results found for the last 168 hours. **          I have reviewed all pertinent labs within the past 24 hours.    Diagnostic Results:

## 2018-06-22 NOTE — ASSESSMENT & PLAN NOTE
RHF secondary to PH and SHERIE compounded by [facility]  Continue remodulin 75ng as current and adempas 1mg TID  continue metolazone per home dose  Start lasix 80mg IV push TID  Low salt diet, I/O and weight monitoring.  Monitor electrolytes and keep K >> 4 and mag >2  GDMT- Aldactone only. No ACEI due to rosalie

## 2018-06-22 NOTE — ASSESSMENT & PLAN NOTE
- continue remodulin as current. adempas as above  - oxygen use at baseline 3L continuous  - I/O and weight monitoring

## 2018-06-22 NOTE — HPI
Ms. Smith is a 57 y.o. Female with a past medical history of PHTN WHO Group 1 on IV Remodulin 75ng, chronic hypoxic respiratory failure, chronic RV failure on home O2 (3L) continous and BiPAP at night, recent admission to hospital with leg cellulitis currently on abx who presented to the clinic today with with worsening SOB , increased weight and abd girth. Patient who is on Bumex 1mg daily, metolazone every other day and lasix push weekly  started noticing weight gain 5 days ago. Her appetite has also been decreasing despite not having nausea and vomiting. Her abdomen feel full all the time and is more distended than usual. Having difficulty laying down and sleeping on recliner  She does not know whether she consistently take metolazone and mention that visiting nurse keep tract of her medication. She also mention compliance with fluids and salt. She report about 10lbs weight gain and unequal edema to the legs with R more than left. She denies decrease in UOP, color change to the urine, cough or voice change. She was discharge ten days ago after a week of admission secondary to LE cellulitis and has been taking oral doxcycline. Her clinic visit labs showed acute FREDDY with creatinine level at 1.8 from 1.5  And BNP of 800s from 600s ten days ago. She was recommended to have in patient diuretics and medication dosage adjustment.  .

## 2018-06-22 NOTE — PROGRESS NOTES
"LUNG TRANSPLANT PRE FOLLOW-UP    Referring Physician:     Reason for Visit:  Pre-lung transplant follow-up.         Date of Initial Evaluation: 4/19/2018                                                                                             History of Present Illness: Sue Smith is a 57 y.o. female who is on 3L of oxygen. She is on BIPAP.  Her New York Heart Association Class is III and a Karnofsky score of 50% - Requires considerable assistance and frequent medical care. She is not diabetic.  Patient recently admitted to \[facility]\"" service from 6/2/2018 through 6/10/2018 for BLE cellulitis secondary to venous stasis and hypervolemia secondary to acute on chronic right heart failure. Patient dischargeed home to complete 7 day course of doxycycline and continued on PH meds of Remodulin and Adempas as outpatient. Patient recently re-started another 7 day course of doxycycline per PCP for continued concern of BLE cellulitis. Patient reports worsening BLE edema and increasing abdominal girth since last hospital discharge on 6/10/18.  Patient reports constant SOB, increasing LANE, orthopnea, and PND. Patient reports dry cough when she becomes dyspneic. Denies chest pain, palpitations. Patient reports receiving IV Lasix 80 mg PRN BLE edema last week but states that her urine output was significantly decreased from prior lasix injections. Patient currently being seen by home health 2x a week.    Patient interested in lung transplant at this time but states current concerns of having a family member say with her daily in apartments for 3 months following transplant and transportation from apartments to [facility] as she is unable to drive.       BP (!) 107/57   Pulse 98   Temp 97.4 °F (36.3 °C) (Oral)   Resp 20   Ht 5' 4" (1.626 m)   Wt 93.9 kg (207 lb)   SpO2 (!) 92% Comment: 3 liters  BMI 35.53 kg/m²   Review of Systems   Constitutional: Negative for chills, diaphoresis, fever, malaise/fatigue and weight loss.   HENT: " Negative for congestion, ear discharge, ear pain, hearing loss, nosebleeds, sinus pain, sore throat and tinnitus.    Eyes: Negative for blurred vision, double vision, photophobia, pain, discharge and redness.   Respiratory: Positive for cough and shortness of breath. Negative for hemoptysis, sputum production, wheezing and stridor.    Cardiovascular: Positive for orthopnea, leg swelling (bilateral) and PND. Negative for chest pain, palpitations and claudication.   Gastrointestinal: Positive for abdominal pain (constant secondary to ascites). Negative for blood in stool, constipation, diarrhea, heartburn, melena, nausea and vomiting.   Genitourinary: Negative for dysuria, flank pain, frequency, hematuria and urgency.   Musculoskeletal: Positive for falls (approximately 1 month ago). Negative for back pain, joint pain, myalgias and neck pain.   Skin: Positive for rash (BLE erythema). Negative for itching.   Neurological: Negative for dizziness, tingling, tremors, sensory change, speech change, focal weakness, seizures, loss of consciousness, weakness and headaches.   Endo/Heme/Allergies: Negative for environmental allergies and polydipsia. Does not bruise/bleed easily.   Psychiatric/Behavioral: Negative for depression, hallucinations, memory loss, substance abuse and suicidal ideas. The patient is not nervous/anxious and does not have insomnia.      Objective:   Physical Exam   Constitutional: She is oriented to person, place, and time and well-developed, well-nourished, and in no distress.   HENT:   Head: Normocephalic and atraumatic.   Mouth/Throat: Oropharynx is clear and moist.   Eyes: Conjunctivae and EOM are normal.   Neck: Normal range of motion. Neck supple.   Cardiovascular: Normal rate and regular rhythm.  Exam reveals no gallop and no friction rub.    No murmur heard.  Prominent P2     Pulmonary/Chest: Effort normal. She has decreased breath sounds in the right middle field, the right lower field, the left  middle field and the left lower field. She has no wheezes. She has no rhonchi. She has no rales.   Nasal cannula in place     Abdominal: She exhibits distension (severe) and ascites (severe; caput medusae noted in lower quadrants). Bowel sounds are hypoactive. There is no tenderness. There is no guarding.   Musculoskeletal: She exhibits edema (BLE to knees).   Neurological: She is alert and oriented to person, place, and time. GCS score is 15.   Skin: Skin is warm and dry. There is erythema (BLE erythema, Clean wound bandage on right anterior shin).   Right-sided port    Psychiatric: Mood, memory, affect and judgment normal.     Labs:  No visits with results within 7 Day(s) from this visit.   Latest known visit with results is:   Admission on 06/02/2018, Discharged on 06/10/2018   No results displayed because visit has over 200 results.          Pulmonary Function Tests 6/22/2018   FVC 2.02   FEV1 1.43   FVC% 65   FEV1% 57   FEF 25-75 0.9   FEF 25-75% 34     Other:   6MW 6/22/2018 3/28/2018 1/26/2018 12/8/2017 10/24/2017 10/9/2017   6MWT Status completed without stopping completed without stopping completed without stopping completed without stopping completed without stopping completed without stopping   Patient Reported Dyspnea;Lightheadedness Dyspnea;Dizziness;Lightheadedness Dyspnea;Lightheadedness Dyspnea;Lightheadedness Dyspnea Dyspnea;Lightheadedness   Was O2 used? Yes Yes Yes Yes Yes Yes   Delivery Method Cannula;Pull Tank;Continuous Flow Cannula;Shoulder Carry;Demand Flow Shoulder Carry;Cannula;Demand Flow Cannula;Pull Tank;Continuous Flow Cannula;Shoulder Carry;Demand Flow Cannula;Continuous Flow   6MW Distance walked (feet) 700 1056 9238 693 5203 1015   Distance walked (meters) 213.36 321.87 304.8 273.41 365.76 309.37   Did patient stop? No No No No No No   Oxygen Saturation 90 91 97 96 96 96   Supplemental Oxygen 6 L/M 4 L/M 3 L/M 3 L/M 3 L/M 3 L/M   Heart Rate 98 86 91 86 80 90   Blood Pressure 102/56  109/66 115/55 133/63 134/69 123/65   Erasmo Dyspnea Rating  heavy somewhat heavy nothing at all moderate nothing at all nothing at all   Oxygen Saturation 84 85 89 89 93 93   Supplemental Oxygen 6 L/M Room Air 3 L/M 3 L/M 3 L/M 3 L/M   Heart Rate 105 103 110 102 108 116   Blood Pressure 118/59 119/63 113/59 112/63 149/71 117/59   Erasmo Dyspnea Rating  very heavy heavy light heavy moderate moderate   Recovery Time (seconds) 121 164 74 160 98 52   Oxygen Saturation 90 93 96 92 94 95   Supplemental Oxygen 6 L/M 4 L/M 3 L/M 3 L/M 3 L/M 3 L/M   Heart Rate 96 90 101 87 89 104         Assessment:-  1. Acute on chronic right heart failure    2. Primary pulmonary hypertension    3. Chronic respiratory failure with hypoxia    4. Lung transplant candidate      Plan:   1. Patient found to be severely volume overloaded with abdominal ascites and BLE edema. Patient currently on Bumex 2mg BID, Spironolactone 1 mg daily, Metolazone 2.5 mg daily, and PRN Lasix 80 mg IV. Discussed with Dr. Truong and Dr. Cullen of [facility] to direct admit patient for further diuresis and monitoring.     2. Currently on Remodulin 75 ng and Adempas 1 mg TID per [facility]. Patient follows with Dr. Cullen as outpatient.     3. Spirometry reviewed. FEV1 1.43 (57%), FVC 2.02 (65%). Continue supplemental oxygen of 3L NC. Continue with BiPAP nightly. Continue Breo, Incruse, and Albuterol PRN.      4. Patient meets disease specific criteria for lung transplant. Current barriers to lung transplant evaluation at this time include severe right heart failure with hypervolemia and BMI 35.  6MWT today with 700 feet completed without stopping. Discussed that optimal 6MWT distance is 800 feet.  Will continue to defer patient for lung transplant evaluation at this time. [facility] to consider palliative care consult while patient is admitted.     Isela Warren PA-C  Lung Transplant

## 2018-06-22 NOTE — LETTER
June 22, 2018                     [facility]  [address]  [facility] [address]  Phone: [phone]   Patient: Sue Smith   MR Number: [id number]   YOB: 1960   Date of Visit: 6/22/2018       Dear      Thank you for referring Sue Smith to me for evaluation. Attached you will find relevant portions of my assessment and plan of care.    If you have questions, please do not hesitate to call me. I look forward to following Sue Smith along with you.    Sincerely,    Alan Truong MD    Enclosure    If you would like to receive this communication electronically, please contact [email or web address] or [phone] to request [employer] access.    [employer] is a tool which provides read-only access to select patient information with whom you have a relationship. Its easy to use and provides real time access to review your patients record including encounter summaries, notes, results, and demographic information.    If you feel you have received this communication in error or would no longer like to receive these types of communications, please e-mail [email or web address]

## 2018-06-22 NOTE — H&P
[facility]  Heart Transplant  H&P    Patient Name: Sue Smith  MRN: [id number]  Admission Date: (Not on file)  Attending Physician: Lola Paul MD  Primary Care Provider: Paddy Guerrier DO  Principal Problem:Acute on chronic diastolic heart failure    Subjective:     History of Present Illness:  Ms. Smith is a 57 y.o. Female with a past medical history of PHTN WHO Group 1 on IV Remodulin 75ng, chronic hypoxic respiratory failure, chronic RV failure on home O2 (3L) continous and BiPAP at night, recent admission to hospital with leg cellulitis currently on abx who presented to the clinic today with with worsening SOB , increased weight and abd girth. Patient who is on Bumex 1mg daily, metolazone every other day and lasix push weekly  started noticing weight gain 5 days ago. Her appetite has also been decreasing despite not having nausea and vomiting. Her abdomen feel full all the time and is more distended than usual. Having difficulty laying down and sleeping on recliner  She does not know whether she consistently take metolazone and mention that visiting nurse keep tract of her medication. She also mention compliance with fluids and salt. She report about 10lbs weight gain and unequal edema to the legs with R more than left. She denies decrease in UOP, color change to the urine, cough or voice change. She was discharge ten days ago after a week of admission secondary to LE cellulitis and has been taking oral doxcycline. Her clinic visit labs showed acute FREDDY with creatinine level at 1.8 from 1.5  And BNP of 800s from 600s ten days ago. She was recommended to have in patient diuretics and medication dosage adjustment.  .    Past Medical History:   Diagnosis Date    Bipolar disorder     CHF (congestive heart failure)     Dyslipidemia     GERD (gastroesophageal reflux disease)     Hx of tracheostomy     Decanulated / surgically removed in 2013? Does not currently have tracheostomy     Hypertension     Hypothyroidism     Oxygen dependent     3L around the clock     Pulmonary HTN     Pulmonary hypertension, group 1 10/4/2017    Pulmonary nodules 10/10/2017    Respiratory failure, chronic        Past Surgical History:   Procedure Laterality Date    BACK SURGERY      PERICARDIAL WINDOW  2013    AR LEFT HEART CATH,PERCUTANEOUS      Cardiac Cath, Left Heart    TUBAL LIGATION         Review of patient's allergies indicates:   Allergen Reactions    Sulfa (sulfonamide antibiotics) Rash       Current Facility-Administered Medications   Medication    doxycycline capsule 100 mg    enoxaparin injection 40 mg    furosemide injection 80 mg    sodium chloride 0.9% flush 3 mL    treprostinil (REMODULIN) 6,000,000 ng in sodium chloride 0.9% 100 mL infusion     Current Outpatient Prescriptions   Medication Sig    albuterol (PROVENTIL) 2.5 mg /3 mL (0.083 %) nebulizer solution Take 2.5 mg by nebulization every 6 (six) hours as needed for Wheezing. Rescue    ALPRAZolam (XANAX) 1 MG tablet Take 0.5 tablets (0.5 mg total) by mouth 3 (three) times daily as needed for Anxiety.    amlodipine (NORVASC) 5 MG tablet Take 5 mg by mouth once daily.    bumetanide (BUMEX) 2 MG tablet Take 1 tablet (2 mg total) by mouth 2 (two) times daily.    busPIRone (BUSPAR) 15 MG tablet Take 15 mg by mouth 3 (three) times daily.    desvenlafaxine succinate (PRISTIQ) 100 MG Tb24 Take 100 mg by mouth once daily.    doxycycline (MONODOX) 100 MG capsule Take 1 capsule by mouth 2 (two) times daily.    fluticasone-vilanterol (BREO) 100-25 mcg/dose diskus inhaler Inhale 1 puff into the lungs once daily. Controller    furosemide (LASIX) 10 mg/mL injection Inject 8 mLs (80 mg total) into the vein twice a week. As needed for weight gain of > 5 lbs or SOB    gabapentin (NEURONTIN) 100 MG capsule Take 1 capsule (100 mg total) by mouth 3 (three) times daily.    hydrocodone-acetaminophen 10-325mg (NORCO)  mg Tab Take 1  tablet by mouth every 8 (eight) hours as needed for Pain.    lamotrigine (LAMICTAL) 100 MG tablet Take 100 mg by mouth 2 (two) times daily.    LATUDA 80 mg Tab tablet Take 80 mg by mouth every evening.    levothyroxine (SYNTHROID) 75 MCG tablet Take 75 mcg by mouth once daily.    metOLazone (ZAROXOLYN) 2.5 MG tablet Take 2.5 mg by mouth once daily. Only take per MD order.    mupirocin (BACTROBAN) 2 % ointment Apply 1 application topically 2 (two) times daily. Apply to affected area    ondansetron (ZOFRAN-ODT) 8 MG TbDL Take 1 tablet (8 mg total) by mouth every 8 (eight) hours as needed.    pantoprazole (PROTONIX) 40 MG tablet Take 40 mg by mouth once daily.    potassium chloride SA (K-DUR,KLOR-CON) 20 MEQ tablet Take 1 tablet (20 mEq total) by mouth 2 (two) times daily.    pravastatin (PRAVACHOL) 40 MG tablet Take 40 mg by mouth once daily.    riociguat (ADEMPAS) 1 mg Tab Take 1 tablet (1 mg total) by mouth 3 (three) times daily.    sodium chloride 0.9% 0.9 % SolP 100 mL with treprostinil 1 mg/mL Soln 6,000,000 ng Inject 2,380 ng/min into the vein continuous.    spironolactone (ALDACTONE) 100 MG tablet Take 1 tablet (100 mg total) by mouth once daily.    umeclidinium 62.5 mcg/actuation DsDv Inhale 62.5 mcg into the lungs once daily. Controller     Family History     Problem Relation (Age of Onset)    Cancer Mother, Sister    Hypertension Mother, Father        Social History Main Topics    Smoking status: Former Smoker     Types: Cigarettes     Start date: 1/1/1979     Quit date: 1/5/1997    Smokeless tobacco: Never Used    Alcohol use No    Drug use: No    Sexual activity: No     Review of Systems   Constitutional: Positive for fatigue and unexpected weight change. Negative for fever.   HENT: Negative for trouble swallowing and voice change.    Respiratory: Positive for shortness of breath. Negative for apnea, cough and wheezing.    Cardiovascular: Positive for leg swelling. Negative for chest pain  and palpitations.   Gastrointestinal: Positive for abdominal distention. Negative for abdominal pain, constipation, nausea and vomiting.   Genitourinary: Negative for difficulty urinating.   Musculoskeletal: Negative for joint swelling.   Skin: Positive for rash.   Neurological: Negative for dizziness and weakness.   Psychiatric/Behavioral: Negative for agitation.     Objective:     Vital Signs (Most Recent):    Vital Signs (24h Range):  Temp:  [97.4 °F (36.3 °C)] 97.4 °F (36.3 °C)  Pulse:  [98] 98  Resp:  [20] 20  SpO2:  [92 %] 92 %  BP: (107)/(57) 107/57     Patient Vitals for the past 72 hrs (Last 3 readings):   Weight   06/22/18 1606 93.4 kg (205 lb 14.6 oz)     Body mass index is 37.66 kg/m².    No intake or output data in the 24 hours ending 06/22/18 1633    Physical Exam   Constitutional: She is oriented to person, place, and time.   Morbidly obese   HENT:   Head: Normocephalic.   Neck: No JVD present.   Cardiovascular: Normal rate, regular rhythm and normal heart sounds.    + P2   Pulmonary/Chest: Effort normal and breath sounds normal. She has no rales.   Abdominal: Soft. Bowel sounds are normal. She exhibits distension. She exhibits no mass. There is no tenderness.   Musculoskeletal: She exhibits edema. She exhibits no tenderness.   Neurological: She is alert and oriented to person, place, and time.   Skin: Skin is warm and dry. Rash (R-arm skin break. Volar aspect) noted.   Psychiatric: She has a normal mood and affect.   Nursing note and vitals reviewed.      Significant Labs:  CBC:    Recent Labs  Lab 06/22/18  1155   WBC 5.28   RBC 4.60   HGB 11.7*   HCT 38.2   *   MCV 83   MCH 25.4*   MCHC 30.6*     BNP:    Recent Labs  Lab 06/22/18  1155   *     CMP:    Recent Labs  Lab 06/22/18  1155   GLU 90   CALCIUM 9.8   ALBUMIN 3.8   PROT 6.6      K 3.8   CO2 25      BUN 19   CREATININE 1.8*   ALKPHOS 210*   ALT 10   AST 12   BILITOT 0.7      Coagulation:   No results for input(s): PT,  INR, APTT in the last 168 hours.  LDH:  No results for input(s): LDH in the last 72 hours.  Microbiology:  Microbiology Results (last 7 days)     ** No results found for the last 168 hours. **          I have reviewed all pertinent labs within the past 24 hours.    Diagnostic Results:        Assessment/Plan:     * Acute on chronic diastolic heart failure    RHF secondary to PH and SHERIE compounded by [facility]  Elevated liver enzymes, alk phos  Continue remodulin 75ng as current and adempas 1mg TID  continue metolazone per home dose  Start lasix 80mg IV push TID  Low salt diet, I/O and weight monitoring.  Monitor electrolytes and keep K >> 4 and mag >2  GDMT- Aldactone only. No ACEI due to rosalie        Lower extremity cellulitis    - on doxycycline 100mg BID for three more doses.   - wound care/dressing change per nurse.        Bipolar affective disorder    - has multiple antipsychotic currently.  - will continue without change        Essential hypertension    - Well controlled currently with amlodipine. Monitor closely        Pulmonary hypertension, group 1    - continue remodulin as current. adempas as above  - oxygen use at baseline 3L continuous  - I/O and weight monitoring            Discussed plan of care with Dr. Humberto Peñaloza MD  Heart Transplant  [facility]

## 2018-06-23 LAB
ANION GAP SERPL CALC-SCNC: 11 MMOL/L
BUN SERPL-MCNC: 16 MG/DL
CALCIUM SERPL-MCNC: 9 MG/DL
CHLORIDE SERPL-SCNC: 107 MMOL/L
CO2 SERPL-SCNC: 23 MMOL/L
CREAT SERPL-MCNC: 1.3 MG/DL
EST. GFR  (AFRICAN AMERICAN): 53 ML/MIN/1.73 M^2
EST. GFR  (NON AFRICAN AMERICAN): 46 ML/MIN/1.73 M^2
GLUCOSE SERPL-MCNC: 99 MG/DL
MAGNESIUM SERPL-MCNC: 1.9 MG/DL
POTASSIUM SERPL-SCNC: 3.3 MMOL/L
SODIUM SERPL-SCNC: 141 MMOL/L

## 2018-06-23 PROCEDURE — 63600175 PHARM REV CODE 636 W HCPCS: Mod: NTX | Performed by: INTERNAL MEDICINE

## 2018-06-23 PROCEDURE — 25000003 PHARM REV CODE 250: Mod: NTX | Performed by: INTERNAL MEDICINE

## 2018-06-23 PROCEDURE — 36415 COLL VENOUS BLD VENIPUNCTURE: CPT | Mod: NTX

## 2018-06-23 PROCEDURE — 20600001 HC STEP DOWN PRIVATE ROOM: Mod: NTX

## 2018-06-23 PROCEDURE — 94660 CPAP INITIATION&MGMT: CPT | Mod: NTX

## 2018-06-23 PROCEDURE — 99233 SBSQ HOSP IP/OBS HIGH 50: CPT | Mod: NTX,,, | Performed by: INTERNAL MEDICINE

## 2018-06-23 PROCEDURE — 27000190 HC CPAP FULL FACE MASK W/VALVE: Mod: NTX

## 2018-06-23 PROCEDURE — 25000003 PHARM REV CODE 250: Mod: NTX | Performed by: STUDENT IN AN ORGANIZED HEALTH CARE EDUCATION/TRAINING PROGRAM

## 2018-06-23 PROCEDURE — 25000242 PHARM REV CODE 250 ALT 637 W/ HCPCS: Mod: NTX | Performed by: INTERNAL MEDICINE

## 2018-06-23 PROCEDURE — 94761 N-INVAS EAR/PLS OXIMETRY MLT: CPT | Mod: NTX

## 2018-06-23 PROCEDURE — 83735 ASSAY OF MAGNESIUM: CPT | Mod: NTX

## 2018-06-23 PROCEDURE — 63600175 PHARM REV CODE 636 W HCPCS: Mod: JG,NTX | Performed by: STUDENT IN AN ORGANIZED HEALTH CARE EDUCATION/TRAINING PROGRAM

## 2018-06-23 PROCEDURE — 27000221 HC OXYGEN, UP TO 24 HOURS: Mod: NTX

## 2018-06-23 PROCEDURE — 94640 AIRWAY INHALATION TREATMENT: CPT | Mod: NTX

## 2018-06-23 PROCEDURE — 99900035 HC TECH TIME PER 15 MIN (STAT): Mod: NTX

## 2018-06-23 PROCEDURE — A4216 STERILE WATER/SALINE, 10 ML: HCPCS | Mod: NTX | Performed by: INTERNAL MEDICINE

## 2018-06-23 PROCEDURE — 80048 BASIC METABOLIC PNL TOTAL CA: CPT | Mod: NTX

## 2018-06-23 RX ORDER — POTASSIUM CHLORIDE 20 MEQ/1
40 TABLET, EXTENDED RELEASE ORAL ONCE
Status: COMPLETED | OUTPATIENT
Start: 2018-06-23 | End: 2018-06-23

## 2018-06-23 RX ADMIN — TREPROSTINIL 75 NG/KG/MIN: 20 INJECTION, SOLUTION INTRAVENOUS; SUBCUTANEOUS at 12:06

## 2018-06-23 RX ADMIN — MUPIROCIN: 20 OINTMENT TOPICAL at 09:06

## 2018-06-23 RX ADMIN — FUROSEMIDE 20 MG/HR: 10 INJECTION, SOLUTION INTRAMUSCULAR; INTRAVENOUS at 11:06

## 2018-06-23 RX ADMIN — METOLAZONE 2.5 MG: 2.5 TABLET ORAL at 09:06

## 2018-06-23 RX ADMIN — RIOCIGUAT 1 MG: 1 TABLET, FILM COATED ORAL at 09:06

## 2018-06-23 RX ADMIN — MUPIROCIN: 20 OINTMENT TOPICAL at 08:06

## 2018-06-23 RX ADMIN — RIOCIGUAT 1 MG: 1 TABLET, FILM COATED ORAL at 08:06

## 2018-06-23 RX ADMIN — AMLODIPINE BESYLATE 5 MG: 5 TABLET ORAL at 09:06

## 2018-06-23 RX ADMIN — DOXYCYCLINE 100 MG: 50 CAPSULE ORAL at 09:06

## 2018-06-23 RX ADMIN — FUROSEMIDE 20 MG/HR: 10 INJECTION, SOLUTION INTRAMUSCULAR; INTRAVENOUS at 08:06

## 2018-06-23 RX ADMIN — ALBUTEROL SULFATE 2.5 MG: 2.5 SOLUTION RESPIRATORY (INHALATION) at 04:06

## 2018-06-23 RX ADMIN — HYDROCODONE BITARTRATE AND ACETAMINOPHEN 1 TABLET: 10; 325 TABLET ORAL at 08:06

## 2018-06-23 RX ADMIN — POTASSIUM CHLORIDE 20 MEQ: 1500 TABLET, EXTENDED RELEASE ORAL at 09:06

## 2018-06-23 RX ADMIN — LAMOTRIGINE 100 MG: 100 TABLET ORAL at 08:06

## 2018-06-23 RX ADMIN — ALBUTEROL SULFATE 2.5 MG: 2.5 SOLUTION RESPIRATORY (INHALATION) at 08:06

## 2018-06-23 RX ADMIN — ENOXAPARIN SODIUM 40 MG: 100 INJECTION SUBCUTANEOUS at 05:06

## 2018-06-23 RX ADMIN — LEVOTHYROXINE SODIUM 75 MCG: 25 TABLET ORAL at 09:06

## 2018-06-23 RX ADMIN — DESVENLAFAXINE SUCCINATE 100 MG: 50 TABLET, FILM COATED, EXTENDED RELEASE ORAL at 09:06

## 2018-06-23 RX ADMIN — ALBUTEROL SULFATE 2.5 MG: 2.5 SOLUTION RESPIRATORY (INHALATION) at 11:06

## 2018-06-23 RX ADMIN — PRAVASTATIN SODIUM 40 MG: 20 TABLET ORAL at 09:06

## 2018-06-23 RX ADMIN — POTASSIUM CHLORIDE 40 MEQ: 1500 TABLET, EXTENDED RELEASE ORAL at 09:06

## 2018-06-23 RX ADMIN — ALBUTEROL SULFATE 2.5 MG: 2.5 SOLUTION RESPIRATORY (INHALATION) at 09:06

## 2018-06-23 RX ADMIN — Medication 3 ML: at 02:06

## 2018-06-23 RX ADMIN — SPIRONOLACTONE 100 MG: 50 TABLET, FILM COATED ORAL at 09:06

## 2018-06-23 RX ADMIN — LAMOTRIGINE 100 MG: 100 TABLET ORAL at 09:06

## 2018-06-23 RX ADMIN — LURASIDONE HYDROCHLORIDE 80 MG: 40 TABLET, FILM COATED ORAL at 08:06

## 2018-06-23 RX ADMIN — ALBUTEROL SULFATE 2.5 MG: 2.5 SOLUTION RESPIRATORY (INHALATION) at 12:06

## 2018-06-23 RX ADMIN — Medication 3 ML: at 06:06

## 2018-06-23 RX ADMIN — POTASSIUM CHLORIDE 20 MEQ: 1500 TABLET, EXTENDED RELEASE ORAL at 08:06

## 2018-06-23 RX ADMIN — BUSPIRONE HYDROCHLORIDE 15 MG: 5 TABLET ORAL at 02:06

## 2018-06-23 RX ADMIN — PANTOPRAZOLE SODIUM 40 MG: 40 TABLET, DELAYED RELEASE ORAL at 09:06

## 2018-06-23 RX ADMIN — BUSPIRONE HYDROCHLORIDE 15 MG: 5 TABLET ORAL at 08:06

## 2018-06-23 RX ADMIN — BUSPIRONE HYDROCHLORIDE 15 MG: 5 TABLET ORAL at 09:06

## 2018-06-23 RX ADMIN — FUROSEMIDE 80 MG: 10 INJECTION, SOLUTION INTRAMUSCULAR; INTRAVENOUS at 09:06

## 2018-06-23 RX ADMIN — DOXYCYCLINE 100 MG: 50 CAPSULE ORAL at 08:06

## 2018-06-23 RX ADMIN — FLUTICASONE FUROATE AND VILANTEROL TRIFENATATE 1 PUFF: 100; 25 POWDER RESPIRATORY (INHALATION) at 09:06

## 2018-06-23 RX ADMIN — RIOCIGUAT 1 MG: 1 TABLET, FILM COATED ORAL at 02:06

## 2018-06-23 RX ADMIN — ALPRAZOLAM 0.5 MG: 0.5 TABLET ORAL at 02:06

## 2018-06-23 NOTE — PLAN OF CARE
Problem: Patient Care Overview  Goal: Plan of Care Review  Outcome: Ongoing (interventions implemented as appropriate)  Patient continues with po abx for leg wound.  Patient remains afebrile with no other s/s of infection.  Central line dressing dry and intact.  Patient ambulating in room without difficulty and steady gait.  Patient Remodulin changed to hospital concentration 120,000,ng/ml, BW=85kg, 75ng/kg/min for 24 rate = 77cc.  Line re-primed without complication.  Lasix drip initiated and patient having adequate urine output.  Patient NPO at this time for abdominal US this pm.  Plan of care reviewed and patient states no further questions regarding plan of care.

## 2018-06-23 NOTE — ASSESSMENT & PLAN NOTE
- continue remodulin 75ng and adempas 1mg TID  - oxygen use at baseline 3L continuous  - I/O and weight monitoring

## 2018-06-23 NOTE — PROCEDURES
Sue Smith is a 57 y.o.  female patient, who presents for a 6 minute walk test ordered by Liliana Booker MD.  The diagnosis is Pulmonary Hypertension.  The patient's BMI is 37.8 kg/m2.  Predicted distance (lower limit of normal) is 315.65 meters.      Test Results:    The test was completed without stopping.  The total time walked was 360 seconds.  During walking, the patient reported:  Dyspnea, Lightheadedness.  The patient used supplemental oxygen during testing.     06/22/2018---------Distance: 213.36 meters (700 feet)     O2 Sat % Supplemental Oxygen Heart Rate Blood Pressure Erasmo Scale   Pre-exercise  (Resting) 90 % 6 L/M 98 bpm 102/56 mmHg 5-6   During Exercise 84 % 6 L/M 105 bpm 118/59 mmHg 7-8   Post-exercise  (Recovery) 90 % 6 L/M  96 bpm       Recovery Time:  121 seconds    Performing nurse/tech:  Lyle GAGE      PREVIOUS STUDY:   03/28/2018---------Distance: 321.87 meters (1056 feet)       O2 Sat % Supplemental Oxygen Heart Rate Blood Pressure Erasmo Scale   Pre-exercise  (Resting) 91 % 4 L/M 86 bpm 109/66 mmHg 4   During Exercise 85 % 4 L/M 103 bpm 119/63 mmHg 5-6   Post-exercise  (Recovery) 93 % 4 L/M  90 bpm           CLINICAL INTERPRETATION:  Six minute walk distance is 213.36 meters (700 feet) with very heavy dyspnea.  During exercise, there was significant desaturation while breathing supplemental oxygen.  Both blood pressure and heart rate remained stable with walking.  The patient reported non-pulmonary symptoms during exercise.  Significant exercise impairment is likely due to desaturation and subjective symptoms.  The patient did complete the study, walking 360 seconds of the 360 second test.  Since the previous study in March 2018, exercise capacity is significantly worse.  Based upon age and body mass index, exercise capacity is less than predicted.

## 2018-06-23 NOTE — ASSESSMENT & PLAN NOTE
RHF secondary to PH and SHERIE compounded by [facility]  Poor response to IV push lasix ( net negative 1 L)  D/c lasix IV push and   Start Lasix 20mg Gtt  ABD US to eval for ascities  Low salt diet, I/O and weight monitoring.  Monitor electrolytes and keep K >> 4 and mag >2  GDMT- Aldactone only. No ACEI due to rosalie

## 2018-06-23 NOTE — HOSPITAL COURSE
6/23  - almost net even last 24hrs.  6/24  - diuresing well. ABD US + for ascities  - Not a candidate for Lung transplant

## 2018-06-23 NOTE — ASSESSMENT & PLAN NOTE
- Back to baseline  - possibly related to pre- renal course  - will monitor closely with diuresis

## 2018-06-23 NOTE — PROGRESS NOTES
Dressing change to R leg changed.  Cleansed with saline, Muprocin applied, tefla overlay with non adherent island dressing applied.  Slough noted, wound care consult placed for recommendations.  Will continue to monitor patient.

## 2018-06-23 NOTE — PLAN OF CARE
Problem: Patient Care Overview  Goal: Plan of Care Review  Outcome: Ongoing (interventions implemented as appropriate)  Pt AAOx4, VSS, afebrile.  Denies c/o pain.  Administered one dose 80mg lasix IVP.  Pt urinated 1700cc clear yellow urine. 1BM overnight.  Pt up and ambulatory independently.  BiPAP worn overnight.  Pt on home dose remodulin through R chest tunneled catheter,  Dressing CDI.  Dressing to R leg changed during shift. Fall risk precautions initiated.  Pt in lowest bed position setting, lighting adjusted, pt to wear nonskid socks when ambulating, side rails up x2.  Pt remain free from falls during shift.   Pt verbalize understanding to call when needed assistance. Call light within reach.  Will continue to monitor.

## 2018-06-23 NOTE — PROGRESS NOTES
Spoke with Dr. Wilson regarding patient's ordered Remodulin dose .  Patient current ordered concentration 60,000ng/ml for 153cc/24 hr which exceeds 24 hours.  New order received to change concentration to 120,000ng/ml which will place 24hr rate at 77cc.  Will continue to monitor patient.

## 2018-06-23 NOTE — SUBJECTIVE & OBJECTIVE
Interval History:     Net negative 1.0 Liters for the last 24 hrs. Still feels abd congestion and distention. Denies worsening SOB or cough. Becomes very emotional at bedside saying she didn't want to stay in hospital and would like to d/c tomorrow.     Continuous Infusions:   furosemide (LASIX) 2 mg/mL infusion (non-titrating) 20 mg/hr (06/23/18 1153)    treprostinil (REMODULIN) infusion 75 ng/kg/min (06/23/18 1250)     Scheduled Meds:   albuterol sulfate  2.5 mg Nebulization Q4H    amLODIPine  5 mg Oral Daily    busPIRone  15 mg Oral TID    desvenlafaxine succinate  100 mg Oral Daily    doxycycline  100 mg Oral BID    enoxaparin  40 mg Subcutaneous Daily    fluticasone-vilanterol  1 puff Inhalation Daily    lamoTRIgine  100 mg Oral BID    levothyroxine  75 mcg Oral Daily    lurasidone  80 mg Oral QHS    metOLazone  2.5 mg Oral Daily    mupirocin   Topical (Top) BID    pantoprazole  40 mg Oral Daily    potassium chloride SA  20 mEq Oral BID    pravastatin  40 mg Oral Daily    riociguat  1 mg Oral TID    sodium chloride 0.9%  3 mL Intravenous Q8H    spironolactone  100 mg Oral Daily    umeclidinium  62.5 mcg Inhalation Daily     PRN Meds:ALPRAZolam, HYDROcodone-acetaminophen, ondansetron    Review of patient's allergies indicates:   Allergen Reactions    Sulfa (sulfonamide antibiotics) Rash     Objective:     Vital Signs (Most Recent):  Temp: 98.3 °F (36.8 °C) (06/23/18 1128)  Pulse: 76 (06/23/18 1132)  Resp: 16 (06/23/18 1132)  BP: 126/77 (06/23/18 1128)  SpO2: (!) 90 % (06/23/18 1132) Vital Signs (24h Range):  Temp:  [96.8 °F (36 °C)-98.4 °F (36.9 °C)] 98.3 °F (36.8 °C)  Pulse:  [74-89] 76  Resp:  [16-22] 16  SpO2:  [90 %-97 %] 90 %  BP: (112-126)/(55-77) 126/77     Patient Vitals for the past 72 hrs (Last 3 readings):   Weight   06/23/18 0400 92.7 kg (204 lb 5.9 oz)   06/22/18 2045 93.9 kg (207 lb 0.2 oz)   06/22/18 1606 93.4 kg (205 lb 14.6 oz)     Body mass index is 37.38  kg/m².      Intake/Output Summary (Last 24 hours) at 06/23/18 1336  Last data filed at 06/23/18 1250   Gross per 24 hour   Intake             1000 ml   Output             2050 ml   Net            -1050 ml       Hemodynamic Parameters:       Telemetry: reviewed and no event noted    Physical Exam   Constitutional: She is oriented to person, place, and time.   Neck: JVD (to the ear. about 16cm h20) present.   Cardiovascular: Normal rate and regular rhythm.  Exam reveals gallop (+ s3).    Pulmonary/Chest: Effort normal and breath sounds normal.   Abdominal: She exhibits distension (very). There is no tenderness. There is no guarding.   Musculoskeletal: She exhibits edema (Leg bilaterally +2). She exhibits no tenderness.   Neurological: She is alert and oriented to person, place, and time.   Skin: Skin is warm and dry.   Skin break left arm   Nursing note and vitals reviewed.      Significant Labs:  CBC:    Recent Labs  Lab 06/22/18  1155   WBC 5.28   RBC 4.60   HGB 11.7*   HCT 38.2   *   MCV 83   MCH 25.4*   MCHC 30.6*     BNP:    Recent Labs  Lab 06/22/18  1155   *     CMP:    Recent Labs  Lab 06/22/18  1155 06/23/18  0440   GLU 90 99   CALCIUM 9.8 9.0   ALBUMIN 3.8  --    PROT 6.6  --     141   K 3.8 3.3*   CO2 25 23    107   BUN 19 16   CREATININE 1.8* 1.3   ALKPHOS 210*  --    ALT 10  --    AST 12  --    BILITOT 0.7  --       Coagulation:   No results for input(s): PT, INR, APTT in the last 168 hours.  LDH:  No results for input(s): LDH in the last 72 hours.  Microbiology:  Microbiology Results (last 7 days)     ** No results found for the last 168 hours. **          I have reviewed all pertinent labs within the past 24 hours.    Estimated Creatinine Clearance: 51.2 mL/min (based on SCr of 1.3 mg/dL).    Diagnostic Results:

## 2018-06-23 NOTE — ASSESSMENT & PLAN NOTE
- has multiple antipsychotic currently.  - Very emotional this morning. Crying at bed side but was able to calm down  - denied SI ideation

## 2018-06-23 NOTE — PROGRESS NOTES
Patient returned from US.  Dinner warmed for patient.  Patient states no other needs at this time.  Will continue to monitor patient.

## 2018-06-23 NOTE — PROGRESS NOTES
[facility]  Heart Transplant  Progress Note    Patient Name: Sue Smith  MRN: [id number]  Admission Date: 6/22/2018  Hospital Length of Stay: 1 days  Attending Physician: Lola Paul MD  Primary Care Provider: Paddy Guerrier DO  Principal Problem:Acute on chronic diastolic heart failure    Subjective:     Interval History:     Net negative 1.0 Liters for the last 24 hrs. Still feels abd congestion and distention. Denies worsening SOB, abd pain, constipation or cough. Becomes very emotional at bedside saying she didn't want to stay in hospital and would like to d/c tomorrow.     Continuous Infusions:   furosemide (LASIX) 2 mg/mL infusion (non-titrating) 20 mg/hr (06/23/18 1153)    treprostinil (REMODULIN) infusion 75 ng/kg/min (06/23/18 1250)     Scheduled Meds:   albuterol sulfate  2.5 mg Nebulization Q4H    amLODIPine  5 mg Oral Daily    busPIRone  15 mg Oral TID    desvenlafaxine succinate  100 mg Oral Daily    doxycycline  100 mg Oral BID    enoxaparin  40 mg Subcutaneous Daily    fluticasone-vilanterol  1 puff Inhalation Daily    lamoTRIgine  100 mg Oral BID    levothyroxine  75 mcg Oral Daily    lurasidone  80 mg Oral QHS    metOLazone  2.5 mg Oral Daily    mupirocin   Topical (Top) BID    pantoprazole  40 mg Oral Daily    potassium chloride SA  20 mEq Oral BID    pravastatin  40 mg Oral Daily    riociguat  1 mg Oral TID    sodium chloride 0.9%  3 mL Intravenous Q8H    spironolactone  100 mg Oral Daily    umeclidinium  62.5 mcg Inhalation Daily     PRN Meds:ALPRAZolam, HYDROcodone-acetaminophen, ondansetron    Review of patient's allergies indicates:   Allergen Reactions    Sulfa (sulfonamide antibiotics) Rash     Objective:     Vital Signs (Most Recent):  Temp: 98.3 °F (36.8 °C) (06/23/18 1128)  Pulse: 76 (06/23/18 1132)  Resp: 16 (06/23/18 1132)  BP: 126/77 (06/23/18 1128)  SpO2: (!) 90 % (06/23/18 1132) Vital Signs (24h Range):  Temp:  [96.8 °F (36 °C)-98.4 °F  (36.9 °C)] 98.3 °F (36.8 °C)  Pulse:  [74-89] 76  Resp:  [16-22] 16  SpO2:  [90 %-97 %] 90 %  BP: (112-126)/(55-77) 126/77     Patient Vitals for the past 72 hrs (Last 3 readings):   Weight   06/23/18 0400 92.7 kg (204 lb 5.9 oz)   06/22/18 2045 93.9 kg (207 lb 0.2 oz)   06/22/18 1606 93.4 kg (205 lb 14.6 oz)     Body mass index is 37.38 kg/m².      Intake/Output Summary (Last 24 hours) at 06/23/18 1336  Last data filed at 06/23/18 1250   Gross per 24 hour   Intake             1000 ml   Output             2050 ml   Net            -1050 ml       Hemodynamic Parameters:       Telemetry: reviewed and no event noted    Physical Exam   Constitutional: She is oriented to person, place, and time.   Neck: JVD (to the ear. about 16cm h20) present.   Cardiovascular: Normal rate and regular rhythm.  Exam reveals gallop (+ s3).    Pulmonary/Chest: Effort normal and breath sounds normal.   Abdominal: She exhibits distension (very). There is no tenderness. There is no guarding.   Musculoskeletal: She exhibits edema (Leg bilaterally +2). She exhibits no tenderness.   Neurological: She is alert and oriented to person, place, and time.   Skin: Skin is warm and dry.   Skin break left arm   Nursing note and vitals reviewed.      Significant Labs:  CBC:    Recent Labs  Lab 06/22/18  1155   WBC 5.28   RBC 4.60   HGB 11.7*   HCT 38.2   *   MCV 83   MCH 25.4*   MCHC 30.6*     BNP:    Recent Labs  Lab 06/22/18  1155   *     CMP:    Recent Labs  Lab 06/22/18  1155 06/23/18  0440   GLU 90 99   CALCIUM 9.8 9.0   ALBUMIN 3.8  --    PROT 6.6  --     141   K 3.8 3.3*   CO2 25 23    107   BUN 19 16   CREATININE 1.8* 1.3   ALKPHOS 210*  --    ALT 10  --    AST 12  --    BILITOT 0.7  --       Coagulation:   No results for input(s): PT, INR, APTT in the last 168 hours.  LDH:  No results for input(s): LDH in the last 72 hours.  Microbiology:  Microbiology Results (last 7 days)     ** No results found for the last 168 hours.  **          I have reviewed all pertinent labs within the past 24 hours.    Estimated Creatinine Clearance: 51.2 mL/min (based on SCr of 1.3 mg/dL).    Diagnostic Results:          Assessment and Plan:     Ms. Smith is a 57 y.o. Female with a past medical history of PHTN WHO Group 1 on IV Remodulin 75ng, chronic hypoxic respiratory failure, chronic RV failure on home O2 (3L) continous and BiPAP at night, recent admission to hospital with leg cellulitis currently on abx who presented to the clinic today with with worsening SOB , increased weight and abd girth. Patient who is on Bumex 1mg daily, metolazone every other day and lasix push weekly  started noticing weight gain 5 days ago. Her appetite has also been decreasing despite not having nausea and vomiting. Her abdomen feel full all the time and is more distended than usual. Having difficulty laying down and sleeping on recliner  She does not know whether she consistently take metolazone and mention that visiting nurse keep tract of her medication. She also mention compliance with fluids and salt. She report about 10lbs weight gain and unequal edema to the legs with R more than left. She denies decrease in UOP, color change to the urine, cough or voice change. She was discharge ten days ago after a week of admission secondary to LE cellulitis and has been taking oral doxcycline. Her clinic visit labs showed acute FREDDY with creatinine level at 1.8 from 1.5  And BNP of 800s from 600s ten days ago. She was recommended to have in patient diuretics and medication dosage adjustment.  .    * Acute on chronic diastolic heart failure    RHF secondary to PH and SHERIE compounded by [facility]  Poor response to IV push lasix ( net negative 1 L)  D/c lasix IV push and   Start Lasix 20mg Gtt  ABD US to eval for ascities  Low salt diet, I/O and weight monitoring.  Monitor electrolytes and keep K >> 4 and mag >2  GDMT- Aldactone only. No ACEI due to freddy        Acute renal injury     - Back  to baseline  - possibly related to pre- renal course  - will monitor closely with diuresis        Lower extremity cellulitis    - on doxycycline 100mg BID for three more doses.   - wound care/dressing change per nurse.        Bipolar affective disorder    - has multiple antipsychotic currently.  - Very emotional this morning. Crying at bed side but was able to calm down  - denied SI ideation        Essential hypertension    - Well controlled currently with amlodipine. Monitor closely        Pulmonary hypertension, group 1    - continue remodulin 75ng and adempas 1mg TID  - oxygen use at baseline 3L continuous  - I/O and weight monitoring            Discussed plan of care with Dr. lisa Peñaloza MD  Heart Transplant  [facility]

## 2018-06-24 LAB
ANION GAP SERPL CALC-SCNC: 12 MMOL/L
ANION GAP SERPL CALC-SCNC: 13 MMOL/L
BUN SERPL-MCNC: 14 MG/DL
BUN SERPL-MCNC: 15 MG/DL
CALCIUM SERPL-MCNC: 9.8 MG/DL
CALCIUM SERPL-MCNC: 9.8 MG/DL
CHLORIDE SERPL-SCNC: 100 MMOL/L
CHLORIDE SERPL-SCNC: 97 MMOL/L
CO2 SERPL-SCNC: 28 MMOL/L
CO2 SERPL-SCNC: 30 MMOL/L
CREAT SERPL-MCNC: 1.4 MG/DL
CREAT SERPL-MCNC: 1.5 MG/DL
EST. GFR  (AFRICAN AMERICAN): 44.6 ML/MIN/1.73 M^2
EST. GFR  (AFRICAN AMERICAN): 48.5 ML/MIN/1.73 M^2
EST. GFR  (NON AFRICAN AMERICAN): 38.7 ML/MIN/1.73 M^2
EST. GFR  (NON AFRICAN AMERICAN): 42 ML/MIN/1.73 M^2
GLUCOSE SERPL-MCNC: 89 MG/DL
GLUCOSE SERPL-MCNC: 91 MG/DL
MAGNESIUM SERPL-MCNC: 1.8 MG/DL
POTASSIUM SERPL-SCNC: 3 MMOL/L
POTASSIUM SERPL-SCNC: 4 MMOL/L
SODIUM SERPL-SCNC: 138 MMOL/L
SODIUM SERPL-SCNC: 142 MMOL/L

## 2018-06-24 PROCEDURE — 83735 ASSAY OF MAGNESIUM: CPT | Mod: NTX

## 2018-06-24 PROCEDURE — 99900035 HC TECH TIME PER 15 MIN (STAT): Mod: NTX

## 2018-06-24 PROCEDURE — A4216 STERILE WATER/SALINE, 10 ML: HCPCS | Mod: NTX | Performed by: INTERNAL MEDICINE

## 2018-06-24 PROCEDURE — 36415 COLL VENOUS BLD VENIPUNCTURE: CPT | Mod: NTX

## 2018-06-24 PROCEDURE — 94640 AIRWAY INHALATION TREATMENT: CPT | Mod: NTX

## 2018-06-24 PROCEDURE — 25000003 PHARM REV CODE 250: Mod: NTX | Performed by: STUDENT IN AN ORGANIZED HEALTH CARE EDUCATION/TRAINING PROGRAM

## 2018-06-24 PROCEDURE — 94660 CPAP INITIATION&MGMT: CPT | Mod: NTX

## 2018-06-24 PROCEDURE — 99232 SBSQ HOSP IP/OBS MODERATE 35: CPT | Mod: NTX,,, | Performed by: INTERNAL MEDICINE

## 2018-06-24 PROCEDURE — 27000221 HC OXYGEN, UP TO 24 HOURS: Mod: NTX

## 2018-06-24 PROCEDURE — 25000003 PHARM REV CODE 250: Mod: NTX | Performed by: INTERNAL MEDICINE

## 2018-06-24 PROCEDURE — 94761 N-INVAS EAR/PLS OXIMETRY MLT: CPT | Mod: NTX

## 2018-06-24 PROCEDURE — 20600001 HC STEP DOWN PRIVATE ROOM: Mod: NTX

## 2018-06-24 PROCEDURE — 63600175 PHARM REV CODE 636 W HCPCS: Mod: JG,NTX | Performed by: STUDENT IN AN ORGANIZED HEALTH CARE EDUCATION/TRAINING PROGRAM

## 2018-06-24 PROCEDURE — 25000242 PHARM REV CODE 250 ALT 637 W/ HCPCS: Mod: NTX | Performed by: INTERNAL MEDICINE

## 2018-06-24 PROCEDURE — 63600175 PHARM REV CODE 636 W HCPCS: Mod: NTX | Performed by: INTERNAL MEDICINE

## 2018-06-24 PROCEDURE — 80048 BASIC METABOLIC PNL TOTAL CA: CPT | Mod: NTX

## 2018-06-24 RX ORDER — POTASSIUM CHLORIDE 20 MEQ/1
60 TABLET, EXTENDED RELEASE ORAL ONCE
Status: COMPLETED | OUTPATIENT
Start: 2018-06-24 | End: 2018-06-24

## 2018-06-24 RX ADMIN — LEVOTHYROXINE SODIUM 75 MCG: 25 TABLET ORAL at 08:06

## 2018-06-24 RX ADMIN — PRAVASTATIN SODIUM 40 MG: 20 TABLET ORAL at 08:06

## 2018-06-24 RX ADMIN — LURASIDONE HYDROCHLORIDE 80 MG: 40 TABLET, FILM COATED ORAL at 08:06

## 2018-06-24 RX ADMIN — BUSPIRONE HYDROCHLORIDE 15 MG: 5 TABLET ORAL at 02:06

## 2018-06-24 RX ADMIN — ALBUTEROL SULFATE 2.5 MG: 2.5 SOLUTION RESPIRATORY (INHALATION) at 07:06

## 2018-06-24 RX ADMIN — LAMOTRIGINE 100 MG: 100 TABLET ORAL at 08:06

## 2018-06-24 RX ADMIN — PANTOPRAZOLE SODIUM 40 MG: 40 TABLET, DELAYED RELEASE ORAL at 08:06

## 2018-06-24 RX ADMIN — FUROSEMIDE 20 MG/HR: 10 INJECTION, SOLUTION INTRAMUSCULAR; INTRAVENOUS at 08:06

## 2018-06-24 RX ADMIN — ALPRAZOLAM 0.5 MG: 0.5 TABLET ORAL at 01:06

## 2018-06-24 RX ADMIN — Medication 3 ML: at 06:06

## 2018-06-24 RX ADMIN — RIOCIGUAT 1 MG: 1 TABLET, FILM COATED ORAL at 08:06

## 2018-06-24 RX ADMIN — METOLAZONE 2.5 MG: 2.5 TABLET ORAL at 08:06

## 2018-06-24 RX ADMIN — MUPIROCIN: 20 OINTMENT TOPICAL at 08:06

## 2018-06-24 RX ADMIN — MUPIROCIN: 20 OINTMENT TOPICAL at 02:06

## 2018-06-24 RX ADMIN — DESVENLAFAXINE SUCCINATE 100 MG: 50 TABLET, FILM COATED, EXTENDED RELEASE ORAL at 08:06

## 2018-06-24 RX ADMIN — DOXYCYCLINE 100 MG: 50 CAPSULE ORAL at 08:06

## 2018-06-24 RX ADMIN — SPIRONOLACTONE 100 MG: 50 TABLET, FILM COATED ORAL at 08:06

## 2018-06-24 RX ADMIN — Medication 3 ML: at 08:06

## 2018-06-24 RX ADMIN — RIOCIGUAT 1 MG: 1 TABLET, FILM COATED ORAL at 02:06

## 2018-06-24 RX ADMIN — FUROSEMIDE 20 MG/HR: 10 INJECTION, SOLUTION INTRAMUSCULAR; INTRAVENOUS at 05:06

## 2018-06-24 RX ADMIN — POTASSIUM CHLORIDE 20 MEQ: 1500 TABLET, EXTENDED RELEASE ORAL at 08:06

## 2018-06-24 RX ADMIN — ALBUTEROL SULFATE 2.5 MG: 2.5 SOLUTION RESPIRATORY (INHALATION) at 11:06

## 2018-06-24 RX ADMIN — ALBUTEROL SULFATE 2.5 MG: 2.5 SOLUTION RESPIRATORY (INHALATION) at 12:06

## 2018-06-24 RX ADMIN — FLUTICASONE FUROATE AND VILANTEROL TRIFENATATE 1 PUFF: 100; 25 POWDER RESPIRATORY (INHALATION) at 08:06

## 2018-06-24 RX ADMIN — BUSPIRONE HYDROCHLORIDE 15 MG: 5 TABLET ORAL at 08:06

## 2018-06-24 RX ADMIN — TREPROSTINIL 75 NG/KG/MIN: 20 INJECTION, SOLUTION INTRAVENOUS; SUBCUTANEOUS at 02:06

## 2018-06-24 RX ADMIN — POTASSIUM CHLORIDE 60 MEQ: 1500 TABLET, EXTENDED RELEASE ORAL at 08:06

## 2018-06-24 RX ADMIN — AMLODIPINE BESYLATE 5 MG: 5 TABLET ORAL at 08:06

## 2018-06-24 RX ADMIN — ENOXAPARIN SODIUM 40 MG: 100 INJECTION SUBCUTANEOUS at 04:06

## 2018-06-24 RX ADMIN — FUROSEMIDE 20 MG/HR: 10 INJECTION, SOLUTION INTRAMUSCULAR; INTRAVENOUS at 02:06

## 2018-06-24 RX ADMIN — ALBUTEROL SULFATE 2.5 MG: 2.5 SOLUTION RESPIRATORY (INHALATION) at 04:06

## 2018-06-24 RX ADMIN — ALBUTEROL SULFATE 2.5 MG: 2.5 SOLUTION RESPIRATORY (INHALATION) at 03:06

## 2018-06-24 NOTE — SUBJECTIVE & OBJECTIVE
Interval History:     Net negative 4.12L for the last 24hrs on lasix drip. Report less abd distension and improved SOB. Abd US positive for moderate/large ascites.  Reviewed her clinic visit with Pulmonary and she is not a lung transplant candidate. Discussed that she may need to re-enrol with hospice/palliative care    Continuous Infusions:   furosemide (LASIX) 2 mg/mL infusion (non-titrating) 20 mg/hr (06/24/18 0508)    treprostinil (REMODULIN) infusion 75 ng/kg/min (06/23/18 1250)     Scheduled Meds:   albuterol sulfate  2.5 mg Nebulization Q4H    amLODIPine  5 mg Oral Daily    busPIRone  15 mg Oral TID    desvenlafaxine succinate  100 mg Oral Daily    doxycycline  100 mg Oral BID    enoxaparin  40 mg Subcutaneous Daily    fluticasone-vilanterol  1 puff Inhalation Daily    lamoTRIgine  100 mg Oral BID    levothyroxine  75 mcg Oral Daily    lurasidone  80 mg Oral QHS    metOLazone  2.5 mg Oral Daily    mupirocin   Topical (Top) BID    pantoprazole  40 mg Oral Daily    potassium chloride SA  20 mEq Oral BID    pravastatin  40 mg Oral Daily    riociguat  1 mg Oral TID    sodium chloride 0.9%  3 mL Intravenous Q8H    spironolactone  100 mg Oral Daily    umeclidinium  62.5 mcg Inhalation Daily     PRN Meds:ALPRAZolam, HYDROcodone-acetaminophen, ondansetron    Review of patient's allergies indicates:   Allergen Reactions    Sulfa (sulfonamide antibiotics) Rash     Objective:     Vital Signs (Most Recent):  Temp: 99.2 °F (37.3 °C) (06/24/18 1142)  Pulse: 83 (06/24/18 1245)  Resp: 16 (06/24/18 1245)  BP: (!) 124/55 (06/24/18 1142)  SpO2: (!) 84 % (06/24/18 1245) Vital Signs (24h Range):  Temp:  [97.4 °F (36.3 °C)-99.2 °F (37.3 °C)] 99.2 °F (37.3 °C)  Pulse:  [79-93] 83  Resp:  [12-20] 16  SpO2:  [84 %-97 %] 84 %  BP: ()/(54-80) 124/55     Patient Vitals for the past 72 hrs (Last 3 readings):   Weight   06/24/18 0346 89.5 kg (197 lb 5 oz)   06/23/18 0400 92.7 kg (204 lb 5.9 oz)   06/22/18 2045  93.9 kg (207 lb 0.2 oz)     Body mass index is 36.09 kg/m².      Intake/Output Summary (Last 24 hours) at 06/24/18 1251  Last data filed at 06/24/18 1100   Gross per 24 hour   Intake          1191.17 ml   Output             6175 ml   Net         -4983.83 ml       Hemodynamic Parameters:       Telemetry:     Physical Exam     Constitutional: She is oriented to person, place, and time.   Neck: JVD (to the ear. about 15cm h20) present.   Cardiovascular: Normal rate and regular rhythm.  Exam reveals gallop (+ S3).    Pulmonary/Chest: Effort normal and breath sounds normal.   Abdominal: She exhibits distension (very). There is no tenderness. There is no guarding.   Musculoskeletal: She exhibits edema (Leg bilaterally +2). She exhibits no tenderness.   Neurological: She is alert and oriented to person, place, and time.   Skin: Skin is warm and dry.   Skin break left arm   Nursing note and vitals reviewed    Significant Labs:  CBC:    Recent Labs  Lab 06/22/18  1155   WBC 5.28   RBC 4.60   HGB 11.7*   HCT 38.2   *   MCV 83   MCH 25.4*   MCHC 30.6*     BNP:    Recent Labs  Lab 06/22/18  1155   *     CMP:    Recent Labs  Lab 06/22/18  1155 06/23/18  0440 06/24/18  0659   GLU 90 99 91   CALCIUM 9.8 9.0 9.8   ALBUMIN 3.8  --   --    PROT 6.6  --   --     141 142   K 3.8 3.3* 3.0*   CO2 25 23 30*    107 100   BUN 19 16 14   CREATININE 1.8* 1.3 1.4   ALKPHOS 210*  --   --    ALT 10  --   --    AST 12  --   --    BILITOT 0.7  --   --       Coagulation:   No results for input(s): PT, INR, APTT in the last 168 hours.  LDH:  No results for input(s): LDH in the last 72 hours.  Microbiology:  Microbiology Results (last 7 days)     ** No results found for the last 168 hours. **          I have reviewed all pertinent labs within the past 24 hours.    Estimated Creatinine Clearance: 46.7 mL/min (based on SCr of 1.4 mg/dL).    Diagnostic Results:

## 2018-06-24 NOTE — ASSESSMENT & PLAN NOTE
RHF secondary to PH and SHERIE compounded by [facility]  Continue Lasix 20mg Gtt  ABD US positive for large ascities  Low salt diet, I/O and weight monitoring.  Monitor electrolytes and keep K >> 4 and mag >2  GDMT- Aldactone only. No ACEI due to rosalie

## 2018-06-24 NOTE — PROGRESS NOTES
[facility]  Heart Transplant  Progress Note    Patient Name: Sue Smith  MRN: [id number]  Admission Date: 6/22/2018  Hospital Length of Stay: 2 days  Attending Physician: Lola Paul MD  Primary Care Provider: Paddy Guerrier DO  Principal Problem:Acute on chronic diastolic heart failure    Subjective:     Interval History:     Net negative 4.12L for the last 24hrs on lasix drip. Report less abd distension and improved SOB. Abd US positive for moderate/large ascites.  Reviewed her clinic visit with Pulmonary and she is not a lung transplant candidate. Discussed that she may need to re-enrol with hospice/palliative care    Continuous Infusions:   furosemide (LASIX) 2 mg/mL infusion (non-titrating) 20 mg/hr (06/24/18 0508)    treprostinil (REMODULIN) infusion 75 ng/kg/min (06/23/18 1250)     Scheduled Meds:   albuterol sulfate  2.5 mg Nebulization Q4H    amLODIPine  5 mg Oral Daily    busPIRone  15 mg Oral TID    desvenlafaxine succinate  100 mg Oral Daily    doxycycline  100 mg Oral BID    enoxaparin  40 mg Subcutaneous Daily    fluticasone-vilanterol  1 puff Inhalation Daily    lamoTRIgine  100 mg Oral BID    levothyroxine  75 mcg Oral Daily    lurasidone  80 mg Oral QHS    metOLazone  2.5 mg Oral Daily    mupirocin   Topical (Top) BID    pantoprazole  40 mg Oral Daily    potassium chloride SA  20 mEq Oral BID    pravastatin  40 mg Oral Daily    riociguat  1 mg Oral TID    sodium chloride 0.9%  3 mL Intravenous Q8H    spironolactone  100 mg Oral Daily    umeclidinium  62.5 mcg Inhalation Daily     PRN Meds:ALPRAZolam, HYDROcodone-acetaminophen, ondansetron    Review of patient's allergies indicates:   Allergen Reactions    Sulfa (sulfonamide antibiotics) Rash     Objective:     Vital Signs (Most Recent):  Temp: 99.2 °F (37.3 °C) (06/24/18 1142)  Pulse: 83 (06/24/18 1245)  Resp: 16 (06/24/18 1245)  BP: (!) 124/55 (06/24/18 1142)  SpO2: (!) 84 % (06/24/18 1245) Vital Signs  (24h Range):  Temp:  [97.4 °F (36.3 °C)-99.2 °F (37.3 °C)] 99.2 °F (37.3 °C)  Pulse:  [79-93] 83  Resp:  [12-20] 16  SpO2:  [84 %-97 %] 84 %  BP: ()/(54-80) 124/55     Patient Vitals for the past 72 hrs (Last 3 readings):   Weight   06/24/18 0346 89.5 kg (197 lb 5 oz)   06/23/18 0400 92.7 kg (204 lb 5.9 oz)   06/22/18 2045 93.9 kg (207 lb 0.2 oz)     Body mass index is 36.09 kg/m².      Intake/Output Summary (Last 24 hours) at 06/24/18 1251  Last data filed at 06/24/18 1100   Gross per 24 hour   Intake          1191.17 ml   Output             6175 ml   Net         -4983.83 ml       Hemodynamic Parameters:       Telemetry:     Physical Exam     Constitutional: She is oriented to person, place, and time.   Neck: JVD (to the ear. about 15cm h20) present.   Cardiovascular: Normal rate and regular rhythm.  Exam reveals gallop (+ S3).    Pulmonary/Chest: Effort normal and breath sounds normal.   Abdominal: She exhibits distension (very). There is no tenderness. There is no guarding.   Musculoskeletal: She exhibits edema (Leg bilaterally +2). She exhibits no tenderness.   Neurological: She is alert and oriented to person, place, and time.   Skin: Skin is warm and dry.   Skin break left arm   Nursing note and vitals reviewed    Significant Labs:  CBC:    Recent Labs  Lab 06/22/18  1155   WBC 5.28   RBC 4.60   HGB 11.7*   HCT 38.2   *   MCV 83   MCH 25.4*   MCHC 30.6*     BNP:    Recent Labs  Lab 06/22/18  1155   *     CMP:    Recent Labs  Lab 06/22/18  1155 06/23/18  0440 06/24/18  0659   GLU 90 99 91   CALCIUM 9.8 9.0 9.8   ALBUMIN 3.8  --   --    PROT 6.6  --   --     141 142   K 3.8 3.3* 3.0*   CO2 25 23 30*    107 100   BUN 19 16 14   CREATININE 1.8* 1.3 1.4   ALKPHOS 210*  --   --    ALT 10  --   --    AST 12  --   --    BILITOT 0.7  --   --       Coagulation:   No results for input(s): PT, INR, APTT in the last 168 hours.  LDH:  No results for input(s): LDH in the last 72  hours.  Microbiology:  Microbiology Results (last 7 days)     ** No results found for the last 168 hours. **          I have reviewed all pertinent labs within the past 24 hours.    Estimated Creatinine Clearance: 46.7 mL/min (based on SCr of 1.4 mg/dL).    Diagnostic Results:        Assessment and Plan:     Ms. Smith is a 57 y.o. Female with a past medical history of PHTN WHO Group 1 on IV Remodulin 75ng, chronic hypoxic respiratory failure, chronic RV failure on home O2 (3L) continous and BiPAP at night, recent admission to hospital with leg cellulitis currently on abx who presented to the clinic today with with worsening SOB , increased weight and abd girth. Patient who is on Bumex 1mg daily, metolazone every other day and lasix push weekly  started noticing weight gain 5 days ago. Her appetite has also been decreasing despite not having nausea and vomiting. Her abdomen feel full all the time and is more distended than usual. Having difficulty laying down and sleeping on recliner  She does not know whether she consistently take metolazone and mention that visiting nurse keep tract of her medication. She also mention compliance with fluids and salt. She report about 10lbs weight gain and unequal edema to the legs with R more than left. She denies decrease in UOP, color change to the urine, cough or voice change. She was discharge ten days ago after a week of admission secondary to LE cellulitis and has been taking oral doxcycline. Her clinic visit labs showed acute FREDDY with creatinine level at 1.8 from 1.5  And BNP of 800s from 600s ten days ago. She was recommended to have in patient diuretics and medication dosage adjustment.  .    * Acute on chronic diastolic heart failure    RHF secondary to PH and SHERIE compounded by [facility]  Continue Lasix 20mg Gtt  ABD US positive for large ascities  Low salt diet, I/O and weight monitoring.  Monitor electrolytes and keep K >> 4 and mag >2  GDMT- Aldactone only. No ACEI due to  rosalie          Acute renal injury     - Back to baseline  - possibly related to pre- renal course  - will monitor closely with diuresis        Lower extremity cellulitis    - on doxycycline 100mg BID for three more doses.   - wound care/dressing change per nurse.        Bipolar affective disorder    - has multiple antipsychotic currently.  - Very emotional this morning. Crying at bed side but was able to calm down  - denied SI ideation        Essential hypertension    - Well controlled currently with amlodipine. Monitor closely        Pulmonary hypertension, group 1    - continue remodulin 75ng and adempas 1mg TID  - oxygen use at baseline 3L continuous  - I/O and weight monitoring  - patient not a candidate for Lung transplant per Lung tx visit note  - discussed that she may need to transition to hospice. Continue discussing on this issue during stay            Discussed plan of care with Dr. Brain Peñaloza MD  Heart Transplant  [facility]

## 2018-06-24 NOTE — PLAN OF CARE
Problem: Patient Care Overview  Goal: Plan of Care Review  Outcome: Ongoing (interventions implemented as appropriate)  Pt AAO.  Pt on Oxygen at 3L NC - 88-90%.  Utilizes home O2.  Pt on lasix gtt for fluid volume overload - diuresing well. Voiding in hat and measuring urine.  Remodulin infusing to [facility] at 77cc/24hrs - 75ng/kg/min- dressing dry and intact- education noted.  Supplemental potassium given for am lab K +3.0. Pt very tearful and frustrated - stated she was told on Friday by the pulmonary team that she is going to get a lung transplant and then told today by the HTS team to consider Hospice- daughter at side tearful as well.  Wants to speak with both teams in the morning.  Independent and gets up to the restroom- has non-skid socks on and turns herself in the bed.  Treating cellultis from a fall with injury at home to right shin- changing dressing BID- wound care cx.  Xaxax given x1 today for anxiety.  Pt on fluid restriction and stated she would be more compliant with diet.  Belly very distneded but she states it is better.

## 2018-06-24 NOTE — PLAN OF CARE
Problem: Patient Care Overview  Goal: Plan of Care Review  Outcome: Ongoing (interventions implemented as appropriate)  Pt AAOx4, VSS, afebrile.  C/o neck pain with relief to PRN Norco.  Pt on lasix gtt infusing at 10cc/hr.  Pt urinated 1700cc clear yellow urine.  0BM.  Pt up and ambulatory independently.  BiPAP worn overnight.  Pt on remodulin infusing at 75/ng/kg/min dos wt 85kg.  77mL/24hours to R chest tunneled catheter,  Dressing CDI.  Dressing to R leg changed during shift. Fall risk precautions initiated.  Pt in lowest bed position setting, lighting adjusted, pt to wear nonskid socks when ambulating, side rails up x2.  Pt remain free from falls during shift.   Pt verbalize understanding to call when needed assistance. Call light within reach.  Will continue to monitor.

## 2018-06-24 NOTE — ASSESSMENT & PLAN NOTE
- continue remodulin 75ng and adempas 1mg TID  - oxygen use at baseline 3L continuous  - I/O and weight monitoring  - patient not a candidate for Lung transplant per Lung tx visit note  - discussed that she may need to transition to hospice. Continue discussing on this issue during stay

## 2018-06-25 PROBLEM — Z51.5 PALLIATIVE CARE ENCOUNTER: Status: ACTIVE | Noted: 2018-06-25

## 2018-06-25 LAB
ANION GAP SERPL CALC-SCNC: 12 MMOL/L
ANION GAP SERPL CALC-SCNC: 13 MMOL/L
BUN SERPL-MCNC: 17 MG/DL
BUN SERPL-MCNC: 18 MG/DL
CALCIUM SERPL-MCNC: 10 MG/DL
CALCIUM SERPL-MCNC: 10.3 MG/DL
CHLORIDE SERPL-SCNC: 92 MMOL/L
CHLORIDE SERPL-SCNC: 94 MMOL/L
CO2 SERPL-SCNC: 33 MMOL/L
CO2 SERPL-SCNC: 33 MMOL/L
CREAT SERPL-MCNC: 1.6 MG/DL
CREAT SERPL-MCNC: 1.6 MG/DL
EST. GFR  (AFRICAN AMERICAN): 41.2 ML/MIN/1.73 M^2
EST. GFR  (AFRICAN AMERICAN): 41.2 ML/MIN/1.73 M^2
EST. GFR  (NON AFRICAN AMERICAN): 35.8 ML/MIN/1.73 M^2
EST. GFR  (NON AFRICAN AMERICAN): 35.8 ML/MIN/1.73 M^2
GLUCOSE SERPL-MCNC: 88 MG/DL
GLUCOSE SERPL-MCNC: 95 MG/DL
MAGNESIUM SERPL-MCNC: 2 MG/DL
POTASSIUM SERPL-SCNC: 2.9 MMOL/L
POTASSIUM SERPL-SCNC: 3.4 MMOL/L
SODIUM SERPL-SCNC: 138 MMOL/L
SODIUM SERPL-SCNC: 139 MMOL/L

## 2018-06-25 PROCEDURE — 63600175 PHARM REV CODE 636 W HCPCS: Mod: JG,NTX | Performed by: STUDENT IN AN ORGANIZED HEALTH CARE EDUCATION/TRAINING PROGRAM

## 2018-06-25 PROCEDURE — 25000242 PHARM REV CODE 250 ALT 637 W/ HCPCS: Mod: NTX | Performed by: INTERNAL MEDICINE

## 2018-06-25 PROCEDURE — 36415 COLL VENOUS BLD VENIPUNCTURE: CPT | Mod: NTX

## 2018-06-25 PROCEDURE — 94761 N-INVAS EAR/PLS OXIMETRY MLT: CPT | Mod: NTX

## 2018-06-25 PROCEDURE — 25000003 PHARM REV CODE 250: Mod: NTX | Performed by: INTERNAL MEDICINE

## 2018-06-25 PROCEDURE — 80048 BASIC METABOLIC PNL TOTAL CA: CPT | Mod: NTX

## 2018-06-25 PROCEDURE — 99223 1ST HOSP IP/OBS HIGH 75: CPT | Mod: NTX,,, | Performed by: CLINICAL NURSE SPECIALIST

## 2018-06-25 PROCEDURE — 63600175 PHARM REV CODE 636 W HCPCS: Mod: NTX | Performed by: INTERNAL MEDICINE

## 2018-06-25 PROCEDURE — 25000003 PHARM REV CODE 250: Mod: NTX | Performed by: PHYSICIAN ASSISTANT

## 2018-06-25 PROCEDURE — A4216 STERILE WATER/SALINE, 10 ML: HCPCS | Mod: NTX | Performed by: INTERNAL MEDICINE

## 2018-06-25 PROCEDURE — 99900035 HC TECH TIME PER 15 MIN (STAT): Mod: NTX

## 2018-06-25 PROCEDURE — 83735 ASSAY OF MAGNESIUM: CPT | Mod: NTX

## 2018-06-25 PROCEDURE — 0W9G3ZZ DRAINAGE OF PERITONEAL CAVITY, PERCUTANEOUS APPROACH: ICD-10-PCS | Performed by: RADIOLOGY

## 2018-06-25 PROCEDURE — 94640 AIRWAY INHALATION TREATMENT: CPT | Mod: NTX

## 2018-06-25 PROCEDURE — 25000003 PHARM REV CODE 250: Mod: NTX | Performed by: STUDENT IN AN ORGANIZED HEALTH CARE EDUCATION/TRAINING PROGRAM

## 2018-06-25 PROCEDURE — 80048 BASIC METABOLIC PNL TOTAL CA: CPT | Mod: 91,NTX

## 2018-06-25 PROCEDURE — 27000221 HC OXYGEN, UP TO 24 HOURS: Mod: NTX

## 2018-06-25 PROCEDURE — 20600001 HC STEP DOWN PRIVATE ROOM: Mod: NTX

## 2018-06-25 PROCEDURE — 99232 SBSQ HOSP IP/OBS MODERATE 35: CPT | Mod: NTX,,, | Performed by: PHYSICIAN ASSISTANT

## 2018-06-25 RX ORDER — POTASSIUM CHLORIDE 20 MEQ/1
60 TABLET, EXTENDED RELEASE ORAL EVERY 4 HOURS
Status: DISCONTINUED | OUTPATIENT
Start: 2018-06-25 | End: 2018-06-25

## 2018-06-25 RX ORDER — POTASSIUM CHLORIDE 20 MEQ/1
60 TABLET, EXTENDED RELEASE ORAL EVERY 4 HOURS
Status: COMPLETED | OUTPATIENT
Start: 2018-06-25 | End: 2018-06-25

## 2018-06-25 RX ADMIN — DOXYCYCLINE 100 MG: 50 CAPSULE ORAL at 08:06

## 2018-06-25 RX ADMIN — Medication 3 ML: at 10:06

## 2018-06-25 RX ADMIN — AMLODIPINE BESYLATE 5 MG: 5 TABLET ORAL at 08:06

## 2018-06-25 RX ADMIN — ENOXAPARIN SODIUM 40 MG: 100 INJECTION SUBCUTANEOUS at 06:06

## 2018-06-25 RX ADMIN — ALBUTEROL SULFATE 2.5 MG: 2.5 SOLUTION RESPIRATORY (INHALATION) at 07:06

## 2018-06-25 RX ADMIN — TREPROSTINIL 75 NG/KG/MIN: 20 INJECTION, SOLUTION INTRAVENOUS; SUBCUTANEOUS at 02:06

## 2018-06-25 RX ADMIN — FLUTICASONE FUROATE AND VILANTEROL TRIFENATATE 1 PUFF: 100; 25 POWDER RESPIRATORY (INHALATION) at 08:06

## 2018-06-25 RX ADMIN — RIOCIGUAT 1 MG: 1 TABLET, FILM COATED ORAL at 09:06

## 2018-06-25 RX ADMIN — RIOCIGUAT 1 MG: 1 TABLET, FILM COATED ORAL at 08:06

## 2018-06-25 RX ADMIN — LEVOTHYROXINE SODIUM 75 MCG: 25 TABLET ORAL at 08:06

## 2018-06-25 RX ADMIN — BUSPIRONE HYDROCHLORIDE 15 MG: 5 TABLET ORAL at 02:06

## 2018-06-25 RX ADMIN — ALBUTEROL SULFATE 2.5 MG: 2.5 SOLUTION RESPIRATORY (INHALATION) at 12:06

## 2018-06-25 RX ADMIN — HYDROCODONE BITARTRATE AND ACETAMINOPHEN 1 TABLET: 10; 325 TABLET ORAL at 11:06

## 2018-06-25 RX ADMIN — BUSPIRONE HYDROCHLORIDE 15 MG: 5 TABLET ORAL at 09:06

## 2018-06-25 RX ADMIN — DESVENLAFAXINE SUCCINATE 100 MG: 50 TABLET, FILM COATED, EXTENDED RELEASE ORAL at 08:06

## 2018-06-25 RX ADMIN — BUSPIRONE HYDROCHLORIDE 15 MG: 5 TABLET ORAL at 08:06

## 2018-06-25 RX ADMIN — FUROSEMIDE 20 MG/HR: 10 INJECTION, SOLUTION INTRAMUSCULAR; INTRAVENOUS at 06:06

## 2018-06-25 RX ADMIN — POTASSIUM CHLORIDE 60 MEQ: 1500 TABLET, EXTENDED RELEASE ORAL at 10:06

## 2018-06-25 RX ADMIN — ALBUTEROL SULFATE 2.5 MG: 2.5 SOLUTION RESPIRATORY (INHALATION) at 10:06

## 2018-06-25 RX ADMIN — LAMOTRIGINE 100 MG: 100 TABLET ORAL at 08:06

## 2018-06-25 RX ADMIN — ALPRAZOLAM 0.5 MG: 0.5 TABLET ORAL at 02:06

## 2018-06-25 RX ADMIN — LAMOTRIGINE 100 MG: 100 TABLET ORAL at 09:06

## 2018-06-25 RX ADMIN — MUPIROCIN: 20 OINTMENT TOPICAL at 09:06

## 2018-06-25 RX ADMIN — PRAVASTATIN SODIUM 40 MG: 20 TABLET ORAL at 08:06

## 2018-06-25 RX ADMIN — ALBUTEROL SULFATE 2.5 MG: 2.5 SOLUTION RESPIRATORY (INHALATION) at 04:06

## 2018-06-25 RX ADMIN — LURASIDONE HYDROCHLORIDE 80 MG: 40 TABLET, FILM COATED ORAL at 09:06

## 2018-06-25 RX ADMIN — FUROSEMIDE 20 MG/HR: 10 INJECTION, SOLUTION INTRAMUSCULAR; INTRAVENOUS at 05:06

## 2018-06-25 RX ADMIN — RIOCIGUAT 1 MG: 1 TABLET, FILM COATED ORAL at 02:06

## 2018-06-25 RX ADMIN — PANTOPRAZOLE SODIUM 40 MG: 40 TABLET, DELAYED RELEASE ORAL at 08:06

## 2018-06-25 RX ADMIN — MUPIROCIN: 20 OINTMENT TOPICAL at 08:06

## 2018-06-25 RX ADMIN — SPIRONOLACTONE 100 MG: 50 TABLET, FILM COATED ORAL at 08:06

## 2018-06-25 RX ADMIN — POTASSIUM CHLORIDE 60 MEQ: 1500 TABLET, EXTENDED RELEASE ORAL at 08:06

## 2018-06-25 NOTE — ASSESSMENT & PLAN NOTE
- RHF secondary to PH and SHERIE  - Continue Lasix 20mg Gtt for now as well as daily metolazone (was on this at home)  - ABD US positive for large ascities   - consulted IR today for therapeutic tap  - Low salt diet, I/O and weight monitoring.  - Monitor electrolytes and keep K >> 4 and mag >2  -GDMT- Aldactone only. No ACEI due to rosalie

## 2018-06-25 NOTE — HPI
Ms. Smith is a 57 yolady with PMH if pulmonary HTN WHO Group 1 on IV remodulin, chronic hypoxic respiratory, chronic RV failure on home O2. [place] [facility] [place] [facility] ED with progressive shortness of breath over the last 3-4 days.  - Recently (5/30) seen in ED for evaluation of a non-healing traumatic wound on her R shin. She was noted to have increased WOB and increased abdominal swelling and given 1 dose of IV lasix. She was also started on clindamycin.   -Discharged from admission with ADHF 4/5-4/22 during which she underwent IV diuresis and uptitration of her home remodulin from 60 to 75 ng. She reports weight and breathing were stable until several days ago.   -5/29, telephone note reports 3 lb wt gain and her HH IV lasix was planned to restart on 5/30 which she ended up getting while at ED for above wound.     Evaluated by lung and heart transplant team.  Does not meet criteria for lung transplant  and found to have no heart failure  advanced therapy options.    Palliative care consulted for goals of care.

## 2018-06-25 NOTE — PLAN OF CARE
Problem: Patient Care Overview  Goal: Plan of Care Review  Outcome: Ongoing (interventions implemented as appropriate)  Pt AAOx4, VSS, afebrile.  C/o neck pain with relief to PRN Norco.  Pt on lasix gtt infusing at 10cc/hr.  Pt urinated 2200cc clear yellow urine.  0BM.  Pt up and ambulatory independently.  BiPAP worn overnight.  Pt on remodulin infusing at 75/ng/kg/min dos wt 85kg.  77mL/24hours to R chest tunneled catheter,  Dressing CDI.  Dressing to R leg changed during shift.  Fall risk precautions initiated.  Pt in lowest bed position setting, lighting adjusted, pt to wear nonskid socks when ambulating, side rails up x2.  Pt remain free from falls during shift.   Pt verbalize understanding to call when needed assistance. Call light within reach.  Will continue to monitor.

## 2018-06-25 NOTE — SUBJECTIVE & OBJECTIVE
Interval History:  SOB and abdominal distention much improved from yesterday. Interested in speaking with palliative care. Will consult IR for paracentesis today for therapeutic tap.     Continuous Infusions:   furosemide (LASIX) 2 mg/mL infusion (non-titrating) Stopped (06/25/18 0815)    treprostinil (REMODULIN) infusion 75 ng/kg/min (06/24/18 1415)    veletri/remodulin tubing       Scheduled Meds:   albuterol sulfate  2.5 mg Nebulization Q4H    amLODIPine  5 mg Oral Daily    busPIRone  15 mg Oral TID    desvenlafaxine succinate  100 mg Oral Daily    enoxaparin  40 mg Subcutaneous Daily    fluticasone-vilanterol  1 puff Inhalation Daily    lamoTRIgine  100 mg Oral BID    [START ON 6/26/2018] levothyroxine  75 mcg Oral Before breakfast    lurasidone  80 mg Oral QHS    metOLazone  2.5 mg Oral Daily    mupirocin   Topical (Top) BID    pantoprazole  40 mg Oral Daily    pravastatin  40 mg Oral Daily    riociguat  1 mg Oral TID    sodium chloride 0.9%  3 mL Intravenous Q8H    spironolactone  100 mg Oral Daily    umeclidinium  62.5 mcg Inhalation Daily     PRN Meds:ALPRAZolam, HYDROcodone-acetaminophen, ondansetron    Review of patient's allergies indicates:   Allergen Reactions    Sulfa (sulfonamide antibiotics) Rash     Objective:     Vital Signs (Most Recent):  Temp: 98.7 °F (37.1 °C) (06/25/18 0800)  Pulse: 83 (06/25/18 1030)  Resp: 16 (06/25/18 1030)  BP: 120/60 (06/25/18 0800)  SpO2: (!) 89 % (06/25/18 1030) Vital Signs (24h Range):  Temp:  [96.3 °F (35.7 °C)-99.2 °F (37.3 °C)] 98.7 °F (37.1 °C)  Pulse:  [76-88] 83  Resp:  [16-25] 16  SpO2:  [84 %-95 %] 89 %  BP: ()/(50-60) 120/60     Patient Vitals for the past 72 hrs (Last 3 readings):   Weight   06/25/18 0400 86.2 kg (190 lb 0.6 oz)   06/24/18 0346 89.5 kg (197 lb 5 oz)   06/23/18 0400 92.7 kg (204 lb 5.9 oz)     Body mass index is 34.76 kg/m².      Intake/Output Summary (Last 24 hours) at 06/25/18 1052  Last data filed at 06/25/18  0400   Gross per 24 hour   Intake              720 ml   Output             3000 ml   Net            -2280 ml     Hemodynamic Parameters:    Physical Exam   Constitutional: She is oriented to person, place, and time.   Neck: JVD (to the ear. about 15cm h20) present.   Cardiovascular: Normal rate and regular rhythm.  Exam reveals gallop (+ S3).    Pulmonary/Chest: Effort normal and breath sounds normal.   Abdominal: She exhibits distension (very). There is no tenderness. There is no guarding.   Musculoskeletal: She exhibits edema (Leg bilaterally +2). She exhibits no tenderness.   Neurological: She is alert and oriented to person, place, and time.   Skin: Skin is warm and dry.   Skin break left arm   Nursing note and vitals reviewed    Significant Labs:  CBC:    Recent Labs  Lab 06/22/18  1155   WBC 5.28   RBC 4.60   HGB 11.7*   HCT 38.2   *   MCV 83   MCH 25.4*   MCHC 30.6*     BNP:    Recent Labs  Lab 06/22/18  1155   *     CMP:    Recent Labs  Lab 06/22/18  1155  06/24/18  1227 06/25/18  0426 06/25/18  0903   GLU 90  < > 89 88 95   CALCIUM 9.8  < > 9.8 10.0 10.3   ALBUMIN 3.8  --   --   --   --    PROT 6.6  --   --   --   --      < > 138 139 138   K 3.8  < > 4.0 2.9* 3.4*   CO2 25  < > 28 33* 33*     < > 97 94* 92*   BUN 19  < > 15 18 17   CREATININE 1.8*  < > 1.5* 1.6* 1.6*   ALKPHOS 210*  --   --   --   --    ALT 10  --   --   --   --    AST 12  --   --   --   --    BILITOT 0.7  --   --   --   --    < > = values in this interval not displayed.   Coagulation:   No results for input(s): PT, INR, APTT in the last 168 hours.  LDH:  No results for input(s): LDH in the last 72 hours.  Microbiology:  Microbiology Results (last 7 days)     ** No results found for the last 168 hours. **          I have reviewed all pertinent labs within the past 24 hours.    Estimated Creatinine Clearance: 40 mL/min (A) (based on SCr of 1.6 mg/dL (H)).    Diagnostic Results:

## 2018-06-25 NOTE — ASSESSMENT & PLAN NOTE
"Palliative care consulted for goals of care. Chart reviewed and patient discussed with KRISTAL Ybarra. IRISH MACHUCA. Palliative care APRN met with patient at bedside.  Palliative care introduced and patient is amenable.       Impression: Ms. Smith is a 56 yo lady with hx of pulmonary hypertension and heart failure.  She is on IV Remodulin and has no advanced therapy options. She is awake, alert and oriented X4.  No complaints of pain or discomforts.  Remodulin infusing as ordered.     Symptom Management  Anxiety  - continue Alprazolam 5 mg by mouth three times daily as needed.      Goals of Care:   - Although it was a "shock to hear" she has understanding of her current clinical condition. She understands she does not meet criteria for advanced therapy options.     Goal is to spend as much quality time with her daughters and her 3 yo grand daughter.   - Patient requested information about hospice.  Mrs. Smith has not previous experience.     - Hospice education provided. Patient asked and had questions answered.  Patient is amenable to transition to home hospice with Remodulin. She has ample family support for home hospice  - Patient will return to her home in [place] after discharge until apartment lease expires.  She will then move to her daughter's home in [place].  Hospice agency will need to provide services with Remodulin in both locations.   - Resuscitation status: currently has full code orders.  After discussion Ms. Smith is not amenable to CPR, intubation or artifical nutrition.     - Recommend La[facility] on discharge.  Form to be placed in blue chart-will need staff physician signature   - Ms. Smith states ascites is uncomfortable. If likely to have frequent re-accumulation of ascites, patient may benefit from palliative pleurex catheter   - Patient has 3 yo grandchild - palliative care has provided information about talking with children about death and dying.   - Emotional support given.     Primary team aware of " above conversation. Consult  for home hospice with Remodulin.

## 2018-06-25 NOTE — H&P
Radiology History & Physical      SUBJECTIVE:     Chief Complaint: Ascites.    History of Present Illness:  uSe Smith is a 57 y.o. female who presents for ultrasound guided paracentesis.  Past Medical History:   Diagnosis Date    Bipolar disorder     CHF (congestive heart failure)     Dyslipidemia     GERD (gastroesophageal reflux disease)     Hx of tracheostomy     Decanulated / surgically removed in 2013? Does not currently have tracheostomy    Hypertension     Hypothyroidism     Oxygen dependent     3L around the clock     Pulmonary HTN     Pulmonary hypertension, group 1 10/4/2017    Pulmonary nodules 10/10/2017    Respiratory failure, chronic      Past Surgical History:   Procedure Laterality Date    BACK SURGERY      PERICARDIAL WINDOW  2013    [place] LEFT HEART CATH,PERCUTANEOUS      Cardiac Cath, Left Heart    TUBAL LIGATION         Home Meds:   Prior to Admission medications    Medication Sig Start Date End Date Taking? Authorizing Provider   albuterol (PROVENTIL) 2.5 mg /3 mL (0.083 %) nebulizer solution Take 2.5 mg by nebulization every 6 (six) hours as needed for Wheezing. Rescue    Historical Provider, MD   ALPRAZolam (XANAX) 1 MG tablet Take 0.5 tablets (0.5 mg total) by mouth 3 (three) times daily as needed for Anxiety. 1/26/18   Madi Santana MD   amlodipine (NORVASC) 5 MG tablet Take 5 mg by mouth once daily.    Historical Provider, MD   bumetanide (BUMEX) 2 MG tablet Take 1 tablet (2 mg total) by mouth 2 (two) times daily. 4/22/18 4/22/19  Cristian Peñaloza MD   busPIRone (BUSPAR) 15 MG tablet Take 15 mg by mouth 3 (three) times daily.    Historical Provider, MD   desvenlafaxine succinate (PRISTIQ) 100 MG Tb24 Take 100 mg by mouth once daily.    Historical Provider, MD   doxycycline (MONODOX) 100 MG capsule Take 1 capsule by mouth 2 (two) times daily. 6/14/18   Historical Provider, MD   fluticasone-vilanterol (BREO) 100-25 mcg/dose diskus inhaler Inhale 1 puff into the lungs once  daily. Controller    Historical Provider, MD   furosemide (LASIX) 10 mg/mL injection Inject 8 mLs (80 mg total) into the vein twice a week. As needed for weight gain of > 5 lbs or SOB 2/26/18   Cristian Peñaloza MD   gabapentin (NEURONTIN) 100 MG capsule Take 1 capsule (100 mg total) by mouth 3 (three) times daily. 11/30/17   Faina Cullen MD   hydrocodone-acetaminophen 10-325mg (NORCO)  mg Tab Take 1 tablet by mouth every 8 (eight) hours as needed for Pain.    Historical Provider, MD   lamotrigine (LAMICTAL) 100 MG tablet Take 100 mg by mouth 2 (two) times daily.    Historical Provider, MD   LATUDA 80 mg Tab tablet Take 80 mg by mouth every evening. 5/15/18   Historical Provider, MD   levothyroxine (SYNTHROID) 75 MCG tablet Take 75 mcg by mouth once daily.    Historical Provider, MD   metOLazone (ZAROXOLYN) 2.5 MG tablet Take 2.5 mg by mouth once daily. Only take per MD order.    Historical Provider, MD   mupirocin (BACTROBAN) 2 % ointment Apply 1 application topically 2 (two) times daily. Apply to affected area 5/30/18   Historical Provider, MD   ondansetron (ZOFRAN-ODT) 8 MG TbDL Take 1 tablet (8 mg total) by mouth every 8 (eight) hours as needed. 12/26/17   Joan Soliz, CARMEN   pantoprazole (PROTONIX) 40 MG tablet Take 40 mg by mouth once daily.    Historical Provider, MD   potassium chloride SA (K-DUR,KLOR-CON) 20 MEQ tablet Take 1 tablet (20 mEq total) by mouth 2 (two) times daily. 4/22/18   Cristian Peñaloza MD   pravastatin (PRAVACHOL) 40 MG tablet Take 40 mg by mouth once daily.    Historical Provider, MD   riociguat (ADEMPAS) 1 mg Tab Take 1 tablet (1 mg total) by mouth 3 (three) times daily. 5/8/18   Faina Cullen MD   sodium chloride 0.9% 0.9 % SolP 100 mL with treprostinil 1 mg/mL Soln 6,000,000 ng Inject 2,380 ng/min into the vein continuous. 12/26/17   Joan Soliz NP   spironolactone (ALDACTONE) 100 MG tablet Take 1 tablet (100 mg total) by mouth once daily. 4/22/18 4/22/19  Cristian  JAY JAY Peñaloza MD   umeclidinium 62.5 mcg/actuation DsDv Inhale 62.5 mcg into the lungs once daily. Controller    Historical Provider, MD     Anticoagulants/Antiplatelets: no anticoagulation    Allergies:   Review of patient's allergies indicates:   Allergen Reactions    Sulfa (sulfonamide antibiotics) Rash     Sedation History:  no adverse reactions    Review of Systems:   Hematological: no known coagulopathies  Respiratory: no shortness of breath  Cardiovascular: no chest pain  Gastrointestinal: no abdominal pain  Genito-Urinary: no dysuria  Musculoskeletal: negative  Neurological: no TIA or stroke symptoms         OBJECTIVE:     Vital Signs (Most Recent)  Temp: 98.7 °F (37.1 °C) (06/25/18 1130)  Pulse: 86 (06/25/18 1400)  Resp: 14 (06/25/18 1218)  BP: 111/62 (06/25/18 1130)  SpO2: (!) 90 % (06/25/18 1218)    Physical Exam:  General: no acute distress  Mental Status: alert and oriented to person, place and time  HEENT: normocephalic, atraumatic  Chest: unlabored breathing  Heart: regular heart rate  Abdomen: distended  Extremity: moves all extremities    Laboratory  Lab Results   Component Value Date    INR 1.1 06/10/2018       Lab Results   Component Value Date    WBC 5.28 06/22/2018    HGB 11.7 (L) 06/22/2018    HCT 38.2 06/22/2018    MCV 83 06/22/2018     (L) 06/22/2018      Lab Results   Component Value Date    GLU 95 06/25/2018     06/25/2018    K 3.4 (L) 06/25/2018    CL 92 (L) 06/25/2018    CO2 33 (H) 06/25/2018    BUN 17 06/25/2018    CREATININE 1.6 (H) 06/25/2018    CALCIUM 10.3 06/25/2018    MG 2.0 06/25/2018    ALT 10 06/22/2018    AST 12 06/22/2018    ALBUMIN 3.8 06/22/2018    BILITOT 0.7 06/22/2018    BILIDIR 0.4 (H) 12/18/2017       ASSESSMENT/PLAN:     Sedation Plan: Local  Patient will undergo ultrasound guided paracentesis.    Luca Ovalle  Radiology

## 2018-06-25 NOTE — CONSULTS
[facility]  Palliative Medicine  Consult Note    Patient Name: Sue Smith  MRN: [id number]  Admission Date: 6/22/2018  Hospital Length of Stay: 3 days  Code Status: Full Code   Attending Provider: Lola Paul MD  Consulting Provider: MEGAN Urrutia  Primary Care Physician: Paddy Guerrier DO  Principal Problem:Acute on chronic diastolic heart failure    Patient information was obtained from past medical records and ER records.      Inpatient consult to Palliative Care  Consult performed by: NATASHA LUNA  Consult ordered by: COLT MURO  Reason for consult: goals of care  Assessment/Recommendations: Palliative care consult received. Chart reviewed and patient discussed with BRIGETTE Flores PA.  Full consult to follow.    Thank you for opportunity to participate in Ms. Smith's care.   Natasha Luna, SUSSY, ACNS-BC, OCN         Assessment/Plan:         T

## 2018-06-25 NOTE — PROGRESS NOTES
Discharge planning    Verbally referred pt to [facility] and give Epic access for discharge planning and pt care needs.    SW remains available.

## 2018-06-25 NOTE — PROGRESS NOTES
[facility]  Heart Transplant  Progress Note    Patient Name: Sue Smith  MRN: [id number]  Admission Date: 6/22/2018  Hospital Length of Stay: 3 days  Attending Physician: Lola Paul MD  Primary Care Provider: Paddy Guerrier DO  Principal Problem:Acute on chronic diastolic heart failure    Subjective:     Interval History:  SOB and abdominal distention much improved from yesterday. Interested in speaking with palliative care. Will consult IR for paracentesis today for therapeutic tap.     Continuous Infusions:   furosemide (LASIX) 2 mg/mL infusion (non-titrating) Stopped (06/25/18 0815)    treprostinil (REMODULIN) infusion 75 ng/kg/min (06/24/18 1415)    veletri/remodulin tubing       Scheduled Meds:   albuterol sulfate  2.5 mg Nebulization Q4H    amLODIPine  5 mg Oral Daily    busPIRone  15 mg Oral TID    desvenlafaxine succinate  100 mg Oral Daily    enoxaparin  40 mg Subcutaneous Daily    fluticasone-vilanterol  1 puff Inhalation Daily    lamoTRIgine  100 mg Oral BID    [START ON 6/26/2018] levothyroxine  75 mcg Oral Before breakfast    lurasidone  80 mg Oral QHS    metOLazone  2.5 mg Oral Daily    mupirocin   Topical (Top) BID    pantoprazole  40 mg Oral Daily    pravastatin  40 mg Oral Daily    riociguat  1 mg Oral TID    sodium chloride 0.9%  3 mL Intravenous Q8H    spironolactone  100 mg Oral Daily    umeclidinium  62.5 mcg Inhalation Daily     PRN Meds:ALPRAZolam, HYDROcodone-acetaminophen, ondansetron    Review of patient's allergies indicates:   Allergen Reactions    Sulfa (sulfonamide antibiotics) Rash     Objective:     Vital Signs (Most Recent):  Temp: 98.7 °F (37.1 °C) (06/25/18 0800)  Pulse: 83 (06/25/18 1030)  Resp: 16 (06/25/18 1030)  BP: 120/60 (06/25/18 0800)  SpO2: (!) 89 % (06/25/18 1030) Vital Signs (24h Range):  Temp:  [96.3 °F (35.7 °C)-99.2 °F (37.3 °C)] 98.7 °F (37.1 °C)  Pulse:  [76-88] 83  Resp:  [16-25] 16  SpO2:  [84 %-95 %] 89 %  BP:  ()/(50-60) 120/60     Patient Vitals for the past 72 hrs (Last 3 readings):   Weight   06/25/18 0400 86.2 kg (190 lb 0.6 oz)   06/24/18 0346 89.5 kg (197 lb 5 oz)   06/23/18 0400 92.7 kg (204 lb 5.9 oz)     Body mass index is 34.76 kg/m².      Intake/Output Summary (Last 24 hours) at 06/25/18 1052  Last data filed at 06/25/18 0400   Gross per 24 hour   Intake              720 ml   Output             3000 ml   Net            -2280 ml     Hemodynamic Parameters:    Physical Exam   Constitutional: She is oriented to person, place, and time.   Neck: JVD (to the ear. about 15cm h20) present.   Cardiovascular: Normal rate and regular rhythm.  Exam reveals gallop (+ S3).    Pulmonary/Chest: Effort normal and breath sounds normal.   Abdominal: She exhibits distension (very). There is no tenderness. There is no guarding.   Musculoskeletal: She exhibits edema (Leg bilaterally +2). She exhibits no tenderness.   Neurological: She is alert and oriented to person, place, and time.   Skin: Skin is warm and dry.   Skin break left arm   Nursing note and vitals reviewed    Significant Labs:  CBC:    Recent Labs  Lab 06/22/18  1155   WBC 5.28   RBC 4.60   HGB 11.7*   HCT 38.2   *   MCV 83   MCH 25.4*   MCHC 30.6*     BNP:    Recent Labs  Lab 06/22/18  1155   *     CMP:    Recent Labs  Lab 06/22/18  1155  06/24/18  1227 06/25/18  0426 06/25/18  0903   GLU 90  < > 89 88 95   CALCIUM 9.8  < > 9.8 10.0 10.3   ALBUMIN 3.8  --   --   --   --    PROT 6.6  --   --   --   --      < > 138 139 138   K 3.8  < > 4.0 2.9* 3.4*   CO2 25  < > 28 33* 33*     < > 97 94* 92*   BUN 19  < > 15 18 17   CREATININE 1.8*  < > 1.5* 1.6* 1.6*   ALKPHOS 210*  --   --   --   --    ALT 10  --   --   --   --    AST 12  --   --   --   --    BILITOT 0.7  --   --   --   --    < > = values in this interval not displayed.   Coagulation:   No results for input(s): PT, INR, APTT in the last 168 hours.  LDH:  No results for input(s): LDH in  the last 72 hours.  Microbiology:  Microbiology Results (last 7 days)     ** No results found for the last 168 hours. **          I have reviewed all pertinent labs within the past 24 hours.    Estimated Creatinine Clearance: 40 mL/min (A) (based on SCr of 1.6 mg/dL (H)).    Diagnostic Results:        Assessment and Plan:     Ms. Smith is a 57 y.o. Female with a past medical history of PHTN WHO Group 1 on IV Remodulin 75ng, chronic hypoxic respiratory failure, chronic RV failure on home O2 (3L) continous and BiPAP at night, recent admission to hospital with leg cellulitis currently on abx who presented to the clinic today with with worsening SOB , increased weight and abd girth. Patient who is on Bumex 1mg daily, metolazone every other day and lasix push weekly  started noticing weight gain 5 days ago. Her appetite has also been decreasing despite not having nausea and vomiting. Her abdomen feel full all the time and is more distended than usual. Having difficulty laying down and sleeping on recliner  She does not know whether she consistently take metolazone and mention that visiting nurse keep tract of her medication. She also mention compliance with fluids and salt. She report about 10lbs weight gain and unequal edema to the legs with R more than left. She denies decrease in UOP, color change to the urine, cough or voice change. She was discharge ten days ago after a week of admission secondary to LE cellulitis and has been taking oral doxcycline. Her clinic visit labs showed acute FREDDY with creatinine level at 1.8 from 1.5  And BNP of 800s from 600s ten days ago. She was recommended to have in patient diuretics and medication dosage adjustment.  .    * Acute on chronic diastolic heart failure    - RHF secondary to PH and SHERIE  - Continue Lasix 20mg Gtt for now as well as daily metolazone (was on this at home)  - ABD US positive for large ascities   - consulted IR today for therapeutic tap  - Low salt diet, I/O and  weight monitoring.  - Monitor electrolytes and keep K >> 4 and mag >2  -GDMT- Aldactone only. No ACEI due to rosalie          Acute renal injury    - Baseline appears to be 1-1.5  - possibly related to pre- renal course  - will monitor closely with diuresis        Lower extremity cellulitis    - on doxycycline 100mg BID ( now complete)  - wound care/dressing change per nurse.        Bipolar affective disorder    - continue PTA antipsychotics  - in good spirits this AM and ready to talk to palliative care        Essential hypertension    - Well controlled currently with amlodipine. Monitor closely        Pulmonary hypertension, group 1    - continue remodulin 75ng and adempas 1mg TID  - oxygen use at baseline 3L continuous  - I/O and weight monitoring  - patient not a candidate for Lung transplant per Lung tx visit note  - discussed that she may need to transition to hospice. Continue discussing on this issue during stay. Palliative consult placed today, patient agrees she would like to speak with them regarding goals of care.               GIA DoanC  Heart Transplant  [facility]

## 2018-06-25 NOTE — PLAN OF CARE
Problem: Patient Care Overview  Goal: Plan of Care Review  Outcome: Ongoing (interventions implemented as appropriate)  Patient is AAOx3, independent. Patient denies any pain or nausea. Patient remains on a Lasix drip at 20mg/hr, 10cc/hr. Lasix drip held this morning per orders. Patient's K was 2.9. Patient received 2 doses of PO replacement. Repeat labs done, K 3.4. Lasix drip restarted per orders. Metolazone held per orders. Remodulin cassette and tubing changed at 1500. Settings and calculations verified with SAVAGE Arango RN. Dressing to the right leg CDI, awaiting wound care to see the patient. Right leg dressing changed. Patient is scheduled for a paracentesis today. Palliative care consulted today. Patient remains NSR on telemetry. Reminded the patient to call for assistance. Call light and personal items are within reach.

## 2018-06-25 NOTE — PROCEDURES
Radiology Post-Procedure Note    Pre Op Diagnosis: Ascites  Post Op Diagnosis: Same    Procedure: Paracentesis    Procedure performed by: Jose Nicholson MD    Written Informed Consent Obtained: Yes  Specimen Removed: YES   Estimated Blood Loss: Minimal    Findings:   Successful paracentesis.  Albumin administered PRN per protocol.    Patient tolerated procedure well.    Luca Ovalle  Radiology

## 2018-06-25 NOTE — PROGRESS NOTES
Admit Note     Met with patient to assess needs. Patient is a 57 y.o. single female, admitted for abdominal swelling with weight gain. Per medical record pt has pulmonary hypertension, heart failure and is on home IV Remodulin.     Patient admitted from home on 6/22/2018 .  At this time, patient presents as alert and oriented x 4, good eye contact, calm and communicative.  At this time, patients caregiver is not in attendance, however pt reports she has communicated with family via phone.     Household/Family Systems     Patient resides alone, at     [address]  [place] [address].        Support system includes father, daughters and friends.     Patient does not have dependents that are need of being cared for.     Patients primary caregiver is self.    The pt reports she may be discharged to home on hospice and if so she plans to stay with her daughter Prabha in [place]. The pt does not know the complete physical address, but knows her daughter lives on [address] in [place].     Pt's cell:  [phone]  Emergency contacts:  Prabha (daughter, lives in [place].  20 mins from pt) [phone]  Radha (daughter, lives in [place], 20 minutes from pt) [phone]  U.JACKIE Clive (father) [phone]      During admission, patient's caregiver plans to stay at home.  Confirmed patient and patients caregivers do have access to reliable transportation.    Cognitive Status/Learning     Patient reports reading ability as 12th grade and states patient does not have difficulty with reading, writing, seeing, hearing, comprehension, learning and memory. Pt does wear glasses.   Patient reports patient learns best by hands on .     Needed: No.   Highest education level: High School (9-12) or GED    Vocation/Disability   .  Working for Income: No  If no, reason not working: Disability  Patient has been disabled for the last 25-30 years due to bipolar disorder.  Prior to disability pt was employed as a  .     Adherence     Patient reports a medium level of adherence to patients health care regimen.  Pt reports she does not always follow her diet because it's a hard diet to follow.  Adherence counseling and education provided. Patient verbalizes understanding.    Substance Use    Patient reports the following substance usage.    Tobacco: none.  Pt quit smoking in .  Pt used to smoke 1-2 ppd  Alcohol: none, patient denies any use.  Illicit Drugs/Non-prescribed Medications: none, patient denies any use.  Patient states clear understanding of the potential impact of substance use.  Substance abstinence/cessation counseling, education and resources provided and reviewed.     Services Utilizing/ADLS    Infusion Service: Prior to admission, patient utilizing? yes IV Remodulin  Home Health: Prior to admission, patient utilizing? yes Nursing specialties [phone], fax [phone]. Pt reoprts she sees the nurse twice a week. Pt receives a lasix push during nursing visit.  Pt is unclear of discharge plan,  HH VS Hospice. If pt discharges with HH the pt would like to use same HH company. Pt does not report a hospice preference.   DME: Prior to admission, yes shower chair, and 02 with portable concentrator (in pt's room)  from [employer]  Pulmonary/Cardiac Rehab: Prior to admission, no  Dialysis:  Prior to admission, no  Transplant Specialty Pharmacy:  Prior to admission, no.    Prior to admission, patient reports patient was independent with ADLS and was not driving.  Pt's friends are able to assist with transport home.   Patient reports patient is not able to care for self at this time due to compromised medical condition (as documented in medical record) and physical weakness..  Patient indicates a willingness to care for self once medically cleared to do so.    Insurance/Medications    Insured by   Payor/Plan Subscr  Sex Relation Sub. Ins. ID Effective Group Num   1. PEOPLES [employer]* RADHAHERACLIO SIMPSON 1960  Female  [id number] 1/1/17 [address]      Primary Insurance (for UNOS reporting): Public Insurance - Medicare FFS (Fee For Service)  Secondary Insurance (for UNOS reporting): None    Patient reports patient is able to obtain and afford medications at this time and at time of discharge.    Living Will/Healthcare Power of     Patient states patient does not have a LW and/or HCPA.   provided education regarding LW and HCPA and the completion of forms.    Coping/Mental Health    Patient is coping adequately with the aid of  family members and friends.   Patient indicates mental health difficulties. Pt reports concern and some increased stress about the need for hospice.  Pt reports she would like more information about hospice.  Pt reports her bipolar systems are controlled at this time and the pt takes, Pristiq, xanax and buspar.  Worker provided support.      Discharge Planning    At time of discharge, patient plans to return to patient's home under the care of self.  Patients friend will transport patient.  Per rounds today, expected discharge date has not been medically determined at this time. Patient and patients caregiver  verbalize understanding and are involved in treatment planning and discharge process.    Additional Concerns    Patient is being followed for needs, education, resources, information, emotional support, supportive counseling, and for supportive and skilled discharge plan of care.  providing ongoing psychosocial support, education, resources and d/c planning as needed.  SW remains available. Patient verbalizes understanding and agreement with information reviewed, social work availability, and how to access available resources as needed.

## 2018-06-25 NOTE — PROGRESS NOTES
Paracentesis complete, 1800 MLs removed.  Dressing applied to LUQ puncture site, dressing clean dry and intact. Pt awaits transport to room.  Report called to La RICKETTS.

## 2018-06-25 NOTE — ASSESSMENT & PLAN NOTE
Palliative care consulted for goals of care. Chart reviewed and patient discussed with KRISTAL Ybarra. IRISH MACHUCA. Palliative care APRN met with patient at bedside.  Introduced palliative care.      Impression: Ms. Smith is a 56 yo lady with hx of pulmonary hypertension and heart failure.  She is on IV Remodulin and has no advanced therapy options.  She has requested to talk with palliative care concerning goals of care    Goals of Care:     - Understanding of current clinical condition:   - Patient is open to transition to hospice.  Hospice education    - continue Remodulin:  Primary team  to complete arrangements for home Remodulin    - Resuscitation status:  Full code per primary team.     - Palliative pleurex catheter - hospice

## 2018-06-25 NOTE — ASSESSMENT & PLAN NOTE
- Baseline appears to be 1-1.5  - possibly related to pre- renal course  - will monitor closely with diuresis

## 2018-06-25 NOTE — ASSESSMENT & PLAN NOTE
- continue remodulin 75ng and adempas 1mg TID  - oxygen use at baseline 3L continuous  - I/O and weight monitoring  - patient not a candidate for Lung transplant per Lung tx visit note  - discussed that she may need to transition to hospice. Continue discussing on this issue during stay. Palliative consult placed today, patient agrees she would like to speak with them regarding goals of care.

## 2018-06-25 NOTE — CONSULTS
"[facility]  Palliative Medicine  Consult Note    Patient Name: Sue Smith  MRN: [id number]  Admission Date: 6/22/2018  Hospital Length of Stay: 3 days  Code Status: Full Code   Attending Provider: Lola Paul MD  Consulting Provider: MEGAN Urrutia  Primary Care Physician: Paddy Guerrier DO  Principal Problem:Acute on chronic diastolic heart failure    Patient information was obtained from patient, past medical records and ER records.      Consults  Assessment/Plan:     Palliative care encounter    Palliative care consulted for goals of care. Chart reviewed and patient discussed with KRISTAL Ybarra. IRISH MACHUCA. Palliative care APRN met with patient at bedside.  Palliative care introduced and patient is amenable.       Impression: Ms. Smith is a 56 yo lady with hx of pulmonary hypertension and heart failure.  She is on IV Remodulin and has no advanced therapy options. She is awake, alert and oriented X4.  No complaints of pain or discomforts.  Remodulin infusing as ordered.     Symptom Management  Anxiety  - continue Alprazolam 5 mg by mouth three times daily as needed.      Goals of Care:   - Although it was a "shock to hear" she has understanding of her current clinical condition. She understands she does not meet criteria for advanced therapy options.     Goal is to spend as much quality time with her daughters and her 3 yo grand daughter.   - Patient requested information about hospice.  Mrs. Smith has not previous experience.     - Hospice education provided. Patient asked and had questions answered.  Patient is amenable to transition to home hospice with Remodulin. She has ample family support for home hospice  - Patient will return to her home in [place] after discharge until apartment lease expires.  She will then move to her daughter's home in [place].  Hospice agency will need to provide services with Remodulin in both locations.   - Resuscitation status: currently has full code " orders.  After discussion Ms. Smith is not amenable to CPR, intubation or artifical nutrition.     - Recommend LaPOST on discharge.  Form to be placed in blue chart-will need staff physician signature   - Ms. Smith states ascites is uncomfortable. If likely to have frequent re-accumulation of ascites, patient may benefit from palliative pleurex catheter   - Patient has 3 yo grandchild - palliative care has provided information about talking with children about death and dying.   - Emotional support given.     Primary team aware of above conversation. Consult  for home hospice with Remodulin.                   Thank you for your consult. I will follow-up with patient. Please contact us if you have any additional questions.    Subjective:     HPI:   Ms. Smith is a 57 yolady with PMH if pulmonary HTN WHO Group 1 on IV remodulin, chronic hypoxic respiratory, chronic RV failure on home O2. [place] [facility] [place] [facility] [place] with progressive shortness of breath over the last 3-4 days.  - Recently (5/30) seen in ED for evaluation of a non-healing traumatic wound on her R shin. She was noted to have increased WOB and increased abdominal swelling and given 1 dose of IV lasix. She was also started on clindamycin.   -Discharged from admission with ADHF 4/5-4/22 during which she underwent IV diuresis and uptitration of her home remodulin from 60 to 75 ng. She reports weight and breathing were stable until several days ago.   -5/29, telephone note reports 3 lb wt gain and her HH IV lasix was planned to restart on 5/30 which she ended up getting while at ED for above wound.     Evaluated by lung and heart transplant team.  Does not meet criteria for lung transplant  and found to have no heart failure  advanced therapy options.    Palliative care consulted for goals of care.        Hospital Course:  No notes on file    Interval History:     Past Medical History:   Diagnosis Date    Bipolar  disorder     CHF (congestive heart failure)     Dyslipidemia     GERD (gastroesophageal reflux disease)     Hx of tracheostomy     Decanulated / surgically removed in 2013? Does not currently have tracheostomy    Hypertension     Hypothyroidism     Oxygen dependent     3L around the clock     Pulmonary HTN     Pulmonary hypertension, group 1 10/4/2017    Pulmonary nodules 10/10/2017    Respiratory failure, chronic        Past Surgical History:   Procedure Laterality Date    BACK SURGERY      PERICARDIAL WINDOW  2013    PA LEFT HEART CATH,PERCUTANEOUS      Cardiac Cath, Left Heart    TUBAL LIGATION         Review of patient's allergies indicates:   Allergen Reactions    Sulfa (sulfonamide antibiotics) Rash       Medications:  Continuous Infusions:   furosemide (LASIX) 2 mg/mL infusion (non-titrating) 20 mg/hr (06/25/18 1136)    treprostinil (REMODULIN) infusion 75 ng/kg/min (06/24/18 1415)    veletri/remodulin tubing       Scheduled Meds:   albuterol sulfate  2.5 mg Nebulization Q4H    amLODIPine  5 mg Oral Daily    busPIRone  15 mg Oral TID    desvenlafaxine succinate  100 mg Oral Daily    enoxaparin  40 mg Subcutaneous Daily    fluticasone-vilanterol  1 puff Inhalation Daily    lamoTRIgine  100 mg Oral BID    [START ON 6/26/2018] levothyroxine  75 mcg Oral Before breakfast    lurasidone  80 mg Oral QHS    metOLazone  2.5 mg Oral Daily    mupirocin   Topical (Top) BID    pantoprazole  40 mg Oral Daily    pravastatin  40 mg Oral Daily    riociguat  1 mg Oral TID    sodium chloride 0.9%  3 mL Intravenous Q8H    spironolactone  100 mg Oral Daily     PRN Meds:ALPRAZolam, HYDROcodone-acetaminophen, ondansetron    Family History     Problem Relation (Age of Onset)    Cancer Mother, Sister    Hypertension Mother, Father        Social History Main Topics    Smoking status: Former Smoker     Types: Cigarettes     Start date: 1/1/1979     Quit date: 1/5/1997    Smokeless tobacco: Never  Used    Alcohol use No    Drug use: No    Sexual activity: No       Review of Systems   Constitutional: Positive for activity change and fatigue.   Respiratory: Positive for shortness of breath.    Cardiovascular: Positive for leg swelling.   Gastrointestinal: Positive for abdominal distention.   Skin: Positive for pallor.   Neurological: Positive for weakness.   Psychiatric/Behavioral: Negative for agitation. The patient is nervous/anxious.      Objective:     Vital Signs (Most Recent):  Temp: 98.7 °F (37.1 °C) (06/25/18 1130)  Pulse: 86 (06/25/18 1218)  Resp: 14 (06/25/18 1218)  BP: 111/62 (06/25/18 1130)  SpO2: (!) 90 % (06/25/18 1218) Vital Signs (24h Range):  Temp:  [96.3 °F (35.7 °C)-99.2 °F (37.3 °C)] 98.7 °F (37.1 °C)  Pulse:  [76-90] 86  Resp:  [14-25] 14  SpO2:  [84 %-95 %] 90 %  BP: ()/(50-62) 111/62     Weight: 86.2 kg (190 lb 0.6 oz)  Body mass index is 34.76 kg/m².    Review of Symptoms  Symptom Assessment (ESAS 0-10 scale)   ESAS 0 1 2 3 4 5 6 7 8 9 10   Pain X             Dyspnea X             Anxiety      X        Nausea X             Depression  X             Anorexia X             Fatigue    X          Insomnia X             Restlessness  X             Agitation X             CAM / Delirium: negative    Constipation    Negative    Diarrhea : negative   Bowel Management Plan (BMP): No    Comments: no complaints of pain or discomfort     Pain Assessment:   OME in 24 hours: 10   Performance Status: 60    ECOG Performance Status Grade: 1 - Ambulates, capable of light work    Physical Exam   Constitutional: She is oriented to person, place, and time. She appears well-developed. No distress.   Neck: JVD present.   Cardiovascular: Normal rate, regular rhythm and normal heart sounds.    Pulmonary/Chest:   Dyspnea on exertion   Abdominal: Soft. Bowel sounds are normal. She exhibits distension.   Musculoskeletal: Normal range of motion. She exhibits edema.   Neurological: She is alert and oriented  to person, place, and time.   Skin: Skin is warm and dry. Capillary refill takes 2 to 3 seconds. There is pallor.   Psychiatric: She has a normal mood and affect. Her behavior is normal. Judgment and thought content normal.   Nursing note and vitals reviewed.      Significant Labs: All pertinent labs within the past 24 hours have been reviewed.  CBC:     Recent Labs  Lab 06/22/18  1155   WBC 5.28   HGB 11.7*   HCT 38.2   MCV 83   *     BMP:    Recent Labs  Lab 06/25/18  0426 06/25/18  0903   GLU 88 95    138   K 2.9* 3.4*   CL 94* 92*   CO2 33* 33*   BUN 18 17   CREATININE 1.6* 1.6*   CALCIUM 10.0 10.3   MG 2.0  --      LFT:  Lab Results   Component Value Date    AST 12 06/22/2018    ALKPHOS 210 (H) 06/22/2018    BILITOT 0.7 06/22/2018     Albumin:   Albumin   Date Value Ref Range Status   06/22/2018 3.8 3.5 - 5.2 g/dL Final     Protein:   Total Protein   Date Value Ref Range Status   06/22/2018 6.6 6.0 - 8.4 g/dL Final     Lactic acid:   No results found for: LACTATE    Significant Imaging: I have reviewed all pertinent imaging results/findings within the past 24 hours.    Advanced Directives::  Living Will: No  LaPOST: No  Do Not Resuscitate Status: No  Medical Power of : No, able to make decisions, next of kin: two adult daughters     Decision-Making Capacity: Patient answered questions    Living Arrangements: Lives alone, will be moving to daughter Howard's house when her lease is up     Psychosocial/Cultural: ,  Two adult daughters: Radha and Giles, one grand  daughter Keny, retired from [employer] - loved her job,  Enjoys spending time with family and caring for her grand daughter       Spiritual:     F- Rosalie and Belief: Bahai   I - Importance: believes in God, not very Islam .  C - Community:   A - Address in Care: Father Tommy has visited. Not sure if she had anointing of the sick, if not she is requesting it.       > 50% of 70  min visit spent in chart  review, face to face discussion of goals of care,  symptom assessment, coordination of care and emotional support.    Natasha Taylor, CNS  Palliative Medicine  [facility]

## 2018-06-26 LAB
ANION GAP SERPL CALC-SCNC: 13 MMOL/L
BUN SERPL-MCNC: 25 MG/DL
CALCIUM SERPL-MCNC: 9.7 MG/DL
CHLORIDE SERPL-SCNC: 92 MMOL/L
CO2 SERPL-SCNC: 30 MMOL/L
CREAT SERPL-MCNC: 1.9 MG/DL
EST. GFR  (AFRICAN AMERICAN): 33.5 ML/MIN/1.73 M^2
EST. GFR  (NON AFRICAN AMERICAN): 29.1 ML/MIN/1.73 M^2
GLUCOSE SERPL-MCNC: 89 MG/DL
MAGNESIUM SERPL-MCNC: 2 MG/DL
POTASSIUM SERPL-SCNC: 3.3 MMOL/L
SODIUM SERPL-SCNC: 135 MMOL/L

## 2018-06-26 PROCEDURE — 99900035 HC TECH TIME PER 15 MIN (STAT): Mod: NTX

## 2018-06-26 PROCEDURE — 27000221 HC OXYGEN, UP TO 24 HOURS: Mod: NTX

## 2018-06-26 PROCEDURE — 94640 AIRWAY INHALATION TREATMENT: CPT | Mod: NTX

## 2018-06-26 PROCEDURE — 99232 SBSQ HOSP IP/OBS MODERATE 35: CPT | Mod: NTX,,, | Performed by: CLINICAL NURSE SPECIALIST

## 2018-06-26 PROCEDURE — 25000003 PHARM REV CODE 250: Mod: NTX | Performed by: PHYSICIAN ASSISTANT

## 2018-06-26 PROCEDURE — 63600175 PHARM REV CODE 636 W HCPCS: Mod: JG,NTX | Performed by: STUDENT IN AN ORGANIZED HEALTH CARE EDUCATION/TRAINING PROGRAM

## 2018-06-26 PROCEDURE — 25000003 PHARM REV CODE 250: Mod: NTX | Performed by: STUDENT IN AN ORGANIZED HEALTH CARE EDUCATION/TRAINING PROGRAM

## 2018-06-26 PROCEDURE — 80048 BASIC METABOLIC PNL TOTAL CA: CPT | Mod: NTX

## 2018-06-26 PROCEDURE — 25000242 PHARM REV CODE 250 ALT 637 W/ HCPCS: Mod: NTX | Performed by: INTERNAL MEDICINE

## 2018-06-26 PROCEDURE — 36415 COLL VENOUS BLD VENIPUNCTURE: CPT | Mod: NTX

## 2018-06-26 PROCEDURE — 25000003 PHARM REV CODE 250: Mod: NTX | Performed by: INTERNAL MEDICINE

## 2018-06-26 PROCEDURE — 83735 ASSAY OF MAGNESIUM: CPT | Mod: NTX

## 2018-06-26 PROCEDURE — 94761 N-INVAS EAR/PLS OXIMETRY MLT: CPT | Mod: NTX

## 2018-06-26 PROCEDURE — 94660 CPAP INITIATION&MGMT: CPT | Mod: NTX

## 2018-06-26 PROCEDURE — 63600175 PHARM REV CODE 636 W HCPCS: Mod: NTX | Performed by: INTERNAL MEDICINE

## 2018-06-26 PROCEDURE — 20600001 HC STEP DOWN PRIVATE ROOM: Mod: NTX

## 2018-06-26 RX ORDER — BUMETANIDE 1 MG/1
2 TABLET ORAL 2 TIMES DAILY
Status: DISCONTINUED | OUTPATIENT
Start: 2018-06-26 | End: 2018-06-27 | Stop reason: HOSPADM

## 2018-06-26 RX ORDER — POTASSIUM CHLORIDE 20 MEQ/1
40 TABLET, EXTENDED RELEASE ORAL EVERY 4 HOURS
Status: COMPLETED | OUTPATIENT
Start: 2018-06-26 | End: 2018-06-26

## 2018-06-26 RX ORDER — FUROSEMIDE 10 MG/ML
80 INJECTION INTRAMUSCULAR; INTRAVENOUS 2 TIMES DAILY
Status: DISCONTINUED | OUTPATIENT
Start: 2018-06-26 | End: 2018-06-26

## 2018-06-26 RX ADMIN — FLUTICASONE FUROATE AND VILANTEROL TRIFENATATE 1 PUFF: 100; 25 POWDER RESPIRATORY (INHALATION) at 08:06

## 2018-06-26 RX ADMIN — PANTOPRAZOLE SODIUM 40 MG: 40 TABLET, DELAYED RELEASE ORAL at 08:06

## 2018-06-26 RX ADMIN — DESVENLAFAXINE SUCCINATE 100 MG: 50 TABLET, FILM COATED, EXTENDED RELEASE ORAL at 08:06

## 2018-06-26 RX ADMIN — RIOCIGUAT 1 MG: 1 TABLET, FILM COATED ORAL at 08:06

## 2018-06-26 RX ADMIN — BUSPIRONE HYDROCHLORIDE 15 MG: 5 TABLET ORAL at 02:06

## 2018-06-26 RX ADMIN — PRAVASTATIN SODIUM 40 MG: 20 TABLET ORAL at 08:06

## 2018-06-26 RX ADMIN — ENOXAPARIN SODIUM 40 MG: 100 INJECTION SUBCUTANEOUS at 05:06

## 2018-06-26 RX ADMIN — BUSPIRONE HYDROCHLORIDE 15 MG: 5 TABLET ORAL at 08:06

## 2018-06-26 RX ADMIN — LEVOTHYROXINE SODIUM 75 MCG: 25 TABLET ORAL at 05:06

## 2018-06-26 RX ADMIN — ALBUTEROL SULFATE 2.5 MG: 2.5 SOLUTION RESPIRATORY (INHALATION) at 12:06

## 2018-06-26 RX ADMIN — ALBUTEROL SULFATE 2.5 MG: 2.5 SOLUTION RESPIRATORY (INHALATION) at 04:06

## 2018-06-26 RX ADMIN — BUMETANIDE 2 MG: 1 TABLET ORAL at 05:06

## 2018-06-26 RX ADMIN — LURASIDONE HYDROCHLORIDE 80 MG: 40 TABLET, FILM COATED ORAL at 08:06

## 2018-06-26 RX ADMIN — TREPROSTINIL 75 NG/KG/MIN: 20 INJECTION, SOLUTION INTRAVENOUS; SUBCUTANEOUS at 02:06

## 2018-06-26 RX ADMIN — POTASSIUM CHLORIDE 40 MEQ: 1500 TABLET, EXTENDED RELEASE ORAL at 10:06

## 2018-06-26 RX ADMIN — LAMOTRIGINE 100 MG: 100 TABLET ORAL at 08:06

## 2018-06-26 RX ADMIN — FUROSEMIDE 20 MG/HR: 10 INJECTION, SOLUTION INTRAMUSCULAR; INTRAVENOUS at 05:06

## 2018-06-26 RX ADMIN — AMLODIPINE BESYLATE 5 MG: 5 TABLET ORAL at 08:06

## 2018-06-26 RX ADMIN — HYDROCODONE BITARTRATE AND ACETAMINOPHEN 1 TABLET: 10; 325 TABLET ORAL at 05:06

## 2018-06-26 RX ADMIN — ALBUTEROL SULFATE 2.5 MG: 2.5 SOLUTION RESPIRATORY (INHALATION) at 08:06

## 2018-06-26 RX ADMIN — POTASSIUM CHLORIDE 40 MEQ: 1500 TABLET, EXTENDED RELEASE ORAL at 08:06

## 2018-06-26 RX ADMIN — ALPRAZOLAM 0.5 MG: 0.5 TABLET ORAL at 08:06

## 2018-06-26 RX ADMIN — ALBUTEROL SULFATE 2.5 MG: 2.5 SOLUTION RESPIRATORY (INHALATION) at 03:06

## 2018-06-26 RX ADMIN — RIOCIGUAT 1 MG: 1 TABLET, FILM COATED ORAL at 02:06

## 2018-06-26 RX ADMIN — SPIRONOLACTONE 100 MG: 50 TABLET, FILM COATED ORAL at 08:06

## 2018-06-26 RX ADMIN — ALBUTEROL SULFATE 2.5 MG: 2.5 SOLUTION RESPIRATORY (INHALATION) at 07:06

## 2018-06-26 RX ADMIN — MUPIROCIN: 20 OINTMENT TOPICAL at 08:06

## 2018-06-26 NOTE — ASSESSMENT & PLAN NOTE
- RHF secondary to PH and SHERIE  - D/C Lasix 20mg Gtt, hold daily metolazone   - will restart home dose of diuretics. Consider adding back metolazone after contraction alkalosis resolves. (-9 L in 3 days)  - ABD US positive for large ascities   - consulted IR for therapeutic tap, -1.8 L  - Low salt diet, I/O and weight monitoring.  - Monitor electrolytes and keep K >> 4 and mag >2  -GDMT- Aldactone only. No ACEI due to rosalie

## 2018-06-26 NOTE — PLAN OF CARE
Problem: Patient Care Overview  Goal: Plan of Care Review  Pt aaox4 vswnl and c/o abd pain and pain med given with relief. Bed in low position and callbell within reach. Standard precautions maintained. Lasix gtt at 20mg/hr and remodulin gtt at 75ng/kg/min. Cassette changed at 3pm on day shift. Rt leg calg with drsg intact done on dayshift. Telemetry maintained nsr. Pt planned for home hospice. 3liters n/c with sats 91%. bipap at night. Paracentesis done on dayshift with 1.9 liters off. drsg d/i. Pt ambulates independently

## 2018-06-26 NOTE — ASSESSMENT & PLAN NOTE
- continue remodulin 75ng and adempas 1mg TID  - oxygen use at baseline 3L continuous  - I/O and weight monitoring  - patient not a candidate for Lung transplant per Lung tx visit note  - Patient would like to go home with hospice. Appreciate palliative care assistance.

## 2018-06-26 NOTE — ASSESSMENT & PLAN NOTE
Palliative care consulted for goals of care. Chart reviewed and patient discussed with KRISTAL Ybarra. IRISH MACHUCA. Palliative care APRN met with patient at bedside.  Palliative care introduced and patient is amenable.       Impression: Ms. Smith is a 58 yo lady with hx of pulmonary hypertension and heart failure.  She is on IV Remodulin and has no advanced therapy options. She is awake, alert and oriented X4.  No complaints of pain or discomforts.  Remodulin infusing as ordered.     Symptom Management  Anxiety  - continue Alprazolam 5 mg by mouth three times daily as needed.    - Patient encouraged to take at bedtime     Goals of Care:   - Primary team  has made referral to [facility].    - LaPOST completed with patient and physician signature.  Original given to patient and copy placed in blue chart for EMR.  - Ms. Smith has received child life information - how to talk to children about death and dying.  She will share them with her daughter   - Ms. Smith expressed gratitude for the care she has received.   - Emotional support provided.

## 2018-06-26 NOTE — PLAN OF CARE
Problem: Patient Care Overview  Goal: Plan of Care Review  Outcome: Ongoing (interventions implemented as appropriate)  Patient AAO I. Currently sats low 90s per 3LNC. Lasix drip dcd today started on bumex.  clear yellow urine. Patient seen by wound care today for right lower leg cellulitis, awaiting recs and note. Remodulin infusing per hospital cadd pump, dosing sheet in chart, cassettes changed today at 1500.  Current rate 77cc/24hr. SR per tele. Possible paracentesis and DC tomorrow.  Potassium replaced today.

## 2018-06-26 NOTE — PROGRESS NOTES
[facility]  Palliative Medicine  Progress Note    Patient Name: Sue Smith  MRN: [id number]  Admission Date: 6/22/2018  Hospital Length of Stay: 4 days  Code Status: Full Code   Attending Provider: Lola Paul MD  Consulting Provider: MEGAN Urrutia  Primary Care Physician: Paddy Guerrier DO  Principal Problem:Acute on chronic diastolic heart failure    Patient information was obtained from patient.      Assessment/Plan:     Palliative care encounter    Palliative care consulted for goals of care. Chart reviewed and patient discussed with KRISTAL Ybarra. IRISH MACHUCA. Palliative care APRN met with patient at bedside.  Palliative care introduced and patient is amenable.       Impression: Ms. Smith is a 56 yo lady with hx of pulmonary hypertension and heart failure.  She is on IV Remodulin and has no advanced therapy options. She is awake, alert and oriented X4.  No complaints of pain or discomforts.  Remodulin infusing as ordered.     Symptom Management  Anxiety  - continue Alprazolam 5 mg by mouth three times daily as needed.    - Patient encouraged to take at bedtime     Goals of Care:   - Primary team  has made referral to [facility].    - LaPOST completed with patient and physician signature.  Original given to patient and copy placed in blue chart for EMR.  - Ms. Smith has received child life information - how to talk to children about death and dying.  She will share them with her daughter   - Ms. Smith expressed gratitude for the care she has received.   - Emotional support provided.                    I will follow-up with patient. Please contact us if you have any additional questions.    Subjective:     Chief Complaint: No chief complaint on file.      HPI:   Ms. Smith is a 57 yolady with PMH if pulmonary HTN WHO Group 1 on IV remodulin, chronic hypoxic respiratory, chronic RV failure on home O2. [place] [facility] [place] [facility] [place] with progressive  shortness of breath over the last 3-4 days.  - Recently (5/30) seen in ED for evaluation of a non-healing traumatic wound on her R shin. She was noted to have increased WOB and increased abdominal swelling and given 1 dose of IV lasix. She was also started on clindamycin.   -Discharged from admission with ADHF 4/5-4/22 during which she underwent IV diuresis and uptitration of her home remodulin from 60 to 75 ng. She reports weight and breathing were stable until several days ago.   -5/29, telephone note reports 3 lb wt gain and her HH IV lasix was planned to restart on 5/30 which she ended up getting while at ED for above wound.     Evaluated by lung and heart transplant team.  Does not meet criteria for lung transplant  and found to have no heart failure  advanced therapy options.    Palliative care consulted for goals of care.        Hospital Course:  No notes on file    Interval History:     Past Medical History:   Diagnosis Date    Bipolar disorder     CHF (congestive heart failure)     Dyslipidemia     GERD (gastroesophageal reflux disease)     Hx of tracheostomy     Decanulated / surgically removed in 2013? Does not currently have tracheostomy    Hypertension     Hypothyroidism     Oxygen dependent     3L around the clock     Pulmonary HTN     Pulmonary hypertension, group 1 10/4/2017    Pulmonary nodules 10/10/2017    Respiratory failure, chronic        Past Surgical History:   Procedure Laterality Date    BACK SURGERY      PERICARDIAL WINDOW  2013    NH LEFT HEART CATH,PERCUTANEOUS      Cardiac Cath, Left Heart    TUBAL LIGATION         Review of patient's allergies indicates:   Allergen Reactions    Sulfa (sulfonamide antibiotics) Rash       Medications:  Continuous Infusions:   treprostinil (REMODULIN) infusion 75 ng/kg/min (06/25/18 1454)    veletri/remodulin tubing       Scheduled Meds:   albuterol sulfate  2.5 mg Nebulization Q4H    amLODIPine  5 mg Oral Daily    busPIRone  15 mg  Oral TID    desvenlafaxine succinate  100 mg Oral Daily    enoxaparin  40 mg Subcutaneous Daily    fluticasone-vilanterol  1 puff Inhalation Daily    furosemide  80 mg Intravenous BID    lamoTRIgine  100 mg Oral BID    levothyroxine  75 mcg Oral Before breakfast    lurasidone  80 mg Oral QHS    mupirocin   Topical (Top) BID    pantoprazole  40 mg Oral Daily    pravastatin  40 mg Oral Daily    riociguat  1 mg Oral TID    sodium chloride 0.9%  3 mL Intravenous Q8H    spironolactone  100 mg Oral Daily     PRN Meds:ALPRAZolam, HYDROcodone-acetaminophen, ondansetron    Family History     Problem Relation (Age of Onset)    Cancer Mother, Sister    Hypertension Mother, Father        Social History Main Topics    Smoking status: Former Smoker     Types: Cigarettes     Start date: 1/1/1979     Quit date: 1/5/1997    Smokeless tobacco: Never Used    Alcohol use No    Drug use: No    Sexual activity: No       Review of Systems   Constitutional: Positive for activity change and fatigue.   Respiratory: Positive for shortness of breath.    Cardiovascular: Positive for leg swelling.   Gastrointestinal: Positive for abdominal distention.   Skin: Positive for pallor.   Neurological: Positive for weakness.   Psychiatric/Behavioral: Negative for agitation. The patient is nervous/anxious.      Objective:     Vital Signs (Most Recent):  Temp: 98.3 °F (36.8 °C) (06/26/18 0814)  Pulse: 82 (06/26/18 1102)  Resp: 20 (06/26/18 0840)  BP: 120/61 (06/26/18 0814)  SpO2: (!) 91 % (06/26/18 0840) Vital Signs (24h Range):  Temp:  [97.6 °F (36.4 °C)-98.7 °F (37.1 °C)] 98.3 °F (36.8 °C)  Pulse:  [67-95] 82  Resp:  [14-20] 20  SpO2:  [88 %-97 %] 91 %  BP: ()/(54-63) 120/61     Weight: 86 kg (189 lb 9.5 oz)  Body mass index is 34.68 kg/m².    Review of Symptoms  Symptom Assessment (ESAS 0-10 scale)   ESAS 0 1 2 3 4 5 6 7 8 9 10   Pain X             Dyspnea X             Anxiety      X        Nausea X             Depression  X              Anorexia X             Fatigue    X          Insomnia X             Restlessness  X             Agitation X             CAM / Delirium: negative    Constipation    Negative    Diarrhea : negative   Bowel Management Plan (BMP): No    Comments: no complaints of pain or discomfort     Pain Assessment:   OME in 24 hours: 10   Performance Status: 60    ECOG Performance Status Grade: 1 - Ambulates, capable of light work    Physical Exam   Constitutional: She is oriented to person, place, and time. She appears well-developed. No distress.   Neck: JVD present.   Cardiovascular: Normal rate, regular rhythm and normal heart sounds.    Pulmonary/Chest:   Dyspnea on exertion   Abdominal: Soft. Bowel sounds are normal. She exhibits distension.   Musculoskeletal: Normal range of motion. She exhibits edema.   Neurological: She is alert and oriented to person, place, and time.   Skin: Skin is warm and dry. Capillary refill takes 2 to 3 seconds. There is pallor.   Psychiatric: She has a normal mood and affect. Her behavior is normal. Judgment and thought content normal.   Nursing note and vitals reviewed.      Significant Labs: All pertinent labs within the past 24 hours have been reviewed.  CBC:     Recent Labs  Lab 06/22/18  1155   WBC 5.28   HGB 11.7*   HCT 38.2   MCV 83   *     BMP:    Recent Labs  Lab 06/26/18  0439   GLU 89   *   K 3.3*   CL 92*   CO2 30*   BUN 25*   CREATININE 1.9*   CALCIUM 9.7   MG 2.0     LFT:  Lab Results   Component Value Date    AST 12 06/22/2018    ALKPHOS 210 (H) 06/22/2018    BILITOT 0.7 06/22/2018     Albumin:   Albumin   Date Value Ref Range Status   06/22/2018 3.8 3.5 - 5.2 g/dL Final     Protein:   Total Protein   Date Value Ref Range Status   06/22/2018 6.6 6.0 - 8.4 g/dL Final     Lactic acid:   No results found for: LACTATE    Significant Imaging: I have reviewed all pertinent imaging results/findings within the past 24 hours.    Advanced Directives::  Living Will:  No  LaPOST: No  Do Not Resuscitate Status: No  Medical Power of : No, able to make decisions, next of kin: two adult daughters     Decision-Making Capacity: Patient answered questions    Living Arrangements: Lives alone, will be moving to daughter Howard's house when her lease is up     Psychosocial/Cultural: ,  Two adult daughters: Radha and Giles, one grand  daughter Keny, retired from [employer] - loved her job,  Enjoys spending time with family and caring for her grand daughter       Spiritual:     F- Rosalie and Belief: Jehovah's witness   I - Importance: believes in God, not very Rastafari .  C - Community:   A - Address in Care: Father Tommy has visited. Not sure if she had anointing of the sick, if not she is requesting it.       > 50% of 25 min visit spent in chart review, face to face discussion of goals of care,  symptom assessment, coordination of care and emotional support.    Natasha Taylor, CNS  Palliative Medicine  [facility]

## 2018-06-26 NOTE — SUBJECTIVE & OBJECTIVE
Interval History:  SOB and abdominal distention much improved from yesterday. Interested in speaking with palliative care. Will consult IR for paracentesis today for therapeutic tap.     Continuous Infusions:   treprostinil (REMODULIN) infusion 75 ng/kg/min (06/25/18 1454)    veletri/remodulin tubing       Scheduled Meds:   albuterol sulfate  2.5 mg Nebulization Q4H    amLODIPine  5 mg Oral Daily    bumetanide  2 mg Oral BID    busPIRone  15 mg Oral TID    desvenlafaxine succinate  100 mg Oral Daily    enoxaparin  40 mg Subcutaneous Daily    fluticasone-vilanterol  1 puff Inhalation Daily    lamoTRIgine  100 mg Oral BID    levothyroxine  75 mcg Oral Before breakfast    lurasidone  80 mg Oral QHS    mupirocin   Topical (Top) BID    pantoprazole  40 mg Oral Daily    pravastatin  40 mg Oral Daily    riociguat  1 mg Oral TID    sodium chloride 0.9%  3 mL Intravenous Q8H    spironolactone  100 mg Oral Daily     PRN Meds:ALPRAZolam, HYDROcodone-acetaminophen, ondansetron    Review of patient's allergies indicates:   Allergen Reactions    Sulfa (sulfonamide antibiotics) Rash     Objective:     Vital Signs (Most Recent):  Temp: 98.3 °F (36.8 °C) (06/26/18 0814)  Pulse: 82 (06/26/18 1102)  Resp: 20 (06/26/18 0840)  BP: 120/61 (06/26/18 0814)  SpO2: (!) 91 % (06/26/18 0840) Vital Signs (24h Range):  Temp:  [97.6 °F (36.4 °C)-98.7 °F (37.1 °C)] 98.3 °F (36.8 °C)  Pulse:  [67-95] 82  Resp:  [14-20] 20  SpO2:  [88 %-97 %] 91 %  BP: ()/(54-63) 120/61     Patient Vitals for the past 72 hrs (Last 3 readings):   Weight   06/26/18 0502 86 kg (189 lb 9.5 oz)   06/25/18 0400 86.2 kg (190 lb 0.6 oz)   06/24/18 0346 89.5 kg (197 lb 5 oz)     Body mass index is 34.68 kg/m².      Intake/Output Summary (Last 24 hours) at 06/26/18 1126  Last data filed at 06/26/18 0511   Gross per 24 hour   Intake           996.83 ml   Output             1700 ml   Net          -703.17 ml     Hemodynamic Parameters:    Physical Exam    Constitutional: She is oriented to person, place, and time.   Neck: JVD (to the ear. about 15cm h20) present.   Cardiovascular: Normal rate and regular rhythm.  Exam reveals gallop (+ S3).    Pulmonary/Chest: Effort normal and breath sounds normal.   Abdominal: She exhibits distension (very). There is no tenderness. There is no guarding.   Musculoskeletal: She exhibits edema (Leg bilaterally +2). She exhibits no tenderness.   Neurological: She is alert and oriented to person, place, and time.   Skin: Skin is warm and dry.   Skin break left arm   Nursing note and vitals reviewed    Significant Labs:  CBC:    Recent Labs  Lab 06/22/18  1155   WBC 5.28   RBC 4.60   HGB 11.7*   HCT 38.2   *   MCV 83   MCH 25.4*   MCHC 30.6*     BNP:    Recent Labs  Lab 06/22/18  1155   *     CMP:    Recent Labs  Lab 06/22/18  1155  06/25/18  0426 06/25/18  0903 06/26/18  0439   GLU 90  < > 88 95 89   CALCIUM 9.8  < > 10.0 10.3 9.7   ALBUMIN 3.8  --   --   --   --    PROT 6.6  --   --   --   --      < > 139 138 135*   K 3.8  < > 2.9* 3.4* 3.3*   CO2 25  < > 33* 33* 30*     < > 94* 92* 92*   BUN 19  < > 18 17 25*   CREATININE 1.8*  < > 1.6* 1.6* 1.9*   ALKPHOS 210*  --   --   --   --    ALT 10  --   --   --   --    AST 12  --   --   --   --    BILITOT 0.7  --   --   --   --    < > = values in this interval not displayed.   Coagulation:   No results for input(s): PT, INR, APTT in the last 168 hours.  LDH:  No results for input(s): LDH in the last 72 hours.  Microbiology:  Microbiology Results (last 7 days)     ** No results found for the last 168 hours. **          I have reviewed all pertinent labs within the past 24 hours.    Estimated Creatinine Clearance: 33.7 mL/min (A) (based on SCr of 1.9 mg/dL (H)).    Diagnostic Results:

## 2018-06-26 NOTE — SUBJECTIVE & OBJECTIVE
Interval History:     Past Medical History:   Diagnosis Date    Bipolar disorder     CHF (congestive heart failure)     Dyslipidemia     GERD (gastroesophageal reflux disease)     Hx of tracheostomy     Decanulated / surgically removed in 2013? Does not currently have tracheostomy    Hypertension     Hypothyroidism     Oxygen dependent     3L around the clock     Pulmonary HTN     Pulmonary hypertension, group 1 10/4/2017    Pulmonary nodules 10/10/2017    Respiratory failure, chronic        Past Surgical History:   Procedure Laterality Date    BACK SURGERY      PERICARDIAL WINDOW  2013    MI LEFT HEART CATH,PERCUTANEOUS      Cardiac Cath, Left Heart    TUBAL LIGATION         Review of patient's allergies indicates:   Allergen Reactions    Sulfa (sulfonamide antibiotics) Rash       Medications:  Continuous Infusions:   treprostinil (REMODULIN) infusion 75 ng/kg/min (06/25/18 3304)    veletri/remodulin tubing       Scheduled Meds:   albuterol sulfate  2.5 mg Nebulization Q4H    amLODIPine  5 mg Oral Daily    busPIRone  15 mg Oral TID    desvenlafaxine succinate  100 mg Oral Daily    enoxaparin  40 mg Subcutaneous Daily    fluticasone-vilanterol  1 puff Inhalation Daily    furosemide  80 mg Intravenous BID    lamoTRIgine  100 mg Oral BID    levothyroxine  75 mcg Oral Before breakfast    lurasidone  80 mg Oral QHS    mupirocin   Topical (Top) BID    pantoprazole  40 mg Oral Daily    pravastatin  40 mg Oral Daily    riociguat  1 mg Oral TID    sodium chloride 0.9%  3 mL Intravenous Q8H    spironolactone  100 mg Oral Daily     PRN Meds:ALPRAZolam, HYDROcodone-acetaminophen, ondansetron    Family History     Problem Relation (Age of Onset)    Cancer Mother, Sister    Hypertension Mother, Father        Social History Main Topics    Smoking status: Former Smoker     Types: Cigarettes     Start date: 1/1/1979     Quit date: 1/5/1997    Smokeless tobacco: Never Used    Alcohol use No     Drug use: No    Sexual activity: No       Review of Systems   Constitutional: Positive for activity change and fatigue.   Respiratory: Positive for shortness of breath.    Cardiovascular: Positive for leg swelling.   Gastrointestinal: Positive for abdominal distention.   Skin: Positive for pallor.   Neurological: Positive for weakness.   Psychiatric/Behavioral: Negative for agitation. The patient is nervous/anxious.      Objective:     Vital Signs (Most Recent):  Temp: 98.3 °F (36.8 °C) (06/26/18 0814)  Pulse: 82 (06/26/18 1102)  Resp: 20 (06/26/18 0840)  BP: 120/61 (06/26/18 0814)  SpO2: (!) 91 % (06/26/18 0840) Vital Signs (24h Range):  Temp:  [97.6 °F (36.4 °C)-98.7 °F (37.1 °C)] 98.3 °F (36.8 °C)  Pulse:  [67-95] 82  Resp:  [14-20] 20  SpO2:  [88 %-97 %] 91 %  BP: ()/(54-63) 120/61     Weight: 86 kg (189 lb 9.5 oz)  Body mass index is 34.68 kg/m².    Review of Symptoms  Symptom Assessment (ESAS 0-10 scale)   ESAS 0 1 2 3 4 5 6 7 8 9 10   Pain X             Dyspnea X             Anxiety      X        Nausea X             Depression  X             Anorexia X             Fatigue    X          Insomnia X             Restlessness  X             Agitation X             CAM / Delirium: negative    Constipation    Negative    Diarrhea : negative   Bowel Management Plan (BMP): No    Comments: no complaints of pain or discomfort     Pain Assessment:   OME in 24 hours: 10   Performance Status: 60    ECOG Performance Status Grade: 1 - Ambulates, capable of light work    Physical Exam   Constitutional: She is oriented to person, place, and time. She appears well-developed. No distress.   Neck: JVD present.   Cardiovascular: Normal rate, regular rhythm and normal heart sounds.    Pulmonary/Chest:   Dyspnea on exertion   Abdominal: Soft. Bowel sounds are normal. She exhibits distension.   Musculoskeletal: Normal range of motion. She exhibits edema.   Neurological: She is alert and oriented to person, place, and time.    Skin: Skin is warm and dry. Capillary refill takes 2 to 3 seconds. There is pallor.   Psychiatric: She has a normal mood and affect. Her behavior is normal. Judgment and thought content normal.   Nursing note and vitals reviewed.      Significant Labs: All pertinent labs within the past 24 hours have been reviewed.  CBC:     Recent Labs  Lab 06/22/18  1155   WBC 5.28   HGB 11.7*   HCT 38.2   MCV 83   *     BMP:    Recent Labs  Lab 06/26/18  0439   GLU 89   *   K 3.3*   CL 92*   CO2 30*   BUN 25*   CREATININE 1.9*   CALCIUM 9.7   MG 2.0     LFT:  Lab Results   Component Value Date    AST 12 06/22/2018    ALKPHOS 210 (H) 06/22/2018    BILITOT 0.7 06/22/2018     Albumin:   Albumin   Date Value Ref Range Status   06/22/2018 3.8 3.5 - 5.2 g/dL Final     Protein:   Total Protein   Date Value Ref Range Status   06/22/2018 6.6 6.0 - 8.4 g/dL Final     Lactic acid:   No results found for: LACTATE    Significant Imaging: I have reviewed all pertinent imaging results/findings within the past 24 hours.    Advanced Directives::  Living Will: No  LaPOST: No  Do Not Resuscitate Status: No  Medical Power of : No, able to make decisions, next of kin: two adult daughters     Decision-Making Capacity: Patient answered questions    Living Arrangements: Lives alone, will be moving to daughter Howard's house when her lease is up     Psychosocial/Cultural: ,  Two adult daughters: Radha and Giles, one grand  daughter Keny, retired from [employer] - loved her job,  Enjoys spending time with family and caring for her grand daughter       Spiritual:     F- Rosalie and Belief: Yarsani   I - Importance: believes in God, not very Yarsani .  C - Community:   A - Address in Care: Father Tommy has visited. Not sure if she had anointing of the sick, if not she is requesting it.

## 2018-06-27 VITALS
TEMPERATURE: 98 F | SYSTOLIC BLOOD PRESSURE: 105 MMHG | WEIGHT: 188.31 LBS | OXYGEN SATURATION: 91 % | HEIGHT: 62 IN | HEART RATE: 85 BPM | DIASTOLIC BLOOD PRESSURE: 62 MMHG | BODY MASS INDEX: 34.65 KG/M2 | RESPIRATION RATE: 19 BRPM

## 2018-06-27 PROBLEM — R23.9 ALTERATION IN SKIN INTEGRITY IN ADULT: Status: ACTIVE | Noted: 2018-06-27

## 2018-06-27 LAB
ANION GAP SERPL CALC-SCNC: 13 MMOL/L
BNP SERPL-MCNC: 579 PG/ML
BUN SERPL-MCNC: 28 MG/DL
CALCIUM SERPL-MCNC: 10.1 MG/DL
CHLORIDE SERPL-SCNC: 92 MMOL/L
CO2 SERPL-SCNC: 32 MMOL/L
CREAT SERPL-MCNC: 2.1 MG/DL
EST. GFR  (AFRICAN AMERICAN): 29.7 ML/MIN/1.73 M^2
EST. GFR  (NON AFRICAN AMERICAN): 25.7 ML/MIN/1.73 M^2
GLUCOSE SERPL-MCNC: 93 MG/DL
MAGNESIUM SERPL-MCNC: 2.2 MG/DL
POTASSIUM SERPL-SCNC: 4 MMOL/L
SODIUM SERPL-SCNC: 137 MMOL/L

## 2018-06-27 PROCEDURE — 94761 N-INVAS EAR/PLS OXIMETRY MLT: CPT | Mod: NTX

## 2018-06-27 PROCEDURE — 25000003 PHARM REV CODE 250: Mod: NTX | Performed by: PHYSICIAN ASSISTANT

## 2018-06-27 PROCEDURE — 83735 ASSAY OF MAGNESIUM: CPT

## 2018-06-27 PROCEDURE — 27000221 HC OXYGEN, UP TO 24 HOURS: Mod: NTX

## 2018-06-27 PROCEDURE — 25000003 PHARM REV CODE 250: Mod: NTX | Performed by: INTERNAL MEDICINE

## 2018-06-27 PROCEDURE — 25000242 PHARM REV CODE 250 ALT 637 W/ HCPCS: Mod: NTX | Performed by: INTERNAL MEDICINE

## 2018-06-27 PROCEDURE — 80048 BASIC METABOLIC PNL TOTAL CA: CPT

## 2018-06-27 PROCEDURE — 94640 AIRWAY INHALATION TREATMENT: CPT | Mod: NTX

## 2018-06-27 PROCEDURE — 83880 ASSAY OF NATRIURETIC PEPTIDE: CPT

## 2018-06-27 PROCEDURE — 36415 COLL VENOUS BLD VENIPUNCTURE: CPT

## 2018-06-27 RX ORDER — METOLAZONE 2.5 MG/1
2.5 TABLET ORAL DAILY
Qty: 30 TABLET | Refills: 1 | Status: SHIPPED | OUTPATIENT
Start: 2018-06-27

## 2018-06-27 RX ADMIN — LAMOTRIGINE 100 MG: 100 TABLET ORAL at 08:06

## 2018-06-27 RX ADMIN — ALBUTEROL SULFATE 2.5 MG: 2.5 SOLUTION RESPIRATORY (INHALATION) at 09:06

## 2018-06-27 RX ADMIN — BUMETANIDE 2 MG: 1 TABLET ORAL at 08:06

## 2018-06-27 RX ADMIN — ALBUTEROL SULFATE 2.5 MG: 2.5 SOLUTION RESPIRATORY (INHALATION) at 01:06

## 2018-06-27 RX ADMIN — ALBUTEROL SULFATE 2.5 MG: 2.5 SOLUTION RESPIRATORY (INHALATION) at 11:06

## 2018-06-27 RX ADMIN — PRAVASTATIN SODIUM 40 MG: 20 TABLET ORAL at 08:06

## 2018-06-27 RX ADMIN — RIOCIGUAT 1 MG: 1 TABLET, FILM COATED ORAL at 02:06

## 2018-06-27 RX ADMIN — AMLODIPINE BESYLATE 5 MG: 5 TABLET ORAL at 08:06

## 2018-06-27 RX ADMIN — BUSPIRONE HYDROCHLORIDE 15 MG: 5 TABLET ORAL at 02:06

## 2018-06-27 RX ADMIN — SPIRONOLACTONE 100 MG: 50 TABLET, FILM COATED ORAL at 08:06

## 2018-06-27 RX ADMIN — LEVOTHYROXINE SODIUM 75 MCG: 25 TABLET ORAL at 05:06

## 2018-06-27 RX ADMIN — BUSPIRONE HYDROCHLORIDE 15 MG: 5 TABLET ORAL at 08:06

## 2018-06-27 RX ADMIN — RIOCIGUAT 1 MG: 1 TABLET, FILM COATED ORAL at 08:06

## 2018-06-27 RX ADMIN — ALBUTEROL SULFATE 2.5 MG: 2.5 SOLUTION RESPIRATORY (INHALATION) at 05:06

## 2018-06-27 RX ADMIN — PANTOPRAZOLE SODIUM 40 MG: 40 TABLET, DELAYED RELEASE ORAL at 08:06

## 2018-06-27 RX ADMIN — MUPIROCIN: 20 OINTMENT TOPICAL at 08:06

## 2018-06-27 RX ADMIN — DESVENLAFAXINE SUCCINATE 100 MG: 50 TABLET, FILM COATED, EXTENDED RELEASE ORAL at 08:06

## 2018-06-27 RX ADMIN — FLUTICASONE FUROATE AND VILANTEROL TRIFENATATE 1 PUFF: 100; 25 POWDER RESPIRATORY (INHALATION) at 08:06

## 2018-06-27 NOTE — PROGRESS NOTES
Consulted to see for right leg wound sustained during a fall.  Wound is covered with nonviable tissue . Will place call to Savannah Ybarra to discuss.   Suggest Santyl ointment to area for enzymatic debridement of the wound if that is the goal.     06/26/18 1300       Wound 06/02/18 0241 Laceration posterior Calf   Date First Assessed/Time First Assessed: 06/02/18 0241   Pre-existing: Yes  Primary Wound Type: Laceration  Side: Right  Orientation: posterior  Location: Calf   Wound Image    Wound WDL ex   Dressing Appearance Intact;Dry   Drainage Amount None   Drainage Characteristics/Odor No odor   Appearance Tan;Eschar;Dry;Not granulating  (Brown)   Black (%), Wound Tissue Color 100 %  (brown- tan)   Periwound Area Swelling;Warm;Redness   Wound Length (cm) 8 cm   Wound Width (cm) 2.5 cm   Wound Depth (cm) (obscured with eschar)   Care Cleansed with:;Sterile normal saline   Dressing Applied;Honey;Island/border  (will order santyl )   Safety Interventions   Patient Rounds visualized patient      Recommendations:   Upon discussion of the wound with Savannah Ybarra PA-C , note the plan is to transfer care to hospice.  Will paint area daily with betadine and cover if needed with mepore dressing.    Bruno Meek RN CWON  [id number]

## 2018-06-27 NOTE — PROGRESS NOTES
Patient dcd per md orders.  No acute distress noted. Patient given AVS. Notified of wound care and given supplies. Transport set up with [facility] to transport patient home with hospice care.  Patient mixed remodulin and connected to home remodulin pump prior to dc. PIV dcd, patient tolerated well. Vitals stable, no acute distress noted. Patient gave full verbal understanding on all written and verbal discharge instructions.  Patient will go home on home O2 concentrator, O2 at bedside.  Awaiting transportation.

## 2018-06-27 NOTE — PROGRESS NOTES
DISCHARGE    SW to pt's room for discharge today.  Pt presents as sitting up in bed, aao x4, calm, cooperative, and asking and answering questions appropriately.  Pt verbalizes understanding and agreement with plan for d/c to home today with hospice.  Pt reports she will be discharging to her home and her dgtr will check in on her.  Pt will continue on Remodulin at home.  Per pt, she has about 20 days of Remodulin at home currently.  [facility] is in communication with specialty pharmacy about pt asstc program for continuation of Remodulin while pt is on hospice.    SW notified Deisy with [facility] (ph: [phone], f: [phone]) that pt will d/c home today.  Pt reports she does not have a ride home today and will need assistance with transportation.  SW scheduled pickup with [facility] ([phone]) for between 3 and 3:30pm.  SW notified pt and bedside nurse Jess of scheduled pickup time.  Pt reports coping adequately at this time, and denies any other needs or concerns to SW.  SW providing ongoing psychosocial and counseling support, education, resources, assistance, and discharge planning as indicated.  SW remains available.

## 2018-06-27 NOTE — PROGRESS NOTES
[facility]  Heart Transplant  Progress Note    Patient Name: Sue Smith  MRN: [id number]  Admission Date: 6/22/2018  Hospital Length of Stay: 5 days  Attending Physician: Lola Paul MD  Primary Care Provider: Paddy Guerrier DO  Principal Problem:Acute on chronic diastolic heart failure    Subjective:     Interval History:  Feels great today with no complaints. Ready to d/c home with hospice. Has 20 day supply of remodulin at home.     Continuous Infusions:   treprostinil (REMODULIN) infusion 75 ng/kg/min (06/26/18 1451)    veletri/remodulin tubing       Scheduled Meds:   albuterol sulfate  2.5 mg Nebulization Q4H    amLODIPine  5 mg Oral Daily    bumetanide  2 mg Oral BID    busPIRone  15 mg Oral TID    desvenlafaxine succinate  100 mg Oral Daily    enoxaparin  40 mg Subcutaneous Daily    fluticasone-vilanterol  1 puff Inhalation Daily    lamoTRIgine  100 mg Oral BID    levothyroxine  75 mcg Oral Before breakfast    lurasidone  80 mg Oral QHS    mupirocin   Topical (Top) BID    pantoprazole  40 mg Oral Daily    pravastatin  40 mg Oral Daily    riociguat  1 mg Oral TID    sodium chloride 0.9%  3 mL Intravenous Q8H    spironolactone  100 mg Oral Daily     PRN Meds:ALPRAZolam, HYDROcodone-acetaminophen, ondansetron    Review of patient's allergies indicates:   Allergen Reactions    Sulfa (sulfonamide antibiotics) Rash     Objective:     Vital Signs (Most Recent):  Temp: 98.2 °F (36.8 °C) (06/27/18 1134)  Pulse: 88 (06/27/18 1137)  Resp: 20 (06/27/18 1137)  BP: (!) 101/50 (06/27/18 1134)  SpO2: (!) 90 % (06/27/18 1137) Vital Signs (24h Range):  Temp:  [97.7 °F (36.5 °C)-98.9 °F (37.2 °C)] 98.2 °F (36.8 °C)  Pulse:  [81-92] 88  Resp:  [16-20] 20  SpO2:  [85 %-92 %] 90 %  BP: ()/(50-57) 101/50     Patient Vitals for the past 72 hrs (Last 3 readings):   Weight   06/27/18 0500 85.4 kg (188 lb 4.8 oz)   06/26/18 2110 84.8 kg (187 lb)   06/26/18 0502 86 kg (189 lb 9.5 oz)      Body mass index is 34.44 kg/m².      Intake/Output Summary (Last 24 hours) at 06/27/18 1206  Last data filed at 06/27/18 0500   Gross per 24 hour   Intake              620 ml   Output             1301 ml   Net             -681 ml     Hemodynamic Parameters:    Physical Exam   Constitutional: She is oriented to person, place, and time.   Neck: JVD (mid neck) present.   Cardiovascular: Normal rate and regular rhythm.  Exam reveals gallop (+ S3).    Pulmonary/Chest: Effort normal and breath sounds normal.   Abdominal: She exhibits distension (improved). There is no tenderness. There is no guarding.   Musculoskeletal: She exhibits edema (Leg bilaterally +2). She exhibits no tenderness.   Neurological: She is alert and oriented to person, place, and time.   Skin: Skin is warm and dry.   Skin break left arm   Nursing note and vitals reviewed    Significant Labs:  CBC:    Recent Labs  Lab 06/22/18  1155   WBC 5.28   RBC 4.60   HGB 11.7*   HCT 38.2   *   MCV 83   MCH 25.4*   MCHC 30.6*     BNP:    Recent Labs  Lab 06/22/18  1155 06/27/18  0850   * 579*     CMP:    Recent Labs  Lab 06/22/18  1155  06/25/18  0903 06/26/18  0439 06/27/18  0628   GLU 90  < > 95 89 93   CALCIUM 9.8  < > 10.3 9.7 10.1   ALBUMIN 3.8  --   --   --   --    PROT 6.6  --   --   --   --      < > 138 135* 137   K 3.8  < > 3.4* 3.3* 4.0   CO2 25  < > 33* 30* 32*     < > 92* 92* 92*   BUN 19  < > 17 25* 28*   CREATININE 1.8*  < > 1.6* 1.9* 2.1*   ALKPHOS 210*  --   --   --   --    ALT 10  --   --   --   --    AST 12  --   --   --   --    BILITOT 0.7  --   --   --   --    < > = values in this interval not displayed.   Coagulation:   No results for input(s): PT, INR, APTT in the last 168 hours.  LDH:  No results for input(s): LDH in the last 72 hours.  Microbiology:  Microbiology Results (last 7 days)     ** No results found for the last 168 hours. **          I have reviewed all pertinent labs within the past 24  hours.    Estimated Creatinine Clearance: 30.3 mL/min (A) (based on SCr of 2.1 mg/dL (H)).    Diagnostic Results:        Assessment and Plan:     Ms. Smith is a 57 y.o. Female with a past medical history of PHTN WHO Group 1 on IV Remodulin 75ng, chronic hypoxic respiratory failure, chronic RV failure on home O2 (3L) continous and BiPAP at night, recent admission to hospital with leg cellulitis currently on abx who presented to the clinic today with with worsening SOB , increased weight and abd girth. Patient who is on Bumex 1mg daily, metolazone every other day and lasix push weekly  started noticing weight gain 5 days ago. Her appetite has also been decreasing despite not having nausea and vomiting. Her abdomen feel full all the time and is more distended than usual. Having difficulty laying down and sleeping on recliner  She does not know whether she consistently take metolazone and mention that visiting nurse keep tract of her medication. She also mention compliance with fluids and salt. She report about 10lbs weight gain and unequal edema to the legs with R more than left. She denies decrease in UOP, color change to the urine, cough or voice change. She was discharge ten days ago after a week of admission secondary to LE cellulitis and has been taking oral doxcycline. Her clinic visit labs showed acute FREDDY with creatinine level at 1.8 from 1.5  And BNP of 800s from 600s ten days ago. She was recommended to have in patient diuretics and medication dosage adjustment.  .    * Acute on chronic diastolic heart failure    - RHF secondary to PH and SHERIE  - D/C Lasix 20mg Gtt, hold daily metolazone   - will restart home dose of diuretics. Consider adding back metolazone after contraction alkalosis resolves. (-9 L in 3 days).   Will leave metolazone PRN w/ PRN IV lasix w/ home hospice orders.   - ABD US positive for large ascities   - consulted IR for therapeutic tap, -1.8 L  - Low salt diet, I/O and weight monitoring.  -  Monitor electrolytes and keep K >> 4 and mag >2  -GDMT- Aldactone only. No ACEI due to rosalie          Acute renal injury    - Baseline appears to be 1-1.5  - possibly related to pre- renal course        Lower extremity cellulitis    - on doxycycline 100mg BID ( now complete)  - wound care/dressing change per nurse. (instructions for betadine dressing in home hospice orders)        Bipolar affective disorder    - continue PTA antipsychotics        Essential hypertension    - Well controlled currently with amlodipine. Monitor closely        Pulmonary hypertension, group 1    - continue remodulin 75ng and adempas 1mg TID  - oxygen use at baseline 3L continuous  - I/O and weight monitoring  - patient not a candidate for Lung transplant per Lung tx visit note  - Home with home hospice today pending transportation.              GIA DoanC  Heart Transplant  [facility]

## 2018-06-27 NOTE — ASSESSMENT & PLAN NOTE
- RHF secondary to PH and SHERIE  - D/C Lasix 20mg Gtt, hold daily metolazone   - will restart home dose of diuretics. Consider adding back metolazone after contraction alkalosis resolves. (-9 L in 3 days).   Will leave metolazone PRN w/ PRN IV lasix w/ home hospice orders.   - ABD US positive for large ascities   - consulted IR for therapeutic tap, -1.8 L  - Low salt diet, I/O and weight monitoring.  - Monitor electrolytes and keep K >> 4 and mag >2  -GDMT- Aldactone only. No ACEI due to rosalie

## 2018-06-27 NOTE — DISCHARGE SUMMARY
[facility]  Heart Transplant  Discharge Summary      Patient Name: Sue Smith  MRN: [id number]  Admission Date: 6/22/2018  Hospital Length of Stay: 5 days  Discharge Date and Time: 06/27/2018 12:59 PM  Attending Physician: Lola Paul MD   Discharging Provider: Savannah Ybarra PA-C  Primary Care Provider: Paddy Guerrier DO     HPI: Ms. Smith is a 57 y.o. Female with a past medical history of PHTN WHO Group 1 on IV Remodulin 75ng, chronic hypoxic respiratory failure, chronic RV failure on home O2 (3L) continous and BiPAP at night, recent admission to hospital with leg cellulitis currently on abx who presented to the clinic today with with worsening SOB , increased weight and abd girth. Patient who is on Bumex 1mg daily, metolazone every other day and lasix push weekly  started noticing weight gain 5 days ago. Her appetite has also been decreasing despite not having nausea and vomiting. Her abdomen feel full all the time and is more distended than usual. Having difficulty laying down and sleeping on recliner  She does not know whether she consistently take metolazone and mention that visiting nurse keep tract of her medication. She also mention compliance with fluids and salt. She report about 10lbs weight gain and unequal edema to the legs with R more than left. She denies decrease in UOP, color change to the urine, cough or voice change. She was discharge ten days ago after a week of admission secondary to LE cellulitis and has been taking oral doxcycline. Her clinic visit labs showed acute FREDDY with creatinine level at 1.8 from 1.5  And BNP of 800s from 600s ten days ago. She was recommended to have in patient diuretics and medication dosage adjustment.    * No surgery found *     Hospital Course: 58 y/o WF with PMHx chronic RV failure and PH Group 1 on IV remodulin 75 ng and adempas 1 mg tid who was admitted from [facility] clinic to hospitals for ADHF in need of IV diuresis on 6/22/18. Pt was started  on lasix 80 mg IVP on admit which was switched to lasix 20 mg gtt on 6/23/18. Her lisinopril was discontinued on admit due to FREDDY. Pt continued her home dose of IV remodulin and adempas throughout her admission. Palliative care was consulted as pt was determined to no longer be a lung transplant candidate, and the pt agreed to go home with hospice.The pt received an abdominal US on 6/23/18 which revealed ascites, and IR was consulted. Paracentesis was done on 6/25/18, and 1800 mLs were removed. On 6/26/18, her home metolazone 2.5 mg qd was discontinued due to contraction alkalosis, lasix 20 mg gtt was stopped, and her home dose of bumex 2 mg bid was restarted. She had a net output of -8.8 L and lost 8 kg during her admission. In addition, her BNP improved from 823 on admit to 579 on 6/27/18.Today, pt reports her symptoms have greatly improved and that she has no complaints. Plan to discharge with PRN metolazone and PRN IV lasix with home health orders as well as hospice.   Patient will continue on remodulin with hospice for comfort. Pt will continue on Remodulin at home. Patient has ~ 20 days of Remodulin at home currently. [facility] is in communication with specialty pharmacy about pt assistance program for continuation of Remodulin while pt is on hospice.   Of note, pt was recently admitted from 6/2/18-6/10/18 for BLE cellulitis 2/2 to venous stasis and hypervolemia 2/2 to ADHF. The pt was in the process of completing a 7 day course of doxy per her PCP for continued concern for BLE cellulitis when she was admitted on 6/22/18. She completed her regimen of doxy of 6/25/18. Pt was seen by wound care for a non-healing wound on her right lower leg on 6/27/18, who recommended treating the area with daily betadine and mepore dressing.     Consults         Status Ordering Provider     Inpatient consult to Palliative Care  Once     Provider:  (Not yet assigned)    Completed COLT MURO     [facility]  Once     Provider:  (Not yet assigned)    Completed ABBY ARTEAGA     Inpatient consult to Social Work  Once     Provider:  (Not yet assigned)    Acknowledged ABBY ARTEAGA          Significant Diagnostic Studies: Labs:   CMP   Recent Labs  Lab 06/26/18  0439 06/27/18  0628   * 137   K 3.3* 4.0   CL 92* 92*   CO2 30* 32*   GLU 89 93   BUN 25* 28*   CREATININE 1.9* 2.1*   CALCIUM 9.7 10.1   ANIONGAP 13 13   ESTGFRAFRICA 33.5* 29.7*   EGFRNONAA 29.1* 25.7*    and CBC No results for input(s): WBC, HGB, HCT, PLT in the last 48 hours.    Pending Diagnostic Studies:     None        Final Active Diagnoses:    Diagnosis Date Noted POA    PRINCIPAL PROBLEM:  Acute on chronic diastolic heart failure [I50.33] 10/06/2017 Yes    Alteration in skin integrity in adult [R23.9] 06/27/2018 Yes    Palliative care encounter [Z51.5] 06/25/2018 Not Applicable    Goals of care, counseling/discussion [Z71.89]  Not Applicable    Acute renal injury [N17.9] 06/22/2018 Yes    Right heart failure [I50.810] 06/22/2018 Yes    Lower extremity cellulitis [L03.119] 06/05/2018 Yes    Essential hypertension [I10]  Yes    Bipolar affective disorder [F31.9]  Yes    Pulmonary hypertension, group 1 [I27.20] 10/04/2017 Yes      Problems Resolved During this Admission:    Diagnosis Date Noted Date Resolved POA      Discharged Condition: stable    Disposition: Hospice/Home    Follow Up: none, discharged with home hospice    Patient Instructions:     Diet Cardiac     Activity as tolerated       Medications:  Reconciled Home Medications:      Medication List      CHANGE how you take these medications    LATUDA 80 mg Tab tablet  Generic drug:  lurasidone  Take 80 mg by mouth every evening.  What changed:  Another medication with the same name was removed. Continue taking this medication, and follow the directions you see here.     metOLazone 2.5 MG tablet  Commonly known as:  ZAROXOLYN  Take 1 tablet (2.5 mg  total) by mouth once daily. Only take per MD order for weight gain per hospice recs  What changed:  additional instructions        CONTINUE taking these medications    albuterol 2.5 mg /3 mL (0.083 %) nebulizer solution  Commonly known as:  PROVENTIL  Take 2.5 mg by nebulization every 6 (six) hours as needed for Wheezing. Rescue     ALPRAZolam 1 MG tablet  Commonly known as:  XANAX  Take 0.5 tablets (0.5 mg total) by mouth 3 (three) times daily as needed for Anxiety.     amLODIPine 5 MG tablet  Commonly known as:  NORVASC  Take 5 mg by mouth once daily.     bumetanide 2 MG tablet  Commonly known as:  BUMEX  Take 1 tablet (2 mg total) by mouth 2 (two) times daily.     busPIRone 15 MG tablet  Commonly known as:  BUSPAR  Take 15 mg by mouth 3 (three) times daily.     desvenlafaxine succinate 100 MG Tb24  Commonly known as:  PRISTIQ  Take 100 mg by mouth once daily.     fluticasone-vilanterol 100-25 mcg/dose diskus inhaler  Commonly known as:  BREO  Inhale 1 puff into the lungs once daily. Controller     furosemide 10 mg/mL injection  Commonly known as:  LASIX  Inject 8 mLs (80 mg total) into the vein twice a week. As needed for weight gain of > 5 lbs or SOB     gabapentin 100 MG capsule  Commonly known as:  NEURONTIN  Take 1 capsule (100 mg total) by mouth 3 (three) times daily.     HYDROcodone-acetaminophen  mg per tablet  Commonly known as:  NORCO  Take 1 tablet by mouth every 8 (eight) hours as needed for Pain.     lamoTRIgine 100 MG tablet  Commonly known as:  LAMICTAL  Take 100 mg by mouth 2 (two) times daily.     levothyroxine 75 MCG tablet  Commonly known as:  SYNTHROID  Take 75 mcg by mouth once daily.     mupirocin 2 % ointment  Commonly known as:  BACTROBAN  Apply 1 application topically 2 (two) times daily. Apply to affected area     ondansetron 8 MG Tbdl  Commonly known as:  ZOFRAN-ODT  Take 1 tablet (8 mg total) by mouth every 8 (eight) hours as needed.     pantoprazole 40 MG tablet  Commonly known  as:  PROTONIX  Take 40 mg by mouth once daily.     potassium chloride SA 20 MEQ tablet  Commonly known as:  K-DUR,KLOR-CON  Take 1 tablet (20 mEq total) by mouth 2 (two) times daily.     pravastatin 40 MG tablet  Commonly known as:  PRAVACHOL  Take 40 mg by mouth once daily.     riociguat 1 mg Tab  Commonly known as:  ADEMPAS  Take 1 tablet (1 mg total) by mouth 3 (three) times daily.     sodium chloride 0.9% 0.9 % SolP 100 mL with treprostinil 1 mg/mL Soln 6,000,000 ng  Inject 2,380 ng/min into the vein continuous.     spironolactone 100 MG tablet  Commonly known as:  ALDACTONE  Take 1 tablet (100 mg total) by mouth once daily.     umeclidinium 62.5 mcg/actuation Dsdv  Inhale 62.5 mcg into the lungs once daily. Controller        STOP taking these medications    doxycycline 100 MG capsule  Commonly known as:  MONODOX            Savannah Ybarra PA-C  Heart Transplant  [facility]

## 2018-06-27 NOTE — PROGRESS NOTES
Patient transported off the unit per wheelchair with [facility].  No acute distress noted at this time.

## 2018-06-27 NOTE — SUBJECTIVE & OBJECTIVE
Interval History:  Feels great today with no complaints. Ready to d/c home with hospice. Has 20 day supply of remodulin at home.     Continuous Infusions:   treprostinil (REMODULIN) infusion 75 ng/kg/min (06/26/18 1451)    veletri/remodulin tubing       Scheduled Meds:   albuterol sulfate  2.5 mg Nebulization Q4H    amLODIPine  5 mg Oral Daily    bumetanide  2 mg Oral BID    busPIRone  15 mg Oral TID    desvenlafaxine succinate  100 mg Oral Daily    enoxaparin  40 mg Subcutaneous Daily    fluticasone-vilanterol  1 puff Inhalation Daily    lamoTRIgine  100 mg Oral BID    levothyroxine  75 mcg Oral Before breakfast    lurasidone  80 mg Oral QHS    mupirocin   Topical (Top) BID    pantoprazole  40 mg Oral Daily    pravastatin  40 mg Oral Daily    riociguat  1 mg Oral TID    sodium chloride 0.9%  3 mL Intravenous Q8H    spironolactone  100 mg Oral Daily     PRN Meds:ALPRAZolam, HYDROcodone-acetaminophen, ondansetron    Review of patient's allergies indicates:   Allergen Reactions    Sulfa (sulfonamide antibiotics) Rash     Objective:     Vital Signs (Most Recent):  Temp: 98.2 °F (36.8 °C) (06/27/18 1134)  Pulse: 88 (06/27/18 1137)  Resp: 20 (06/27/18 1137)  BP: (!) 101/50 (06/27/18 1134)  SpO2: (!) 90 % (06/27/18 1137) Vital Signs (24h Range):  Temp:  [97.7 °F (36.5 °C)-98.9 °F (37.2 °C)] 98.2 °F (36.8 °C)  Pulse:  [81-92] 88  Resp:  [16-20] 20  SpO2:  [85 %-92 %] 90 %  BP: ()/(50-57) 101/50     Patient Vitals for the past 72 hrs (Last 3 readings):   Weight   06/27/18 0500 85.4 kg (188 lb 4.8 oz)   06/26/18 2110 84.8 kg (187 lb)   06/26/18 0502 86 kg (189 lb 9.5 oz)     Body mass index is 34.44 kg/m².      Intake/Output Summary (Last 24 hours) at 06/27/18 1206  Last data filed at 06/27/18 0500   Gross per 24 hour   Intake              620 ml   Output             1301 ml   Net             -681 ml     Hemodynamic Parameters:    Physical Exam   Constitutional: She is oriented to person, place, and  time.   Neck: JVD (mid neck) present.   Cardiovascular: Normal rate and regular rhythm.  Exam reveals gallop (+ S3).    Pulmonary/Chest: Effort normal and breath sounds normal.   Abdominal: She exhibits distension (improved). There is no tenderness. There is no guarding.   Musculoskeletal: She exhibits edema (Leg bilaterally +2). She exhibits no tenderness.   Neurological: She is alert and oriented to person, place, and time.   Skin: Skin is warm and dry.   Skin break left arm   Nursing note and vitals reviewed    Significant Labs:  CBC:    Recent Labs  Lab 06/22/18  1155   WBC 5.28   RBC 4.60   HGB 11.7*   HCT 38.2   *   MCV 83   MCH 25.4*   MCHC 30.6*     BNP:    Recent Labs  Lab 06/22/18  1155 06/27/18  0850   * 579*     CMP:    Recent Labs  Lab 06/22/18  1155  06/25/18  0903 06/26/18  0439 06/27/18  0628   GLU 90  < > 95 89 93   CALCIUM 9.8  < > 10.3 9.7 10.1   ALBUMIN 3.8  --   --   --   --    PROT 6.6  --   --   --   --      < > 138 135* 137   K 3.8  < > 3.4* 3.3* 4.0   CO2 25  < > 33* 30* 32*     < > 92* 92* 92*   BUN 19  < > 17 25* 28*   CREATININE 1.8*  < > 1.6* 1.9* 2.1*   ALKPHOS 210*  --   --   --   --    ALT 10  --   --   --   --    AST 12  --   --   --   --    BILITOT 0.7  --   --   --   --    < > = values in this interval not displayed.   Coagulation:   No results for input(s): PT, INR, APTT in the last 168 hours.  LDH:  No results for input(s): LDH in the last 72 hours.  Microbiology:  Microbiology Results (last 7 days)     ** No results found for the last 168 hours. **          I have reviewed all pertinent labs within the past 24 hours.    Estimated Creatinine Clearance: 30.3 mL/min (A) (based on SCr of 2.1 mg/dL (H)).    Diagnostic Results:

## 2018-06-27 NOTE — PLAN OF CARE
[facility]     Department of Hospital Medicine     [address] [place] [address]     [phone] after hours  [phone] fax                                   HOSPICE  ORDERS     06/27/2018    Admit to Hospice:  Home Service  Active Hospital Problems    Diagnosis  POA    *Acute on chronic diastolic heart failure [I50.33]  Yes    Palliative care encounter [Z51.5]  Not Applicable    Goals of care, counseling/discussion [Z71.89]  Not Applicable    Acute renal injury [N17.9]  Yes     -        Right heart failure [I50.810]  Yes    Lower extremity cellulitis [L03.119]  Yes    Essential hypertension [I10]  Yes    Bipolar affective disorder [F31.9]  Yes    Pulmonary hypertension, group 1 [I27.20]  Yes               Resolved Hospital Problems    Diagnosis Date Resolved POA   No resolved problems to display.       Hospice Qualifying Diagnoses: Pulmonary Hypertension       Patient has a life expectancy < 6 months due to these conditions.    Vital Signs: Routine per Hospice Protocol.    Allergies:  Review of patient's allergies indicates:   Allergen Reactions    Sulfa (sulfonamide antibiotics) Rash     Diet: Cardiac    Activities: As tolerated    Nursing: Per Hospice Routine    Wound Care: paint area daily with betadine and cover if needed with mepore dressing.    Future Orders:  Hospice Medical Director may dictate new orders for comfortable care measures & sign death certificate.    Medications:         Comfort Care Medications Only   Sue Smith   Home Medication Instructions LIZZETTE:[id number]    Printed on:06/27/18 0847   Medication Information                      albuterol (PROVENTIL) 2.5 mg /3 mL (0.083 %) nebulizer solution  Take 2.5 mg by nebulization every 6 (six) hours as needed for Wheezing. Rescue             ALPRAZolam (XANAX) 1 MG tablet  Take 0.5 tablets (0.5 mg total) by mouth 3 (three) times daily as needed for Anxiety.             amlodipine  (NORVASC) 5 MG tablet  Take 5 mg by mouth once daily.             bumetanide (BUMEX) 2 MG tablet  Take 1 tablet (2 mg total) by mouth 2 (two) times daily.             busPIRone (BUSPAR) 15 MG tablet  Take 15 mg by mouth 3 (three) times daily.             desvenlafaxine succinate (PRISTIQ) 100 MG Tb24  Take 100 mg by mouth once daily.             fluticasone-vilanterol (BREO) 100-25 mcg/dose diskus inhaler  Inhale 1 puff into the lungs once daily. Controller             furosemide (LASIX) 10 mg/mL injection  Inject 8 mLs (80 mg total) into the vein twice a week. As needed for weight gain of > 5 lbs or SOB             gabapentin (NEURONTIN) 100 MG capsule  Take 1 capsule (100 mg total) by mouth 3 (three) times daily.             hydrocodone-acetaminophen 10-325mg (NORCO)  mg Tab  Take 1 tablet by mouth every 8 (eight) hours as needed for Pain.             lamotrigine (LAMICTAL) 100 MG tablet  Take 100 mg by mouth 2 (two) times daily.             LATUDA 80 mg Tab tablet  Take 80 mg by mouth every evening.             levothyroxine (SYNTHROID) 75 MCG tablet  Take 75 mcg by mouth once daily.             metOLazone (ZAROXOLYN) 2.5 MG tablet  Take 1 tablet (2.5 mg total) by mouth once daily. Only take per MD order for weight gain per hospice recs             mupirocin (BACTROBAN) 2 % ointment  Apply 1 application topically 2 (two) times daily. Apply to affected area             ondansetron (ZOFRAN-ODT) 8 MG TbDL  Take 1 tablet (8 mg total) by mouth every 8 (eight) hours as needed.             pantoprazole (PROTONIX) 40 MG tablet  Take 40 mg by mouth once daily.             potassium chloride SA (K-DUR,KLOR-CON) 20 MEQ tablet  Take 1 tablet (20 mEq total) by mouth 2 (two) times daily.             pravastatin (PRAVACHOL) 40 MG tablet  Take 40 mg by mouth once daily.             riociguat (ADEMPAS) 1 mg Tab  Take 1 tablet (1 mg total) by mouth 3 (three) times daily.             sodium chloride 0.9% 0.9 % SolP 100 mL  with treprostinil 1 mg/mL Soln 6,000,000 ng  Inject 2,380 ng/min into the vein continuous.             spironolactone (ALDACTONE) 100 MG tablet  Take 1 tablet (100 mg total) by mouth once daily.             umeclidinium 62.5 mcg/actuation DsDv  Inhale 62.5 mcg into the lungs once daily. Controller                 _________________________________  Savannah Ybarra PA-C  06/27/2018

## 2018-06-27 NOTE — ASSESSMENT & PLAN NOTE
- on doxycycline 100mg BID ( now complete)  - wound care/dressing change per nurse. (instructions for betadine dressing in home hospice orders)

## 2018-06-27 NOTE — PHYSICIAN QUERY
PT Name: Sue Smith  MR #: [id number]     Physician Query Form - Documentation Clarification      CDS/: Angela Saunders RN, CCDS             Contact information: [email or web address]    This form is a permanent document in the medical record.     Query Date: June 27, 2018    By submitting this query, we are merely seeking further clarification of documentation. Please utilize your independent clinical judgment when addressing the question(s) below.    The Medical record reflects the following:    Supporting Clinical Findings Location in Medical Record   Patient's K was 2.9. Patient received 2 doses of PO replacement. Repeat labs done, K 3.4.    K+  2.9-->3.4-->3.3     6/25 nurse plan of care        6/25, 6/26 lab     KCL 60 meq PO every 4 hrs x 2 doses  KCL 40 meq PO every 4 hrs x 2 doses       6/25 mar  6/26 mar                                                                            Doctor, Please specify diagnosis or diagnoses associated with above clinical findings.    Provider Use Only      (   X )  Hypokalemia    (    )  Other__________                                                                                                                         [  ] Clinically undetermined

## 2018-06-27 NOTE — ASSESSMENT & PLAN NOTE
- continue remodulin 75ng and adempas 1mg TID  - oxygen use at baseline 3L continuous  - I/O and weight monitoring  - patient not a candidate for Lung transplant per Lung tx visit note  - Home with home hospice today pending transportation.

## 2018-07-03 ENCOUNTER — TELEPHONE (OUTPATIENT)
Dept: TRANSPLANT | Facility: CLINIC | Age: 58
End: 2018-07-03

## 2018-07-03 NOTE — TELEPHONE ENCOUNTER
"Celestine, SP RN was able to contact Mrs. Smith. He states that she "sounds out of it." Mrs. Smith told him that she has the remodulin pump on her but "she can't reach it." She at first said she last changed the cassette in the hospital, but then said maybe Sunday. Celestine asked patient if she thought Radha, her mixing partner, could come to her house but patient did not think so and said, "maybe my daughter can come over."  Celestine is unable to make a home visit today.    PH Coordinator contact [facility], updated them on the situation and asked if they could send a nurse to patient's home. Hospice RN stated that he would try to get someone over there soon. Gave them the number for Celestine so they can communicate information about the remodulin.  Will need to sort out caregiver and mixing partner situation with family and patient.     Will continue to F/U with patient, hospice and [facility].  "

## 2018-07-03 NOTE — TELEPHONE ENCOUNTER
----- Message from Rachael Mcneil sent at 7/3/2018  2:42 PM CDT -----  Contact: Pt daughter Howard      ----- Message -----  From: Carolyn Mckee  Sent: 7/3/2018   2:13 PM  To: [facility]     Pt daughter called regarding a [place] PAP for Remodulin. Please call pt daughter @ [phone]. Thank you.

## 2018-07-03 NOTE — TELEPHONE ENCOUNTER
Per Celestine, he was able to speak with the Hospice nurse who is currently at the house with Mrs. Smith. Patient had programmed the remodulin pump incorrectly and it was running at a rate double what it should be. Celestine walked hospice RN through resetting the pump. Patient will need to have cassette changed in 24 hours.     Celestine was able to contact Radha, patient's mixing partner, and she will go over to patient's house in the morning.     PH Coordinator called Prabha, patient's daughter, who is willing to learn how to mix and states that she will coordinate with Radha. Also stated that she understands that the medication is important for her mother to have for continued comfort.   Will have a [facility] RN set up a visit to teach daughter as well.     Radha Conde (mixing partner) - [phone]

## 2018-07-03 NOTE — TELEPHONE ENCOUNTER
"Received call from patient's daughter stating that patient's remodulin pump was malfunctioning. She believes it is "overdosing" her. Daughter reports that patient has been throwing up, having diarrhea and is too weak to stand. She reports that Hospice tried to call [facility] but they do not understand. Patient in the background and states that she does have two pumps which she changes with each cassette change.  Gave daughter the number for NIESHA Brunner RN and for [facility].   Notified both Celestine and the SP of pump related problems. Also asked that they train patient's daughter on mixing and managing pump if she has not been trained.   PH Coordinator will continue follow-up.  "

## 2018-07-13 ENCOUNTER — TELEPHONE (OUTPATIENT)
Dept: TRANSPLANT | Facility: CLINIC | Age: 58
End: 2018-07-13

## 2018-07-13 NOTE — TELEPHONE ENCOUNTER
Report received from NIESHA Sprague RN. Dr. Cullen notified of all. Patient may eat and drink what she wishes. Will continue to be available for IV Remodulin medication needs.    I met with Mrs. Sue Smith and her daughter Pamela for additional IVR teaching on Thursday 7-2-18. Pamela was able to successfully perform IVR mix using the [facility] manual as a guide with only minimal verbal cues needed from myself. She was also taught CADD pump programming and was instructed to check her mothers CADD pump regularly to make sure it is programmed properly. She is aware that Mrs. Smith is no longer titrating and will remain at 75 nkm dose with a rate of 46 ml/ 24 hours.   Pamela and Radha Amaya were both instructed they are allowed to pre-mix cassettes and that those cassettes are only good for 14 days at room temp after mixing. They will purchase a small Tupperware container in which to place the pre-mixed cassettes so they can keep track of how many are remaining.   Mrs. Smith is much more alert and I think the problems she had earlier in the week were a result of the hospice RN giving her morphine combined with her incorrectly programming the CADD pump.   She was in a bad mood today because her daughters put her dog up for adoption. On a side note Radha brought her a lunch of heavily salted [employer] chicken. Both Mrs. Smith and Radha stated that they knew she wasnt supposed to eat it but did anyway.   Now that the daughter is trained I hope they wont run into as many problems in the future.

## 2018-09-17 ENCOUNTER — TELEPHONE (OUTPATIENT)
Dept: TRANSPLANT | Facility: CLINIC | Age: 58
End: 2018-09-17

## 2018-10-02 ENCOUNTER — TELEPHONE (OUTPATIENT)
Dept: TRANSPLANT | Facility: CLINIC | Age: 58
End: 2018-10-02

## 2018-10-02 NOTE — TELEPHONE ENCOUNTER
""Mrs. Sue Smith was seen on Tuesday 10-2-18 for a Adempas 5 month follow up visit. She remains on Adempas 1 mg po TID and does not report side effects at this time.   Mrs. Smith reports feeling well and that she is able to walk her dog around her building without having as much SOB. O2 at 94% on 3 liters oxygen. She is also on IVR 75 nkm with a rate of 46 ml/ 24 hours. Mrs. Smith reports cassette change is due tomorrow. Her right chest Gomez site looks fine and there are no s/s infection observed.   Vital signs were /66, pulse 84 and respirations 18. 1+ edema noted to BLE and slight crackles to left lower base. She does not report any cough at this time. She also has ascites and states her last paracentesis was about 1 month ago. She reports having procedure done on a monthly basis at her local hospital [facility].   She is living at her old home again and moved away from her daughter about 1 month ago and states she feels better living at her own home."    Notified MD.    "

## 2018-10-11 ENCOUNTER — HISTORICAL (OUTPATIENT)
Dept: ADMINISTRATIVE | Facility: HOSPITAL | Age: 58
End: 2018-10-11

## 2018-12-12 ENCOUNTER — TELEPHONE (OUTPATIENT)
Dept: TRANSPLANT | Facility: CLINIC | Age: 58
End: 2018-12-12

## 2019-12-05 NOTE — CONSULTS
[facility]  Vascular Surgery  Consult Note    Inpatient consult to Vascular Surgery  Consult performed by: CRISTO VICTOR  Consult ordered by: SCHUYLER GENAO        Subjective:     Chief Complaint/Reason for Admission:     History of Present Illness:   Patient is a 57-year-old female with Hx of HTN, HLD, CHF (EF 60% 4/4/18) with RV enlargement and severely depressed systolic function, pulmonary HTN (PASP 70-80s) on home Remodulin infusion via R IJ single-lumen Gomez catheter, respiratory insufficiency on 3 L NC home oxygen who was admitted (4/5/18) for volume overload. She has been on Remodulin and Lasix infusions. She was taken to Cath Lab today for RHC with 7 Fr catheter. She was hemostatic post-procedure but had significant bleeding from R IJ site while positioning in the stretcher. Pressure was held for 30 minutes again with hemostasis. She was hemostatic and doing well until just recently in Recovery when she ambulated to restroom as this write presented for evaluation and again had brisk bleeding. Pressure was held personally for 20 minutes with hemostasis noted. She had previous RHC in Oct 2017 and had no bleeding complications. Denies personal or family history of bleeding disorders.      No current facility-administered medications on file prior to encounter.      Current Outpatient Prescriptions on File Prior to Encounter   Medication Sig    amlodipine (NORVASC) 5 MG tablet Take 5 mg by mouth once daily.    busPIRone (BUSPAR) 15 MG tablet Take 15 mg by mouth 3 (three) times daily.    desvenlafaxine succinate (PRISTIQ) 100 MG Tb24 Take 100 mg by mouth once daily.    gabapentin (NEURONTIN) 100 MG capsule Take 1 capsule (100 mg total) by mouth 3 (three) times daily.    hydrocodone-acetaminophen 10-325mg (NORCO)  mg Tab Take 1 tablet by mouth every 8 (eight) hours as needed for Pain.    lamotrigine (LAMICTAL) 100 MG tablet Take 100 mg by mouth 2 (two) times daily.     The patient is a 44y Male complaining of seizures. levothyroxine (SYNTHROID) 75 MCG tablet Take 75 mcg by mouth once daily.    lurasidone (LATUDA) 60 mg Tab tablet Take 40 mg by mouth once daily.     pantoprazole (PROTONIX) 40 MG tablet Take 40 mg by mouth once daily.    potassium chloride SA (K-DUR,KLOR-CON) 20 MEQ tablet Take 4 tablets (80 mEq total) by mouth 2 (two) times daily.    pravastatin (PRAVACHOL) 40 MG tablet Take 40 mg by mouth once daily.    torsemide (DEMADEX) 100 MG Tab Take 1 tablet (100 mg total) by mouth 2 (two) times daily.    albuterol (PROVENTIL) 2.5 mg /3 mL (0.083 %) nebulizer solution Take 2.5 mg by nebulization every 6 (six) hours as needed for Wheezing. Rescue    ALPRAZolam (XANAX) 1 MG tablet Take 0.5 tablets (0.5 mg total) by mouth 3 (three) times daily as needed for Anxiety.    budesonide-formoterol 80-4.5 mcg (SYMBICORT) 80-4.5 mcg/actuation HFAA Inhale 2 puffs into the lungs 2 (two) times daily. Controller    cyclobenzaprine (FLEXERIL) 10 MG tablet TAKE 1 TABLET BY MOUTH THREE TIMES DAILY MAY CAUSE DROWSINESS    furosemide (LASIX) 10 mg/mL injection Inject 8 mLs (80 mg total) into the vein twice a week. As needed for weight gain of > 5 lbs or SOB    magnesium oxide (MAG-OX) 400 mg tablet Take 1 tablet (400 mg total) by mouth 2 (two) times daily.    metOLazone (ZAROXOLYN) 2.5 MG tablet Take 1 tablet (2.5 mg total) by mouth once daily.    ondansetron (ZOFRAN-ODT) 8 MG TbDL Take 1 tablet (8 mg total) by mouth every 8 (eight) hours as needed.    sodium chloride 0.9% 0.9 % SolP 100 mL with treprostinil 1 mg/mL Soln 6,000,000 ng Inject 2,380 ng/min into the vein continuous.     Review of patient's allergies indicates:   Allergen Reactions    Sulfa (sulfonamide antibiotics) Rash     Past Medical History:   Diagnosis Date    Bipolar disorder     CHF (congestive heart failure)     Dyslipidemia     GERD (gastroesophageal reflux disease)     Hx of tracheostomy     Decanulated / surgically removed in 2013? Does not currently  have tracheostomy    Hypertension     Hypothyroidism     Oxygen dependent     3L around the clock     Pulmonary HTN     Pulmonary hypertension, group 1 10/4/2017    Pulmonary nodules 10/10/2017    Respiratory failure, chronic      Past Surgical History:   Procedure Laterality Date    BACK SURGERY      PERICARDIAL WINDOW  2013    [place] LEFT HEART CATH,PERCUTANEOUS      Cardiac Cath, Left Heart    TUBAL LIGATION       Family History     Problem Relation (Age of Onset)    Cancer Mother, Sister    Hypertension Mother, Father        Social History Main Topics    Smoking status: Former Smoker     Types: Cigarettes     Start date: 1/1/1979     Quit date: 1/5/1997    Smokeless tobacco: Never Used    Alcohol use No    Drug use: No    Sexual activity: No     Review of Systems   Constitutional: Positive for fatigue. Negative for activity change, chills and fever.   HENT: Negative for congestion, rhinorrhea and sore throat.    Eyes: Negative for visual disturbance.   Respiratory: Positive for shortness of breath. Negative for cough and wheezing.    Cardiovascular: Positive for leg swelling. Negative for chest pain and palpitations.   Gastrointestinal: Negative for abdominal distention, abdominal pain, constipation, diarrhea, nausea and vomiting.   Genitourinary: Negative for dysuria, frequency and hematuria.   Musculoskeletal: Negative for arthralgias and myalgias.   Skin: Negative for color change, rash and wound.   Neurological: Negative for syncope, weakness and numbness.   Hematological: Does not bruise/bleed easily.   Psychiatric/Behavioral: Negative for behavioral problems and confusion.        Objective:     Vital Signs (Most Recent):  Temp: 98.5 °F (36.9 °C) (04/16/18 1148)  Pulse: 92 (04/16/18 1148)  Resp: 19 (04/16/18 1148)  BP: 112/60 (04/16/18 1148)  SpO2: (!) 91 % (04/16/18 1148) Vital Signs (24h Range):  Temp:  [97.5 °F (36.4 °C)-99.3 °F (37.4 °C)] 98.5 °F (36.9 °C)  Pulse:  [77-93] 92  Resp:  [18-20]  19  SpO2:  [89 %-95 %] 91 %  BP: (101-117)/(55-70) 112/60     Weight: 86.1 kg (189 lb 13.1 oz)  Body mass index is 34.71 kg/m².      Intake/Output Summary (Last 24 hours) at 04/16/18 1646  Last data filed at 04/16/18 1244   Gross per 24 hour   Intake             1110 ml   Output             4600 ml   Net            -3490 ml       Physical Exam   Constitutional: She is oriented to person, place, and time. She appears well-developed and well-nourished. No distress.   HENT:   Head: Normocephalic and atraumatic.   Eyes: EOM are normal.   Neck: Normal range of motion.   R neck hemostatic on examination  Mild subcutaneous hematoma from post-procedure bleeding  Observed bleeding venous in nature   Cardiovascular: Normal rate, regular rhythm and intact distal pulses.    Pulmonary/Chest: Effort normal. No respiratory distress. She has no wheezes.   Abdominal: Soft. She exhibits no distension. There is no tenderness.   Obese, soft, ND, NT, prior upper midline incision from pericardial window well healed   Musculoskeletal: She exhibits edema. She exhibits no deformity.   Neurological: She is alert and oriented to person, place, and time.   Skin: Skin is warm and dry. No rash noted. She is not diaphoretic. No erythema.   Psychiatric: She has a normal mood and affect. Her behavior is normal.   Nursing note and vitals reviewed.      Significant Labs:  CBC:   Recent Labs  Lab 04/16/18  0439   WBC 4.57   RBC 4.48   HGB 12.3   HCT 39.4   *   MCV 88   MCH 27.5   MCHC 31.2*     CMP:   Recent Labs  Lab 04/16/18  0439 04/16/18  1250   GLU 79 90   CALCIUM 9.4 9.7   ALBUMIN 4.1  --    PROT 6.6  --     138   K 3.0* 3.9   CO2 30* 33*   CL 95 95   BUN 17 17   CREATININE 1.1 1.3   ALKPHOS 180*  --    ALT 22  --    AST 24  --    BILITOT 0.7  --        Significant Diagnostics:  RHC (4/16/18): RAP 19. PAP 72/22.      Assessment/Plan:   56 y/o female with HTN, CHF (EF 60% 4/4/18) with RV enlargement and severely depressed systolic  function, pulmonary HTN (PASP 80s) on home Remodulin infusion via R IJ single-lumen Gomez catheter, respiratory insufficiency on 3 L NC home oxygen admitted for volume overload and now with prolonged venous bleeding from R IJ access site following RHC today    - Relevant notes, imaging, and laboratory studies reviewed  - Hemostasis obtained with manual pressure x 20 minutes  - D-STAT procoagulant placed  - Etiology secondary to increased central venous pressure  - Recommend bed rest and elevate head of bed to allow venotomy to clot  - No indication for venogram at this time  - Discussed at bedside with Dr. Hiro Rivera MD  Surgery Resident, PGY-III  Pager: [phone]  4/16/2018 4:46 PM

## 2020-01-01 NOTE — PROGRESS NOTES
Please forward this important TCC information to your provider in order to maximize the post discharge care delivery of this patient.    C3 nurse spoke with Sue Smith  for a TCC post hospital discharge follow up call. The patient has a scheduled HEARTTX appointment with Faina Cullen on 3\28\18 @ 1400.  Respectfully,  Ileana Patterson RN  [facility] C3    [email or web address]       Please do not reply to this message, as this inbox is not routinely monitored.        Neonatology  Neonatology  [facility], [phone] [place], [place] [address]  Phone: [phone]  Fax: [phone]  Follow Up Time: Routine Neonatology  Neonatology  [facility], [address] [place] [address]  Phone: [phone]  Fax: [phone]  Follow Up Time: Routine    [facility]  Ophthalmology  [address], [address]  [place] [address]  Phone: [phone]  Fax:   Follow Up Time: Routine [facility]  Ophthalmology  [address], [address]  [place] [address]  Phone: [phone]  Fax:   Follow Up Time: Routine    Neonatology  Neonatology  [facility], [address] [place] [address]  Phone: [phone]  Fax: [phone]  Follow Up Time: Routine    [facility]  Developmental/Behavioral  [address], [address]  [place] [address]  Phone: [phone]  Fax: [phone]  Follow Up Time:

## 2020-01-01 NOTE — SUBJECTIVE & OBJECTIVE
Interval History: Feeling better, diuresing well.     Continuous Infusions:   furosemide (LASIX) 5 mg/mL infusion (non-titrating) 20 mg/hr (12/08/17 2055)     Scheduled Meds:   albuterol sulfate  2.5 mg Nebulization Q4H    amLODIPine  5 mg Oral Daily    busPIRone  15 mg Oral TID    desvenlafaxine succinate  100 mg Oral Daily    fluticasone-vilanterol  1 puff Inhalation Daily    gabapentin  100 mg Oral TID    heparin (porcine)  5,000 Units Subcutaneous Q8H    lamoTRIgine  100 mg Oral BID    levothyroxine  75 mcg Oral Daily    metOLazone  2.5 mg Oral Daily    pantoprazole  40 mg Oral Daily    potassium chloride 10%  40 mEq Oral Once    potassium chloride  10 mEq Oral Once    potassium chloride SA  20 mEq Oral Daily    pravastatin  40 mg Oral Daily    selexipag 200 mcg (140)- 800 mcg (60) DsPk  1,400 mcg Oral BID    sodium chloride 0.9%  3 mL Intravenous Q8H     PRN Meds:hydrocodone-acetaminophen 10-325mg    Review of patient's allergies indicates:   Allergen Reactions    Sulfa (sulfonamide antibiotics) Rash     Objective:     Vital Signs (Most Recent):  Temp: 97.5 °F (36.4 °C) (12/09/17 0311)  Pulse: 72 (12/09/17 0728)  Resp: (!) 95 (12/09/17 0723)  BP: 113/76 (12/09/17 0311)  SpO2: (!) 94 % (12/09/17 0723) Vital Signs (24h Range):  Temp:  [97.5 °F (36.4 °C)-98.5 °F (36.9 °C)] 97.5 °F (36.4 °C)  Pulse:  [70-83] 72  Resp:  [16-95] 95  SpO2:  [90 %-97 %] 94 %  BP: (103-127)/(65-81) 113/76     Patient Vitals for the past 72 hrs (Last 3 readings):   Weight   12/09/17 0400 87.2 kg (192 lb 3.9 oz)   12/08/17 2100 88.3 kg (194 lb 10.7 oz)   12/08/17 1925 88.3 kg (194 lb 10.7 oz)     Body mass index is 35.16 kg/m².      Intake/Output Summary (Last 24 hours) at 12/09/17 0808  Last data filed at 12/09/17 0600   Gross per 24 hour   Intake                0 ml   Output             1800 ml   Net            -1800 ml       Hemodynamic Parameters:         Physical Exam   Constitutional: She is oriented to person,  place, and time. She appears well-developed and well-nourished.   HENT:   Head: Normocephalic.   Neck: Normal range of motion. JVD present.   Cardiovascular: Normal rate and regular rhythm.    Pulmonary/Chest: Effort normal and breath sounds normal.   Abdominal: Soft.   Musculoskeletal: She exhibits edema.   Neurological: She is alert and oriented to person, place, and time.   Skin: Skin is warm and dry.       Significant Labs:  CBC:    Recent Labs  Lab 12/08/17 1415 12/08/17 1945   WBC 6.65 6.09   RBC 3.93* 3.56*   HGB 12.3 11.3*   HCT 37.4 33.2*    151   MCV 95 93   MCH 31.3* 31.7*   MCHC 32.9 34.0     BNP:    Recent Labs  Lab 12/08/17 1415   BNP 1,489*     CMP:    Recent Labs  Lab 12/08/17 1415 12/08/17 1945 12/09/17  0444   GLU 88 102 77   CALCIUM 9.5 9.7 9.9   ALBUMIN 4.1 3.8  --    PROT 7.1 6.5  --     142 141   K 2.9* 2.8* 2.7*   CO2 33* 34* 33*   CL 96 97 95   BUN 25* 26* 23*   CREATININE 1.7* 1.6* 1.4   ALKPHOS 110 104  --    ALT 23 23  --    AST 31 29  --    BILITOT 0.7 0.7  --       Coagulation:   No results for input(s): PT, INR, APTT in the last 168 hours.  LDH:  No results for input(s): LDH in the last 72 hours.  Microbiology:  Microbiology Results (last 7 days)     ** No results found for the last 168 hours. **          I have reviewed all pertinent labs within the past 24 hours.    Estimated Creatinine Clearance: 46 mL/min (based on SCr of 1.4 mg/dL).    Diagnostic Results:  I have reviewed and interpreted all pertinent imaging results/findings within the past 24 hours.   Statement Selected I will SWITCH the dose or number of times a day I take the medications listed below when I get home from the hospital:  None

## 2020-04-01 NOTE — PROGRESS NOTES
The pt was recently admitted from 1/3 through 1/11 for CHF exacerbation.  See calendar for recent INRs and warfarin doses while admitted.  I was able to reach the pt today.  She reports that she was instructed to take a new dose of warfarin - 7.5mg every day.  I am advising a boosted dose for tonight and then she can continue with this new dose.  HH faxed for an INR on Monday.   see mdm

## 2021-04-19 NOTE — PROCEDURES
Sue Smith is a 56 y.o.  female patient, who presents for a 6 minute walk test ordered by Shelby De La Paz MD.  The diagnosis is Pulmonary Hypertension.  The patient's BMI is 32.8 kg/m2.  Predicted distance (lower limit of normal) is 352.68 meters.      Test Results:    The test was completed without stopping.  The total time walked was 360 seconds.  During walking, the patient reported:  Dyspnea, Lightheadedness.  The patient used a wheelchair for assistance and supplemental oxygen during testing.     10/09/2017---------Distance: 309.37 meters (1015 feet)     O2 Sat % Supplemental Oxygen Heart Rate Blood Pressure Erasmo Scale   Pre-exercise  (Resting) 96 % 3 L/M 90 bpm 123/65 mmHg 0   During Exercise 93 % 3 L/M 116 bpm 117/59 mmHg 3   Post-exercise  (Recovery) 95 % 3 L/M  104 bpm       Recovery Time:  52 seconds    Performing nurse/tech:  MERARI Flores      PREVIOUS STUDY:   The patient has not had a previous study.      CLINICAL INTERPRETATION:  Six minute walk distance is 309.37 meters (1015 feet) with moderate dyspnea.  During exercise, there was desaturation while breathing room air.  Blood pressure remained stable and Heart rate increased significantly with walking.  The patient reported non-pulmonary symptoms during exercise.  No previous study performed.  Based upon age and body mass index, exercise capacity is less than predicted.   same

## 2021-08-05 NOTE — SUBJECTIVE & OBJECTIVE
Interval History: No complaints or overnight events    Continuous Infusions:   sodium chloride 0.9%      treprostinil (REMODULIN) infusion 28 ng/kg/min (12/21/17 1653)    veletri/remodulin cassette      veletri/remodulin tubing       Scheduled Meds:   albuterol sulfate  2.5 mg Nebulization Q4H    amLODIPine  5 mg Oral Daily    bumetanide  4 mg Oral BID    busPIRone  15 mg Oral TID    desvenlafaxine succinate  100 mg Oral Daily    fluticasone-vilanterol  1 puff Inhalation Daily    gabapentin  100 mg Oral TID    lamoTRIgine  100 mg Oral BID    levothyroxine  75 mcg Oral Daily    magnesium oxide  400 mg Oral Daily    pantoprazole  40 mg Oral Daily    potassium chloride 10%  40 mEq Oral Once    potassium chloride SA  60 mEq Oral BID    pravastatin  40 mg Oral Daily    sodium chloride 0.9%  3 mL Intravenous Q8H    warfarin  7.5 mg Oral Daily     PRN Meds:acetaminophen, gelatin adsorbable 12-7 mm top sponge, hydrocodone-acetaminophen 10-325mg, loperamide, ondansetron, ondansetron, oxyCODONE-acetaminophen    Review of patient's allergies indicates:   Allergen Reactions    Sulfa (sulfonamide antibiotics) Rash     Objective:     Vital Signs (Most Recent):  Temp: 96.7 °F (35.9 °C) (12/22/17 0408)  Pulse: 81 (12/22/17 0600)  Resp: 15 (12/22/17 0441)  BP: 115/69 (12/22/17 0408)  SpO2: 97 % (12/22/17 0441) Vital Signs (24h Range):  Temp:  [96.7 °F (35.9 °C)-98 °F (36.7 °C)] 96.7 °F (35.9 °C)  Pulse:  [] 81  Resp:  [12-20] 15  SpO2:  [93 %-97 %] 97 %  BP: (105-124)/(59-72) 115/69     Patient Vitals for the past 72 hrs (Last 3 readings):   Weight   12/22/17 0500 84.7 kg (186 lb 11.7 oz)   12/20/17 0515 84.3 kg (185 lb 13.6 oz)     Body mass index is 34.15 kg/m².      Intake/Output Summary (Last 24 hours) at 12/22/17 0648  Last data filed at 12/22/17 0500   Gross per 24 hour   Intake             1040 ml   Output             1300 ml   Net             -260 ml       Hemodynamic Parameters:           Physical  Exam   Constitutional: She is oriented to person, place, and time. She appears well-developed and well-nourished.   HENT:   Head: Normocephalic and atraumatic.   Eyes: Conjunctivae and EOM are normal. Pupils are equal, round, and reactive to light.   Neck: Normal range of motion. Neck supple. No JVD present. No thyromegaly present.   Cardiovascular: Normal rate and regular rhythm.    Grade III/VI systolic murmur LSB   Pulmonary/Chest: Effort normal and breath sounds normal.   Abdominal: Soft. Bowel sounds are normal.   Musculoskeletal: Normal range of motion. She exhibits no edema.   Neurological: She is alert and oriented to person, place, and time.   Skin: Skin is warm and dry. Capillary refill takes less than 2 seconds.   Psychiatric: She has a normal mood and affect. Her behavior is normal. Judgment and thought content normal.       Significant Labs:  CBC:    Recent Labs  Lab 12/20/17 0626 12/21/17 0359 12/22/17  0422   WBC 6.06 6.39 6.16   RBC 4.22 4.00 4.17   HGB 13.7 12.6 13.2   HCT 40.2 38.2 38.8   * 146* 155   MCV 95 96 93   MCH 32.5* 31.5* 31.7*   MCHC 34.1 33.0 34.0     BNP:    Recent Labs  Lab 12/18/17  0911   *     CMP:    Recent Labs  Lab 12/20/17  0626 12/21/17 0359 12/22/17  0422    88 103   CALCIUM 9.9 9.7 8.9   ALBUMIN 4.1 3.8 3.9   PROT 7.0 6.5 6.7    140 140   K 3.8 4.4 3.0*   CO2 32* 34* 31*   CL 97 99 98   BUN 22* 21* 17   CREATININE 1.0 1.1 0.9   ALKPHOS 100 93 91   ALT 45* 35 28   AST 37 29 26   BILITOT 0.7 0.6 0.6      Coagulation:     Recent Labs  Lab 12/20/17 0626 12/21/17 0359 12/22/17  0422   INR 1.4* 1.3* 1.3*     LDH:  No results for input(s): LDH in the last 72 hours.  Microbiology:  Microbiology Results (last 7 days)     ** No results found for the last 168 hours. **          I have reviewed all pertinent labs within the past 24 hours.    Estimated Creatinine Clearance: 70.4 mL/min (based on SCr of 0.9 mg/dL).         Attending Attestation (For Attendings USE Only)...

## 2022-04-07 ENCOUNTER — HISTORICAL (OUTPATIENT)
Dept: ADMINISTRATIVE | Facility: HOSPITAL | Age: 62
End: 2022-04-07

## 2022-04-23 VITALS
WEIGHT: 199.5 LBS | BODY MASS INDEX: 36.71 KG/M2 | HEIGHT: 62 IN | DIASTOLIC BLOOD PRESSURE: 60 MMHG | OXYGEN SATURATION: 88 % | SYSTOLIC BLOOD PRESSURE: 110 MMHG

## 2022-04-29 NOTE — H&P
Patient:   Sue Smith            MRN: [id number]            FIN: [id number]               Age:   56 years     Sex:  Female     :  1960   Associated Diagnoses:   None   Author:   Vangie Larry MD      Chief Complaint   Shortness of breath      History of Present Illness   56-year-old  female with obesity hypoventilation syndrome, chronic hypoxemia, obstructive sleep apnea, severe pulmonary hypertension, COPD who presented to the ER today with complaints of shortness of breath.  Patient reports she's been having shortness of breath for long time but for the past 2 weeks her shortness of breath has gotten worse she is on trilogy at home, reports no fever no chills no expectoration no wheezing but reports that she's not able to go to the bathroom without getting shortness of breath area and on 3 L nasal cannula during daytime.  CT of the chest was done that showed no evidence of PE, right sided pleural effusion with some compressive atelectatic  changes.  Mild ascites and trace pericardial effusion.  Patient reports she recently got a echo done and was told that she has pulmonary hypertension and was supposed to get injured gram done in October.  She's been admitted to hospitalist service for further management and car      Review of Systems   As documented all other systems reviewed and negative      Health Status   Allergies:    Allergic Reactions (All)  Severity Not Documented  Sulfamethoxazole- Sob.,    Allergies (1) Active Reaction  sulfamethoxazole SOB     Current medications:  (Selected)   Prescriptions  Prescribed  Lasix 20 mg oral tablet: 20 mg = 1 tab(s), Oral, Daily, # 5 tab(s), 0 Refill(s)  albuterol 2.5 mg/3 mL (0.083%) inhalation solution: 5 mg = 6 mL, INH, q6hr, PRN PRN as needed for wheezing, # 60 EA, 0 Refill(s), Pharmacy: [facility]  Documented Medications  Documented  Folet DHA: 1 packet(s), Oral, Daily, 0 Refill(s)  Incruse Ellipta 62.5 mcg/inh  inhalation powder: 1 EA, INH, q24hr, doses should be taken at least 24 hours apart, # 30 EA, 0 Refill(s)  Latuda 60 mg oral tablet: 60 mg = 1 tab(s), Oral, Daily, # 30 tab(s), 0 Refill(s)  Norco 10 mg-325 mg oral tablet: 1 tab(s), Oral, TID, PRN PRN for pain, 0 Refill(s)  Norvasc 5 mg oral tablet: 5 mg = 1 tab(s), Oral, Daily, # 90 tab(s), 0 Refill(s)  Pantoprazole 40 mg ORAL EC-Tablet: 40 mg = 1 tab(s), Oral, Daily, # 30 tab(s), 0 Refill(s)  Pravastatin 40 mg Oral Tab: 40 mg = 1 tab(s), Oral, Daily, # 90 tab(s), 0 Refill(s)  Pristiq 100 mg oral tablet, extended release: 100 mg = 1 tab(s), Oral, Daily, # 30 tab(s), 0 Refill(s)  Symbicort 80 mcg-4.5 mcg/inh inhalation aerosol: 2 puff(s), INH, BID, 0 Refill(s)  albuterol 2.5 mg/3 mL (0.083%) inhalation solution: 5 mg = 6 mL, INH, q6hr, PRN PRN as needed for wheezing, # 60 EA, 0 Refill(s)  busPIRone 15 mg oral tablet: 15 mg = 1 tab(s), Oral, TID, # 270 tab(s), 0 Refill(s)  lamoTRIgine 100 mg oral tablet, extended release: 100 mg = 1 tab(s), Oral, Daily, 2 tabs am  1 tab evening, 0 Refill(s)  levothyroxine 75 mcg (0.075 mg) oral tablet: 75 mcg = 1 tab(s), Oral, Daily, # 60 tab(s), 0 Refill(s),    No qualifying data available        Histories   Past Medical History: Obesity hypoventilation syndrome, chronic hypoxemia, obstructive sleep apnea, primary hypertension, COPD, hypertension, hyperlipidemia, cervical radiculopathy, congestive heart failure, depression, GERD, hyperlipidemia, hypertension, obesity   Family History: Mom had hypertension and leukemia   dad  had hypertension   Procedure history: Tubal ligation, pericardial window, back surgery   Social History     Social history negative.        Physical Examination      Vital Signs (last 24 hrs)_____  Last Charted___________  Heart Rate Peripheral   84 bpm  (SEP 28 16:52)  Resp Rate         H 26br/min  (SEP 28 16:52)  SBP      129 mmHg  (SEP 28 16:52)  DBP      73 mmHg  (SEP 28 16:52)  SpO2      L 92%  (SEP 28  16:52)     General:  Alert and oriented.    Eye:  Pupils are equal, round and reactive to light, Extraocular movements are intact, Normal conjunctiva.    HENT:  Normocephalic.    Neck:  Supple, Non-tender, No carotid bruit.    Respiratory:  Decreased breath sounds at the base.    Cardiovascular:  Normal rate, Regular rhythm.    Gastrointestinal:  Soft, Non-tender, Non-distended.    Musculoskeletal:  Normal range of motion, Normal strength.    Integumentary:  Warm, Dry, Pink.    Neurologic:  Alert, Oriented.    Cognition and Speech:  Oriented, Speech clear and coherent, Functional cognition intact.    Psychiatric:  Cooperative, Appropriate mood & affect.       Review / Management   Results review:     Labs (Last four charted values)  Glucose              91 (SEP 28) .    Radiology results   Rad Results (ST)   Accession: [id number]  Order: CT Thorax W Contrast  Report Dt/Tm: 09/28/2017 16:19  Report:   Clinical History  Rule out PE     Technique  Axial images of the chest were obtained with contrast. Sagittal and  coronal reconstructed images were available for review.     Comparison  No prior imaging available for comparison.        Findings  PULMONARY ARTERY:No evidence of pulmonary thromboembolism.     AORTA: Normal in course and caliber.     THYROID GLAND: The visualized thyroid is unremarkable.     AIRWAYS: Trachea is midline and tracheobronchial tree is patent.      HEART: The heart is normal in size. There is trace pericardial  effusion.      LUNGS: Right-sided pleural effusion with subsegmental atelectatic  changes. Additional mild subsegmental atelectatic changes at the left  base.     LYMPH NODES: There is no significant mediastinal, axillary or hilar  lymphadenopathy.     BONES: No displaced fracture. No aggressive appearing osseous lesion  identified.     UPPER ABDOMEN: Mild ascites is noted.     Impression  No evidence of pulmonary thromboembolism. Right-sided pleural effusion  with compressive  atelectatic changes in the right lower lobe. Mild  ascites is noted. Trace pericardial effusion.      Accession: [id number]  Order: XR Chest 1 View  Report Dt/Tm: 09/28/2017 13:52  Report:      EXAM: XR Chest 1 View     INDICATION: Dyspnea     TECHNIQUE: Frontal portable view of the chest is obtained.     COMPARISON: Report only without images from chest x-ray on 7/19/2017,  as well as chest x-ray on 6/23/2011     FINDINGS: The cardiomediastinal silhouette is enlarged. Increased  interstitial markings are redemonstrated in the bilateral lung bases,  more prominent on the right. No pleural effusion or pneumothorax is  identified.        IMPRESSION:   Redemonstration of increased interstitial markings in the bilateral  lung bases. Cardiomegaly.         Documentation reviewed:       Case discussed with: Labs reviewed, Images reviewed.  Images reports reviewed.       Impression and Plan   #1 shortness of breath  Multi-factorial with CHF exacerbation versus pulmonary hypertension, obesity hypoventilation syndrome  -For now we'll continue with BiPAP  We will get 2-D echo results  -Start on Lasix 20 daily  -We'll consult patient's pulmonologist Dr. Lynne    #2 of the city hypoventilation syndrome  #3 sleep apnea  #4 pulmonary hypertension  #5 COPD  We will continue with breathing treatments and start low-dose of steroids  #6 CHF exacerbation  We'll start on Lasix 20 mg IV twice a day  Strict in's and O's  We'll get echo results from [facility]  #7 chronic hypoxemic respiratory failure  We'll continue with BiPAP    #8 hypertension  We'll resume home medication  #9 hyperlipidemia  We'll resume home medication  #10 depression  We will resume home medications     PCP: Lurdes Melendez

## 2022-04-29 NOTE — ED PROVIDER NOTES
Patient:   Sue Smith            MRN: [id number]            FIN: [id number]               Age:   57 years     Sex:  Female     :  1960   Associated Diagnoses:   Cellulitis of left lower leg; Moderate to severe pulmonary hypertension   Author:   Dl Witt MD      Basic Information   Time seen: Date & time 2018 12:52:00.   History source: Patient.   Arrival mode: Private vehicle.   History limitation: None.      History of Present Illness   The patient presents with 58y/o F presents to the ED with SOB that has progressively getting worse past 3 days ( home O2 at 3L) Hx of COPD and CHF. Denies fever. Reports right lower leg wound infection  with suctures in place x 1 week. SHAHIDA powell.  The onset was 3  days ago.  The course/duration of symptoms is constant.  Degree at onset mild.  Degree at present moderate.  The Exacerbating factors is exertion.  The Relieving factors is rest.  Risk factors consist of congestive heart failure and severe pulmonary hypertension.  Prior episodes: none.  Therapy today: oxygen and doctor's office visit.  Associated symptoms: none.  Additional history: Seen by PMD today and sent for eval of leg wound.  Pt completed prophylactic course of Keflex, which she started 8 days ago after falling and sustaining laceration to RLE.  Seen here and repaired primarily.  PMD concerned that pt has cellulitis.  Pt states that she did not mention SOB to Dr. Melendez today, but she had been in contact with Dr. Cullen at [facility], who told her she would order IV Lasix to be given by home health nurse starting today.  Pt adamant that she does not want to be hospitalized, and wants to go home for trial of Lasix at home..        Review of Systems   Constitutional symptoms:  Negative except as documented in HPI.   Skin symptoms:  Negative except as documented in HPI.   Eye symptoms:  Negative except as documented in HPI.   ENMT symptoms:  Negative except as documented  in HPI.   Respiratory symptoms:  Negative except as documented in HPI.   Cardiovascular symptoms:  Negative except as documented in HPI.   Gastrointestinal symptoms:  Negative except as documented in HPI.   Genitourinary symptoms:  Negative except as documented in HPI.   Musculoskeletal symptoms:  Negative except as documented in HPI.   Neurologic symptoms:  Negative except as documented in HPI.   Psychiatric symptoms:  Negative except as documented in HPI.   Endocrine symptoms:  Negative except as documented in HPI.   Hematologic/Lymphatic symptoms:  Negative except as documented in HPI.   Allergy/immunologic symptoms:  Negative except as documented in HPI.             Additional review of systems information: All other systems reviewed and otherwise negative.      Health Status   Allergies:    Allergic Reactions (Selected)  Severity Not Documented  Sulfamethoxazole- Sob..   Medications:  (Selected)   Inpatient Medications  Ordered  KCL 20 mEq Oral Tab: 40 mEq, form: Tab-ER, Oral, Once, first dose 01/03/18 15:09:00 CST, stop date 01/03/18 15:09:00 CST, STAT, 24  Documented Medications  Documented  DuoNeb 0.5 mg-2.5 mg/3 mL inhalation solution: NEB, q4hr, 0 Refill(s)  Folet DHA: 1 packet(s), Oral, Daily, 0 Refill(s)  Incruse Ellipta 62.5 mcg/inh inhalation powder: 1 EA, INH, Daily, doses should be taken at least 24 hours apart, # 30 EA, 0 Refill(s)  K-Dur 20 oral tablet, extended release: 20 mEq = 1 tab(s), Oral, BID, # 180 tab(s), 0 Refill(s)  Latuda 40 mg oral tablet: 40 mg = 1 tab(s), Oral, At Bedtime, # 30 tab(s), 0 Refill(s)  Latuda 80 mg oral tablet: 80 mg = 1 tab(s), Oral, Daily, with food  Norvasc 5 mg oral tablet: 5 mg = 1 tab(s), Oral, Daily, # 90 tab(s), 0 Refill(s)  Pantoprazole 40 mg ORAL EC-Tablet: 40 mg = 1 tab(s), Oral, Daily, # 30 tab(s), 0 Refill(s)  Pravastatin 40 mg Oral Tab: 40 mg = 1 tab(s), Oral, Daily, # 90 tab(s), 0 Refill(s)  Pristiq 100 mg oral tablet, extended release: 100 mg = 1 tab(s),  Oral, Daily, # 30 tab(s), 0 Refill(s)  budesonide-formoterol 160 mcg-4.5 mcg/inh inhalation aerosol: 2 puff(s), INH, BID, 0 Refill(s)  bumetanide 2 mg oral tablet: 4 mg = 2 tab(s), Oral, BID, # 90 tab(s), 0 Refill(s)  busPIRone 15 mg oral tablet: 15 mg = 1 tab(s), Oral, TID, # 270 tab(s), 0 Refill(s)  gabapentin 100 mg oral capsule: 200 mg = 2 cap(s), Oral, TID, # 240 cap(s), 0 Refill(s)  lamoTRIgine 100 mg oral tablet, extended release: 100 mg = 1 tab(s), Oral, BIDHS, 0 Refill(s)  levothyroxine 75 mcg (0.075 mg) oral tablet: 75 mcg = 1 tab(s), Oral, Daily, # 60 tab(s), 0 Refill(s)  metOLazone 2.5 mg oral tablet: 2.5 mg = 1 tab(s), Oral, Daily, PRN PRN wt gain ( swelling), # 30 tab(s), 0 Refill(s)  ondansetron 8 mg oral tablet: 8 mg = 1 tab(s), Oral, q8hr, PRN PRN as needed for nausea/vomiting, 0 Refill(s)  torsemide 100 mg oral tablet: 100 mg = 1 tab(s), Oral, Daily  warfarin 5 mg oral tablet: 10 mg = 2 tab(s), Oral, Daily, # 30 tab(s), 0 Refill(s).      Past Medical/ Family/ Social History   Medical history:    Active  Hypertension ([id number])  COPD, moderate ([id number])  Hyperlipidemia ([id number])  GERD (gastroesophageal reflux disease) ([id number])  Bipolar (981574378)  Depression (696555221)  Cervical radiculopathy (552984995)  Numbness and tingling ([id number])  SOB (shortness of breath) ([id number])  Hypothyroid (532178106)  Resolved  CHF - Congestive heart failure (349530248):  Resolved.  Pulmonary arterial hypertension (1268042158):  Resolved..   Surgical history:    Injection Cervical Epidural Steroid (., None) on 6/27/2017 at 56 Years.  Comments:  6/27/2017 13:42 - Lissy Lr RN  auto-populated from documented surgical case  Injection Cervical Epidural Steroid (., None) on 6/13/2017 at 56 Years.  Comments:  6/13/2017 10:45 - Lissy Lr RN  auto-populated from  documented surgical case  Injection Cervical Epidural Steroid (.) on 5/30/2017 at 56 Years.  Comments:  5/30/2017 15:54 - Be RICKETTS, Lissy MEJIA  auto-populated from documented surgical case  Tubal ligation ([id number]).  Back surgery.  Previous back surgery ([id number]).  Bilateral tubal ligation ([id number])..   Family history:    History is unknown..   Social history:    Social & Psychosocial Habits    Alcohol  05/15/2017  Use: Never    Substance Abuse  05/15/2017  Use: Never    Tobacco  05/15/2017  Use: Former smoker    09/28/2017  Use: Former smoker    Type: Cigarettes    Stopped at age: 41 Years  .      Physical Examination   General:  Alert, no acute distress.                Vital Signs   Skin:  Warm, dry, pink, mild erythema of anterior right lower leg; 10 cm laceration with sutures still in place; no purulence; small area of hemorrhagic necrosis along the lateral aspect of the mid-portion of the wound; appropriately TTP.    Head:  Normocephalic, atraumatic.    Neck:  Supple, trachea midline, no tenderness, no JVD, no carotid bruit.    Eye:  Pupils are equal, round and reactive to light, extraocular movements are intact, normal conjunctiva, vision unchanged.    Ears, nose, mouth and throat:  Tympanic membranes clear, oral mucosa moist, no pharyngeal erythema or exudate.    Cardiovascular:  Regular rate and rhythm, No murmur, Normal peripheral perfusion, Edema: Bilateral, lower extremity, 3+, pitting.    Respiratory:  Lungs are clear to auscultation, respirations are non-labored, breath sounds are equal, Symmetrical chest wall expansion.    Chest wall:  No tenderness, No deformity, tunnelled IV access noted exiting right upper chest wall.    Back:  Nontender, Normal range of motion, Normal alignment.    Musculoskeletal:  Normal ROM, normal strength, no tenderness, no swelling, no deformity.    Gastrointestinal:  Soft, Nontender, Non distended, Normal bowel sounds, No organomegaly.     Neurological:  Alert and oriented to person, place, time, and situation, No focal neurological deficit observed, CN II-XII intact, normal sensory observed, normal motor observed, normal speech observed, normal coordination observed.    Lymphatics:  No lymphadenopathy.   Psychiatric:  Cooperative, appropriate mood & affect, normal judgment.       Medical Decision Making   Results review:  Lab results : Lab View   5/30/2018 13:23 CDT      POC Troponin              0.01 ng/mL    5/30/2018 13:22 CDT      POC BNP iSTAT             789 pg/mL  HI    5/30/2018 13:17 CDT      Sodium Lvl                140 mmol/L                             Potassium Lvl             3.4 mmol/L  LOW                             Chloride                  103 mmol/L                             CO2                       25.0 mmol/L                             Calcium Lvl               8.6 mg/dL                             Glucose Lvl               96 mg/dL                             BUN                       29.0 mg/dL  HI                             Creatinine                2.09 mg/dL  HI                             eGFR-AA                   32 mL/min/1.73 m2  NA                             eGFR-ISIDORO                  26 mL/min/1.73 m2  NA                             Bili Total                0.4 mg/dL                             Bili Direct               0.20 mg/dL                             Bili Indirect             0.20 mg/dL                             AST                       21 unit/L                             ALT                       20 unit/L                             Alk Phos                  229 unit/L  HI                             Total Protein             6.7 gm/dL                             Albumin Lvl               3.90 gm/dL                             Globulin                  2.80 gm/dL                             A/G Ratio                 1.4  NA                             BNP                       471 pg/mL  HI                              Troponin-I                0.02 ng/mL                             WBC                       5.6 x10(3)/mcL                             RBC                       4.34 x10(6)/mcL                             Hgb                       11.6 gm/dL  LOW                             Hct                       37.0 %                             Platelet                  135 x10(3)/mcL                             MCV                       85.3 fL                             MCH                       26.7 pg  LOW                             MCHC                      31.4 gm/dL  LOW                             RDW                       17.4 %  HI                             MPV                       9.9 fL                             Abs Neut                  4.59 x10(3)/mcL                             Neutro Auto               81 %  NA                             Lymph Auto                11 %  NA                             Mono Auto                 6 %  NA                             Eos Auto                  1 %  NA                             Abs Eos                   0.1 x10(3)/mcL                             Basophil Auto             0 %  NA                             Abs Neutro                4.59 x10(3)/mcL                             Abs Lymph                 0.6 x10(3)/mcL                             Abs Mono                  0.3 x10(3)/mcL                             Abs Baso                  0.0 x10(3)/mcL  .   Radiology results:  Rad Results (ST)  < 12 hrs   Accession: [id number]  Order: XR Chest 2 Views  Report Dt/Tm: 05/30/2018 14:24  Report:   Indication: COPD     FINDINGS: Compared to 4/4/2018. Heart is enlarged but stable. Lungs  are clear bilaterally. Pulmonary vasculature is normal. A right  internal jugular central line is again noted.     IMPRESSION: Cardiac enlargement with no evidence of acute disease  seen.      .      Impression and Plan   Diagnosis   Cellulitis of left lower  leg (JTM30-RW L03.116)   Moderate to severe pulmonary hypertension (PSC18-IE I27.20)      Calls-Consults   -  5/30/2018 16:00:00 , Alyse CHRISTOPHER , Faina GARCIA, pulmonary HTN - [facility] - NO, phone call, recommends I spoke with Dr. Cullen' nurse - states can give Lasix 80 mg IVP x 1 dose and D/C home.  She will contact pt's home health agency with further orders.  Clindamycin OK for antibiotic..    Plan   Condition: Stable.    Disposition: Discharged: Time  5/30/2018 16:51:00, to home.    Prescriptions: Launch prescriptions   Pharmacy:  Percocet 10/325 oral tablet (Prescribe): 1 tab, Oral, q4hr, PRN PRN as needed for pain, X 3 day(s), # 24 tab(s), 0 Refill(s)  clindamycin 150 mg oral capsule (Prescribe): 300 mg = 2 cap(s), Oral, q6hr, X 10 day(s), # 80 cap(s), 0 Refill(s)  CLINDAMYCIN  CAP 150MG (Discontinue): 5/30/2018 16:52 CDT.    Patient was given the following educational materials: Cellulitis, Adult, Heart Failure, Pulmonary Hypertension, Pulmonary Hypertension, Heart Failure, Cellulitis, Adult.    Limitations: Limited activity.    Follow up with: Faina Cullen MD Within 1 to 2 weeks Call for followup appointment  Return to ED if symptoms worsen.    Counseled: Patient, Regarding diagnosis, Regarding diagnostic results, Regarding treatment plan, Regarding prescription, Patient indicated understanding of instructions.

## 2022-04-29 NOTE — H&P
Patient:   Sue Smith            MRN: [id number]            FIN: [id number]               Age:   57 years     Sex:  Female     :  1960   Associated Diagnoses:   None   Author:   Kush CHRISTOPHER, Lea NELSON      Basic Information   Source of history:  Self.    Present at bedside:  None.    Referral source:  Self.    History limitation:  Clinical condition.       Chief Complaint   Worsening shortness of breath      History of Present Illness   Patient is a 57-year-old  female with patient past medical history of end-stage pulmonary hypertension on continuous drip on remodulin, CHF, chronic hypoxic yesterday failure on home oxygen at 4-1/2 L, SHERIE on CPAP, hypertension, hyperlipidemia, GERD, bipolar disorder who presented to the ED with history of hypoxia despite using her CPAP and home oxygen.  Patient's oxygen saturation at present as she was in the 50s-60s which improved with BiPAP to 90s.  Patient has end-stage pulmonary hypertension and usually follows at [facility] where she has been told they do not have anything else to offer her.  She is currently on hospice but stated she was not comfortable and this was the reason she came into the hospital to get more comfortable.  She initially told the ED physician that she wanted to be full code and was transferred to the ICU.  Patient was continued on BiPAP and also had thoracentesis with about 1400 mls of serosanguineous fluid drained from her right lung.  As per patient she has had recurrent pleural effusion on the right lung and she has had repeated thoracentesis.  In the ICU patient stated she did not want to be intubated nor does she want CPR.  All she wants is to be comfortable.  Patient was then transferred to the floor for continued management.  Patient's BiPAP is currently maxed 100% and she is currently saturating between 89-90%.  Patient denies any pain or worsening shortness of breath.  She states she is comfortable at this  time.  Patient on morphine.      Review of Systems   All systems reviewed and negative except as documented in HPI      Health Status   Allergies:    Allergic Reactions (Selected)  Severity Not Documented  Sulfamethoxazole- Sob.,    Allergies (1) Active Reaction  sulfamethoxazole SOB   Current medications:  (Selected)   Inpatient Medications  Ordered  KCL 20 mEq Oral Tab: 40 mEq, form: Tab-ER, Oral, Once, first dose 01/03/18 15:09:00 CST, stop date 01/03/18 15:09:00 CST, STAT, 24  Latuda 40 mg oral tablet: 40 mg, form: Tab, Oral, At Bedtime, first dose 10/11/18 21:00:00 CDT  Latuda 40 mg oral tablet: 80 mg, form: Tab, Oral, Daily, first dose 10/12/18 9:00:00 CDT  Norvasc 5 mg oral tablet: 5 mg, form: Tab, Oral, Daily, first dose 10/12/18 9:00:00 CDT  Pantoprazole 40 mg ORAL EC-Tablet: 40 mg, form: Tab-EC, Oral, Daily, first dose 10/12/18 6:00:00 CDT  acetaminophen-hydrocodone 325 mg-10 mg oral tablet: 1 tab(s), form: Tab, Oral, QID PRN for pain, first dose 10/11/18 16:39:00 CDT  alPRAzolam 0.5 mg oral tablet: 0.5 mg, form: Tab, Oral, TID, first dose 10/11/18 22:00:00 CDT  albuterol 3 mg- ipratropium 0.5 mg/3 mL inhalation NEB solution: 3 mL, form: Soln, NEB, TID, first dose 10/11/18 22:00:00 CDT  bumetanide: 4 mg, form: Tab, Oral, BID, first dose 10/11/18 21:00:00 CDT  busPIRone 10 mg oral tablet: 15 mg, form: Tab, Oral, TID, first dose 10/11/18 22:00:00 CDT  furosemide 40 mg oral tablet: 40 mg, form: Tab, Oral, BID, first dose 10/11/18 18:00:00 CDT  gabapentin 100 mg oral capsule: 200 mg, form: Cap, Oral, TID, first dose 10/11/18 22:00:00 CDT  lamoTRIgine 100 mg oral tablet: 100 mg, form: Tab, Oral, BID, first dose 10/11/18 16:41:00 CDT  levothyroxine 25 mcg (0.025 mg) oral tablet: 75 mcg, form: Tab, Oral, Daily, first dose 10/12/18 6:00:00 CDT  morphine 4 mg/mL preservative-free intravenous solution: 2 mg, form: Injection, IV, q2hr PRN for pain, first dose 10/11/18 16:57:00 CDT  morphine 4 mg/mL preservative-free  intravenous solution: 4 mg, form: Injection, IV, q2hr PRN for pain, mild, first dose 10/11/18 16:57:00 CDT  morphine 4 mg/mL preservative-free intravenous solution: 6 mg, form: Injection, IV, q2hr PRN for pain, moderate, first dose 10/11/18 16:57:00 CDT  morphine 4 mg/mL preservative-free intravenous solution: 8 mg, form: Injection, IV, q2hr PRN for pain, severe, first dose 10/11/18 16:58:00 CDT  Documented Medications  Documented  ADEMPAS      TAB 1MG:   Adempas 0.5 mg tablet:   Alprazolam 0.5 Mg Tab: 0.5 mg = 1 tab(s), Oral, TID  Breo Ellipta 100 mcg-25 mcg/inh inhalation powder: 1 puff(s), INH, Daily, # 30 EA, 0 Refill(s)  Breo Ellipta 100-25 mcg/dose blister with device:   CEPHALEXIN   CAP 500M mg = 1 cap(s), Oral, QID  DESVENLAFAX  TAB 50MG ER: Oral, BID  DuoNeb 0.5 mg-2.5 mg/3 mL inhalation solution: NEB, q4hr, 0 Refill(s)  Furosemide 40 Mg Tab: 40 mg = 1 tab(s), Oral, BID  HYDROCO/APAP TAB 10-325M tab(s), Oral, QID  Incruse Ellipta 62.5 mcg/inh inhalation powder: 1 EA, INH, Daily, doses should be taken at least 24 hours apart, # 30 EA, 0 Refill(s)  Incruse Ellipta 62.5 mcg/inh inhalation powder: 1 EA, INH, q24hr, doses should be taken at least 24 hours apart, # 30 EA, 0 Refill(s)  K-Dur 20 oral tablet, extended release: 20 mEq = 1 tab(s), Oral, BID, # 180 tab(s), 0 Refill(s)  LAMOTRIGINE  TAB 100MG:   LATUDA       TAB 120MG: See Instructions, 2 tab(s) Oral Daily  Latuda 40 mg oral tablet: 40 mg = 1 tab(s), Oral, At Bedtime, # 30 tab(s), 0 Refill(s)  Latuda 80 mg oral tablet: 80 mg = 1 tab(s), Oral, Daily, with food  Levofloxacin 500 Tab: 500 mg = 1 tab(s), Oral, Daily  MUPIROCIN    OIN 2%: 1 mitchell, TOP, TID  Norvasc 5 mg oral tablet: 5 mg = 1 tab(s), Oral, Daily, # 90 tab(s), 0 Refill(s)  Pantoprazole 40 mg ORAL EC-Tablet: 40 mg = 1 tab(s), Oral, Daily, # 30 tab(s), 0 Refill(s)  Pravastatin 40 mg Oral Tab: 40 mg = 1 tab(s), Oral, Daily, # 90 tab(s), 0 Refill(s)  Pristiq 100 mg oral tablet, extended  release: 100 mg = 1 tab(s), Oral, Daily, # 30 tab(s), 0 Refill(s)  REMODULIN    INJ 5MG/ML:   SPIRONOLACT  TAB 100M mg = 1 tab(s), Oral, BID  SUCRALFATE   TAB 1GM: 1 gm = 1 tab(s), Oral, BID  WARFARIN     TAB 5MG: 10 mg = 2 tab(s), Oral, Daily  Xanax 1 mg tablet:   albuterol sulfate 0.083% Nebu Soln:   budesonide-formoterol 160 mcg-4.5 mcg/inh inhalation aerosol: 2 puff(s), INH, BID, 0 Refill(s)  budesonide-formoterol 80-4.5 mcg/actuation HFA aerosol inhaler:   bumetanide 2 mg oral tablet: 4 mg = 2 tab(s), Oral, BID, # 90 tab(s), 0 Refill(s)  busPIRone 15 mg oral tablet: 15 mg = 1 tab(s), Oral, TID, # 270 tab(s), 0 Refill(s)  cyclobenzaprine 10 mg tablet:   gabapentin 100 mg oral capsule: 200 mg = 2 cap(s), Oral, TID, # 240 cap(s), 0 Refill(s)  lamoTRIgine 100 mg oral tablet, extended release: 100 mg = 1 tab(s), Oral, BIDHS, 0 Refill(s)  levothyroxine 75 mcg (0.075 mg) oral tablet: 75 mcg = 1 tab(s), Oral, Daily, # 60 tab(s), 0 Refill(s)  metOLazone 2.5 mg oral tablet: 2.5 mg = 1 tab(s), Oral, Daily, PRN PRN wt gain ( swelling), # 30 tab(s), 0 Refill(s)  ondansetron 8 mg oral tablet: 8 mg = 1 tab(s), Oral, q8hr, PRN PRN as needed for nausea/vomiting, 0 Refill(s)  torsemide 100 mg oral tablet: 100 mg = 1 tab(s), Oral, Daily,    Medications (17) Active  Scheduled: (12)  albuterol-ipratropium 3mg-0.5 mg/3 mL Sol  3 mL, NEB, TID  alprazolam 0.5 mg Tab UD  0.5 mg 1 tab(s), Oral, TID  amlodipine 5 mg Tab UD  5 mg 1 tab(s), Oral, Daily  bumetanide 1 mg Tab  4 mg 4 tab(s), Oral, BID  buSPIRone 10 mg Tab UD  15 mg 1.5 tab(s), Oral, TID  furosemide 40 mg Tab UD  40 mg 1 tab(s), Oral, BID  gabapentin 100 mg Cap UD  200 mg 2 cap(s), Oral, TID  lamotrigine 100 mg Tab  100 mg 1 tab(s), Oral, BID  levothyroxine 0.025 mg Tab UD  75 mcg 3 tab(s), Oral, Daily  lurasidone 40 mg Tab UD  80 mg 2 tab(s), Oral, Daily  lurasidone 40 mg Tab UD  40 mg 1 tab(s), Oral, At Bedtime  pantoprazole 40 mg EC Tab  40 mg 1 tab(s), Oral,  Daily  Continuous: (0)  PRN: (5)  hydrocodone 10mg/APAP 325mg  1 tab(s), Oral, QID  morphine 4 mg/mL preservative-free Soln  2 mg 0.5 mL, IV, q2hr  morphine 4 mg/mL preservative-free Soln  4 mg 1 mL, IV, q2hr  morphine 4 mg/mL preservative-free Soln  6 mg 1.5 mL, IV, q2hr  morphine 4 mg/mL preservative-free Soln  8 mg 2 mL, IV, q2hr   Problem list:    All Problems  Bipolar / SNOMED CT 669729778 / Confirmed  Cervical radiculopathy / SNOMED CT 840230533 / Confirmed  CHF (congestive heart failure) / SNOMED CT [id number] / Confirmed  COPD, moderate / SNOMED CT [id number] / Confirmed  Depression / SNOMED CT 636093062 / Confirmed  GERD (gastroesophageal reflux disease) / SNOMED CT [id number] / Confirmed  Hyperlipidemia / SNOMED CT [id number] / Confirmed  Hypertension / SNOMED CT 47003391 / Confirmed  Hypothyroid / SNOMED CT 652303333 / Confirmed  Numbness and tingling / SNOMED CT [id number] / Confirmed  Obesity / SNOMED CT 5773394789 / Probable  SHERIE (obstructive sleep apnea) / SNOMED CT 263666691 / Confirmed  SOB (shortness of breath) / SNOMED CT [id number] / Confirmed,    Active Problems (13)  Bipolar   Cervical radiculopathy   CHF (congestive heart failure)   COPD, moderate   Depression   GERD (gastroesophageal reflux disease)   Hyperlipidemia   Hypertension   Hypothyroid   Numbness and tingling   Obesity   SHERIE (obstructive sleep apnea)   SOB (shortness of breath)       Histories   Past Medical History:    Active  Hypertension (07432964)  COPD, moderate ([id number])  Hyperlipidemia ([id number])  GERD (gastroesophageal reflux disease) ([id number])  Bipolar (312724197)  Depression (705974106)  Cervical radiculopathy (187012020)  Numbness and tingling  ([id number]-[id number])  SOB (shortness of breath) ([id number]-6000A7L01WZ8)  Hypothyroid (037851111)  Resolved  CHF - Congestive heart failure (177655492):  Resolved.  Pulmonary arterial hypertension (3319811321):  Resolved.   Family History:    History is unknown.   Procedure history:    Injection Cervical Epidural Steroid (., None) on 6/27/2017 at 56 Years.  Comments:  6/27/2017 13:42 - Lissy Lr RN  auto-populated from documented surgical case  Injection Cervical Epidural Steroid (., None) on 6/13/2017 at 56 Years.  Comments:  6/13/2017 10:45 - Lissy Lr RN  auto-populated from documented surgical case  Injection Cervical Epidural Steroid (.) on 5/30/2017 at 56 Years.  Comments:  5/30/2017 15:54 - Lissy Lr RN  auto-populated from documented surgical case  Tubal ligation (876881901).  Back surgery.  Previous back surgery ([id number]).  Bilateral tubal ligation (823199981).   Social History        Social & Psychosocial Habits    Alcohol  05/15/2017  Use: Never    Substance Abuse  05/15/2017  Use: Never    Tobacco  05/15/2017  Use: Former smoker    09/28/2017  Use: Former smoker    Type: Cigarettes    Stopped at age: 41 Years.        Physical Examination   Intake and Output   Fluid Balance Primitives   10/12/2018 0:00 CDT      Urine Voided              750 mL    4/4/2018 18:49 CDT       Urine Voided              400 mL    4/4/2018 16:30 CDT       Urine Voided              800 mL        General:  Alert and oriented, BiPAP on face.    Eye:  Pupils are equal, round and reactive to light, Extraocular movements are intact.    HENT:  Normocephalic.    Neck:  Supple, Non-tender.    Respiratory:  Decreased air entry at right lung base.    Cardiovascular:  Normal rate, Regular rhythm, No murmur.    Gastrointestinal:  Soft, Non-tender, Non-distended, Normal bowel sounds.       Vital Signs (last 24 hrs)_____  Last Charted___________  Temp  Axillary     36.6 DegC  (OCT 12 01:00)  Heart Rate Peripheral   79 bpm  (OCT 12 02:00)  Resp Rate         H 25br/min  (OCT 12 02:00)  SBP      L 89mmHg  (OCT 12 02:00)  DBP      L 51mmHg  (OCT 12 02:00)  SpO2      L 91%  (OCT 12 02:00)   Genitourinary:  No costovertebral angle tenderness.    Musculoskeletal:  Normal range of motion, Normal strength.    Integumentary:  Warm, Dry.    Neurologic:  Alert, Oriented.    Cognition and Speech:  Oriented.    Psychiatric:  Cooperative.       Health Maintenance      Health Maintenance     Pending (in the next year)        OverDue           COPD Maintenance-Spirometry due  and every            Diabetes Screening due  and every         Due            ADL Screening due  10/12/18  and every 1  year(s)           Alcohol Misuse Screening due  10/12/18  and every 1  year(s)           Aspirin Therapy for CVD Prevention due  10/12/18  and every 1  year(s)           Breast Cancer Screening due  10/12/18  and every            Cervical Cancer Screening due  10/12/18  and every            Colorectal Screening due  10/12/18  and every            HF-LVEF due  10/12/18  and every            Hypertension Management-Education due  10/12/18  and every 1  year(s)           Influenza Vaccine due  10/12/18  and every            Smoking Cessation due  10/12/18  Variable frequency        Due In Future            HF-Heart Failure Education not due until  11/09/18  and every 1  year(s)           Obesity Screening not due until  05/28/19  and every 1  year(s)           Body Mass Index Check not due until  10/11/19  and every 1  year(s)           Hypertension Management-BMP not due until  10/11/19  and every 1  year(s)     Satisfied (in the past 1 year)        Satisfied            Blood Pressure Screening on  10/12/18.  Satisfied by Juan Jose RICKETTS, Derrick NELSON           Body Mass Index Check on  05/28/18.  Satisfied by Sera Kirby LPN           Diabetes Screening on  10/11/18.  Satisfied by Scarlett Buchanan  JUNIE.           Hypertension Management-Blood Pressure on  10/12/18.  Satisfied by Juan Jose RICKETTS, Derrick NELSON           Influenza Vaccine on  10/11/18.  Satisfied by Radha Tellez RN           Lipid Screening on  04/26/18.  Satisfied by Lurdes Melendez MD           Obesity Screening on  05/28/18.  Satisfied by Sera Kirby LPN           Tetanus Vaccine on  11/07/17.  Satisfied by Jorge RICKETTS, Anabel RICE        Review / Management   Results review:     Labs (Last four charted values)  WBC                  H 12.5 (OCT 11)   Hgb                  14.4 (OCT 11)   Hct                  H 47.9 (OCT 11)   Plt                  165 (OCT 11)   Na                   L 130 (OCT 11)   K                    H 5.6 (OCT 11)   CO2                  23.0 (OCT 11)   Cl                   L 97 (OCT 11)   Cr                   H 2.48 (OCT 11)   BUN                  H 47.0 (OCT 11)   Glucose Random       106 (OCT 11)   PT                   H 16.1 (OCT 11)   INR                  1.25 (OCT 11)   PTT                  27.5 (OCT 11) .    Radiology results   Rad Results (ST)   Accession: [id number]  Order: XR Chest 1 View  Report Dt/Tm: 10/11/2018 17:19  Report:      CLINICAL: Status post right-sided thoracentesis. Postoperative.     COMPARISON: Same date.                       FINDINGS: Improved size of right pleural effusion post thoracentesis.  Improved right lower lung zone compressive atelectasis and no  pneumothorax. Otherwise, no significant interval change.      IMPRESSION:     Improved right pleural effusion.          Impression and Plan   Acute on chronic hypoxic respiratory failure  End-stage pulmonary hypertension on continuous drip on remodulin   CHF   SHERIE on CPAP  hypertension  hyperlipidemia  GERD  bipolar disorder     Plan    Patient currently is on home hospice with goal to keep patient comfortable      Case management has been consulted to start the process of informing the patient and family about possibility of going to  [facility]  On BiPAP and monitor oxygen saturation  Pain management

## 2022-04-29 NOTE — CONSULTS
DATE OF CONSULTATION:      ATTENDING PHYSICIAN:  Vangie Larry MD  CONSULTING PHYSICIAN:  ESTIVEN Magallanes MD    HISTORY OF PRESENT ILLNESS:  This is a 56 year old, white female patient, who has seen Dr. Tree Lynne in the past.  She was admitted to the hospital with increasing shortness of breath over the past several days with dyspnea on exertion at rest and even with only a few feet.  The patient is known to have obstructive sleep apnea and was previously on a Trilogy at home however she does not qualify for that because she has a normal pCO2.  Her chart reflects that she has chronic obstructive pulmonary disease.  That is inaccurate.  She has had normal PFTs done in July 2015 except for mild restriction likely due to the increased abdominal girth.  It also states she has obesity hypoventilation syndrome.  She does not have that.  She is mildly obese but she does not have hypoventilation as her pCO2 is normal.     She recently saw Dr. Guerrier of [facility] cardiology, had an echogram, reportedly it showed that she had pulmonary hypertension and she was scheduled for a right and left heart catheterization next week.  Currently she is sitting up in bed.  She has no complaints.  She still does not feel like she is back to her baseline.    ALLERGIES:  Sulfa.    MEDICATIONS:    1. Norco 10 t.i.d. prn.  2. Albuterol nebs q.6.  3. Amlodipine 5 daily.  4. Symbicort 80/4.5 b.i.d.  5. Buspirone 15 t.i.d.  6. Pristiq 100 daily.  7. Lasix 20 daily.  8. Lamotrigine 100 mg tablet daily.  9. Synthroid 75 mcg daily.  10. Latuda 60 mg daily.  11. Protonix 40 daily.  12. Pravastatin 40 daily.  13. Incruse one cap inhaled daily.    PAST MEDICAL HISTORY:  Mild restrictive lung disease secondary to obesity, obstructive sleep apnea not quantitated but on CPAP and oxygen at home, hypertension, hyperlipidemia, cervical radiculopathy.  The chart says congestive heart failure; we do not have any echocardiographic data to support  that.    PAST SURGICAL HISTORY:  Tubal ligation, reportedly pericardial window, no surgical documentation at this hospital, and previous back surgery.    SOCIAL HISTORY:  She smoked about 17 years.  She says she has quit probably greater than ten years.  She lives at home alone.    REVIEW OF SYSTEMS:  As stated.    PHYSICAL EXAMINATION:  VITAL SIGNS:  Temperature is 36.7, blood pressure 130/88, heart rate 85, currently on 50% Venti her sat is 93%.   HEENT:  Unremarkable.   CHEST:  Diminished but clear.   HEART:  Regular in rhythm.   ABDOMEN:  Mild to moderately obese, soft.   EXTREMITIES:  Without significant edema.    LABORATORY DATA:    Sodium 142, potassium 4.1, chloride 107, bicarb 19, BUN 21, creatinine 1.15, troponin negative, H&H 14 and 43, white count 9600, platelet count is 110.  ABG yesterday on 50% Ventimask, pH of 7.43, pCO2 of 33, pO2 of 58.    RADIOLOGY:    CT scan of the chest was done which showed a small right pleural effusion.  There is mild ascites as well.  No evidence of pulmonary embolism.    IMPRESSION:    1. Chronic hypoxemia.   2. Obstructive sleep apnea.  3. She does not have chronic obstructive pulmonary disease nor does she had hypoventilation syndrome.  4. Pulmonary hypertension (severe by history but no documentation on the chart).  5. History of congestive heart failure.  No documentation on the chart.  6. History of hypertension.  7. History of hyperlipidemia.  8. History of depression.      PLAN:    I called and spoke with Dr. Larry.  I have recommended that [facility] see her on this hospitalization.  My concern would be that she has group 1 pulmonary hypertension on top of hypertension caused from obstructive sleep apnea as typically that does not cause a pulmonary systolic pressure greater than the 50s.  With a history of smoking in the past, I think it treating her with Incruse is fine.  I do not feel like she would need Symbicort if she does not have chronic obstructive pulmonary  disease.  The other concern would be that she has an intracardiac shunt causing the chronic hypoxemia and pulmonary hypertension.  Will defer that evaluation further to cardiology.  Will continue to follow along with you.        ______________________________  G. MD KARMEN Bergman/PARAG  DD:  09/29/2017  Time:  08:33AM  DT:  09/29/2017  Time:  09:19AM  Job #:  [id number]

## 2022-04-29 NOTE — DISCHARGE SUMMARY
Patient:   Sue Smith            MRN: [id number]            FIN: [id number]               Age:   56 years     Sex:  Female     :  1960   Associated Diagnoses:   None   Author:   Tasha CHRISTOPHER, Kassandra Figueroa      Please refer to the progress note and addendum from 10/4/2017.  The patient was transferred to [facility] to be seen by the pulmonary hypertension team.

## 2022-04-29 NOTE — CONSULTS
Patient:   Sue Smith            MRN: [id number]            FIN: [id number]               Age:   56 years     Sex:  Female     :  1960   Associated Diagnoses:   None   Author:   Wallace Escalera MD      Patient still requiring Ventimask with 12 L O2.  Severe pulmonary hypertension by recent right heart cath.  No one here at  [facility] to adequately treat or complete workup for severe pulmonary hypertension.  Spoke with Faina Cullen MD at [facility] who agrees with transfer for further workup.  We'll arrange for transfer when bed is available.

## 2022-04-29 NOTE — ED PROVIDER NOTES
Patient:   Sue Smith            MRN: [id number]            FIN: [id number]               Age:   57 years     Sex:  Female     :  1960   Associated Diagnoses:   Bilateral pleural effusion; Chronic hypoxemic respiratory failure; Pulmonary hypertension   Author:   Migdalia Arauz MD      Basic Information   Time seen: Date & time 2018 12:18:00.   History source: Patient, family.   Arrival mode: Private vehicle.   History limitation: None.   Additional information: Patient's physician(s): Paddy Guerrier DO, [place], Chief Complaint from Nursing Triage Note : Chief Complaint   2018 12:16 CST      Chief Complaint           c/o increased SOB x2 days with increased weight gain noted yesterday. Hx pulmonary HTN and currently receiving Remodulin through inserted port to right chest wall. Denies cough/congestion/fever.  .      History of Present Illness   The patient presents with Patient states increased SOB and weight gain x two days. Hx. of pulmonary HT N and recieves Remodulin (lay, CARMEN).  and I, Dr. Arauz, assumed care for this patient at 1307.    56 y/o C female with history of pulmonary hypertension, CHF, HTN, and COPD on a Remodulin drip presents to ED with family at bedside c/o worsening shortness of breath onset 1 day ago. Patient states at home her O2 sats been running 70-80s on 4L. Per patient, her O2 sats usually run about 94% on 4L NC. Patient reports peripheral edema and 10 pound weight gain in 3 days. Per patient, she is on Lasix and Bumex. Patient denies pain, chest pain, fever, cough, and sputum production. .  The onset was 1  days ago.  The course/duration of symptoms is worsening.  Degree at onset moderate.  Degree at present moderate.  The Exacerbating factors is none.  The Relieving factors is none.  Risk factors consist of congestive heart failure, hypertension, chronic obstructive pulmonary disease and Hx of pulmonary HTN.  Prior episodes: occasional.  Therapy today:  oxygen.  Associated symptoms: weight gain.        Review of Systems   Constitutional symptoms:  No fever, no chillsWeight gain: 10 pounds.   Skin symptoms:  No jaundice, no rash.    Eye symptoms:  Vision unchanged, No blurred vision,    Respiratory symptoms:  Shortness of breath, no orthopnea, no cough, no sputum production.    Cardiovascular symptoms:  Peripheral edema, no chest pain, no palpitations, no diaphoresis.    Gastrointestinal symptoms:  No abdominal pain, no nausea, no vomiting, no diarrhea, no constipation.    Genitourinary symptoms:  No dysuria, no hematuria.    Neurologic symptoms:  No headache, no dizziness.    Hematologic/Lymphatic symptoms:  Bleeding tendency negative,    Allergy/immunologic symptoms:  No impaired immunity,              Additional review of systems information: All other systems reviewed and otherwise negative.      Health Status   Allergies:    Allergic Reactions (Selected)  Severity Not Documented  Sulfamethoxazole- Sob..   Medications:  (Selected)   Inpatient Medications  Ordered  KCL 20 mEq Oral Tab: 40 mEq, form: Tab-ER, Oral, Once, first dose 01/03/18 15:09:00 CST, stop date 01/03/18 15:09:00 CST, STAT, 24  Documented Medications  Documented  DuoNeb 0.5 mg-2.5 mg/3 mL inhalation solution: NEB, q4hr, 0 Refill(s)  Folet DHA: 1 packet(s), Oral, Daily, 0 Refill(s)  Incruse Ellipta 62.5 mcg/inh inhalation powder: 1 EA, INH, Daily, doses should be taken at least 24 hours apart, # 30 EA, 0 Refill(s)  K-Dur 20 oral tablet, extended release: 20 mEq = 1 tab(s), Oral, BID, # 180 tab(s), 0 Refill(s)  Latuda 40 mg oral tablet: 40 mg = 1 tab(s), Oral, At Bedtime, # 30 tab(s), 0 Refill(s)  Norvasc 5 mg oral tablet: 5 mg = 1 tab(s), Oral, Daily, # 90 tab(s), 0 Refill(s)  Pantoprazole 40 mg ORAL EC-Tablet: 40 mg = 1 tab(s), Oral, Daily, # 30 tab(s), 0 Refill(s)  Pravastatin 40 mg Oral Tab: 40 mg = 1 tab(s), Oral, Daily, # 90 tab(s), 0 Refill(s)  Pristiq 100 mg oral tablet, extended release:  100 mg = 1 tab(s), Oral, Daily, # 30 tab(s), 0 Refill(s)  budesonide-formoterol 160 mcg-4.5 mcg/inh inhalation aerosol: 2 puff(s), INH, BID, 0 Refill(s)  bumetanide 2 mg oral tablet: 4 mg = 2 tab(s), Oral, BID, # 90 tab(s), 0 Refill(s)  busPIRone 15 mg oral tablet: 15 mg = 1 tab(s), Oral, TID, # 270 tab(s), 0 Refill(s)  gabapentin 100 mg oral capsule: 200 mg = 2 cap(s), Oral, At Bedtime, # 240 cap(s), 0 Refill(s)  lamoTRIgine 100 mg oral tablet, extended release: 100 mg = 1 tab(s), Oral, BIDHS, 0 Refill(s)  levothyroxine 75 mcg (0.075 mg) oral tablet: 75 mcg = 1 tab(s), Oral, Daily, # 60 tab(s), 0 Refill(s)  metOLazone 2.5 mg oral tablet: 2.5 mg = 1 tab(s), Oral, Daily, PRN PRN wt gain ( swelling), # 30 tab(s), 0 Refill(s)  ondansetron 8 mg oral tablet: 8 mg = 1 tab(s), Oral, q8hr, PRN PRN as needed for nausea/vomiting, 0 Refill(s)  warfarin 5 mg oral tablet: 10 mg = 2 tab(s), Oral, Daily, # 30 tab(s), 0 Refill(s).      Past Medical/ Family/ Social History   Medical history:    Active  Hypertension ([id number])  COPD, moderate ([id number])  Hyperlipidemia ([id number])  GERD (gastroesophageal reflux disease) ([id number])  Bipolar (319115826)  Depression ([id number])  Cervical radiculopathy ([id number])  Numbness and tingling ([id number])  SOB (shortness of breath) ([id number])  Hypothyroid (214220812)  Resolved  CHF - Congestive heart failure ([id number]):  Resolved.  Pulmonary arterial hypertension (1496844103):  Resolved., Reviewed as documented in chart.   Surgical history:    Injection Cervical Epidural Steroid (., None) on 6/27/2017 at 56 Years.  Comments:  6/27/2017 13:42 - Lissy Lr RN  auto-populated from documented surgical case  Injection Cervical Epidural Steroid (., None) on 6/13/2017 at 56 Years.  Comments:  6/13/2017 10:45 - Lissy Lr RN  auto-populated from  documented surgical case  Injection Cervical Epidural Steroid (.) on 5/30/2017 at 56 Years.  Comments:  5/30/2017 15:54 - Be RICKETTS, Lissy MEJIA  auto-populated from documented surgical case  Tubal ligation ([id number]).  Back surgery.  Previous back surgery ([id number]).  Bilateral tubal ligation ([id number])., Reviewed as documented in chart.   Family history:    History is unknown..   Social history: Alcohol use: Denies, Tobacco use: Quit 16 years ago, Drug use: Denies.      Physical Examination               Vital Signs   Vital Signs   2/16/2018 13:35 CST      Peripheral Pulse Rate     88 bpm                             Heart Rate Monitored      88 bpm                             Respiratory Rate          22 br/min                             SpO2                      83 %  LOW                             Oxygen Therapy            Nasal cannula                             Oxygen Flow Rate          4 L/min                             Systolic Blood Pressure   124 mmHg                             Diastolic Blood Pressure  81 mmHg                             Mean Arterial Pressure, Cuff              95 mmHg    2/16/2018 12:16 CST      Temperature Oral          36.9 DegC                             Temperature Oral (calculated)             98.42 DegF                             Peripheral Pulse Rate     85 bpm                             Respiratory Rate          22 br/min                             SpO2                      81 %  LOW                             Oxygen Therapy            Nasal cannula                             Oxygen Flow Rate          4 L/min                             Systolic Blood Pressure   110 mmHg                             Diastolic Blood Pressure  71 mmHg  .   Measurements   2/16/2018 12:16 CST      Weight Dosing             87 kg                             Weight Measured and Calculated in Lbs     191.80 lb                             Weight Estimated           "87 kg                             Height/Length Dosing      157.48 cm                             Height/Length Estimated   157.48 cm                             Body Mass Index Estimated 35.08 kg/m2  .   Basic Oxygen Information   2/16/2018 13:35 CST      SpO2                      83 %  LOW                             Oxygen Flow Rate          4 L/min                             Oxygen Therapy            Nasal cannula    2/16/2018 12:16 CST      SpO2                      81 %  LOW                             Oxygen Flow Rate          4 L/min                             Oxygen Therapy            Nasal cannula  .   General:  Alert, mild distress.    Skin:  Warm, dry.    Head:  Normocephalic, atraumatic.    Neck:  Supple, trachea midline.    Eye:  Normal conjunctiva.   Ears, nose, mouth and throat:  Oral mucosa moist.   Cardiovascular:  Regular rate and rhythm, No murmur, Normal peripheral perfusion, Edema: Bilateral, lower extremity, 3+, pitting, anasarca.    Respiratory:  Lungs are clear to auscultation, Respirations: Respiratory distress mild.    Gastrointestinal:  Soft, Nontender, Normal bowel sounds, abdominal distention.    Neurological:  Alert and oriented to person, place, time, and situation.   Psychiatric:  Cooperative.      Medical Decision Making   Documents reviewed:  Emergency department nurses' notes, emergency department records.    Orders  Launch Order Profile (Selected)   Inpatient Orders  Ordered  30 Day Readmission: 02/16/18 12:22:14 CST, Stop date 02/16/18 12:22:14 CST, "This patient has had an inpatient, observation, outpatient bedded or emergency visit within the last 30 days."  CPAP: 02/16/18 14:21:00 CST, EPAP/CPAP: 7  Discharge Planning Ongoing Assessment: 02/19/18 9:00:00 CST, q3day  Oxygen Therapy: 02/16/18 13:55:00 CST, Venti Face Mask, CM Oxygen  Perineal Care: 02/16/18 14:28:08 CST, BID  Urinary Catheter Insertion: 02/16/18 14:25:00 CST, Indwelling, Yes  Urinary Catheter Monitoring: " 18 14:28:08 CST, BID  Urinary Catheter Nurse Driven Protocol: BID, 18 14:28:08 CST  Ordered (Collected)  POC BNP iSTAT request: Blood, Routine collect, Collected, 18 12:20:00 CST by Antionette MORALES, Jess DANIELS, Stop date 18 12:20:00 CST, Nurse collect, Print Label By Order Location  POC iSTAT ER Troponin request: Blood, Stat collect, Collected, 18 12:20:00 CST by Antionette MORALES, Jess DANIELS, Stop date 18 12:20:00 CST, Nurse collect, Print Label By Order Location  Completed  Aerosol Treatment: 18 13:55:00 CST  Automated Diff: Now collect, 18 14:06:00 CST, Blood, Collected, Stop date 18 14:06:00 CST, Lab Collect, Print Label By Order Location, 18 12:20:00 CST  CBC w/ Auto Diff: Now collect, 18 12:20:00 CST, Blood, Stop date 18 12:20:00 CST, Lab Collect, Print Label By Order Location, 18 12:20:00 CST  CMP: Stat collect, 18 12:20:00 CST, Blood, Stop date 18 12:20:00 CST, Lab Collect, Print Label By Order Location, 18 12:20:00 CST  DuoNeb 0.5 mg-2.5 mg/3 mL inhalation solution: 3 mL, form: Soln, NEB, Once-Unscheduled, first dose 18 13:55:00 CST  EK18 12:20:00 CST, 18 12:20:00 CST, Patient Bed, Patient Has IV, Patient on O2, Standard Precautions, NOW, -1, -1, 18 12:20:00 CST  Estimated Glomerular Filtration Rate: Stat collect, 18 14:06:00 CST, Blood, Collected, Stop date 18 14:06:00 CST, Lab Collect, Print Label By Order Location, 18 12:20:00 CST  Lasix inject.  (IV Push or IM) 10 mg/mL: 60 mg, form: Injection, IV, Once, first dose 18 13:45:00 CST, stop date 18 13:45:00 CST, STAT  POC BNP iSTAT: Blood, Stat collect, Collected, 18 14:23:48 CST  POC Istat ER Troponin: Blood, Stat collect, Collected, 18 14:22:56 CST  Troponin-I: Stat collect, 18 12:20:00 CST, Blood, Stop date 18 12:20:00 CST, Lab Collect, Print Label By Order Location, 18 12:20:00 CST  XR Chest 2  Views: Stat, 02/16/18 12:20:00 CST, Chest Pain, None, Patient Bed, Patient Has IV?, Patient on Oxygen?, Rad Type, 02/16/18 12:20:00 CST.   Electrocardiogram:  Time 2/16/2018 12:16:00, rate 93, normal sinus rhythm, STT segments RVH w/ repol, QRS interval Right bundle branch block (incomplete), Previous EKG available No changes, compared with 1/3/2018 11:46:00, Interpretation by Emergency Physician No significant interval changes.    Results review:  Lab results : Lab View   2/16/2018 14:23 CST      POC BNP iSTAT             563 pg/mL  HI    2/16/2018 14:22 CST      POC Troponin              0.02 ng/mL    2/16/2018 14:06 CST      Sodium Lvl                136 mmol/L                             Potassium Lvl             4.5 mmol/L                             Chloride                  101 mmol/L                             CO2                       24.0 mmol/L                             Calcium Lvl               9.1 mg/dL                             Glucose Lvl               85 mg/dL                             BUN                       24.0 mg/dL  HI                             Creatinine                1.37 mg/dL  HI                             eGFR-AA                   51 mL/min/1.73 m2  NA                             eGFR-ISIDORO                  42 mL/min/1.73 m2  NA                             Bili Total                0.5 mg/dL                             Bili Direct               0.20 mg/dL                             Bili Indirect             0.30 mg/dL                             AST                       35 unit/L                             ALT                       31 unit/L                             Alk Phos                  152 unit/L  HI                             Total Protein             6.4 gm/dL                             Albumin Lvl               3.90 gm/dL                             Globulin                  2.50 gm/dL                             A/G Ratio                 1.6  NA                              Troponin-I                0.03 ng/mL                             WBC                       7.2 x10(3)/mcL                             RBC                       4.35 x10(6)/mcL                             Hgb                       13.0 gm/dL                             Hct                       41.7 %                             Platelet                  158 x10(3)/mcL                             MCV                       95.9 fL  HI                             MCH                       29.9 pg                             MCHC                      31.2 gm/dL  LOW                             RDW                       17.5 %  HI                             MPV                       10.6 fL                             Abs Neut                  5.67 x10(3)/mcL                             Neutro Auto               79 %  NA                             Lymph Auto                12 %  NA                             Mono Auto                 7 %  NA                             Eos Auto                  1 %  NA                             Abs Eos                   0.1 x10(3)/mcL                             Basophil Auto             1 %  NA                             Abs Neutro                5.67 x10(3)/mcL                             Abs Lymph                 0.9 x10(3)/mcL                             Abs Mono                  0.5 x10(3)/mcL                             Abs Baso                  0.0 x10(3)/mcL  , Interpretation Abnormal results  Elevated BNP.    Chest X-Ray:  Time reported 2/16/2018 13:37:00,            * Final Report *    Reason For Exam  Chest Pain    Radiology Report  Clinical History  Chest Pain     Technique  2 views of the chest.     Comparison  January 3, 2018     Findings  Lungs are clear with no visualized focal airspace opacity.  The trachea appears midline.  Right-sided line is noted projecting over the mid SVC.  The cardiomediastinal silhouette is enlarged.  Right greater than left  pleural effusion.  Visualized abdomen, soft tissues, and osseous structures are  unremarkable.     Impression  Interval development of right greater than left pleural effusion.       Signature Line  Electronically Signed By: Hunter Figueroa MD  Date/Time Signed: 02/16/2018 13:37    .       Reexamination/ Reevaluation   Vital signs   results included from flowsheet : Vital Signs   2/16/2018 15:00 CST      Peripheral Pulse Rate     87 bpm                             Heart Rate Monitored      87 bpm                             Respiratory Rate          19 br/min                             SpO2                      94 %                             Oxygen Therapy            CPAP                             Systolic Blood Pressure   119 mmHg                             Diastolic Blood Pressure  80 mmHg                             Mean Arterial Pressure, Cuff              93 mmHg    2/16/2018 14:41 CST      Peripheral Pulse Rate     87 bpm                             Heart Rate Monitored      86 bpm                             FIO2                      55 %                             SpO2                      94 %                             Oxygen Therapy            CPAP    2/16/2018 14:37 CST      Peripheral Pulse Rate     90 bpm                             Heart Rate Monitored      90 bpm                             Respiratory Rate          23 br/min                             FIO2                      50 %                             SpO2                      88 %  LOW                             Oxygen Therapy            Venti-Mask                             Oxygen Flow Rate          12 L/min     Course: improving.   Notes: Patient without much improvement on Ventimask.  Placed on CPAP with SPO2 93-94%.  Breathing much more comfortably.  Chest x-ray demonstrates new bilateral pleural effusions.  Patient given 60 mg IV Lasix and has already put out 600-700 mL of urine.  Osorio catheter placed for transport.   Patient needs to be transferred to [facility] as we are unable to maintain Remodulin gtt here. Findings and plan discussed with the patient, and she is agreeable to transfer at this time.   .      Procedure   Critical care note   Total time: 35 minutes spent engaged in work directly related to patient care and/ or available for direct patient care.   Critical condition(s) addressed for impending deterioration include: airway, cardiovascular.   Associated risk factors: hypoxia.   Management: bedside assessment, supervision of care, Interpretation (chest x-ray, electrocardiogram), Interventions ventilator management (CPAP), Case review medical specialist ([facility] cardiology).      Impression and Plan   Diagnosis   Bilateral pleural effusion (BDT66-WW J90)   Chronic hypoxemic respiratory failure (RMG18-XW J96.11)   Pulmonary hypertension (EQS06-SK I27.20)   Plan   Condition: Improved.    Disposition: Transfer to other location: Time: 2/16/2018 15:34:00, Facility name: [facility], Accepted by: Dr. Santana.    Counseled: Patient, Family, Regarding diagnosis, Regarding diagnostic results, Regarding treatment plan, Patient indicated understanding of instructions, Family understood.    Notes: IIrish, acted solely as a scribe for and in the presence of Dr. Arauz who performed the service.  , Migdalia PEOPLES, have independently performed the history, physical, medical decision making and procedures as documented above and agree with the scribe's documentation. .

## 2022-04-29 NOTE — DISCHARGE SUMMARY
Document Contains [facility]  Hospitalist Progress Note     Patient:   Seu Smith            MRN: [id number]            FIN: [id number]               Age:   56 years     Sex:  Female     :  1960   Associated Diagnoses:   None   Author:   Tasha CHRISTOPHER, Kassandra Figueroa      Basic Information   CC: Shortness of breath    HPI:56-year-old  female with obesity hypoventilation syndrome, chronic hypoxemia, obstructive sleep apnea, severe pulmonary hypertension, COPD who presented to the ER today with complaints of shortness of breath.  Patient reports she's been having shortness of breath for long time but for the past 2 weeks her shortness of breath has gotten worse she is on trilogy at home, reports no fever no chills no expectoration no wheezing but reports that she's not able to go to the bathroom without getting shortness of breath area and on 3 L nasal cannula during daytime.  CT of the chest was done that showed no evidence of PE, right sided pleural effusion with some compressive atelectatic  changes.  Mild ascites and trace pericardial effusion.  Patient reports she recently got a echo done and was told that she has pulmonary hypertension and was supposed to get injured gram done in October.  She's been admitted to hospitalist service for further management and car       10/04/17: Patient seen and examined.  Still remains on a Ventimask at 12 L.  Has no acute complaints or needs.  No acute events overnight.  Appears comfortable.  Discussed the possibility of transfer from [facility] directly to [facility] to the pulmonary hypertension specialist.  Verbalizes understanding.      Review of Systems   As documented all other systems reviewed and negative      Physical Examination      Vital Signs (last 24 hrs)_____  Last Charted___________  Temp Oral     36.3 DegC  (OCT 04 11:50)  Heart Rate Peripheral   76 bpm  (OCT 04 11:50)  Resp Rate         17 br/min  (OCT 04 11:50)  SBP      90 mmHg  (OCT  04 11:50)  DBP      L 56mmHg  (OCT 04 11:50)  SpO2      L 92%  (OCT 04 11:50)     General:  Alert and oriented, No acute distress.    Currently remains on Ventimask at 12 L   Eye:  Pupils are equal, round and reactive to light, Extraocular movements are intact.    HENT:  Normocephalic.    Neck:  Supple.    Respiratory:  Lungs are clear to auscultation, Somewhat diminished all over.    Cardiovascular:  Normal rate.    Gastrointestinal:  Soft, Non-tender.    Musculoskeletal:  Normal range of motion.    Neurologic:  Alert, Oriented.    Psychiatric:  Cooperative, Appropriate mood & affect.       Review / Management   Results review:     Labs (Last four charted values)  Glucose              H 133 (SEP 29) 91 (SEP 28) .       Impression and Plan   #1 shortness of breath/severe pulmonary hypertension   secondary to severe pulmonary hypertension diagnosed with right heart cath  Appreciate pulmonology and cardiology's recommendations  Dr. Escalera has been talking to the pulmonary hypertension specialist at [facility] and transfer will be initiated once bed is available.  Accepting MD is Dr. Faina Cullen  Continue on  Sildenafil  -Continue with Lasix 40 mg IV every 8 hours, accurate ins and outs    #2 sleep apnea    #3 CHF exacerbation  Lasix 40 IV 3 times daily  Strict in's and O's    #4 chronic hypoxemic respiratory failure  On Trilogy at home    #5 hypertension  Blood pressure stable    #6 hyperlipidemia  Continue with medication    #7 depression  Continue with medication          Addendum by Tasha CHRISTOPHER, Kassandra Figueroa on October 09, 2017 16:58 CDT (Verified)  Admitting diagnosis: Shortness of breath, chronic hypoxemic respiratory failure  Discharge diagnosis: Severe pulmonary hypertension    Admit date: 9/20/2017  Discharge date: 10/4/2017    Was notified by case management that evening the patient was accepted to [facility] and there was a bed available.  Patient was transferred to [facility] to the pulmonary  hypertension team via ambulance  Condition: Stable  Activity: As tolerated  Medications: Per med rec sheet  Follow-up: Cardiology, pulmonology, pulmonary hypertension team in [facility]    Time spent: 35 minutes    Result type: Progress Note-Physician  Result date: October 04, 2017 14:10 CDT  Result status: Modified  Result title: Hospitalist Progress Note  Performed by: Kassandra Cordova MD on October 04, 2017 14:14 CDT  Verified by: Kassandra Cordova MD on October 04, 2017 14:14 CDT  Encounter info: [id number], [facility], Inpatient, 9/28/2017 - 10/4/2017    This document has been moved to the correct document type by the [facility] department per Dr. Cordova.

## 2022-04-29 NOTE — ED PROVIDER NOTES
Patient:   Sue Smith            MRN: [id number]            FIN: [id number]               Age:   57 years     Sex:  Female     :  1960   Associated Diagnoses:   Pulmonary hypertension; Acute on chronic respiratory failure with hypoxia; FREDDY (acute kidney injury)   Author:   Migdalia Arauz MD      Basic Information   Time seen: Date & time 2018 14:59:00.   History source: Patient.   Arrival mode: Private vehicle.   History limitation: None.   Additional information: Patient's physician(s): Alyse CHRISTOPHER , Faina GARCIA, Chief Complaint from Nursing Triage Note : Chief Complaint   2018 14:58 CDT       Chief Complaint           Pt c/o sob, denies chest pain, states she was seen two days ago given lasix but not working. Pt has a right leg infection.  .      History of Present Illness   The patient presents with 57-year-old female presents to the ED with complaints of shortness of breath ×4 days.  History of CHF and COPD (3 L home O2).  Reports was seen 4 days ago and was given Lasix and clindamycin with no relief. Denies any fever. Reports generalize weakness. BP in triage 78/51  Sun fnp-c and     I, Dr. Arauz, assumed care of this patient at 1512.    56 y/o CF with a history of CHF, COPD, on 3 L home O2, and with Remodulin pump in right chest wall for pulmonary hypertension, presents to the ED with complaints of SOB and lower extremity edema for 5 days. She was seen in the ED on 18, and was discharged with 80 mg Lasix and Clindamycin for a right lower leg infection. Pt says she only took one dose of the Clindamycin, and says the Lasix that she took did not make her urinate. Her SOB was persisted, prompting her return to the ED. She denies having any fever..  The onset was 5  days ago.  The course/duration of symptoms is constant.  Degree at onset moderate.  Degree at present moderate.  The Exacerbating factors is exertion.  The Relieving factors is none.  Risk factors consist of  hypertension and chronic obstructive pulmonary disease.  Prior episodes: multiple ED visits.  Therapy today: Lasix, Clindamycin.  Associated symptoms: denies fever.        Review of Systems   Constitutional symptoms:  No fever, no chills.    Skin symptoms:  Right lower leg infection along laceration, with sutures in place, no jaundice, no rash.    Eye symptoms:  Vision unchanged, No blurred vision,    Respiratory symptoms:  Shortness of breath, no orthopnea, no cough, no sputum production.    Cardiovascular symptoms:  Bilateral lower extremity edema, no chest pain, no palpitations, no diaphoresis.    Gastrointestinal symptoms:  No abdominal pain, no nausea, no vomiting, no diarrhea, no constipation.    Genitourinary symptoms:  No dysuria, no hematuria.    Neurologic symptoms:  No headache, no dizziness.    Hematologic/Lymphatic symptoms:  Bleeding tendency negative,    Allergy/immunologic symptoms:  No impaired immunity,       Health Status   Allergies:    Allergic Reactions (Selected)  Severity Not Documented  Sulfamethoxazole- Sob..   Medications:  (Selected)   Inpatient Medications  Ordered  KCL 20 mEq Oral Tab: 40 mEq, form: Tab-ER, Oral, Once, first dose 18 15:09:00 CST, stop date 18 15:09:00 CST, STAT, 24  Prescriptions  Prescribed  Percocet 10/325 oral tablet: 1 tab, Oral, q4hr, PRN PRN as needed for pain, X 3 day(s), # 24 tab(s), 0 Refill(s)  clindamycin 150 mg oral capsule: 300 mg = 2 cap(s), Oral, q6hr, X 10 day(s), # 80 cap(s), 0 Refill(s)  Documented Medications  Documented  ADEMPAS      TAB 1MG:   Adempas 0.5 mg tablet:   Breo Ellipta 100-25 mcg/dose blister with device:   CEPHALEXIN   CAP 500M mg = 1 cap(s), Oral, QID  DuoNeb 0.5 mg-2.5 mg/3 mL inhalation solution: NEB, q4hr, 0 Refill(s)  HYDROCO/APAP TAB 10-325M tab(s), Oral, QID  Incruse Ellipta 62.5 mcg/inh inhalation powder: 1 EA, INH, Daily, doses should be taken at least 24 hours apart, # 30 EA, 0 Refill(s)  K-Dur 20 oral  tablet, extended release: 20 mEq = 1 tab(s), Oral, BID, # 180 tab(s), 0 Refill(s)  LAMOTRIGINE  TAB 100MG:   Latuda 40 mg oral tablet: 40 mg = 1 tab(s), Oral, At Bedtime, # 30 tab(s), 0 Refill(s)  Latuda 80 mg oral tablet: 80 mg = 1 tab(s), Oral, Daily, with food  MUPIROCIN    OIN 2%: 1 mitchell, TOP, TID  Norvasc 5 mg oral tablet: 5 mg = 1 tab(s), Oral, Daily, # 90 tab(s), 0 Refill(s)  Pantoprazole 40 mg ORAL EC-Tablet: 40 mg = 1 tab(s), Oral, Daily, # 30 tab(s), 0 Refill(s)  Pravastatin 40 mg Oral Tab: 40 mg = 1 tab(s), Oral, Daily, # 90 tab(s), 0 Refill(s)  Pristiq 100 mg oral tablet, extended release: 100 mg = 1 tab(s), Oral, Daily, # 30 tab(s), 0 Refill(s)  SPIRONOLACT  TAB 100M mg = 1 tab(s), Oral, BID  Xanax 1 mg tablet:   albuterol sulfate 0.083% Nebu Soln:   budesonide-formoterol 160 mcg-4.5 mcg/inh inhalation aerosol: 2 puff(s), INH, BID, 0 Refill(s)  budesonide-formoterol 80-4.5 mcg/actuation HFA aerosol inhaler:   bumetanide 2 mg oral tablet: 4 mg = 2 tab(s), Oral, BID, # 90 tab(s), 0 Refill(s)  busPIRone 15 mg oral tablet: 15 mg = 1 tab(s), Oral, TID, # 270 tab(s), 0 Refill(s)  cyclobenzaprine 10 mg tablet:   gabapentin 100 mg oral capsule: 200 mg = 2 cap(s), Oral, TID, # 240 cap(s), 0 Refill(s)  lamoTRIgine 100 mg oral tablet, extended release: 100 mg = 1 tab(s), Oral, BIDHS, 0 Refill(s)  levothyroxine 75 mcg (0.075 mg) oral tablet: 75 mcg = 1 tab(s), Oral, Daily, # 60 tab(s), 0 Refill(s)  metOLazone 2.5 mg oral tablet: 2.5 mg = 1 tab(s), Oral, Daily, PRN PRN wt gain ( swelling), # 30 tab(s), 0 Refill(s)  ondansetron 8 mg oral tablet: 8 mg = 1 tab(s), Oral, q8hr, PRN PRN as needed for nausea/vomiting, 0 Refill(s)  torsemide 100 mg oral tablet: 100 mg = 1 tab(s), Oral, Daily.      Past Medical/ Family/ Social History   Medical history:    Active  Hypertension ([id number])  COPD, moderate ([id number])  Hyperlipidemia ([id number])  GERD (gastroesophageal  reflux disease) ([id number])  Bipolar (136516224)  Depression (657842351)  Cervical radiculopathy (927925152)  Numbness and tingling ([id number])  SOB (shortness of breath) ([id number])  Hypothyroid (479181736)  Resolved  CHF - Congestive heart failure (824015473):  Resolved.  Pulmonary arterial hypertension (2374123137):  Resolved..   Surgical history:    Injection Cervical Epidural Steroid (., None) on 6/27/2017 at 56 Years.  Comments:  6/27/2017 13:42 - Lissy Lr RN  auto-populated from documented surgical case  Injection Cervical Epidural Steroid (., None) on 6/13/2017 at 56 Years.  Comments:  6/13/2017 10:45 - Lissy Lr RN  auto-populated from documented surgical case  Injection Cervical Epidural Steroid (.) on 5/30/2017 at 56 Years.  Comments:  5/30/2017 15:54 - Lissy Lr RN  auto-populated from documented surgical case  Tubal ligation ([id number]).  Back surgery.  Previous back surgery ([id number]).  Bilateral tubal ligation ([id number])..   Family history:    History is unknown..   Social history: Alcohol use: Denies, Tobacco use: Denies, Drug use: Denies.      Physical Examination               Vital Signs   Vital Signs   6/1/2018 14:58 CDT       Temperature Oral          37.0 DegC                             Temperature Oral (calculated)             98.60 DegF                             Peripheral Pulse Rate     91 bpm                             Respiratory Rate          20 br/min                             SpO2                      77 %  LOW                             Systolic Blood Pressure   78 mmHg  LOW                             Diastolic Blood Pressure  51 mmHg  LOW  .   Measurements   6/1/2018 14:58 CDT       Weight Estimated          91 kg                             Height/Length Estimated   157 cm                             Body Mass Index Estimated 36.92 kg/m2  .    Basic Oxygen Information   6/1/2018 14:58 CDT       SpO2                      77 %  LOW  .   General:  Alert, mild distress.    Skin:  Warm, dry, Laceration with sutures in place on right lateral lower extremity, mild erythema, no drainage.    Head:  Normocephalic, atraumatic.    Neck:  Supple, trachea midline.    Eye:  Normal conjunctiva.   Ears, nose, mouth and throat:  Oral mucosa moist.   Cardiovascular:  Regular rate and rhythm, No murmur, Normal peripheral perfusion, Edema: Bilateral, lower extremity, 2+, pitting.    Respiratory:  Respirations: Tachypneic, Breath sounds: Bilateral, diminished.    Chest wall:  Right upper chest wall central line in place.   Gastrointestinal:  Soft, Nontender, Non distended, Normal bowel sounds.    Neurological:  Alert and oriented to person, place, time, and situation.   Psychiatric:  Cooperative.      Medical Decision Making   Documents reviewed:  Emergency department nurses' notes.   Orders  Launch Order Profile (Selected)   Inpatient Orders  Ordered  CPAP: 06/01/18 17:03:00 CDT, EPAP/CPAP: 10  Discharge Planning Ongoing Assessment: 06/04/18 9:00:00 CDT, q3day  Fall Risk Protocol: 06/01/18 15:27:37 CDT, Constant Order  O2 Therapy: 06/01/18 15:27:38 CDT  Ordered (Collected)  POC BNP iSTAT request: Blood, Routine collect, Collected, 06/01/18 15:02:00 CDT by Xiao Sanchez, Stop date 06/01/18 15:02:00 CDT, Nurse collect, Print Label By Order Location  POC iSTAT ER Troponin request: Blood, Stat collect, Collected, 06/01/18 15:02:00 CDT by Xiao Sanchez, Stop date 06/01/18 15:02:00 CDT, Nurse collect, Print Label By Order Location  Canceled  US NIVA Venous Lower Ext Right: 05/30/18 13:30:00 CDT, Stop date 05/30/18 13:30:00 CDT  Completed  ABG Adult RT: Stat collect, Arterial Blood, 06/01/18 18:00:00 CDT, Once, Stop date 06/01/18 18:00:00 CDT  Automated Diff: Now collect, 06/01/18 15:32:00 CDT, Blood, Collected, Stop date 06/01/18 15:32:00 CDT, Lab Collect,  Print Label By Order Location, 06/01/18 15:02:00 CDT  BNP: Stat collect, 06/01/18 15:03:00 CDT, Blood, Stop date 06/01/18 15:03:00 CDT, Lab Collect, Print Label By Order Location, 06/01/18 15:03:00 CDT  CBC w/ Auto Diff: Now collect, 06/01/18 15:02:00 CDT, Blood, Stop date 06/01/18 15:03:00 CDT, Lab Collect, Print Label By Order Location, 06/01/18 15:02:00 CDT  CMP: Stat collect, 06/01/18 15:02:00 CDT, Blood, Stop date 06/01/18 15:03:00 CDT, Lab Collect, Print Label By Order Location, 06/01/18 15:02:00 CDT  EKG Adult 12 Lead: 06/01/18 15:02:00 CDT, Stat, Once, 06/01/18 15:02:00 CDT, Patient Bed, Patient Has IV, Patient on O2, Standard Precautions, -1, -1, 06/01/18 15:02:00 CDT  Estimated Glomerular Filtration Rate: Stat collect, 06/01/18 15:32:00 CDT, Blood, Collected, Stop date 06/01/18 15:32:00 CDT, Lab Collect, Print Label By Order Location, 06/01/18 15:02:00 CDT  [id number]: 250 mL, 250 mL, IV, start date 06/01/18 16:17:00 CDT, stop date 06/01/18 16:17:00 CDT, STAT  POC BNP iSTAT: Blood, Stat collect, Collected, 06/01/18 15:38:08 CDT  POC Istat ER Troponin: Blood, Stat collect, Collected, 06/01/18 16:57:46 CDT  Troponin-I: Stat collect, 06/01/18 15:02:00 CDT, Blood, Stop date 06/01/18 15:03:00 CDT, Lab Collect, Print Label By Order Location, 06/01/18 15:02:00 CDT  XR Chest 2 Views: Stat, 06/01/18 15:02:00 CDT, Shortness of Breath, None, Patient Bed, Patient Has IV?, Patient on Oxygen?, Rad Type, 06/01/18 15:02:00 CDT.   Electrocardiogram:  Time 6/1/2018 15:05:00, rate 91, normal sinus rhythm, The Axis is rightward.  , STT segments T-wave inversions in inferior, anterior and lateral leads, right ventricular hypertrophy, Previous EKG available Compared with 5/30/2018 12:56:00, Interpretation by Emergency Physician No significant interval changes.    Results review:  Lab results : Lab View   6/1/2018 18:11 CDT       Sample ABG                art                             Treatment                 cpap                              Site                      Radial Rt                             pH Art                    7.400                             pCO2 Art                  39.0 mmHg                             pO2 Art                   74.0 mmHg  LOW                             HCO3 Art                  24.2 mmol/L                             CO2 Totl Art              25.4 mmol/L                             O2 Sat Art                94.7 %  LOW                             D base                    -0.5                             THB ABG                   11.7 gm/dL  LOW                             CO Hgb                    1.4 %  NA                             Met Hgb Art               1.4 %                             O2 Hgb                    93.2 %  LOW                             CaO2                      15.4 mL/dL  LOW                             Ionized Calcium           1.15 mmol/L                             Sodium RT                 131.0 mmol/L  LOW                             Potassium RT              4.40 mmol/L                             Allens                    N/A                             Setting 1 ABG             cpap +10                             Setting 2 ABG             45%                             Setting 3 ABG             RR 22    6/1/2018 16:57 CDT       POC Troponin              0.02 ng/mL    6/1/2018 15:38 CDT       POC BNP iSTAT             574 pg/mL  HI    6/1/2018 15:32 CDT       Sodium Lvl                133 mmol/L  LOW                             Potassium Lvl             4.3 mmol/L                             Chloride                  101 mmol/L                             CO2                       24.0 mmol/L                             Calcium Lvl               8.8 mg/dL                             Glucose Lvl               104 mg/dL                             BUN                       35.0 mg/dL  HI                             Creatinine                2.90 mg/dL  HI                              eGFR-AA                   22 mL/min/1.73 m2  NA                             eGFR-ISIDORO                  18 mL/min/1.73 m2  NA                             Bili Total                0.6 mg/dL                             Bili Direct               0.30 mg/dL  HI                             Bili Indirect             0.30 mg/dL                             AST                       14 unit/L  LOW                             ALT                       16 unit/L                             Alk Phos                  196 unit/L  HI                             Total Protein             6.9 gm/dL                             Albumin Lvl               3.80 gm/dL                             Globulin                  3.10 gm/dL                             A/G Ratio                 1.2  NA                             BNP                       700 pg/mL  HI                             Troponin-I                0.02 ng/mL                             WBC                       7.0 x10(3)/mcL                             RBC                       4.35 x10(6)/mcL                             Hgb                       11.4 gm/dL  LOW                             Hct                       36.9 %  LOW                             Platelet                  129 x10(3)/mcL  LOW                             MCV                       84.8 fL                             MCH                       26.2 pg  LOW                             MCHC                      30.9 gm/dL  LOW                             RDW                       17.3 %  HI                             MPV                       10.2 fL                             Abs Neut                  5.96 x10(3)/mcL                             Neutro Auto               84 %  NA                             Lymph Auto                7 %  NA                             Mono Auto                 7 %  NA                             Eos Auto                  1 %  NA                              Abs Eos                   0.0 x10(3)/mcL                             Basophil Auto             0 %  NA                             Abs Neutro                5.96 x10(3)/mcL                             Abs Lymph                 0.5 x10(3)/mcL  LOW                             Abs Mono                  0.5 x10(3)/mcL                             Abs Baso                  0.0 x10(3)/mcL  , Interpretation Abnormal results  Elevated BNP, bump in creatinine from baseline from 2.09-2.90.    Radiology results:  Reviewed radiologist's report, Rad Results (ST)  < 12 hrs   Accession: [id number]  Order: XR Chest 2 Views  Report Dt/Tm: 06/01/2018 16:40  Report:   CHEST X-RAY, PA AND LATERAL VIEWS     HISTORY: Shortness of Breath     COMPARISON: 5/30/2018.     FINDINGS:  Right IJ central venous catheter with unchanged positioning.     No focal consolidations, pleural effusions or pneumothoraces.  Enlarged cardiac silhouette without overt decompensation. Aortic knob  calcifications.  No acute bony pathology.  Soft tissues within normal limits.       IMPRESSION:     No acute thoracic abnormality.  No interval change.      .       Reexamination/ Reevaluation   Time: 6/1/2018 18:26:00 .   Vital signs   results included from flowsheet : Vital Signs   6/1/2018 18:00 CDT       Peripheral Pulse Rate     85 bpm                             Heart Rate Monitored      85 bpm                             Respiratory Rate          17 br/min                             SpO2                      93 %  LOW                             Oxygen Therapy            CPAP                             Systolic Blood Pressure   107 mmHg                             Diastolic Blood Pressure  83 mmHg                             Mean Arterial Pressure, Cuff              91 mmHg    6/1/2018 17:37 CDT       Peripheral Pulse Rate     82 bpm                             Heart Rate Monitored      78 bpm                             Respiratory Rate          15 br/min                              SpO2                      94 %                             Oxygen Therapy            CPAP                             Systolic Blood Pressure   103 mmHg                             Diastolic Blood Pressure  55 mmHg  LOW                             Mean Arterial Pressure, Cuff              71 mmHg    6/1/2018 17:10 CDT       Heart Rate Monitored      82 bpm                             Respiratory Rate          20 br/min                             FIO2                      45 %                             SpO2                      92 %  LOW                             Oxygen Therapy            CPAP    6/1/2018 17:00 CDT       Peripheral Pulse Rate     83 bpm                             Heart Rate Monitored      82 bpm                             Respiratory Rate          30 br/min  HI                             FIO2                      50 %                             SpO2                      88 %  LOW                             Oxygen Therapy            Venti-Mask                             Oxygen Flow Rate          12 L/min                             Systolic Blood Pressure   106 mmHg                             Diastolic Blood Pressure  53 mmHg  LOW    6/1/2018 16:45 CDT       Peripheral Pulse Rate     83 bpm                             Heart Rate Monitored      84 bpm                             Respiratory Rate          21 br/min                             FIO2                      50 %                             SpO2                      89 %  LOW                             Oxygen Therapy            Venti-Mask                             Oxygen Flow Rate          12 L/min                             Systolic Blood Pressure   99 mmHg                             Diastolic Blood Pressure  60 mmHg                             Mean Arterial Pressure, Cuff              73 mmHg    6/1/2018 16:08 CDT       Peripheral Pulse Rate     84 bpm                             Heart Rate  Monitored      85 bpm                             Respiratory Rate          33 br/min  HI                             FIO2                      50 %                             SpO2                      86 %  LOW                             Oxygen Therapy            Venti-Mask                             Oxygen Flow Rate          12 L/min    6/1/2018 16:01 CDT       Peripheral Pulse Rate     86 bpm                             Heart Rate Monitored      85 bpm                             Respiratory Rate          19 br/min                             FIO2                      35 %                             SpO2                      89 %  LOW                             Oxygen Therapy            Venti-Mask                             Oxygen Flow Rate          8 L/min                             Systolic Blood Pressure   92 mmHg                             Diastolic Blood Pressure  64 mmHg                             Mean Arterial Pressure, Cuff              73 mmHg    6/1/2018 15:25 CDT       Peripheral Pulse Rate     87 bpm                             Respiratory Rate          24 br/min                             SpO2                      88 %  LOW                             Oxygen Therapy            Nasal cannula                             Oxygen Flow Rate          6 L/min                             Systolic Blood Pressure   92 mmHg                             Diastolic Blood Pressure  53 mmHg  LOW                             Mean Arterial Pressure, Cuff              66 mmHg    6/1/2018 15:13 CDT       Peripheral Pulse Rate     80 bpm                             Respiratory Rate          25 br/min  HI                             SpO2                      88 %  LOW                             Oxygen Therapy            Nasal cannula                             Oxygen Flow Rate          6 L/min                             Systolic Blood Pressure   97 mmHg                             Diastolic Blood Pressure  57  mmHg  LOW                             Mean Arterial Pressure, Cuff              70 mmHg     Notes: Patient feeling much improved on CPAP and blood pressure has improved post small fluid bolus.  I suspect that her bump in creatinine and hypotension are due to increased Lasix use which unfortunately did not improve her respirations status.  She is on a chronic Remodulin drip which we are unable to manage at this facility.  Patient has required transfer to [facility] for management by her cardiac specialists in the past, I believe this to be indicated at this time as well.  Patient is amenable to transfer.  At this time, she is hemodynamically stable with ABG demonstrating that the CPAP is adequately oxygenating and ventilating for her, no indication for further diuresis at this time.     I discussed with Dr. Santana at [facility] who has kindly accepted the patient in transfer. .      Procedure   Critical care note   Total time: 35 minutes spent engaged in work directly related to patient care and/ or available for direct patient care.   Critical condition(s) addressed for impending deterioration include: airway, respiratory, cardiovascular.   Associated risk factors: hypotension, hypoxia.   Management: bedside assessment, supervision of care, Interpretation (chest x-ray, blood gases, electrocardiogram, blood pressure), Interventions (hemodynamic management, ventilator management (CPAP)), Case review (medical specialist ([facility]), family).   Performed by: self.      Impression and Plan   Diagnosis   Pulmonary hypertension (OSB86-NV I27.20)   Acute on chronic respiratory failure with hypoxia (FON40-XJ J96.21)   FREDDY (acute kidney injury) (DCS34-XE N17.9)   Plan   Disposition: Transfer to other location: Time: 6/1/2018 20:27:00, Facility name: [facility], Accepted by: Dr. Santana.    Counseled: Patient, Regarding diagnosis, Regarding diagnostic results, Regarding treatment plan, Patient indicated understanding of  instructions.    Notes: I, Joe Kebede, acted solely as a scribe for and in the presence of Dr. Arauz who performed the service. , I, Migdalia Arauz, have independently performed the history, physical, medical decision making and procedures as documented above and agree with the scribe's documentation. .

## 2022-04-29 NOTE — DISCHARGE SUMMARY
Patient:   Sue Smith            MRN: [id number]            FIN: [id number]               Age:   57 years     Sex:  Female     :  1960   Associated Diagnoses:   None   Author:   Eldon CHRISTOPHER, Julieta PEREZ      Discharge Information      Discharge Summary Information   Admit/Discharge Dates   Admit Date: 10/11/2018  Discharge Date: 10/13/2018   Procedures   No procedures recorded for this visit.      Hospital Course   Admission/discharge diagnoses    Acute on chronic respiratory failure with hypoxemia  Pulmonary hypertension  Right pleural effusion status post thoracentesis  Hyperlipidemia  Bipolar disorder.      Hospital Course: Patient is a 57-year-old  female with patient past medical history of end-stage pulmonary hypertension on continuous drip of Remodulin who presented to the ED on 10/11/18 with hypoxic respiratory failure with oxygen saturations in the 50s-60s despite using her CPAP and home oxygen. Patient was transferred to the ICU in which BiPAP 100% improved oxygen saturations into the 90s. Patient also underwent thoracentesis for recurrent right pleural effusion with removal of 1400 cc while in the ICU. She initially told the ED physician that she wanted to be full code however, after discussion patient agreed to a DNR. She was then downgraded to the floor for supportive care on BiPAP and morphine. Patient is on hospice and case management has been consulted to discuss hospice options such as [facility].  However, the patient relented and wanted to go home instead with hospice.  Case management facilitated this.  Once this was completed, she was discharged.      Physical Examination   General:  Alert and oriented, No acute distress.    Neck:  Supple, Non-tender.    Respiratory:  Lungs are clear to auscultation, Respirations are non-labored.    Cardiovascular:  Normal rate, Regular rhythm.    Gastrointestinal:  Soft, Non-tender.       Discharge Plan   Discharge Summary Plan    Discharge disposition: discharge to home.     Orders     Orders   Patient Care:  Give all scheduled vaccinations prior to discharge. (Order): 10/13/2018 10:09 CDT, Give all scheduled vaccinations prior to discharge.  Discontinue IV (Order): 10/13/2018 10:09 CDT  Pharmacy:  Levofloxacin 500 Tab (Discontinue): 10/13/2018 10:08 CDT  Admit/Transfer/Discharge:  Discharge Activity (Order): Exercise as Tolerated  Discharge (Order): 10/13/2018 10:09 CDT, Hospice Home, Give all scheduled vaccinations prior to discharge..        Education and Follow-up   Counseled: patient, family, regarding diagnosis, regarding treatment, regarding medications.     Discharge Planning: Heart Failure, Easy-to-Read, ; Follow up with [facility], home,     time spent on discharge disposition included the following: final examination of the patient; discussion of the hospital stay; instructions for continuing care; final diagnoses; patient/family counseling; preparation of discharge records; preparation of prescriptions; referral forms; chart review.  Total time spent on discharge disposition was 31 minutes.   .

## 2022-04-29 NOTE — H&P
Patient:   Sue Smith            MRN: [id number]            FIN: [id number]               Age:   57 years     Sex:  Female     :  1960   Associated Diagnoses:   None   Author:   Yamileth Moreno     Past Medical History: Chronic hypoxemic respiratory failure - on Triology, HTN, Dyslipidemia, Bipolar disorder, Chronic neck pain secondary to cervical radiculopathy, GERD, Hyperdynamic LV function with grade 1 dysfunction with EF 85% - ECHO 17, VHD - 1-2+ CA/2+ TR, Severe pulmonary hypertension - Per RHC 17, Hypothyroidism  Past Surgical History: Back surgery, Bilateral tubal ligation, Pericardial window, LHC - no obstructive disease - 8/3/17  Family History: None  Social History:  No alcohol, tobacco or illicit drug use.      Review / Management   Laboratory Results   Today's Lab Results : PowerNote Discrete Results   1/3/2018 12:16 CST       POC BNP iSTAT             889 pg/mL  HI    1/3/2018 12:15 CST       Sample ABG                art                             Treatment                 nc3L                             Site                      Radial Lt                             pH Art                    7.490  HI                             pCO2 Art                  37.0 mmHg                             pO2 Art                   55.0 mmHg  CRIT                             HCO3 Art                  28.2 mmol/L  HI                             CO2 Totl Art              29.3 mmol/L  HI                             O2 Sat Art                90.8 %  LOW                             D base                    4.7  HI    1/3/2018 12:11 CST       WBC                       5.9 x10(3)/mcL                             RBC                       3.95 x10(6)/mcL  LOW                             Hgb                       12.4 gm/dL                             Hct                       38.2 %                             Platelet                  157 x10(3)/mcL                              MCV                       96.7 fL  HI                             MCH                       31.4 pg  HI                             MCHC                      32.5 gm/dL  LOW                             PT                        29.1 second(s)  HI                             INR                       2.65  HI                             PTT                       33.7 second(s)                             Sodium Lvl                139 mmol/L                             Potassium Lvl             3.3 mmol/L  LOW                             Chloride                  104 mmol/L                             CO2                       28.0 mmol/L                             Calcium Lvl               9.1 mg/dL                             Glucose Lvl               100 mg/dL                             BUN                       18.0 mg/dL                             Creatinine                1.56 mg/dL  HI                             Bili Total                0.4 mg/dL                             Bili Direct               0.20 mg/dL                             Bili Indirect             0.20 mg/dL                             AST                       35 unit/L                             ALT                       37 unit/L                             Albumin Lvl               3.90 gm/dL                             Troponin-I                0.02 ng/mL        01/03/18 CHEST XRAY:  Right IJ central venous catheter tip projects over the low SVC.  No focal consolidations, pleural effusions or pneumothoraces.  Enlarged caudate silhouette without overt decompensation.  No acute bony pathology.  Soft tissues within normal limits.       IMPRESSION: No acute thoracic abnormality.      Impression and Plan     Admission to hospitalist service canceled before patient was seen. She was transferred to [facility] due to her severe pulmonary hypertension. She's currently receiving treatment there that we are unable to provide here.

## 2022-04-29 NOTE — ED PROVIDER NOTES
Patient:   Sue Smith            MRN: [id number]            FIN: [id number]               Age:   57 years     Sex:  Female     :  1960   Associated Diagnoses:   Pulmonary hypertension   Author:   Jimmie Jarrett MD      Basic Information   Time seen: Date & time 2018 12:36:00.   History source: Patient.   Arrival mode: Private vehicle.   History limitation: None.   Additional information: Patient's physician(s): Dr[facility]Stacey [facility]- [facility].      History of Present Illness   The patient presents with 56 y/o female who presents with worsening sob and increase in swelling, hx pulm htn (on remodilun infusion.) states she was supposed to go to [place] but because her symptoms were worsening they told her to come to ER and get treated with possible transfer to them in [place], sees. dr dejah dunham. [facility], [facility] and     I, , assumed care of this patient at 15:12.    56 y/o CF with a history of pulmonary HTN and CKD presents to ED with worsening SOB and swelling for 1 week. Pt was instructed by [facility] to come to ED and see about being transferred to [facility]. Pt is on a continuous transfusion of Remodulin. Pt is normally on 4L of O2 and usually stats at 93%. Pt is currently 94% on 50% venti. Pt denies any cough or weakness otherwise..  The onset was 1  weeks ago.  The course/duration of symptoms is worsening.  Degree at onset mild.  Degree at present moderate.  The Exacerbating factors is none.  The Relieving factors is none.  Risk factors consist of pulmonary HTN.  Prior episodes: none.  Therapy today: none.  Associated symptoms: increased swelling.        Review of Systems   Constitutional symptoms:  No fever, no chills.    Skin symptoms:  No rash,    ENMT symptoms:  Negative except as documented in HPI.   Respiratory symptoms:  Shortness of breath, No cough,    Cardiovascular symptoms:  increased swelling, No chest pain,    Gastrointestinal symptoms:  No abdominal pain, no  nausea, no vomiting, no diarrhea.    Genitourinary symptoms:  No dysuria, no hematuria.    Musculoskeletal symptoms:  No back pain,    Neurologic symptoms:  No headache, no dizziness.    Psychiatric symptoms:  No anxiety,              Additional review of systems information: All systems reviewed as documented in chart.      Health Status   Allergies:    Allergic Reactions (Selected)  Severity Not Documented  Sulfamethoxazole- Sob..   Medications:  (Selected)   Inpatient Medications  Ordered  KCL 20 mEq Oral Tab: 40 mEq, form: Tab-ER, Oral, Once, first dose 01/03/18 15:09:00 CST, stop date 01/03/18 15:09:00 CST, STAT, 24  Documented Medications  Documented  DuoNeb 0.5 mg-2.5 mg/3 mL inhalation solution: NEB, q4hr, 0 Refill(s)  Folet DHA: 1 packet(s), Oral, Daily, 0 Refill(s)  Incruse Ellipta 62.5 mcg/inh inhalation powder: 1 EA, INH, Daily, doses should be taken at least 24 hours apart, # 30 EA, 0 Refill(s)  K-Dur 20 oral tablet, extended release: 20 mEq = 1 tab(s), Oral, BID, # 180 tab(s), 0 Refill(s)  Latuda 40 mg oral tablet: 40 mg = 1 tab(s), Oral, At Bedtime, # 30 tab(s), 0 Refill(s)  Norvasc 5 mg oral tablet: 5 mg = 1 tab(s), Oral, Daily, # 90 tab(s), 0 Refill(s)  Pantoprazole 40 mg ORAL EC-Tablet: 40 mg = 1 tab(s), Oral, Daily, # 30 tab(s), 0 Refill(s)  Pravastatin 40 mg Oral Tab: 40 mg = 1 tab(s), Oral, Daily, # 90 tab(s), 0 Refill(s)  Pristiq 100 mg oral tablet, extended release: 100 mg = 1 tab(s), Oral, Daily, # 30 tab(s), 0 Refill(s)  budesonide-formoterol 160 mcg-4.5 mcg/inh inhalation aerosol: 2 puff(s), INH, BID, 0 Refill(s)  bumetanide 2 mg oral tablet: 4 mg = 2 tab(s), Oral, BID, # 90 tab(s), 0 Refill(s)  busPIRone 15 mg oral tablet: 15 mg = 1 tab(s), Oral, TID, # 270 tab(s), 0 Refill(s)  gabapentin 100 mg oral capsule: 200 mg = 2 cap(s), Oral, At Bedtime, # 240 cap(s), 0 Refill(s)  lamoTRIgine 100 mg oral tablet, extended release: 100 mg = 1 tab(s), Oral, BIDHS, 0 Refill(s)  levothyroxine 75 mcg  (0.075 mg) oral tablet: 75 mcg = 1 tab(s), Oral, Daily, # 60 tab(s), 0 Refill(s)  metOLazone 2.5 mg oral tablet: 2.5 mg = 1 tab(s), Oral, Daily, PRN PRN wt gain ( swelling), # 30 tab(s), 0 Refill(s)  ondansetron 8 mg oral tablet: 8 mg = 1 tab(s), Oral, q8hr, PRN PRN as needed for nausea/vomiting, 0 Refill(s)  warfarin 5 mg oral tablet: 10 mg = 2 tab(s), Oral, Daily, # 30 tab(s), 0 Refill(s).      Past Medical/ Family/ Social History   Medical history:    Active  Hypertension (95880031)  COPD, moderate ([id number])  Hyperlipidemia ([id number])  GERD (gastroesophageal reflux disease) ([id number])  Bipolar (604714949)  Depression (671904959)  Cervical radiculopathy (896764379)  Numbness and tingling (T71H2B73-X37Y-[id number])  SOB (shortness of breath) (4CD9N1B6-40Q9-724P-[id number])  Hypothyroid (627338136)  Resolved  CHF - Congestive heart failure (803401975):  Resolved.  Pulmonary arterial hypertension (8936909055):  Resolved..   Surgical history:    Injection Cervical Epidural Steroid (., None) performed by Campos Dennis MD on 6/27/2017 at 56 Years.  Comments:  6/27/2017 13:42 - Lissy Lr RN  auto-populated from documented surgical case  Injection Cervical Epidural Steroid (., None) performed by Campos Dennis MD on 6/13/2017 at 56 Years.  Comments:  6/13/2017 10:45 - Lissy Lr RN  auto-populated from documented surgical case  Injection Cervical Epidural Steroid (.) performed by Campos Dennis MD on 5/30/2017 at 56 Years.  Comments:  5/30/2017 15:54 - Be RICKETTS, Lissy MEJIA  auto-populated from documented surgical case  Tubal ligation (SNOMED CT 989594335).  Back surgery.  Previous back surgery (SNOMED CT [id number]).  Bilateral tubal ligation (SNOMED CT 794500665)..   Family history:    History is unknown..   Social history: Alcohol use: Denies, Tobacco use: former  smoker; quit 15 years ago, Drug use: Denies.      Physical Examination               Vital Signs   Vital Signs   4/4/2018 14:30 CDT       Peripheral Pulse Rate     100 bpm                             Heart Rate Monitored      100 bpm                             Respiratory Rate          20 br/min                             FIO2                      50 %                             SpO2                      96 %                             Oxygen Therapy            Venti-Mask                             Oxygen Flow Rate          12 L/min                             Systolic Blood Pressure   103 mmHg                             Diastolic Blood Pressure  80 mmHg                             Mean Arterial Pressure, Cuff              88 mmHg    4/4/2018 12:37 CDT       Temperature Oral          36.6 DegC                             Temperature Oral (calculated)             97.88 DegF                             Peripheral Pulse Rate     90 bpm                             Respiratory Rate          18 br/min                             SpO2                      90 %  LOW                             Oxygen Therapy            Nasal cannula                             Oxygen Flow Rate          4 L/min                             Systolic Blood Pressure   113 mmHg                             Diastolic Blood Pressure  76 mmHg  .   Measurements   4/4/2018 12:37 CDT       Weight Dosing             91 kg                             Weight Measured and Calculated in Lbs     200.62 lb                             Weight Estimated          91 kg                             Height/Length Dosing      157 cm                             Height/Length Estimated   157 cm                             Body Mass Index Estimated 36.92 kg/m2  .   Basic Oxygen Information   4/4/2018 14:30 CDT       SpO2                      96 %                             Oxygen Flow Rate          12 L/min                             Oxygen Therapy             Venti-Mask    4/4/2018 12:37 CDT       SpO2                      90 %  LOW                             Oxygen Flow Rate          4 L/min                             Oxygen Therapy            Nasal cannula  .   General:  Alert, no acute distress, well appearing, conversant.    Skin:  Warm, dry, intact.    Head:  Normocephalic, atraumatic.    Neck:  Supple, trachea midline.    Eye:  Pupils are equal, round and reactive to light, extraocular movements are intact, normal conjunctiva.    Ears, nose, mouth and throat:  Oral mucosa moist.   Cardiovascular:  Regular rate and rhythm, No murmur, Normal peripheral perfusion, Edema: Lower extremity, 1+, pitting.    Respiratory:  Lungs are clear to auscultation, breath sounds are equal, Symmetrical chest wall expansion.    Chest wall:  No tenderness.   Musculoskeletal:  Normal ROM, normal strength, no tenderness, no swelling, no deformity, Lower extremity: old scar to left shin.    Gastrointestinal:  Soft, Nontender, abdominal distention.    Neurological:  Alert and oriented to person, place, time, and situation, No focal neurological deficit observed, CN II-XII intact, normal sensory observed, normal motor observed, normal speech observed, normal coordination observed.    Psychiatric:  Cooperative, appropriate mood & affect, normal judgment.       Medical Decision Making   Documents reviewed:  Emergency department nurses' notes.   Orders  Launch Orders   Laboratory:  POC iSTAT ER Troponin request (Order): Blood, Stat collect, Collected, 4/4/2018 12:38 CDT by Gayathri Mary, Nurse collect, Print Label By Order Location  POC BNP iSTAT request (Order): Blood, Stat collect, Collected, 4/4/2018 12:38 CDT by Gayathri Mary, Nurse collect, Print Label By Order Location  Troponin-I (Order): Stat collect, 4/4/2018 12:38 CDT, Blood, Lab Collect, Print Label By Order Location, 4/4/2018 12:38 CDT  CMP (Order): Stat collect, 4/4/2018 12:38 CDT, Blood, Lab Collect, Print Label By  Order Location, 4/4/2018 12:38 CDT  PTT (Order): Stat collect, 4/4/2018 12:38 CDT, Blood, Lab Collect, Print Label By Order Location, 4/4/2018 12:38 CDT  PT (Order): Stat collect, 4/4/2018 12:38 CDT, Blood, Lab Collect, Print Label By Order Location, 4/4/2018 12:38 CDT  CBC w/ Auto Diff (Order): Now collect, 4/4/2018 12:38 CDT, Blood, Lab Collect, Print Label By Order Location, 4/4/2018 12:38 CDT  Radiology:  XR Chest 1 View (Order): Stat, 4/4/2018 12:38 CDT, Dyspnea, None, Patient Bed, Patient Has IV?, Patient on Oxygen?, Rad Type  Cardiology:  EKG (Order): 4/4/2018 12:38 CDT, Patient Bed, Patient Has IV, Patient on O2, Standard Precautions, NOW, -1, -1, 4/4/2018 12:38 CDT.   Electrocardiogram:  Time 4/4/2018 12:36:00, rate 89, normal sinus rhythm, no ectopy, EP Interp, STT segments ST depression; inferior and lateral leads, T wave Inversion, all leads, QRS interval Right bundle branch block, incomplete, Previous EKG available changes are chronic compared with Feb 2016, right ventricular hypertrophy.    Results review:  Lab results : Lab View   4/4/2018 12:50 CDT       Sodium Lvl                135 mmol/L  LOW                             Potassium Lvl             4.6 mmol/L                             Chloride                  99 mmol/L                             CO2                       25.0 mmol/L                             Calcium Lvl               9.1 mg/dL                             Glucose Lvl               141 mg/dL  HI                             BUN                       38.0 mg/dL  HI                             Creatinine                1.92 mg/dL  HI                             eGFR-AA                   35 mL/min/1.73 m2  NA                             eGFR-ISIDORO                  29 mL/min/1.73 m2  NA                             Bili Total                0.4 mg/dL                             Bili Direct               0.20 mg/dL                             Bili Indirect             0.20 mg/dL                              AST                       29 unit/L                             ALT                       24 unit/L                             Alk Phos                  205 unit/L  HI                             Total Protein             7.0 gm/dL                             Albumin Lvl               3.70 gm/dL                             Globulin                  3.30 gm/dL                             A/G Ratio                 1.1 ratio                             POC BNP iSTAT             696 pg/mL  HI                             Troponin-I                <0.02 ng/mL                             POC Troponin              0.02 ng/mL                             PT                        14.9 second(s)  HI                             INR                       1.13                             PTT                       26.0 second(s)                             WBC                       6.8 x10(3)/mcL                             RBC                       4.87 x10(6)/mcL                             Hgb                       13.7 gm/dL                             Hct                       44.6 %                             Platelet                  136 x10(3)/mcL                             MCV                       91.6 fL                             MCH                       28.1 pg                             MCHC                      30.7 gm/dL  LOW                             RDW                       18.3 %  HI                             MPV                       11.2 fL                             Abs Neut                  5.57 x10(3)/mcL                             Neutro Auto               82 %  NA                             Lymph Auto                10 %  NA                             Mono Auto                 5 %  NA                             Eos Auto                  1 %  NA                             Abs Eos                   0.1 x10(3)/mcL                             Basophil Auto             1 %   NA                             Abs Neutro                5.57 x10(3)/mcL                             Abs Lymph                 0.7 x10(3)/mcL                             Abs Mono                  0.4 x10(3)/mcL                             Abs Baso                  0.0 x10(3)/mcL    .   Radiology results:  Rad Results (ST)  < 12 hrs   Accession: [id number]  Order: XR Chest 1 View  Report Dt/Tm: 04/04/2018 13:04  Report:   EXAMINATION: AP view the chest     EXAMINATION DATE: 4/4/2018     COMPARISON: Chest from December 16, 2018     TECHNIQUE: As above     CLINICAL HISTORY: Dyspnea     FINDINGS:     The tunnel right PICC is unchanged in position.     The cardiac mediastinal silhouette and pulmonary vasculature are  unchanged.     Bibasilar atelectatic changes with likely resolution of the right  effusion. Left effusion appears somewhat smaller.     IMPRESSION:      Improved aeration of the bibasilar lungs with essentially resolved  right effusion and resolving left        .      Procedure   Critical care note   Total time: Critical care time on this pt was less than 30 min.      Impression and Plan   Diagnosis   Pulmonary hypertension (XOA15-TE I27.20)      Calls-Consults   -  Dr. Santana - will accept pt to 8th floor [facility].   Plan   Condition: Stable.    Disposition: Transfer to other location: Facility name: [facility], Accepted by: Dr. Santana.    Counseled: Patient, Regarding diagnosis, Regarding diagnostic results, Regarding treatment plan, Patient indicated understanding of instructions.    Notes: I, Radha Ingram, acted solely as a scribe for and in the presence of  who performed the service..       Addendum      Teaching-Supervisory Addendum-Brief   Notes: I, Dr. Jarrett, personally performed the services described in this documentation as scribed in my presence and it is both accurate and complete..

## 2022-04-29 NOTE — ED PROVIDER NOTES
Patient:   Sue Smith            MRN: [id number]            FIN: [id number]               Age:   56 years     Sex:  Female     :  1960   Associated Diagnoses:   Cor pulmonale; COPD exacerbation; Pulmonary hypertension; Heart failure, right-sided; Hypoxia   Author:   Dl Witt MD      Basic Information   Time seen: Date & time 2017 12:59:00, Immediately upon arrival.   History source: Patient, EMS.   Arrival mode: Ambulance.   History limitation: None.   Additional information: Patient's physician(s): Paddy Guerrier DO, Dr. Lurdes Melendez, Chief Complaint from Nursing Triage Note : Chief Complaint   2017 13:01 CDT      Chief Complaint           c/o SOB x1 week, worse today. hx of copd. given duomneb & albuterol in route. RA SAT 87%;  pt placed on CPAP w/ O2SAT 96%, wheezing noted.  told last week she had R sided heart failure by her cardiologist.  .      History of Present Illness   The patient presents with Obese 57 y/o CF with hx of COPD and HTN presents to the ED via EMS c/o SOB onset of 1 week that worsened in severity today. EMS reports pt was wheezing on scene and was RA SAT at 87%. Pt was given a duoneb and 2 albuterol that did not alleviate SOB. Pt was then placed on a CPAP machine which steve O2 saturation to 96%. Pt was seen by cardiologist last week and told she had right sided heart failure..  The onset was 1  weeks ago.  The course/duration of symptoms is constant and worsening.  Degree at onset moderate.  Degree at present moderate.  The Exacerbating factors is none.  The Relieving factors is none.  Risk factors consist of hypertension and chronic obstructive pulmonary disease.  Prior episodes: none.  Therapy today: beta-agonist (albuterol, nebulizer), emergency medical services and CPAP machine.  Associated symptoms: none.        Review of Systems   Constitutional symptoms:  Negative except as documented in HPI.   Skin symptoms:  Negative except as  documented in HPI.   Eye symptoms:  Negative except as documented in HPI.   ENMT symptoms:  Negative except as documented in HPI.   Respiratory symptoms:  Shortness of breath, wheezing.    Cardiovascular symptoms:  Negative except as documented in HPI.   Gastrointestinal symptoms:  Negative except as documented in HPI.   Genitourinary symptoms:  Negative except as documented in HPI.   Musculoskeletal symptoms:  Negative except as documented in HPI.   Neurologic symptoms:  Negative except as documented in HPI.   Psychiatric symptoms:  Negative except as documented in HPI.   Endocrine symptoms:  Negative except as documented in HPI.   Hematologic/Lymphatic symptoms:  Negative except as documented in HPI.   Allergy/immunologic symptoms:  Negative except as documented in HPI.             Additional review of systems information: All other systems reviewed and otherwise negative.      Health Status   Allergies:    Allergic Reactions (Selected)  Severity Not Documented  Sulfamethoxazole- Sob.,    Allergies (1) Active Reaction  sulfamethoxazole SOB  .   Medications:  (Selected)   Prescriptions  Prescribed  Lasix 20 mg oral tablet: 20 mg = 1 tab(s), Oral, Daily, # 5 tab(s), 0 Refill(s)  albuterol 2.5 mg/3 mL (0.083%) inhalation solution: 5 mg = 6 mL, INH, q6hr, PRN PRN as needed for wheezing, # 60 EA, 0 Refill(s), Pharmacy: [facility]  Documented Medications  Documented  Folet DHA: 1 packet(s), Oral, Daily, 0 Refill(s)  Incruse Ellipta 62.5 mcg/inh inhalation powder: 1 EA, INH, q24hr, doses should be taken at least 24 hours apart, # 30 EA, 0 Refill(s)  Latuda 60 mg oral tablet: 60 mg = 1 tab(s), Oral, Daily, # 30 tab(s), 0 Refill(s)  Norco 10 mg-325 mg oral tablet: 1 tab(s), Oral, TID, PRN PRN for pain, 0 Refill(s)  Norvasc 5 mg oral tablet: 5 mg = 1 tab(s), Oral, Daily, # 90 tab(s), 0 Refill(s)  Pantoprazole 40 mg ORAL EC-Tablet: 40 mg = 1 tab(s), Oral, Daily, # 30 tab(s), 0 Refill(s)  Pravastatin 40 mg  Oral Tab: 40 mg = 1 tab(s), Oral, Daily, # 90 tab(s), 0 Refill(s)  Pristiq 100 mg oral tablet, extended release: 100 mg = 1 tab(s), Oral, Daily, # 30 tab(s), 0 Refill(s)  Symbicort 80 mcg-4.5 mcg/inh inhalation aerosol: 2 puff(s), INH, BID, 0 Refill(s)  albuterol 2.5 mg/3 mL (0.083%) inhalation solution: 5 mg = 6 mL, INH, q6hr, PRN PRN as needed for wheezing, # 60 EA, 0 Refill(s)  busPIRone 15 mg oral tablet: 15 mg = 1 tab(s), Oral, TID, # 270 tab(s), 0 Refill(s)  lamoTRIgine 100 mg oral tablet, extended release: 100 mg = 1 tab(s), Oral, Daily, 2 tabs am  1 tab evening, 0 Refill(s)  levothyroxine 75 mcg (0.075 mg) oral tablet: 75 mcg = 1 tab(s), Oral, Daily, # 60 tab(s), 0 Refill(s).      Past Medical/ Family/ Social History   Medical history:    Active  Hypertension ([id number])  COPD, moderate ([id number])  Hyperlipidemia ([id number])  GERD (gastroesophageal reflux disease) ([id number])  Bipolar ([id number])  Depression ([id number])  Cervical radiculopathy ([id number])  Numbness and tingling ([id number])  SOB (shortness of breath) ([id number]).   Surgical history:    Injection Cervical Epidural Steroid (., None) on 6/27/2017 at 56 Years.  Comments:  6/27/2017 13:42 - Be RICKETTS, Lissy MEJIA  auto-populated from documented surgical case  Injection Cervical Epidural Steroid (., None) on 6/13/2017 at 56 Years.  Comments:  6/13/2017 10:45 - Lissy Lr RN  auto-populated from documented surgical case  Injection Cervical Epidural Steroid (.) on 5/30/2017 at 56 Years.  Comments:  5/30/2017 15:54 - Lissy Lr RN  auto-populated from documented surgical case  Tubal ligation ([id number]).  Back surgery..   Family history: Not significant.      Physical Examination               Vital Signs   Vital Signs   9/28/2017 13:01 CDT      Peripheral Pulse Rate     84 bpm                              Respiratory Rate          30 br/min  HI                             SpO2                      96 %                             Oxygen Therapy            CPAP                             Systolic Blood Pressure   156 mmHg  HI                             Diastolic Blood Pressure  90 mmHg  .      Vital Signs (last 24 hrs)_____  Last Charted___________  Heart Rate Peripheral   84 bpm  (SEP 28 13:01)  Resp Rate         H 30br/min  (SEP 28 13:01)  SBP      H 156mmHg  (SEP 28 13:01)  DBP      90 mmHg  (SEP 28 13:01)  SpO2      96 %  (SEP 28 13:01)  .   Basic Oxygen Information   9/28/2017 13:01 CDT      SpO2                      96 %                             Oxygen Therapy            CPAP  .   General:  Alert, no acute distress.    Skin:  Warm, dry, pink.    Head:  Normocephalic, atraumatic.    Neck:  Supple, trachea midline, no tenderness, no carotid bruit, moderate JVD noted.    Eye:  Pupils are equal, round and reactive to light, extraocular movements are intact, normal conjunctiva, vision unchanged.    Ears, nose, mouth and throat:  Tympanic membranes clear, oral mucosa moist, no pharyngeal erythema or exudate.    Cardiovascular:  Regular rate and rhythm, No murmur, Normal peripheral perfusion, Edema: Bilateral, lower extremity, 4+.    Respiratory:  Breath sounds are equal, Symmetrical chest wall expansion, Respirations: Tachypneic, Breath sounds: Rales present (diffuse), wheezes present expiratory wheezes.    Chest wall:  No tenderness, No deformity.    Back:  Nontender, Normal range of motion, Normal alignment.    Musculoskeletal:  Normal ROM, normal strength, no tenderness, no swelling, no deformity.    Gastrointestinal:  Soft, Nontender, Non distended, Normal bowel sounds, No organomegaly, Obese.    Neurological:  Alert and oriented to person, place, time, and situation, No focal neurological deficit observed, CN II-XII intact, normal sensory observed, normal motor observed, normal speech observed, normal  coordination observed.    Lymphatics:  No lymphadenopathy.   Psychiatric:  Cooperative, appropriate mood & affect, normal judgment.       Medical Decision Making   Rationale:   Documents reviewed:  Emergency department nurses' notes.   Orders  Laboratory    CBC w/ Auto Diff, Dl Witt MD, 09/28/17, 13:10, Completed    CMP, Dl Witt MD, 09/28/17, 13:10, Completed    Magnesium Level, Dl Witt MD, 09/28/17, 13:11, Completed    Troponin-I, Dl Witt MD, 09/28/17, 13:11, Completed    ABG Adult RT, Dl Witt MD, 09/28/17, 13:11, Completed    Automated Diff, Dl Witt MD, 09/28/17, 13:27, Completed    Estimated Glomerular Filtration Rate, Dl Witt MD, 09/28/17, 13:59, Completed    BNP, Dl Witt MD, 09/28/17, 13:11, Ordered  Xray    XR Chest 1 View, Dl Witt MD, 09/28/17, 13:11, Completed.   Electrocardiogram:  Time 9/28/2017 13:07:00, rate 83, normal sinus rhythm, no ectopy, EP Interp, T wave Inversion (diffuse), QRS interval right ventricular hypertrophy.    Results review:  Lab results : Lab View   9/28/2017 13:25 CDT      Sample ABG                art                             Treatment                 bipap                             Site                      Radial Rt                             pH Art                    7.340  LOW                             pCO2 Art                  34.0 mmHg  LOW                             pO2 Art                   52.0 mmHg  CRIT                             HCO3 Art                  18.3 mmol/L  LOW                             CO2 Totl Art              19.3 mmol/L  LOW                             O2 Sat Art                84.0 %  LOW                             D base                    -6.5  LOW                             THB ABG                   14.9 gm/dL                             CO Hgb                    2.3 %  NA                              Met Hgb Art               0.8 %                             O2 Hgb                    86.2 %  LOW                             CaO2                      18.0 mL/dL                             Ionized Calcium           1.10 mmol/L  LOW                             Sodium RT                 134.0 mmol/L  LOW                             Potassium RT              4.10 mmol/L                             Allens                    N/A                             Setting 1 ABG             12/5                             Setting 2 ABG             40%    9/28/2017 13:13 CDT      Sodium Lvl                137 mmol/L                             Potassium Lvl             3.8 mmol/L                             Chloride                  105 mmol/L                             CO2                       18.0 mmol/L  LOW                             Calcium Lvl               8.4 mg/dL  LOW                             Magnesium Lvl             1.5 mg/dL  LOW                             Glucose Lvl               91 mg/dL                             BUN                       20.0 mg/dL  HI                             Creatinine                1.16 mg/dL  HI                             eGFR-AA                   >60 mL/min/1.73 m2  NA                             eGFR-ISIDORO                  52 mL/min/1.73 m2  NA                             Bili Total                1.1 mg/dL  HI                             Bili Direct               0.40 mg/dL  HI                             Bili Indirect             0.70 mg/dL                             AST                       38 unit/L  HI                             ALT                       53 unit/L                             Alk Phos                  129 unit/L  HI                             Total Protein             6.6 gm/dL                             Albumin Lvl               4.20 gm/dL                             Globulin                  2.40 gm/dL                             A/G Ratio                  1.8  NA                             BNP                       856 pg/mL  HI                             Troponin-I                0.03 ng/mL                             WBC                       12.7 x10(3)/mcL  HI                             RBC                       4.58 x10(6)/mcL                             Hgb                       14.7 gm/dL                             Hct                       45.0 %                             Platelet                  144 x10(3)/mcL                             MCV                       98.3 fL  HI                             MCH                       32.1 pg  HI                             MCHC                      32.7 gm/dL  LOW                             RDW                       16.0 %                             MPV                       12.3 fL                             Abs Neut                  9.92 x10(3)/mcL  HI                             Neutro Auto               78 %  NA                             Lymph Auto                15 %  NA                             Mono Auto                 5 %  NA                             Eos Auto                  0 %  NA                             Abs Eos                   0.0 x10(3)/mcL                             Basophil Auto             0 %  NA                             Abs Neutro                9.92 x10(3)/mcL  HI                             Abs Lymph                 1.8 x10(3)/mcL                             Abs Mono                  0.6 x10(3)/mcL                             Abs Baso                  0.0 x10(3)/mcL  .   Radiology results:  Rad Results (ST)  < 12 hrs   Accession: [id number]  Order: XR Chest 1 View  Report Dt/Tm: 09/28/2017 13:52  Report:      EXAM: XR Chest 1 View     INDICATION: Dyspnea     TECHNIQUE: Frontal portable view of the chest is obtained.     COMPARISON: Report only without images from chest x-ray on 7/19/2017,  as well as chest x-ray on 6/23/2011     FINDINGS: The  cardiomediastinal silhouette is enlarged. Increased  interstitial markings are redemonstrated in the bilateral lung bases,  more prominent on the right. No pleural effusion or pneumothorax is  identified.        IMPRESSION:   Redemonstration of increased interstitial markings in the bilateral  lung bases. Cardiomegaly.      .      Reexamination/ Reevaluation   Time: 9/28/2017 16:18:00 .   Vital signs   Basic Oxygen Information   9/28/2017 13:01 CDT      SpO2                      96 %                             Oxygen Therapy            CPAP     Course: improving.   Assessment: off BiPAP, attempted to wean to nasal cannula, but became hypoxic.  Feels good on 40% Venti-Mask - will admit for further diuresis and cardiology and pulmonary eval.  Recent echo showed preserved LVEF with severe pulmonary hypertension (98 mm Hg).  Scheduled for RHC in the near future with plans to ultimately refer her to [facility]..      Procedure   Critical care note   Total time: 36 minutes spent engaged in work directly related to patient care and/ or available for direct patient care.   Critical condition(s) addressed for impending deterioration include: cardiovascular.   Associated risk factors: hypoxia.   Management: bedside assessment, supervision of care, Interpretation (chest x-ray, blood gases, electrocardiogram, blood pressure, cardiac output measures), Interventions NIPPV - BiPAP, Case review (medical specialist, nursing).   Performed by: self.      Impression and Plan   Diagnosis   Cor pulmonale (WXT39-KJ I27.81)   COPD exacerbation (HED13-YK J44.1)   Pulmonary hypertension (CTF19-YZ I27.2)   Heart failure, right-sided (NVK71-PY I50.9)   Hypoxia (JQI19-FG R09.02)      Calls-Consults   -  9/28/2017 16:23:00 , Yamileth Moreno med, phone call, recommends admit to Dr. Larry.    Plan   Condition: Stable.    Disposition: Admit time  9/28/2017 16:23:00, Admit to Inpatient Telemetry Unit.    Counseled: Patient, Regarding  diagnostic results, Regarding treatment plan, Patient indicated understanding of instructions.    Notes: I, Megan Blunt, acted solely as a scribe for and in the presence of Dr. Witt who performed the service., I, Dr. Witt, performed all activities above as documented and I am in full agreement with the above documentation..

## 2022-04-29 NOTE — ED PROVIDER NOTES
Patient:   Sue Smith            MRN: [id number]            FIN: [id number]               Age:   57 years     Sex:  Female     :  1960   Associated Diagnoses:   CHF exacerbation; COPD exacerbation; Hypoxia; History of pulmonary hypertension; Hypokalemia   Author:   Migdalia Arauz MD      Basic Information   Time seen: Date & time 1/3/2018 11:45:00.   History source: Patient.   Arrival mode: Walking.   History limitation: None.   Additional information: Chief Complaint from Nursing Triage Note : Chief Complaint   1/3/2018 11:44 CST       Chief Complaint           Sob x 1week and swelling to lower extremities. Pt states she gained 15lbs x 1 week. HX chf, copd, pulmonary HTN. PT on Romagalin drip at present. l oxygen at home.    .      History of Present Illness   The patient presents with difficulty breathing, SORT:  COPD CHF female with worsening dyspnea and edema.  Rafael COBURN and I, Dr. Arauz, assumed care for this patient at 1323.    58 y/o C female with history of CHF, pulmonary HTN, and COPD presents to ED with family at bedside c/o worsening shortness of breath onset 1 week ago. Patient reports peripheral edema and 15 lb weight gain. Patient states she is on Remodulin pump at home. Per patient, she is usually on 3L of O2 at home and her O2 sats normally run about 93%..  The onset was 1  weeks ago.  The course/duration of symptoms is worsening.  Degree at onset moderate.  Degree at present moderate.  The Exacerbating factors is none.  The Relieving factors is none.  Risk factors consist of congestive heart failure, hypertension and chronic obstructive pulmonary disease.  Prior episodes: congestive heart failure.  Therapy today: Remodulin pump.  Associated symptoms: weight gain and peripheral edema.        Review of Systems   Constitutional symptoms:  No fever, no chillsWeight gain: 15 pounds.   Skin symptoms:  No jaundice, no rash.    Eye symptoms:  Vision unchanged, No blurred vision,     Respiratory symptoms:  Shortness of breath, no orthopnea, no cough, no sputum production.    Cardiovascular symptoms:  Peripheral edema, no chest pain, no palpitations, no diaphoresis.    Gastrointestinal symptoms:  No abdominal pain, no nausea, no vomiting, no diarrhea, no constipation.    Genitourinary symptoms:  No dysuria, no hematuria.    Neurologic symptoms:  No headache, no dizziness.    Hematologic/Lymphatic symptoms:  Bleeding tendency negative,    Allergy/immunologic symptoms:  No impaired immunity,              Additional review of systems information: All other systems reviewed and otherwise negative.      Health Status   Allergies:    Allergic Reactions (Selected)  Severity Not Documented  Sulfamethoxazole- Sob..   Medications:  (Selected)   Documented Medications  Documented  DuoNeb 0.5 mg-2.5 mg/3 mL inhalation solution: NEB, q4hr, 0 Refill(s)  Folet DHA: 1 packet(s), Oral, Daily, 0 Refill(s)  Incruse Ellipta 62.5 mcg/inh inhalation powder: 1 EA, INH, Daily, doses should be taken at least 24 hours apart, # 30 EA, 0 Refill(s)  Latuda 40 mg oral tablet: 40 mg = 1 tab(s), Oral, At Bedtime, # 30 tab(s), 0 Refill(s)  Norvasc 5 mg oral tablet: 5 mg = 1 tab(s), Oral, Daily, # 90 tab(s), 0 Refill(s)  Pantoprazole 40 mg ORAL EC-Tablet: 40 mg = 1 tab(s), Oral, Daily, # 30 tab(s), 0 Refill(s)  Pravastatin 40 mg Oral Tab: 40 mg = 1 tab(s), Oral, Daily, # 90 tab(s), 0 Refill(s)  Pristiq 100 mg oral tablet, extended release: 100 mg = 1 tab(s), Oral, Daily, # 30 tab(s), 0 Refill(s)  Pristiq 100 mg oral tablet, extended release: 100 mg = 1 tab(s), Oral, Daily, # 30 tab(s), 0 Refill(s)  SOLU-Medrol: 60 mg = 0.96 mL, IV Push, c1qg-Gcsdf, 0 Refill(s)  budesonide-formoterol 160 mcg-4.5 mcg/inh inhalation aerosol: 2 puff(s), INH, BID, 0 Refill(s)  busPIRone 15 mg oral tablet: 15 mg = 1 tab(s), Oral, TID, # 270 tab(s), 0 Refill(s)  furosemide 80 mg oral tablet: 80 mg = 1 tab(s), Oral, BID, # 30 tab(s), 0  Refill(s)  lamoTRIgine 100 mg oral tablet, extended release: 100 mg = 1 tab(s), Oral, At Bedtime, 0 Refill(s)  lamoTRIgine 100 mg oral tablet: 200 mg = 2 tab(s), Oral, Daily, 0 Refill(s)  levothyroxine 75 mcg (0.075 mg) oral tablet: 75 mcg = 1 tab(s), Oral, Daily, # 60 tab(s), 0 Refill(s)  metOLazone 2.5 mg oral tablet: 2.5 mg = 1 tab(s), Oral, Daily, PRN PRN wt gain ( swelling), # 30 tab(s), 0 Refill(s)  potassium chloride 10 mEq oral CAPSULE extended release: 10 mEq = 1 cap(s), Oral, Daily, # 60 cap(s), 0 Refill(s)  selexipag 200 mcg oral tablet: 600 mcg = 3 tab(s), Oral, BID, 0 Refill(s).      Past Medical/ Family/ Social History   Medical history:    Active  Hypertension (03558662)  COPD, moderate ([id number])  Hyperlipidemia ([id number])  GERD (gastroesophageal reflux disease) ([id number])  Bipolar ([id number])  Depression (142307468)  Cervical radiculopathy (593557955)  Numbness and tingling ([id number])  SOB (shortness of breath) ([id number])  Hypothyroid (430433347)  Resolved  CHF - Congestive heart failure (204420311):  Resolved.  Pulmonary arterial hypertension ([id number]):  Resolved., Reviewed as documented in chart.   Surgical history:    Injection Cervical Epidural Steroid (., None) on 6/27/2017 at 56 Years.  Comments:  6/27/2017 13:42 - Be RICKETTS, Lissy MEJIA  auto-populated from documented surgical case  Injection Cervical Epidural Steroid (., None) on 6/13/2017 at 56 Years.  Comments:  6/13/2017 10:45 - Lissy Lr RN  auto-populated from documented surgical case  Injection Cervical Epidural Steroid (.) on 5/30/2017 at 56 Years.  Comments:  5/30/2017 15:54 - Lissy Lr RN  auto-populated from documented surgical case  Tubal ligation ([id number]).  Back surgery.  Previous back surgery ([id number]).  Bilateral tubal ligation ([id number]).,  Reviewed as documented in chart.   Family history:    History is unknown..   Social history: Alcohol use: Denies, Tobacco use: Quit 16 years ago, Drug use: Denies.      Physical Examination               Vital Signs   Vital Signs   1/3/2018 13:00 CST       Peripheral Pulse Rate     83 bpm                             Heart Rate Monitored      83 bpm                             Respiratory Rate          21 br/min                             SpO2                      92 %  LOW                             Oxygen Therapy            Nasal cannula                             Oxygen Flow Rate          3 L/min                             Systolic Blood Pressure   104 mmHg                             Diastolic Blood Pressure  65 mmHg                             Mean Arterial Pressure, Cuff              78 mmHg    1/3/2018 12:18 CST       Peripheral Pulse Rate     88 bpm                             Heart Rate Monitored      88 bpm                             Respiratory Rate          24 br/min                             SpO2                      89 %  LOW                             Oxygen Therapy            Nasal cannula                             Oxygen Flow Rate          3 L/min                             Systolic Blood Pressure   122 mmHg                             Diastolic Blood Pressure  76 mmHg                             Mean Arterial Pressure, Cuff              91 mmHg    1/3/2018 11:44 CST       Temperature Oral          36.5 DegC                             Temperature Oral (calculated)             97.70 DegF                             Peripheral Pulse Rate     84 bpm                             Respiratory Rate          22 br/min                             SpO2                      90 %  LOW                             Oxygen Therapy            Room air, Nasal cannula                             Oxygen Flow Rate          3 L/min                             Systolic Blood Pressure   114 mmHg                              Diastolic Blood Pressure  77 mmHg  .   Measurements   1/3/2018 11:44 CST       Weight Dosing             91.5 kg                             Weight Measured and Calculated in Lbs     201.72 lb                             Weight Estimated          91.5 kg                             Height/Length Dosing      157 cm                             Height/Length Estimated   157 cm                             Body Mass Index Estimated 37.12 kg/m2  .   Basic Oxygen Information   1/3/2018 13:00 CST       SpO2                      92 %  LOW                             Oxygen Flow Rate          3 L/min                             Oxygen Therapy            Nasal cannula    1/3/2018 12:18 CST       SpO2                      89 %  LOW                             Oxygen Flow Rate          3 L/min                             Oxygen Therapy            Nasal cannula    1/3/2018 11:44 CST       SpO2                      90 %  LOW                             Oxygen Flow Rate          3 L/min                             Oxygen Therapy            Room air, Nasal cannula  .   General:  Alert, no acute distress.    Skin:  Warm, dry.    Head:  Normocephalic, atraumatic.    Neck:  Supple, trachea midline.    Eye:  Normal conjunctiva.   Ears, nose, mouth and throat:  Oral mucosa moist.   Cardiovascular:  Regular rate and rhythm, No murmur, Normal peripheral perfusion, Edema: Bilateral, lower extremity, 3+.    Respiratory:  Respirations are non-labored, Breath sounds: Bilateral, base(s), diminished, crackles present, rales present, wheezes present (expiratory wheezes, bilaterally upper lobes).    Gastrointestinal:  Soft, Nontender, Non distended, Normal bowel sounds.    Neurological:  Alert and oriented to person, place, time, and situation.   Psychiatric:  Cooperative, Mood and affect: Tearful.       Medical Decision Making   Differential Diagnosis:  Pneumonia, bronchitis, congestive heart failure, chronic obstructive pulmonary  disease, pulmonary edema, pleural effusion, influenza.    Documents reviewed:  Emergency department nurses' notes, emergency department records.    Orders  Launch Orders   Laboratory:  Troponin-I (Order): Stat collect, 1/3/2018 11:45 CST, Blood, Lab Collect, Print Label By Order Location, 1/3/2018 11:45 CST  POC BNP iSTAT request (Order): Blood, Stat collect, Collected, 1/3/2018 11:45 CST by Raúl Sheldon, Nurse collect, Print Label By Order Location  Flu Swab - Influenza A & B (Order): Stat collect, Nasal, 1/3/2018 11:45 CST, Nurse collect, Print Label By Order Location, 1/3/2018 11:45 CST  CMP (Order): Stat collect, 1/3/2018 11:45 CST, Blood, Lab Collect, Print Label By Order Location, 1/3/2018 11:45 CST  CBC w/ Auto Diff (Order): Now collect, 1/3/2018 11:45 CST, Blood, Lab Collect, Print Label By Order Location, 1/3/2018 11:45 CST  Radiology:  XR Chest 1 View (Order): Stat, 1/3/2018 11:46 CST, Dyspnea, None, Patient Bed, Patient Has IV?, Patient on Oxygen?, Rad Type  Cardiology:  EKG Adult 12 Lead (Order): 1/3/2018 11:46 CST, Stat, Once, 24, 1/3/2018 11:46 CST, Patient Bed, Patient Has IV, Patient on O2, Standard Precautions, -1, -1, 1/3/2018 11:46 CST, Launch Orders   Laboratory:  ABG Adult RT (Order): Stat collect, Arterial Blood, 1/3/2018 11:51 CST, Once, Stop date 1/3/2018 11:51 CST  , launch Order Profile (Selected)   Inpatient Orders  Ordered  Admit as Inpatient: 01/03/18 14:26:00 CST, Amaya CHRISTOPHER, Joe BERG Telemetry with Monitor, No  Capacity Management Bed Request: 01/03/18 14:26:32 CST, Telemetry with Monitor  Discharge Planning Ongoing Assessment: 01/06/18 9:00:00 CST, q3day  EKG Adult 12 Lead: 01/03/18 11:46:00 CST, Stat, Once, 24, 01/03/18 11:46:00 CST, Patient Bed, Patient Has IV, Patient on O2, Standard Precautions, -1, -1, 01/03/18 11:46:00 CST  Fall Risk Protocol: 01/03/18 12:22:43 CST, Constant Order  K-Dur 20 oral tablet, extended release: 40 mEq, form: Tab-ER, Oral, BID, first dose  18 13:43:00 CST, 24,034  Perineal Care: 18 13:21:41 CST, BID  Solumedrol IV push / IM: 125 mg, form: Injection, IV Push, Once, first dose 18 13:43:00 CST, stop date 18 13:43:00 CST, STAT, 24  Urinary Catheter Insertion: 18 13:21:00 CST, Indwelling, Yes  Urinary Catheter Monitorin18 13:21:41 CST, BID  Urinary Catheter Nurse Driven Protocol: BID, 21, 18 13:21:41 CST  Ordered (Collected)  POC BNP iSTAT request: Blood, Stat collect, Collected, 18 11:45:00 CST by Raúl Sheldon, Stop date 18 11:45:00 CST, Nurse collect, Print Label By Order Location  Completed  ABG Adult RT: Stat collect, Arterial Blood, 18 11:51:00 CST, Once, Stop date 18 11:52:00 CST  Aerosol Treatment: 18 13:43:00 CST  Automated Diff: Now collect, 18 12:11:00 CST, Blood, Collected, Stop date 18 12:11:00 CST, Lab Collect, Print Label By Order Location, 18 11:45:00 CST  CBC w/ Auto Diff: Now collect, 18 11:45:00 CST, Blood, Stop date 18 11:46:00 CST, Lab Collect, Print Label By Order Location, 18 11:45:00 CST  CMP: Stat collect, 18 11:45:00 CST, Blood, Stop date 18 11:46:00 CST, Lab Collect, Print Label By Order Location, 18 11:45:00 CST  DuoNeb 0.5 mg-2.5 mg/3 mL inhalation solution: 3 mL, form: Soln, NEB, Once-Unscheduled, first dose 18 13:43:00 CST  Estimated Glomerular Filtration Rate: Stat collect, 18 12:11:00 CST, Blood, Collected, Stop date 18 12:11:00 CST, Lab Collect, Print Label By Order Location, 18 11:45:00 CST  Flu Swab - Influenza A & B: Stat collect, Nasal, 18 11:45:00 CST, Stop date 18 11:46:00 CST, Nurse collect, Print Label By Order Location, 18 11:45:00 CST  INR - Protime: Stat collect, 18 12:48:00 CST, Blood, Stop date 18 12:48:00 CST, Lab Collect, Print Label By Order Location, 18 12:48:00 CST  Lasix inject.  (IV Push or IM) 10 mg/mL: 60  mg, form: Injection, IV Push, Once, first dose 01/03/18 12:47:00 CST, stop date 01/03/18 12:47:00 CST, STAT, 24  POC BNP iSTAT: Blood, Stat collect, Collected, 01/03/18 12:16:06 CST  PTT: Stat collect, 01/03/18 12:48:00 CST, Blood, Stop date 01/03/18 12:48:00 CST, Lab Collect, Print Label By Order Location, 01/03/18 12:48:00 CST  Troponin-I: Stat collect, 01/03/18 11:45:00 CST, Blood, Stop date 01/03/18 11:46:00 CST, Lab Collect, Print Label By Order Location, 01/03/18 11:45:00 CST  XR Chest 1 View: Stat, 01/03/18 11:46:00 CST, Dyspnea, None, Patient Bed, Patient Has IV?, Patient on Oxygen?, Rad Type, 01/03/18 11:46:00 CST  .    Electrocardiogram:  Time 1/3/2018 11:48:00, rate 85, normal sinus rhythm, STT segments Inferolateral ST depressions and T-wave inversions appear similar to prior ECG, QRS interval Right bundle branch block, Previous EKG available No changes, compared with 11/7/2017 04:15:00, Interpretation by Emergency Physician No significant interval changes.    Results review:  Lab results : Lab View   1/3/2018 12:16 CST       POC BNP iSTAT             889 pg/mL  HI    1/3/2018 12:15 CST       Sample ABG                art                             Treatment                 nc3L                             Site                      Radial Lt                             pH Art                    7.490  HI                             pCO2 Art                  37.0 mmHg                             pO2 Art                   55.0 mmHg  CRIT                             HCO3 Art                  28.2 mmol/L  HI                             CO2 Totl Art              29.3 mmol/L  HI                             O2 Sat Art                90.8 %  LOW                             D base                    4.7  HI                             THB ABG                   12.0 gm/dL                             CO Hgb                    1.9 %  NA                             Met Hgb Art               1.1 %                              O2 Hgb                    89.5 %  LOW                             CaO2                      15.1 mL/dL  LOW                             Ionized Calcium           1.13 mmol/L                             Sodium RT                 139.0 mmol/L                             Potassium RT              2.70 mmol/L  LOW                             Allens                    N/A                             Setting 1 ABG             -                             Setting 2 ABG             -                             Setting 3 ABG             -                             Setting 4 ABG             -    1/3/2018 12:11 CST       Sodium Lvl                139 mmol/L                             Potassium Lvl             3.3 mmol/L  LOW                             Chloride                  104 mmol/L                             CO2                       28.0 mmol/L                             Calcium Lvl               9.1 mg/dL                             Glucose Lvl               100 mg/dL                             BUN                       18.0 mg/dL                             Creatinine                1.56 mg/dL  HI                             eGFR-AA                   44 mL/min/1.73 m2  NA                             eGFR-ISIDORO                  36 mL/min/1.73 m2  NA                             Bili Total                0.4 mg/dL                             Bili Direct               0.20 mg/dL                             Bili Indirect             0.20 mg/dL                             AST                       35 unit/L                             ALT                       37 unit/L                             Alk Phos                  126 unit/L                             Total Protein             6.7 gm/dL                             Albumin Lvl               3.90 gm/dL                             Globulin                  2.80 gm/dL                             A/G Ratio                 1.4 ratio                              Troponin-I                0.02 ng/mL                             PT                        29.1 second(s)  HI                             INR                       2.65  HI                             PTT                       33.7 second(s)                             WBC                       5.9 x10(3)/mcL                             RBC                       3.95 x10(6)/mcL  LOW                             Hgb                       12.4 gm/dL                             Hct                       38.2 %                             Platelet                  157 x10(3)/mcL                             MCV                       96.7 fL  HI                             MCH                       31.4 pg  HI                             MCHC                      32.5 gm/dL  LOW                             RDW                       16.1 %                             MPV                       12.2 fL                             Abs Neut                  4.60 x10(3)/mcL                             Neutro Auto               78 %  NA                             Lymph Auto                14 %  NA                             Mono Auto                 5 %  NA                             Eos Auto                  1 %  NA                             Abs Eos                   0.1 x10(3)/mcL                             Basophil Auto             0 %  NA                             Abs Neutro                4.60 x10(3)/mcL                             Abs Lymph                 0.8 x10(3)/mcL                             Abs Mono                  0.3 x10(3)/mcL                             Abs Baso                  0.0 x10(3)/mcL                             Influ A Ag                Negative                             Influ B Ag                Negative    , Interpretation Abnormal results  Elevated BNP..    Chest X-Ray:  Time reported 1/3/2018 13:17:00,            * Final Report *    Reason For Exam  Dyspnea    Radiology  Report  PORTABLE CHEST X-RAY, ONE FRONTAL VIEW     HISTORY: Dyspnea     COMPARISON: None.     FINDINGS:  Right IJ central venous catheter tip projects over the low SVC.     No focal consolidations, pleural effusions or pneumothoraces.  Enlarged caudate silhouette without overt decompensation.  No acute bony pathology.  Soft tissues within normal limits.       IMPRESSION:     No acute thoracic abnormality.             Signature Line  Electronically Signed By: Eleni John MD  Date/Time Signed: 01/03/2018 13:17  .       Reexamination/ Reevaluation   Vital signs   results included from flowsheet : Vital Signs   1/3/2018 14:05 CST       Heart Rate Monitored      75 bpm                             Respiratory Rate          16 br/min                             SpO2                      95 %    1/3/2018 14:00 CST       Heart Rate Monitored      75 bpm                             Respiratory Rate          20 br/min                             SpO2                      90 %  LOW    1/3/2018 13:00 CST       Peripheral Pulse Rate     83 bpm                             Heart Rate Monitored      83 bpm                             Respiratory Rate          21 br/min                             SpO2                      92 %  LOW                             Oxygen Therapy            Nasal cannula                             Oxygen Flow Rate          3 L/min                             Systolic Blood Pressure   104 mmHg                             Diastolic Blood Pressure  65 mmHg                             Mean Arterial Pressure, Cuff              78 mmHg    1/3/2018 12:18 CST       Peripheral Pulse Rate     88 bpm                             Heart Rate Monitored      88 bpm                             Respiratory Rate          24 br/min                             SpO2                      89 %  LOW                             Oxygen Therapy            Nasal cannula                             Oxygen Flow Rate           3 L/min                             Systolic Blood Pressure   122 mmHg                             Diastolic Blood Pressure  76 mmHg                             Mean Arterial Pressure, Cuff              91 mmHg     Notes: Patient presented hypoxic on her home 3 L nasal cannula with bibasilar crackles and expiratory wheezes concerning for possible mixed picture COPD/CHF exacerbation.  Her reported 15 pound weight gain in one week as concerning for volume retention.  She is on a remodeling drip for her dilated cardiomyopathy.  Patient with significant peripheral edema and orthopnea.  Chest x-ray does not demonstrate overt abnormalities; however, BNP approximately 900.  ECG with chronic changes, no acute ischemia noted.  Given worsening hypoxia despite increasing her home O2, and compliance with her home medications, will admit for inpatient diuresis, continue neb treatments for possible COPD exacerbation component. Findings and plan discussed with the patient, and she is agreeable to admission at this time.   .   Notes: Patient has put out 1200 mL of urine so far. Will be transferred to [facility] for higher level of care (pulmonary HTN service, available Remodulin via pharmacy as drip will need to be changed in AM)..      Impression and Plan   Diagnosis   CHF exacerbation (ORG09-HC I50.9)   COPD exacerbation (VGY88-OF J44.1)   Hypoxia (SKM40-OX R09.02)   History of pulmonary hypertension (FHZ60-BH Z86.79)   Hypokalemia (OEW49-AG E87.6)   Plan   Condition: Stable.    Disposition: Transfer to other location: Time: 1/3/2018 15:04:00, Facility name: [facility], Accepted by: Dr. Bosch, I was called by [facility] requesting to have the patient transferred there as she was just recently discharged from their service with the last 2 weeks.  Patient on a remodeling drip which we are unable to provide at this hospital at this time.  I spoke with Dr. Bosch who accepted the patient in transfer. Patient to be transfered via ground  ambulance/ALS services. Findings and plan discussed with the patient, and she is agreeable to transfer at this time.   ,  Admit: Admit time  1/3/2018 14:26:00, admit to Inpatient Telemetry Unit, Amaya CHRISTOPHER, Joe BERG, Case discussed with Yamileth Bush NP    Counseled: Patient, Family, Regarding diagnosis, Regarding diagnostic results, Regarding treatment plan, Patient indicated understanding of instructions, Family understood.    Notes: I, Irish Salgado, acted solely as a scribe for and in the presence of Dr. Arauz who performed the service.  , I, Migdalia Arauz, have independently performed the history, physical, medical decision making and procedures as documented above and agree with the scribe's documentation. .

## 2022-04-29 NOTE — ED PROVIDER NOTES
Patient:   Sue Smith            MRN: [id number]            FIN: [id number]               Age:   57 years     Sex:  Female     :  1960   Associated Diagnoses:   COPD (chronic obstructive pulmonary disease); Decreased oxygen level; Hypoxemia requiring supplemental oxygen; Ascites; Pleural effusion on right; Pulmonary hypertension   Author:   Maria Antonia CHRISTOPHER, Cristian DUNN      Basic Information   Time seen: Date & time 10/11/2018 11:27:00, Immediately upon arrival.   History source: Patient, EMS.   Arrival mode: Ambulance.   History limitation: None.   Additional information: Patient's physician(s): Al CHRISTOPHER, LULU Ramirez, Chief Complaint from Nursing Triage Note : Chief Complaint   10/11/2018 11:28 CDT     Chief Complaint           Complaint of SOB, EMS intitial sats in 50,s, now on CPAP, sats in 70,s.  Hx of endstage COPD, on hospice  .      History of Present Illness   The patient presents with difficulty breathing and 58 y/o white female with hx of HLD, CHF and pulmonary HTN presents to the ED via EMS c/o SOB that began yesterday (10.10.18). Upon arrival of EMS pt O2 saturation was in the 50s. Placement of CPAP brought them up to 70s. Pt is on a continuous drip of Remodulin. She is on Hospice and is a modified code: no CPR, ok with intubation..  The onset was 1  days ago.  The course/duration of symptoms is constant.  Degree at onset moderate.  Degree at present moderate.  The Exacerbating factors is none.  The Relieving factors is none.  Risk factors consist of congestive heart failure and pulmonary HTN.  Prior episodes: none.  Therapy today: emergency medical services.  Associated symptoms: none.  Patient gives me the history of having abdominal paracentesis done as well as pleurocentesis done both to remove fluids..        Review of Systems   Constitutional symptoms:  Negative except as documented in HPI.   Skin symptoms:  Negative except as documented in HPI.   Eye symptoms:  Negative  except as documented in HPI.   ENMT symptoms:  Negative except as documented in HPI.   Respiratory symptoms:  Shortness of breath.   Cardiovascular symptoms:  Negative except as documented in HPI.   Gastrointestinal symptoms:  Negative except as documented in HPI.   Genitourinary symptoms:  Negative except as documented in HPI.   Musculoskeletal symptoms:  Negative except as documented in HPI.   Neurologic symptoms:  Negative except as documented in HPI.   Psychiatric symptoms:  Negative except as documented in HPI.   Endocrine symptoms:  Negative except as documented in HPI.   Hematologic/Lymphatic symptoms:  Negative except as documented in HPI.   Allergy/immunologic symptoms:  Negative except as documented in HPI.             Additional review of systems information: All other systems reviewed and otherwise negative.      Health Status   Allergies:    Allergic Reactions (Selected)  Severity Not Documented  Sulfamethoxazole- Sob.,    Allergies (1) Active Reaction  sulfamethoxazole SOB.   Medications:  (Selected)   Inpatient Medications  Ordered  KCL 20 mEq Oral Tab: 40 mEq, form: Tab-ER, Oral, Once, first dose 18 15:09:00 CST, stop date 18 15:09:00 CST, STAT, 24  [id number] 1,000 mL: 1,000 mL, 1,000 mL, IV, 25 mL/hr, start date 10/11/18 11:53:00 CDT  Documented Medications  Documented  ADEMPAS      TAB 1MG:   Adempas 0.5 mg tablet:   Breo Ellipta 100 mcg-25 mcg/inh inhalation powder: 1 puff(s), INH, Daily, # 30 EA, 0 Refill(s)  Breo Ellipta 100-25 mcg/dose blister with device:   CEPHALEXIN   CAP 500M mg = 1 cap(s), Oral, QID  DESVENLAFAX  TAB 50MG ER: Oral, BID  DuoNeb 0.5 mg-2.5 mg/3 mL inhalation solution: NEB, q4hr, 0 Refill(s)  HYDROCO/APAP TAB 10-325M tab(s), Oral, QID  Incruse Ellipta 62.5 mcg/inh inhalation powder: 1 EA, INH, Daily, doses should be taken at least 24 hours apart, # 30 EA, 0 Refill(s)  Incruse Ellipta 62.5 mcg/inh inhalation powder: 1 EA, INH, q24hr, doses should be taken at  least 24 hours apart, # 30 EA, 0 Refill(s)  K-Dur 20 oral tablet, extended release: 20 mEq = 1 tab(s), Oral, BID, # 180 tab(s), 0 Refill(s)  LAMOTRIGINE  TAB 100MG:   Latuda 40 mg oral tablet: 40 mg = 1 tab(s), Oral, At Bedtime, # 30 tab(s), 0 Refill(s)  Latuda 80 mg oral tablet: 80 mg = 1 tab(s), Oral, Daily, with food  Levofloxacin 500 Tab: 500 mg = 1 tab(s), Oral, Daily  MUPIROCIN    OIN 2%: 1 mitchell, TOP, TID  Norvasc 5 mg oral tablet: 5 mg = 1 tab(s), Oral, Daily, # 90 tab(s), 0 Refill(s)  Pantoprazole 40 mg ORAL EC-Tablet: 40 mg = 1 tab(s), Oral, Daily, # 30 tab(s), 0 Refill(s)  Pravastatin 40 mg Oral Tab: 40 mg = 1 tab(s), Oral, Daily, # 90 tab(s), 0 Refill(s)  Pristiq 100 mg oral tablet, extended release: 100 mg = 1 tab(s), Oral, Daily, # 30 tab(s), 0 Refill(s)  REMODULIN    INJ 5MG/ML:   SPIRONOLACT  TAB 100M mg = 1 tab(s), Oral, BID  SUCRALFATE   TAB 1GM: 1 gm = 1 tab(s), Oral, BID  WARFARIN     TAB 5MG: 10 mg = 2 tab(s), Oral, Daily  Xanax 1 mg tablet:   albuterol sulfate 0.083% Nebu Soln:   budesonide-formoterol 160 mcg-4.5 mcg/inh inhalation aerosol: 2 puff(s), INH, BID, 0 Refill(s)  budesonide-formoterol 80-4.5 mcg/actuation HFA aerosol inhaler:   bumetanide 2 mg oral tablet: 4 mg = 2 tab(s), Oral, BID, # 90 tab(s), 0 Refill(s)  busPIRone 15 mg oral tablet: 15 mg = 1 tab(s), Oral, TID, # 270 tab(s), 0 Refill(s)  cyclobenzaprine 10 mg tablet:   gabapentin 100 mg oral capsule: 200 mg = 2 cap(s), Oral, TID, # 240 cap(s), 0 Refill(s)  lamoTRIgine 100 mg oral tablet, extended release: 100 mg = 1 tab(s), Oral, BIDHS, 0 Refill(s)  levothyroxine 75 mcg (0.075 mg) oral tablet: 75 mcg = 1 tab(s), Oral, Daily, # 60 tab(s), 0 Refill(s)  metOLazone 2.5 mg oral tablet: 2.5 mg = 1 tab(s), Oral, Daily, PRN PRN wt gain ( swelling), # 30 tab(s), 0 Refill(s)  ondansetron 8 mg oral tablet: 8 mg = 1 tab(s), Oral, q8hr, PRN PRN as needed for nausea/vomiting, 0 Refill(s)  torsemide 100 mg oral tablet: 100 mg = 1 tab(s),  Oral, Daily.   Immunizations: Tetanus less than 5 years pneumonia less than 5 years flu vaccine 2017.   Menstrual history: Menopausal.      Past Medical/ Family/ Social History   Medical history:    Active  Hypertension ([id number])  COPD, moderate ([id number])  Hyperlipidemia ([id number])  GERD (gastroesophageal reflux disease) ([id number])  Bipolar ([id number])  Depression (107481860)  Cervical radiculopathy (817757049)  Numbness and tingling ([id number])  SOB (shortness of breath) ([id number])  Hypothyroid (511172164)  Resolved  CHF - Congestive heart failure ([id number]):  Resolved.  Pulmonary arterial hypertension (3139449508):  Resolved..   Surgical history:    Injection Cervical Epidural Steroid (., None) on 2017 at 56 Years.  Comments:  2017 13:42 - Lissy Lr RN  auto-populated from documented surgical case  Injection Cervical Epidural Steroid (., None) on 2017 at 56 Years.  Comments:  2017 10:45 - Lissy Lr RN  auto-populated from documented surgical case  Injection Cervical Epidural Steroid (.) on 2017 at 56 Years.  Comments:  2017 15:54 - Lissy Lr RN  auto-populated from documented surgical case  Tubal ligation ([id number]).  Back surgery.  Previous back surgery ([id number]).  Bilateral tubal ligation ([id number])..   Family history: Mother  she had leukemia.  Father is alive he has high blood pressure and glaucoma.   Social history: Alcohol use: Denies, Tobacco use: Quit 15 years ago, Drug use: Denies, Occupation: On disability, Family/social situation: Unmarried, lives alone.   Problem list:    Active Problems (13)  Bipolar   Cervical radiculopathy   CHF (congestive heart failure)   COPD, moderate   Depression   GERD (gastroesophageal reflux disease)   Hyperlipidemia   Hypertension    Hypothyroid   Numbness and tingling   Obesity   SHERIE (obstructive sleep apnea)   SOB (shortness of breath) .      Physical Examination               Vital Signs   Vital Signs   10/11/2018 11:30 CDT     Peripheral Pulse Rate     93 bpm                             Heart Rate Monitored      93 bpm                             Respiratory Rate          16 br/min                             SpO2                      80 %  LOW                             Oxygen Therapy            CPAP    10/11/2018 11:28 CDT     Temperature Temporal Artery               36 DegC  LOW                             Peripheral Pulse Rate     95 bpm                             Respiratory Rate          18 br/min                             FIO2                      100 %                             SpO2                      70 %  LOW                             Oxygen Therapy            CPAP                             Systolic Blood Pressure   90 mmHg                             Diastolic Blood Pressure  73 mmHg  .      Vital Signs (last 24 hrs)_____  Last Charted___________  Heart Rate Peripheral   93 bpm  (OCT 11 11:30)  Resp Rate         16 br/min  (OCT 11 11:30)  SBP      90 mmHg  (OCT 11 11:28)  DBP      73 mmHg  (OCT 11 11:28)  SpO2      L 80%  (OCT 11 11:30).   Measurements   10/11/2018 11:28 CDT     Weight Dosing             82 kg                             Weight Measured and Calculated in Lbs     180.78 lb                             Weight Estimated          82 kg  .   Basic Oxygen Information   10/11/2018 11:30 CDT     SpO2                      80 %  LOW                             Oxygen Therapy            CPAP    10/11/2018 11:28 CDT     SpO2                      70 %  LOW                             Oxygen Therapy            CPAP  .   General:  Alert, severe distress, white female, not anxious, not ill-appearing.    Skin:  Warm, dry, no pallor, no rash, Condition(s): Bilateral LE , venous stasis changes.    Head:  Normocephalic,  atraumatic.    Neck:  Supple, trachea midline, no tenderness, no carotid bruit, long term IV noted to right neck .    Eye:  Pupils are equal, round and reactive to light, extraocular movements are intact, normal conjunctiva.    Ears, nose, mouth and throat:  Tympanic membranes clear, oral mucosa moist, no pharyngeal erythema or exudate.    Cardiovascular:  Regular rate and rhythm, No murmur, Normal peripheral perfusion, Edema: Bilateral, lower extremity, trace.    Respiratory:  Diminished breath sounds the bottom one half of the lung right side, Breath sounds: Bilateral, base(s) (minimal), Retractions: Mild.    Chest wall:  No tenderness, No deformity.    Back:  Nontender, Normal range of motion, Normal alignment, no step-offs.    Musculoskeletal:  Normal ROM, normal strength, no tenderness, no swelling, no deformity.    Gastrointestinal:  Soft, Nontender, Non distended, Normal bowel sounds, No organomegaly, protuberant abd with taut ascites fluid.    Genitourinary:  no CVA tenderness.   Neurological:  Alert and oriented to person, place, time, and situation, No focal neurological deficit observed, CN II-XII intact, normal sensory observed, normal motor observed, normal speech observed, normal coordination observed, normal and symmetrical reflexes.    Lymphatics:  No lymphadenopathy.   Psychiatric:  Cooperative, appropriate mood & affect, normal judgment, non-suicidal.       Medical Decision Making   Differential Diagnosis:  Pneumonia, pulmonary edema, pleural effusion.    Documents reviewed:  Emergency department nurses' notes.   Orders  Laboratory    ABG Adult RT, Cristian Em MD, 10/11/18, 11:52, Completed    CBC w/ Auto Diff, Cristian Em MD, 10/11/18, 11:52, Ordered    CMP, Cristian Em MD, 10/11/18, 11:52, Ordered    Blood Culture, Cristian Em MD, 10/11/18, 11:52, Ordered    Blood Culture, Cristian Em MD, 10/11/18, 11:52, Ordered    INR - Protime, Cristian Em MD, 10/11/18, 11:53,  Ordered    TSH, Cristian Em MD, 10/11/18, 11:54, Ordered    PTT, Cristian Em MD, 10/11/18, 11:54, Ordered    Troponin-I, Cristian Em MD, 10/11/18, 11:54, Ordered    Mg Level, Cristian Em MD, 10/11/18, 11:54, Ordered    Lipase Level, Cristian Em MD, 10/11/18, 11:54, Ordered    Lactic Acid, Cristian Em MD, 10/11/18, 11:54, Ordered    UA Total a reflex to culture, Cristian Em MD, 10/11/18, 11:54, Ordered    BNP, Cristian Em MD, 10/11/18, 11:56, Ordered  Xray    XR Chest 1 View, Cristian Em MD, 10/11/18, 11:54, Ordered  EKG / Respiratory / Ancillary    EKG Adult 12 Lead, ER, Physician, 10/11/18, 11:42, Completed    O2 Therapy, ER, Physician, 10/11/18, 11:32, Ordered    BPAP, Cristian Em MD, 10/11/18, 11:54, Ordered.   Electrocardiogram:  Time 10/11/2018 11:33:00, rate 89, normal sinus rhythm, no ectopy, normal ID & QRS intervals, EP Interp, Previous EKG available No changes, June 2018, multiple ST and T wave abnormalities noted.    Results review:  Lab results : Lab View   10/11/2018 13:00 CDT     UA Appear                 CLOUDY                             UA Color                  DK YELLOW                             UA Spec Grav              1.018                             UA Bili                   1+                             UA pH                     5.0                             UA Urobilinogen           1.0                             UA Blood                  Negative                             UA Glucose                Negative                             UA Ketones                Negative                             UA Protein                1+                             UA Nitrite                Negative                             UA Leuk Est               Negative    10/11/2018 12:18 CDT     Sodium Lvl                130 mmol/L  LOW                             Potassium Lvl             5.6 mmol/L  HI                             Chloride                   97 mmol/L  LOW                             CO2                       23.0 mmol/L                             Calcium Lvl               8.8 mg/dL                             Magnesium Lvl             2.6 mg/dL  HI                             Glucose Lvl               106 mg/dL                             BUN                       47.0 mg/dL  HI                             Creatinine                2.48 mg/dL  HI                             eGFR-AA                   26 mL/min/1.73 m2  NA                             eGFR-ISIDORO                  21 mL/min/1.73 m2  NA                             Bili Total                0.8 mg/dL                             Bili Direct               0.40 mg/dL                             Bili Indirect             0.40 mg/dL                             AST                       15 unit/L                             ALT                       22 unit/L                             Alk Phos                  277 unit/L  HI                             Total Protein             7.9 gm/dL                             Albumin Lvl               4.20 gm/dL                             Globulin                  3.70 gm/dL  HI                             A/G Ratio                 1.1 ratio                             Lactic Acid Lvl           1.1 mmol/L                             Lipase Lvl                110 unit/L                             Troponin-I                0.02 ng/mL                             TSH                       11.400 mIU/L  HI                             PT                        16.1 second(s)  HI                             INR                       1.25                             PTT                       27.5 second(s)                             WBC                       12.5 x10(3)/mcL  HI                             RBC                       5.58 x10(6)/mcL  HI                             Hgb                       14.4 gm/dL                             Hct                        47.9 %  HI                             Platelet                  165 x10(3)/mcL                             MCV                       85.8 fL                             MCH                       25.8 pg  LOW                             MCHC                      30.1 gm/dL  LOW                             RDW                       19.2 %  HI                             MPV                       9.8 fL                             Abs Neut                  11.26 x10(3)/mcL  HI                             Neutro Auto               90 %  NA                             Lymph Auto                3 %  NA                             Mono Auto                 6 %  NA                             Basophil Auto             0 %  NA                             Abs Neutro                11.26 x10(3)/mcL  HI                             Abs Lymph                 0.3 x10(3)/mcL  LOW                             Abs Mono                  0.7 x10(3)/mcL                             Abs Baso                  0.0 x10(3)/mcL    10/11/2018 11:54 CDT     Sample ABG                art                             Treatment                 bipap                             Site                      Radial Rt                             pH Art                    7.230  CRIT                             pCO2 Art                  54.0 mmHg  CRIT                             pO2 Art                   58.0 mmHg  LOW                             HCO3 Art                  22.6 mmol/L                             CO2 Totl Art              24.3 mmol/L                             O2 Sat Art                83.8 %  LOW                             D base                    -5.7  LOW                             THB ABG                   15.0 gm/dL                             CO Hgb                    2.2 %  NA                             Met Hgb Art               0.8 %                             O2 Hgb                    85.9 %  LOW                              CaO2                      18.1 mL/dL                             Ionized Calcium           1.18 mmol/L                             Sodium RT                 129.0 mmol/L  LOW                             Potassium RT              5.90 mmol/L  HI                             Allens                    Positive                             Setting 1 ABG             bipap                             Setting 2 ABG             20/10                             Setting 3 ABG             fio2-100%  .   Radiology results:  Reported at  10/11/2018 12:26:00, X-ray (Reason For Exam  Cough    Radiology Report  Clinical History  Cough     Technique  Portable Single view AP radiograph of the chest.     Comparison  June 1, 2018     Findings  Cardiomegaly with right-sided pleural effusion. Right-sided central  venous line projects over the right atrium.     Impression  Interval development of right-sided pleural effusion. Cardiomegaly is  unchanged.       Signature Line  Electronically Signed By: Hunter Figueroa MD  Date/Time Signed: 10/11/2018 12:26).       Reexamination/ Reevaluation   Time: 10/11/2018 13:30:00 .   Vital signs   results included from flowsheet : Vital Signs   10/11/2018 12:30 CDT     Peripheral Pulse Rate     79 bpm                             Heart Rate Monitored      73 bpm                             Respiratory Rate          22 br/min                             SpO2                      87 %  LOW                             Oxygen Therapy            BiPAP                             Systolic Blood Pressure   114 mmHg                             Diastolic Blood Pressure  78 mmHg                             Mean Arterial Pressure, Cuff              90 mmHg     Assessment: There is now present the patient is awake bright oriented speaking she appears to be doing much better than she was when she initially arrived.  Her saturation is still low at running less than 90% however 8889% on 100% oxygen BiPAP  20/10.  Discussed the pleural effusion with her and the dad present in the room.  Patient will need to be admitted going to the intensive care unit or  the stepdown unit with the patient I'm going to speak with the intensivist service large amount of abdominal ascites may be affecting her breathing also patient may be a candidate for drainage of both.      Procedure   Critical care note   Total time: 86 minutes spent engaged in work directly related to patient care and/ or available for direct patient care.   Critical condition(s) addressed for impending deterioration include: respiratory.   Associated risk factors: hypoxia.   Management: bedside assessment, supervision of care, Interpretation (chest x-ray, blood gases, electrocardiogram, blood pressure), Interventions (hemodynamic management, ventilator management), Case review (medical specialist, nursing, family), Alternate history (family, emergency medical services).   Performed by: self.      Impression and Plan   Diagnosis   COPD (chronic obstructive pulmonary disease) (XYB64-WB J44.9)   Decreased oxygen level (PNED [id number])   Hypoxemia requiring supplemental oxygen (XQX28-QG R09.02)   Ascites (PCP49-AT R18.8)   Pleural effusion on right (WBE46-GJ J90)   Pulmonary hypertension (OEN36-HH I27.20)      Calls-Consults   -  10/11/2018 13:32:00 , RONAN Magallanes MD, ICU TEAM RESIDENT.    Plan   Condition: Critical.    Disposition: Admit time  10/11/2018 13:32:00, Admit to Intensive Care Unit.    Counseled: Patient, Regarding diagnosis, Regarding diagnostic results, Regarding treatment plan, Patient indicated understanding of instructions, as did dad in the room .    Notes: I, Megan Bulnt, acted solely as a scribe for and in the presence of Dr. Em who performed the service.,   I, Donovan Munoz, acted solely as a scribe for and in the presence of Dr. Em who performed the service. , I, Cristian Em MD, a physician licensed to  practice in this state, have  performed the physical evaluation,  history gathering,  and medical decision making that is reflected in this record..   MARIAJOSE Em MD.

## 2023-05-16 NOTE — TELEPHONE ENCOUNTER
Addended by: TAYLOR BINGHAM on: 5/16/2023 02:09 PM     Modules accepted: Orders     Correspondence from [facility] Celestine Chowdhury RN. Forwarded info to Dr Cullen. Will hold at 1mg for now, unless Dr Cullen would prefer otherwise.  ---------------------------      I met with Mrs. Sue Smith this morning for a Adempas titration assessment. She reports having a clinic visit on Friday with Dr. Cullen and has her bags packed ready for admit. At the present time she has a large volume of ascites and 2+ edema to BLE. Lung sounds were clear on auscultation.  I checked her BP in both arms several times due to her history of hypotension.  Left arm readings were 98/62 and 96/60. Right arm was 102/60. Pulse 92 and O2 sat ranged from 87-92% on 3 liters of oxygen.  Mrs. Smith does not report any headaches, dizziness, GERD or other side effects.  Would Dr. Cullen like to keep her at Adempas 1mg dose until she is seen in clinic?  I think based on her hypotension in the past and her edema she has a good chance of being admitted again on Friday.  Let me know ASAP so I can submit an order for med if necessary.

## 2024-09-03 NOTE — PLAN OF CARE
Detail Level: Detailed Problem: Patient Care Overview  Goal: Plan of Care Review  Outcome: Ongoing (interventions implemented as appropriate)  Pt tolerating all treatments and therapies well, pt is AAOx4 and VSS, pt reported pain one time that was controlled with prn medication, pt bed is low and locked and call bell is in reach,pt instructed to call if any assistance is needed, pt afebrile and shows no new signs of infection, no acute events noted or reported, will continue to monitor.      Detail Level: Simple Detail Level: Zone

## 2025-01-30 NOTE — PLAN OF CARE
Increase Atorvastatin to 80 mg        [facility]   Heart Transplant/[facility]N Clinic   [address] [place] [address]   [phone] after hours [phone] fax   HOME HEALTH ORDERS      Admit to Home Health   Diagnosis:      Patient Active Problem List   Diagnosis    Pulmonary hypertension, group 1    Chronic respiratory failure with hypoxia    Hypothyroidism    Essential hypertension    Dyslipidemia    Bipolar affective disorder    Acute on chronic right heart failure    Hx of tracheostomy    Gastroesophageal reflux disease    Acute on chronic diastolic heart failure    Cardiomegaly    Pulmonary nodules    Chronic pulmonary heart disease    Hypervolemia    Shortness of breath         Patient is homebound due to: PH on IV Remodulin     Diet: 2 gm N diet, 1.5 L fluid restriction     Acitivities: As tolerated     Nursing:   SN to complete comprehensive assessment including routine vital signs. Instruct on disease process and s/s of complications to report to MD. Review/verify medication list sent home with the patient at time of discharge and instruct patient/caregiver as needed. Frequency may be adjusted depending on start of care date.      Notify MD if SBP > 160 or < 90; DBP > 90 or < 50; HR > 120 or < 50; Temp > 101; Weight gain >3lbs in 1 day or 5lbs in 1 week.       LABS: SN to perform labs:   CMP, BNP, Mg, CBC q week  INR on Mon 1/15/18 with results sent to coumadin clinic.      Lasix 80 mg IVP q week as needed for weight gain more than 5 pounds in a week, 3 pounds in a day, worsening LE or abdominal edema, or worsening SOB     HOME INFUSION THERAPY:      Pt only to do infusion of medication  SN Central Line Care.   Review Central Line Care     **For questions or concerns, please call [phone] and ask for Pulmonary Hypertension clinic, M-F 8-5. After hours, weekends, call [phone] and ask for the Heart Transplant Cardiologist on call.**      Central :   - Sterile  dressing changes are done weekly and as needed.   - Use chlor-hexadine scrub to cleanse site, apply Biopatch to insertion site,   apply securement device dressing   - If sterile gauze is under dressing to control oozing,   dressing change must be performed every 24 hours until gauze is not needed.      Send follow up questions to [phone] or [phone].

## (undated) DEVICE — DRAPE THYROID WITH ARMBOARD

## (undated) DEVICE — SUT VICRYL 3-0 27 SH

## (undated) DEVICE — DRESSING TRANS 4X4 TEGADERM

## (undated) DEVICE — SET DECANTER MEDICHOICE

## (undated) DEVICE — SUT 4-0 VICRYL / SH

## (undated) DEVICE — BLADE SURG CARBON STEEL SZ11

## (undated) DEVICE — NDL HYPO REG 25G X 1 1/2

## (undated) DEVICE — SOL NACL 0.9% INJ PF/50151

## (undated) DEVICE — CLOSURE SKIN STERI STRIP 1/2X4

## (undated) DEVICE — TRAY MINOR GEN SURG

## (undated) DEVICE — ADHESIVE MASTISOL VIAL 48/BX

## (undated) DEVICE — SEE MEDLINE ITEM 157117

## (undated) DEVICE — DRESSING ANTIMICROBIAL 1 INCH

## (undated) DEVICE — SYR DISP LL 5CC

## (undated) DEVICE — DRAPE C ARM 42 X 120 10/BX

## (undated) DEVICE — ELECTRODE REM PLYHSV RETURN 9